# Patient Record
Sex: MALE | Race: BLACK OR AFRICAN AMERICAN | Employment: UNEMPLOYED | URBAN - METROPOLITAN AREA
[De-identification: names, ages, dates, MRNs, and addresses within clinical notes are randomized per-mention and may not be internally consistent; named-entity substitution may affect disease eponyms.]

---

## 2018-08-06 ENCOUNTER — HOSPITAL ENCOUNTER (OUTPATIENT)
Dept: PHYSICAL THERAPY | Age: 58
Discharge: HOME | End: 2018-08-06
Payer: MEDICAID

## 2018-08-06 PROCEDURE — 97165 OT EVAL LOW COMPLEX 30 MIN: CPT
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
## 2018-09-06 ENCOUNTER — HOSPITAL ENCOUNTER (OUTPATIENT)
Dept: PHYSICAL THERAPY | Age: 58
Discharge: HOME | End: 2018-09-06
Payer: MEDICAID

## 2018-09-06 PROCEDURE — 97110 THERAPEUTIC EXERCISES: CPT

## 2018-09-06 PROCEDURE — 97535 SELF CARE MNGMENT TRAINING: CPT

## 2018-09-06 PROCEDURE — 97162 PT EVAL MOD COMPLEX 30 MIN: CPT

## 2018-09-06 PROCEDURE — 97165 OT EVAL LOW COMPLEX 30 MIN: CPT
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
## 2018-09-06 NOTE — PROGRESS NOTES
OT DAILY TREATMENT NOTE  3-16 Patient Name: Odalis Lemos Date:2018 : 1960 [x]  Patient  Verified Payor: [employer]ICA[employer] / Plan: [address] [place] [address] / Product Type: Managed Care Medicaid / In time:315  Out time:350 Total Treatment Time (min): 35 Visit #: 1 of 10 Treatment Area: Lower extremity dysfunction [R29.898] SUBJECTIVE Pain Level (0-10 scale): 0/10 Any medication changes, allergies to medications, adverse drug reactions, diagnosis change, or new procedure performed?: [x] No    [] Yes (see summary sheet for update) Subjective functional status/changes:   [] No changes reported I was doing better at this stuff OBJECTIVE 25 min Self Care/Home Management: LB dressing Rationale: increase ROM and improve balance  to improve the patients ability to dress lower bodyWheelchair to mat transfer, sit to supine to sit, supine ot long ist all with supervision only Able to reach to thighs, move hip to partial flexion but unable to abduct to bring feet closer for dressing With 
 [] TE 
 [] TA 
 [] neuro 
 [] other: Patient Education: [x] Review HEP [] Progressed/Changed HEP based on: OT POC, hip stretching 
[] positioning   [] body mechanics   [] transfers   [] heat/ice application  
[] Splint wear/care   [] Sensory re-education   [] scar management     
[] other:   
 
      
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 Stays in bedroom lower than rest of house. Nurse 7 hours day Medical hx: None Medications: See the written copy of this report in the patient's paper medical record. Objective: 
Noraml UE ROm, 5/5 strength, good  B ADLs Feeding:        []MaxA   []ModA   []Imtiaz   [] CGA   []SBA   []Rosales   [x]Independent UE Dressing:       []MaxA   []ModA   []Imtiaz   [] CGA   [x]SBA   []Rosales   []Independent LE Dressing:       [x]MaxA   []ModA   []Imtiaz   [] CGA   []SBA   []Rosales   []Independent Grooming:       []MaxA   []ModA   []Imtiaz   [] CGA   [x]SBA   []Rosales   []Independent Toileting:       []MaxA   []ModA   []Imtiaz   [] CGA   [x]SBA   []Rosales   []Independent Bathing:       []MaxA   []ModA   [x]Imtiaz   [] CGA   []SBA   []Rosales   []Independent Light Meal Prep:    [x]MaxA   []ModA   []Imtiaz   [] CGA   []SBA   []Rosales   []Independent Household/Other:  [x]MaxA   []ModA   []Imtiaz   [] CGA   []SBA   []Rosales   []Independent Adaptive Equip:     []MaxA   []ModA   []Imtiaz   [] CGA   []SBA   []Rosales   []Independent Driving:       [x]MaxA   []ModA   []Imtiaz   [] CGA   []SBA   []Rosales   []Independent Money Mgmt:        []MaxA   []ModA   []Imtiaz   [] CGA   []SBA   []Rosales   []Independent Pain Level (0-10 scale) post treatment: 0/10 ASSESSMENT/Changes in Function:   
[x]  See Plan of Care 
[]  See progress note/recertification 
[]  See Discharge Summary PLAN 
[]  Upgrade activities as tolerated     []  Continue plan of care 
[]  Update interventions per flow sheet      
[]  Discharge due to:_ 
[]  Other:_ Reilly Marcial OTR/L 9/6/2018  3:18 PM 
 
Future Appointments Date Time Provider Brittany Stephanie 10/25/2018 10:45 AM MD Blair Woo
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
## 2018-09-06 NOTE — PROGRESS NOTES
[facility] [address] [facility] 
[phone] fax Plan of Care/ Statement of Necessity for Physical Therapy Services Patient name: Luz Elena Yu Start of Care: 2018 Referral source: Boston Hutchison : 1960 Medical Diagnosis: Lower extremity dysfunction [R29.898] Onset Date: 2017 Treatment Diagnosis: LBP, paraplegia Prior Hospitalization: see medical history Provider#: [id number] Medications: Verified on Patient summary List  
 Comorbidities: asthma, HTN Prior Level of Function: manual wheelchair, but in process of getting electric wheelchair, occasional assistance with transfers and getting dressed. The Plan of Care and following information is based on the information from the initial evaluation. Assessment/ key information:  Pt. Is a 62year old male c/o difficulty with transfers and back pain. He reports he was able to place legs in wheelchair foot rests and manage legs for sit to supine transfers but is now having difficulty with these tasks. He also reports having pain and tightness in his back. He reports no sensation below navel and uses a catheter secondary to lack of bladder control. MMT hip flexion 1/5 and was 0/5 in knee and ankle. He had decreased knee mobility into flexion. Mod A for sit to supine transfers, min A for bringing feet onto foot rests. Skilled PT is medically necessary in order to improve pt. Transfers and trunk stability for increased ease of ADLs improve independence at home.   
 
Evaluation Complexity History MEDIUM  Complexity : 1-2 comorbidities / personal factors will impact the outcome/ POC ; Examination MEDIUM Complexity : 3 Standardized tests and measures addressing body structure, function, activity limitation and / or participation in recreation  ;Presentation MEDIUM Complexity : Evolving with changing characteristics ;Clinical Decision Making MEDIUM Complexity : FOTO score of 26-74 Overall Complexity Rating: MEDIUM Problem List: pain affecting function, decrease ROM, decrease strength, impaired gait/ balance, decrease ADL/ functional abilitiies, decrease activity tolerance, decrease flexibility/ joint mobility and decrease transfer abilities Treatment Plan may include any combination of the following: Therapeutic exercise, Therapeutic activities, Neuromuscular re-education, Physical agent/modality, Gait/balance training, Manual therapy, Patient education, Self Care training, Functional mobility training and Home safety training Patient / Family readiness to learn indicated by: asking questions and trying to perform skills Persons(s) to be included in education: patient (P) Barriers to Learning/Limitations: None Patient Goal (s): to be more independent Patient Self Reported Health Status: good Rehabilitation Potential: good Short Term Goals: To be accomplished in 1 weeks: 1. Patient will demonstrate compliance with HEP in order to improve knee flexion mobility. Long Term Goals: To be accomplished in 6 weeks: 1. Patient will improve FOTO score by 7 points in order to demonstrate a significant improvement in function. 2. Patient will be Ind with sit to supine transfers in order to improve independence at home. 3. Patient will be Ind with placing feet on wheelchair foot rests in order to improve independence at home. 4. Patient will report pain at 4/10 or less in order to improve quality of life. Frequency / Duration: Patient to be seen 2 times per week for 6 weeks. Patient/ CarPatient/ Caregiver education and instruction: Diagnosis, prognosis, exercises 
 [x]  Plan of care has been reviewed with CINDY Romero, PT 9/6/2018 6:25 PM 
 
________________________________________________________________________ I certify that the above Therapy Services are being furnished while the patient is under my care. I agree with the treatment plan and certify that this therapy is necessary. [de-identified] Signature:____________Date:_________TIME:________ 
 
[employer], Date and Time must be completed for valid certification ** Please sign and return to [facility]  [address] [facility] 
[phone] fax

## 2018-09-10 ENCOUNTER — HOSPITAL ENCOUNTER (OUTPATIENT)
Dept: PHYSICAL THERAPY | Age: 58
Discharge: HOME | End: 2018-09-10
Payer: MEDICAID

## 2018-09-10 PROCEDURE — 97535 SELF CARE MNGMENT TRAINING: CPT

## 2018-09-10 NOTE — PROGRESS NOTES
OT DAILY TREATMENT NOTE  3-16 Patient Name: Feliciano Serrano Date:9/10/2018 : 1960 [x]  Patient  Verified Payor: BLUE [employer]OSS MEDICAID / Plan: [address] [place] [address] / Product Type: Managed Care Medicaid / In time:500  Out time:530 Total Treatment Time (min): 30 Visit #: 2 of  Treatment Area: There are no admission diagnoses documented for this encounter. SUBJECTIVE Pain Level (0-10 scale): 0/10 Any medication changes, allergies to medications, adverse drug reactions, diagnosis change, or new procedure performed?: [x] No    [] Yes (see summary sheet for update) Subjective functional status/changes:   [] No changes reported This is a work out, it feels better after I did it ( stretching OBJECTIVE 10 min Therapeutic Exercise:  [] See flow sheet :  
Rationale: increase ROM to improve the patients ability to reach for fee 
t Stretching lower extremities using sheet pull with good success 20 
 min Self Care/Home Management: Lower body dressing Rationale: increase ROM, increase strength and improve balance  to improve the patients ability to dress self , Wc to mat transfer with supervision Sit to supine to long sit with demonstration Sheet pull used to bring left knee into flex hip into abd to reach foot and remove shoe with max difficulty and effort Required OT assist to replace shoe due to swelling 
 
d With 
 [] TE 
 [] TA 
 [] neuro 
 [] other: Patient Education: [x] Review HEP [] Progressed/Changed HEP based on:  
[] positioning   [] body mechanics   [x] transfers   [] heat/ice application  
[] Splint wear/care   [] Sensory re-education   [] scar management     
[x] other: techniques for ADL Other Objective/Functional Measures:  
Able to remove left shoe today Pain Level (0-10 scale) post treatment: 0/10 ASSESSMENT/Changes in Function: Improving independence Patient will continue to benefit from skilled OT services to modify and progress therapeutic interventions, address ROM deficits, address strength deficits, analyze and address soft tissue restrictions, analyze and cue movement patterns and instruct in home and community integration to attain remaining goals. []  See Plan of Care 
[]  See progress note/recertification 
[]  See Discharge Summary Progress towards goals / Updated goals: *1.  Patient will be able to move from supine to long sitting independently and reach to knee level to assist with self caremet following stretching 9/10/18 2. Patient will be independent in stretching for lower extremities to position for lower extremity dressing. progressing 9/10 /18 3. Patient will be able to reposition lower extremities to enable reaching to feet with knee bent. 
  
Long Term Goals: To be accomplished in 8 weeks: 1.  Patient will be able to dress self independently at bed level. 2.  Patient will  Be able to complete simple meal prep from wheelchair level 3. Patient will be able to bathe self from [facility] level** PLAN 
[]  Upgrade activities as tolerated     []  Continue plan of care 
[]  Update interventions per flow sheet      
[]  Discharge due to:_ 
[]  Other:_ MISTY Pena/L 9/10/2018  4:58 PM 
 
Future Appointments Date Time Provider Brittany Brown 9/10/2018 5:00 PM Dayanna Paulino OTR/L [facility]  
9/12/2018 5:30 PM Zara Leal, PT [facility]  
9/13/2018 3:00 PM Rosangela Pineda, PTA [facility]  
9/17/2018 3:30 PM Liza Hankins [facility]  
9/17/2018 4:00 PM Zara Leal, PT [facility]  
9/20/2018 12:30 PM Rosangela Pineda, PTA [facility]  
9/20/2018 1:00 PM Dayanna Paulino, OTR/L [facility]  
9/24/2018 5:00 PM Dayanna Paulino OTR/L [facility]  
9/24/2018 5:30 PM Warrior Gloss, PT [facility]  
9/26/2018 2:00 PM Warrior Gloss, PT [facility]  
9/26/2018 2:30 PM Dayanna Paulino, OTR/L [facility]  
10/25/2018 10:45 AM Samantha Gallegos MD [id number]

## 2018-09-12 ENCOUNTER — HOSPITAL ENCOUNTER (OUTPATIENT)
Dept: PHYSICAL THERAPY | Age: 58
End: 2018-09-12
Payer: MEDICAID

## 2018-09-12 ENCOUNTER — APPOINTMENT (OUTPATIENT)
Dept: PHYSICAL THERAPY | Age: 58
End: 2018-09-12
Payer: MEDICAID

## 2018-09-13 ENCOUNTER — APPOINTMENT (OUTPATIENT)
Dept: PHYSICAL THERAPY | Age: 58
End: 2018-09-13
Payer: MEDICAID

## 2018-09-13 ENCOUNTER — HOSPITAL ENCOUNTER (OUTPATIENT)
Dept: PHYSICAL THERAPY | Age: 58
Discharge: HOME | End: 2018-09-13
Payer: MEDICAID

## 2018-09-13 PROCEDURE — 97110 THERAPEUTIC EXERCISES: CPT

## 2018-09-13 PROCEDURE — 97530 THERAPEUTIC ACTIVITIES: CPT

## 2018-09-13 NOTE — PROGRESS NOTES
OT DAILY TREATMENT NOTE - non [facility] 3-16 Patient Name: Yasmine Morejon Date:2018 : 1960 [x]  Patient  Verified Payor: BLUE [employer]OSS MEDICAID / Plan: [address] [place] [address] / Product Type: Managed Care Medicaid / In time:931  Out time:1002 Total Treatment Time (min): 31 Visit #: 3 of 10 Treatment Area: Lower extremity dysfunction [R29.898] SUBJECTIVE Pain Level (0-10 scale): 0/10 Any medication changes, allergies to medications, adverse drug reactions, diagnosis change, or new procedure performed?: [x] No    [] Yes (see summary sheet for update) Subjective functional status/changes:   [] No changes reported My feet a swollen today OBJECTIVE 11 min Therapeutic Exercise:  [] See flow sheet :  
Rationale: increase ROM and increase strength to improve the patients ability to reach LB stretching with use of leg , seated at edge of mat 
 
20 min Therapeutic Activity:  [x]  See flow sheet :  
Rationale: increase ROM, increase strength and improve coordination  to improve the patients ability to don/doff pants Simulated LB dressing with use of theraband, reacher, and leg  in long sitting, don/doff 2x With 
 [] TE 
 [] TA 
 [] neuro 
 [] other: Patient Education: [x] Review HEP [] Progressed/Changed HEP based on:  
[] positioning   [] body mechanics   [] transfers   [] heat/ice application  
[] Splint wear/care   [] Sensory re-education   [] scar management     
[] other:   
 
      
Other Objective/Functional Measures:  
 
Edge of Mat to Supine: Min A assistance with moving BLE onto mat Supine to Long Sitting: supervision Long sitting to [place] of Mat: Supervision with some difficulty with B LE 
Edge of Mat to W/c: Supervision, assistance needed for BLE in leg rests Simulated BLE dressing: Mod A/Min A with improvement on second attempt, increased time required Pain Level (0-10 scale) post treatment: 0/10 ASSESSMENT/Changes in Function: Improvement with LB dressing with use of AE Patient will continue to benefit from skilled OT services to modify and progress therapeutic interventions, address ROM deficits, address strength deficits and instruct in home and community integration to attain remaining goals. []  See Plan of Care 
[]  See progress note/recertification 
[]  See Discharge Summary Progress towards goals / Updated goals: 1.  Patient will be able to move from supine to long sitting independently and reach to knee level to assist with self caremet following stretching 9/10/18 2.  Patient will be independent in stretching for lower extremities to position for lower extremity dressing. progressing 9/10 /18 3.   Patient will be able to reposition lower extremities to enable reaching to feet with knee bent. 
   
Long Term Goals: To be accomplished in [address] [place] [address] 
1.  Patient will be able to dress self independently at bed level. 2.  Patient will  Be able to complete simple meal prep from wheelchair level 3.  Patient will be able to bathe self from [facility] level PLAN 
[]  Upgrade activities as tolerated     [x]  Continue plan of care 
[]  Update interventions per flow sheet      
[]  Discharge due to:_ 
[]  Other:_ Jesu Mayen MONTIEL/ROBBIN 9/13/2018  9:29 AM 
 
Future Appointments Date Time Provider Brittany Brown 9/13/2018 9:30 AM Jane Alarcon [facility]  
9/17/2018 3:30 PM Jesu Mayen [id number] [facility]  
9/17/2018 4:00 PM Navin Graham, PT [facility]  
9/20/2018 12:30 PM Eliz Kim PTA [facility]  
9/20/2018 1:00 PM Shruthi Good, OTR/L [facility]  
9/24/2018 5:00 PM Shruthi Good, OTR/L [facility]  
9/24/2018 5:30 PM Navin Graham, PT [facility]  
9/26/2018 2:00 PM Navin Graham PT [facility]  
9/26/2018 2:30 PM Shruthi Good, OTR/L [facility]  
10/25/2018 10:45 AM MD Blair Joyner

## 2018-09-13 NOTE — PROGRESS NOTES
PT DAILY TREATMENT NOTE - [facility]  Patient Name: Guerda Espinal Date:2018 : 1960 [x]  Patient  Verified Payor: BL[employer]OSS [employer]ICAID / Plan: [address] [place] [address] / Product Type: Managed Care Medicaid / In time:9:06  Out time:9:31 Total Treatment Time (min): 25 Total Timed Codes (min): 25 
1:1 Treatment Time ( only): 25 Visit #: 2 of 12 Treatment Area: Lower extremity dysfunction [R29.898] SUBJECTIVE Pain Level (0-10 scale): 0/10 Any medication changes, allergies to medications, adverse drug reactions, diagnosis change, or new procedure performed?: [x] No    [] Yes (see summary sheet for update) Subjective functional status/changes:   [] No changes reported Pt reports that he is doing the exercises at home. He is not sure what he's hoping to achieve with therapy. He feels like he's ok with his transfers at home. OBJECTIVE 25 min Therapeutic Exercise:  [x] See flow sheet :  
Rationale: increase ROM, increase strength, improve coordination, improve balance and increase proprioception to improve the patients ability to improve ease of transfers and ADLs With 
 [] TE 
 [] TA 
 [] neuro 
 [] other: Patient Education: [x] Review HEP [] Progressed/Changed HEP based on:  
[] positioning   [] body mechanics   [] transfers   [] heat/ice application   
[] other:   
 
Other Objective/Functional Measures:  
 
Simon TB for TB rows, required cues for scapular retraction and to avoid should shrug, improved form with cuing Unable to maintain elbow extension with plum TB for extension, improved form with blue TB, required cues to avoid trunk flexion and maintain upright posture Performed wheelchair<>table transfer Refugio, therapist performed set-up with wheelchair Imtiaz with putting feet on leg rests of wheelchair No visible movement with seated tara Pain Level (0-10 scale) post treatment: 0/10 ASSESSMENT/Changes in Function: Initiated treatment per POC. Pt is motivated in therapy and put forth good effort with interventions. Will continue to address strength and functional mobility deficits for improved ease of ADLs and improved QOL. Patient will continue to benefit from skilled PT services to modify and progress therapeutic interventions, address functional mobility deficits, address ROM deficits, address strength deficits, analyze and address soft tissue restrictions, analyze and cue movement patterns, analyze and modify body mechanics/ergonomics, assess and modify postural abnormalities, address imbalance/dizziness and instruct in home and community integration to attain remaining goals. Progress towards goals / Updated goals: 
Short Term Goals: To be accomplished in 1 weeks: 1. Patient will demonstrate compliance with HEP in order to improve knee flexion mobility. Goal me. 9/13/18 
  
Long Term Goals: To be accomplished in 6 weeks: 1. Patient will improve FOTO score by 7 points in order to demonstrate a significant improvement in function. 2. Patient will be Ind with sit to supine transfers in order to improve independence at home. 3. Patient will be Ind with placing feet on wheelchair foot rests in order to improve independence at home. 4. Patient will report pain at 4/10 or less in order to improve quality of life. PLAN [x]  Upgrade activities as tolerated     [x]  Continue plan of care 
[]  Update interventions per flow sheet      
[]  Discharge due to:_ 
[]  Other:_ Daniel Berman PT 9/13/2018  9:08 AM 
 
Future Appointments Date Time Provider Brittany Brown 9/13/2018 9:30 AM Nirav Hands Grogg [facility]  
9/17/2018 3:30 PM Hebert Hannon [id number] [facility]  
9/17/2018 4:00 PM Demetria Dennis, PT [facility]  
9/20/2018 12:30 PM Alejo Hunt, PTA [facility]  
9/20/2018 1:00 PM Sebastián Rodriguez, OTR/L [facility]  
9/24/2018 5:00 PM Sebastián Rodriguez, OTR/L [facility]  
 9/24/2018 5:30 PM Ning Salmon, PT [id number] [facility]  
9/26/2018 2:00 PM Ning Salmon, PT [facility]  
9/26/2018 2:30 PM Elodia Del Castillo, OTR/L [facility]  
10/25/2018 10:45 AM MD Simeon Hamilton [facility]

## 2018-09-17 ENCOUNTER — HOSPITAL ENCOUNTER (OUTPATIENT)
Dept: PHYSICAL THERAPY | Age: 58
Discharge: HOME | End: 2018-09-17
Payer: MEDICAID

## 2018-09-17 PROCEDURE — 97110 THERAPEUTIC EXERCISES: CPT

## 2018-09-17 PROCEDURE — 97535 SELF CARE MNGMENT TRAINING: CPT

## 2018-09-17 NOTE — PROGRESS NOTES
OT DAILY TREATMENT NOTE - non [facility] 3-16 Patient Name: Kenzie Odom Date:2018 : 1960 [x]  Patient  Verified Payor: BL[employer]OSS [employer]ICAID / Plan: [address] [place] [address] / Product Type: Managed Care Medicaid / In time:332  Out time:400 Total Treatment Time (min): 28 Visit #: 4 of 10 Treatment Area: Lower extremity dysfunction [R29.898] SUBJECTIVE Pain Level (0-10 scale): 0/10 Any medication changes, allergies to medications, adverse drug reactions, diagnosis change, or new procedure performed?: [x] No    [] Yes (see summary sheet for update) Subjective functional status/changes:   [] No changes reported I think I would be able to do my shoes if I wasn't so tight OBJECTIVE 18 min Therapeutic Exercise:  [] See flow sheet :  
Rationale: increase ROM and increase strength to improve the patients ability to reach BLE for increased independence with ADL's 
 
LB Stretching with Band 10 min Self Care/Home Management: Shoe Donning Rationale: increase ROM, increase strength and improve coordination  to improve the patients ability to don shoes with increased independence Patty Minion Right Shoe in long sitting With 
 [] TE 
 [] TA 
 [] neuro 
 [] other: Patient Education: [x] Review HEP [] Progressed/Changed HEP based on:  
[] positioning   [] body mechanics   [] transfers   [] heat/ice application  
[] Splint wear/care   [] Sensory re-education   [] scar management     
[] other:   
 
      
Other Objective/Functional Measures: W/C <> Mat: Mod I Edge of Mat <> Supine: Min A for assistance with B LE 
Supine to long sitting: Mod I Dynamic Sitting balance in long sitting: SBA Patty Minion Right Shoe: SBA with increased time required Verbal Cueing needing for neck position/posture during stretching Pain Level (0-10 scale) post treatment: 0/10 ASSESSMENT/Changes in Function: Pt progressing with ability to perform LB dressing tasks Patient will continue to benefit from skilled OT services to modify and progress therapeutic interventions, address ROM deficits, address strength deficits, analyze and cue movement patterns and instruct in home and community integration to attain remaining goals. []  See Plan of Care 
[]  See progress note/recertification 
[]  See Discharge Summary Progress towards goals / Updated goals: 1.  Patient will be able to move from supine to long sitting independently and reach to knee level to assist with self caremet following stretching 9/10/18 2.  Patient will be independent in stretching for lower extremities to position for lower extremity dressing. progressing 9/17 /18 3.   Patient will be able to reposition lower extremities to enable reaching to feet with knee bent. Progressing with in clinic activities 9/17/18 
   
[address] be accomplished in [address] ([facility]) will be able to dress self independently at bed level. 2.  Patient will  Be able to complete simple meal prep from wheelchair level 3.  Patient will be able to bathe self from [facility] level PLAN 
[]  Upgrade activities as tolerated     [x]  Continue plan of care 
[]  Update interventions per flow sheet      
[]  Discharge due to:_ 
[]  Other:_ DINESH Leiva/ROBBIN 9/17/2018  2:54 PM 
 
Future Appointments Date Time Provider Brittany Brown 9/17/2018 3:30 PM Von Toledo [facility]  
9/17/2018 4:00 PM Henri Calderon, DESIRE [facility]  
9/20/2018 12:30 PM Delgado Diallo PTA [facility]  
9/20/2018 1:00 PM Irina Murrell, OTR/L [facility]  
9/24/2018 5:00 PM Irina Murrell, OTR/L [facility]  
9/24/2018 5:30 PM Henri Calderon, DESIRE [facility]  
9/26/2018 2:00 PM Henri Calderon, DESIRE [facility]  
9/26/2018 2:30 PM Irina Murrell, OTR/L [facility]  
10/25/2018 10:45 AM Veronica Vega MD [facility]

## 2018-09-17 NOTE — PROGRESS NOTES
PT DAILY TREATMENT NOTE  Patient Name: Nora Shultz Date:2018 : 1960 [x]  Patient  Verified Payor: BLUE [employer]OSS [employer]ICAID / Plan: [address] [place] [address] / Product Type: Managed Care Medicaid / In time:406 Out time:4:44 Total Treatment Time (min): 38 Visit #: 3 of 12 Treatment Area: Lower extremity dysfunction [R29.898] SUBJECTIVE Pain Level (0-10 scale): 0/10 Any medication changes, allergies to medications, adverse drug reactions, diagnosis change, or new procedure performed?: [x] No    [] Yes (see summary sheet for update) Subjective functional status/changes:   [] No changes reported Pt. States he is doing fine. Received after completing OT session. OBJECTIVE 15 min Therapeutic Exercise:  [x] See flow sheet :  
Rationale: increase ROM and increase strength to improve the patients ability to improve ease with ADL's. 
 
8 min Therapeutic Activity:  [x]  See flow sheet :  
Rationale: increase strength and improve coordination  to improve the patients ability to improve ease with ADL's, including bed mobility and transfers. 10 min Neuromuscular Re-education:  [x]  See flow sheet :  
Rationale: increase strength and improve balance  to improve the patients ability to improve ease with ADL's. With 
 [] TE 
 [] TA 
 [] neuro 
 [] other: Patient Education: [x] Review HEP [] Progressed/Changed HEP based on:  
[] positioning   [] body mechanics   [] transfers   [] heat/ice application   
[] other:   
 
Other Objective/Functional Measures: Pt. Able to complete rolling in supine with minimal assistance to manage LE's, uses momentum of UE's to complete. Pain Level (0-10 scale) post treatment: 0/10 ASSESSMENT/Changes in Function: 
Pt. Is highly motivated to achieve goals. Pt. Worked on improving core activation and strengthening to improve ease of completing bed mobility and transfers. Patient will continue to benefit from skilled PT services to modify and progress therapeutic interventions, address functional mobility deficits, address ROM deficits, address strength deficits, analyze and cue movement patterns, assess and modify postural abnormalities, address imbalance/dizziness and instruct in home and community integration to attain remaining goals. Progress towards goals / Updated goals: 
Short Term Goals: To be accomplished in 1 weeks: 1. Patient will demonstrate compliance with HEP in order to improve knee flexion mobility. Goal me. 9/13/18 
   
Long Term Goals: To be accomplished in 6 weeks: 1. Patient will improve FOTO score by 7 points in order to demonstrate a significant improvement in function. 2. Patient will be Ind with sit to supine transfers in order to improve independence at home. 3. Patient will be Ind with placing feet on wheelchair foot rests in order to improve independence at home. 4. Patient will report pain at 4/10 or less in order to improve quality of life. PLAN 
[]  Upgrade activities as tolerated     [x]  Continue plan of care 
[]  Update interventions per flow sheet      
[]  Discharge due to:_ 
[]  Other:_   
 
Sol Goss, PT 9/17/2018  4:13 PM 
 
Future Appointments Date Time Provider Brittany Brown 9/20/2018 12:30 PM Demetri Gibbons PTA [facility]  
9/20/2018 1:00 PM Colby Fox, OTR/L [facility]  
9/24/2018 5:00 PM Colby Fox, OTR/L [facility]  
9/24/2018 5:30 PM Sol Goss, PT [facility]  
9/26/2018 2:00 PM Sol Goss, PT [facility]  
9/26/2018 2:30 PM Colby Fox, OTR/L [facility]  
10/25/2018 10:45 AM Quincy Menendez MD [address] [facility]

## 2018-09-19 ENCOUNTER — APPOINTMENT (OUTPATIENT)
Dept: PHYSICAL THERAPY | Age: 58
End: 2018-09-19
Payer: MEDICAID

## 2018-09-20 ENCOUNTER — HOSPITAL ENCOUNTER (OUTPATIENT)
Dept: PHYSICAL THERAPY | Age: 58
Discharge: HOME | End: 2018-09-20
Payer: MEDICAID

## 2018-09-20 PROCEDURE — 97110 THERAPEUTIC EXERCISES: CPT

## 2018-09-20 PROCEDURE — 97535 SELF CARE MNGMENT TRAINING: CPT

## 2018-09-20 NOTE — PROGRESS NOTES
PT DAILY TREATMENT NOTE - [facility]  Patient Name: Isaiah Weiss Date:2018 : 1960 [x]  Patient  Verified Payor: BLUE [employer]OSS MEDICAID / Plan: [address] [place] [address] / Product Type: Managed Care Medicaid / In time:12:39  Out time:1:02 Total Treatment Time (min): 23 Total Timed Codes (min): 23 
1:1 Treatment Time ([facility] only): 23 Visit #: 4 of 12 Treatment Area: Lower extremity dysfunction [R29.898] SUBJECTIVE Pain Level (0-10 scale): 0/10 Any medication changes, allergies to medications, adverse drug reactions, diagnosis change, or new procedure performed?: [x] No    [] Yes (see summary sheet for update) Subjective functional status/changes:   [] No changes reported Pt reports being compliant with his HEP. OBJECTIVE 23 min Therapeutic Exercise:  [] See flow sheet :  
Rationale: increase ROM and increase strength to improve the patients ability to perform ADL's. With 
 [x] TE 
 [] TA 
 [] neuro 
 [] other: Patient Education: [x] Review HEP [] Progressed/Changed HEP based on:  
[] positioning   [] body mechanics   [] transfers   [] heat/ice application   
[] other:   
 
Other Objective/Functional Measures: Pt progressed TE with tricep extensions. Pain Level (0-10 scale) post treatment: 0/10 ASSESSMENT/Changes in Function: Pt progressed strengthening TE's evidenced by tricep extensions and [place] rows repetitions. Pt seems very motivated and works hard. Patient will continue to benefit from skilled PT services to modify and progress therapeutic interventions, address functional mobility deficits, address ROM deficits, address strength deficits, analyze and address soft tissue restrictions and analyze and cue movement patterns to attain remaining goals. [x]  See Plan of Care 
[]  See progress note/recertification 
[]  See Discharge Summary Progress towards goals / Updated goals: 
Short Term Goals: To be accomplished in 1 weeks: 1. Patient will demonstrate compliance with HEP in order to improve knee flexion mobility. Goal me. 9/13/18 
   
Long Term Goals: To be accomplished in 6 weeks: 1. Patient will improve FOTO score by 7 points in order to demonstrate a significant improvement in function. 2. Patient will be Ind with sit to supine transfers in order to improve independence at home. 3. Patient will be Ind with placing feet on wheelchair foot rests in order to improve independence at home. 4. Patient will report pain at 4/10 or less in order to improve quality of life. PLAN [x]  Upgrade activities as tolerated     [x]  Continue plan of care [x]  Update interventions per flow sheet      
[]  Discharge due to:_ 
[]  Other:_ Toyin White, CINDY 9/20/2018  6:13 PM 
 
Future Appointments Date Time Provider Brittany Brown 9/24/2018 5:00 PM Sheila Avelar, OTR/L [facility]  
9/24/2018 5:30 PM Bard Brunner, PT [facility]  
9/26/2018 2:00 PM Bard Brunner, PT [facility]  
9/26/2018 2:30 PM Sheila Avelar, OTR/L [facility]  
10/25/2018 10:45 AM Paras Bragg MD [address] [facility]

## 2018-09-20 NOTE — PROGRESS NOTES
OT DAILY TREATMENT NOTE  3-16 Patient Name: Isaiah Weiss Date:2018 : 1960 [x]  Patient  Verified Payor: BLUE CROSS MEDICAID / Plan: [address] [place] [address] / Product Type: Managed Care Medicaid / In time:100  Out time:130 Total Treatment Time (min): 30 Visit #: 5 of 10 Treatment Area: Lower extremity dysfunction [R29.898] SUBJECTIVE Pain Level (0-10 scale): 0/10 Any medication changes, allergies to medications, adverse drug reactions, diagnosis change, or new procedure performed?: [x] No    [] Yes (see summary sheet for update) Subjective functional status/changes:   [] No changes reported I got stretched out good in PT today OBJECTIVE 30 min Self Care/Home Management: Lower body dressing Rationale: increase ROM  to improve the patients ability to dress lower extremity Wc to mat transfer, supine to sit after I, sit to supine with several trials to correct Reached to mid calf, used leg  with cues, tried sock aid hard and thin plastic with fair success Able to remove left shoe With 
 [] TE 
 [] TA 
 [] neuro 
 [] other: Patient Education: [x] Review HEP [] Progressed/Changed HEP based on: stretching 
[] positioning   [] body mechanics   [] transfers   [] heat/ice application  
[] Splint wear/care   [] Sensory re-education   [] scar management     
[] other:   
 
      
Other Objective/Functional Measures:  
Able to remove left shoe independently Pain Level (0-10 scale) post treatment: 0/10 ASSESSMENT/Changes in Function: Improving lower body dressing Patient will continue to benefit from skilled OT services to modify and progress therapeutic interventions, address ROM deficits, address strength deficits, analyze and address soft tissue restrictions and instruct in home and community integration to attain remaining goals. []  See Plan of Care 
[]  See progress note/recertification 
[]  See Discharge Summary Progress towards goals / Updated goals: 1.  Patient will be able to move from supine to long sitting independently and reach to knee level to assist with self caremet following stretching 9/10/18 2.  Patient will be independent in stretching for lower extremities to position for lower extremity dressing. met 9/20/18 3.   Patient will be able to reposition lower extremities to enable reaching to feet with knee bent. Progressing with in clinic activities 9/20/18 
   
[address] be accomplished in [address] ([facility]) will be able to dress self independently at bed level. 2.  Patient will  Be able to complete simple meal prep from wheelchair level 3.  Patient will be able to bathe self from [facility] level PLAN [x]  Upgrade activities as tolerated     [x]  Continue plan of care 
[]  Update interventions per flow sheet      
[]  Discharge due to:_ 
[]  Other:_ Eunice Holder OTR/L 9/20/2018  2:55 PM 
 
Future Appointments Date Time Provider Brittany Brown 9/24/2018 5:00 PM Eunice Holder OTR/L [facility]  
9/24/2018 5:30 PM Elvis Elam, PT [facility]  
9/26/2018 2:00 PM Elvis Elam, PT [facility]  
9/26/2018 2:30 PM Eunice Holder OTR/L [facility]  
10/25/2018 10:45 AM Hayden Yang MD [address] [facility]

## 2018-09-24 ENCOUNTER — HOSPITAL ENCOUNTER (OUTPATIENT)
Dept: PHYSICAL THERAPY | Age: 58
Discharge: HOME | End: 2018-09-24
Payer: MEDICAID

## 2018-09-24 PROCEDURE — 97110 THERAPEUTIC EXERCISES: CPT

## 2018-09-24 PROCEDURE — 97535 SELF CARE MNGMENT TRAINING: CPT

## 2018-09-24 NOTE — PROGRESS NOTES
OT DAILY TREATMENT NOTE  3 Patient Name: Yasmine Morejon Date:2018 : 1960 [x]  Patient  Verified Payor: [employer] / Plan: [address] [place] [address] / Product Type: Managed Care Medicaid / In time:515  Out time:530 Total Treatment Time (min): 15 Visit #: 6 of  Treatment Area: Lower extremity dysfunction [R29.898] SUBJECTIVE Pain Level (0-10 scale): 0/10 Any medication changes, allergies to medications, adverse drug reactions, diagnosis change, or new procedure performed?: [x] No    [] Yes (see summary sheet for update) Subjective functional status/changes:   [] No changes reported I am sorry I am late I am seeing foot doctor tomorrow ( re sore on great toe on left foot) OBJECTIVE 15 min Self Care/Home Management: Lower body dressing Rationale: increase ROM and adaptive techniques  to improve the patients ability to don doff left shoe Removed left sock with dressing stick in wheelchair, Removed left shoe with dressing stick in wheelchair Donned left sock with sock aid in wheelchair, donned left shoe using dressing stick and mod assist of OT With 
 [] TE 
 [] TA 
 [] neuro 
 [] other: Patient Education: [x] Review HEP [] Progressed/Changed HEP based on: use of equipment 
[] positioning   [] body mechanics   [] transfers   [] heat/ice application  
[] Splint wear/care   [] Sensory re-education   [] scar management     
[x] other: Skin care foot care with use of mirror Other Objective/Functional Measures:  
Open area on top of great toe, dark area on side of 5th metatarsal  
 
Pain Level (0-10 scale) post treatment: 0/10 ASSESSMENT/Changes in Function: Able to perform shoe and sock at wheelchair level requiring equipment and rest breaks, and physical assist only for shoe fully on Patient will continue to benefit from skilled OT services to modify and progress therapeutic interventions, address ROM deficits, address strength deficits, analyze and address soft tissue restrictions and instruct in home and community integration to attain remaining goals. []  See Plan of Care 
[]  See progress note/recertification 
[]  See Discharge Summary Progress towards goals / Updated goals: 1.  Patient will be able to move from supine to long sitting independently and reach to knee level to assist with self caremet following stretching 9/10/18 2.  Patient will be independent in stretching for lower extremities to position for lower extremity dressing. met 9/20/18 3.   Patient will be able to reposition lower extremities to enable reaching to feet with knee bent. Progressing with in clinic activities 9/20/18 
   
[address] be accomplished in [address] ([facility]) will be able to dress self independently at bed level. 2.  Patient will  Be able to complete simple meal prep from wheelchair level 3.  Patient will be able to bathe self from [facility] level PLAN 
[]  Upgrade activities as tolerated     [x]  Continue plan of care 
[]  Update interventions per flow sheet      
[]  Discharge due to:_ 
[]  Other:_ Connie Cabrera OTR/L 9/24/2018  5:38 PM 
 
Future Appointments Date Time Provider Brittany Brown 9/26/2018 2:00 PM Genora Lefort, PT [facility]  
9/26/2018 2:30 PM Connie Cabrera OTR/L [facility]  
10/25/2018 10:45 AM MD Blair Morales

## 2018-09-26 ENCOUNTER — HOSPITAL ENCOUNTER (OUTPATIENT)
Dept: PHYSICAL THERAPY | Age: 58
Discharge: HOME | End: 2018-09-26
Payer: MEDICAID

## 2018-09-26 PROCEDURE — 97535 SELF CARE MNGMENT TRAINING: CPT

## 2018-09-26 PROCEDURE — 97110 THERAPEUTIC EXERCISES: CPT

## 2018-09-26 NOTE — PROGRESS NOTES
OT DAILY TREATMENT NOTE  3- Patient Name: Sabi Jacinto Date:2018 : 1960 [x]  Patient  Verified Payor: [employer] / Plan: [address] [place] [address] / Product Type: Managed Care Medicaid / In time:230  Out time:300 Total Treatment Time (min): 30 Visit #: 7 of  Treatment Area: Lower extremity dysfunction [R29.898] SUBJECTIVE Pain Level (0-10 scale): 0/10 Any medication changes, allergies to medications, adverse drug reactions, diagnosis change, or new procedure performed?: [x] No    [] Yes (see summary sheet for update) Subjective functional status/changes:   [] No changes reported This is harder than I thought it would be OBJECTIVE 30 min Self Care/Home Management: lower body dressing Rationale: increase ROM, increase strength and improve balance  to improve the patients ability to dress lower body Sit to supine on first trial today Verbal cues to long sit from supine Able to don pants to hips with min assist and mod verbal cues, Used leg  to reposition leg intially Attempted seated shoe management-unable With 
 [] TE 
 [] TA 
 [] neuro 
 [] other: Patient Education: [x] Review HEP [x] Progressed/Changed HEP based on: adaptive dressing skills [] positioning   [] body mechanics   [] transfers   [] heat/ice application  
[] Splint wear/care   [] Sensory re-education   [] scar management     
[] other:   
 
      
Other Objective/Functional Measures:  
Able to don pants to hips with min assist  
 
Pain Level (0-10 scale) post treatment: 0/10 ASSESSMENT/Changes in Function: improving self care and mobility skills Patient will continue to benefit from skilled OT services to modify and progress therapeutic interventions, address ROM deficits, address strength deficits, analyze and address soft tissue restrictions, analyze and cue movement patterns and instruct in home and community integration to attain remaining goals. []  See Plan of Care 
[]  See progress note/recertification 
[]  See Discharge Summary Progress towards goals / Updated goals: *1.  Patient will be able to move from supine to long sitting independently and reach to knee level to assist with self caremet following stretching 9/10/18, met 9/26/18 2.  Patient will be independent in stretching for lower extremities to position for lower extremity dressing. met 9/20/18 3.   Patient will be able to reposition lower extremities to enable reaching to feet with knee bent. Progressing with in clinic activities 9/26/18 
   
[address] be accomplished in [address] ([facility]) will be able to dress self independently at bed level. progressing 9/26/18 2.  Patient will  Be able to complete simple meal prep from wheelchair level 3.  Patient will be able to bathe self from [facility] level** PLAN [x]  Upgrade activities as tolerated     [x]  Continue plan of care 
[]  Update interventions per flow sheet      
[]  Discharge due to:_ 
[]  Other:_ MISTY Chicas/L 9/26/2018  3:04 PM 
 
Future Appointments Date Time Provider Brittany Brown 10/25/2018 10:45 AM MD Blair Crain Che

## 2018-09-26 NOTE — PROGRESS NOTES
PT DAILY TREATMENT NOTE - [facility]  Patient Name: Guerda Espinal Date:2018 : 1960 [x]  Patient  Verified Payor: [employer] / Plan: [address] [place] [address] / Product Type: Managed Care Medicaid / In time:202 Out time:230 Total Treatment Time (min): 28 Total Timed Codes (min): 28 
1:1 Treatment Time ( only): 28 Visit #: 6 of 12 Treatment Area: Lower extremity dysfunction [R29.898] SUBJECTIVE Pain Level (0-10 scale): 0/10 Any medication changes, allergies to medications, adverse drug reactions, diagnosis change, or new procedure performed?: [x] No    [] Yes (see summary sheet for update) Subjective functional status/changes:   [x] No changes reported Pt reports he is doing alright, no changes since Monday. OBJECTIVE 30 min Therapeutic Exercise:  [x] See flow sheet :  
Rationale: increase strength and improve balance to improve the patients ability to increase ease of transfers and daily activities. With 
 [] TE 
 [] TA 
 [] neuro 
 [] other: Patient Education: [x] Review HEP [] Progressed/Changed HEP based on:  
[] positioning   [] body mechanics   [] transfers   [] heat/ice application   
[] other:   
 
Other Objective/Functional Measures:   
 
Pain Level (0-10 scale) post treatment: 0/10 ASSESSMENT/Changes in Function: Pt reports a 95% improvement in over all function since start of therapy program. He is able to complete bed mobility and transfers ( wheelchair <>mat, supine<>sit) independently. Patient has met 4/4 longer term goals and 100% FOTO goal. Patient reports he is compliant, and independent with HEP.  
 
Patient will continue to benefit from skilled PT services to modify and progress therapeutic interventions, address functional mobility deficits, address ROM deficits, address strength deficits, analyze and cue movement patterns, analyze and modify body mechanics/ergonomics and instruct in home and community integration to attain remaining goals. []  See Plan of Care 
[]  See progress note/recertification 
[]  See Discharge Summary Progress towards goals / Updated goals: 
Short Term Goals: To be accomplished in 1 weeks: 1. Patient will demonstrate compliance with HEP in order to improve knee flexion mobility. Goal met. 9/13/18 
   
Long Term Goals: To be accomplished in 6 weeks: 1. Patient will improve FOTO score by 7 points in order to demonstrate a significant improvement in function. Current: MET, scored 45 pts on 9/24, 13 point improvement 2. Patient will be Ind with sit to supine transfers in order to improve independence at home. Current: MET 3. Patient will be Ind with placing feet on wheelchair foot rests in order to improve independence at home. Current: MET 4. Patient will report pain at 4/10 or less in order to improve quality of life. Current: MET, patient reports no pain, occasional muscular soreness following exercise. PLAN 
[]  Upgrade activities as tolerated     [x]  Continue plan of care 
[]  Update interventions per flow sheet      
[]  Discharge due to:_ 
[]  Other:_   
 
Milan Benitez, PT 9/26/2018  2:07 PM 
 
Future Appointments Date Time Provider Brittany Brown 9/26/2018 2:30 PM Andreas Ramos OTR/L [facility]  
10/25/2018 10:45 AM Claudette Arbour, MD [address] [facility]

## 2018-09-27 ENCOUNTER — APPOINTMENT (OUTPATIENT)
Dept: PHYSICAL THERAPY | Age: 58
End: 2018-09-27
Payer: MEDICAID

## 2018-10-08 ENCOUNTER — HOSPITAL ENCOUNTER (OUTPATIENT)
Dept: PHYSICAL THERAPY | Age: 58
Discharge: HOME | End: 2018-10-08
Payer: MEDICAID

## 2018-10-08 PROCEDURE — 97110 THERAPEUTIC EXERCISES: CPT

## 2018-10-08 PROCEDURE — 97112 NEUROMUSCULAR REEDUCATION: CPT

## 2018-10-08 NOTE — PROGRESS NOTES
PT DAILY TREATMENT NOTE - [facility]  Patient Name: Pascual Garcia Date:10/8/2018 : 1960 [x]  Patient  Verified Payor: [employer] / Plan: [address] [place] [address] / Product Type: Managed Care Medicaid / In time:4:30  Out time:5:03 Total Treatment Time (min): 33 Total Timed Codes (min): 33 
1:1 Treatment Time ( only): 28 Visit #: 7 of 12 Treatment Area: Lower extremity dysfunction [R29.898] SUBJECTIVE Pain Level (0-10 scale): 0/10 Any medication changes, allergies to medications, adverse drug reactions, diagnosis change, or new procedure performed?: [x] No    [] Yes (see summary sheet for update) Subjective functional status/changes:   [] No changes reported Pt reports being compliant with his HEP. OBJECTIVE 23 min Therapeutic Exercise:  [x] See flow sheet :  
Rationale: increase ROM and increase strength to improve the patients ability to perform ADL's independently. 10 min Neuromuscular Re-education:  [x]  See flow sheet :  
Rationale: improve coordination, improve balance and increase proprioception  to improve the patients ability to perform ADL's independently. With 
 [x] TE 
 [] TA [x] neuro 
 [] other: Patient Education: [x] Review HEP [] Progressed/Changed HEP based on:  
[] positioning   [] body mechanics   [] transfers   [] heat/ice application   
[] other:   
 
Other Objective/Functional Measures: Pt completed TE's per flow sheet with good for taking cueing. Pain Level (0-10 scale) post treatment: 0/10 ASSESSMENT/Changes in Function: Pt continued strengthening TE's and neuro re-ed requiring verbal and tactile cues. Pt appears to have the most difficulty with core strengthening, evidenced by seated push outs and SB#1's.   
 
Patient will continue to benefit from skilled PT services to modify and progress therapeutic interventions, address functional mobility deficits, address ROM deficits, address strength deficits, analyze and address soft tissue restrictions and analyze and cue movement patterns to attain remaining goals. [x]  See Plan of Care 
[]  See progress note/recertification 
[]  See Discharge Summary Progress towards goals / Updated goals: 
Short Term Goals: To be accomplished in 1 weeks: 1. Patient will demonstrate compliance with HEP in order to improve knee flexion mobility. Goal met. 9/13/18 
   
Long Term Goals: To be accomplished in 6 weeks: 1. Patient will improve FOTO score by 7 points in order to demonstrate a significant improvement in function. Current: MET, scored 45 pts on 9/24, 13 point improvement 2. Patient will be Ind with sit to supine transfers in order to improve independence at home. Current: MET 3. Patient will be Ind with placing feet on wheelchair foot rests in order to improve independence at home. Current: MET 4. Patient will report pain at 4/10 or less in order to improve quality of life. Current: MET, patient reports no pain, occasional muscular soreness following exercise. PLAN [x]  Upgrade activities as tolerated     [x]  Continue plan of care [x]  Update interventions per flow sheet      
[]  Discharge due to:_ 
[]  Other:_ Harriett Klein PTA 10/8/2018  6:28 PM 
 
Future Appointments Date Time Provider Brittany Brown 10/10/2018 1:30 PM [facility]pers[facility]  
10/25/2018 10:45 AM MD Blair Jain

## 2018-10-09 NOTE — PROGRESS NOTES
[facility] [address] [facility] 
[phone] fax Physical Therapy Progress Note Patient name: Valentina Lundberg Start of Care:  18 Referral source: Epi Barrett : 1960 Medical/Treatment Diagnosis: Lower extremity dysfunction [R29.898] Onset Date: 2017 Prior Hospitalization: see medical history Provider#: [id number] Medications: Verified on Patient Summary List   
Comorbidities: asthma, HTN Prior Level of Function: manual wheelchair, but in process of getting electric wheelchair, occasional assistance with transfers and getting dressed. Visits from Start of Care: 7    Missed Visits: 0 Established Goals:         Excellent           Good         Limited           None 
[x] Increased ROM   []  [x]  []  [] 
[x] Increased Strength  []  []  [x]  [] 
[x] Increased Mobility  []  [x]  []  []  
[x] Decreased Pain   []  [x]  []  [] 
[] Decreased Swelling  []  []  []  [] 
 
Key Functional Changes:  Pt. Is progressing well with physical therapy. FOTO score improved to 45 points indicating a significant improvement in function. He has improved in his wheelchair management and bed mobility. Pt. Is Independent with sit to supine transfers and placing feet onto wheelchair foot rests. He also reports having less overall except for muscle soreness following exercise. Skilled PT is medically necessary in order to improve strength and mobility for improved independence at home. Updated Goals: to be achieved in 4 weeks: 1. Patient will improve B hip flexor strength to 2/5 in order to increase ease of transfers at home. 2. Patient will demonstrate understanding of updated HEP in order to continue to improve following D/C. ASSESSMENT/RECOMMENDATIONS: 
[x]Continue therapy per initial plan/protocol at a frequency of  1-2 x per week for 4 weeks []Continue therapy with the following recommended changes:_____________________      _____________________________________________________________________ []Discontinue therapy progressing towards or have reached established goals []Discontinue therapy due to lack of appreciable progress towards goals []Discontinue therapy due to lack of attendance or compliance []Await Physician's recommendations/decisions regarding therapy []Other:________________________________________________________________ Thank you for this referral.  
Warden Rouse, PT 10/9/2018 9:48 AM 
 
NOTE TO PHYSICIAN:  PLEASE COMPLETE THE ORDERS BELOW AND  
FAX TO [facility]: [phone] If you are unable to process this request in 24 hours please contact our office: [phone] ? I have read the above report and request that my patient continue as recommended. ? I have read the above report and request that my patient continue therapy with the following changes/special instructions:____________________________________ ? I have read the above report and request that my patient be discharged from therapy. Physicians signature: ______________________________Date: ______Time:______

## 2018-10-10 ENCOUNTER — APPOINTMENT (OUTPATIENT)
Dept: PHYSICAL THERAPY | Age: 58
End: 2018-10-10
Payer: MEDICAID

## 2018-10-10 NOTE — PROGRESS NOTES
[facility] [address] [facility] 
[phone] fax Occupational Therapy Progress Note Patient name: Yasmine Morejon Start of Care: 18 Referral source: Ruddy Marion : 1960 Medical/Treatment Diagnosis: Lower extremity dysfunction [R29.898] Onset Date:2017 Prior Hospitalization: see medical history Provider#: [id number] Medications: Verified on Patient Summary List   
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 
[x] Increased ROM   []  [x]  []  [] 
[] Increased Strength  []  []  []  [] 
[x] Increased Mobility  []  [x]  []  []  
[] Decreased Pain   []  []  []  [] 
[] Decreased Swelling  []  []  []  [] 
[] Increased Fine Motor Skills []  []  []  [] 
[x] Increased ADL [place] []  [x]  []  [] 
 
Key Functional Changes:  
Patient is progressing well in occupational therapy with improved reach and independence in lower extremity management for lower body dressing. He is now able to move from sit to supine without assist on first trial and go from supine to long sit well. He is able to don pants to hip level with min assist and moderate verbal cues. He has been trained in use of dressing stick and leg . He continues to struggle with shoes but can remove socks with dressing stick and don with sock aid with min assist.   
 
Prior goals and progress; 1.  Patient will be able to move from supine to long sitting independently and reach to knee level to assist with self care met 18 2.  Patient will be independent in stretching for lower extremities to position for lower extremity dressing. met 18 3.   Patient will be able to reposition lower extremities to enable reaching to feet with knee bent. Progressing with in clinic activities using leg  as needed 9/26/18 
   
Long Term Goals: To be accomplished in [address] [place] [address] 
1.  Patient will be able to dress self independently at bed level. progressing 9/26/18 2.  Patient will  Be able to complete simple meal prep from wheelchair levelnot addressed 3.  Patient will be able to bathe self from [address] addressed Updated Goals: to be achieved in weeks: 3.   Patient will be able to reposition lower extremities to enable reaching to feet with knee bent. Long Term Goals:  
LTG 1.  Patient will be able to dress self independently at bed level. progressing 9/26/18 LTG 2.  Patient will  Be able to complete simple meal prep from wheelchair level LTG 3.  Patient will be able to bathe self from [facility] level ASSESSMENT/RECOMMENDATIONS: 
[x]Continue therapy per initial plan/protocol at a frequency of  2-3 x per week for 8 weeks []Continue therapy with the following recommended changes:_____________________      _____________________________________________________________________ []Discontinue therapy progressing towards or have reached established goals []Discontinue therapy due to lack of appreciable progress towards goals []Discontinue therapy due to lack of attendance or compliance []Await Physician's recommendations/decisions regarding therapy []Other:________________________________________________________________ Thank you for this referral.  
JENN Chaudhry 10/10/2018 1:48 PM 
NOTE TO PHYSICIAN:  PLEASE COMPLETE THE ORDERS BELOW AND  
FAX TO [facility]: [phone] If you are unable to process this request in 24 hours please contact our office: [phone] ? I have read the above report and request that my patient continue as recommended.  
? I have read the above report and request that my patient continue therapy with the following changes/special instructions:___________*_____________________________________________ ? I have read the above report and request that my patient be discharged from therapy.  
 
[de-identified] Signature:____________Date:_________TIME:________ 
 
[employer], Date and Time must be completed for valid certification **

## 2018-10-11 ENCOUNTER — HOSPITAL ENCOUNTER (OUTPATIENT)
Dept: PHYSICAL THERAPY | Age: 58
Discharge: HOME | End: 2018-10-11
Payer: MEDICAID

## 2018-10-11 PROCEDURE — 97535 SELF CARE MNGMENT TRAINING: CPT

## 2018-10-11 NOTE — PROGRESS NOTES
OT DAILY TREATMENT NOTE 10-18 Patient Name: Maicol Chu Date:10/11/2018 : 1960 [x]  Patient  Verified Payor: BLUE [employer]OSS MEDICAID / Plan: [address] [place] [address] / Product Type: Managed Care Medicaid / In time:400  Out time:430 Total Treatment Time (min): 30 Visit #: 1 of  Medicare/BCBS Only Total Timed Codes (min):  30 1:1 Treatment Time:  40 Treatment Area: Lower extremity dysfunction [R29.898] SUBJECTIVE Pain Level (0-10 scale): 0/10 Any medication changes, allergies to medications, adverse drug reactions, diagnosis change, or new procedure performed?: [x] No    [] Yes (see summary sheet for update) Subjective functional status/changes:   [] No changes reported My wrist is sore after this OBJECTIVE Modality rationale: decrease pain to improve the patients ability to use right hand Min Type Additional Details  
 [] Estim:  []Unatt       []IFC  []Premod []Other:  []w/ice   []w/heat Position: Location:  
 [] Estim: []Att    []TENS instruct  []NMES []Other:  []w/US   []w/ice   []w/heat Position: Location:  
 []  Traction: [] Cervical       []Lumbar 
                     [] Prone          []Supine []Intermittent   []Continuous Lbs: 
[] before manual 
[] after manual  
 []  Ultrasound: []Continuous   [] Pulsed []1MHz   []3MHz W/cm2: 
Location:  
 []  Iontophoresis with dexamethasone Location: [] Take home patch  
[] In clinic  
10 [x]  Ice     []  heat 
[]  Ice massage 
[]  Laser  
[]  Anodyne Position: Location:right wrist  
 []  Laser with stim 
[]  Other:  Position: Location:  
 []  Vasopneumatic Device Pressure:       [] lo [] med [] hi  
Temperature: [] lo [] med [] hi  
[x] Skin assessment post-treatment:  [x]intact []redness- no adverse reaction 
  []redness  adverse reaction:  
 
 
 
30 min Self Care/Home Management: *Lower body dressing**  
 Rationale: increase ROM, increase strength and improve balance  to improve the patients ability to dress lower body bed level Able to remove right show and sock without assist, able to replace with verbal cues and demonstration using sock aid. With 
 [] TE 
 [] TA 
 [] neuro 
 [] other: Patient Education: [x] Review HEP [] Progressed/Changed HEP based on: how to position foot for success 
[] positioning   [] body mechanics   [] transfers   [] heat/ice application  
[] Splint wear/care   [] Sensory re-education   [] scar management     
[] other:   
 
     
Other Objective/Functional Measures:  
Supervision level for right shoe and sock Pain Level (0-10 scale) post treatment: 0/10 after ice to wrist 
 
ASSESSMENT/Changes in Function: Improving dressing independence Patient will continue to benefit from skilled OT services to modify and progress therapeutic interventions, address ROM deficits, analyze and cue movement patterns and instruct in home and community integration to attain remaining goals. []  See Plan of Care 
[]  See progress note/recertification 
[]  See Discharge Summary Progress towards goals / Updated goals: *3.   Patient will be able to reposition lower extremities to enable reaching to feet with knee bent. met today 10/11/18 
  
Long Term Goals:  
LTG 1.  Patient will be able to dress self independently at bed level. progressing 10/11/18 donned doffed right shoe and sock with supervision LTG 2.  Patient will  Be able to complete simple meal prep from wheelchair level LTG 3.  Patient will be able to bathe self from [facility] level** PLAN 
[]  Upgrade activities as tolerated     []  Continue plan of care 
[]  Update interventions per flow sheet      
[]  Discharge due to:_ 
[]  Other:_ JENN Arevalo 10/11/2018  5:16 PM 
 
Future Appointments Date Time Provider Brittany Brown 10[facility]16/2018 3:15 [facility]  
 10/18/2018 3:00 PM Jacob Willoughby, PTA [facility]CPTP[facility]  
10/18/2018 3:30 PM Veldon Jeramy, OTR/L [facility]CPTP[facility]  
10/22/2018 2:30 PM Gerhardt Broach GPLNDUE [facility]  
10/22/2018 3:30 PM Shruti Cosme, PT MMCPTPB [facility]  
10/25/2018 10:45 AM MD Heike Chavez [facility]  
10/26/2018 5:30 PM Aashish Block, PT MMCPTPB [facility]  
10/30/2018 3:15 PM Gerhardt Broach SBSWNRC [facility]  
11/2/2018 3:00 PM Veldon Jeramy, OTR/L [facility]CPTP[facility]  
11/2/2018 4:00 PM Aashish Block, PT [facility]CPTP[facility]  
11/5/2018 5:30 PM Aashish Block, PT [facility]CP[facility]  
11/8/2018 5:15 PM Veldon Jeramy, OTR/L [facility]  
11/13/2018 1:00 PM Veldon Jeramy, OTR/L [facility]CPTPB [facility]  
11/13/2018 2:00 PM [facility] PT [address] MMCPTPB [facility]

## 2018-10-16 ENCOUNTER — HOSPITAL ENCOUNTER (OUTPATIENT)
Dept: PHYSICAL THERAPY | Age: 58
Discharge: HOME | End: 2018-10-16
Payer: MEDICAID

## 2018-10-16 PROCEDURE — 97535 SELF CARE MNGMENT TRAINING: CPT

## 2018-10-16 NOTE — PROGRESS NOTES
OT DAILY TREATMENT NOTE 10-18 Patient Name: Valentina Lundberg Date:10/16/2018 : 1960 [x]  Patient  Verified Payor: BL[employer]OSS [employer]ICAID / Plan: [address] [place] [address] / Product Type: Managed Care Medicaid / In time:335  Out time:400 Total Treatment Time (min): 25 Visit #: 2 of  Medicare/BCBS Only Total Timed Codes (min):  25 1:1 Treatment Time:  25 Treatment Area: Lower extremity dysfunction [R29.898] SUBJECTIVE Pain Level (0-10 scale): 0/10 Any medication changes, allergies to medications, adverse drug reactions, diagnosis change, or new procedure performed?: [x] No    [] Yes (see summary sheet for update) Subjective functional status/changes:   [] No changes reported I should be doing my bathing on the [facility], but I've been doing it in the bed OBJECTIVE 25 min Self Care/Home Management: Bathing Rationale: increase ROM, improve coordination and improve balance  to improve the patients ability to bathe self independently Adaptive Equipment education and demonstration to assist with UB/LB bathing. Pt able to use Long handled sponge with legs crossed at ankle to demonstrate LB bathing. With 
 [] TE 
 [] TA 
 [] neuro 
 [] other: Patient Education: [x] Review HEP [] Progressed/Changed HEP based on:  
[] positioning   [] body mechanics   [] transfers   [] heat/ice application  
[] Splint wear/care   [] Sensory re-education   [] scar management     
[] other:   
 
     
Other Objective/Functional Measures:  
 
Pt able to demonstrate use of Long Handled Sponge Pain Level (0-10 scale) post treatment: 0/10 ASSESSMENT/Changes in Function: no pain reported in wrist, ROM improving Patient will continue to benefit from skilled OT services to modify and progress therapeutic interventions, address ROM deficits, address strength deficits, analyze and cue movement patterns and instruct in home and community integration to attain remaining goals. []  See Plan of Care 
[]  See progress note/recertification 
[]  See Discharge Summary Progress towards goals / Updated goals: 3.   Patient will be able to reposition lower extremities to enable reaching to feet with knee bent. met today 10/11/18 
   
Long Term Goals:  
LTG 1.  Patient will be able to dress self independently at bed level. progressing 10/11/18 donned doffed right shoe and sock with supervision LTG 2.  Patient will  Be able to complete simple meal prep from wheelchair level LTG 3.  Patient will be able to bathe self from [facility] level  Education/Demonstration of AE 10/16/18 PLAN 
[]  Upgrade activities as tolerated     [x]  Continue plan of care 
[]  Update interventions per flow sheet      
[]  Discharge due to:_ 
[]  Other:_ Toshia Castellanos, MONTIEL/L 10/16/2018  3:16 PM 
 
Future Appointments Date Time Provider Brittany Brown 10/18/2018 3:00 PM Sravani Christine, PTA [facility]  
10/18/2018 3:30 PM Fior Tamez, OTR/L [facility]TP[facility]  
10/22/2018 2:30 PM Toshia Castellanos VZNEWWH [facility]  
10/22/2018 3:30 PM Pearl Engel, PT [facility]CPTP[facility]  
10/25/2018 10:45 AM Rosalba Goss MD [facility]  
10/26/2018 5:30 PM Shekhar Hendrickson, PT MMCPTPB [facility]  
10/30/2018 3:15 PM Toshia Castellanos FIWRZYD [facility]  
11/2/2018 3:00 PM Fior Tamez, OTR/L [facility]CPTPB [facility]  
11/2/2018 4:00 PM Shekhar Hendrickson, PT [facility]CPTPB [facility]  
11/5/2018 5:30 PM Shekhar Hendrickson, PT [facility]  
11/8/2018 5:15 PM Fior Tamez, OTR/L [facility]CPTPB [facility]  
11/13/2018 1:00 PM Fior Tamez, OTR/L [facility]CPTPB [facility]  
11/13/2018 2:00 PM [facility] PT [address] MMCPTPB [facility]

## 2018-10-18 ENCOUNTER — APPOINTMENT (OUTPATIENT)
Dept: PHYSICAL THERAPY | Age: 58
End: 2018-10-18
Payer: MEDICAID

## 2018-10-22 ENCOUNTER — APPOINTMENT (OUTPATIENT)
Dept: PHYSICAL THERAPY | Age: 58
End: 2018-10-22
Payer: MEDICAID

## 2018-10-22 ENCOUNTER — HOSPITAL ENCOUNTER (OUTPATIENT)
Dept: PHYSICAL THERAPY | Age: 58
Discharge: HOME | End: 2018-10-22
Payer: MEDICAID

## 2018-10-22 PROCEDURE — 97110 THERAPEUTIC EXERCISES: CPT

## 2018-10-22 PROCEDURE — 97112 NEUROMUSCULAR REEDUCATION: CPT

## 2018-10-22 NOTE — PROGRESS NOTES
PT DAILY TREATMENT NOTE 10-18 Patient Name: Craole Maurer Date:10/22/2018 : 1960 [x]  Patient  Verified Payor: [employer]ICA[employer] / Plan: [address] [place] [address] / Product Type: Managed Care Medicaid / In time:335  Out time:405 Total Treatment Time (min): 30 Visit #: 1 of 4-8 Medicare/BCBS Only Total Timed Codes (min):  30 1:1 Treatment Time:  30 Treatment Area: Lower extremity dysfunction [R29.898] SUBJECTIVE Pain Level (0-10 scale): 0 Any medication changes, allergies to medications, adverse drug reactions, diagnosis change, or new procedure performed?: [x] No    [] Yes (see summary sheet for update) Subjective functional status/changes:   [] No changes reported \" I'm alright\" OBJECTIVE 15 min Therapeutic Exercise:  [x] See flow sheet :  
Rationale: increase ROM and increase strength to improve the patients ability to increase ease of ADL's. 15 min Neuromuscular Re-education:  [x]  See flow sheet :  
Rationale: increase strength, improve balance and increase proprioception  to improve the patients ability to improve core stability in order to increase ease of daily tasks. With 
 [] TE 
 [] TA 
 [] neuro 
 [] other: Patient Education: [x] Review HEP [] Progressed/Changed HEP based on:  
[] positioning   [] body mechanics   [] transfers   [] heat/ice application   
[] other:   
 
Other Objective/Functional Measures:  
Pt reports muscular fatigue following Sam exercises. Added [place] anti-rotation & chop Pain Level (0-10 scale) post treatment: 0 
 
ASSESSMENT/Changes in Function: Patient demonstrates good tolerance to progression of core stability program.Will continue to benefit from skilled PT for strengthening and endurance training in order to increase ease of daily tasks.  
 
Patient will continue to benefit from skilled PT services to modify and progress therapeutic interventions, address functional mobility deficits, address ROM deficits, address strength deficits, analyze and cue movement patterns, analyze and modify body mechanics/ergonomics, assess and modify postural abnormalities and instruct in home and community integration to attain remaining goals. []  See Plan of Care 
[]  See progress note/recertification 
[]  See Discharge Summary Progress towards goals / Updated goals: 1. Patient will improve B hip flexor strength to 2/5 in order to increase ease of transfers at home. 2. Patient will demonstrate understanding of updated HEP in order to continue to improve following D/C. PLAN 
[]  Upgrade activities as tolerated     [x]  Continue plan of care 
[]  Update interventions per flow sheet      
[]  Discharge due to:_ 
[]  Other:_   
 
Daril Dates, PT 10/22/2018  3:32 PM 
 
Future Appointments Date Time Provider Brittany Brown 10/25/2018 10:45 AM Gordo Lambert MD [facility]  
10/26/2018  5:30 PM Neftali Mccann, PT [id number] [facility]  
10/30/2018  3:15 PM Stephanie MALONEYFDHUJESSE [facility]  
11/2/2018  3:00 PM Genie Paonia, OTR/L MMCPTPB [facility]  
11/2/2018  4:00 PM Neftali Halo, PT [id number] [facility]  
11/5/2018  5:30 PM Neftali Halo, PT [id number] [facility]  
11/8/2018  5:15 PM Genie Paonia, OTR/L MMCPTPB [facility]  
11/13/2018  1:00 PM Genie Paonia, OTR/L MMCPTPB [facility]  
11/13/2018  2:00 PM [facility] PT [address] MMCPTPB [facility]

## 2018-10-26 ENCOUNTER — APPOINTMENT (OUTPATIENT)
Dept: PHYSICAL THERAPY | Age: 58
End: 2018-10-26
Payer: MEDICAID

## 2018-10-26 ENCOUNTER — HOSPITAL ENCOUNTER (OUTPATIENT)
Dept: PHYSICAL THERAPY | Age: 58
Discharge: HOME | End: 2018-10-26
Payer: MEDICAID

## 2018-10-26 PROCEDURE — 97112 NEUROMUSCULAR REEDUCATION: CPT

## 2018-10-26 PROCEDURE — 97110 THERAPEUTIC EXERCISES: CPT

## 2018-10-26 NOTE — PROGRESS NOTES
PT DAILY TREATMENT NOTE 10-18 Patient Name: Lenora Cogan Date:10/26/2018 : 1960 [x]  Patient  Verified Payor: BL[employer]OSS [employer]ICAID / Plan: [address] [place] [address] / Product Type: Managed Care Medicaid / In time: 10:35  Out time: 11:10 Total Treatment Time (min): 35 Visit #: 2 of 4-8 Medicare/BCBS Only Total Timed Codes (min):  35 1:1 Treatment Time: 35 Treatment Area: Lower extremity dysfunction [R29.898] SUBJECTIVE Pain Level (0-10 scale): 0/10 Any medication changes, allergies to medications, adverse drug reactions, diagnosis change, or new procedure performed?: [x] No    [] Yes (see summary sheet for update) Subjective functional status/changes:   [] No changes reported I had to use my old wheelchair today because the other one is in the other vehicle. OBJECTIVE 18 min Therapeutic Exercise:  [x] See flow sheet :  
Rationale: increase ROM and increase strength to improve the patients ability to increase ease of ADL's. 17 min Neuromuscular Re-education:  [x]  See flow sheet :  
Rationale: increase strength, improve balance and increase proprioception  to improve the patients ability to improve core stability in order to increase ease of daily tasks. With 
 [] TE 
 [] TA 
 [] neuro 
 [] other: Patient Education: [x] Review HEP [] Progressed/Changed HEP based on:  
[] positioning   [] body mechanics   [] transfers   [] heat/ice application   
[] other:   
 
Other Objective/Functional Measures:  
- Progressed reps/sets/resiatcne as noted on flow sheet - Patient requires additional time to complete each exercise - Patient challenged to maintain TA draw and erect posture with all seated TE  
- Patient c/o fatigue after seated D2 flexion with OTB Pain Level (0-10 scale) post treatment: 0/10 ASSESSMENT/Changes in Function:  
Patient requires demo and cuing for all TE for proper form/technique and to prevent compensations. He is progressing slowly with some increase in core strength noted. Good effort by patient. He continues to be unable to lift BLEs off of the floor when marching, trace muscle contraction palpated. Patient will continue to benefit from skilled PT services to modify and progress therapeutic interventions, address functional mobility deficits, address ROM deficits, address strength deficits, analyze and cue movement patterns, analyze and modify body mechanics/ergonomics, assess and modify postural abnormalities and instruct in home and community integration to attain remaining goals. []  See Plan of Care 
[]  See progress note/recertification 
[]  See Discharge Summary Progress towards goals / Updated goals: 1. Patient will improve B hip flexor strength to 2/5 in order to increase ease of transfers at home. 2. Patient will demonstrate understanding of updated HEP in order to continue to improve following D/C. PLAN 
[]  Upgrade activities as tolerated     [x]  Continue plan of care 
[]  Update interventions per flow sheet      
[]  Discharge due to:_ 
[]  Other:_   
 
Kateryna Pérez 10/26/2018  11:10 AM 
 
Future Appointments Date Time Provider Brittany Brown 10/26/2018 10:30 AM [address] [facility] [address]  
10/30/2018  3:15 PM Kina Soares ONPTCTQ [address]  
11/2/2018  3:00 PM Kenneth Snell, OTR/L MMCPTPB [address]  
11/2/2018  4:00 PM Quin Bruno PT [id number] [address]  
11/5/2018  5:30 PM Quin Bruno PT LLEOGZK [address]  
11/8/2018  5:15 PM Kenneth Snell OTR/L MMCPTPB [address]  
11/13/2018  1:00 PM Kennteh Snell OTR/L MMCPTPB [address]  
11/13/2018  2:00 PM [address] [facility]TP[facility] [address]  
12/4/2018  3:45 PM Sj Power MD [place]

## 2018-10-30 ENCOUNTER — HOSPITAL ENCOUNTER (OUTPATIENT)
Dept: PHYSICAL THERAPY | Age: 58
Discharge: HOME | End: 2018-10-30
Payer: MEDICAID

## 2018-10-30 PROCEDURE — 97110 THERAPEUTIC EXERCISES: CPT

## 2018-10-30 PROCEDURE — 97535 SELF CARE MNGMENT TRAINING: CPT

## 2018-10-30 NOTE — PROGRESS NOTES
OT DAILY TREATMENT NOTE 10-18 Patient Name: Danni Briseno Date:10/30/2018 : 1960 [x]  Patient  Verified Payor: BL[employer]OSS [employer]ICAID / Plan: [address] [place] [address] / Product Type: Managed Care Medicaid / In time:318  Out time:405 Total Treatment Time (min): 47 Visit #: 3 of - Medicare/BCBS Only Total Timed Codes (min):  25 1:1 Treatment Time:  47  
 
Treatment Area: Lower extremity dysfunction [R29.898] SUBJECTIVE Pain Level (0-10 scale): 0/10 Any medication changes, allergies to medications, adverse drug reactions, diagnosis change, or new procedure performed?: [x] No    [] Yes (see summary sheet for update) Subjective functional status/changes:   [] No changes reported It scared me. (popping sound) OBJECTIVE 10 min Therapeutic Exercise:  [] See flow sheet :  
Rationale: increase ROM and increase strength to improve the patients ability to reach Seated at [facility]: Pt used B UE to completing shoulder flexion, and horizontal abduction/adduction with swiss ball 10x with cueing for breathing 15 min Self Care/Home Management: Bathing, LB Dressing Rationale: increase ROM, increase strength, improve coordination and improve balance  to improve the patients ability to complete self care tasks with least amount of assistance Recheck for PN:  
 BSC Bathing LB Dressing Meal Prep Pt tasked with removing Right Shoe in long sitting on mat Pt tasked with using theraband to simulate threading Right LE into pant leg in long sitting. Pt able to perform task at independent level. Pt then tasked with removing foot to simulate doffing pant leg. While attempting to remove foot, loud \"thud/pop\" was heard. Physical Therapist was notified and looked at Patients Right knee. Right knee was able to bend with proper knee cap tracking.  Pt advised to keep an eye on Right Knee for any abnormal swelling or redness and if any occurs to reach out to their doctor. Pt unable to report any sensation changes due to Paraplegia. Pt able to complete Edge of Mat to wheelchair transfer at end of session with no reports of perceived changes/difficulties. With 
 [] TE 
 [] TA 
 [] neuro 
 [] other: Patient Education: [x] Review HEP [] Progressed/Changed HEP based on:  
[] positioning   [] body mechanics   [] transfers   [] heat/ice application  
[] Splint wear/care   [] Sensory re-education   [] scar management     
[] other:   
 
     
Other Objective/Functional Measures: 
 
Wheel Chair <> Edge of Mat: Independent Edge of Mat to Supine: Independent Supine to Edge of Mat: SBA for B LE secondary to fear/concern over Right LE Right Shoe [place]: Independent with increased time Pain Level (0-10 scale) post treatment: 0/10 ASSESSMENT/Changes in Function:   
 
Patient will continue to benefit from skilled OT services to modify and progress therapeutic interventions, address ROM deficits, address strength deficits, analyze and cue movement patterns and instruct in home and community integration to attain remaining goals. []  See Plan of Care 
[]  See progress note/recertification 
[]  See Discharge Summary Progress towards goals / Updated goals: 3.   Patient will be able to reposition lower extremities to enable reaching to feet with knee bent. met today 10/11/18 
   
Long Term Goals:  
LTG 1.  Patient will be able to dress self independently at bed levelPer Pt, still requiring assistance with LE tasks 10/30/18 LTG 2.  Patient will  Be able to complete simple meal prep from wheelchair level Met Per Pt report 10/30/18 LTG 3.  Patient will be able to bathe self from [facility] level  Met with use of Long Handled Sponge per Pt report 10/30/18 PLAN 
[]  Upgrade activities as tolerated     [x]  Continue plan of care 
[]  Update interventions per flow sheet      
[]  Discharge due to:_ 
[]  Other:_ VILLA Mathias 10/30/2018  3:26 PM 
 
 Future Appointments Date Time Provider Brittany Brown 11/2/2018  3:00 PM Crys Moscoso, OTR/L MMCPTPB [facility]  
11/2/2018  4:00 PM Jordyn Denny, PT [id number] [facility]  
11/5/2018  5:30 PM Jordyn Denny, PT [id number] [facility]  
11/8/2018  5:15 PM Crys Moscoso, OTR/L MMCPTPB [facility]  
11/13/2018  1:00 PM Crys Moscoso, OTR/L MMCPTPB [facility]  
11/13/2018  2:00 PM [facility] PT [address] MMCPTPB [facility]  
12/4/2018  3:45 PM Cindy Armijo MD [facility]

## 2018-11-02 ENCOUNTER — HOSPITAL ENCOUNTER (OUTPATIENT)
Dept: PHYSICAL THERAPY | Age: 58
Discharge: HOME | End: 2018-11-02
Payer: MEDICAID

## 2018-11-02 PROCEDURE — 97110 THERAPEUTIC EXERCISES: CPT

## 2018-11-02 PROCEDURE — 97535 SELF CARE MNGMENT TRAINING: CPT

## 2018-11-02 NOTE — PROGRESS NOTES
OT DAILY TREATMENT NOTE - [facility]  Patient Name: Deanna Sewell Date:2018 : 1960 [x]  Patient  Verified Payor: BLUE CROSS MEDICAID / Plan: [address] [place] [address] / Product Type: Managed Care Medicaid / In time:315  Out time:330 Total Treatment Time (min):15 Visit #: 4 of  Treatment Area: Lower extremity dysfunction [R29.898] SUBJECTIVE Pain Level (0-10 scale): 0/10 Any medication changes, allergies to medications, adverse drug reactions, diagnosis change, or new procedure performed?: [x] No    [] Yes (see summary sheet for update) Subjective functional status/changes:   [] No changes reported I dn't want to move my legs on the mat OBJECTIVE 15 min Self Care/Home Management: OT POC Rationale: increase ROM, increase strength and improve balance  to improve the patients ability to perform self care Reviewed progress made. With 
 [] TE 
 [] TA 
 [] neuro 
 [] other: Patient Education: [x] Review HEP [] Progressed/Changed HEP based on:  
[] positioning   [] body mechanics   [] transfers   [] heat/ice application  
[] Splint wear/care   [] Sensory re-education   [] scar management     
[x] other: OT goals progress Other Objective/Functional Measures:  
Transfer to mat with supervision only Pain Level (0-10 scale) post treatment: 01/10 ASSESSMENT/Changes in Function: Improved self care skills, still with assist required for lower body dressing, and not interested in working on this at this time due to fear of injury to lower extremities. []  See Plan of Care 
[]  See progress note/recertification 
[x]  See Discharge Summary Progress towards goals / Updated goals: 3.   Patient will be able to reposition lower extremities to enable reaching to feet with knee bent. met today 10/11/18 
   
Long Term Goals:  
LTG 1.  Patient will be able to dress self independently at bed levelPer Pt, still requiring assistance with LE tasks 10/30/18 LTG 2.  Patient will  Be able to complete simple meal prep from wheelchair level Met Per Pt report 10/30/18 LTG 3.  Patient will be able to bathe self from [facility] level  Met with use of Long Handled Sponge per Pt report 10/30/18 PLAN 
[]  Upgrade activities as tolerated     []  Continue plan of care 
[]  Update interventions per flow sheet [x]  Discharge due to:Not comfortable moving legs on mat and does not want to addres lower extremity dressing at this time.  _ 
[]  Other:_ Serafin Paniagua, OTR/L 11/2/2018  4:08 PM 
 
Future Appointments Date Time Provider Brittany Brown 11/5/2018  5:30 PM Tenzin Bermeo, PT [facility]  
11/13/2018  2:00 PM [facility] [address] [facility]  
12/4/2018  3:45 PM [facility] Power [facility]

## 2018-11-05 ENCOUNTER — APPOINTMENT (OUTPATIENT)
Dept: GENERAL RADIOLOGY | Age: 58
End: 2018-11-05
Attending: PHYSICIAN ASSISTANT
Payer: MEDICAID

## 2018-11-05 ENCOUNTER — HOSPITAL ENCOUNTER (EMERGENCY)
Age: 58
Discharge: HOME | End: 2018-11-05
Attending: EMERGENCY MEDICINE
Payer: MEDICAID

## 2018-11-05 ENCOUNTER — APPOINTMENT (OUTPATIENT)
Dept: VASCULAR SURGERY | Age: 58
End: 2018-11-05
Attending: PHYSICIAN ASSISTANT
Payer: MEDICAID

## 2018-11-05 ENCOUNTER — HOSPITAL ENCOUNTER (OUTPATIENT)
Dept: PHYSICAL THERAPY | Age: 58
End: 2018-11-05
Payer: MEDICAID

## 2018-11-05 VITALS
DIASTOLIC BLOOD PRESSURE: 85 MMHG | HEART RATE: 91 BPM | SYSTOLIC BLOOD PRESSURE: 146 MMHG | OXYGEN SATURATION: 98 % | RESPIRATION RATE: 20 BRPM | TEMPERATURE: 99.2 F

## 2018-11-05 DIAGNOSIS — S83.91XA SPRAIN OF RIGHT KNEE, UNSPECIFIED LIGAMENT, INITIAL ENCOUNTER: Primary | ICD-10-CM

## 2018-11-05 LAB — D DIMER PPP FEU-MCNC: 14.19 UG/ML(FEU)

## 2018-11-05 PROCEDURE — 93971 EXTREMITY STUDY: CPT

## 2018-11-05 PROCEDURE — 73560 X-RAY EXAM OF KNEE 1 OR 2: CPT

## 2018-11-05 PROCEDURE — 99282 EMERGENCY DEPT VISIT SF MDM: CPT

## 2018-11-05 PROCEDURE — 85379 FIBRIN DEGRADATION QUANT: CPT | Performed by: PHYSICIAN ASSISTANT

## 2018-11-05 NOTE — ED PROVIDER NOTES
EMERGENCY DEPARTMENT HISTORY AND PHYSICAL EXAM 
 
5:58 PM 
 
 
Date: 11/5/2018 Patient Name: Alesia Avendaño History of Presenting Illness Chief Complaint Patient presents with  Leg Injury History Provided By: Patient Chief Complaint: right knee swelling, possible injury Duration: 6 Days Timing:  Acute Location: RLE Quality: Aching Severity: Mild Modifying Factors: none Associated Symptoms: denies any other associated signs or symptoms Additional History (Context):Syed Medina is a 62 y.o. male with a pertinent history of paraplegia from [facility], HTN who presents to the emergency department for evaluation of right knee swelling after hearing a pop while performing PT 6 days ago. No pain. No history of blood clots. Pt is not on any blood thinners. Pt is wheelchair bound at baseline. Pt denies any fevers or chills, headache, dizziness or light headedness, ENT issues, CP or discomfort, SOB, cough, n/v/d/c, abd pain, back pain, dysuria, hematuria, frequency, focal weakness/numbness/tingling, or rash. Patient has no other complaints at this time. PCP:  Chelly Mccauley MD 
 
 
 
Current Outpatient Medications Medication Sig Dispense Refill  furosemide (LASIX) 20 mg tablet TAKE 1 TABLET BY MOUTH DAILY AS NEEDED FOR SWELLING  3  
 lisinopril-hydroCHLOROthiazide (PRINZIDE, ZESTORETIC) 20-12.5 mg per tablet TAKE 1 TABLET EVERY DAY  3  
 MULTIVIT-MINERALS/FOLIC ACID (SPECTRAVITE ADULT PO) Take  by mouth.  escitalopram oxalate (LEXAPRO) 10 mg tablet Take 10 mg by mouth daily.  acetaminophen (TYLENOL) 325 mg tablet TAKE 2 TABLETS BY MOUTH EVERY 4 HOURS AS NEEDED FOR PAIN  2  
 lisinopril (PRINIVIL, ZESTRIL) 20 mg tablet Take 1 Tab by mouth daily. 30 Tab 0 Past History Past Medical History: 
Past Medical History:  
Diagnosis Date  Asthma  Hypertension  Paralysis ([facility] [address] 2017  
 waist down,  [facility] Past Surgical History: Past Surgical History:  
Procedure Laterality Date  HX OTHER SURGICAL Eye surgery  HX OTHER SURGICAL  2017  
 spinal surgery  HX OTHER SURGICAL  2017 Liver repair from [facility] Family History: 
History reviewed. No pertinent family history. Social History: 
Social History Tobacco Use  Smoking status: Never Smoker  Smokeless tobacco: Never Used Substance Use Topics  Alcohol use: No  
 Drug use: No  
 
 
Allergies: 
No Known Allergies Review of Systems Review of Systems Constitutional: Negative for chills and fever. HENT: Negative for congestion, rhinorrhea and sore throat. Respiratory: Negative for cough and shortness of breath. Cardiovascular: Negative for chest pain. Gastrointestinal: Negative for abdominal pain, blood in stool, constipation, diarrhea, nausea and vomiting. Genitourinary: Negative for dysuria, frequency and hematuria. Musculoskeletal: Positive for gait problem and joint swelling. Negative for back pain and myalgias. Skin: Negative for rash and wound. Neurological: Negative for dizziness and headaches. Physical Exam  
 
Visit Vitals /85 (BP 1 Location: Left arm) Pulse 91 Temp 99.2 °F (37.3 °C) Resp 20 SpO2 98% Physical Exam  
Constitutional: He is oriented to person, place, and time. He appears well-developed and well-nourished. No distress. HENT:  
Head: Normocephalic and atraumatic. Eyes: Conjunctivae are normal.  
Neck: Normal range of motion. Neck supple. Cardiovascular: Normal rate, regular rhythm and normal heart sounds. Pulmonary/Chest: Effort normal and breath sounds normal. No respiratory distress. He exhibits no tenderness. Musculoskeletal: He exhibits edema. He exhibits no tenderness or deformity. Paraplegic. Edema noted to right knee and right lower leg compared to the left. Intact distal PT and DP pulses. No overlying skin disruption. No warmth to RLE. Neurological: He is alert and oriented to person, place, and time. Skin: Skin is warm and dry. He is not diaphoretic. Psychiatric: He has a normal mood and affect. Nursing note and vitals reviewed. Diagnostic Study Results Labs - Recent Results (from the past 12 hour(s)) D DIMER Collection Time: 11/05/18  6:40 PM  
Result Value Ref Range D DIMER 14.19 (H) <0.46 ug/ml(FEU) Radiologic Studies - Xr Knee Rt Max 2 Vws Result Date: 11/5/2018 EXAM: KNEE LIMITED RIGHT CLINICAL HISTORY/INDICATION: Swelling of the right knee and leg status post physical therapy 5 days ago, patient heard a pop from the knee physical therapy, patient is paralyzed from the waist down and cannot feel pain at the knee COMPARISON: None. TECHNIQUE: Two views obtained. FINDINGS: The joint spaces are minimally narrowed with tiny marginal spurs. There is no fracture nor dislocation. There is a small to moderate joint effusion. Mineralization is normal. There is no chondrocalcinosis. IMPRESSION: Mild osteoarthrosis. Joint effusion. Collin Linda Medical Decision Making I am the first provider for this patient. I reviewed the vital signs, available nursing notes, past medical history, past surgical history, family history and social history. Vital Signs-Reviewed the patient's vital signs. Pulse Oximetry Analysis -  98% on room air (Interpretation) Records Reviewed: Nursing Notes and Old Medical Records (Time of Review: 5:58 PM) ED Course: Progress Notes, Reevaluation, and Consults: 
 
Provider Notes (Medical Decision Making):  
differential diagnosis:  Fracture, dislocation, sprain, DVT, bursitis, less likely infection Plan: Pt presents in NAD, vitals wnl. Exam with swelling to right knee and right lower leg compared with the left. Ddimer elevated. XR with effusion, but no fracture. Duplex negative. Pt is not tachycardic, SOB, hypoxic, or experiencing CP. Do not feel that CTA is warranted. Explained to patient that haile was very elevted and he needs to follow-up with PCP for this. At this time, patient is stable and appropriate for discharge home. Patient demonstrates understanding of current diagnoses and is in agreement with the treatment plan. They are advised that while the likelihood of serious underlying condition is low at this point given the evaluation performed today, we cannot fully rule it out. They are advised to immediately return with any new symptoms or worsening of current condition. All questions have been answered. Patient is given educational material regarding their diagnoses, including danger symptoms and when to return to the ED. Diagnosis Clinical Impression: 1. Sprain of right knee, unspecified ligament, initial encounter Disposition: [address] Follow-up Information Follow up With Specialties Details Why Contact Info [address] [facility] and [facility] - Yanira Wilkerson Orthopedic Surgery Call in 2 days For follow-up [address] [facility], [address] [facility] [address] 
[phone] [address] [facility] EMERGENCY DEPT Emergency Medicine Go to As needed, If symptoms worsen Simin Garza [facility] [id number] [phone] Medication List  
  
CONTINUE taking these medications   
acetaminophen 325 mg tablet Commonly known as:  TYLENOL 
  
escitalopram oxalate 10 mg tablet Commonly known as:  LEXAPRO 
  
furosemide 20 mg tablet Commonly known as:  LASIX 
  
lisinopril 20 mg tablet Commonly known as:  Morgan Soto Take 1 Tab by mouth daily. lisinopril-hydroCHLOROthiazide 20-12.5 mg per tablet Commonly known as:  Inderjit Leyva 
  
  
 
_______________________________

## 2018-11-05 NOTE — ED TRIAGE NOTES
Pt was at physical Therapy weds and heard a pop in his right knee. Now leg is swollen. Pt is unable to feel pain due to being paralyzed from the waist down

## 2018-11-06 NOTE — DISCHARGE INSTRUCTIONS
Knee Sprain: Care Instructions  Your Care Instructions    A knee sprain is one or more stretched, partly torn, or completely torn knee ligaments. Ligaments are bands of ropelike tissue that connect bone to bone and make the knee stable. The knee has four main ligaments. Knee sprains often happen because of a twisting or bending injury from sports such as skiing, basketball, soccer, or football. The knee turns one way while the lower or upper leg goes another way. A sprain also can happen when the knee is hit from the side or the front. If a knee ligament is slightly stretched, you will probably need only home treatment. You may need a splint or brace (immobilizer) for a partly torn ligament. A complete tear may need surgery. A minor knee sprain may take up to 6 weeks to heal, while a severe sprain may take months. Follow-up care is a key part of your treatment and safety. Be sure to make and go to all appointments, and call your doctor if you are having problems. It's also a good idea to know your test results and keep a list of the medicines you take. How can you care for yourself at home? · Follow instructions about how much weight you can put on your leg and how to walk with crutches. · Prop up your leg on a pillow when you ice it or anytime you sit or lie down for the next 3 days. Try to keep it above the level of your heart. This will help reduce swelling. · Put ice or a cold pack on your knee for 10 to 20 minutes at a time. Try to do this every 1 to 2 hours for the next 3 days (when you are awake) or until the swelling goes down. Put a thin cloth between the ice and your skin. Do not get the splint wet. · If you have an elastic bandage, make sure it is snug but not so tight that your leg is numb, tingles, or swells below the bandage. You can loosen the bandage if it is too tight. · Your doctor may recommend a brace (immobilizer) to support your knee while it heals. Wear it as directed.   · Ask your doctor if you can take an over-the-counter pain medicine, such as acetaminophen (Tylenol), ibuprofen (Advil, Motrin), or naproxen (Aleve). Be safe with medicines. Read and follow all instructions on the label. When should you call for help? Call 911 anytime you think you may need emergency care. For example, call if:    · You have sudden chest pain and shortness of breath, or you cough up blood.    Call your doctor now or seek immediate medical care if:    · You have increased or severe pain.     · You cannot move your toes or ankle.     · Your foot is cool or pale or changes color.     · You have tingling, weakness, or numbness in your foot or leg.     · Your splint or brace feels too tight.     · You are unable to straighten the knee, or the knee \"locks. \"     · You have signs of a blood clot in your leg, such as:  ? Pain in your calf, back of the knee, thigh, or groin. ? Redness and swelling in your leg.    Watch closely for changes in your health, and be sure to contact your doctor if:    · Your pain is not getting better or is getting worse. Where can you learn more? Go to http://agustin-lawanda.info/. Enter N406 in the search box to learn more about \"Knee Sprain: Care Instructions. \"  Current as of: November 29, 2017  Content Version: 11.8  © 8732-4367 [employer]. Care instructions adapted under license by [employer] (which disclaims liability or warranty for this information). If you have questions about a medical condition or this instruction, always ask your healthcare professional. Lawrance [employer] any warranty or liability for your use of this information. Learning About RICE (Rest, Ice, Compression, and Elevation)  What is RICE? RICE is a way to care for an injury. RICE helps relieve pain and swelling. It may also help with healing and flexibility. RICE stands for:  · Rest and protect the injured or sore area.   · Ice or a cold pack used as soon as possible. · Compression, or wrapping the injured or sore area with an elastic bandage. · Elevation (propping up) the injured or sore area. How do you do RICE? You can use RICE for home treatment when you have general aches and pains or after an injury or surgery. Rest  · Do not put weight on the injury for at least 24 to 48 hours. · Use crutches for a badly sprained knee or ankle. · Support a sprained wrist, elbow, or shoulder with a sling. Ice  · Put ice or a cold pack on the injury right away to reduce pain and swelling. Frozen vegetables will also work as an ice pack. Put a thin cloth between the ice or cold pack and your skin. The cloth protects the injured area from getting too cold. · Use ice for 10 to 15 minutes at a time for the first 48 to 72 hours. Compression  · Use compression for sprains, strains, and surgeries of the arms and legs. · Wrap the injured area with an elastic bandage or compression sleeve to reduce swelling. · Don't wrap it too tightly. If the area below it feels numb, tingles, or feels cool, loosen the wrap. Elevation  · Use elevation for areas of the body that can be propped up, such as arms and legs. · Prop up the injured area on pillows whenever you use ice. Keep it propped up anytime you sit or lie down. · Try to keep the injured area at or above the level of your heart. This will help reduce swelling and bruising. Where can you learn more? Go to http://agustin-lawanda.info/. Enter J275 in the search box to learn more about \"Learning About RICE (Rest, Ice, Compression, and Elevation). \"  Current as of: November 29, 2017  Content Version: 11.8  © 4022-7320 [employer]. Care instructions adapted under license by [employer] (which disclaims liability or warranty for this information).  If you have questions about a medical condition or this instruction, always ask your healthcare professional. [employer] disclaims any warranty or liability for your use of this information.

## 2018-11-06 NOTE — PROGRESS NOTES
[facility] [address] [facility] 
[phone] fax Occupational Therapy Discharge Summary Patient name: Ashley Torres Start of Care: 18 Referral source: Elmer Davison : 1960 Medical/Treatment Diagnosis: Lower extremity dysfunction [R29.898] Payor: BLUE CROSS MEDICAID / Plan: [address] [place] [address] / Product Type: Managed Care Medicaid /  Onset Date:2017 Prior Hospitalization: see medical history Provider#: [id number] Medications: Verified on Patient Summary List   
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 He has strategies for task modifications for dressing and bathing. Progress on remaining golas are listed below: 
 
Goal:3.   Patient will be able to reposition lower extremities to enable reaching to feet with knee bent Status at last note/certification:Unable Status at discharge: met 
 
:LTG 1.  Patient will be able to dress self independently at bed level Status at last note/certification:Max assist 
Status at discharge: not met Progressing Patient able to perform tasks with max effort but is not functional in terms of energy expenditure and time at this time.   
 , 
l:LTG 2.  Patient will  Be able to complete simple meal prep from wheelchair level Status at last note/certification:Unable Status at discharge: met 
 
:LTG 3.  Patient will be able to bathe self from [facility] level   
Status at last note/certification:Unable Status at discharge: met ASSESSMENT/Changes in Function: Patient made excellent progress in occupational therapy. Patient had episode on 10/30/18 when doing leg management for dressing on mat. A pop was heard from knee.   No edema and no pain noted, no change in patella per physical therapist and ATC. Since that time patient has been reluctant to move lower extremities for fear of reoccurnece. He is being discharged at this time as only remaining unmet goal involved lower body dressing which requires leg management. ASSESSMENT/RECOMMENDATIONS: 
[x]Discontinue therapy: [x]Patient has reached or is progressing toward set goals []Patient is non-compliant or has abdicated 
    []Due to lack of appreciable progress towards set goals JENN Krueger 11/6/2018 8:44 AM 
 
NOTE TO PHYSICIAN:  Please complete the following and fax to: [facility] at [facility] at [phone] Jeramn Romo Retain this original for your records. If you are unable to process this request in  
24 hours, please contact our office. [] I have read the above report and request that my patient continue therapy with the following changes/special instructions: 
[] I have read the above report and request that my patient be discharged from therapy [de-identified] Signature:____________Date:_________TIME:________ 
 
[employer], Date and Time must be completed for valid certification **

## 2018-11-06 NOTE — ED NOTES
I have reviewed discharge instructions with the patient. The patient verbalized understanding. Medication teaching given, to include name, dose, action, and side effects. Patient verbalized understanding of medications. Encouraged patient to voice any concerns with reassurance provided. Patient armband removed and given to patient to take home. Patient was informed of the privacy risks if armband lost or stolen Patient Discharged in stable condition.

## 2018-11-08 ENCOUNTER — APPOINTMENT (OUTPATIENT)
Dept: PHYSICAL THERAPY | Age: 58
End: 2018-11-08
Payer: MEDICAID

## 2018-11-13 ENCOUNTER — APPOINTMENT (OUTPATIENT)
Dept: PHYSICAL THERAPY | Age: 58
End: 2018-11-13
Payer: MEDICAID

## 2018-11-16 ENCOUNTER — HOSPITAL ENCOUNTER (OUTPATIENT)
Dept: PHYSICAL THERAPY | Age: 58
End: 2018-11-16
Payer: MEDICAID

## 2018-11-20 ENCOUNTER — APPOINTMENT (OUTPATIENT)
Dept: PHYSICAL THERAPY | Age: 58
End: 2018-11-20
Payer: MEDICAID

## 2018-12-10 NOTE — PROGRESS NOTES
[facility] [address] [facility] 
[phone] fax Physical Therapy Discharge Summary Patient name: Lolly Lucio Start of Care:  18 Referral source: Errol Woods : 1960 Medical/Treatment Diagnosis: Lower extremity dysfunction [R29.898] Payor: [facility] MEDICAID / [facility] / Product Type: Managed Care Medicaid /  Onset Date: 2017 Prior Hospitalization: see medical history Provider#: [id number] Medications: Verified on Patient Summary List   
Comorbidities: asthma, HTN Prior Level of Function: manual wheelchair, but in process of getting electric wheelchair, occasional assistance with transfers and getting dressed.   
 
Visits from Start of Care: 10    Missed Visits: 0 Reporting Period : 10/9/18 to 18 Summary of Care: 
Goal: Patient will improve B hip flexor strength to 2/5 in order to increase ease of transfers at home. Status at last note/certification:  Status at discharge: not met Goal: Patient will demonstrate understanding of updated HEP in order to continue to improve following D/C. Status at last note/certification: n/a Status at discharge: met Pt. Has progressed with physical therapy. He demonstrates improving balance and trunk control but no significant change in LE strength. He is able to transfer from wheelchair to bed without difficulty. He was educated on HEP to continue with following D/C. ASSESSMENT/RECOMMENDATIONS: 
[x]Discontinue therapy: [x]Patient has reached or is progressing toward set goals []Patient is non-compliant or has abdicated 
    []Due to lack of appreciable progress towards set goals Ange Ge, PT 12/10/2018 9:25 AM

## 2019-03-23 ENCOUNTER — APPOINTMENT (OUTPATIENT)
Dept: CT IMAGING | Age: 59
End: 2019-03-23
Attending: EMERGENCY MEDICINE
Payer: MEDICAID

## 2019-03-23 ENCOUNTER — APPOINTMENT (OUTPATIENT)
Dept: GENERAL RADIOLOGY | Age: 59
End: 2019-03-23
Attending: EMERGENCY MEDICINE
Payer: MEDICAID

## 2019-03-23 ENCOUNTER — HOSPITAL ENCOUNTER (EMERGENCY)
Age: 59
Discharge: HOME | End: 2019-03-24
Attending: EMERGENCY MEDICINE
Payer: MEDICAID

## 2019-03-23 DIAGNOSIS — N30.01 ACUTE CYSTITIS WITH HEMATURIA: Primary | ICD-10-CM

## 2019-03-23 DIAGNOSIS — R41.82 ALTERED MENTAL STATUS, UNSPECIFIED ALTERED MENTAL STATUS TYPE: ICD-10-CM

## 2019-03-23 DIAGNOSIS — T40.2X1A OPIOID OVERDOSE, ACCIDENTAL OR UNINTENTIONAL, INITIAL ENCOUNTER (HCC): ICD-10-CM

## 2019-03-23 LAB
AMPHET UR QL SCN: NEGATIVE
ANION GAP SERPL CALC-SCNC: 7 MMOL/L (ref 3–18)
APPEARANCE UR: ABNORMAL
BACTERIA URNS QL MICRO: ABNORMAL /HPF
BARBITURATES UR QL SCN: NEGATIVE
BASOPHILS # BLD: 0 K/UL (ref 0–0.1)
BASOPHILS NFR BLD: 0 % (ref 0–2)
BENZODIAZ UR QL: NEGATIVE
BILIRUB UR QL: NEGATIVE
BUN SERPL-MCNC: 12 MG/DL (ref 7–18)
BUN/CREAT SERPL: 12 (ref 12–20)
CALCIUM SERPL-MCNC: 9 MG/DL (ref 8.5–10.1)
CANNABINOIDS UR QL SCN: NEGATIVE
CHLORIDE SERPL-SCNC: 102 MMOL/L (ref 100–108)
CK MB CFR SERPL CALC: 1.6 % (ref 0–4)
CK MB SERPL-MCNC: 6.1 NG/ML (ref 5–25)
CK SERPL-CCNC: 379 U/L (ref 39–308)
CO2 SERPL-SCNC: 30 MMOL/L (ref 21–32)
COCAINE UR QL SCN: NEGATIVE
COLOR UR: YELLOW
CREAT SERPL-MCNC: 1.03 MG/DL (ref 0.6–1.3)
DIFFERENTIAL METHOD BLD: ABNORMAL
EOSINOPHIL # BLD: 0.2 K/UL (ref 0–0.4)
EOSINOPHIL NFR BLD: 1 % (ref 0–5)
EPITH CASTS URNS QL MICRO: NEGATIVE /LPF (ref 0–5)
ERYTHROCYTE [DISTWIDTH] IN BLOOD BY AUTOMATED COUNT: 15.7 % (ref 11.6–14.5)
FLUAV AG NPH QL IA: NEGATIVE
FLUBV AG NOSE QL IA: NEGATIVE
GLUCOSE BLD STRIP.AUTO-MCNC: 197 MG/DL (ref 70–110)
GLUCOSE SERPL-MCNC: 202 MG/DL (ref 74–99)
GLUCOSE UR STRIP.AUTO-MCNC: NEGATIVE MG/DL
HCT VFR BLD AUTO: 42.7 % (ref 36–48)
HDSCOM,HDSCOM: ABNORMAL
HGB BLD-MCNC: 13.8 G/DL (ref 13–16)
HGB UR QL STRIP: ABNORMAL
KETONES UR QL STRIP.AUTO: NEGATIVE MG/DL
LACTATE BLD-SCNC: 1.65 MMOL/L (ref 0.4–2)
LACTATE BLD-SCNC: 4.13 MMOL/L (ref 0.4–2)
LEUKOCYTE ESTERASE UR QL STRIP.AUTO: ABNORMAL
LYMPHOCYTES # BLD: 4.5 K/UL (ref 0.9–3.6)
LYMPHOCYTES NFR BLD: 34 % (ref 21–52)
MCH RBC QN AUTO: 29.2 PG (ref 24–34)
MCHC RBC AUTO-ENTMCNC: 32.3 G/DL (ref 31–37)
MCV RBC AUTO: 90.5 FL (ref 74–97)
METHADONE UR QL: POSITIVE
MONOCYTES # BLD: 0.7 K/UL (ref 0.05–1.2)
MONOCYTES NFR BLD: 5 % (ref 3–10)
NEUTS SEG # BLD: 7.8 K/UL (ref 1.8–8)
NEUTS SEG NFR BLD: 60 % (ref 40–73)
NITRITE UR QL STRIP.AUTO: POSITIVE
OPIATES UR QL: POSITIVE
PCP UR QL: NEGATIVE
PH UR STRIP: 5.5 [PH] (ref 5–8)
PLATELET # BLD AUTO: 412 K/UL (ref 135–420)
PMV BLD AUTO: 10.1 FL (ref 9.2–11.8)
POTASSIUM SERPL-SCNC: 3.8 MMOL/L (ref 3.5–5.5)
PROT UR STRIP-MCNC: 30 MG/DL
RBC # BLD AUTO: 4.72 M/UL (ref 4.7–5.5)
RBC #/AREA URNS HPF: ABNORMAL /HPF (ref 0–5)
SODIUM SERPL-SCNC: 139 MMOL/L (ref 136–145)
SP GR UR REFRACTOMETRY: 1.02 (ref 1–1.03)
TROPONIN I SERPL-MCNC: <0.02 NG/ML (ref 0–0.04)
UROBILINOGEN UR QL STRIP.AUTO: 1 EU/DL (ref 0.2–1)
WBC # BLD AUTO: 13.2 K/UL (ref 4.6–13.2)
WBC URNS QL MICRO: ABNORMAL /HPF (ref 0–4)

## 2019-03-23 PROCEDURE — 80307 DRUG TEST PRSMV CHEM ANLYZR: CPT

## 2019-03-23 PROCEDURE — 87040 BLOOD CULTURE FOR BACTERIA: CPT

## 2019-03-23 PROCEDURE — 87804 INFLUENZA ASSAY W/OPTIC: CPT

## 2019-03-23 PROCEDURE — 93005 ELECTROCARDIOGRAM TRACING: CPT

## 2019-03-23 PROCEDURE — 81001 URINALYSIS AUTO W/SCOPE: CPT

## 2019-03-23 PROCEDURE — 96368 THER/DIAG CONCURRENT INF: CPT

## 2019-03-23 PROCEDURE — 99285 EMERGENCY DEPT VISIT HI MDM: CPT

## 2019-03-23 PROCEDURE — 82962 GLUCOSE BLOOD TEST: CPT

## 2019-03-23 PROCEDURE — 80048 BASIC METABOLIC PNL TOTAL CA: CPT

## 2019-03-23 PROCEDURE — 70450 CT HEAD/BRAIN W/O DYE: CPT

## 2019-03-23 PROCEDURE — 85025 COMPLETE CBC W/AUTO DIFF WBC: CPT

## 2019-03-23 PROCEDURE — 74011000258 HC RX REV CODE- 258: Performed by: EMERGENCY MEDICINE

## 2019-03-23 PROCEDURE — 74011250636 HC RX REV CODE- 250/636: Performed by: EMERGENCY MEDICINE

## 2019-03-23 PROCEDURE — 71045 X-RAY EXAM CHEST 1 VIEW: CPT

## 2019-03-23 PROCEDURE — 96366 THER/PROPH/DIAG IV INF ADDON: CPT

## 2019-03-23 PROCEDURE — 82550 ASSAY OF CK (CPK): CPT

## 2019-03-23 PROCEDURE — 96365 THER/PROPH/DIAG IV INF INIT: CPT

## 2019-03-23 PROCEDURE — 83605 ASSAY OF LACTIC ACID: CPT

## 2019-03-23 RX ORDER — VANCOMYCIN/0.9 % SOD CHLORIDE 1.5G/250ML
1500 PLASTIC BAG, INJECTION (ML) INTRAVENOUS ONCE
Status: COMPLETED | OUTPATIENT
Start: 2019-03-23 | End: 2019-03-24

## 2019-03-23 RX ORDER — VANCOMYCIN HYDROCHLORIDE
1250 EVERY 12 HOURS
Status: DISCONTINUED | OUTPATIENT
Start: 2019-03-24 | End: 2019-03-24 | Stop reason: HOSPADM

## 2019-03-23 RX ORDER — SULFAMETHOXAZOLE AND TRIMETHOPRIM 800; 160 MG/1; MG/1
1 TABLET ORAL 2 TIMES DAILY
Qty: 20 TAB | Refills: 0 | Status: SHIPPED | OUTPATIENT
Start: 2019-03-23 | End: 2019-04-02

## 2019-03-23 RX ORDER — SODIUM CHLORIDE 0.9 % (FLUSH) 0.9 %
5-10 SYRINGE (ML) INJECTION AS NEEDED
Status: DISCONTINUED | OUTPATIENT
Start: 2019-03-23 | End: 2019-03-24 | Stop reason: HOSPADM

## 2019-03-23 RX ORDER — SULFAMETHOXAZOLE AND TRIMETHOPRIM 800; 160 MG/1; MG/1
1 TABLET ORAL
Status: DISCONTINUED | OUTPATIENT
Start: 2019-03-23 | End: 2019-03-23

## 2019-03-23 RX ORDER — LEVOFLOXACIN 5 MG/ML
750 INJECTION, SOLUTION INTRAVENOUS EVERY 24 HOURS
Status: DISCONTINUED | OUTPATIENT
Start: 2019-03-23 | End: 2019-03-24 | Stop reason: HOSPADM

## 2019-03-23 RX ADMIN — VANCOMYCIN HYDROCHLORIDE 1500 MG: 10 INJECTION, POWDER, LYOPHILIZED, FOR SOLUTION INTRAVENOUS at 21:45

## 2019-03-23 RX ADMIN — SODIUM CHLORIDE 1000 ML: 900 INJECTION, SOLUTION INTRAVENOUS at 20:00

## 2019-03-23 RX ADMIN — SODIUM CHLORIDE 1000 ML: 900 INJECTION, SOLUTION INTRAVENOUS at 19:42

## 2019-03-23 RX ADMIN — LEVOFLOXACIN 750 MG: 5 INJECTION, SOLUTION INTRAVENOUS at 20:10

## 2019-03-23 RX ADMIN — PIPERACILLIN SODIUM,TAZOBACTAM SODIUM 4.5 G: 4; .5 INJECTION, POWDER, FOR SOLUTION INTRAVENOUS at 20:01

## 2019-03-23 RX ADMIN — SODIUM CHLORIDE 844 ML: 900 INJECTION, SOLUTION INTRAVENOUS at 20:00
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
## 2019-03-24 VITALS
HEIGHT: 72 IN | DIASTOLIC BLOOD PRESSURE: 81 MMHG | BODY MASS INDEX: 28.31 KG/M2 | TEMPERATURE: 99.5 F | SYSTOLIC BLOOD PRESSURE: 122 MMHG | HEART RATE: 113 BPM | RESPIRATION RATE: 16 BRPM | WEIGHT: 209 LBS | OXYGEN SATURATION: 100 %

## 2019-03-24 LAB
ATRIAL RATE: 98 BPM
CALCULATED P AXIS, ECG09: 69 DEGREES
CALCULATED R AXIS, ECG10: 27 DEGREES
CALCULATED T AXIS, ECG11: 64 DEGREES
DIAGNOSIS, 93000: NORMAL
P-R INTERVAL, ECG05: 146 MS
Q-T INTERVAL, ECG07: 318 MS
QRS DURATION, ECG06: 80 MS
QTC CALCULATION (BEZET), ECG08: 405 MS
VENTRICULAR RATE, ECG03: 98 BPM

## 2019-03-24 NOTE — DISCHARGE INSTRUCTIONS
Patient Education        Urinary Tract Infections in Men: Care Instructions  Your Care Instructions    A urinary tract infection, or UTI, is a general term for an infection anywhere between the kidneys and the tip of the penis. UTIs can also be a result of a prostate problem. Most cause pain or burning when you urinate. Most UTIs are caused by bacteria and can be cured with antibiotics. It is important to complete your treatment so that the infection does not get worse. Follow-up care is a key part of your treatment and safety. Be sure to make and go to all appointments, and call your doctor if you are having problems. It's also a good idea to know your test results and keep a list of the medicines you take. How can you care for yourself at home? · Take your antibiotics as prescribed. Do not stop taking them just because you feel better. You need to take the full course of antibiotics. · Take your medicines exactly as prescribed. Your doctor may have prescribed a medicine, such as phenazopyridine (Pyridium), to help relieve pain when you urinate. This turns your urine orange. You may stop taking it when your symptoms get better. But be sure to take all of your antibiotics, which treat the infection. · Drink extra water for the next day or two. This will help make the urine less concentrated and help wash out the bacteria causing the infection. (If you have kidney, heart, or liver disease and have to limit your fluids, talk with your doctor before you increase your fluid intake.)  · Avoid drinks that are carbonated or have caffeine. They can irritate the bladder. · Urinate often. Try to empty your bladder each time. · To relieve pain, take a hot bath or lay a heating pad (set on low) over your lower belly or genital area. Never go to sleep with a heating pad in place. To help prevent UTIs  · Drink plenty of fluids, enough so that your urine is light yellow or clear like water.  If you have kidney, heart, or liver disease and have to limit fluids, talk with your doctor before you increase the amount of fluids you drink. · Urinate when you have the urge. Do not hold your urine for a long time. Urinate before you go to sleep. · Keep your penis clean. Catheter care  If you have a drainage tube (catheter) in place, the following steps will help you care for it. · Always wash your hands before and after touching your catheter. · Check the area around the urethra for inflammation or signs of infection. Signs of infection include irritated, swollen, red, or tender skin, or pus around the catheter. · Clean the area around the catheter with soap and water two times a day. Dry with a clean towel afterward. · Do not apply powder or lotion to the skin around the catheter. To empty the urine collection bag  · Wash your hands with soap and water. · Without touching the drain spout, remove the spout from its sleeve at the bottom of the collection bag. Open the valve on the spout. · Let the urine flow out of the bag and into the toilet or a container. Do not let the tubing or drain spout touch anything. · After you empty the bag, clean the end of the drain spout with tissue and water. Close the valve and put the drain spout back into its sleeve at the bottom of the collection bag. · Wash your hands with soap and water. When should you call for help? Call your doctor now or seek immediate medical care if:    · Symptoms such as a fever, chills, nausea, or vomiting get worse or happen for the first time.     · You have new pain in your back just below your rib cage. This is called flank pain.     · There is new blood or pus in your urine.     · You are not able to take or keep down your antibiotics.    Watch closely for changes in your health, and be sure to contact your doctor if:    · You are not getting better after taking an antibiotic for 2 days.     · Your symptoms go away but then come back.    Where can you learn more?  Go to http://agustin-lawanda.info/. Enter N796 in the search box to learn more about \"Urinary Tract Infections in Men: Care Instructions. \"  Current as of: March 20, 2018  Content Version: 11.9  © 2181-8359 [employer]. Care instructions adapted under license by [employer] (which disclaims liability or warranty for this information). If you have questions about a medical condition or this instruction, always ask your healthcare professional. [address] any warranty or liability for your use of this information. Patient Education        Accidental Overdose of Medicine: Care Instructions  Your Care Instructions    Almost any medicine can cause harm if you take too much of it. You have had treatment to help your body get rid of an overdose of a medicine. This may have been an over-the-counter medicine. Or it might be one that a doctor prescribed. It may even have been a vitamin or a supplement. During treatment, the doctor may have given you fluids and medicine. You also may have had lab tests. Then the doctor made sure that you were well enough to go home. The doctor has checked you carefully, but problems can develop later. If you notice any problems or new symptoms, get medical treatment right away. Follow-up care is a key part of your treatment and safety. Be sure to make and go to all appointments, and call your doctor if you are having problems. It's also a good idea to know your test results and keep a list of the medicines you take. How can you care for yourself at home? Home care  · Drink plenty of fluids. If you have kidney, heart, or liver disease and have to limit fluids, talk with your doctor before you increase the amount of fluids you drink. · If you normally take medicines, ask your doctor when you can start taking them again. · Read the information that comes with any medicine.  If you have questions, ask your doctor or pharmacist.  Prevention  · Be safe with medicines. Take your medicines exactly as prescribed or as the label directs. Call your doctor if you think you are having a problem with your medicine. · Keep your medicines in the containers they came in. Keep them with the original labels. · Find out what to do if you miss a dose of your medicine. When should you call for help? Call 911 anytime you think you may need emergency care. For example, call if:    · You passed out (lost consciousness).     · You have trouble breathing.     · You are sleepy or hard to wake up.   [facility] your doctor now or seek immediate medical care if:    · You are vomiting.     · You have a new or worse headache.     · You are dizzy or lightheaded or feel like you may faint.    Watch closely for changes in your health, and be sure to contact your doctor if:    · You do not get better as expected. Where can you learn more? Go to http://agustin-lawanda.info/. Enter C165 in the search box to learn more about \"Accidental Overdose of Medicine: Care Instructions. \"  Current as of: March 28, 2018  Content Version: 11.9  © 8284-8412 [employer]. Care instructions adapted under license by [employer] (which disclaims liability or warranty for this information). If you have questions about a medical condition or this instruction, always ask your healthcare professional. [address] any warranty or liability for your use of this information.

## 2019-03-29 LAB
BACTERIA SPEC CULT: NORMAL
BACTERIA SPEC CULT: NORMAL
SERVICE CMNT-IMP: NORMAL
SERVICE CMNT-IMP: NORMAL

## 2019-05-07 ENCOUNTER — HOSPITAL ENCOUNTER (OUTPATIENT)
Dept: ULTRASOUND IMAGING | Age: 59
Discharge: HOME | End: 2019-05-07
Attending: UROLOGY
Payer: MEDICAID

## 2019-05-07 DIAGNOSIS — R33.9 INCOMPLETE BLADDER EMPTYING: ICD-10-CM

## 2019-05-07 DIAGNOSIS — N39.0 RECURRENT UTI: ICD-10-CM

## 2019-05-07 PROCEDURE — 76770 US EXAM ABDO BACK WALL COMP: CPT

## 2019-08-13 ENCOUNTER — HOSPITAL ENCOUNTER (OUTPATIENT)
Dept: VASCULAR SURGERY | Age: 59
Discharge: HOME | End: 2019-08-13
Attending: PODIATRIST
Payer: MEDICAID

## 2019-08-13 DIAGNOSIS — L97.512 SKIN ULCER OF RIGHT FOOT WITH FAT LAYER EXPOSED (HCC): ICD-10-CM

## 2019-08-13 LAB
LEFT ABI: 1.17
LEFT ANTERIOR TIBIAL: 142 MMHG
LEFT ARM BP: 139 MMHG
LEFT CALF PRESSURE: 144 MMHG
LEFT POSTERIOR TIBIAL: 163 MMHG
LEFT TBI: 0.82
LEFT TOE PRESSURE: 114 MMHG
RIGHT ABI: 1.15
RIGHT ANTERIOR TIBIAL: 160 MMHG
RIGHT ARM BP: 139 MMHG
RIGHT CALF PRESSURE: 168 MMHG
RIGHT POSTERIOR TIBIAL: 132 MMHG
RIGHT TBI: 0.79
RIGHT TOE PRESSURE: 110 MMHG

## 2019-08-13 PROCEDURE — 93923 UPR/LXTR ART STDY 3+ LVLS: CPT

## 2020-03-05 ENCOUNTER — HOSPITAL ENCOUNTER (OUTPATIENT)
Dept: LAB | Age: 60
Discharge: HOME | End: 2020-03-05

## 2020-03-05 LAB — XX-LABCORP SPECIMEN COL,LCBCF: NORMAL

## 2020-03-05 PROCEDURE — 99001 SPECIMEN HANDLING PT-LAB: CPT

## 2021-01-28 ENCOUNTER — HOME CARE VISIT (OUTPATIENT)
Dept: HOME HEALTH SERVICES | Facility: HOME HEALTH | Age: 61
End: 2021-01-28
Payer: MEDICAID

## 2021-03-31 ENCOUNTER — TRANSCRIBE ORDER (OUTPATIENT)
Dept: SCHEDULING | Age: 61
End: 2021-03-31

## 2021-03-31 DIAGNOSIS — N39.0 URINARY TRACT INFECTION, SITE NOT SPECIFIED: ICD-10-CM

## 2021-03-31 DIAGNOSIS — E63.9 NUTRITIONAL DEFICIENCY, UNSPECIFIED: ICD-10-CM

## 2021-03-31 DIAGNOSIS — R73.9 HYPERGLYCEMIA, UNSPECIFIED: ICD-10-CM

## 2021-03-31 DIAGNOSIS — L98.9 DISORDER OF THE SKIN AND SUBCUTANEOUS TISSUE, UNSPECIFIED: ICD-10-CM

## 2021-03-31 DIAGNOSIS — L89.92: ICD-10-CM

## 2021-03-31 DIAGNOSIS — F32.9 MAJOR DEPRESSIVE DISORDER, SINGLE EPISODE, UNSPECIFIED: ICD-10-CM

## 2021-03-31 DIAGNOSIS — G82.20 PARAPLEGIA (HCC): ICD-10-CM

## 2021-03-31 DIAGNOSIS — I10 ESSENTIAL (PRIMARY) HYPERTENSION: ICD-10-CM

## 2021-03-31 DIAGNOSIS — K59.00 CONSTIPATION, UNSPECIFIED: ICD-10-CM

## 2021-03-31 DIAGNOSIS — G89.29 OTHER CHRONIC PAIN: ICD-10-CM

## 2021-03-31 DIAGNOSIS — E55.9 VITAMIN D DEFICIENCY, UNSPECIFIED: ICD-10-CM

## 2021-03-31 DIAGNOSIS — J45.909 UNSPECIFIED ASTHMA, UNCOMPLICATED: Primary | ICD-10-CM

## 2021-04-07 ENCOUNTER — HOSPITAL ENCOUNTER (OUTPATIENT)
Dept: CT IMAGING | Age: 61
Discharge: HOME | End: 2021-04-07
Attending: FAMILY MEDICINE
Payer: MEDICAID

## 2021-04-07 DIAGNOSIS — G82.20 PARAPLEGIA (HCC): ICD-10-CM

## 2021-04-07 DIAGNOSIS — K59.00 CONSTIPATION, UNSPECIFIED: ICD-10-CM

## 2021-04-07 DIAGNOSIS — F32.9 MAJOR DEPRESSIVE DISORDER, SINGLE EPISODE, UNSPECIFIED: ICD-10-CM

## 2021-04-07 DIAGNOSIS — N39.0 URINARY TRACT INFECTION, SITE NOT SPECIFIED: ICD-10-CM

## 2021-04-07 DIAGNOSIS — R73.9 HYPERGLYCEMIA, UNSPECIFIED: ICD-10-CM

## 2021-04-07 DIAGNOSIS — L89.92: ICD-10-CM

## 2021-04-07 DIAGNOSIS — I10 ESSENTIAL (PRIMARY) HYPERTENSION: ICD-10-CM

## 2021-04-07 DIAGNOSIS — E63.9 NUTRITIONAL DEFICIENCY, UNSPECIFIED: ICD-10-CM

## 2021-04-07 DIAGNOSIS — J45.909 UNSPECIFIED ASTHMA, UNCOMPLICATED: ICD-10-CM

## 2021-04-07 DIAGNOSIS — G89.29 OTHER CHRONIC PAIN: ICD-10-CM

## 2021-04-07 DIAGNOSIS — L98.9 DISORDER OF THE SKIN AND SUBCUTANEOUS TISSUE, UNSPECIFIED: ICD-10-CM

## 2021-04-07 DIAGNOSIS — E55.9 VITAMIN D DEFICIENCY, UNSPECIFIED: ICD-10-CM

## 2021-04-07 LAB — CREAT UR-MCNC: 0.9 MG/DL (ref 0.6–1.3)

## 2021-04-07 PROCEDURE — 74178 CT ABD&PLV WO CNTR FLWD CNTR: CPT

## 2021-04-07 PROCEDURE — 74011000636 HC RX REV CODE- 636

## 2021-04-07 PROCEDURE — 82565 ASSAY OF CREATININE: CPT

## 2021-04-07 RX ADMIN — IOPAMIDOL 99 ML: 612 INJECTION, SOLUTION INTRAVENOUS at 17:29

## 2021-04-12 ENCOUNTER — OFFICE VISIT (OUTPATIENT)
Dept: SURGERY | Age: 61
End: 2021-04-12
Payer: MEDICAID

## 2021-04-12 VITALS
BODY MASS INDEX: 32.55 KG/M2 | RESPIRATION RATE: 20 BRPM | DIASTOLIC BLOOD PRESSURE: 80 MMHG | HEART RATE: 78 BPM | SYSTOLIC BLOOD PRESSURE: 136 MMHG | HEIGHT: 72 IN | TEMPERATURE: 98.4 F

## 2021-04-12 DIAGNOSIS — L97.512 RIGHT FOOT ULCER, WITH FAT LAYER EXPOSED (HCC): ICD-10-CM

## 2021-04-12 DIAGNOSIS — G82.20 PARAPLEGIA (HCC): Primary | ICD-10-CM

## 2021-04-12 DIAGNOSIS — L97.512 RIGHT FOOT ULCER, WITH FAT LAYER EXPOSED (HCC): Primary | ICD-10-CM

## 2021-04-12 DIAGNOSIS — L89.154 SACRAL DECUBITUS ULCER, STAGE IV (HCC): ICD-10-CM

## 2021-04-12 PROCEDURE — 99205 OFFICE O/P NEW HI 60 MIN: CPT | Performed by: SURGERY
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
## 2021-04-15 ENCOUNTER — ANESTHESIA EVENT (OUTPATIENT)
Dept: SURGERY | Age: 61
End: 2021-04-15
Payer: MEDICAID

## 2021-04-16 ENCOUNTER — HOSPITAL ENCOUNTER (OUTPATIENT)
Age: 61
Setting detail: OUTPATIENT SURGERY
Discharge: HOME | End: 2021-04-16
Attending: SURGERY | Admitting: SURGERY
Payer: MEDICAID

## 2021-04-16 ENCOUNTER — ANESTHESIA (OUTPATIENT)
Dept: SURGERY | Age: 61
End: 2021-04-16
Payer: MEDICAID

## 2021-04-16 VITALS
HEART RATE: 69 BPM | WEIGHT: 250 LBS | OXYGEN SATURATION: 97 % | DIASTOLIC BLOOD PRESSURE: 77 MMHG | HEIGHT: 74 IN | TEMPERATURE: 98 F | SYSTOLIC BLOOD PRESSURE: 121 MMHG | BODY MASS INDEX: 32.08 KG/M2 | RESPIRATION RATE: 16 BRPM

## 2021-04-16 LAB
COVID-19 RAPID TEST, COVR: NOT DETECTED
SARS-COV-2, COV2: NORMAL
SOURCE, COVRS: NORMAL

## 2021-04-16 PROCEDURE — 00400 ANES INTEGUMENTARY SYS NOS: CPT | Performed by: ANESTHESIOLOGY

## 2021-04-16 PROCEDURE — 74011250637 HC RX REV CODE- 250/637: Performed by: NURSE ANESTHETIST, CERTIFIED REGISTERED

## 2021-04-16 PROCEDURE — 74011000258 HC RX REV CODE- 258: Performed by: NURSE ANESTHETIST, CERTIFIED REGISTERED

## 2021-04-16 PROCEDURE — 2709999900 HC NON-CHARGEABLE SUPPLY: Performed by: SURGERY

## 2021-04-16 PROCEDURE — 76060000032 HC ANESTHESIA 0.5 TO 1 HR: Performed by: SURGERY

## 2021-04-16 PROCEDURE — 87635 SARS-COV-2 COVID-19 AMP PRB: CPT

## 2021-04-16 PROCEDURE — 76010000138 HC OR TIME 0.5 TO 1 HR: Performed by: SURGERY

## 2021-04-16 PROCEDURE — 76210000063 HC OR PH I REC FIRST 0.5 HR: Performed by: SURGERY

## 2021-04-16 PROCEDURE — 74011250636 HC RX REV CODE- 250/636: Performed by: SURGERY

## 2021-04-16 PROCEDURE — 74011250636 HC RX REV CODE- 250/636: Performed by: NURSE ANESTHETIST, CERTIFIED REGISTERED

## 2021-04-16 PROCEDURE — 74011000250 HC RX REV CODE- 250: Performed by: NURSE ANESTHETIST, CERTIFIED REGISTERED

## 2021-04-16 PROCEDURE — 11043 DBRDMT MUSC&/FSCA 1ST 20/<: CPT | Performed by: SURGERY

## 2021-04-16 PROCEDURE — 00400 ANES INTEGUMENTARY SYS NOS: CPT | Performed by: NURSE ANESTHETIST, CERTIFIED REGISTERED

## 2021-04-16 PROCEDURE — 77030040361 HC SLV COMPR DVT MDII -B: Performed by: SURGERY

## 2021-04-16 PROCEDURE — 76210000021 HC REC RM PH II 0.5 TO 1 HR: Performed by: SURGERY

## 2021-04-16 PROCEDURE — 77030040922 HC BLNKT HYPOTHRM STRY -A: Performed by: SURGERY

## 2021-04-16 PROCEDURE — 11046 DBRDMT MUSC&/FSCA EA ADDL: CPT | Performed by: SURGERY

## 2021-04-16 RX ORDER — SODIUM CHLORIDE, SODIUM LACTATE, POTASSIUM CHLORIDE, CALCIUM CHLORIDE 600; 310; 30; 20 MG/100ML; MG/100ML; MG/100ML; MG/100ML
25 INJECTION, SOLUTION INTRAVENOUS CONTINUOUS
Status: DISCONTINUED | OUTPATIENT
Start: 2021-04-16 | End: 2021-04-16 | Stop reason: HOSPADM

## 2021-04-16 RX ORDER — LIDOCAINE HYDROCHLORIDE 20 MG/ML
INJECTION, SOLUTION EPIDURAL; INFILTRATION; INTRACAUDAL; PERINEURAL AS NEEDED
Status: DISCONTINUED | OUTPATIENT
Start: 2021-04-16 | End: 2021-04-16 | Stop reason: HOSPADM

## 2021-04-16 RX ORDER — FENTANYL CITRATE 50 UG/ML
50 INJECTION, SOLUTION INTRAMUSCULAR; INTRAVENOUS AS NEEDED
Status: DISCONTINUED | OUTPATIENT
Start: 2021-04-16 | End: 2021-04-16 | Stop reason: HOSPADM

## 2021-04-16 RX ORDER — PROPOFOL 10 MG/ML
INJECTION, EMULSION INTRAVENOUS AS NEEDED
Status: DISCONTINUED | OUTPATIENT
Start: 2021-04-16 | End: 2021-04-16 | Stop reason: HOSPADM

## 2021-04-16 RX ORDER — SODIUM CHLORIDE 0.9 % (FLUSH) 0.9 %
5-40 SYRINGE (ML) INJECTION AS NEEDED
Status: DISCONTINUED | OUTPATIENT
Start: 2021-04-16 | End: 2021-04-16 | Stop reason: HOSPADM

## 2021-04-16 RX ORDER — ONDANSETRON 2 MG/ML
4 INJECTION INTRAMUSCULAR; INTRAVENOUS ONCE
Status: DISCONTINUED | OUTPATIENT
Start: 2021-04-16 | End: 2021-04-16 | Stop reason: HOSPADM

## 2021-04-16 RX ORDER — FENTANYL CITRATE 50 UG/ML
INJECTION, SOLUTION INTRAMUSCULAR; INTRAVENOUS AS NEEDED
Status: DISCONTINUED | OUTPATIENT
Start: 2021-04-16 | End: 2021-04-16 | Stop reason: HOSPADM

## 2021-04-16 RX ORDER — MIDAZOLAM HYDROCHLORIDE 1 MG/ML
INJECTION, SOLUTION INTRAMUSCULAR; INTRAVENOUS AS NEEDED
Status: DISCONTINUED | OUTPATIENT
Start: 2021-04-16 | End: 2021-04-16 | Stop reason: HOSPADM

## 2021-04-16 RX ORDER — LIDOCAINE HYDROCHLORIDE 10 MG/ML
0.1 INJECTION, SOLUTION EPIDURAL; INFILTRATION; INTRACAUDAL; PERINEURAL AS NEEDED
Status: DISCONTINUED | OUTPATIENT
Start: 2021-04-16 | End: 2021-04-16 | Stop reason: HOSPADM

## 2021-04-16 RX ORDER — ROCURONIUM BROMIDE 10 MG/ML
INJECTION, SOLUTION INTRAVENOUS AS NEEDED
Status: DISCONTINUED | OUTPATIENT
Start: 2021-04-16 | End: 2021-04-16 | Stop reason: HOSPADM

## 2021-04-16 RX ORDER — FAMOTIDINE 20 MG/1
20 TABLET, FILM COATED ORAL ONCE
Status: COMPLETED | OUTPATIENT
Start: 2021-04-16 | End: 2021-04-16

## 2021-04-16 RX ORDER — SODIUM CHLORIDE 0.9 % (FLUSH) 0.9 %
5-40 SYRINGE (ML) INJECTION EVERY 8 HOURS
Status: DISCONTINUED | OUTPATIENT
Start: 2021-04-16 | End: 2021-04-16 | Stop reason: HOSPADM

## 2021-04-16 RX ORDER — ONDANSETRON 2 MG/ML
INJECTION INTRAMUSCULAR; INTRAVENOUS AS NEEDED
Status: DISCONTINUED | OUTPATIENT
Start: 2021-04-16 | End: 2021-04-16 | Stop reason: HOSPADM

## 2021-04-16 RX ADMIN — FENTANYL CITRATE 100 MCG: 50 INJECTION, SOLUTION INTRAMUSCULAR; INTRAVENOUS at 13:36

## 2021-04-16 RX ADMIN — LIDOCAINE HYDROCHLORIDE 50 MG: 20 INJECTION, SOLUTION EPIDURAL; INFILTRATION; INTRACAUDAL; PERINEURAL at 14:02

## 2021-04-16 RX ADMIN — SODIUM CHLORIDE, SODIUM LACTATE, POTASSIUM CHLORIDE, AND CALCIUM CHLORIDE 25 ML/HR: 600; 310; 30; 20 INJECTION, SOLUTION INTRAVENOUS at 12:30

## 2021-04-16 RX ADMIN — MIDAZOLAM HYDROCHLORIDE 2 MG: 2 INJECTION, SOLUTION INTRAMUSCULAR; INTRAVENOUS at 13:26

## 2021-04-16 RX ADMIN — ONDANSETRON 4 MG: 2 INJECTION INTRAMUSCULAR; INTRAVENOUS at 13:42

## 2021-04-16 RX ADMIN — LIDOCAINE HYDROCHLORIDE 50 MG: 20 INJECTION, SOLUTION EPIDURAL; INFILTRATION; INTRACAUDAL; PERINEURAL at 14:07

## 2021-04-16 RX ADMIN — PHENYLEPHRINE HYDROCHLORIDE 250 MCG: 10 INJECTION INTRAVENOUS at 13:50

## 2021-04-16 RX ADMIN — ROCURONIUM BROMIDE 50 MG: 50 INJECTION INTRAVENOUS at 13:37

## 2021-04-16 RX ADMIN — LIDOCAINE HYDROCHLORIDE 50 MG: 20 INJECTION, SOLUTION EPIDURAL; INFILTRATION; INTRACAUDAL; PERINEURAL at 13:36

## 2021-04-16 RX ADMIN — FAMOTIDINE 20 MG: 20 TABLET, FILM COATED ORAL at 12:30

## 2021-04-16 RX ADMIN — SUGAMMADEX 200 MG: 100 INJECTION, SOLUTION INTRAVENOUS at 13:59

## 2021-04-16 RX ADMIN — PROPOFOL 200 MG: 10 INJECTION, EMULSION INTRAVENOUS at 13:37

## 2021-04-16 NOTE — PROGRESS NOTES
Date of Surgery Update:  Audrey Sagastume was seen and examined. History and physical has been reviewed. The patient has been examined.  There have been no significant clinical changes since the completion of the originally dated History and Physical. Will proceed with debridement of a stage IV decubitus ulcer     Signed By: Eliazar Amato MD     April 16, 2021 12:48 PM

## 2021-04-16 NOTE — DISCHARGE INSTRUCTIONS
Discharge Instructions Following Surgery    Patient: Tania Phillip MRN: [id number]  SSN: [id number]    YOB: 1960  Age: 61 y.o. Sex: male      Activity  · As tolerated, walking encourage, stairs are okay. · Avoid strenuous activities - no lifting anything heavier than 15 pounds till seen in the clinic. · You may shower at home after 24 hours. Diet  · Regular diet after nausea from the anesthetic has passed. Pain  · Take pain medication as directed by your doctor. · Call your doctor if pain is NOT relieved by medication. Wound and Dressing Care    · Wet to dry dressings daily. After Anesthesia  · For the first 24 hours: DO NOT Drive, Drink alcoholic beverages, or Make important decisions. · Be aware of dizziness following anesthesia and while taking pain medication. Call your doctor if  · Excessive bleeding that does not stop after holding mild pressure over the area. · Temperature of 101 degrees F or above. · Redness,excessive swelling or bruising, and/or green or yellow, smelly discharge from incision. · If nausea and vomiting continues. Appointment date/time Follow-Up Phone Calls    · Call the office at [phone] to make your follow-up appointment in 2 weeks after the surgery (if not already set up) . Dr. Yue Zaman cell phone number is [phone]. Please call me if you have any concerns or questions.
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
## 2021-04-16 NOTE — ANESTHESIA POSTPROCEDURE EVALUATION
Procedure(s):  DEBRIDEMENT OF SACRAL DECUBITUS ULCER. general    Anesthesia Post Evaluation      Multimodal analgesia: multimodal analgesia used between 6 hours prior to anesthesia start to PACU discharge  Patient location during evaluation: bedside  Patient participation: complete - patient participated  Level of consciousness: awake  Pain management: adequate  Airway patency: patent  Anesthetic complications: no  Cardiovascular status: stable  Respiratory status: acceptable  Hydration status: acceptable  Post anesthesia nausea and vomiting:  controlled      INITIAL Post-op Vital signs:   Vitals Value Taken Time   /72 04/16/21 1435   Temp 37.1 °C (98.8 °F) 04/16/21 1419   Pulse 72 04/16/21 1438   Resp 15 04/16/21 1438   SpO2 100 % 04/16/21 1439   Vitals shown include unvalidated device data.

## 2021-04-16 NOTE — BRIEF OP NOTE
Brief Postoperative Note    Patient: José No  YOB: 1960  MRN: [id number]    Date of Procedure: 4/16/2021     Pre-Op Diagnosis: L89.154 SACRAL ULCER    Post-Op Diagnosis: Same as preoperative diagnosis.       Procedure(s):  DEBRIDEMENT OF SACRAL DECUBITUS ULCER    Surgeon(s):  Pratima Sanchez MD    Surgical Assistant: Surg Asst-1: Mortimer Longest    Anesthesia: General     Estimated Blood Loss (mL): Minimal    Complications: None    Specimens: * No specimens in log *     Implants: * No implants in log *    Drains: * No LDAs found *    Findings: Stage 4     Electronically Signed by Issa Christensen MD on 4/16/2021 at 1:59 PM

## 2021-04-17 NOTE — OP NOTES
[address] [place] [address]   OPERATIVE REPORT    Name:  Patsy Medina  MR#:   [id number]  :  1960  ACCOUNT #:  [de-identified]  DATE OF SERVICE:  2021    PREOPERATIVE DIAGNOSIS:  Stage IV sacral decubitus ulcer. POSTOPERATIVE DIAGNOSIS:  Stage IV sacral decubitus ulcer. PROCEDURE PERFORMED:  Debridement of stage IV sacral decubitus ulcer. SURGEON:  Madelin Smith. Owen De La Vega MD.    Benoit Fitch. ANESTHESIA:  General.    COMPLICATIONS:  None. SPECIMENS REMOVED:  Necrotic tissue from decubitus ulcer. IMPLANTS:  None. ESTIMATED BLOOD LOSS:  Minimal.    DETAILS OF PROCEDURE:  The patient was brought to the operating room. Anesthesia was induced. The patient was placed in prone position. Scrubbing and draping of the buttock area were done in the usual manner. A time-out was performed. The sacral ulcer was identified. It was stage IV with exposed bone. There was necrotic tissue in the skin, subcutaneous tissue, and fascia all the way to the bone. These were all excised and debrided using electrocautery. Hemostasis was secured with electrocautery. The defect itself was about 15 x 5 cm and the depth was all the way to the bone, about 4 cm deep. There was tunneling on the upper side, about 3 cm. At this point, the area was irrigated and packed with Kerlix and sterile dressing.       Sherran Bernheim, MD      [id number]  D:  2021 14:01  T:  2021 0:51  JOB #:  [id number]

## 2021-04-19 ENCOUNTER — TELEPHONE (OUTPATIENT)
Dept: SURGERY | Age: 61
End: 2021-04-19

## 2021-04-19 DIAGNOSIS — L89.154 SACRAL DECUBITUS ULCER, STAGE IV (HCC): Primary | ICD-10-CM

## 2021-04-21 ENCOUNTER — TELEPHONE (OUTPATIENT)
Dept: SURGERY | Age: 61
End: 2021-04-21

## 2021-04-21 NOTE — TELEPHONE ENCOUNTER
Ms. Parikh/Mr. Garrido Notice called stating they received an email stating her son, Lily Lezama, appointment was cancelled. Informed I verified with the nurse post op visit was not cancelled. Post visit is scheduled for Monday, April 26 at 1pm at [facility] office.

## 2021-04-22 ENCOUNTER — TELEPHONE (OUTPATIENT)
Dept: SURGERY | Age: 61
End: 2021-04-22

## 2021-04-22 NOTE — TELEPHONE ENCOUNTER
Jes Osborne with [facility] states she has charan treating patient with wet to dry dressing change daily. Jes Osborne states today wound has necrotic tissue, fowl smelling drainage, and a hard knot above the wound. I advised Douglas that I will send Dr Kasandra Jernigan message. Jes Osborne said that she also has a picture of the wound and would like to email it to Dr Kasandra Jernigan. Jordi Santana mom is concerned and would like to know if he should go to the ER or wait until Monday. I gave Jes Osborne email add to surgery scheduler [place]. Email was received by Lissett Lopez and forwarded it to Dr Kasandra Jernigan and myself. I contacted Dr Kasandra Jernigan and he states he call Nurse Jes Osborne.

## 2021-04-26 ENCOUNTER — OFFICE VISIT (OUTPATIENT)
Dept: SURGERY | Age: 61
End: 2021-04-26
Payer: MEDICAID

## 2021-04-26 ENCOUNTER — TELEPHONE (OUTPATIENT)
Dept: SURGERY | Age: 61
End: 2021-04-26

## 2021-04-26 VITALS — SYSTOLIC BLOOD PRESSURE: 115 MMHG | HEART RATE: 73 BPM | OXYGEN SATURATION: 100 % | DIASTOLIC BLOOD PRESSURE: 57 MMHG

## 2021-04-26 DIAGNOSIS — G82.20 PARAPLEGIA (HCC): ICD-10-CM

## 2021-04-26 DIAGNOSIS — L89.154 SACRAL DECUBITUS ULCER, STAGE IV (HCC): Primary | ICD-10-CM

## 2021-04-26 DIAGNOSIS — R15.9 FULL INCONTINENCE OF FECES: ICD-10-CM

## 2021-04-26 PROCEDURE — 99215 OFFICE O/P EST HI 40 MIN: CPT | Performed by: SURGERY

## 2021-04-26 NOTE — PROGRESS NOTES
Joel Angulo is a 61 y.o. male (: 1960) presenting to address:    Chief Complaint   Patient presents with    Surgical Follow-up     Debridement of stage 4 sacral decubitus ulcer 21       Medication list and allergies have been reviewed with Joel Adele and updated as of today's date. I have gone over all Medical, Surgical and Social History with Joel Angulo and updated/added the information accordingly. 1. Have you been to the ER, Urgent Care or Hospitalized since your last visit? NO      2. Have you followed up with your PCP or any other Physicians since your procedure/ last office visit?    NO

## 2021-04-26 NOTE — TELEPHONE ENCOUNTER
Left voicemail message for Mr. Gretel Fitzgerald to contact our office to schedule surgery (debridement/lap colostomy) with Dr. Ridge Benton.
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
## 2021-04-28 ENCOUNTER — ANESTHESIA EVENT (OUTPATIENT)
Dept: SURGERY | Age: 61
DRG: 364 | End: 2021-04-28
Payer: MEDICAID

## 2021-04-29 ENCOUNTER — HOSPITAL ENCOUNTER (INPATIENT)
Age: 61
LOS: 19 days | Discharge: NURSING, REHAB OR LONG-TERM CARE | DRG: 364 | End: 2021-05-22
Attending: SURGERY | Admitting: SURGERY
Payer: MEDICAID

## 2021-04-29 ENCOUNTER — ANESTHESIA (OUTPATIENT)
Dept: SURGERY | Age: 61
DRG: 364 | End: 2021-04-29
Payer: MEDICAID

## 2021-04-29 DIAGNOSIS — D72.829 LEUKOCYTOSIS, UNSPECIFIED TYPE: ICD-10-CM

## 2021-04-29 DIAGNOSIS — L89.154 SACRAL DECUBITUS ULCER, STAGE IV (HCC): Primary | ICD-10-CM

## 2021-04-29 PROBLEM — Z93.3 S/P COLOSTOMY (HCC): Status: ACTIVE | Noted: 2021-04-29

## 2021-04-29 LAB
ANION GAP SERPL CALC-SCNC: 6 MMOL/L (ref 3–18)
BUN SERPL-MCNC: 35 MG/DL (ref 7–18)
BUN/CREAT SERPL: 19 (ref 12–20)
CALCIUM SERPL-MCNC: 8.4 MG/DL (ref 8.5–10.1)
CHLORIDE SERPL-SCNC: 89 MMOL/L (ref 100–111)
CO2 SERPL-SCNC: 30 MMOL/L (ref 21–32)
CREAT SERPL-MCNC: 1.8 MG/DL (ref 0.6–1.3)
GLUCOSE SERPL-MCNC: 99 MG/DL (ref 74–99)
POTASSIUM SERPL-SCNC: 5.5 MMOL/L (ref 3.5–5.5)
SODIUM SERPL-SCNC: 125 MMOL/L (ref 136–145)

## 2021-04-29 PROCEDURE — 00790 ANES IPER UPR ABD NOS: CPT | Performed by: NURSE ANESTHETIST, CERTIFIED REGISTERED

## 2021-04-29 PROCEDURE — 77030010541: Performed by: SURGERY

## 2021-04-29 PROCEDURE — 77030040259 HC STPLR DEV SUREFORM DVNCI INTU -F: Performed by: SURGERY

## 2021-04-29 PROCEDURE — 76010000875 HC OR TIME 1.5 TO 2HR INTENSV - TIER 2: Performed by: SURGERY

## 2021-04-29 PROCEDURE — 80048 BASIC METABOLIC PNL TOTAL CA: CPT

## 2021-04-29 PROCEDURE — 74011250636 HC RX REV CODE- 250/636: Performed by: NURSE ANESTHETIST, CERTIFIED REGISTERED

## 2021-04-29 PROCEDURE — 77030035489 HC REDUCR CANN ENDOWR INTU -C: Performed by: SURGERY

## 2021-04-29 PROCEDURE — 77030020703 HC SEAL CANN DISP INTU -B: Performed by: SURGERY

## 2021-04-29 PROCEDURE — 77030040260 HC STPLR RELD SUREFORM DVNCI INTU -C: Performed by: SURGERY

## 2021-04-29 PROCEDURE — 74011250636 HC RX REV CODE- 250/636: Performed by: SURGERY

## 2021-04-29 PROCEDURE — 76210000017 HC OR PH I REC 1.5 TO 2 HR: Performed by: SURGERY

## 2021-04-29 PROCEDURE — 77030036731 HC STPLR ENDOSC J&J -F: Performed by: SURGERY

## 2021-04-29 PROCEDURE — 2709999900 HC NON-CHARGEABLE SUPPLY: Performed by: SURGERY

## 2021-04-29 PROCEDURE — 99218 HC RM OBSERVATION: CPT

## 2021-04-29 PROCEDURE — 77030040922 HC BLNKT HYPOTHRM STRY -A: Performed by: SURGERY

## 2021-04-29 PROCEDURE — 74011250637 HC RX REV CODE- 250/637: Performed by: NURSE ANESTHETIST, CERTIFIED REGISTERED

## 2021-04-29 PROCEDURE — 74011000250 HC RX REV CODE- 250: Performed by: NURSE ANESTHETIST, CERTIFIED REGISTERED

## 2021-04-29 PROCEDURE — 77030008683 HC TU ET CUF COVD -A: Performed by: NURSE ANESTHETIST, CERTIFIED REGISTERED

## 2021-04-29 PROCEDURE — 76060000034 HC ANESTHESIA 1.5 TO 2 HR: Performed by: SURGERY

## 2021-04-29 PROCEDURE — S2900 ROBOTIC SURGICAL SYSTEM: HCPCS | Performed by: SURGERY

## 2021-04-29 PROCEDURE — 88304 TISSUE EXAM BY PATHOLOGIST: CPT

## 2021-04-29 PROCEDURE — 77030040361 HC SLV COMPR DVT MDII -B: Performed by: SURGERY

## 2021-04-29 PROCEDURE — 00790 ANES IPER UPR ABD NOS: CPT | Performed by: ANESTHESIOLOGY

## 2021-04-29 PROCEDURE — 77030035279 HC SEAL VSL ENDOWR XI INTU -I2: Performed by: SURGERY

## 2021-04-29 PROCEDURE — 44188 LAP COLOSTOMY: CPT | Performed by: SURGERY

## 2021-04-29 PROCEDURE — 77030035277 HC OBTRTR BLDELSS DISP INTU -B: Performed by: SURGERY

## 2021-04-29 PROCEDURE — 0QB10ZZ EXCISION OF SACRUM, OPEN APPROACH: ICD-10-PCS | Performed by: SURGERY

## 2021-04-29 RX ORDER — HYDROMORPHONE HYDROCHLORIDE 2 MG/ML
0.5 INJECTION, SOLUTION INTRAMUSCULAR; INTRAVENOUS; SUBCUTANEOUS
Status: DISCONTINUED | OUTPATIENT
Start: 2021-04-29 | End: 2021-04-29 | Stop reason: HOSPADM

## 2021-04-29 RX ORDER — MIDAZOLAM HYDROCHLORIDE 1 MG/ML
INJECTION, SOLUTION INTRAMUSCULAR; INTRAVENOUS AS NEEDED
Status: DISCONTINUED | OUTPATIENT
Start: 2021-04-29 | End: 2021-04-29 | Stop reason: HOSPADM

## 2021-04-29 RX ORDER — VECURONIUM BROMIDE FOR INJECTION 1 MG/ML
INJECTION, POWDER, LYOPHILIZED, FOR SOLUTION INTRAVENOUS AS NEEDED
Status: DISCONTINUED | OUTPATIENT
Start: 2021-04-29 | End: 2021-04-29 | Stop reason: HOSPADM

## 2021-04-29 RX ORDER — HYDROMORPHONE HYDROCHLORIDE 1 MG/ML
1 INJECTION, SOLUTION INTRAMUSCULAR; INTRAVENOUS; SUBCUTANEOUS
Status: DISCONTINUED | OUTPATIENT
Start: 2021-04-29 | End: 2021-05-05

## 2021-04-29 RX ORDER — SODIUM CHLORIDE, SODIUM LACTATE, POTASSIUM CHLORIDE, CALCIUM CHLORIDE 600; 310; 30; 20 MG/100ML; MG/100ML; MG/100ML; MG/100ML
50 INJECTION, SOLUTION INTRAVENOUS CONTINUOUS
Status: DISCONTINUED | OUTPATIENT
Start: 2021-04-29 | End: 2021-04-29 | Stop reason: SDUPTHER

## 2021-04-29 RX ORDER — PROPOFOL 10 MG/ML
INJECTION, EMULSION INTRAVENOUS AS NEEDED
Status: DISCONTINUED | OUTPATIENT
Start: 2021-04-29 | End: 2021-04-29 | Stop reason: HOSPADM

## 2021-04-29 RX ORDER — KETOROLAC TROMETHAMINE 15 MG/ML
15 INJECTION, SOLUTION INTRAMUSCULAR; INTRAVENOUS EVERY 6 HOURS
Status: DISPENSED | OUTPATIENT
Start: 2021-04-29 | End: 2021-05-04

## 2021-04-29 RX ORDER — GLYCOPYRROLATE 0.2 MG/ML
INJECTION INTRAMUSCULAR; INTRAVENOUS AS NEEDED
Status: DISCONTINUED | OUTPATIENT
Start: 2021-04-29 | End: 2021-04-29 | Stop reason: HOSPADM

## 2021-04-29 RX ORDER — ONDANSETRON 2 MG/ML
INJECTION INTRAMUSCULAR; INTRAVENOUS AS NEEDED
Status: DISCONTINUED | OUTPATIENT
Start: 2021-04-29 | End: 2021-04-29 | Stop reason: HOSPADM

## 2021-04-29 RX ORDER — PHENYLEPHRINE HCL IN 0.9% NACL 1 MG/10 ML
SYRINGE (ML) INTRAVENOUS AS NEEDED
Status: DISCONTINUED | OUTPATIENT
Start: 2021-04-29 | End: 2021-04-29 | Stop reason: HOSPADM

## 2021-04-29 RX ORDER — INSULIN LISPRO 100 [IU]/ML
INJECTION, SOLUTION INTRAVENOUS; SUBCUTANEOUS ONCE
Status: DISCONTINUED | OUTPATIENT
Start: 2021-04-29 | End: 2021-04-29 | Stop reason: HOSPADM

## 2021-04-29 RX ORDER — SODIUM CHLORIDE, SODIUM LACTATE, POTASSIUM CHLORIDE, CALCIUM CHLORIDE 600; 310; 30; 20 MG/100ML; MG/100ML; MG/100ML; MG/100ML
75 INJECTION, SOLUTION INTRAVENOUS CONTINUOUS
Status: DISCONTINUED | OUTPATIENT
Start: 2021-04-29 | End: 2021-04-29 | Stop reason: HOSPADM

## 2021-04-29 RX ORDER — LIDOCAINE HYDROCHLORIDE 20 MG/ML
INJECTION, SOLUTION EPIDURAL; INFILTRATION; INTRACAUDAL; PERINEURAL AS NEEDED
Status: DISCONTINUED | OUTPATIENT
Start: 2021-04-29 | End: 2021-04-29 | Stop reason: HOSPADM

## 2021-04-29 RX ORDER — FENTANYL CITRATE 50 UG/ML
INJECTION, SOLUTION INTRAMUSCULAR; INTRAVENOUS AS NEEDED
Status: DISCONTINUED | OUTPATIENT
Start: 2021-04-29 | End: 2021-04-29 | Stop reason: HOSPADM

## 2021-04-29 RX ORDER — OXYCODONE AND ACETAMINOPHEN 5; 325 MG/1; MG/1
1 TABLET ORAL
Status: DISCONTINUED | OUTPATIENT
Start: 2021-04-29 | End: 2021-05-22 | Stop reason: HOSPADM

## 2021-04-29 RX ORDER — NEOSTIGMINE METHYLSULFATE 1 MG/ML
INJECTION, SOLUTION INTRAVENOUS AS NEEDED
Status: DISCONTINUED | OUTPATIENT
Start: 2021-04-29 | End: 2021-04-29 | Stop reason: HOSPADM

## 2021-04-29 RX ORDER — ALBUTEROL SULFATE 90 UG/1
AEROSOL, METERED RESPIRATORY (INHALATION) AS NEEDED
Status: DISCONTINUED | OUTPATIENT
Start: 2021-04-29 | End: 2021-04-29 | Stop reason: HOSPADM

## 2021-04-29 RX ORDER — SUCCINYLCHOLINE CHLORIDE 20 MG/ML
INJECTION INTRAMUSCULAR; INTRAVENOUS AS NEEDED
Status: DISCONTINUED | OUTPATIENT
Start: 2021-04-29 | End: 2021-04-29 | Stop reason: HOSPADM

## 2021-04-29 RX ORDER — FAMOTIDINE 20 MG/1
20 TABLET, FILM COATED ORAL ONCE
Status: DISCONTINUED | OUTPATIENT
Start: 2021-04-29 | End: 2021-04-29 | Stop reason: HOSPADM

## 2021-04-29 RX ORDER — CEFAZOLIN SODIUM 2 G/50ML
2 SOLUTION INTRAVENOUS ONCE
Status: COMPLETED | OUTPATIENT
Start: 2021-04-29 | End: 2021-04-29

## 2021-04-29 RX ORDER — SODIUM CHLORIDE, SODIUM LACTATE, POTASSIUM CHLORIDE, CALCIUM CHLORIDE 600; 310; 30; 20 MG/100ML; MG/100ML; MG/100ML; MG/100ML
25 INJECTION, SOLUTION INTRAVENOUS CONTINUOUS
Status: DISCONTINUED | OUTPATIENT
Start: 2021-04-29 | End: 2021-05-01

## 2021-04-29 RX ORDER — ONDANSETRON 2 MG/ML
4 INJECTION INTRAMUSCULAR; INTRAVENOUS
Status: DISCONTINUED | OUTPATIENT
Start: 2021-04-29 | End: 2021-04-29 | Stop reason: HOSPADM

## 2021-04-29 RX ADMIN — Medication 200 MCG: at 12:57

## 2021-04-29 RX ADMIN — SODIUM CHLORIDE, SODIUM LACTATE, POTASSIUM CHLORIDE, AND CALCIUM CHLORIDE 25 ML/HR: 600; 310; 30; 20 INJECTION, SOLUTION INTRAVENOUS at 18:08

## 2021-04-29 RX ADMIN — ALBUTEROL SULFATE 4 PUFF: 108 AEROSOL, METERED RESPIRATORY (INHALATION) at 12:48

## 2021-04-29 RX ADMIN — LIDOCAINE HYDROCHLORIDE 100 MG: 20 INJECTION, SOLUTION EPIDURAL; INFILTRATION; INTRACAUDAL; PERINEURAL at 12:38

## 2021-04-29 RX ADMIN — VECURONIUM BROMIDE 3 MG: 1 INJECTION, POWDER, LYOPHILIZED, FOR SOLUTION INTRAVENOUS at 13:04

## 2021-04-29 RX ADMIN — HYDROMORPHONE HYDROCHLORIDE 1 MG: 1 INJECTION, SOLUTION INTRAMUSCULAR; INTRAVENOUS; SUBCUTANEOUS at 18:07

## 2021-04-29 RX ADMIN — CEFAZOLIN SODIUM 2 G: 2 SOLUTION INTRAVENOUS at 12:54

## 2021-04-29 RX ADMIN — SUCCINYLCHOLINE CHLORIDE 100 MG: 20 INJECTION, SOLUTION INTRAMUSCULAR; INTRAVENOUS at 12:38

## 2021-04-29 RX ADMIN — MIDAZOLAM HYDROCHLORIDE 2 MG: 2 INJECTION, SOLUTION INTRAMUSCULAR; INTRAVENOUS at 12:31

## 2021-04-29 RX ADMIN — Medication 3 MG: at 14:13

## 2021-04-29 RX ADMIN — Medication 200 MCG: at 13:03

## 2021-04-29 RX ADMIN — GLYCOPYRROLATE 0.4 MG: 0.2 INJECTION INTRAMUSCULAR; INTRAVENOUS at 14:13

## 2021-04-29 RX ADMIN — VECURONIUM BROMIDE 4 MG: 1 INJECTION, POWDER, LYOPHILIZED, FOR SOLUTION INTRAVENOUS at 12:45

## 2021-04-29 RX ADMIN — ONDANSETRON 4 MG: 2 INJECTION INTRAMUSCULAR; INTRAVENOUS at 14:07

## 2021-04-29 RX ADMIN — FENTANYL CITRATE 50 MCG: 50 INJECTION, SOLUTION INTRAMUSCULAR; INTRAVENOUS at 12:31

## 2021-04-29 RX ADMIN — SODIUM CHLORIDE, SODIUM LACTATE, POTASSIUM CHLORIDE, AND CALCIUM CHLORIDE: 600; 310; 30; 20 INJECTION, SOLUTION INTRAVENOUS at 12:31

## 2021-04-29 RX ADMIN — PROPOFOL 200 MG: 10 INJECTION, EMULSION INTRAVENOUS at 12:38

## 2021-04-29 RX ADMIN — FENTANYL CITRATE 50 MCG: 50 INJECTION, SOLUTION INTRAMUSCULAR; INTRAVENOUS at 12:59

## 2021-04-29 RX ADMIN — KETOROLAC TROMETHAMINE 15 MG: 15 INJECTION, SOLUTION INTRAMUSCULAR; INTRAVENOUS at 18:08

## 2021-04-29 RX ADMIN — SODIUM CHLORIDE, SODIUM LACTATE, POTASSIUM CHLORIDE, AND CALCIUM CHLORIDE: 600; 310; 30; 20 INJECTION, SOLUTION INTRAVENOUS at 14:02

## 2021-04-29 NOTE — BRIEF OP NOTE
Brief Postoperative Note    Patient: Vanessa Palacios  YOB: 1960  MRN: [id number]    Date of Procedure: 4/29/2021     Pre-Op Diagnosis: L89.154 SACRAL DECUBITUS ULCER    Post-Op Diagnosis: Same as preoperative diagnosis. Procedure(s):  ROBOTIC COLOSTOMY FORMATION  DEBRIDEMENT OF STAGE IV NECROTIC SACRAL DECUBITUS ULCER    Surgeon(s):  Abraham Morales MD    Surgical Assistant: Surg Asst-1: Tc Barros    Anesthesia: General     Estimated Blood Loss (mL): Minimal    Complications: None    Specimens:   ID Type Source Tests Collected by Time Destination   1 : sacral decubitus ulcer Preservative   Abraham Morales MD 4/29/2021 1341 Pathology        Implants: * No implants in log *    Drains: * No LDAs found *    Findings: Intra-abdominal adhesions. Infected stage 4 decubitus ulcer.      Electronically Signed by Latonya Pagan MD on 4/29/2021 at 2:13 PM

## 2021-04-29 NOTE — PERIOP NOTES
Assumed care of pt from [address] with pt lying quietly in bed. No distress noted. Will cont to monitor. 1550   TRANSFER - OUT REPORT:    Verbal report given to Jeovany Rueda RN (name) on Aaron Spears  being transferred to [address] [place] [address] (unit) for routine post - op       Report consisted of patients Situation, Background, Assessment and   Recommendations(SBAR). Information from the following report(s) SBAR, OR Summary, Intake/Output, MAR and Cardiac Rhythm sinus rhythm was reviewed with the receiving nurse. Lines:   Peripheral IV 04/29/21 Left Hand (Active)   Site Assessment Clean, dry, & intact 04/29/21 1522   Phlebitis Assessment 0 04/29/21 1522   Infiltration Assessment 0 04/29/21 1522   Dressing Status Clean, dry, & intact 04/29/21 1522   Dressing Type Transparent;Tape 04/29/21 1522   Hub Color/Line Status Pink; Infusing 04/29/21 1522   Alcohol Cap Used No 04/29/21 1226        Opportunity for questions and clarification was provided. Patient transported with:   Tech  1600  Pts mother called and given pt update and room assignment.

## 2021-04-29 NOTE — PERIOP NOTES
Pre-Op Summary    Pt arrived via medical transport and is oriented to time, place, person and situation. Patient with unsteady gait with wheelchair assistive devices. Visit Vitals  /67   Pulse 83   Temp 97.8 °F (36.6 °C)   Resp 18   Ht 6' 2\" (1.88 m)   Wt 113.4 kg (250 lb)   SpO2 100%   BMI 32.10 kg/m²       Peripheral IV located on Left hand . Patients belongings are located [facility]. Patient's point of contact is Joycelyn Hernandez (angelic) and their contact number is: [phone] or [phone]. They will be leaving and coming back. They are able to receive medication information. They will be their ride home. Pt will need medical transport, mom will accompany pt.
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
## 2021-04-29 NOTE — ANESTHESIA POSTPROCEDURE EVALUATION
Procedure(s):  ROBOTIC COLOSTOMY FORMATION  DEBRIDEMENT OF STAGE IV NECROTIC SACRAL DECUBITUS ULCER. general    Anesthesia Post Evaluation      Multimodal analgesia: multimodal analgesia used between 6 hours prior to anesthesia start to PACU discharge  Patient location during evaluation: PACU  Patient participation: complete - patient participated  Level of consciousness: awake and alert  Pain management: adequate  Airway patency: patent  Anesthetic complications: no  Cardiovascular status: acceptable  Respiratory status: acceptable  Hydration status: acceptable  Post anesthesia nausea and vomiting:  none  Final Post Anesthesia Temperature Assessment:  Normothermia (36.0-37.5 degrees C)      INITIAL Post-op Vital signs:   Vitals Value Taken Time   /96 04/29/21 1452   Temp 36.3 °C (97.4 °F) 04/29/21 1423   Pulse 85 04/29/21 1455   Resp 15 04/29/21 1455   SpO2 100 % 04/29/21 1455   Vitals shown include unvalidated device data.

## 2021-04-29 NOTE — PROGRESS NOTES
Date of Surgery Update:  Audrey Sagastume was seen and examined. History and physical has been reviewed. The patient has been examined. There have been no significant clinical changes since the completion of the originally dated History and Physical. Will proceed with robotic colostomy formation and debridement of stage IV necrotic sacral decubitus ulcer.     Signed By: Eliazar Amato MD     April 29, 2021 11:52 AM

## 2021-04-30 PROBLEM — I10 HTN (HYPERTENSION): Status: ACTIVE | Noted: 2021-04-30

## 2021-04-30 PROBLEM — G82.20 PARAPLEGIA (HCC): Status: ACTIVE | Noted: 2021-04-30

## 2021-04-30 PROBLEM — L89.154 SACRAL DECUBITUS ULCER, STAGE IV (HCC): Status: ACTIVE | Noted: 2021-04-30

## 2021-04-30 PROCEDURE — 74011000250 HC RX REV CODE- 250: Performed by: HOSPITALIST

## 2021-04-30 PROCEDURE — 99218 HC RM OBSERVATION: CPT

## 2021-04-30 PROCEDURE — 77030013076 HC PCH OST BAG COLO -A

## 2021-04-30 PROCEDURE — 2709999900 HC NON-CHARGEABLE SUPPLY

## 2021-04-30 PROCEDURE — 96374 THER/PROPH/DIAG INJ IV PUSH: CPT

## 2021-04-30 PROCEDURE — 74011250636 HC RX REV CODE- 250/636: Performed by: SURGERY

## 2021-04-30 PROCEDURE — 77030040162

## 2021-04-30 RX ORDER — DEXTROSE MONOHYDRATE AND SODIUM CHLORIDE 5; .45 G/100ML; G/100ML
75 INJECTION, SOLUTION INTRAVENOUS CONTINUOUS
Status: DISCONTINUED | OUTPATIENT
Start: 2021-04-30 | End: 2021-05-01

## 2021-04-30 RX ORDER — ESCITALOPRAM OXALATE 10 MG/1
10 TABLET ORAL DAILY
Status: DISCONTINUED | OUTPATIENT
Start: 2021-05-01 | End: 2021-05-22 | Stop reason: HOSPADM

## 2021-04-30 RX ADMIN — DEXTROSE MONOHYDRATE AND SODIUM CHLORIDE 75 ML/HR: 5; .45 INJECTION, SOLUTION INTRAVENOUS at 17:59

## 2021-04-30 RX ADMIN — KETOROLAC TROMETHAMINE 15 MG: 15 INJECTION, SOLUTION INTRAMUSCULAR; INTRAVENOUS at 00:18

## 2021-04-30 RX ADMIN — KETOROLAC TROMETHAMINE 15 MG: 15 INJECTION, SOLUTION INTRAMUSCULAR; INTRAVENOUS at 06:24

## 2021-04-30 RX ADMIN — KETOROLAC TROMETHAMINE 15 MG: 15 INJECTION, SOLUTION INTRAMUSCULAR; INTRAVENOUS at 11:38

## 2021-04-30 RX ADMIN — KETOROLAC TROMETHAMINE 15 MG: 15 INJECTION, SOLUTION INTRAMUSCULAR; INTRAVENOUS at 17:59

## 2021-04-30 NOTE — WOUND CARE
Physical Exam   Focused assessment   Colostomy to left lower quad leaking, thin sanguinous drainage. Old appliance removed and peristomal skin cleaned with water and washcloth. This is allowed to air dry then skin prep applied. Stoma measured and wafer cut to fit. Barrier ring applied to wafer portion then applied to patient. Patient held in place for 2-3 minutes to speed adherence. Patient reports that he has paid caregivers daily from 9a-1p, and has a spouse that assists him at home. He reports she will be in at some point today and plans to discharge from hospital prior to Monday. Will plan a second visit today to review ostomy care with spouse. Patient also reports that he receives skilled nursing care for his wound but unsure of which company. Topical treatment protocol in place. Care discussed with primary nurse, Navneet Parrish. Care turned over to nursing staff at this time. Sebastien Renteria RN, BSN, [facility]

## 2021-04-30 NOTE — PROGRESS NOTES
Spoke with patient's mom and she stated she cannot care for his colostomy and pressure wound at home. Patient and patient's mom were agreeable with CM matching patient in [facility] for LTC placement. Will continue to follow up.      Adilia Saenz RN, BSN   Care Management  [phone]

## 2021-04-30 NOTE — WOUND CARE
Physical Exam   Visit attempted to teach colostomy care to caregiver. No caregiver at bedside. Spoke with TABITHA Sanchez, patient will likely be discharged over the weekend but is planning to have home health care. He is provided with 2 sets of wafer/pouches and supplies and samples for ostomy care. Will also have sample kit sent via [address] [place] [address] discussed with primary nurse, Vicenta Harper. Care turned over to nursing staff at this time. Honor Dimitrios Renteria RN, BSN, [address] [place] [address],[address]

## 2021-04-30 NOTE — PROGRESS NOTES
Received pt alert and oriented and in pleasant spirits. He is paraplegic. Condom catheter in place draining adryan urine. Lap sites x 3 to abdomen dry and well approximated with surgical glue. Colostomy noted to left lower abdomen with scant bloody drainage in bag. Dressing to buttock dry and intact. Pt able to assist with turning. No complaints voiced at this time. 0400 pt has requested no prn pain medications. Scheduled toradol controlling pain . Taking in p.o.fluids well. Comfort measures provided. SCds on, and able to use ICS independently.

## 2021-04-30 NOTE — PROGRESS NOTES
bedsde shift received from [facility] with sbar  Dr. Sydney Russo in to see patient  Taking clears well denies pain  Wound care nurse in room  Bedside shift report given to [facility] with sbar

## 2021-04-30 NOTE — ACP (ADVANCE CARE PLANNING)
Advance Care Planning     General Advance Care Planning (ACP) Conversation      Date of Conversation: 4/30/21  Conducted with: Patient with Decision Making Capacity     Content/Action Overview:   DECLINED ACP conversation - will revisit periodically     Sherman Gresham RN, BSN   Care Management  [phone]

## 2021-04-30 NOTE — PROGRESS NOTES
Problem: Falls - Risk of  Goal: *Absence of Falls  Description: Document Balbir Connors Fall Risk and appropriate interventions in the flowsheet.   Outcome: Progressing Towards Goal  Note: Fall Risk Interventions:  Mobility Interventions: Bed/chair exit alarm    Mentation Interventions: Adequate sleep, hydration, pain control, Bed/chair exit alarm    Medication Interventions: Bed/chair exit alarm    Elimination Interventions: Bed/chair exit alarm, Call light in reach              Problem: Patient Education: Go to Patient Education Activity  Goal: Patient/Family Education  Outcome: Progressing Towards Goal
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
## 2021-04-30 NOTE — OP NOTES
[address] [place] [address]   OPERATIVE REPORT    Name:  Patsy Medina  MR#:   [id number]  :  1960  ACCOUNT #:  [de-identified]  DATE OF SERVICE:  2021    PREOPERATIVE DIAGNOSES:  Stage IV infected decubitus ulcer and stool incontinence. POSTOPERATIVE DIAGNOSES:  Stage IV infected decubitus ulcer and stool incontinence. PROCEDURES PERFORMED:  1.  Robotic colostomy formation. 2.  Debridement of stage IV decubitus ulcer all the way to the bone. SURGEON:  Madelin Smith. Owen De La Vega MD.    ASSISTANTShira Panda. ANESTHESIA:  General.    COMPLICATIONS:  None. SPECIMENS REMOVED:  Infected tissue from decubitus ulcer. IMPLANTS:  None. ESTIMATED BLOOD LOSS:  Minimal.    DETAILS OF PROCEDURE:  The patient was brought to the operating room. Anesthesia was induced. There was slight difficulty in intubation. The patient's saturation went down to the low 70s for less than a few seconds, but we were able to Ambu bag him and then we were able to intubate him. At this point, the patient was placed in prone position, and scrubbing and draping of the buttock and sacral area were done in the usual manner. There was foul-smelling odor coming from the sacral area with significant necrotic tissue. I had to do excision of skin, subcutaneous tissue, and muscles all the way to the bone. They were all necrotic tissue that were excised with electrocautery and sent for permanent pathology. We made the sacral decubitus ulcer area wider than it was before and it was about 15 x 15 cm round area. At the end, there was no evidence of necrotic tissue, but the whole tissue area was dusky and not clearly healthy. So, at this point, we irrigated the area multiple times and we placed Kerlix and then abdominal pad and returned the patient back to supine position. New scrubbing and draping of the abdomen were done in the usual manner and a time-out was performed again.   A skin incision in the left upper quadrant was performed. Veress needle was inserted. Saline drop test was performed. Abdomen was insufflated. An 8 mm port was placed on the right side of the abdominal wall in the right upper quadrant. Exploration revealed adhesion in the upper abdomen, most likely from a previous laparotomy after his trauma. We were able to see the sigmoid colon, so I decided to place two other ports on the right side of the abdominal wall. One was an 8 mm port and one was a 12 mm port for stapler in the right lower quadrant. The patient was placed slightly left side up and the robot was docked. The sigmoid colon was very redundant as expected in this patient, so I was able to divide it with a ETHEL blue load at an area where I felt it was going to be enough length to bring it up. After dividing the colon with a ETHEL blue load, the mesentery of the colon was taken with a vessel sealer just to make sure that there was enough space and length to be able to pull. We identified an area in the left lower quadrant where we attempted to bring the colon up and it was clearly easily pulled up with no evidence of no tension. So, at this point, I scrubbed back in into the abdomen. We desufflated the abdomen, but we grabbed the colon before desufflation with a grasper just to make sure this was the proximal end. At this point, we created a colostomy area in the left lower quadrant by dividing the skin incision in a round fashion and then dividing the fascia in a cruciate fashion. The muscle was split to find the posterior peritoneum, which we divided, and then, we were able to grab the grasper and the colon that it was holding and pull the colon up easily. We formed the colostomy by dividing the colon and maturing it with interrupted 3-0 Vicryl sutures and then placing a colostomy bag. The three port sites were closed with 4-0 Monocryl and glue.       Viviane Luna MD      [id number]  D:  04/29/2021 14:22  T:  04/30/2021 1:19  JOB #:  [id number]

## 2021-04-30 NOTE — PROGRESS NOTES
Reason for Admission:  S/P colostomy ([facility] [Z93.3]                 RUR Score:    n/a            Plan for utilizing home health:    [facility]                      Likelihood of Readmission:   LOW                         Transition of Care Plan:              Initial assessment completed with patient and parent. Cognitive status of patient: oriented to time, place, person and situation. Face sheet information confirmed:  yes. The patient designates mom to participate in his discharge plan and to receive any needed information. This patient lives in a single family home with mother. Patient is not able to navigate steps as needed. Prior to hospitalization, patient was considered to be independent with ADLs/IADLS : no . If not independent,  patient needs assist with : dressing, bathing, food preparation, cooking, toileting and grooming    Patient has a current ACP document on file: no      Healthcare Decision Maker:     Click here to complete [address] including selection of the [address] Relationship (ie \"Primary\")    The patient will need medicaid w/c [facility] [employer] home upon discharge. The patient already has [address], [facility], Shower chair, [facility], hospital bed medical equipment available in the home. Patient is currently active with home health. If active, agency name is [facility], confirmed with sariah and also receives 8hr/day personal care through [facility]. Patient has not stayed in a skilled nursing facility or rehab. This patient is on dialysis :no    List of available Home Health agencies were provided and reviewed with the patient prior to discharge. Freedom of choice signed: yes, for [facility] . Currently, the discharge plan is Home with [address] [place] [address] [facility]. The patient states that he can obtain his medications from the pharmacy, and take his medications as directed.     Patient's current insurance is [employer]       Care My [employer] Interventions  PCP Verified by CM:  Yes  Mode of Transport at Discharge: [facility](medicaid transport)  Transition of Care Consult (CM Consult): Home Health, Discharge Planning  Reason Outside [place]: Patient already serviced by other home care/hospice agency  Discharge Durable Medical Equipment: No  Physical Therapy Consult: No  Occupational Therapy Consult: No  Speech Therapy Consult: No  Current Support Network: Relative's Home(lives with mom)  Confirm Follow Up Transport: Other (see comment)(medicaid transport-w/c [place])  The Patient and/or Patient Representative was Provided with a Choice of Provider and Agrees with the Discharge Plan?: Yes  Name of the Patient Representative Who was Provided with a Choice of Provider and Agrees with the Discharge Plan: ramona edward  Freedom of Choice List was Provided with Basic Dialogue that Supports the Patient's Individualized Plan of Care/Goals, Treatment Preferences and Shares the Quality Data Associated with the Providers?: Yes  Discharge Location  Discharge Placement: Home with home health        Sue Appiah RN, BSN   Care Management  [phone]

## 2021-05-01 LAB
ANION GAP SERPL CALC-SCNC: 5 MMOL/L (ref 3–18)
BASOPHILS # BLD: 0.1 K/UL (ref 0–0.1)
BASOPHILS NFR BLD: 0 % (ref 0–2)
BUN SERPL-MCNC: 17 MG/DL (ref 7–18)
BUN/CREAT SERPL: 19 (ref 12–20)
CALCIUM SERPL-MCNC: 8 MG/DL (ref 8.5–10.1)
CHLORIDE SERPL-SCNC: 92 MMOL/L (ref 100–111)
CO2 SERPL-SCNC: 31 MMOL/L (ref 21–32)
CREAT SERPL-MCNC: 0.9 MG/DL (ref 0.6–1.3)
DIFFERENTIAL METHOD BLD: ABNORMAL
EOSINOPHIL # BLD: 0.1 K/UL (ref 0–0.4)
EOSINOPHIL NFR BLD: 1 % (ref 0–5)
ERYTHROCYTE [DISTWIDTH] IN BLOOD BY AUTOMATED COUNT: 13.6 % (ref 11.6–14.5)
GLUCOSE SERPL-MCNC: 112 MG/DL (ref 74–99)
HCT VFR BLD AUTO: 27.1 % (ref 36–48)
HGB BLD-MCNC: 8.6 G/DL (ref 13–16)
LYMPHOCYTES # BLD: 2.1 K/UL (ref 0.9–3.6)
LYMPHOCYTES NFR BLD: 10 % (ref 21–52)
MCH RBC QN AUTO: 27.8 PG (ref 24–34)
MCHC RBC AUTO-ENTMCNC: 31.7 G/DL (ref 31–37)
MCV RBC AUTO: 87.7 FL (ref 74–97)
MONOCYTES # BLD: 1.7 K/UL (ref 0.05–1.2)
MONOCYTES NFR BLD: 8 % (ref 3–10)
NEUTS SEG # BLD: 16.5 K/UL (ref 1.8–8)
NEUTS SEG NFR BLD: 80 % (ref 40–73)
PLATELET # BLD AUTO: 369 K/UL (ref 135–420)
PMV BLD AUTO: 9.9 FL (ref 9.2–11.8)
POTASSIUM SERPL-SCNC: 3.8 MMOL/L (ref 3.5–5.5)
RBC # BLD AUTO: 3.09 M/UL (ref 4.35–5.65)
SODIUM SERPL-SCNC: 128 MMOL/L (ref 136–145)
WBC # BLD AUTO: 20.7 K/UL (ref 4.6–13.2)

## 2021-05-01 PROCEDURE — 74011250636 HC RX REV CODE- 250/636: Performed by: SURGERY

## 2021-05-01 PROCEDURE — 2709999900 HC NON-CHARGEABLE SUPPLY

## 2021-05-01 PROCEDURE — 74011250637 HC RX REV CODE- 250/637: Performed by: SURGERY

## 2021-05-01 PROCEDURE — 87040 BLOOD CULTURE FOR BACTERIA: CPT

## 2021-05-01 PROCEDURE — 36415 COLL VENOUS BLD VENIPUNCTURE: CPT

## 2021-05-01 PROCEDURE — 99232 SBSQ HOSP IP/OBS MODERATE 35: CPT | Performed by: EMERGENCY MEDICINE

## 2021-05-01 PROCEDURE — 74011250636 HC RX REV CODE- 250/636: Performed by: EMERGENCY MEDICINE

## 2021-05-01 PROCEDURE — 85025 COMPLETE CBC W/AUTO DIFF WBC: CPT

## 2021-05-01 PROCEDURE — 80048 BASIC METABOLIC PNL TOTAL CA: CPT

## 2021-05-01 PROCEDURE — 74011000258 HC RX REV CODE- 258: Performed by: EMERGENCY MEDICINE

## 2021-05-01 PROCEDURE — 96376 TX/PRO/DX INJ SAME DRUG ADON: CPT

## 2021-05-01 PROCEDURE — 96375 TX/PRO/DX INJ NEW DRUG ADDON: CPT

## 2021-05-01 PROCEDURE — 99218 HC RM OBSERVATION: CPT

## 2021-05-01 PROCEDURE — 77030027138 HC INCENT SPIROMETER -A

## 2021-05-01 RX ORDER — BISACODYL 5 MG
5 TABLET, DELAYED RELEASE (ENTERIC COATED) ORAL DAILY PRN
Status: DISCONTINUED | OUTPATIENT
Start: 2021-05-01 | End: 2021-05-22 | Stop reason: HOSPADM

## 2021-05-01 RX ORDER — SODIUM CHLORIDE 9 MG/ML
50 INJECTION, SOLUTION INTRAVENOUS CONTINUOUS
Status: DISCONTINUED | OUTPATIENT
Start: 2021-05-01 | End: 2021-05-20

## 2021-05-01 RX ORDER — VANCOMYCIN 1.75 GRAM/500 ML IN 0.9 % SODIUM CHLORIDE INTRAVENOUS
1750 EVERY 12 HOURS
Status: DISCONTINUED | OUTPATIENT
Start: 2021-05-02 | End: 2021-05-04

## 2021-05-01 RX ORDER — DOCUSATE SODIUM 50 MG/5ML
100 LIQUID ORAL DAILY
Status: DISCONTINUED | OUTPATIENT
Start: 2021-05-02 | End: 2021-05-22 | Stop reason: HOSPADM

## 2021-05-01 RX ORDER — VANCOMYCIN 2 GRAM/500 ML IN 0.9 % SODIUM CHLORIDE INTRAVENOUS
2000 ONCE
Status: COMPLETED | OUTPATIENT
Start: 2021-05-01 | End: 2021-05-01

## 2021-05-01 RX ADMIN — VANCOMYCIN HYDROCHLORIDE 2000 MG: 10 INJECTION, POWDER, LYOPHILIZED, FOR SOLUTION INTRAVENOUS at 20:54

## 2021-05-01 RX ADMIN — KETOROLAC TROMETHAMINE 15 MG: 15 INJECTION, SOLUTION INTRAMUSCULAR; INTRAVENOUS at 00:52

## 2021-05-01 RX ADMIN — KETOROLAC TROMETHAMINE 15 MG: 15 INJECTION, SOLUTION INTRAMUSCULAR; INTRAVENOUS at 16:03

## 2021-05-01 RX ADMIN — OXYCODONE HYDROCHLORIDE AND ACETAMINOPHEN 1 TABLET: 5; 325 TABLET ORAL at 08:49

## 2021-05-01 RX ADMIN — PIPERACILLIN AND TAZOBACTAM 3.38 G: 3; .375 INJECTION, POWDER, LYOPHILIZED, FOR SOLUTION INTRAVENOUS at 18:18

## 2021-05-01 RX ADMIN — KETOROLAC TROMETHAMINE 15 MG: 15 INJECTION, SOLUTION INTRAMUSCULAR; INTRAVENOUS at 05:55

## 2021-05-01 RX ADMIN — SODIUM CHLORIDE 1000 ML: 900 INJECTION, SOLUTION INTRAVENOUS at 16:25

## 2021-05-01 RX ADMIN — SODIUM CHLORIDE 75 ML/HR: 900 INJECTION, SOLUTION INTRAVENOUS at 17:42

## 2021-05-01 NOTE — PROGRESS NOTES
Patient seen and examined. He is doing well. Tolerating clear liquid diet. He denies any major abdominal pain and he denies any nausea. His ostomy is functioning and viable. His abdomen is soft and nontender. Plan:  Regular diet  Discharge home today  Wound care follow-up for his decubitus ulcer in his ostomy  Wound care for home which he already is set up  I spoke with the mother yesterday and she is okay with him going home today.   Follow-up with me in couple weeks

## 2021-05-01 NOTE — PROGRESS NOTES
Received pt alert and oriented resting in bed. Colostomy intact to LLQ with small amount of tan/brown drainage. Pt without any complaints of pain or discomfort at this time. 2200 dressing changed to buttock . Small amount bleed from wound. Pt had small bowel movement of soft tan stool from rectum during dressing change. Hien care given. Wound cleaned and dressed with W-D packing and ABD pads covering. Pt tolerated procedure well. 0330 MD in and assessed pt. States pt is for discharge today.
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
## 2021-05-01 NOTE — PROGRESS NOTES
Problem: Falls - Risk of  Goal: *Absence of Falls  Description: Document Yolanda Caro Fall Risk and appropriate interventions in the flowsheet.   Outcome: Progressing Towards Goal  Note: Fall Risk Interventions:  Mobility Interventions: Patient to call before getting OOB    Mentation Interventions: Adequate sleep, hydration, pain control    Medication Interventions: Patient to call before getting OOB    Elimination Interventions: Call light in reach              Problem: Patient Education: Go to Patient Education Activity  Goal: Patient/Family Education  Outcome: Progressing Towards Goal

## 2021-05-01 NOTE — ROUTINE PROCESS
End of Shift Note     Bedside and verbal shift change report given to Dallas Hicks RN (On coming [address]) by Kan Puri RN (Off going nurse).   Report included the following information:      --Procedure Summary     --MAR,     --Recent Results     --Med Rec Status

## 2021-05-01 NOTE — PROGRESS NOTES
Problem: Falls - Risk of  Goal: *Absence of Falls  Description: Document Booker Bundy Fall Risk and appropriate interventions in the flowsheet. Outcome: Progressing Towards Goal  Note: Fall Risk Interventions:  Mobility Interventions: Bed/chair exit alarm, Patient to call before getting OOB    Mentation Interventions: Adequate sleep, hydration, pain control, Evaluate medications/consider consulting pharmacy    Medication Interventions: Utilize gait belt for transfers/ambulation, Evaluate medications/consider consulting pharmacy    Elimination Interventions: Call light in reach, Patient to call for help with toileting needs, Toileting schedule/hourly rounds              Problem: Patient Education: Go to Patient Education Activity  Goal: Patient/Family Education  Outcome: Progressing Towards Goal     Problem: Pressure Injury - Risk of  Goal: *Prevention of pressure injury  Description: Document Jordin Scale and appropriate interventions in the flowsheet.   Outcome: Progressing Towards Goal  Note: Pressure Injury Interventions:  Sensory Interventions: Assess changes in LOC, Discuss PT/OT consult with provider, Check visual cues for pain, Assess need for specialty bed, Keep linens dry and wrinkle-free, Maintain/enhance activity level, Float heels, Minimize linen layers    Moisture Interventions: Absorbent underpads, Apply protective barrier, creams and emollients, Assess need for specialty bed, Maintain skin hydration (lotion/cream), Minimize layers, Moisture barrier    Activity Interventions: Pressure redistribution bed/mattress(bed type), Increase time out of bed    Mobility Interventions: Pressure redistribution bed/mattress (bed type)    Nutrition Interventions: Document food/fluid/supplement intake    Friction and Shear Interventions: Foam dressings/transparent film/skin sealants, HOB 30 degrees or less, Lift sheet, Lift team/patient mobility team                Problem: Patient Education: Go to Patient Education Activity  Goal: Patient/Family Education  Outcome: Progressing Towards Goal

## 2021-05-01 NOTE — ROUTINE PROCESS
Dr. Modesta Iraheta called about the patient's discharge status, he stated that nobody informed him about the changes. Updates on patient's discharge changes was not clearly communicated. Spoke with the  Pam Majano RN and given her Dr. Lesly Saeed contact number.

## 2021-05-02 LAB
ANION GAP SERPL CALC-SCNC: 8 MMOL/L (ref 3–18)
BASOPHILS # BLD: 0 K/UL (ref 0–0.1)
BASOPHILS NFR BLD: 0 % (ref 0–2)
BUN SERPL-MCNC: 12 MG/DL (ref 7–18)
BUN/CREAT SERPL: 15 (ref 12–20)
CALCIUM SERPL-MCNC: 8.2 MG/DL (ref 8.5–10.1)
CHLORIDE SERPL-SCNC: 98 MMOL/L (ref 100–111)
CO2 SERPL-SCNC: 25 MMOL/L (ref 21–32)
CREAT SERPL-MCNC: 0.82 MG/DL (ref 0.6–1.3)
DIFFERENTIAL METHOD BLD: ABNORMAL
EOSINOPHIL # BLD: 0.2 K/UL (ref 0–0.4)
EOSINOPHIL NFR BLD: 1 % (ref 0–5)
ERYTHROCYTE [DISTWIDTH] IN BLOOD BY AUTOMATED COUNT: 13.5 % (ref 11.6–14.5)
GLUCOSE SERPL-MCNC: 87 MG/DL (ref 74–99)
HCT VFR BLD AUTO: 25.8 % (ref 36–48)
HGB BLD-MCNC: 8.3 G/DL (ref 13–16)
LYMPHOCYTES # BLD: 0.6 K/UL (ref 0.9–3.6)
LYMPHOCYTES NFR BLD: 3 % (ref 21–52)
MAGNESIUM SERPL-MCNC: 1.8 MG/DL (ref 1.6–2.6)
MCH RBC QN AUTO: 28.1 PG (ref 24–34)
MCHC RBC AUTO-ENTMCNC: 32.2 G/DL (ref 31–37)
MCV RBC AUTO: 87.5 FL (ref 74–97)
MONOCYTES # BLD: 1.3 K/UL (ref 0.05–1.2)
MONOCYTES NFR BLD: 6 % (ref 3–10)
NEUTS SEG # BLD: 19.3 K/UL (ref 1.8–8)
NEUTS SEG NFR BLD: 90 % (ref 40–73)
PLATELET # BLD AUTO: 432 K/UL (ref 135–420)
PLATELET COMMENTS,PCOM: ABNORMAL
PMV BLD AUTO: 9.4 FL (ref 9.2–11.8)
POTASSIUM SERPL-SCNC: 3.4 MMOL/L (ref 3.5–5.5)
RBC # BLD AUTO: 2.95 M/UL (ref 4.35–5.65)
RBC MORPH BLD: ABNORMAL
SODIUM SERPL-SCNC: 131 MMOL/L (ref 136–145)
WBC # BLD AUTO: 21.4 K/UL (ref 4.6–13.2)
WBC MORPH BLD: ABNORMAL

## 2021-05-02 PROCEDURE — 99232 SBSQ HOSP IP/OBS MODERATE 35: CPT | Performed by: EMERGENCY MEDICINE

## 2021-05-02 PROCEDURE — 99218 HC RM OBSERVATION: CPT

## 2021-05-02 PROCEDURE — 85025 COMPLETE CBC W/AUTO DIFF WBC: CPT

## 2021-05-02 PROCEDURE — 36415 COLL VENOUS BLD VENIPUNCTURE: CPT

## 2021-05-02 PROCEDURE — 80048 BASIC METABOLIC PNL TOTAL CA: CPT

## 2021-05-02 PROCEDURE — 74011000258 HC RX REV CODE- 258: Performed by: EMERGENCY MEDICINE

## 2021-05-02 PROCEDURE — 74011250637 HC RX REV CODE- 250/637: Performed by: EMERGENCY MEDICINE

## 2021-05-02 PROCEDURE — 74011250636 HC RX REV CODE- 250/636: Performed by: SURGERY

## 2021-05-02 PROCEDURE — 2709999900 HC NON-CHARGEABLE SUPPLY

## 2021-05-02 PROCEDURE — 74011250636 HC RX REV CODE- 250/636: Performed by: EMERGENCY MEDICINE

## 2021-05-02 PROCEDURE — 96376 TX/PRO/DX INJ SAME DRUG ADON: CPT

## 2021-05-02 PROCEDURE — 77030018836 HC SOL IRR NACL ICUM -A

## 2021-05-02 PROCEDURE — 83735 ASSAY OF MAGNESIUM: CPT

## 2021-05-02 RX ORDER — ACETAMINOPHEN 325 MG/1
650 TABLET ORAL
Status: DISCONTINUED | OUTPATIENT
Start: 2021-05-02 | End: 2021-05-22 | Stop reason: HOSPADM

## 2021-05-02 RX ADMIN — KETOROLAC TROMETHAMINE 15 MG: 15 INJECTION, SOLUTION INTRAMUSCULAR; INTRAVENOUS at 12:24

## 2021-05-02 RX ADMIN — VANCOMYCIN HYDROCHLORIDE 1750 MG: 10 INJECTION, POWDER, LYOPHILIZED, FOR SOLUTION INTRAVENOUS at 21:51

## 2021-05-02 RX ADMIN — KETOROLAC TROMETHAMINE 15 MG: 15 INJECTION, SOLUTION INTRAMUSCULAR; INTRAVENOUS at 05:31

## 2021-05-02 RX ADMIN — POTASSIUM BICARBONATE 40 MEQ: 782 TABLET, EFFERVESCENT ORAL at 19:44

## 2021-05-02 RX ADMIN — KETOROLAC TROMETHAMINE 15 MG: 15 INJECTION, SOLUTION INTRAMUSCULAR; INTRAVENOUS at 19:44

## 2021-05-02 RX ADMIN — SODIUM CHLORIDE 75 ML/HR: 900 INJECTION, SOLUTION INTRAVENOUS at 11:08

## 2021-05-02 RX ADMIN — PIPERACILLIN AND TAZOBACTAM 3.38 G: 3; .375 INJECTION, POWDER, LYOPHILIZED, FOR SOLUTION INTRAVENOUS at 00:49

## 2021-05-02 RX ADMIN — PIPERACILLIN AND TAZOBACTAM 3.38 G: 3; .375 INJECTION, POWDER, LYOPHILIZED, FOR SOLUTION INTRAVENOUS at 12:24

## 2021-05-02 RX ADMIN — VANCOMYCIN HYDROCHLORIDE 1750 MG: 10 INJECTION, POWDER, LYOPHILIZED, FOR SOLUTION INTRAVENOUS at 08:42

## 2021-05-02 RX ADMIN — KETOROLAC TROMETHAMINE 15 MG: 15 INJECTION, SOLUTION INTRAMUSCULAR; INTRAVENOUS at 00:49

## 2021-05-02 RX ADMIN — PIPERACILLIN AND TAZOBACTAM 3.38 G: 3; .375 INJECTION, POWDER, LYOPHILIZED, FOR SOLUTION INTRAVENOUS at 19:44

## 2021-05-02 RX ADMIN — PIPERACILLIN AND TAZOBACTAM 3.38 G: 3; .375 INJECTION, POWDER, LYOPHILIZED, FOR SOLUTION INTRAVENOUS at 05:31

## 2021-05-02 NOTE — PROGRESS NOTES
Problem: Falls - Risk of  Goal: *Absence of Falls  Description: Document Faye Chua Fall Risk and appropriate interventions in the flowsheet. Outcome: Progressing Towards Goal  Note: Fall Risk Interventions:  Mobility Interventions: Communicate number of staff needed for ambulation/transfer, OT consult for ADLs, Patient to call before getting OOB, PT Consult for mobility concerns    Mentation Interventions: Adequate sleep, hydration, pain control, Evaluate medications/consider consulting pharmacy    Medication Interventions: Evaluate medications/consider consulting pharmacy    Elimination Interventions: Call light in reach, Toileting schedule/hourly rounds              Problem: Patient Education: Go to Patient Education Activity  Goal: Patient/Family Education  Outcome: Progressing Towards Goal     Problem: Pressure Injury - Risk of  Goal: *Prevention of pressure injury  Description: Document Jordin Scale and appropriate interventions in the flowsheet.   Outcome: Progressing Towards Goal  Note: Pressure Injury Interventions:  Sensory Interventions: Assess changes in LOC, Assess need for specialty bed, Keep linens dry and wrinkle-free, Maintain/enhance activity level, Minimize linen layers    Moisture Interventions: Absorbent underpads, Apply protective barrier, creams and emollients, Internal/External urinary devices, Minimize layers, Moisture barrier, Maintain skin hydration (lotion/cream)    Activity Interventions: PT/OT evaluation, Pressure redistribution bed/mattress(bed type), Increase time out of bed    Mobility Interventions: Pressure redistribution bed/mattress (bed type), PT/OT evaluation    Nutrition Interventions: Document food/fluid/supplement intake    Friction and Shear Interventions: HOB 30 degrees or less, Foam dressings/transparent film/skin sealants, Lift team/patient mobility team, Transferring/repositioning devices                Problem: Patient Education: Go to Patient Education Activity  Goal: Patient/Family Education  Outcome: Progressing Towards Goal     Problem: Pain  Goal: *Control of Pain  Outcome: Progressing Towards Goal     Problem: Patient Education: Go to Patient Education Activity  Goal: Patient/Family Education  Outcome: Progressing Towards Goal

## 2021-05-02 NOTE — PROGRESS NOTES
conducted an initial consultation and Spiritual Assessment for Ganesh Chapa, who is a 61 y.o.,male. Patient's Primary Language is: [place]. According to the patient's EMR Religion Affiliation is: Ilan Leon.     The reason the Patient came to the hospital is:   Patient Active Problem List    Diagnosis Date Noted    Paraplegia ([facility] 04/30/2021    Sacral decubitus ulcer, stage IV ([facility] 04/30/2021    HTN (hypertension) 04/30/2021    S/P colostomy ([facility] 04/29/2021        The  provided the following Interventions:  Initiated a relationship of care and support. Explored for spiritual needs while hospitalized. Provided information about Spiritual Care Services, in case he desired a 's assistance during his stay. Chart reviewed. The following outcomes where achieved:  Patient expressed gratitude for 's visit. Assessment:  Patient does not have any Methodist/cultural needs that will affect patient's preferences in health care. There are no spiritual or Methodist issues which require intervention at this time. Plan:  Chaplains will continue to follow and will provide pastoral care on an as needed/requested basis.  recommends bedside caregivers page  on duty if patient shows signs of acute spiritual or emotional distress.     [address] [place]   [phone]
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
## 2021-05-02 NOTE — PROGRESS NOTES
Progress notes uploaded in [facility] to nursing facilities. From CM's discussion with pt's mom yesterday, she is aware that pt only has Medicaid and does not have long term benefits and he an sharon go to snf long term. Case Management will follow up with nursing facilities tomorrow.         TRINA WhiteN RN  Care Management  Pager: [phone]

## 2021-05-02 NOTE — ROUTINE PROCESS
Received report from HUMAIRA. Pt AAOx3, NAD, breathing non labored, on room air, HOB up. IV site clean, dry and intact. IVF going per order. Dressing sacral area in place. Pt's paraplegic. Bed at the lowest level on lock position, call bell w/i reach. Bed alarm on. Bedside and Verbal shift change report given to Gunjan Mondragon (oncoming nurse) by me (offgoing nurse). Report included the following information SBAR, Kardex, Procedure Summary, Intake/Output, MAR and Recent Results.

## 2021-05-02 NOTE — ROUTINE PROCESS
Bedside and Verbal shift change report given to Noemi Prasad (oncoming nurse) by Nilda Lewis (offgoing nurse). Report included the following information SBAR, Kardex, Intake/Output, MAR and Recent Results.

## 2021-05-02 NOTE — ROUTINE PROCESS
End of Shift Note     Bedside and verbal shift change report given to Nyasia Painting RN (On coming nurse) by Kevin Briceno RN (Off going nurse).   Report included the following information:      --Procedure Summary     --MAR,     --Recent Results     --Med Rec Status
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
## 2021-05-02 NOTE — PROGRESS NOTES
Problem: Falls - Risk of  Goal: *Absence of Falls  Description: Document Shelly Werner Fall Risk and appropriate interventions in the flowsheet.   Outcome: Progressing Towards Goal  Note: Fall Risk Interventions:  Mobility Interventions: Communicate number of staff needed for ambulation/transfer, OT consult for ADLs, PT Consult for mobility concerns    Mentation Interventions: Adequate sleep, hydration, pain control, Door open when patient unattended    Medication Interventions: Evaluate medications/consider consulting pharmacy    Elimination Interventions: Call light in reach, Patient to call for help with toileting needs, Toileting schedule/hourly rounds

## 2021-05-02 NOTE — ROUTINE PROCESS
Bedside and Verbal shift change report given to Raven Paredes (oncoming nurse) by Maria Del Carmen gallego. Report included the following information SBAR, Kardex and MAR.

## 2021-05-02 NOTE — PROGRESS NOTES
Patient seen and examined. He is doing well. He is tolerating regular diet. His abdomen is soft and nontender and his ostomy is functioning well. Patient was supposed to be discharged yesterday for which I placed the order however for some reason there was a discussion between the family and social work and the nursing team and they decided to keep him because they need a nursing place for him. I spoke with the mother and she stated that she is very exhausted and tired and she does not think that she can take care of him with his new ostomy in place. I do understand her as well as the patient and the  helping in getting him a bed in a nursing home. However I explained to them that if by Monday we cannot get a place and not going to keep him in the hospital and he will have to go home with home health care. Which they agreed.

## 2021-05-03 ENCOUNTER — ANESTHESIA EVENT (OUTPATIENT)
Dept: SURGERY | Age: 61
DRG: 364 | End: 2021-05-03
Payer: MEDICAID

## 2021-05-03 ENCOUNTER — ANESTHESIA (OUTPATIENT)
Dept: SURGERY | Age: 61
DRG: 364 | End: 2021-05-03
Payer: MEDICAID

## 2021-05-03 LAB
ANION GAP SERPL CALC-SCNC: 6 MMOL/L (ref 3–18)
BASOPHILS # BLD: 0.2 K/UL (ref 0–0.1)
BASOPHILS NFR BLD: 1 % (ref 0–2)
BUN SERPL-MCNC: 12 MG/DL (ref 7–18)
BUN/CREAT SERPL: 13 (ref 12–20)
CALCIUM SERPL-MCNC: 7.7 MG/DL (ref 8.5–10.1)
CHLORIDE SERPL-SCNC: 100 MMOL/L (ref 100–111)
CO2 SERPL-SCNC: 27 MMOL/L (ref 21–32)
COVID-19 RAPID TEST, COVR: NOT DETECTED
CREAT SERPL-MCNC: 0.96 MG/DL (ref 0.6–1.3)
DIFFERENTIAL METHOD BLD: ABNORMAL
EOSINOPHIL # BLD: 0.4 K/UL (ref 0–0.4)
EOSINOPHIL NFR BLD: 2 % (ref 0–5)
ERYTHROCYTE [DISTWIDTH] IN BLOOD BY AUTOMATED COUNT: 13.5 % (ref 11.6–14.5)
GLUCOSE SERPL-MCNC: 73 MG/DL (ref 74–99)
HCT VFR BLD AUTO: 23.9 % (ref 36–48)
HGB BLD-MCNC: 7.7 G/DL (ref 13–16)
LYMPHOCYTES # BLD: 1.8 K/UL (ref 0.9–3.6)
LYMPHOCYTES NFR BLD: 9 % (ref 21–52)
MCH RBC QN AUTO: 28.6 PG (ref 24–34)
MCHC RBC AUTO-ENTMCNC: 32.2 G/DL (ref 31–37)
MCV RBC AUTO: 88.8 FL (ref 74–97)
MONOCYTES # BLD: 1.6 K/UL (ref 0.05–1.2)
MONOCYTES NFR BLD: 8 % (ref 3–10)
NEUTS SEG # BLD: 15.5 K/UL (ref 1.8–8)
NEUTS SEG NFR BLD: 80 % (ref 40–73)
PLATELET # BLD AUTO: 381 K/UL (ref 135–420)
PLATELET COMMENTS,PCOM: ABNORMAL
PMV BLD AUTO: 10.1 FL (ref 9.2–11.8)
POTASSIUM SERPL-SCNC: 3.8 MMOL/L (ref 3.5–5.5)
RBC # BLD AUTO: 2.69 M/UL (ref 4.35–5.65)
RBC MORPH BLD: ABNORMAL
SODIUM SERPL-SCNC: 133 MMOL/L (ref 136–145)
SOURCE, COVRS: NORMAL
WBC # BLD AUTO: 19.5 K/UL (ref 4.6–13.2)

## 2021-05-03 PROCEDURE — 77030010541: Performed by: SURGERY

## 2021-05-03 PROCEDURE — 99232 SBSQ HOSP IP/OBS MODERATE 35: CPT | Performed by: EMERGENCY MEDICINE

## 2021-05-03 PROCEDURE — 97530 THERAPEUTIC ACTIVITIES: CPT

## 2021-05-03 PROCEDURE — 87205 SMEAR GRAM STAIN: CPT

## 2021-05-03 PROCEDURE — 77030003029 HC SUT VCRL J&J -B: Performed by: SURGERY

## 2021-05-03 PROCEDURE — 76210000006 HC OR PH I REC 0.5 TO 1 HR: Performed by: SURGERY

## 2021-05-03 PROCEDURE — 74011250636 HC RX REV CODE- 250/636: Performed by: SURGERY

## 2021-05-03 PROCEDURE — 77030036731 HC STPLR ENDOSC J&J -F: Performed by: SURGERY

## 2021-05-03 PROCEDURE — 77030011265 HC ELECTRD BLD HEX COVD -A: Performed by: SURGERY

## 2021-05-03 PROCEDURE — 74011250637 HC RX REV CODE- 250/637: Performed by: EMERGENCY MEDICINE

## 2021-05-03 PROCEDURE — 77030019934 HC DRSG VAC ASST KCON -B

## 2021-05-03 PROCEDURE — 85025 COMPLETE CBC W/AUTO DIFF WBC: CPT

## 2021-05-03 PROCEDURE — 74011250636 HC RX REV CODE- 250/636: Performed by: EMERGENCY MEDICINE

## 2021-05-03 PROCEDURE — 00840 ANES IPER PX LOWER ABD NOS: CPT | Performed by: NURSE ANESTHETIST, CERTIFIED REGISTERED

## 2021-05-03 PROCEDURE — 74011250636 HC RX REV CODE- 250/636: Performed by: ANESTHESIOLOGY

## 2021-05-03 PROCEDURE — 77030011264 HC ELECTRD BLD EXT COVD -A: Performed by: SURGERY

## 2021-05-03 PROCEDURE — 80048 BASIC METABOLIC PNL TOTAL CA: CPT

## 2021-05-03 PROCEDURE — 74011000250 HC RX REV CODE- 250: Performed by: ANESTHESIOLOGY

## 2021-05-03 PROCEDURE — 74011000258 HC RX REV CODE- 258: Performed by: EMERGENCY MEDICINE

## 2021-05-03 PROCEDURE — 88307 TISSUE EXAM BY PATHOLOGIST: CPT

## 2021-05-03 PROCEDURE — 76010000153 HC OR TIME 1.5 TO 2 HR: Performed by: SURGERY

## 2021-05-03 PROCEDURE — 74011250636 HC RX REV CODE- 250/636: Performed by: NURSE ANESTHETIST, CERTIFIED REGISTERED

## 2021-05-03 PROCEDURE — 77010033678 HC OXYGEN DAILY

## 2021-05-03 PROCEDURE — 77030002996 HC SUT SLK J&J -A: Performed by: SURGERY

## 2021-05-03 PROCEDURE — 97161 PT EVAL LOW COMPLEX 20 MIN: CPT

## 2021-05-03 PROCEDURE — 77030011278 HC ELECTRD LIG IMPT COVD -F: Performed by: SURGERY

## 2021-05-03 PROCEDURE — 77030009979 HC RELD STPLR TCR J&J -C: Performed by: SURGERY

## 2021-05-03 PROCEDURE — 87077 CULTURE AEROBIC IDENTIFY: CPT

## 2021-05-03 PROCEDURE — 0D1N0Z4 BYPASS SIGMOID COLON TO CUTANEOUS, OPEN APPROACH: ICD-10-PCS | Performed by: SURGERY

## 2021-05-03 PROCEDURE — 8E0W4CZ ROBOTIC ASSISTED PROCEDURE OF TRUNK REGION, PERCUTANEOUS ENDOSCOPIC APPROACH: ICD-10-PCS | Performed by: SURGERY

## 2021-05-03 PROCEDURE — 97166 OT EVAL MOD COMPLEX 45 MIN: CPT

## 2021-05-03 PROCEDURE — 0DT80ZZ RESECTION OF SMALL INTESTINE, OPEN APPROACH: ICD-10-PCS | Performed by: SURGERY

## 2021-05-03 PROCEDURE — 87186 SC STD MICRODIL/AGAR DIL: CPT

## 2021-05-03 PROCEDURE — 99218 HC RM OBSERVATION: CPT

## 2021-05-03 PROCEDURE — 77030008462 HC STPLR SKN PROX J&J -A: Performed by: SURGERY

## 2021-05-03 PROCEDURE — 74011250637 HC RX REV CODE- 250/637: Performed by: HOSPITALIST

## 2021-05-03 PROCEDURE — 88304 TISSUE EXAM BY PATHOLOGIST: CPT

## 2021-05-03 PROCEDURE — 2709999900 HC NON-CHARGEABLE SUPPLY: Performed by: SURGERY

## 2021-05-03 PROCEDURE — 77030008683 HC TU ET CUF COVD -A: Performed by: ANESTHESIOLOGY

## 2021-05-03 PROCEDURE — 96376 TX/PRO/DX INJ SAME DRUG ADON: CPT

## 2021-05-03 PROCEDURE — 2709999900 HC NON-CHARGEABLE SUPPLY

## 2021-05-03 PROCEDURE — 00840 ANES IPER PX LOWER ABD NOS: CPT | Performed by: ANESTHESIOLOGY

## 2021-05-03 PROCEDURE — 77030002966 HC SUT PDS J&J -A: Performed by: SURGERY

## 2021-05-03 PROCEDURE — 87635 SARS-COV-2 COVID-19 AMP PRB: CPT

## 2021-05-03 PROCEDURE — 74011000258 HC RX REV CODE- 258: Performed by: SURGERY

## 2021-05-03 PROCEDURE — 36415 COLL VENOUS BLD VENIPUNCTURE: CPT

## 2021-05-03 PROCEDURE — 76060000034 HC ANESTHESIA 1.5 TO 2 HR: Performed by: SURGERY

## 2021-05-03 PROCEDURE — 65270000029 HC RM PRIVATE

## 2021-05-03 RX ORDER — DIPHENHYDRAMINE HYDROCHLORIDE 50 MG/ML
12.5 INJECTION, SOLUTION INTRAMUSCULAR; INTRAVENOUS
Status: DISCONTINUED | OUTPATIENT
Start: 2021-05-03 | End: 2021-05-03 | Stop reason: HOSPADM

## 2021-05-03 RX ORDER — ONDANSETRON 2 MG/ML
4 INJECTION INTRAMUSCULAR; INTRAVENOUS ONCE
Status: DISCONTINUED | OUTPATIENT
Start: 2021-05-03 | End: 2021-05-03 | Stop reason: HOSPADM

## 2021-05-03 RX ORDER — ALBUTEROL SULFATE 0.83 MG/ML
2.5 SOLUTION RESPIRATORY (INHALATION) AS NEEDED
Status: DISCONTINUED | OUTPATIENT
Start: 2021-05-03 | End: 2021-05-03 | Stop reason: HOSPADM

## 2021-05-03 RX ORDER — HYDROMORPHONE HYDROCHLORIDE 2 MG/ML
0.5 INJECTION, SOLUTION INTRAMUSCULAR; INTRAVENOUS; SUBCUTANEOUS
Status: DISCONTINUED | OUTPATIENT
Start: 2021-05-03 | End: 2021-05-03 | Stop reason: HOSPADM

## 2021-05-03 RX ORDER — FENTANYL CITRATE 50 UG/ML
25 INJECTION, SOLUTION INTRAMUSCULAR; INTRAVENOUS
Status: DISCONTINUED | OUTPATIENT
Start: 2021-05-03 | End: 2021-05-03 | Stop reason: HOSPADM

## 2021-05-03 RX ORDER — ONDANSETRON 2 MG/ML
INJECTION INTRAMUSCULAR; INTRAVENOUS AS NEEDED
Status: DISCONTINUED | OUTPATIENT
Start: 2021-05-03 | End: 2021-05-03 | Stop reason: HOSPADM

## 2021-05-03 RX ORDER — DEXAMETHASONE SODIUM PHOSPHATE 4 MG/ML
INJECTION, SOLUTION INTRA-ARTICULAR; INTRALESIONAL; INTRAMUSCULAR; INTRAVENOUS; SOFT TISSUE AS NEEDED
Status: DISCONTINUED | OUTPATIENT
Start: 2021-05-03 | End: 2021-05-03 | Stop reason: HOSPADM

## 2021-05-03 RX ORDER — ROCURONIUM BROMIDE 10 MG/ML
INJECTION, SOLUTION INTRAVENOUS AS NEEDED
Status: DISCONTINUED | OUTPATIENT
Start: 2021-05-03 | End: 2021-05-03 | Stop reason: HOSPADM

## 2021-05-03 RX ORDER — FENTANYL CITRATE 50 UG/ML
INJECTION, SOLUTION INTRAMUSCULAR; INTRAVENOUS AS NEEDED
Status: DISCONTINUED | OUTPATIENT
Start: 2021-05-03 | End: 2021-05-03 | Stop reason: HOSPADM

## 2021-05-03 RX ORDER — MIDAZOLAM HYDROCHLORIDE 1 MG/ML
INJECTION, SOLUTION INTRAMUSCULAR; INTRAVENOUS AS NEEDED
Status: DISCONTINUED | OUTPATIENT
Start: 2021-05-03 | End: 2021-05-03 | Stop reason: HOSPADM

## 2021-05-03 RX ORDER — OXYCODONE AND ACETAMINOPHEN 5; 325 MG/1; MG/1
1 TABLET ORAL AS NEEDED
Status: DISCONTINUED | OUTPATIENT
Start: 2021-05-03 | End: 2021-05-03 | Stop reason: HOSPADM

## 2021-05-03 RX ORDER — HYDROMORPHONE HYDROCHLORIDE 2 MG/ML
0.5 INJECTION, SOLUTION INTRAMUSCULAR; INTRAVENOUS; SUBCUTANEOUS
Status: DISCONTINUED | OUTPATIENT
Start: 2021-05-03 | End: 2021-05-03

## 2021-05-03 RX ORDER — SODIUM CHLORIDE, SODIUM LACTATE, POTASSIUM CHLORIDE, CALCIUM CHLORIDE 600; 310; 30; 20 MG/100ML; MG/100ML; MG/100ML; MG/100ML
100 INJECTION, SOLUTION INTRAVENOUS CONTINUOUS
Status: DISCONTINUED | OUTPATIENT
Start: 2021-05-03 | End: 2021-05-03 | Stop reason: HOSPADM

## 2021-05-03 RX ORDER — NEOSTIGMINE METHYLSULFATE 1 MG/ML
INJECTION, SOLUTION INTRAVENOUS AS NEEDED
Status: DISCONTINUED | OUTPATIENT
Start: 2021-05-03 | End: 2021-05-03 | Stop reason: HOSPADM

## 2021-05-03 RX ORDER — PROPOFOL 10 MG/ML
INJECTION, EMULSION INTRAVENOUS AS NEEDED
Status: DISCONTINUED | OUTPATIENT
Start: 2021-05-03 | End: 2021-05-03 | Stop reason: HOSPADM

## 2021-05-03 RX ORDER — GLYCOPYRROLATE 0.2 MG/ML
INJECTION INTRAMUSCULAR; INTRAVENOUS AS NEEDED
Status: DISCONTINUED | OUTPATIENT
Start: 2021-05-03 | End: 2021-05-03 | Stop reason: HOSPADM

## 2021-05-03 RX ORDER — SUCCINYLCHOLINE CHLORIDE 20 MG/ML
INJECTION INTRAMUSCULAR; INTRAVENOUS AS NEEDED
Status: DISCONTINUED | OUTPATIENT
Start: 2021-05-03 | End: 2021-05-03 | Stop reason: HOSPADM

## 2021-05-03 RX ORDER — FENTANYL CITRATE 50 UG/ML
25 INJECTION, SOLUTION INTRAMUSCULAR; INTRAVENOUS
Status: DISCONTINUED | OUTPATIENT
Start: 2021-05-03 | End: 2021-05-03

## 2021-05-03 RX ORDER — LIDOCAINE HYDROCHLORIDE 20 MG/ML
INJECTION, SOLUTION EPIDURAL; INFILTRATION; INTRACAUDAL; PERINEURAL AS NEEDED
Status: DISCONTINUED | OUTPATIENT
Start: 2021-05-03 | End: 2021-05-03 | Stop reason: HOSPADM

## 2021-05-03 RX ADMIN — HYDROMORPHONE HYDROCHLORIDE 0.5 MG: 2 INJECTION, SOLUTION INTRAMUSCULAR; INTRAVENOUS; SUBCUTANEOUS at 22:26

## 2021-05-03 RX ADMIN — PIPERACILLIN AND TAZOBACTAM 3.38 G: 3; .375 INJECTION, POWDER, LYOPHILIZED, FOR SOLUTION INTRAVENOUS at 23:14

## 2021-05-03 RX ADMIN — KETOROLAC TROMETHAMINE 15 MG: 15 INJECTION, SOLUTION INTRAMUSCULAR; INTRAVENOUS at 06:30

## 2021-05-03 RX ADMIN — ROCURONIUM BROMIDE 10 MG: 50 INJECTION INTRAVENOUS at 20:30

## 2021-05-03 RX ADMIN — HYDROMORPHONE HYDROCHLORIDE 0.5 MG: 2 INJECTION, SOLUTION INTRAMUSCULAR; INTRAVENOUS; SUBCUTANEOUS at 22:31

## 2021-05-03 RX ADMIN — SUCCINYLCHOLINE CHLORIDE 160 MG: 20 INJECTION, SOLUTION INTRAMUSCULAR; INTRAVENOUS at 20:30

## 2021-05-03 RX ADMIN — Medication 3 MG: at 21:43

## 2021-05-03 RX ADMIN — KETOROLAC TROMETHAMINE 15 MG: 15 INJECTION, SOLUTION INTRAMUSCULAR; INTRAVENOUS at 18:12

## 2021-05-03 RX ADMIN — DOCUSATE SODIUM 100 MG: 50 LIQUID ORAL at 11:14

## 2021-05-03 RX ADMIN — FENTANYL CITRATE 100 MCG: 50 INJECTION, SOLUTION INTRAMUSCULAR; INTRAVENOUS at 20:22

## 2021-05-03 RX ADMIN — PIPERACILLIN AND TAZOBACTAM 3.38 G: 3; .375 INJECTION, POWDER, LYOPHILIZED, FOR SOLUTION INTRAVENOUS at 00:14

## 2021-05-03 RX ADMIN — PIPERACILLIN AND TAZOBACTAM 3.38 G: 3; .375 INJECTION, POWDER, LYOPHILIZED, FOR SOLUTION INTRAVENOUS at 06:30

## 2021-05-03 RX ADMIN — VANCOMYCIN HYDROCHLORIDE 1750 MG: 10 INJECTION, POWDER, LYOPHILIZED, FOR SOLUTION INTRAVENOUS at 18:12

## 2021-05-03 RX ADMIN — GLYCOPYRROLATE 0.2 MG: 0.2 INJECTION INTRAMUSCULAR; INTRAVENOUS at 20:18

## 2021-05-03 RX ADMIN — HYDROMORPHONE HYDROCHLORIDE 1 MG: 1 INJECTION, SOLUTION INTRAMUSCULAR; INTRAVENOUS; SUBCUTANEOUS at 18:11

## 2021-05-03 RX ADMIN — GLYCOPYRROLATE 0.4 MG: 0.2 INJECTION INTRAMUSCULAR; INTRAVENOUS at 21:43

## 2021-05-03 RX ADMIN — ROCURONIUM BROMIDE 30 MG: 50 INJECTION INTRAVENOUS at 20:35

## 2021-05-03 RX ADMIN — MIDAZOLAM HYDROCHLORIDE 2 MG: 2 INJECTION, SOLUTION INTRAMUSCULAR; INTRAVENOUS at 20:18

## 2021-05-03 RX ADMIN — KETOROLAC TROMETHAMINE 15 MG: 15 INJECTION, SOLUTION INTRAMUSCULAR; INTRAVENOUS at 11:14

## 2021-05-03 RX ADMIN — VANCOMYCIN HYDROCHLORIDE 2 G: 10 INJECTION, POWDER, LYOPHILIZED, FOR SOLUTION INTRAVENOUS at 20:22

## 2021-05-03 RX ADMIN — KETOROLAC TROMETHAMINE 15 MG: 15 INJECTION, SOLUTION INTRAMUSCULAR; INTRAVENOUS at 00:14

## 2021-05-03 RX ADMIN — LIDOCAINE HYDROCHLORIDE 80 MG: 20 INJECTION, SOLUTION EPIDURAL; INFILTRATION; INTRACAUDAL; PERINEURAL at 20:30

## 2021-05-03 RX ADMIN — DEXAMETHASONE SODIUM PHOSPHATE 4 MG: 4 INJECTION, SOLUTION INTRAMUSCULAR; INTRAVENOUS at 20:51

## 2021-05-03 RX ADMIN — PROPOFOL 150 MG: 10 INJECTION, EMULSION INTRAVENOUS at 20:30

## 2021-05-03 RX ADMIN — PIPERACILLIN AND TAZOBACTAM 3.38 G: 3; .375 INJECTION, POWDER, LYOPHILIZED, FOR SOLUTION INTRAVENOUS at 18:12

## 2021-05-03 RX ADMIN — PIPERACILLIN AND TAZOBACTAM 3.38 G: 3; .375 INJECTION, POWDER, LYOPHILIZED, FOR SOLUTION INTRAVENOUS at 11:14

## 2021-05-03 RX ADMIN — ESCITALOPRAM OXALATE 10 MG: 10 TABLET ORAL at 11:14

## 2021-05-03 RX ADMIN — ONDANSETRON 4 MG: 2 INJECTION INTRAMUSCULAR; INTRAVENOUS at 20:51

## 2021-05-03 RX ADMIN — KETOROLAC TROMETHAMINE 15 MG: 15 INJECTION, SOLUTION INTRAMUSCULAR; INTRAVENOUS at 23:14
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
## 2021-05-03 NOTE — DISCHARGE INSTRUCTIONS
Discharge Instructions Following Surgery    Patient: Sami Aquino MRN: [id number]  SSN: [id number]    YOB: 1960  Age: 61 y.o. Sex: male      Activity  · As tolerated  · You may shower at home    Diet  · Regular diet    Pain  · Take pain medication as directed by your doctor. · Call your doctor if pain is NOT relieved by medication. Wound and Dressing Care  · Sacral decubitus ulcer dressing as before the surgery with packing and turning as much as possible. · Ostomy care    Call your doctor if  · Excessive bleeding that does not stop after holding mild pressure over the area. · Temperature of 101 degrees F or above. · Redness,excessive swelling or bruising, and/or green or yellow, smelly discharge from incision. · If nausea and vomiting continues. Appointment date/time Follow-Up Phone Calls    · Call the office at [phone] to make your follow-up appointment in 2 weeks after the surgery (if not already set up) . Dr. Lesly Saeed cell phone number is [phone]. Please call me if you have any concerns or questions.
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
## 2021-05-03 NOTE — PROGRESS NOTES
Patient seen and examined. He is doing well. His vital signs are normal with no fever or tachycardia. He is tolerating regular diet and his ostomy is functioning. He still have leukocytosis but it is getting slightly better. The social work team are still working on placement. Plan:  I appreciate the medicine management and help  Appreciate social work team help in finding him in place  Dr. Stone Landeros agreed to transfer the patient under his name. He will decide if the patient will need chronic IV antibiotics or ID consultation for now  Plan for placement either at home or at the nursing home  I will sign off for now and I wrote all my discharge instructions. Please call me for any questions.

## 2021-05-03 NOTE — ROUTINE PROCESS
Patient had two discharge orders, spoke with Surgeon regarding these discharges. He ordered for both of the DC orders to be discontinued. Surgeon is going to call MD regarding patient.

## 2021-05-03 NOTE — WOUND CARE
Physical Exam   Nurse in for ostomy teaching with mother and wound vac placement. Upon inspection of pouch, it is noted to be fully filled with stoma. There remains some liquid, brown output, however, bowel is filling almost all of the pouch. The bowel is noted to be deep pink, moist and soft. Dr. Teixeira Begin notified and he inst to place new pouch at bedside so that he may evaluate in the AM and change pouch. Supplies left on bedside tray. Will defer wound vac placement until tomorrow post MD eval of stoma. He also orders to hold discharge orders until further notice. Topical treatment protocol in place. Care discussed with primary nurse, Prince Borges RN. Care turned over to nursing staff at this time. Yue Renteria RN, BSN, [facility]

## 2021-05-03 NOTE — WOUND CARE
Physical Exam  Musculoskeletal:        Legs:         Focused assessment   POA sacral pressure injury 10cm x 15cm x 4.1cm with 4.2cm undermining from 9 o'clock to 3 o'clock. 25% slough, base pale pink, bone apparent. Edges well defined with some hyperpigmentation michel-wound. Colostomy bar to RLQ intact. Planning to change later with mother in room for education. Topical treatment protocol in place. Skilled nurse to perform [facility] dressing change to sacral stage 4 pressure injury  Monday, Wednesday, and Friday. May soak dressing in saline prior to removal if necessary. Cleanse with wound spray and apply Wound Vac at 150mmhg continuous suction. May apply non-adherent dressing to protect tendons, ligaments, bone, areas not intended for granulation or use white foam. May apply NS wet to dry dressing prn wound vac problems. Care discussed with primary nurse, She Jerry RN. Care turned over to nursing staff at this time. Karen Renteria RN, BSN, [facility]

## 2021-05-03 NOTE — PROGRESS NOTES
Physician Progress Note      Isai Chin  CSN #:                  [id number]  :                       1960  ADMIT DATE:       2021 11:00 AM  [address] [place] DATE:  RESPONDING  PROVIDER #:        Clemente Ness MD          QUERY TEXT:    Pt admitted with stage 4 sacral pressure ulcer. Pt noted to have elevated creatinine upon admission. If possible, please document in the progress notes and discharge summary if you are evaluating and/or treating any of the following: The medical record reflects the following:  Risk Factors: 61 y.o. male with paraplegia, HTN, s/p colostomy formation and decubitus debridement  Clinical Indicators: Creatinine on  - 1.80  Per  medicine progress note - Patient's creatinine is elevated compared with his baseline. Will treat with IVF (no potassium as his potassium is relatively high.) Follow renal function  Treatment: IVF    Thank you,  Camila Shelton RN, [facility]  [phone]  Options provided:  -- Acute kidney failure  -- Acute kidney injury  -- Other - I will add my own diagnosis  -- Disagree - Not applicable / Not valid  -- Disagree - Clinically unable to determine / Unknown  -- Refer to Clinical Documentation Reviewer    PROVIDER RESPONSE TEXT:    This patient has an Acute kidney injury.     Query created by: Branden Pelayo on 5/3/2021 5:20 PM      Electronically signed by:  Clemente Ness MD 5/3/2021 6:01 PM

## 2021-05-03 NOTE — PROGRESS NOTES
Discharge planning    Met with patient and mother at bedside to discuss discharge planning. Per Mrs. Mcgee, Patient is active with [facility] and has personal aides with [facility]. She stated \" I just want more care done to his wounds. The nurse that was coming in was just putting saline on the wound. I believe more need to be done than that. If the doctor can give better wound care orders for the wound, I have no problem with him coming home. \"      This writer spoke with Dr. Niels Tee concerning Mrs. Mcgee concerns about the wound care. Dr. Niels Tee stated \" I put a consult in for wound care nurse to see the patient. Whatever, they recommend will be fine. Please ask the nurse to call and ask the wound care nurse to see him. Also, he need to be turned more at home. \"    Spoke with Gladys Paul RN and she will call wound care nurse. 1150 am : Met with patient and mother. Explained above and mother and patient verbalized understanding. Plan is home with home health with personal care aides and family assistance. 1430 - Wound care recommending wound vac. Order form placed on chart for MD's signature. Will need home health orders for wound vac care.      JEANNA Kelly, RN  Pager # [phone]  Care Manager

## 2021-05-03 NOTE — PROGRESS NOTES
Nutrition Note    RN referral received for pressure injury. Full nutrition assessment completed 5/2, please refer to RD note for further details. Fair intake of recent meals per chart, will add nutritional supplements per previous RD recommendation. Pt progressing toward nutritional goals. Nutrition Recommendations/Plan:   - Continue current diet and add Ensure Enlive, TID.     Electronically signed by Neela Jefferson RD on 5/3/2021 at 12:52 PM    Contact: [phone]

## 2021-05-03 NOTE — PROGRESS NOTES
Problem: Falls - Risk of  Goal: *Absence of Falls  Description: Document Pradolaurel Carrington Fall Risk and appropriate interventions in the flowsheet. Outcome: Progressing Towards Goal  Note: Fall Risk Interventions:  Mobility Interventions: Assess mobility with egress test, Communicate number of staff needed for ambulation/transfer, Bed/chair exit alarm, Strengthening exercises (ROM-active/passive), Utilize walker, cane, or other assistive device    Mentation Interventions: Adequate sleep, hydration, pain control, Bed/chair exit alarm, Door open when patient unattended, Evaluate medications/consider consulting pharmacy, Familiar objects from home, Family/sitter at bedside, More frequent rounding, Toileting rounds, Update white board    Medication Interventions: Assess postural VS orthostatic hypotension, Bed/chair exit alarm, Evaluate medications/consider consulting pharmacy    Elimination Interventions: Bed/chair exit alarm, Call light in reach, Patient to call for help with toileting needs, Toileting schedule/hourly rounds              Problem: Patient Education: Go to Patient Education Activity  Goal: Patient/Family Education  Outcome: Progressing Towards Goal     Problem: Pressure Injury - Risk of  Goal: *Prevention of pressure injury  Description: Document Jordin Scale and appropriate interventions in the flowsheet. Outcome: Progressing Towards Goal  Note: Pressure Injury Interventions:  Sensory Interventions: Assess changes in LOC, Assess need for specialty bed, Avoid rigorous massage over bony prominences, Chair cushion, Check visual cues for pain, Float heels, Keep linens dry and wrinkle-free, Maintain/enhance activity level, Minimize linen layers, Monitor skin under medical devices, Pad between skin to skin, Pressure redistribution bed/mattress (bed type), Turn and reposition approx.  every two hours (pillows and wedges if needed)    Moisture Interventions: Absorbent underpads, Apply protective barrier, creams and emollients, Assess need for specialty bed, Check for incontinence Q2 hours and as needed, Contain wound drainage, Internal/External urinary devices, Limit adult briefs, Maintain skin hydration (lotion/cream), Minimize layers, Moisture barrier, Offer toileting Q_hr    Activity Interventions: Assess need for specialty bed, Pressure redistribution bed/mattress(bed type)    Mobility Interventions: Assess need for specialty bed, Chair cushion, Float heels, HOB 30 degrees or less, Pressure redistribution bed/mattress (bed type), Turn and reposition approx. every two hours(pillow and wedges)    Nutrition Interventions: Document food/fluid/supplement intake    Friction and Shear Interventions: Apply protective barrier, creams and emollients, Feet elevated on foot rest, Foam dressings/transparent film/skin sealants, HOB 30 degrees or less, Lift sheet, Lift team/patient mobility team, Minimize layers, Transferring/repositioning devices                Problem: Patient Education: Go to Patient Education Activity  Goal: Patient/Family Education  Outcome: Progressing Towards Goal     Problem: Pain  Goal: *Control of Pain  Outcome: Progressing Towards Goal     Problem: Patient Education: Go to Patient Education Activity  Goal: Patient/Family Education  Outcome: Progressing Towards Goal     Problem: Nutrition Deficit  Goal: *Optimize nutritional status  Outcome: Progressing Towards Goal     Problem: Impaired Skin Integrity/Pressure Injury Treatment  Goal: *Improvement of Existing Pressure Injury  Outcome: Progressing Towards Goal  Goal: *Prevention of pressure injury  Description: Document Jordin Scale and appropriate interventions in the flowsheet.   Outcome: Progressing Towards Goal  Note: Pressure Injury Interventions:  Sensory Interventions: Assess changes in LOC, Assess need for specialty bed, Avoid rigorous massage over bony prominences, Chair cushion, Check visual cues for pain, Float heels, Keep linens dry and wrinkle-free, Maintain/enhance activity level, Minimize linen layers, Monitor skin under medical devices, Pad between skin to skin, Pressure redistribution bed/mattress (bed type), Turn and reposition approx. every two hours (pillows and wedges if needed)    Moisture Interventions: Absorbent underpads, Apply protective barrier, creams and emollients, Assess need for specialty bed, Check for incontinence Q2 hours and as needed, Contain wound drainage, Internal/External urinary devices, Limit adult briefs, Maintain skin hydration (lotion/cream), Minimize layers, Moisture barrier, Offer toileting Q_hr    Activity Interventions: Assess need for specialty bed, Pressure redistribution bed/mattress(bed type)    Mobility Interventions: Assess need for specialty bed, Chair cushion, Float heels, HOB 30 degrees or less, Pressure redistribution bed/mattress (bed type), Turn and reposition approx.  every two hours(pillow and wedges)    Nutrition Interventions: Document food/fluid/supplement intake    Friction and Shear Interventions: Apply protective barrier, creams and emollients, Feet elevated on foot rest, Foam dressings/transparent film/skin sealants, HOB 30 degrees or less, Lift sheet, Lift team/patient mobility team, Minimize layers, Transferring/repositioning devices                Problem: Patient Education: Go to Patient Education Activity  Goal: Patient/Family Education  Outcome: Progressing Towards Goal     Problem: Hypertension  Goal: *Blood pressure within specified parameters  Outcome: Progressing Towards Goal  Goal: *Fluid volume balance  Outcome: Progressing Towards Goal  Goal: *Labs within defined limits  Outcome: Progressing Towards Goal     Problem: Patient Education: Go to Patient Education Activity  Goal: Patient/Family Education  Outcome: Progressing Towards Goal     Problem: Patient Education: Go to Patient Education Activity  Goal: Patient/Family Education  Outcome: Progressing Towards Goal     Problem: Ostomy Care  Goal: *Patient pouching appliance will fit properly and maintain integrity at least three to five days  Description: Infection control procedures (eg, clean dressings, clean gloves, hand washing, precautions to isolate wound from contamination, sterile instruments used for wound debridement) should be implemented.   Outcome: Progressing Towards Goal  Goal: *Acceptance of change in body image  Outcome: Progressing Towards Goal     Problem: Patient Education: Go to Patient Education Activity  Goal: Patient/Family Education  Outcome: Progressing Towards Goal

## 2021-05-03 NOTE — ROUTINE PROCESS
Patient was complaining of pain, I walked into patient's room, asked patient's pain rating; documented assessment and then looked at ostomy bag; bowel has completely filled ostomy bag and is also surrounding the outside of the ostomy. I notified Dr. Heriberto Mobley. Surgeon ordered to place wet dressing on bowels. I with the assistance of another RN placed lilia pads around the area to contain the bowels and placed wet 4x4s all around exposed bowel. Patient has now been placed on the surgery roster, rapid covid test has been sent down, consent forms for blood, anesthesia, and surgery were placed in patient's folder for MD to sign.
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
## 2021-05-04 LAB
ANION GAP SERPL CALC-SCNC: 7 MMOL/L (ref 3–18)
APPEARANCE UR: ABNORMAL
BACTERIA URNS QL MICRO: ABNORMAL /HPF
BASOPHILS # BLD: 0 K/UL (ref 0–0.1)
BASOPHILS NFR BLD: 0 % (ref 0–2)
BILIRUB UR QL: NEGATIVE
BUN SERPL-MCNC: 13 MG/DL (ref 7–18)
BUN/CREAT SERPL: 13 (ref 12–20)
CALCIUM SERPL-MCNC: 7.9 MG/DL (ref 8.5–10.1)
CHLORIDE SERPL-SCNC: 104 MMOL/L (ref 100–111)
CO2 SERPL-SCNC: 24 MMOL/L (ref 21–32)
COLOR UR: ABNORMAL
CREAT SERPL-MCNC: 1.04 MG/DL (ref 0.6–1.3)
DIFFERENTIAL METHOD BLD: ABNORMAL
EOSINOPHIL # BLD: 0 K/UL (ref 0–0.4)
EOSINOPHIL NFR BLD: 0 % (ref 0–5)
EPITH CASTS URNS QL MICRO: ABNORMAL /LPF (ref 0–5)
ERYTHROCYTE [DISTWIDTH] IN BLOOD BY AUTOMATED COUNT: 13.6 % (ref 11.6–14.5)
GLUCOSE BLD STRIP.AUTO-MCNC: 189 MG/DL (ref 70–110)
GLUCOSE SERPL-MCNC: 130 MG/DL (ref 74–99)
GLUCOSE UR STRIP.AUTO-MCNC: NEGATIVE MG/DL
HCT VFR BLD AUTO: 29.3 % (ref 36–48)
HGB BLD-MCNC: 9.2 G/DL (ref 13–16)
HGB UR QL STRIP: NEGATIVE
KETONES UR QL STRIP.AUTO: NEGATIVE MG/DL
LEUKOCYTE ESTERASE UR QL STRIP.AUTO: ABNORMAL
LYMPHOCYTES # BLD: 1.7 K/UL (ref 0.9–3.6)
LYMPHOCYTES NFR BLD: 9 % (ref 21–52)
MAGNESIUM SERPL-MCNC: 1.6 MG/DL (ref 1.6–2.6)
MCH RBC QN AUTO: 28 PG (ref 24–34)
MCHC RBC AUTO-ENTMCNC: 31.4 G/DL (ref 31–37)
MCV RBC AUTO: 89.3 FL (ref 74–97)
MONOCYTES # BLD: 0.7 K/UL (ref 0.05–1.2)
MONOCYTES NFR BLD: 4 % (ref 3–10)
NEUTS SEG # BLD: 16.3 K/UL (ref 1.8–8)
NEUTS SEG NFR BLD: 87 % (ref 40–73)
NITRITE UR QL STRIP.AUTO: NEGATIVE
PH UR STRIP: 5.5 [PH] (ref 5–8)
PLATELET # BLD AUTO: 459 K/UL (ref 135–420)
PLATELET COMMENTS,PCOM: ABNORMAL
PMV BLD AUTO: 9.6 FL (ref 9.2–11.8)
POTASSIUM SERPL-SCNC: 4.8 MMOL/L (ref 3.5–5.5)
PROT UR STRIP-MCNC: ABNORMAL MG/DL
RBC # BLD AUTO: 3.28 M/UL (ref 4.35–5.65)
RBC #/AREA URNS HPF: ABNORMAL /HPF (ref 0–5)
RBC MORPH BLD: ABNORMAL
SODIUM SERPL-SCNC: 135 MMOL/L (ref 136–145)
SP GR UR REFRACTOMETRY: 1.01 (ref 1–1.03)
UROBILINOGEN UR QL STRIP.AUTO: 1 EU/DL (ref 0.2–1)
WBC # BLD AUTO: 18.7 K/UL (ref 4.6–13.2)
WBC URNS QL MICRO: ABNORMAL /HPF (ref 0–4)

## 2021-05-04 PROCEDURE — 44120 REMOVAL OF SMALL INTESTINE: CPT | Performed by: SURGERY

## 2021-05-04 PROCEDURE — 74011250637 HC RX REV CODE- 250/637: Performed by: HOSPITALIST

## 2021-05-04 PROCEDURE — 77010033678 HC OXYGEN DAILY

## 2021-05-04 PROCEDURE — 36415 COLL VENOUS BLD VENIPUNCTURE: CPT

## 2021-05-04 PROCEDURE — 82962 GLUCOSE BLOOD TEST: CPT

## 2021-05-04 PROCEDURE — 80048 BASIC METABOLIC PNL TOTAL CA: CPT

## 2021-05-04 PROCEDURE — 85025 COMPLETE CBC W/AUTO DIFF WBC: CPT

## 2021-05-04 PROCEDURE — 74011250637 HC RX REV CODE- 250/637: Performed by: SURGERY

## 2021-05-04 PROCEDURE — 97606 NEG PRS WND THER DME>50 SQCM: CPT

## 2021-05-04 PROCEDURE — 44320 COLOSTOMY: CPT | Performed by: SURGERY

## 2021-05-04 PROCEDURE — 80202 ASSAY OF VANCOMYCIN: CPT

## 2021-05-04 PROCEDURE — 2709999900 HC NON-CHARGEABLE SUPPLY

## 2021-05-04 PROCEDURE — 65270000029 HC RM PRIVATE

## 2021-05-04 PROCEDURE — 83735 ASSAY OF MAGNESIUM: CPT

## 2021-05-04 PROCEDURE — 74011250636 HC RX REV CODE- 250/636: Performed by: SURGERY

## 2021-05-04 PROCEDURE — 81001 URINALYSIS AUTO W/SCOPE: CPT

## 2021-05-04 PROCEDURE — 99232 SBSQ HOSP IP/OBS MODERATE 35: CPT | Performed by: EMERGENCY MEDICINE

## 2021-05-04 PROCEDURE — 51798 US URINE CAPACITY MEASURE: CPT

## 2021-05-04 PROCEDURE — 74011000258 HC RX REV CODE- 258: Performed by: SURGERY

## 2021-05-04 RX ADMIN — PIPERACILLIN AND TAZOBACTAM 3.38 G: 3; .375 INJECTION, POWDER, LYOPHILIZED, FOR SOLUTION INTRAVENOUS at 05:44

## 2021-05-04 RX ADMIN — PIPERACILLIN AND TAZOBACTAM 3.38 G: 3; .375 INJECTION, POWDER, LYOPHILIZED, FOR SOLUTION INTRAVENOUS at 18:13

## 2021-05-04 RX ADMIN — PIPERACILLIN AND TAZOBACTAM 3.38 G: 3; .375 INJECTION, POWDER, LYOPHILIZED, FOR SOLUTION INTRAVENOUS at 23:41

## 2021-05-04 RX ADMIN — ESCITALOPRAM OXALATE 10 MG: 10 TABLET ORAL at 10:14

## 2021-05-04 RX ADMIN — DOCUSATE SODIUM 100 MG: 50 LIQUID ORAL at 10:14

## 2021-05-04 RX ADMIN — OXYCODONE HYDROCHLORIDE AND ACETAMINOPHEN 1 TABLET: 5; 325 TABLET ORAL at 03:31

## 2021-05-04 RX ADMIN — KETOROLAC TROMETHAMINE 15 MG: 15 INJECTION, SOLUTION INTRAMUSCULAR; INTRAVENOUS at 12:51

## 2021-05-04 RX ADMIN — VANCOMYCIN HYDROCHLORIDE 1750 MG: 10 INJECTION, POWDER, LYOPHILIZED, FOR SOLUTION INTRAVENOUS at 05:06

## 2021-05-04 RX ADMIN — PIPERACILLIN AND TAZOBACTAM 3.38 G: 3; .375 INJECTION, POWDER, LYOPHILIZED, FOR SOLUTION INTRAVENOUS at 12:50

## 2021-05-04 RX ADMIN — SODIUM CHLORIDE 75 ML/HR: 900 INJECTION, SOLUTION INTRAVENOUS at 12:52

## 2021-05-04 RX ADMIN — KETOROLAC TROMETHAMINE 15 MG: 15 INJECTION, SOLUTION INTRAMUSCULAR; INTRAVENOUS at 05:44

## 2021-05-04 NOTE — PROGRESS NOTES
Infectious Disease progress Note        Reason:     Current abx Prior abx   Piperacillin/tazobactam, vancomycin since 5/1      Lines:       Assessment :    61year old man with h/o HTN, paraplegia admitted to [facility] on 4/29/21 for evaluation of infected sacral decubiti. Clinical presentation c/w acute on chronic sacral osteomyelitis, infected sacral decubiti    S/p surgical debridement,  robotic colostomy on 4/29/2021    Persistent leukocytosis could be leukemoid reaction to post op sacral inflammation versus partially treated infection due to resistant pathogens. Await wound cultures  Intraoperative findings discussed with dr. Niels Tee    Protrusion of the small bowel around the colostomy causing bowel ischemia s/p expl. laparotomy with small bowel resection, partial colectomy/revision of colostomy on 5/4/21    Recommendations:    1. Continue pip/tazo, d/c vancomycin  2. F/u intra op wound cultures  3. picc line for outpt iv abx  4. Patient will benefit from outpatient iv abx and good wound care to aid healing and prevent future infectious complications  5. Colostomy care per surgery team    Above plan was discussed in details with patient, family and dr Delphine Lopez. Please call me if any further questions or concerns. Will continue to participate in the care of this patient. HPI:      At the time of my evaluation, patient denied any chest pain, shortness of breath, abdominal pain. Current Discharge Medication List      CONTINUE these medications which have NOT CHANGED    Details   lisinopril-hydroCHLOROthiazide (PRINZIDE, ZESTORETIC) 20-12.5 mg per tablet TAKE 1 TABLET EVERY DAY  Refills: 3      ergocalciferol (ERGOCALCIFEROL) 1,250 mcg (50,000 unit) capsule       ciprofloxacin HCl (CIPRO) 250 mg tablet Take 1 Tab by mouth two (2) times a day.   Qty: 30 Tab, Refills: 2      tamsulosin (FLOMAX) 0.4 mg capsule TAKE 1 CAPSULE BY MOUTH EVERY DAY  Refills: 1      naloxone (NARCAN) 2 mg/actuation spry Use 1 spray intranasally into 1 nostril. Use a new Narcan nasal spray for subsequent doses and administer into alternating nostrils. May repeat every 2 to 3 minutes as needed. Qty: 2 Actuation(s), Refills: 0      furosemide (LASIX) 20 mg tablet TAKE 1 TABLET BY MOUTH DAILY AS NEEDED FOR SWELLING  Refills: 3      MULTIVIT-MINERALS/FOLIC ACID (SPECTRAVITE ADULT PO) Take  by mouth.      escitalopram oxalate (LEXAPRO) 10 mg tablet Take 10 mg by mouth daily. acetaminophen (TYLENOL) 325 mg tablet TAKE 2 TABLETS BY MOUTH EVERY 4 HOURS AS NEEDED FOR PAIN  Refills: 2             Current Facility-Administered Medications   Medication Dose Route Frequency    Vancomycin trough  @0830  1 Each Other ONCE    acetaminophen (TYLENOL) tablet 650 mg  650 mg Oral Q6H PRN    0.9% sodium chloride infusion  75 mL/hr IntraVENous CONTINUOUS    docusate (COLACE) 50 mg/5 mL oral liquid 100 mg  100 mg Oral DAILY    bisacodyL (DULCOLAX) tablet 5 mg  5 mg Oral DAILY PRN    piperacillin-tazobactam (ZOSYN) 3.375 g in 0.9% sodium chloride (MBP/ADV) 100 mL MBP  3.375 g IntraVENous Q6H    vancomycin (VANCOCIN) 1750 mg in  ml infusion  1,750 mg IntraVENous Q12H    escitalopram oxalate (LEXAPRO) tablet 10 mg  10 mg Oral DAILY    HYDROmorphone (DILAUDID) injection 1 mg  1 mg IntraVENous Q2H PRN    ketorolac (TORADOL) injection 15 mg  15 mg IntraVENous Q6H    oxyCODONE-acetaminophen (PERCOCET) 5-325 mg per tablet 1 Tab  1 Tab Oral Q4H PRN       Allergies: Patient has no known allergies. Temp (24hrs), Av.6 °F (36.4 °C), Min:96.9 °F (36.1 °C), Max:98.8 °F (37.1 °C)    Visit Vitals  /61   Pulse 89   Temp 97 °F (36.1 °C)   Resp 16   Ht 6' 2\" (1.88 m)   Wt 113.4 kg (250 lb)   SpO2 100%   BMI 32.10 kg/m²       ROS: 12 point ROS obtained in details. Pertinent positives as mentioned in HPI,   otherwise negative    Physical Exam:    General: Well developed, well nourished male laying on the bed AAOx3 in no acute distress.     General: awake alert and oriented   HEENT:  Normocephalic, atraumatic, EOMI, no scleral icterus or pallor; no conjunctival hemmohage;  nasal and oral mucous are moist and without evidence of lesions. No thrush. Dentition good. Neck supple, no bruits. Lymph Nodes:   no cervical, axillary or inguinal adenopathy   Lungs:   non-labored, bilaterally clear to auscultation- no crackles wheezes rales or rhonchi   Heart:  RRR, s1 and s2; no rubs or gallops, no edema, + pedal pulses   Abdomen:  soft, non-distended, active bowel sounds, no hepatomegaly, no splenomegaly. Colostomy in place. Non-tender   Genitourinary:  deferred   Extremities:   no clubbing, cyanosis; no joint effusions or swelling; Full ROM of all large joints to the upper and lower extremities; muscle mass appropriate for age   Neurologic:  Paraplegia. Speech appropriate.  Cranial nerves intact                        Skin:  Surgical changes back   Back:  wound vac recent surgical wound     Psychiatric:  No suicidal or homicidal ideations, appropriate mood and affect         Labs: Results:   Chemistry Recent Labs     05/04/21 0220 05/03/21  0210 05/02/21  0407   * 73* 87   * 133* 131*   K 4.8 3.8 3.4*    100 98*   CO2 24 27 25   BUN 13 12 12   CREA 1.04 0.96 0.82   CA 7.9* 7.7* 8.2*   AGAP 7 6 8   BUCR 13 13 15      CBC w/Diff Recent Labs     05/04/21 0220 05/03/21 0210 05/02/21  0407   WBC 18.7* 19.5* 21.4*   RBC 3.28* 2.69* 2.95*   HGB 9.2* 7.7* 8.3*   HCT 29.3* 23.9* 25.8*   * 381 432*   GRANS 87* 80* 90*   LYMPH 9* 9* 3*   EOS 0 2 1      Microbiology Recent Labs     05/03/21 0236 05/01/21  1918   CULT LIGHT GRAM NEGATIVE RODS*  CHECKING FOR POSSIBLE LIGHT 2ND GRAM NEGATIVE MICHEL*  MODERATE MIXED SKIN JORDAN ISOLATED NO GROWTH 3 DAYS  NO GROWTH 3 DAYS          RADIOLOGY:    All available imaging studies/reports in [facility] care for this admission were reviewed      Disclaimer: Sections of this note are dictated utilizing voice recognition software, which may have resulted in some phonetic based errors in grammar and contents. Even though attempts were made to correct all the mistakes, some may have been missed, and remained in the body of the document. If questions arise, please contact our department.     Dr. Georgie Salmon, Infectious Disease Specialist  [phone]  May 4, 2021  8:39 PM

## 2021-05-04 NOTE — ROUTINE PROCESS
6105 Patient has low urine output =50ml of cloudy urine ,bladder aclitpp=632 ml.  0630 Dr Franklin Tobar notified about low urine output and bladder scanned =797 ml ,new order received to insert lee cath.

## 2021-05-04 NOTE — WOUND CARE
Physical Exam   Focused assessment Rm 513  Colostomy pouch in place, with budded, pink stoma. No output noted in bag, but patient reports improvement in abdominal pain. He is turned onto his right side for wound vac placement. Old dressing removed from sacral wound and site cleaned with wound spray. Skin prep applied followed by drapes. Hole cut over wound then, adaptic applied over bone, then packed with one piece black foam. This is then bridged to left hip area and drapes applied followed by diskus. Vac intiated and positive seal obtained. Settings 125mmhg continuous. Topical treatment protocol in place. Care discussed with primary nurse, [facility] RN. Care turned over to nursing staff at this time. Stephany Renteria RN, BSN, [address] [place] [address],[address]

## 2021-05-04 NOTE — ROUTINE PROCESS
0400 Stage 4 to Sacrum dressing changed, small amount serosanguinous drainage noted wound bed pink with yellow slough noted. 4x4 wet to dry saline ,abdominal pad and tape applied. Patient tolerated procedure.

## 2021-05-04 NOTE — ROUTINE PROCESS
Bedside shift change report given to Micaela Mancilla RN (oncoming nurse) by Charleen Lau RN (offgoing nurse). Report included the following information SBAR, Kardex, Procedure Summary, Intake/Output, MAR and Recent Results. Opportunity for questions and clarification was provided.
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
## 2021-05-04 NOTE — PROGRESS NOTES
Patient seen and examined. He is doing much better compared to yesterday before the surgery. He stated that he does not have significant abdominal pain. His vitals are normal with no fever or tachycardia and his blood pressure is normal as well. His WBC is 18,000. Yesterday it was 19,000. His creatinine is normal.  His abdomen is soft with some incisional tenderness. I removed the dressing and the midline wound is clean. His colostomy is viable but not functioning yet. Plan:  I really do appreciate the nursing team calling me yesterday as well as the wound care team to inform me about the paracolostomy herniation. The patient is doing much better. I expect the patient to have an ileus because of both small bowel resection and revision of his colostomy. So we will keep him n.p.o. for now but he can have ice chips and sips of water.   Follow-up ID recommendation  I do appreciate the medicine taking over and helping me with the medical issues  Hold on discharge for now till the patient has return of his bowel function  Follow-up the wound care team recommendation  Ostomy care  Dressing care for his sacral decubitus ulcer  I will follow

## 2021-05-04 NOTE — BRIEF OP NOTE
Brief Postoperative Note    Patient: Vanessa Palacios  YOB: 1960  MRN: [id number]    Date of Procedure: 5/3/2021     Pre-Op Diagnosis: Protruding of colostomy with small bowel evisceration. Post-Op Diagnosis: Small bowel ischemia secondary to para colostomy evisceration. Procedure(s):  Exploratory laparotomy, small bowel resection, partial colectomy, and colostomy. Surgeon(s):  Abraham Morales MD    Surgical Assistant: Surg Asst-1: Yajaira Duncan    Anesthesia: General     Estimated Blood Loss (mL): Minimal    Complications: None    Specimens:   ID Type Source Tests Collected by Time Destination   1 : SIGMOID Preservative Sigmoid  Abraham Morales MD 5/3/2021 2141 Pathology        Implants: * No implants in log *    Drains:   Condom Catheter (Active)   Indications for Use Prolonged immobilization (e.g. unstable thoracic or lumbar spine, multiple traumatic injuries such as pelvic fractures) 05/03/21 0809   Status Draining;Patent 05/03/21 0809   Site Condition No abnormalities 05/03/21 0809   Drainage Tube Clipped to Bed Yes 05/03/21 0809   Catheter Secured to Thigh No 05/03/21 0809   Tamper Seal Intact No 05/03/21 0809   Bag Below Bladder/Not on Floor Yes 05/03/21 0809   Lack of Dependent Loop in Tubing Yes 05/03/21 0809   Drainage Bag Less Than Half Full Yes 05/03/21 0809   Sterile Solution Used for  Irrigation N/A 05/03/21 0809   Urine Output (mL) 700 ml 05/03/21 0552       Findings: As above.      Electronically Signed by Latonya Pagan MD on 5/3/2021 at 9:56 PM

## 2021-05-04 NOTE — PROGRESS NOTES
Patient seen and examined. I got the call from the floor that he is ostomy is protruding and causing severe pain. The patient is complaining of severe abdominal pain and there is evisceration of the bowel from the ostomy site. I explained to him and his mother that I have to take him for reexploration and revision of his ostomy and possible colectomy. A consent was taken from the patient and will take him for surgery now.

## 2021-05-04 NOTE — PROGRESS NOTES
Problem: Falls - Risk of  Goal: *Absence of Falls  Description: Document Faye Carranzavera Fall Risk and appropriate interventions in the flowsheet. Outcome: Progressing Towards Goal  Note: Fall Risk Interventions:  Mobility Interventions: PT Consult for mobility concerns    Mentation Interventions: Adequate sleep, hydration, pain control    Medication Interventions: Patient to call before getting OOB    Elimination Interventions: Bed/chair exit alarm              Problem: Patient Education: Go to Patient Education Activity  Goal: Patient/Family Education  Outcome: Progressing Towards Goal     Problem: Pressure Injury - Risk of  Goal: *Prevention of pressure injury  Description: Document Jordin Scale and appropriate interventions in the flowsheet. Outcome: Progressing Towards Goal  Note: Pressure Injury Interventions:  Sensory Interventions: Assess changes in LOC    Moisture Interventions: Absorbent underpads    Activity Interventions: Increase time out of bed    Mobility Interventions: Assess need for specialty bed    Nutrition Interventions: Document food/fluid/supplement intake    Friction and Shear Interventions: Apply protective barrier, creams and emollients                Problem: Patient Education: Go to Patient Education Activity  Goal: Patient/Family Education  Outcome: Progressing Towards Goal     Problem: Pain  Goal: *Control of Pain  Outcome: Progressing Towards Goal     Problem: Patient Education: Go to Patient Education Activity  Goal: Patient/Family Education  Outcome: Progressing Towards Goal     Problem: Nutrition Deficit  Goal: *Optimize nutritional status  Outcome: Progressing Towards Goal     Problem: Impaired Skin Integrity/Pressure Injury Treatment  Goal: *Improvement of Existing Pressure Injury  Outcome: Progressing Towards Goal  Goal: *Prevention of pressure injury  Description: Document Jordin Scale and appropriate interventions in the flowsheet.   Outcome: Progressing Towards Goal  Note: Pressure Injury Interventions:  Sensory Interventions: Assess changes in LOC    Moisture Interventions: Absorbent underpads    Activity Interventions: Increase time out of bed    Mobility Interventions: Assess need for specialty bed    Nutrition Interventions: Document food/fluid/supplement intake    Friction and Shear Interventions: Apply protective barrier, creams and emollients                Problem: Patient Education: Go to Patient Education Activity  Goal: Patient/Family Education  Outcome: Progressing Towards Goal     Problem: Hypertension  Goal: *Blood pressure within specified parameters  Outcome: Progressing Towards Goal  Goal: *Fluid volume balance  Outcome: Progressing Towards Goal  Goal: *Labs within defined limits  Outcome: Progressing Towards Goal     Problem: Patient Education: Go to Patient Education Activity  Goal: Patient/Family Education  Outcome: Progressing Towards Goal     Problem: Ostomy Care  Goal: *Patient pouching appliance will fit properly and maintain integrity at least three to five days  Description: Infection control procedures (eg, clean dressings, clean gloves, hand washing, precautions to isolate wound from contamination, sterile instruments used for wound debridement) should be implemented.   Outcome: Progressing Towards Goal  Goal: *Acceptance of change in body image  Outcome: Progressing Towards Goal
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
## 2021-05-04 NOTE — PERIOP NOTES
TRANSFER - OUT REPORT:    Verbal report given to Sarah(name) on Cindy Peraza  being transferred to room 513(unit) for routine progression of care       Report consisted of patients Situation, Background, Assessment and   Recommendations(SBAR). Information from the following report(s) SBAR, OR Summary, Intake/Output and MAR was reviewed with the receiving nurse. Lines:   Peripheral IV 05/02/21 Right Antecubital (Active)   Site Assessment Clean, dry, & intact 05/03/21 2202   Phlebitis Assessment 0 05/03/21 2202   Infiltration Assessment 0 05/03/21 2202   Dressing Status Clean, dry, & intact 05/03/21 2202   Dressing Type Tape;Transparent 05/03/21 2202   Hub Color/Line Status Pink; Infusing 05/02/21 2000   Action Taken Open ports on tubing capped 05/02/21 2000   Alcohol Cap Used Yes 05/02/21 2000        Opportunity for questions and clarification was provided.       Patient transported with:   O2 @ 3 liters  Registered Nurse

## 2021-05-04 NOTE — ROUTINE PROCESS
Bedside and Verbal shift change report given to Radha Rodriguez (oncoming nurse) by Bryan Maldonado RN (offgoing nurse). Report included the following information SBAR, Kardex, Intake/Output, MAR and Recent Results.

## 2021-05-04 NOTE — PROGRESS NOTES
Problem: Falls - Risk of  Goal: *Absence of Falls  Description: Document Linden Whitaker Fall Risk and appropriate interventions in the flowsheet. Outcome: Progressing Towards Goal  Note: Fall Risk Interventions:  Mobility Interventions: Bed/chair exit alarm, Patient to call before getting OOB, Utilize walker, cane, or other assistive device    Mentation Interventions: Bed/chair exit alarm    Medication Interventions: Bed/chair exit alarm    Elimination Interventions: Call light in reach, Bed/chair exit alarm              Problem: Patient Education: Go to Patient Education Activity  Goal: Patient/Family Education  Outcome: Progressing Towards Goal     Problem: Pressure Injury - Risk of  Goal: *Prevention of pressure injury  Description: Document Jordin Scale and appropriate interventions in the flowsheet.   Outcome: Progressing Towards Goal  Note: Pressure Injury Interventions:  Sensory Interventions: Assess changes in LOC, Float heels, Keep linens dry and wrinkle-free, Minimize linen layers    Moisture Interventions: Absorbent underpads, Minimize layers, Moisture barrier    Activity Interventions: Pressure redistribution bed/mattress(bed type)    Mobility Interventions: Pressure redistribution bed/mattress (bed type)    Nutrition Interventions: Document food/fluid/supplement intake    Friction and Shear Interventions: Minimize layers, Apply protective barrier, creams and emollients                Problem: Patient Education: Go to Patient Education Activity  Goal: Patient/Family Education  Outcome: Progressing Towards Goal     Problem: Pain  Goal: *Control of Pain  Outcome: Progressing Towards Goal     Problem: Patient Education: Go to Patient Education Activity  Goal: Patient/Family Education  Outcome: Progressing Towards Goal     Problem: Nutrition Deficit  Goal: *Optimize nutritional status  Outcome: Progressing Towards Goal     Problem: Impaired Skin Integrity/Pressure Injury Treatment  Goal: *Improvement of Existing Pressure Injury  Outcome: Progressing Towards Goal  Goal: *Prevention of pressure injury  Description: Document Jordin Scale and appropriate interventions in the flowsheet. Outcome: Progressing Towards Goal  Note: Pressure Injury Interventions:  Sensory Interventions: Assess changes in LOC, Float heels, Keep linens dry and wrinkle-free, Minimize linen layers    Moisture Interventions: Absorbent underpads, Minimize layers, Moisture barrier    Activity Interventions: Pressure redistribution bed/mattress(bed type)    Mobility Interventions: Pressure redistribution bed/mattress (bed type)    Nutrition Interventions: Document food/fluid/supplement intake    Friction and Shear Interventions: Minimize layers, Apply protective barrier, creams and emollients                Problem: Patient Education: Go to Patient Education Activity  Goal: Patient/Family Education  Outcome: Progressing Towards Goal     Problem: Hypertension  Goal: *Blood pressure within specified parameters  Outcome: Progressing Towards Goal  Goal: *Fluid volume balance  Outcome: Progressing Towards Goal  Goal: *Labs within defined limits  Outcome: Progressing Towards Goal     Problem: Patient Education: Go to Patient Education Activity  Goal: Patient/Family Education  Outcome: Progressing Towards Goal     Problem: Patient Education: Go to Patient Education Activity  Goal: Patient/Family Education  Outcome: Progressing Towards Goal     Problem: Patient Education: Go to Patient Education Activity  Goal: Patient/Family Education  Outcome: Progressing Towards Goal     Problem: Ostomy Care  Goal: *Patient pouching appliance will fit properly and maintain integrity at least three to five days  Description: Infection control procedures (eg, clean dressings, clean gloves, hand washing, precautions to isolate wound from contamination, sterile instruments used for wound debridement) should be implemented.   Outcome: Progressing Towards Goal  Goal: *Acceptance of change in body image  Outcome: Progressing Towards Goal     Problem: Patient Education: Go to Patient Education Activity  Goal: Patient/Family Education  Outcome: Progressing Towards Goal     Problem: Patient Education: Go to Patient Education Activity  Goal: Patient/Family Education  Outcome: Progressing Towards Goal

## 2021-05-04 NOTE — ROUTINE PROCESS
Bedside shift change report given to Arie Renee RN (oncoming nurse) by Prince Borges RN (offgoing nurse). Report included the following information SBAR, Kardex, Procedure Summary, Intake/Output, MAR and Recent Results. Opportunity for questions and clarification was provided.
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
## 2021-05-05 LAB
AMORPH CRY URNS QL MICRO: ABNORMAL
ANION GAP SERPL CALC-SCNC: 6 MMOL/L (ref 3–18)
ANION GAP SERPL CALC-SCNC: 8 MMOL/L (ref 3–18)
APPEARANCE UR: ABNORMAL
BACTERIA URNS QL MICRO: ABNORMAL /HPF
BASOPHILS # BLD: 0 K/UL (ref 0–0.1)
BASOPHILS NFR BLD: 0 % (ref 0–2)
BILIRUB UR QL: NEGATIVE
BUN SERPL-MCNC: 20 MG/DL (ref 7–18)
BUN SERPL-MCNC: 25 MG/DL (ref 7–18)
BUN/CREAT SERPL: 8 (ref 12–20)
BUN/CREAT SERPL: 8 (ref 12–20)
CALCIUM SERPL-MCNC: 7.9 MG/DL (ref 8.5–10.1)
CALCIUM SERPL-MCNC: 8 MG/DL (ref 8.5–10.1)
CHLORIDE SERPL-SCNC: 108 MMOL/L (ref 100–111)
CHLORIDE SERPL-SCNC: 91 MMOL/L (ref 100–111)
CO2 SERPL-SCNC: 22 MMOL/L (ref 21–32)
CO2 SERPL-SCNC: 25 MMOL/L (ref 21–32)
COLOR UR: YELLOW
CREAT SERPL-MCNC: 2.45 MG/DL (ref 0.6–1.3)
CREAT SERPL-MCNC: 3.21 MG/DL (ref 0.6–1.3)
DATE LAST DOSE: NORMAL
DIFFERENTIAL METHOD BLD: ABNORMAL
EOSINOPHIL # BLD: 0.2 K/UL (ref 0–0.4)
EOSINOPHIL #/AREA URNS HPF: NORMAL /[HPF]
EOSINOPHIL NFR BLD: 1 % (ref 0–5)
EPITH CASTS URNS QL MICRO: ABNORMAL /LPF (ref 0–5)
ERYTHROCYTE [DISTWIDTH] IN BLOOD BY AUTOMATED COUNT: 13.8 % (ref 11.6–14.5)
GLUCOSE SERPL-MCNC: 75 MG/DL (ref 74–99)
GLUCOSE SERPL-MCNC: 77 MG/DL (ref 74–99)
GLUCOSE UR STRIP.AUTO-MCNC: NEGATIVE MG/DL
HCT VFR BLD AUTO: 25.3 % (ref 36–48)
HGB BLD-MCNC: 7.8 G/DL (ref 13–16)
HGB UR QL STRIP: ABNORMAL
KETONES UR QL STRIP.AUTO: NEGATIVE MG/DL
LEUKOCYTE ESTERASE UR QL STRIP.AUTO: ABNORMAL
LYMPHOCYTES # BLD: 1.3 K/UL (ref 0.9–3.6)
LYMPHOCYTES NFR BLD: 7 % (ref 21–52)
MAGNESIUM SERPL-MCNC: 1.5 MG/DL (ref 1.6–2.6)
MCH RBC QN AUTO: 28.2 PG (ref 24–34)
MCHC RBC AUTO-ENTMCNC: 30.8 G/DL (ref 31–37)
MCV RBC AUTO: 91.3 FL (ref 74–97)
MONOCYTES # BLD: 0.6 K/UL (ref 0.05–1.2)
MONOCYTES NFR BLD: 3 % (ref 3–10)
NEUTS SEG # BLD: 16.8 K/UL (ref 1.8–8)
NEUTS SEG NFR BLD: 89 % (ref 40–73)
NITRITE UR QL STRIP.AUTO: NEGATIVE
PH UR STRIP: 5 [PH] (ref 5–8)
PLATELET # BLD AUTO: 475 K/UL (ref 135–420)
PLATELET COMMENTS,PCOM: ABNORMAL
PMV BLD AUTO: 9.9 FL (ref 9.2–11.8)
POTASSIUM SERPL-SCNC: 4.1 MMOL/L (ref 3.5–5.5)
POTASSIUM SERPL-SCNC: 5.1 MMOL/L (ref 3.5–5.5)
PROT UR STRIP-MCNC: 100 MG/DL
RBC # BLD AUTO: 2.77 M/UL (ref 4.35–5.65)
RBC #/AREA URNS HPF: ABNORMAL /HPF (ref 0–5)
RBC MORPH BLD: ABNORMAL
RBC MORPH BLD: ABNORMAL
REPORTED DOSE,DOSE: NORMAL UNITS
REPORTED DOSE/TIME,TMG: 2100
SODIUM SERPL-SCNC: 121 MMOL/L (ref 136–145)
SODIUM SERPL-SCNC: 139 MMOL/L (ref 136–145)
SODIUM UR-SCNC: 42 MMOL/L (ref 20–110)
SP GR UR REFRACTOMETRY: 1.01 (ref 1–1.03)
UROBILINOGEN UR QL STRIP.AUTO: 1 EU/DL (ref 0.2–1)
VANCOMYCIN TROUGH SERPL-MCNC: NORMAL UG/ML (ref 10–20)
WBC # BLD AUTO: 18.9 K/UL (ref 4.6–13.2)
WBC URNS QL MICRO: ABNORMAL /HPF (ref 0–4)

## 2021-05-05 PROCEDURE — 87205 SMEAR GRAM STAIN: CPT

## 2021-05-05 PROCEDURE — 77030012890

## 2021-05-05 PROCEDURE — 74011250636 HC RX REV CODE- 250/636: Performed by: SURGERY

## 2021-05-05 PROCEDURE — 84300 ASSAY OF URINE SODIUM: CPT

## 2021-05-05 PROCEDURE — 65270000029 HC RM PRIVATE

## 2021-05-05 PROCEDURE — 87086 URINE CULTURE/COLONY COUNT: CPT

## 2021-05-05 PROCEDURE — 80202 ASSAY OF VANCOMYCIN: CPT

## 2021-05-05 PROCEDURE — 74011000258 HC RX REV CODE- 258: Performed by: SURGERY

## 2021-05-05 PROCEDURE — 80048 BASIC METABOLIC PNL TOTAL CA: CPT

## 2021-05-05 PROCEDURE — 2709999900 HC NON-CHARGEABLE SUPPLY

## 2021-05-05 PROCEDURE — 81001 URINALYSIS AUTO W/SCOPE: CPT

## 2021-05-05 PROCEDURE — 77030025414 HC DRSG VAC ASST SMPLC KCON -B

## 2021-05-05 PROCEDURE — 83935 ASSAY OF URINE OSMOLALITY: CPT

## 2021-05-05 PROCEDURE — 83735 ASSAY OF MAGNESIUM: CPT

## 2021-05-05 PROCEDURE — 99232 SBSQ HOSP IP/OBS MODERATE 35: CPT | Performed by: EMERGENCY MEDICINE

## 2021-05-05 PROCEDURE — 36415 COLL VENOUS BLD VENIPUNCTURE: CPT

## 2021-05-05 PROCEDURE — 74011250636 HC RX REV CODE- 250/636: Performed by: EMERGENCY MEDICINE

## 2021-05-05 PROCEDURE — 85025 COMPLETE CBC W/AUTO DIFF WBC: CPT

## 2021-05-05 RX ORDER — HYDROMORPHONE HYDROCHLORIDE 1 MG/ML
0.5 INJECTION, SOLUTION INTRAMUSCULAR; INTRAVENOUS; SUBCUTANEOUS
Status: DISCONTINUED | OUTPATIENT
Start: 2021-05-05 | End: 2021-05-22 | Stop reason: HOSPADM

## 2021-05-05 RX ORDER — NALOXONE HYDROCHLORIDE 0.4 MG/ML
0.4 INJECTION, SOLUTION INTRAMUSCULAR; INTRAVENOUS; SUBCUTANEOUS AS NEEDED
Status: DISCONTINUED | OUTPATIENT
Start: 2021-05-05 | End: 2021-05-22 | Stop reason: HOSPADM

## 2021-05-05 RX ORDER — MAGNESIUM SULFATE 1 G/100ML
1 INJECTION INTRAVENOUS ONCE
Status: COMPLETED | OUTPATIENT
Start: 2021-05-05 | End: 2021-05-05

## 2021-05-05 RX ORDER — DEXTROSE, SODIUM CHLORIDE, AND POTASSIUM CHLORIDE 5; .45; .15 G/100ML; G/100ML; G/100ML
150 INJECTION INTRAVENOUS CONTINUOUS
Status: DISCONTINUED | OUTPATIENT
Start: 2021-05-05 | End: 2021-05-05

## 2021-05-05 RX ADMIN — PIPERACILLIN AND TAZOBACTAM 3.38 G: 3; .375 INJECTION, POWDER, LYOPHILIZED, FOR SOLUTION INTRAVENOUS at 05:01

## 2021-05-05 RX ADMIN — SODIUM CHLORIDE 75 ML/HR: 900 INJECTION, SOLUTION INTRAVENOUS at 08:25

## 2021-05-05 RX ADMIN — MAGNESIUM SULFATE HEPTAHYDRATE 1 G: 1 INJECTION, SOLUTION INTRAVENOUS at 22:03

## 2021-05-05 RX ADMIN — PIPERACILLIN AND TAZOBACTAM 3.38 G: 3; .375 INJECTION, POWDER, LYOPHILIZED, FOR SOLUTION INTRAVENOUS at 11:11

## 2021-05-05 RX ADMIN — PIPERACILLIN AND TAZOBACTAM 3.38 G: 3; .375 INJECTION, POWDER, LYOPHILIZED, FOR SOLUTION INTRAVENOUS at 17:55

## 2021-05-05 RX ADMIN — PIPERACILLIN AND TAZOBACTAM 3.38 G: 3; .375 INJECTION, POWDER, LYOPHILIZED, FOR SOLUTION INTRAVENOUS at 23:32

## 2021-05-05 RX ADMIN — SODIUM CHLORIDE 100 ML/HR: 900 INJECTION, SOLUTION INTRAVENOUS at 22:02

## 2021-05-05 RX ADMIN — SODIUM CHLORIDE, SODIUM LACTATE, POTASSIUM CHLORIDE, AND CALCIUM CHLORIDE 1000 ML: 600; 310; 30; 20 INJECTION, SOLUTION INTRAVENOUS at 11:11

## 2021-05-05 NOTE — PROGRESS NOTES
Patient seen and examined. He is doing relatively well. He is having some abdominal pain but it is better than yesterday. His vital signs are normal with no fever or tachycardia and his blood pressure is normal and he is satting 100% on room air. He still n.p.o.. His abdomen is soft with some incisional tenderness. His colostomy is viable but not functioning yet. His labs from today showed that he still have leukocytosis. It is 18.9. Similar to yesterday. His creatinine showed significant worsening. It is 2.4 today up from 1. Plan:  Appreciate the medicine accepting the patient and the management  Follow-up the ID recommendation because of his leukocytosis and adjusting his medication for his renal failure  I increase his IV fluids to 100 cc/h and I gave him 1 L bolus of LR  Repeat labs tomorrow  Consider renal consultation  I placed him on clear liquid diet for now but I will not advance until he has return of bowel function. The patient has paraplegia and I expect him to have an ileus, so we will take it slow with the diet. I will follow closely.

## 2021-05-05 NOTE — ANESTHESIA POSTPROCEDURE EVALUATION
Procedure(s):  EXPLORATORY LAPAROTOMY, SMALL BOWEL RESECTION , PARTIAL COLECTOMY ,COLOSTOMY, REVISION. general    Anesthesia Post Evaluation      Multimodal analgesia: multimodal analgesia used between 6 hours prior to anesthesia start to PACU discharge  Patient location during evaluation: bedside  Patient participation: complete - patient participated  Level of consciousness: awake  Pain management: adequate  Airway patency: patent  Anesthetic complications: no  Cardiovascular status: stable  Respiratory status: acceptable  Hydration status: acceptable  Post anesthesia nausea and vomiting:  controlled  Final Post Anesthesia Temperature Assessment:  Normothermia (36.0-37.5 degrees C)      INITIAL Post-op Vital signs:   Vitals Value Taken Time   /67 05/03/21 2241   Temp 36.2 °C (97.2 °F) 05/03/21 2243   Pulse 79 05/03/21 2243   Resp 16 05/03/21 2243   SpO2 100 % 05/03/21 2243   Vitals shown include unvalidated device data.

## 2021-05-05 NOTE — ROUTINE PROCESS
End of Shift Note     Bedside and verbal shift change report given to Bernice Nolasco RN (On coming nurse) by Yrn Nix RN (Off going nurse).   Report included the following information:      --Procedure Summary     --MAR,     --Recent Results     --Med Rec Status

## 2021-05-05 NOTE — PROGRESS NOTES
Problem: Falls - Risk of  Goal: *Absence of Falls  Description: Document Shauna Maza Fall Risk and appropriate interventions in the flowsheet. Outcome: Progressing Towards Goal  Note: Fall Risk Interventions:  Mobility Interventions: Bed/chair exit alarm, Patient to call before getting OOB, Utilize walker, cane, or other assistive device    Mentation Interventions: Bed/chair exit alarm    Medication Interventions: Bed/chair exit alarm    Elimination Interventions: Call light in reach, Bed/chair exit alarm              Problem: Patient Education: Go to Patient Education Activity  Goal: Patient/Family Education  Outcome: Progressing Towards Goal     Problem: Pressure Injury - Risk of  Goal: *Prevention of pressure injury  Description: Document Jordin Scale and appropriate interventions in the flowsheet.   Outcome: Progressing Towards Goal  Note: Pressure Injury Interventions:  Sensory Interventions: Assess changes in LOC, Float heels, Keep linens dry and wrinkle-free, Minimize linen layers    Moisture Interventions: Absorbent underpads, Minimize layers, Moisture barrier    Activity Interventions: Pressure redistribution bed/mattress(bed type)    Mobility Interventions: Pressure redistribution bed/mattress (bed type)    Nutrition Interventions: Document food/fluid/supplement intake    Friction and Shear Interventions: Minimize layers, Apply protective barrier, creams and emollients                Problem: Patient Education: Go to Patient Education Activity  Goal: Patient/Family Education  Outcome: Progressing Towards Goal     Problem: Pain  Goal: *Control of Pain  Outcome: Progressing Towards Goal     Problem: Patient Education: Go to Patient Education Activity  Goal: Patient/Family Education  Outcome: Progressing Towards Goal     Problem: Nutrition Deficit  Goal: *Optimize nutritional status  Outcome: Progressing Towards Goal     Problem: Impaired Skin Integrity/Pressure Injury Treatment  Goal: *Improvement of Existing Pressure Injury  Outcome: Progressing Towards Goal  Goal: *Prevention of pressure injury  Description: Document Jordin Scale and appropriate interventions in the flowsheet. Outcome: Progressing Towards Goal  Note: Pressure Injury Interventions:  Sensory Interventions: Assess changes in LOC, Float heels, Keep linens dry and wrinkle-free, Minimize linen layers    Moisture Interventions: Absorbent underpads, Minimize layers, Moisture barrier    Activity Interventions: Pressure redistribution bed/mattress(bed type)    Mobility Interventions: Pressure redistribution bed/mattress (bed type)    Nutrition Interventions: Document food/fluid/supplement intake    Friction and Shear Interventions: Minimize layers, Apply protective barrier, creams and emollients                Problem: Patient Education: Go to Patient Education Activity  Goal: Patient/Family Education  Outcome: Progressing Towards Goal     Problem: Hypertension  Goal: *Blood pressure within specified parameters  Outcome: Progressing Towards Goal  Goal: *Fluid volume balance  Outcome: Progressing Towards Goal  Goal: *Labs within defined limits  Outcome: Progressing Towards Goal     Problem: Patient Education: Go to Patient Education Activity  Goal: Patient/Family Education  Outcome: Progressing Towards Goal     Problem: Patient Education: Go to Patient Education Activity  Goal: Patient/Family Education  Outcome: Progressing Towards Goal     Problem: Ostomy Care  Goal: *Patient pouching appliance will fit properly and maintain integrity at least three to five days  Description: Infection control procedures (eg, clean dressings, clean gloves, hand washing, precautions to isolate wound from contamination, sterile instruments used for wound debridement) should be implemented.   Outcome: Progressing Towards Goal  Goal: *Acceptance of change in body image  Outcome: Progressing Towards Goal     Problem: Patient Education: Go to Patient Education Activity  Goal: Patient/Family Education  Outcome: Progressing Towards Goal     Problem: Patient Education: Go to Patient Education Activity  Goal: Patient/Family Education  Outcome: Progressing Towards Goal     Problem: Patient Education: Go to Patient Education Activity  Goal: Patient/Family Education  Outcome: Progressing Towards Goal

## 2021-05-05 NOTE — PROGRESS NOTES
Infectious Disease progress Note        Reason:     Current abx Prior abx   Piperacillin/tazobactam since 5/1 vancomycin 5/1-5/4     Lines:       Assessment :    61year old man with h/o HTN, paraplegia admitted to [facility] on 4/29/21 for evaluation of infected sacral decubiti. Clinical presentation c/w acute on chronic sacral osteomyelitis, infected sacral decubiti    S/p surgical debridement,  robotic colostomy on 4/29/2021    Persistent leukocytosis could be leukemoid reaction to post op sacral inflammation versus partially treated infection due to resistant pathogens. Await wound cultures  Intraoperative findings discussed with dr. Mark Villaseñor    Protrusion of the small bowel around the colostomy causing bowel ischemia s/p expl. laparotomy with small bowel resection, partial colectomy/revision of colostomy on 5/4/21    Recommendations:    1. Continue pip/tazo  2. F/u intra op wound cultures  3. picc line for outpt iv abx once improved leukocytosis, and patient on appropriate abx  4. Patient will benefit from outpatient iv abx and good wound care to aid healing and prevent future infectious complications  5. Colostomy care per surgery team    Above plan was discussed in details with patient, dr. Mark Villaseñor, primary team. Please call me if any further questions or concerns. Will continue to participate in the care of this patient. HPI:      Feels better. patient denied any chest pain, shortness of breath, abdominal pain. Current Discharge Medication List      CONTINUE these medications which have NOT CHANGED    Details   lisinopril-hydroCHLOROthiazide (PRINZIDE, ZESTORETIC) 20-12.5 mg per tablet TAKE 1 TABLET EVERY DAY  Refills: 3      ergocalciferol (ERGOCALCIFEROL) 1,250 mcg (50,000 unit) capsule       ciprofloxacin HCl (CIPRO) 250 mg tablet Take 1 Tab by mouth two (2) times a day.   Qty: 30 Tab, Refills: 2      tamsulosin (FLOMAX) 0.4 mg capsule TAKE 1 CAPSULE BY MOUTH EVERY DAY  Refills: 1      naloxone [facility]) 2 mg/actuation spry Use 1 spray intranasally into 1 nostril. Use a new Narcan nasal spray for subsequent doses and administer into alternating nostrils. May repeat every 2 to 3 minutes as needed. Qty: 2 Actuation(s), Refills: 0      furosemide (LASIX) 20 mg tablet TAKE 1 TABLET BY MOUTH DAILY AS NEEDED FOR SWELLING  Refills: 3      MULTIVIT-MINERALS/FOLIC ACID (SPECTRAVITE ADULT PO) Take  by mouth.      escitalopram oxalate (LEXAPRO) 10 mg tablet Take 10 mg by mouth daily. acetaminophen (TYLENOL) 325 mg tablet TAKE 2 TABLETS BY MOUTH EVERY 4 HOURS AS NEEDED FOR PAIN  Refills: 2             Current Facility-Administered Medications   Medication Dose Route Frequency    acetaminophen (TYLENOL) tablet 650 mg  650 mg Oral Q6H PRN    0.9% sodium chloride infusion  75 mL/hr IntraVENous CONTINUOUS    docusate (COLACE) 50 mg/5 mL oral liquid 100 mg  100 mg Oral DAILY    bisacodyL (DULCOLAX) tablet 5 mg  5 mg Oral DAILY PRN    piperacillin-tazobactam (ZOSYN) 3.375 g in 0.9% sodium chloride (MBP/ADV) 100 mL MBP  3.375 g IntraVENous Q6H    escitalopram oxalate (LEXAPRO) tablet 10 mg  10 mg Oral DAILY    HYDROmorphone (DILAUDID) injection 1 mg  1 mg IntraVENous Q2H PRN    oxyCODONE-acetaminophen (PERCOCET) 5-325 mg per tablet 1 Tab  1 Tab Oral Q4H PRN       Allergies: Patient has no known allergies. Temp (24hrs), Av °F (36.7 °C), Min:96.8 °F (36 °C), Max:99.1 °F (37.3 °C)    Visit Vitals  /73 (BP 1 Location: Left upper arm, BP Patient Position: At rest)   Pulse 78   Temp 99.1 °F (37.3 °C)   Resp 18   Ht 6' 2\" (1.88 m)   Wt 113.4 kg (250 lb)   SpO2 100%   BMI 32.10 kg/m²       ROS: 12 point ROS obtained in details. Pertinent positives as mentioned in HPI,   otherwise negative    Physical Exam:    General: Well developed, well nourished male laying on the bed AAOx3 in no acute distress.     General:   awake alert and oriented   HEENT:  Normocephalic, atraumatic, EOMI, no scleral icterus or pallor; no conjunctival hemmohage;  nasal and oral mucous are moist and without evidence of lesions. No thrush. Dentition good. Neck supple, no bruits. Lymph Nodes:   no cervical, axillary or inguinal adenopathy   Lungs:   non-labored, bilaterally clear to auscultation- no crackles wheezes rales or rhonchi   Heart:  RRR, s1 and s2; no rubs or gallops, no edema   Abdomen:  soft, non-distended, active bowel sounds, no hepatomegaly, no splenomegaly. Colostomy in place. Non-tender   Genitourinary:  deferred   Extremities:   no clubbing, cyanosis; no joint effusions or swelling; Full ROM of all large joints to the upper and lower extremities; muscle mass appropriate for age   Neurologic:  Paraplegia. Speech appropriate. Cranial nerves intact                        Skin:  Surgical changes back   Back:  wound vac recent surgical wound     Psychiatric:  No suicidal or homicidal ideations, appropriate mood and affect         Labs: Results:   Chemistry Recent Labs     05/05/21 0250 05/04/21 0220 05/03/21  0210   GLU 77 130* 73*   * 135* 133*   K 4.1 4.8 3.8   CL 91* 104 100   CO2 22 24 27   BUN 20* 13 12   CREA 2.45* 1.04 0.96   CA 8.0* 7.9* 7.7*   AGAP 8 7 6   BUCR 8* 13 13      CBC w/Diff Recent Labs     05/05/21 0250 05/04/21 0220 05/03/21  0210   WBC 18.9* 18.7* 19.5*   RBC 2.77* 3.28* 2.69*   HGB 7.8* 9.2* 7.7*   HCT 25.3* 29.3* 23.9*   * 459* 381   GRANS 89* 87* 80*   LYMPH 7* 9* 9*   EOS 1 0 2      Microbiology Recent Labs     05/03/21  0236   CULT LIGHT GRAM NEGATIVE RODS*  CHECKING FOR POSSIBLE LIGHT 2ND GRAM NEGATIVE MICHEL*  MODERATE MIXED SKIN JORDAN ISOLATED          RADIOLOGY:    All available imaging studies/reports in [facility] care for this admission were reviewed      Disclaimer: Sections of this note are dictated utilizing voice recognition software, which may have resulted in some phonetic based errors in grammar and contents.  Even though attempts were made to correct all the mistakes, some may have been missed, and remained in the body of the document. If questions arise, please contact our department.     Dr. Dylan Nolasco, Infectious Disease Specialist  [phone]  May 5, 2021  8:39 PM
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
## 2021-05-05 NOTE — ROUTINE PROCESS
Bedside and Verbal shift change report given to [employer] (oncoming nurse) by Lydia Arana RN (offgoing nurse). Report included the following information SBAR, Kardex, Intake/Output, MAR and Recent Results.

## 2021-05-06 ENCOUNTER — HOSPITAL ENCOUNTER (INPATIENT)
Dept: ULTRASOUND IMAGING | Age: 61
Discharge: HOME | DRG: 364 | End: 2021-05-06
Attending: INTERNAL MEDICINE | Admitting: SURGERY
Payer: MEDICAID

## 2021-05-06 LAB
ANION GAP SERPL CALC-SCNC: 7 MMOL/L (ref 3–18)
BACTERIA SPEC CULT: ABNORMAL
BACTERIA SPEC CULT: NORMAL
BASOPHILS # BLD: 0 K/UL (ref 0–0.1)
BASOPHILS NFR BLD: 0 % (ref 0–2)
BUN SERPL-MCNC: 26 MG/DL (ref 7–18)
BUN/CREAT SERPL: 8 (ref 12–20)
CALCIUM SERPL-MCNC: 7.8 MG/DL (ref 8.5–10.1)
CHLORIDE SERPL-SCNC: 107 MMOL/L (ref 100–111)
CO2 SERPL-SCNC: 23 MMOL/L (ref 21–32)
CREAT SERPL-MCNC: 3.38 MG/DL (ref 0.6–1.3)
DIFFERENTIAL METHOD BLD: ABNORMAL
EOSINOPHIL # BLD: 1.3 K/UL (ref 0–0.4)
EOSINOPHIL NFR BLD: 6 % (ref 0–5)
ERYTHROCYTE [DISTWIDTH] IN BLOOD BY AUTOMATED COUNT: 14.1 % (ref 11.6–14.5)
GLUCOSE SERPL-MCNC: 81 MG/DL (ref 74–99)
GRAM STN SPEC: ABNORMAL
GRAM STN SPEC: ABNORMAL
HCT VFR BLD AUTO: 24.8 % (ref 36–48)
HGB BLD-MCNC: 7.7 G/DL (ref 13–16)
LYMPHOCYTES # BLD: 3 K/UL (ref 0.9–3.6)
LYMPHOCYTES NFR BLD: 14 % (ref 21–52)
MAGNESIUM SERPL-MCNC: 2.1 MG/DL (ref 1.6–2.6)
MCH RBC QN AUTO: 28.3 PG (ref 24–34)
MCHC RBC AUTO-ENTMCNC: 31 G/DL (ref 31–37)
MCV RBC AUTO: 91.2 FL (ref 74–97)
MONOCYTES # BLD: 1.3 K/UL (ref 0.05–1.2)
MONOCYTES NFR BLD: 6 % (ref 3–10)
NEUTS SEG # BLD: 15.5 K/UL (ref 1.8–8)
NEUTS SEG NFR BLD: 74 % (ref 40–73)
PLATELET # BLD AUTO: 468 K/UL (ref 135–420)
PLATELET COMMENTS,PCOM: ABNORMAL
PMV BLD AUTO: 8.6 FL (ref 9.2–11.8)
POTASSIUM SERPL-SCNC: 4.4 MMOL/L (ref 3.5–5.5)
RBC # BLD AUTO: 2.72 M/UL (ref 4.35–5.65)
RBC MORPH BLD: ABNORMAL
SERVICE CMNT-IMP: ABNORMAL
SERVICE CMNT-IMP: NORMAL
SODIUM SERPL-SCNC: 137 MMOL/L (ref 136–145)
VANCOMYCIN SERPL-MCNC: 31.8 UG/ML (ref 5–40)
WBC # BLD AUTO: 21.1 K/UL (ref 4.6–13.2)

## 2021-05-06 PROCEDURE — 74011000258 HC RX REV CODE- 258: Performed by: SURGERY

## 2021-05-06 PROCEDURE — 74011250636 HC RX REV CODE- 250/636: Performed by: SURGERY

## 2021-05-06 PROCEDURE — 74011000250 HC RX REV CODE- 250: Performed by: INTERNAL MEDICINE

## 2021-05-06 PROCEDURE — 74011250636 HC RX REV CODE- 250/636: Performed by: INTERNAL MEDICINE

## 2021-05-06 PROCEDURE — 65270000029 HC RM PRIVATE

## 2021-05-06 PROCEDURE — 36415 COLL VENOUS BLD VENIPUNCTURE: CPT

## 2021-05-06 PROCEDURE — 85025 COMPLETE CBC W/AUTO DIFF WBC: CPT

## 2021-05-06 PROCEDURE — 2709999900 HC NON-CHARGEABLE SUPPLY

## 2021-05-06 PROCEDURE — 80048 BASIC METABOLIC PNL TOTAL CA: CPT

## 2021-05-06 PROCEDURE — 83735 ASSAY OF MAGNESIUM: CPT

## 2021-05-06 PROCEDURE — 74011250637 HC RX REV CODE- 250/637: Performed by: SURGERY

## 2021-05-06 PROCEDURE — 76770 US EXAM ABDO BACK WALL COMP: CPT

## 2021-05-06 PROCEDURE — 99232 SBSQ HOSP IP/OBS MODERATE 35: CPT | Performed by: EMERGENCY MEDICINE

## 2021-05-06 RX ADMIN — PIPERACILLIN AND TAZOBACTAM 3.38 G: 3; .375 INJECTION, POWDER, LYOPHILIZED, FOR SOLUTION INTRAVENOUS at 05:06

## 2021-05-06 RX ADMIN — SODIUM CHLORIDE 100 ML/HR: 900 INJECTION, SOLUTION INTRAVENOUS at 12:34

## 2021-05-06 RX ADMIN — ESCITALOPRAM OXALATE 10 MG: 10 TABLET ORAL at 09:08

## 2021-05-06 RX ADMIN — MEROPENEM 500 MG: 500 INJECTION INTRAVENOUS at 12:34

## 2021-05-06 NOTE — ROUTINE PROCESS
[address] results no new order. Urine specimen collected and  sent to lab. Patient resting quietly no distress noted.

## 2021-05-06 NOTE — PROGRESS NOTES
Chart reviewed. Pt had colostomy place on 4/29 with a revision on 5/4. Has a PICC for anticipated course of long term IV abx. Wound vac to debrided sacral decubitus. Pt is bedbound at baseline. Plan is for SNF vs home with home health. Pt has [facility] care through [facility]. Will continue to monitor for discharge needs. Recent clinicals uploaded to [facility]. Numerous SNF acceptances.   Sona Mann RN - Outcomes Manager  [phone]

## 2021-05-06 NOTE — PROGRESS NOTES
Problem: Falls - Risk of  Goal: *Absence of Falls  Description: Document Chari Hamlin Fall Risk and appropriate interventions in the flowsheet. Outcome: Progressing Towards Goal  Note: Fall Risk Interventions:  Mobility Interventions: Bed/chair exit alarm, Patient to call before getting OOB, Strengthening exercises (ROM-active/passive), Utilize walker, cane, or other assistive device    Mentation Interventions: Bed/chair exit alarm, Door open when patient unattended    Medication Interventions: Patient to call before getting OOB, Teach patient to arise slowly    Elimination Interventions: Bed/chair exit alarm, Call light in reach, Patient to call for help with toileting needs, Toileting schedule/hourly rounds              Problem: Patient Education: Go to Patient Education Activity  Goal: Patient/Family Education  Outcome: Progressing Towards Goal     Problem: Pressure Injury - Risk of  Goal: *Prevention of pressure injury  Description: Document Jordin Scale and appropriate interventions in the flowsheet.   Outcome: Progressing Towards Goal  Note: Pressure Injury Interventions:  Sensory Interventions: Assess changes in LOC, Keep linens dry and wrinkle-free, Minimize linen layers    Moisture Interventions: Minimize layers, Absorbent underpads, Apply protective barrier, creams and emollients    Activity Interventions: Increase time out of bed, Pressure redistribution bed/mattress(bed type)    Mobility Interventions: Pressure redistribution bed/mattress (bed type)    Nutrition Interventions: Document food/fluid/supplement intake    Friction and Shear Interventions: Minimize layers, Apply protective barrier, creams and emollients                Problem: Patient Education: Go to Patient Education Activity  Goal: Patient/Family Education  Outcome: Progressing Towards Goal     Problem: Pain  Goal: *Control of Pain  Outcome: Progressing Towards Goal     Problem: Patient Education: Go to Patient Education Activity  Goal: Patient/Family Education  Outcome: Progressing Towards Goal     Problem: Nutrition Deficit  Goal: *Optimize nutritional status  Outcome: Progressing Towards Goal     Problem: Impaired Skin Integrity/Pressure Injury Treatment  Goal: *Improvement of Existing Pressure Injury  Outcome: Progressing Towards Goal  Goal: *Prevention of pressure injury  Description: Document Jordin Scale and appropriate interventions in the flowsheet.   Outcome: Progressing Towards Goal  Note: Pressure Injury Interventions:  Sensory Interventions: Assess changes in LOC, Keep linens dry and wrinkle-free, Minimize linen layers    Moisture Interventions: Minimize layers, Absorbent underpads, Apply protective barrier, creams and emollients    Activity Interventions: Increase time out of bed, Pressure redistribution bed/mattress(bed type)    Mobility Interventions: Pressure redistribution bed/mattress (bed type)    Nutrition Interventions: Document food/fluid/supplement intake    Friction and Shear Interventions: Minimize layers, Apply protective barrier, creams and emollients                Problem: Patient Education: Go to Patient Education Activity  Goal: Patient/Family Education  Outcome: Progressing Towards Goal     Problem: Hypertension  Goal: *Blood pressure within specified parameters  Outcome: Progressing Towards Goal  Goal: *Fluid volume balance  Outcome: Progressing Towards Goal  Goal: *Labs within defined limits  Outcome: Progressing Towards Goal     Problem: Patient Education: Go to Patient Education Activity  Goal: Patient/Family Education  Outcome: Progressing Towards Goal     Problem: Patient Education: Go to Patient Education Activity  Goal: Patient/Family Education  Outcome: Not Progressing Towards Goal     Problem: Patient Education: Go to Patient Education Activity  Goal: Patient/Family Education  Outcome: Not Progressing Towards Goal     Problem: Ostomy Care  Goal: *Patient pouching appliance will fit properly and maintain integrity at least three to five days  Description: Infection control procedures (eg, clean dressings, clean gloves, hand washing, precautions to isolate wound from contamination, sterile instruments used for wound debridement) should be implemented.   Outcome: Progressing Towards Goal  Goal: *Acceptance of change in body image  Outcome: Progressing Towards Goal     Problem: Patient Education: Go to Patient Education Activity  Goal: Patient/Family Education  Outcome: Progressing Towards Goal     Problem: Patient Education: Go to Patient Education Activity  Goal: Patient/Family Education  Outcome: Progressing Towards Goal

## 2021-05-06 NOTE — PROGRESS NOTES
Problem: Falls - Risk of  Goal: *Absence of Falls  Description: Document Michael Bourgeois Fall Risk and appropriate interventions in the flowsheet. Outcome: Progressing Towards Goal  Note: Fall Risk Interventions:  Mobility Interventions: Bed/chair exit alarm    Mentation Interventions: Door open when patient unattended    Medication Interventions: Bed/chair exit alarm    Elimination Interventions: Call light in reach              Problem: Patient Education: Go to Patient Education Activity  Goal: Patient/Family Education  Outcome: Progressing Towards Goal     Problem: Pressure Injury - Risk of  Goal: *Prevention of pressure injury  Description: Document Jordin Scale and appropriate interventions in the flowsheet.   Outcome: Progressing Towards Goal  Note: Pressure Injury Interventions:  Sensory Interventions: Assess changes in LOC    Moisture Interventions: Absorbent underpads    Activity Interventions: PT/OT evaluation    Mobility Interventions: Pressure redistribution bed/mattress (bed type), PT/OT evaluation    Nutrition Interventions: Document food/fluid/supplement intake    Friction and Shear Interventions: HOB 30 degrees or less                Problem: Patient Education: Go to Patient Education Activity  Goal: Patient/Family Education  Outcome: Progressing Towards Goal     Problem: Pain  Goal: *Control of Pain  Outcome: Progressing Towards Goal     Problem: Pain  Goal: *Control of Pain  Outcome: Progressing Towards Goal     Problem: Patient Education: Go to Patient Education Activity  Goal: Patient/Family Education  Outcome: Progressing Towards Goal     Problem: Nutrition Deficit  Goal: *Optimize nutritional status  Outcome: Progressing Towards Goal     Problem: Impaired Skin Integrity/Pressure Injury Treatment  Goal: *Improvement of Existing Pressure Injury  Outcome: Progressing Towards Goal  Goal: *Prevention of pressure injury  Description: Document Jordin Scale and appropriate interventions in the flowsheet. Outcome: Progressing Towards Goal  Note: Pressure Injury Interventions:  Sensory Interventions: Assess changes in LOC    Moisture Interventions: Absorbent underpads    Activity Interventions: PT/OT evaluation    Mobility Interventions: Pressure redistribution bed/mattress (bed type), PT/OT evaluation    Nutrition Interventions: Document food/fluid/supplement intake    Friction and Shear Interventions: HOB 30 degrees or less                Problem: Impaired Skin Integrity/Pressure Injury Treatment  Goal: *Improvement of Existing Pressure Injury  Outcome: Progressing Towards Goal  Goal: *Prevention of pressure injury  Description: Document Jordin Scale and appropriate interventions in the flowsheet.   Outcome: Progressing Towards Goal  Note: Pressure Injury Interventions:  Sensory Interventions: Assess changes in LOC    Moisture Interventions: Absorbent underpads    Activity Interventions: PT/OT evaluation    Mobility Interventions: Pressure redistribution bed/mattress (bed type), PT/OT evaluation    Nutrition Interventions: Document food/fluid/supplement intake    Friction and Shear Interventions: HOB 30 degrees or less                Problem: Patient Education: Go to Patient Education Activity  Goal: Patient/Family Education  Outcome: Progressing Towards Goal

## 2021-05-06 NOTE — ROUTINE PROCESS
Bedside shift change report given to SAFIA Quezada (oncoming nurse) by Prince Borges RN (offgoing nurse). Report included the following information SBAR, Kardex, Procedure Summary, Intake/Output, MAR and Recent Results. Opportunity for questions and clarification was provided.

## 2021-05-06 NOTE — PROGRESS NOTES
Infectious Disease progress Note        Reason:     Current abx Prior abx   Piperacillin/tazobactam since 5/1 vancomycin 5/1-5/4     Lines:       Assessment :    61year old man with h/o HTN, paraplegia admitted to [facility] on 4/29/21 for evaluation of infected sacral decubiti. Clinical presentation c/w acute on chronic sacral osteomyelitis, infected sacral decubiti    S/p surgical debridement,  robotic colostomy on 4/29/2021    Persistent leukocytosis -likely due to partially treated infection due to resistant pathogens. wound cultures 5/3-E. coli, providencia (resistant to piperacillin/tazobactam)  Intraoperative findings discussed with dr. Zeyad Barnes    Protrusion of the small bowel around the colostomy causing bowel ischemia s/p expl. laparotomy with small bowel resection, partial colectomy/revision of colostomy on 5/4/21    Now with worsening leukocytosis likely secondary to acute kidney injury    Acute kidney injury-likely multifactorial.  Discussed with nephrologist.  Concerns for antibiotic associated interstitial nephritis    Recommendations:    1. Discontinue pip/tazo. Start meropenem-adjust dose per changing renal function  2. Follow-up nephrology recommendations regarding ЕКАТЕРИНА   3. picc line for outpt iv abx once improved leukocytosis, sacral infection controlled  4. Patient will benefit from outpatient iv abx and good wound care to aid healing and prevent future infectious complications  5. Colostomy care per surgery team    Above plan was discussed in details with patient, dr. Carmen Barrientos, primary team. Please call me if any further questions or concerns. Will continue to participate in the care of this patient. HPI:      Feels better. patient denied any chest pain, shortness of breath, abdominal pain.       Current Discharge Medication List      CONTINUE these medications which have NOT CHANGED    Details   lisinopril-hydroCHLOROthiazide (PRINZIDE, ZESTORETIC) 20-12.5 mg per tablet TAKE 1 TABLET EVERY DAY  Refills: 3      ergocalciferol (ERGOCALCIFEROL) 1,250 mcg (50,000 unit) capsule       ciprofloxacin HCl (CIPRO) 250 mg tablet Take 1 Tab by mouth two (2) times a day. Qty: 30 Tab, Refills: 2      tamsulosin (FLOMAX) 0.4 mg capsule TAKE 1 CAPSULE BY MOUTH EVERY DAY  Refills: 1      naloxone (NARCAN) 2 mg/actuation spry Use 1 spray intranasally into 1 nostril. Use a new Narcan nasal spray for subsequent doses and administer into alternating nostrils. May repeat every 2 to 3 minutes as needed. Qty: 2 Actuation(s), Refills: 0      furosemide (LASIX) 20 mg tablet TAKE 1 TABLET BY MOUTH DAILY AS NEEDED FOR SWELLING  Refills: 3      MULTIVIT-MINERALS/FOLIC ACID (SPECTRAVITE ADULT PO) Take  by mouth.      escitalopram oxalate (LEXAPRO) 10 mg tablet Take 10 mg by mouth daily. acetaminophen (TYLENOL) 325 mg tablet TAKE 2 TABLETS BY MOUTH EVERY 4 HOURS AS NEEDED FOR PAIN  Refills: 2             Current Facility-Administered Medications   Medication Dose Route Frequency    meropenem (MERREM) 500 mg in sterile water (preservative free) 10 mL IV syringe  0.5 g IntraVENous Q12H    HYDROmorphone (DILAUDID) syringe 0.5 mg  0.5 mg IntraVENous Q4H PRN    naloxone (NARCAN) injection 0.4 mg  0.4 mg IntraVENous PRN    acetaminophen (TYLENOL) tablet 650 mg  650 mg Oral Q6H PRN    0.9% sodium chloride infusion  100 mL/hr IntraVENous CONTINUOUS    docusate (COLACE) 50 mg/5 mL oral liquid 100 mg  100 mg Oral DAILY    bisacodyL (DULCOLAX) tablet 5 mg  5 mg Oral DAILY PRN    escitalopram oxalate (LEXAPRO) tablet 10 mg  10 mg Oral DAILY    oxyCODONE-acetaminophen (PERCOCET) 5-325 mg per tablet 1 Tab  1 Tab Oral Q4H PRN       Allergies: Patient has no known allergies.     Temp (24hrs), Av °F (36.7 °C), Min:97.5 °F (36.4 °C), Max:98.6 °F (37 °C)    Visit Vitals  /71   Pulse 77   Temp 97.5 °F (36.4 °C)   Resp 18   Ht 6' 2\" (1.88 m)   Wt 113.4 kg (250 lb)   SpO2 100%   BMI 32.10 kg/m²       ROS: 12 point ROS obtained in details. Pertinent positives as mentioned in HPI,   otherwise negative    Physical Exam:    General: Well developed, well nourished male laying on the bed AAOx3 in no acute distress. General:   awake alert and oriented   HEENT:  Normocephalic, atraumatic, EOMI, no scleral icterus or pallor; no conjunctival hemmohage;  nasal and oral mucous are moist and without evidence of lesions. No thrush. Dentition good. Neck supple, no bruits. Lymph Nodes:   no cervical, axillary or inguinal adenopathy   Lungs:   non-labored, bilaterally clear to auscultation- no crackles wheezes rales or rhonchi   Heart:  RRR, s1 and s2; no rubs or gallops, no edema   Abdomen:  soft, non-distended, active bowel sounds, no hepatomegaly, no splenomegaly. Colostomy in place. Non-tender   Genitourinary:  deferred   Extremities:   no clubbing, cyanosis; no joint effusions or swelling; Full ROM of all large joints to the upper and lower extremities; muscle mass appropriate for age   Neurologic:  Paraplegia. Speech appropriate. Cranial nerves intact                        Skin:  Surgical changes back   Back:  wound vac recent surgical wound     Psychiatric:  No suicidal or homicidal ideations, appropriate mood and affect         Labs: Results:   Chemistry Recent Labs     05/06/21 0310 05/05/21  2143 05/05/21  0250   GLU 81 75 77    139 121*   K 4.4 5.1 4.1    108 91*   CO2 23 25 22   BUN 26* 25* 20*   CREA 3.38* 3.21* 2.45*   CA 7.8* 7.9* 8.0*   AGAP 7 6 8   BUCR 8* 8* 8*      CBC w/Diff Recent Labs     05/06/21  0310 05/05/21  0250 05/04/21  0220   WBC 21.1* 18.9* 18.7*   RBC 2.72* 2.77* 3.28*   HGB 7.7* 7.8* 9.2*   HCT 24.8* 25.3* 29.3*   * 475* 459*   GRANS 74* 89* 87*   LYMPH 14* 7* 9*   EOS 6* 1 0      Microbiology No results for input(s): CULT in the last 72 hours.        RADIOLOGY:    All available imaging studies/reports in connect care for this admission were reviewed      Disclaimer: Sections of this note are dictated utilizing voice recognition software, which may have resulted in some phonetic based errors in grammar and contents. Even though attempts were made to correct all the mistakes, some may have been missed, and remained in the body of the document. If questions arise, please contact our department.     Dr. Kathi Tan, Infectious Disease Specialist  [phone]  May 6, 2021  8:39 PM

## 2021-05-06 NOTE — CONSULTS
Consult Note    Consult requested by: Leonardo Tran MD    ADMIT DATE: 4/29/2021    CONSULT DATE: May 6, 2021           Admission diagnosis: Sacral decubitus ulcer, stage IV ([address]   Reason for Nephrology Consultation: ЕКАТЕРИНА    Assessment and plan:     #1 acute kidney injury, nonoliguric, etiology AIN secondary to Vanc/Zosyn toxicity v/s  ischemic ATN   Renal ultrasound pending  #2 hypotonic hyponatremia, likely associated with volume depletion, presently on saline  #3 stage IV sacral decubitus ulcer s/p debridement  #4 parastomal herniation, now s/p revision of colostomy  #5 hypertension but presently soft blood pressures  #6 leukocytosis and possible sepsis  #7 anemia of chronic disease  #8 hypomag replace      Plan:     #1 continue IV fluids with normal saline at 75 cc an hour  #2 discussed with ID regarding possible antibiotic change   #3 renal ultrasound to be obtained, f/u  #4 avoid tight blood pressure control, blood pressures of 804B 501M systolic acceptable  #5 avoid IV contrast, nephrotoxins, NSAIDs  #6 check renal panel this evening and in the morning   #8 dose AB for egfr < 30  #9 strict I/o , lee in place         Please call with questions     Josefa Alvarez MD FASN  Cell [phone]  Pager: [phone]    HPI:   Patient is a 70-year-old male with history of hypertension, paraplegia who was initially admitted for evaluation of infected sacral decubitus ulcer. Patient was diagnosed with acute on chronic sacral osteomyelitis, was treated with antibiotics vancomycin and Zosyn. Patient initially underwent debridement of sacral stage IV decubitus ulcer on 16 April. Subsequently was noted to have worsening decubitus ulcer with necrosis. He was admitted on 4/29 for surgical debridement. He underwent surgical debridement and then robotic colostomy on 4/29. He had worsening leukocytosis, started on bowel broad-spectrum antibiotics subsequently.   Subsequently postop patient's creatinine was 0.96 on 5/2 which has worsened to 2.45 this morning. Also patient sodium which was in the high 120s had improved to 135 on 5/4 is also down to 121 this morning.   Nephrology has been consulted for both ЕКАТЕРИНА and hyponatremia          Past Medical History:   Diagnosis Date    Asthma     Hypertension     Ill-defined condition     Neurogenic Bladder, s/p T11 injury    Paralysis ([facility] [address] 2017    waist down,  [facility]      Past Surgical History:   Procedure Laterality Date    HX OTHER SURGICAL      Eye surgery    HX OTHER SURGICAL  2017    spinal surgery    HX OTHER SURGICAL  2017    Liver repair from [facility]    HX OTHER SURGICAL  04/2021    Decubitus Debridement       Social History     Socioeconomic History    Marital status:      Spouse name: Not on file    Number of children: Not on file    Years of education: Not on file    Highest education level: Not on file   Occupational History    Not on file   Social Needs    Financial resource strain: Not on file    Food insecurity     Worry: Not on file     Inability: Not on file    Transportation needs     Medical: Not on file     Non-medical: Not on file   Tobacco Use    Smoking status: Never Smoker    Smokeless tobacco: Never Used   Substance and Sexual Activity    Alcohol use: No    Drug use: Never    Sexual activity: Not Currently     Partners: Female   Lifestyle    Physical activity     Days per week: Not on file     Minutes per session: Not on file    Stress: Not on file   Relationships    Social connections     Talks on phone: Not on file     Gets together: Not on file     Attends Alevism service: Not on file     Active member of club or organization: Not on file     Attends meetings of clubs or organizations: Not on file     Relationship status: Not on file    Intimate partner violence     Fear of current or ex partner: Not on file     Emotionally abused: Not on file     Physically abused: Not on file     Forced sexual activity: Not on file   Other Topics Concern    Not on file   Social History Narrative    Not on file       History reviewed. No pertinent family history. No Known Allergies     Home Medications:     Medications Prior to Admission   Medication Sig    lisinopril-hydroCHLOROthiazide (PRINZIDE, ZESTORETIC) 20-12.5 mg per tablet TAKE 1 TABLET EVERY DAY    ergocalciferol (ERGOCALCIFEROL) 1,250 mcg (50,000 unit) capsule     ciprofloxacin HCl (CIPRO) 250 mg tablet Take 1 Tab by mouth two (2) times a day.  tamsulosin (FLOMAX) 0.4 mg capsule TAKE 1 CAPSULE BY MOUTH EVERY DAY    naloxone (NARCAN) 2 mg/actuation spry Use 1 spray intranasally into 1 nostril. Use a new Narcan nasal spray for subsequent doses and administer into alternating nostrils. May repeat every 2 to 3 minutes as needed.  furosemide (LASIX) 20 mg tablet TAKE 1 TABLET BY MOUTH DAILY AS NEEDED FOR SWELLING    MULTIVIT-MINERALS/FOLIC ACID (SPECTRAVITE ADULT PO) Take  by mouth.  escitalopram oxalate (LEXAPRO) 10 mg tablet Take 10 mg by mouth daily.  acetaminophen (TYLENOL) 325 mg tablet TAKE 2 TABLETS BY MOUTH EVERY 4 HOURS AS NEEDED FOR PAIN       Current Inpatient Medications:     Current Facility-Administered Medications   Medication Dose Route Frequency    meropenem (MERREM) 500 mg in sterile water (preservative free) 10 mL IV syringe  0.5 g IntraVENous Q12H    HYDROmorphone (DILAUDID) syringe 0.5 mg  0.5 mg IntraVENous Q4H PRN    naloxone (NARCAN) injection 0.4 mg  0.4 mg IntraVENous PRN    acetaminophen (TYLENOL) tablet 650 mg  650 mg Oral Q6H PRN    0.9% sodium chloride infusion  100 mL/hr IntraVENous CONTINUOUS    docusate (COLACE) 50 mg/5 mL oral liquid 100 mg  100 mg Oral DAILY    bisacodyL (DULCOLAX) tablet 5 mg  5 mg Oral DAILY PRN    escitalopram oxalate (LEXAPRO) tablet 10 mg  10 mg Oral DAILY    oxyCODONE-acetaminophen (PERCOCET) 5-325 mg per tablet 1 Tab  1 Tab Oral Q4H PRN       Review of Systems:   No fever or chills. No sore throat.  No cough or hemoptysis. No shortness of breath or chest pain. No orthopnea or paroxysmal nocturnal dyspnea. Good appetite. No dysuria, no gross hematuria of voiding difficulties. No ankle swelling, no joint paints. No muscle aches. No skin changes. No dizziness or lightheadedness. No headaches. Physical Assessment:     Vitals:    05/05/21 2318 05/06/21 0316 05/06/21 0851 05/06/21 1156   BP: 117/73 123/77 117/71 133/79   Pulse: 77 73 77 73   Resp: 18 18 18 18   Temp: 98.1 °F (36.7 °C) 98.3 °F (36.8 °C) 97.5 °F (36.4 °C) 98.5 °F (36.9 °C)   SpO2: 99% 98% 100% 100%   Weight:       Height:         Last 3 Recorded Weights in this Encounter    04/27/21 1058   Weight: 113.4 kg (250 lb)     Admission weight: Weight: 113.4 kg (250 lb) (04/27/21 1058)      Intake/Output Summary (Last 24 hours) at 5/6/2021 1626  Last data filed at 5/6/2021 1546  Gross per 24 hour   Intake 850 ml   Output 1160 ml   Net -310 ml     Patient is in no apparent distress. HEENT: mmm  Neck: no cervical lymphadenopathy or thyromegaly. Lungs: good air entry, clear to auscultation bilaterally. Cardiovascular system: S1, S2, regular rate and rhythm. Abdomen: soft, colostomy + , staples midline   Extremities: no clubbing, cyanosis or edema. Neurologic: Alert, oriented time three. Data Review:    Labs: Results:       Chemistry Recent Labs     05/06/21 0310 05/05/21  2143 05/05/21  0250   GLU 81 75 77    139 121*   K 4.4 5.1 4.1    108 91*   CO2 23 25 22   BUN 26* 25* 20*   CREA 3.38* 3.21* 2.45*   CA 7.8* 7.9* 8.0*   AGAP 7 6 8   BUCR 8* 8* 8*         CBC w/Diff Recent Labs     05/06/21  0310 05/05/21  0250 05/04/21  0220   WBC 21.1* 18.9* 18.7*   RBC 2.72* 2.77* 3.28*   HGB 7.7* 7.8* 9.2*   HCT 24.8* 25.3* 29.3*   * 475* 459*   GRANS 74* 89* 87*   LYMPH 14* 7* 9*   EOS 6* 1 0         Iron/Ferritin No results for input(s): IRON in the last 72 hours.     No lab exists for component: TIBCCALC   PTH/VIT D No results for input(s): PTH in the last 72 hours.     No lab exists for component: KAMRAN Baxter MD  5/6/2021  4:26 PM      May 6, 2021

## 2021-05-06 NOTE — PROGRESS NOTES
Problem: Falls - Risk of  Goal: *Absence of Falls  Description: Document Kathy Echeverria Fall Risk and appropriate interventions in the flowsheet. 5/6/2021 1558 by Aide Siddiqui  Outcome: Progressing Towards Goal  Note: Fall Risk Interventions:  Mobility Interventions: Bed/chair exit alarm, Patient to call before getting OOB, Strengthening exercises (ROM-active/passive), Utilize walker, cane, or other assistive device    Mentation Interventions: Bed/chair exit alarm, Door open when patient unattended    Medication Interventions: Patient to call before getting OOB, Teach patient to arise slowly    Elimination Interventions: Bed/chair exit alarm, Call light in reach, Patient to call for help with toileting needs, Toileting schedule/hourly rounds           5/6/2021 1143 by Aide Siddiqui  Outcome: Progressing Towards Goal  Note: Fall Risk Interventions:  Mobility Interventions: Bed/chair exit alarm, Patient to call before getting OOB, Strengthening exercises (ROM-active/passive), Utilize walker, cane, or other assistive device    Mentation Interventions: Bed/chair exit alarm, Door open when patient unattended    Medication Interventions: Patient to call before getting OOB, Teach patient to arise slowly    Elimination Interventions: Bed/chair exit alarm, Call light in reach, Patient to call for help with toileting needs, Toileting schedule/hourly rounds              Problem: Patient Education: Go to Patient Education Activity  Goal: Patient/Family Education  5/6/2021 1558 by Aide Siddiqui  Outcome: Progressing Towards Goal  5/6/2021 1143 by Aide Siddiqui  Outcome: Progressing Towards Goal     Problem: Pressure Injury - Risk of  Goal: *Prevention of pressure injury  Description: Document Jordin Scale and appropriate interventions in the flowsheet.   5/6/2021 1558 by Aide Siddiqui  Outcome: Progressing Towards Goal  Note: Pressure Injury Interventions:  Sensory Interventions: Assess changes in LOC, Keep linens dry and wrinkle-free, Minimize linen layers    Moisture Interventions: Minimize layers, Absorbent underpads, Apply protective barrier, creams and emollients    Activity Interventions: Increase time out of bed, Pressure redistribution bed/mattress(bed type)    Mobility Interventions: Pressure redistribution bed/mattress (bed type)    Nutrition Interventions: Document food/fluid/supplement intake    Friction and Shear Interventions: Minimize layers, Apply protective barrier, creams and emollients             5/6/2021 1143 by Babita Benavidez  Outcome: Progressing Towards Goal  Note: Pressure Injury Interventions:  Sensory Interventions: Assess changes in LOC, Keep linens dry and wrinkle-free, Minimize linen layers    Moisture Interventions: Minimize layers, Absorbent underpads, Apply protective barrier, creams and emollients    Activity Interventions: Increase time out of bed, Pressure redistribution bed/mattress(bed type)    Mobility Interventions: Pressure redistribution bed/mattress (bed type)    Nutrition Interventions: Document food/fluid/supplement intake    Friction and Shear Interventions: Minimize layers, Apply protective barrier, creams and emollients                Problem: Patient Education: Go to Patient Education Activity  Goal: Patient/Family Education  5/6/2021 1558 by Babita Benavidez  Outcome: Progressing Towards Goal  5/6/2021 1143 by Babita Benavidez  Outcome: Progressing Towards Goal     Problem: Pain  Goal: *Control of Pain  5/6/2021 1558 by Babita Benavidez  Outcome: Progressing Towards Goal  5/6/2021 1143 by Babita Benavidez  Outcome: Progressing Towards Goal     Problem: Patient Education: Go to Patient Education Activity  Goal: Patient/Family Education  5/6/2021 1558 by Babita Benavidez  Outcome: Progressing Towards Goal  5/6/2021 1143 by Babita Benavidez  Outcome: Progressing Towards Goal     Problem: Nutrition Deficit  Goal: *Optimize nutritional status  5/6/2021 1558 by Shalom Ward Jennifer  Outcome: Progressing Towards Goal  5/6/2021 1143 by Sabine Kan  Outcome: Progressing Towards Goal     Problem: Impaired Skin Integrity/Pressure Injury Treatment  Goal: *Improvement of Existing Pressure Injury  5/6/2021 1558 by Sabine Kan  Outcome: Progressing Towards Goal  5/6/2021 1143 by Sabine Kan  Outcome: Progressing Towards Goal  Goal: *Prevention of pressure injury  Description: Document Jordin Scale and appropriate interventions in the flowsheet.   5/6/2021 1558 by Sabine Kan  Outcome: Progressing Towards Goal  Note: Pressure Injury Interventions:  Sensory Interventions: Assess changes in LOC, Keep linens dry and wrinkle-free, Minimize linen layers    Moisture Interventions: Minimize layers, Absorbent underpads, Apply protective barrier, creams and emollients    Activity Interventions: Increase time out of bed, Pressure redistribution bed/mattress(bed type)    Mobility Interventions: Pressure redistribution bed/mattress (bed type)    Nutrition Interventions: Document food/fluid/supplement intake    Friction and Shear Interventions: Minimize layers, Apply protective barrier, creams and emollients             5/6/2021 1143 by Sabine Kan  Outcome: Progressing Towards Goal  Note: Pressure Injury Interventions:  Sensory Interventions: Assess changes in LOC, Keep linens dry and wrinkle-free, Minimize linen layers    Moisture Interventions: Minimize layers, Absorbent underpads, Apply protective barrier, creams and emollients    Activity Interventions: Increase time out of bed, Pressure redistribution bed/mattress(bed type)    Mobility Interventions: Pressure redistribution bed/mattress (bed type)    Nutrition Interventions: Document food/fluid/supplement intake    Friction and Shear Interventions: Minimize layers, Apply protective barrier, creams and emollients                Problem: Patient Education: Go to Patient Education Activity  Goal: Patient/Family Education  5/6/2021 1558 by Duarte Rout  Outcome: Progressing Towards Goal  5/6/2021 1143 by Duarte Rout  Outcome: Progressing Towards Goal     Problem: Hypertension  Goal: *Blood pressure within specified parameters  5/6/2021 1558 by Duarte Rout  Outcome: Progressing Towards Goal  5/6/2021 1143 by Duarte Rout  Outcome: Progressing Towards Goal  Goal: *Fluid volume balance  5/6/2021 1558 by Duarte Rout  Outcome: Progressing Towards Goal  5/6/2021 1143 by Duarte Rout  Outcome: Progressing Towards Goal  Goal: *Labs within defined limits  5/6/2021 1558 by Duarte Rout  Outcome: Progressing Towards Goal  5/6/2021 1143 by Duarte Rout  Outcome: Progressing Towards Goal     Problem: Patient Education: Go to Patient Education Activity  Goal: Patient/Family Education  5/6/2021 1558 by Duarte Rout  Outcome: Progressing Towards Goal  5/6/2021 1143 by Duarte Rout  Outcome: Progressing Towards Goal     Problem: Patient Education: Go to Patient Education Activity  Goal: Patient/Family Education  5/6/2021 1558 by Duarte Rout  Outcome: Progressing Towards Goal  5/6/2021 1143 by Duarte Rout  Outcome: Not Progressing Towards Goal     Problem: Patient Education: Go to Patient Education Activity  Goal: Patient/Family Education  5/6/2021 1558 by Duarte Rout  Outcome: Progressing Towards Goal  5/6/2021 1143 by Duarte Rout  Outcome: Not Progressing Towards Goal     Problem: Ostomy Care  Goal: *Patient pouching appliance will fit properly and maintain integrity at least three to five days  Description: Infection control procedures (eg, clean dressings, clean gloves, hand washing, precautions to isolate wound from contamination, sterile instruments used for wound debridement) should be implemented.   5/6/2021 1558 by Duarte Rout  Outcome: Progressing Towards Goal  5/6/2021 1143 by Duarte Rout  Outcome: Progressing Towards Goal  Goal: *Acceptance of change in body image  5/6/2021 1558 by Marah Estrada  Outcome: Progressing Towards Goal  5/6/2021 1143 by Marah Estrada  Outcome: Progressing Towards Goal     Problem: Patient Education: Go to Patient Education Activity  Goal: Patient/Family Education  5/6/2021 1558 by Marah Estrada  Outcome: Progressing Towards Goal  5/6/2021 1143 by Marah Estrada  Outcome: Progressing Towards Goal     Problem: Patient Education: Go to Patient Education Activity  Goal: Patient/Family Education  5/6/2021 1558 by Marah Estrada  Outcome: Progressing Towards Goal  5/6/2021 1143 by Marah Estrada  Outcome: Progressing Towards Goal

## 2021-05-06 NOTE — PROGRESS NOTES
Patient seen and examined. He stated that he is doing relatively well. He looks depressed. He is tolerating clear liquid diet and he stated he has no nausea. He said that his abdominal pain is minimal.  His abdomen is soft and nontender and his midline wound is healing well. His ostomy is not functioning yet but viable. His WBC has increased and his creatinine is still high but seems to be stabilized. Plan:  Appreciate medicine management  Follow-up ID and nephrology recommendation  Consider placing a Chavez catheter instead of a condom cath for accurate intake output  Continue only on the clear liquid diet for now till return of bowel function and colostomy is functioning  Wound care for his sacral ulcer.   Follow-up the wound care team recommendation  Ostomy care  We will follow
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
## 2021-05-06 NOTE — PROGRESS NOTES
Consult noted for ЕКАТЕРИНА , hyponatremia , chart reviewed,  Orders placed, discussed with Dr Silvia Diaz  A/P as below:    Patient is a 70-year-old male with history of hypertension, paraplegia who was initially admitted for evaluation of infected sacral decubitus ulcer. Patient was diagnosed with acute on chronic sacral osteomyelitis, was treated with antibiotics vancomycin and Zosyn. Patient initially underwent debridement of sacral stage IV decubitus ulcer on 16 April. Subsequently was noted to have worsening decubitus ulcer with necrosis. He was admitted on 4/29 for surgical debridement. He underwent surgical debridement and then robotic colostomy on 4/29. He had worsening leukocytosis, started on bowel broad-spectrum antibiotics subsequently. Subsequently postop patient's creatinine was 0.96 on 5/2 which has worsened to 2.45 this morning. Also patient sodium which was in the high 120s had improved to 135 on 5/4 is also down to 121 this morning. Nephrology has been consulted for both ЕКАТЕРИНА and hyponatremia    Assessment and plan:    #1 acute kidney injury, nonoliguric, etiology prerenal azotemia versus ischemic ATN in the setting of sepsis/postop ATN. Patient has been on antibiotics so acute interstitial nephritis also in the differential.  Postinfectious GN also a possibility but less likely. Urine analysis with no hematuria, some proteinuria.   Renal ultrasound pending  #2 hypotonic hyponatremia, likely associated with volume depletion, presently on saline  #3 stage IV sacral decubitus ulcer s/p debridement  #4 parastomal herniation, now s/p revision of colostomy  #5 hypertension but presently soft blood pressures  #6 leukocytosis and possible sepsis  #7 anemia of chronic disease  #8 hypomag     Plan:    #1 continue IV fluids with normal saline at 100 cc an hour  #2 urine studies including urine analysis has been obtained, urine sodium and urine eosinophils ordered, urine osm as well  #3 renal ultrasound to be obtained  #4 avoid tight blood pressure control, blood pressures of 439R 118R systolic acceptable  #5 check Vanco levels  #6 avoid IV contrast, nephrotoxins, NSAIDs  #7 check renal panel stat this evening and in the morning  #8 dose AB for egfr < 30  #9 replace mag       Please call with questions    Soni Fletcher MD FASN  Cell [phone]  Pager: [phone]

## 2021-05-06 NOTE — ROUTINE PROCESS
Bedside and Verbal shift change report given to Radha Rodriguez (oncoming nurse) by Daniel Murry RN (offgoing nurse). Report included the following information SBAR, Kardex, Intake/Output, MAR and Recent Results.

## 2021-05-07 LAB
ALBUMIN SERPL-MCNC: 1.7 G/DL (ref 3.4–5)
ANION GAP SERPL CALC-SCNC: 6 MMOL/L (ref 3–18)
BACTERIA SPEC CULT: NORMAL
BACTERIA SPEC CULT: NORMAL
BASOPHILS # BLD: 0.1 K/UL (ref 0–0.1)
BASOPHILS NFR BLD: 0 % (ref 0–2)
BUN SERPL-MCNC: 28 MG/DL (ref 7–18)
BUN/CREAT SERPL: 7 (ref 12–20)
CALCIUM SERPL-MCNC: 7.5 MG/DL (ref 8.5–10.1)
CHLORIDE SERPL-SCNC: 109 MMOL/L (ref 100–111)
CO2 SERPL-SCNC: 23 MMOL/L (ref 21–32)
CREAT SERPL-MCNC: 3.97 MG/DL (ref 0.6–1.3)
DIFFERENTIAL METHOD BLD: ABNORMAL
EOSINOPHIL # BLD: 0.4 K/UL (ref 0–0.4)
EOSINOPHIL NFR BLD: 3 % (ref 0–5)
ERYTHROCYTE [DISTWIDTH] IN BLOOD BY AUTOMATED COUNT: 14.2 % (ref 11.6–14.5)
GLUCOSE SERPL-MCNC: 88 MG/DL (ref 74–99)
HCT VFR BLD AUTO: 25.4 % (ref 36–48)
HGB BLD-MCNC: 7.7 G/DL (ref 13–16)
LYMPHOCYTES # BLD: 1.1 K/UL (ref 0.9–3.6)
LYMPHOCYTES NFR BLD: 7 % (ref 21–52)
MCH RBC QN AUTO: 28.2 PG (ref 24–34)
MCHC RBC AUTO-ENTMCNC: 30.3 G/DL (ref 31–37)
MCV RBC AUTO: 93 FL (ref 74–97)
MONOCYTES # BLD: 0.8 K/UL (ref 0.05–1.2)
MONOCYTES NFR BLD: 5 % (ref 3–10)
NEUTS SEG # BLD: 14.2 K/UL (ref 1.8–8)
NEUTS SEG NFR BLD: 85 % (ref 40–73)
OSMOLALITY UR: 240 MOSMOL/KG
PHOSPHATE SERPL-MCNC: 4.6 MG/DL (ref 2.5–4.9)
PLATELET # BLD AUTO: 511 K/UL (ref 135–420)
PMV BLD AUTO: 8.8 FL (ref 9.2–11.8)
POTASSIUM SERPL-SCNC: 4.8 MMOL/L (ref 3.5–5.5)
RBC # BLD AUTO: 2.73 M/UL (ref 4.35–5.65)
SERVICE CMNT-IMP: NORMAL
SERVICE CMNT-IMP: NORMAL
SODIUM SERPL-SCNC: 138 MMOL/L (ref 136–145)
WBC # BLD AUTO: 16.7 K/UL (ref 4.6–13.2)

## 2021-05-07 PROCEDURE — 99232 SBSQ HOSP IP/OBS MODERATE 35: CPT | Performed by: EMERGENCY MEDICINE

## 2021-05-07 PROCEDURE — 74011250636 HC RX REV CODE- 250/636: Performed by: INTERNAL MEDICINE

## 2021-05-07 PROCEDURE — 74011000250 HC RX REV CODE- 250: Performed by: INTERNAL MEDICINE

## 2021-05-07 PROCEDURE — 2709999900 HC NON-CHARGEABLE SUPPLY

## 2021-05-07 PROCEDURE — 65270000029 HC RM PRIVATE

## 2021-05-07 PROCEDURE — 85025 COMPLETE CBC W/AUTO DIFF WBC: CPT

## 2021-05-07 PROCEDURE — 36415 COLL VENOUS BLD VENIPUNCTURE: CPT

## 2021-05-07 PROCEDURE — 77030019934 HC DRSG VAC ASST KCON -B

## 2021-05-07 PROCEDURE — 80069 RENAL FUNCTION PANEL: CPT

## 2021-05-07 RX ADMIN — SODIUM CHLORIDE 75 ML/HR: 900 INJECTION, SOLUTION INTRAVENOUS at 00:05

## 2021-05-07 RX ADMIN — MEROPENEM 500 MG: 500 INJECTION INTRAVENOUS at 12:12

## 2021-05-07 RX ADMIN — MEROPENEM 500 MG: 500 INJECTION INTRAVENOUS at 00:03

## 2021-05-07 RX ADMIN — SODIUM CHLORIDE 75 ML/HR: 900 INJECTION, SOLUTION INTRAVENOUS at 18:00

## 2021-05-07 NOTE — PROGRESS NOTES
Infectious Disease progress Note        Reason: infected decubiti ulcer    Current abx Prior abx   Piperacillin/tazobactam since 5/1 vancomycin 5/1-5/4     Lines:       Assessment :    61year old man with h/o HTN, paraplegia admitted to [facility] on 4/29/21 for evaluation of infected sacral decubiti. Clinical presentation c/w acute on chronic sacral osteomyelitis, infected sacral decubiti    S/p surgical debridement,  robotic colostomy on 4/29/2021    Persistent leukocytosis -likely due to partially treated infection due to resistant pathogens. wound cultures 5/3-E. coli, providencia (resistant to piperacillin/tazobactam)  Antibiotics switched to meropenem on 5/6/2021. Improved leukocytosis    Protrusion of the small bowel around the colostomy causing bowel ischemia s/p expl. laparotomy with small bowel resection, partial colectomy/revision of colostomy on 5/4/21    Now with worsening leukocytosis likely secondary to acute kidney injury    Acute kidney injury-likely multifactorial.  Discussed with nephrologist.  Concerns for antibiotic associated interstitial nephritis    Recommendations:    1. Continue meropenem-adjust dose per changing renal function  2. Follow-up nephrology recommendations regarding ЕКАТЕРИНА   3. picc line for outpt iv abx once improved leukocytosis, sacral infection controlled  4. Patient will benefit from outpatient iv abx and good wound care to aid healing and prevent future infectious complications  5. Colostomy care per surgery team    Above plan was discussed in details with patient, dr. Jens Corrales, primary team. Please call me if any further questions or concerns. Will continue to participate in the care of this patient. HPI:      Feels better. patient denied any chest pain, shortness of breath, abdominal pain.       Current Discharge Medication List      CONTINUE these medications which have NOT CHANGED    Details   lisinopril-hydroCHLOROthiazide (PRINZIDE, ZESTORETIC) 20-12.5 mg per tablet TAKE 1 TABLET EVERY DAY  Refills: 3      ergocalciferol (ERGOCALCIFEROL) 1,250 mcg (50,000 unit) capsule       ciprofloxacin HCl (CIPRO) 250 mg tablet Take 1 Tab by mouth two (2) times a day. Qty: 30 Tab, Refills: 2      tamsulosin (FLOMAX) 0.4 mg capsule TAKE 1 CAPSULE BY MOUTH EVERY DAY  Refills: 1      naloxone (NARCAN) 2 mg/actuation spry Use 1 spray intranasally into 1 nostril. Use a new Narcan nasal spray for subsequent doses and administer into alternating nostrils. May repeat every 2 to 3 minutes as needed. Qty: 2 Actuation(s), Refills: 0      furosemide (LASIX) 20 mg tablet TAKE 1 TABLET BY MOUTH DAILY AS NEEDED FOR SWELLING  Refills: 3      MULTIVIT-MINERALS/FOLIC ACID (SPECTRAVITE ADULT PO) Take  by mouth.      escitalopram oxalate (LEXAPRO) 10 mg tablet Take 10 mg by mouth daily. acetaminophen (TYLENOL) 325 mg tablet TAKE 2 TABLETS BY MOUTH EVERY 4 HOURS AS NEEDED FOR PAIN  Refills: 2             Current Facility-Administered Medications   Medication Dose Route Frequency    meropenem (MERREM) 500 mg in sterile water (preservative free) 10 mL IV syringe  0.5 g IntraVENous Q12H    HYDROmorphone (DILAUDID) syringe 0.5 mg  0.5 mg IntraVENous Q4H PRN    naloxone (NARCAN) injection 0.4 mg  0.4 mg IntraVENous PRN    acetaminophen (TYLENOL) tablet 650 mg  650 mg Oral Q6H PRN    0.9% sodium chloride infusion  75 mL/hr IntraVENous CONTINUOUS    docusate (COLACE) 50 mg/5 mL oral liquid 100 mg  100 mg Oral DAILY    bisacodyL (DULCOLAX) tablet 5 mg  5 mg Oral DAILY PRN    escitalopram oxalate (LEXAPRO) tablet 10 mg  10 mg Oral DAILY    oxyCODONE-acetaminophen (PERCOCET) 5-325 mg per tablet 1 Tab  1 Tab Oral Q4H PRN       Allergies: Patient has no known allergies.     Temp (24hrs), Av.9 °F (36.6 °C), Min:96.7 °F (35.9 °C), Max:98.5 °F (36.9 °C)    Visit Vitals  BP (!) 151/83   Pulse 76   Temp 98.3 °F (36.8 °C)   Resp 16   Ht 6' 2\" (1.88 m)   Wt 113.4 kg (250 lb)   SpO2 100%   BMI 32.10 kg/m² ROS: 12 point ROS obtained in details. Pertinent positives as mentioned in HPI,   otherwise negative    Physical Exam:    General: Well developed, well nourished male laying on the bed AAOx3 in no acute distress. General:   awake alert and oriented   HEENT:  Normocephalic, atraumatic, EOMI, no scleral icterus or pallor; no conjunctival hemmohage;  nasal and oral mucous are moist and without evidence of lesions. No thrush. Dentition good. Neck supple, no bruits. Lymph Nodes:   no cervical, axillary or inguinal adenopathy   Lungs:   non-labored, bilaterally clear to auscultation- no crackles wheezes rales or rhonchi   Heart:  RRR, s1 and s2; no rubs or gallops, no edema   Abdomen:  soft, non-distended, active bowel sounds, no hepatomegaly, no splenomegaly. Colostomy in place. Non-tender   Genitourinary:  deferred   Extremities:   no clubbing, cyanosis; no joint effusions or swelling; Full ROM of all large joints to the upper and lower extremities; muscle mass appropriate for age   Neurologic:  Paraplegia. Speech appropriate.  Cranial nerves intact                        Skin:  Surgical changes back   Back:  wound vac recent surgical wound     Psychiatric:  No suicidal or homicidal ideations, appropriate mood and affect         Labs: Results:   Chemistry Recent Labs     05/06/21  0310 05/05/21  2143 05/05/21  0250   GLU 81 75 77    139 121*   K 4.4 5.1 4.1    108 91*   CO2 23 25 22   BUN 26* 25* 20*   CREA 3.38* 3.21* 2.45*   CA 7.8* 7.9* 8.0*   AGAP 7 6 8   BUCR 8* 8* 8*      CBC w/Diff Recent Labs     05/06/21  0310 05/05/21  0250   WBC 21.1* 18.9*   RBC 2.72* 2.77*   HGB 7.7* 7.8*   HCT 24.8* 25.3*   * 475*   GRANS 74* 89*   LYMPH 14* 7*   EOS 6* 1      Microbiology Recent Labs     05/05/21  1814   CULT No growth (<1,000 CFU/ML)          RADIOLOGY:    All available imaging studies/reports in [facility] care for this admission were reviewed      Disclaimer: Sections of this note are dictated utilizing voice recognition software, which may have resulted in some phonetic based errors in grammar and contents. Even though attempts were made to correct all the mistakes, some may have been missed, and remained in the body of the document. If questions arise, please contact our department.     Dr. Jerman Allen, Infectious Disease Specialist  [phone]  May 7, 2021  8:39 PM
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
## 2021-05-07 NOTE — PROGRESS NOTES
Per Con Gonzalez with [facility], pt can go to [address] [place] [address] on Monday. Pt needs LTC as he normally lives with his mother and has personal care 7hrs a day but she is not able to care for him. Pt has wound vac, PICC and needs course of IV abx. Pt told today that he will need to relinquish his disability check to go to the facility; not happy about that but acknowledges that his mother is not able to properly care for him at home.   Bright Bernal RN - Outcomes Manager  [phone]

## 2021-05-07 NOTE — PROGRESS NOTES
Problem: Falls - Risk of  Goal: *Absence of Falls  Description: Document Gordo Major Fall Risk and appropriate interventions in the flowsheet. Outcome: Progressing Towards Goal  Note: Fall Risk Interventions:  Mobility Interventions: Bed/chair exit alarm    Mentation Interventions: Bed/chair exit alarm    Medication Interventions: Bed/chair exit alarm    Elimination Interventions: Bed/chair exit alarm, Call light in reach              Problem: Patient Education: Go to Patient Education Activity  Goal: Patient/Family Education  Outcome: Progressing Towards Goal     Problem: Pressure Injury - Risk of  Goal: *Prevention of pressure injury  Description: Document Jordin Scale and appropriate interventions in the flowsheet.   Outcome: Progressing Towards Goal  Note: Pressure Injury Interventions:  Sensory Interventions: Float heels, Keep linens dry and wrinkle-free    Moisture Interventions: Check for incontinence Q2 hours and as needed, Internal/External fecal devices, Internal/External urinary devices    Activity Interventions: Increase time out of bed, Pressure redistribution bed/mattress(bed type)    Mobility Interventions: HOB 30 degrees or less, Pressure redistribution bed/mattress (bed type)    Nutrition Interventions: Document food/fluid/supplement intake, Offer support with meals,snacks and hydration    Friction and Shear Interventions: Foam dressings/transparent film/skin sealants, HOB 30 degrees or less                Problem: Patient Education: Go to Patient Education Activity  Goal: Patient/Family Education  Outcome: Progressing Towards Goal     Problem: Pain  Goal: *Control of Pain  Outcome: Progressing Towards Goal     Problem: Patient Education: Go to Patient Education Activity  Goal: Patient/Family Education  Outcome: Progressing Towards Goal     Problem: Nutrition Deficit  Goal: *Optimize nutritional status  Outcome: Progressing Towards Goal     Problem: Impaired Skin Integrity/Pressure Injury Treatment  Goal: *Improvement of Existing Pressure Injury  Outcome: Progressing Towards Goal  Goal: *Prevention of pressure injury  Description: Document Jordin Scale and appropriate interventions in the flowsheet. Outcome: Progressing Towards Goal  Note: Pressure Injury Interventions:  Sensory Interventions: Float heels, Keep linens dry and wrinkle-free    Moisture Interventions: Check for incontinence Q2 hours and as needed, Internal/External fecal devices, Internal/External urinary devices    Activity Interventions: Increase time out of bed, Pressure redistribution bed/mattress(bed type)    Mobility Interventions: HOB 30 degrees or less, Pressure redistribution bed/mattress (bed type)    Nutrition Interventions: Document food/fluid/supplement intake, Offer support with meals,snacks and hydration    Friction and Shear Interventions: Foam dressings/transparent film/skin sealants, HOB 30 degrees or less                Problem: Patient Education: Go to Patient Education Activity  Goal: Patient/Family Education  Outcome: Progressing Towards Goal     Problem: Hypertension  Goal: *Blood pressure within specified parameters  Outcome: Progressing Towards Goal  Goal: *Fluid volume balance  Outcome: Progressing Towards Goal  Goal: *Labs within defined limits  Outcome: Progressing Towards Goal     Problem: Patient Education: Go to Patient Education Activity  Goal: Patient/Family Education  Outcome: Progressing Towards Goal     Problem: Patient Education: Go to Patient Education Activity  Goal: Patient/Family Education  Outcome: Progressing Towards Goal     Problem: Ostomy Care  Goal: *Patient pouching appliance will fit properly and maintain integrity at least three to five days  Description: Infection control procedures (eg, clean dressings, clean gloves, hand washing, precautions to isolate wound from contamination, sterile instruments used for wound debridement) should be implemented.   Outcome: Progressing Towards Goal  Goal: *Acceptance of change in body image  Outcome: Progressing Towards Goal     Problem: Patient Education: Go to Patient Education Activity  Goal: Patient/Family Education  Outcome: Progressing Towards Goal     Problem: Patient Education: Go to Patient Education Activity  Goal: Patient/Family Education  Outcome: Progressing Towards Goal     Problem: Patient Education: Go to Patient Education Activity  Goal: Patient/Family Education  Outcome: Progressing Towards Goal

## 2021-05-07 NOTE — PROGRESS NOTES
Patient seen and examined. He is doing relatively well. He stated that he is tolerating clear liquid diet but he is not sure if his ostomy has been functioning. He denies any significant abdominal pain. His vitals shows no fever or tachycardia. He does not have labs back from today. His abdomen is soft and nontender and non distended. His colostomy is viable but no gas or stool yet. Plan:  I appreciate medicine taking over and helping with the management  Follow-up ID recommendation because his leukocytosis is still worsening but there is no labs from today back yet so hopefully it is trending down  Keep on clear liquid diet for now because he still have a picture of ileus especially given his surgery and infection. However it safe to continue on the clear liquid diet because he is tolerating it well and his abdomen is soft and non distended  Social work help for placement  Wound care for his sacral decubitus ulcer  Ostomy care.

## 2021-05-07 NOTE — PROGRESS NOTES
Nutrition Assessment     Type and Reason for Visit: Reassess, Consult, Wound    Nutrition Recommendations/Plan:   - Monitor GI symptoms and readiness for diet advancement  - Change nutritional supplements to Ensure Clear, BID & Gelatein once daily   - IVF per MD    Nutrition Assessment:  Fair intake until NPO 5/3 now s/p ex lap, small bowel resection partial colectomy and colostomy 5/3 for small bowel ischemia due to para colostomy evisceration. Clear liquid diet started 5/5 tolerating well with fair intake but MD with concern for ileus, no gas or stool from ostomy yet. Malnutrition Assessment:  Malnutrition Status: At risk for malnutrition (specify)(pressure injury)     Estimated Daily Nutrient Needs:  Energy (kcal):  2010-2412  Protein (g):  136-170       Fluid (ml/day):  2010-2412    Nutrition Related Findings:  Colostomy. NS at 75 mL/hr. Denies nausea or emesis      Current Nutrition Therapies:  DIET NUTRITIONAL SUPPLEMENTS Breakfast, Lunch, Dinner, All Meals;  Ensure Enlive  DIET CLEAR LIQUID    Anthropometric Measures:  · Height:  6' 2\" (188 cm)  · Current Body Wt:  113.4 kg (250 lb)  · BMI: 32.1    Nutrition Diagnosis:   · Inadequate energy intake related to altered GI function, increased demand for energy/nutrients(wounds) as evidenced by NPO or clear liquid status due to medical condition, intake 26-50%    Nutrition Intervention:  Food and/or Nutrient Delivery: Continue current diet, Modify oral nutrition supplement, IV fluid delivery  Nutrition Education and Counseling: Education not indicated  Coordination of Nutrition Care: Continue to monitor while inpatient    Goals:  PO nutrition intake will meet >75% of patient estimated nutritional needs within the next 7 days       Nutrition Monitoring and Evaluation:   Behavioral-Environmental Outcomes: None identified  Food/Nutrient Intake Outcomes: Diet advancement/tolerance, Food and nutrient intake, Supplement intake, IVF intake  Physical Signs/Symptoms Outcomes: Biochemical data, GI status, Meal time behavior, Nutrition focused physical findings    Discharge Planning:     Too soon to determine     Electronically signed by Ayaan Paredes RD on 5/7/2021 at 3:40 PM    Contact Number: [phone]

## 2021-05-07 NOTE — PROGRESS NOTES
In Patient Progress note      Admit Date: 4/29/2021    Impression:     #1 acute kidney injury, nonoliguric, etiology AIN secondary to Vanc/Zosyn toxicity v/s  ischemic ATN   Renal ultrasound with no renal US   #2 hypotonic hyponatremia, likely associated with volume depletion, presently on saline  #3 stage IV sacral decubitus ulcer s/p debridement  #4 parastomal herniation, now s/p revision of colostomy  #5 hypertension but presently soft blood pressures  #6 leukocytosis and possible sepsis  #7 anemia of chronic disease  #8 hypomag replace   #9 Complex fluid collection above the bladder on renal US , consider urology consult      Plan:  #1 continue IV fluids with normal saline at 75 cc an hour  #2 appreciate ID recs   #3 renal ultrasound to be obtained, f/u  #4 avoid tight blood pressure control, blood pressures of 909P 223L systolic acceptable  #5 avoid IV contrast, nephrotoxins, NSAIDs  #6 check renal panel this evening and in the morning   #8 dose AB for egfr < 30  #9 strict I/o , lee in place      Please call with questions,     Vincent Gregory MD FASN  Cell [phone]  Pager: [phone]     Subjective:     - No acute over night events. - respiratory - stable  - hemodynamics - stable, no pressrs  - UOP-ok  - Nutrition -poor    Objective:     Visit Vitals  /86 (BP 1 Location: Left upper arm, BP Patient Position: At rest)   Pulse 80   Temp 98.7 °F (37.1 °C)   Resp 16   Ht 6' 2\" (1.88 m)   Wt 113.4 kg (250 lb)   SpO2 100%   BMI 32.10 kg/m²         Intake/Output Summary (Last 24 hours) at 5/7/2021 1550  Last data filed at 5/7/2021 1207  Gross per 24 hour   Intake 390 ml   Output 1075 ml   Net -685 ml       Physical Exam:           Patient is in no apparent distress. HEENT: mmm  Neck: no cervical lymphadenopathy or thyromegaly. Lungs: good air entry, clear to auscultation bilaterally. Cardiovascular system: S1, S2, regular rate and rhythm.    Abdomen: soft, colostomy + , staples midline Extremities: no clubbing, cyanosis or edema. Neurologic: Alert, oriented time three.         Data Review:    Recent Labs     05/07/21  1045   WBC 16.7*   RBC 2.73*   HCT 25.4*   MCV 93.0   MCH 28.2   MCHC 30.3*   RDW 14.2     Recent Labs     05/07/21  1045 05/06/21  0310 05/05/21  2143 05/05/21  0250   BUN 28* 26* 25* 20*   CREA 3.97* 3.38* 3.21* 2.45*   CA 7.5* 7.8* 7.9* 8.0*   ALB 1.7*  --   --   --    K 4.8 4.4 5.1 4.1    137 139 121*    107 108 91*   CO2 23 23 25 22   PHOS 4.6  --   --   --    GLU 88 81 75 77       Rylan Pope MD

## 2021-05-07 NOTE — PROGRESS NOTES
Problem: Pain  Goal: *Control of Pain  Outcome: Progressing Towards Goal     Problem: Patient Education: Go to Patient Education Activity  Goal: Patient/Family Education  Outcome: Progressing Towards Goal     Problem: Impaired Skin Integrity/Pressure Injury Treatment  Goal: *Improvement of Existing Pressure Injury  Outcome: Progressing Towards Goal  Goal: *Prevention of pressure injury  Description: Document Jordin Scale and appropriate interventions in the flowsheet.   Outcome: Progressing Towards Goal  Note: Pressure Injury Interventions:  Sensory Interventions: Float heels, Keep linens dry and wrinkle-free    Moisture Interventions: Check for incontinence Q2 hours and as needed, Internal/External fecal devices, Internal/External urinary devices    Activity Interventions: Increase time out of bed, Pressure redistribution bed/mattress(bed type)    Mobility Interventions: HOB 30 degrees or less, Pressure redistribution bed/mattress (bed type)    Nutrition Interventions: Document food/fluid/supplement intake, Offer support with meals,snacks and hydration    Friction and Shear Interventions: Foam dressings/transparent film/skin sealants, HOB 30 degrees or less

## 2021-05-07 NOTE — PROGRESS NOTES
Shift Summary Note:  Assumed care of patient in bed asleep but arouseable. No complaints offered. VSS, Uneventful night Wound vac intact. Uneventful night, call bell within reach.   Patient Vitals for the past 12 hrs:   Temp Pulse Resp BP SpO2   05/07/21 0402 (!) 96.7 °F (35.9 °C) 86 16 138/79 100 %   05/06/21 1948 97.5 °F (36.4 °C) 78 16 (!) 142/83 100 %

## 2021-05-08 LAB
ANION GAP SERPL CALC-SCNC: 6 MMOL/L (ref 3–18)
BASOPHILS # BLD: 0.1 K/UL (ref 0–0.1)
BASOPHILS NFR BLD: 0 % (ref 0–2)
BUN SERPL-MCNC: 30 MG/DL (ref 7–18)
BUN/CREAT SERPL: 7 (ref 12–20)
CALCIUM SERPL-MCNC: 7.7 MG/DL (ref 8.5–10.1)
CHLORIDE SERPL-SCNC: 110 MMOL/L (ref 100–111)
CO2 SERPL-SCNC: 22 MMOL/L (ref 21–32)
CREAT SERPL-MCNC: 4.08 MG/DL (ref 0.6–1.3)
DIFFERENTIAL METHOD BLD: ABNORMAL
EOSINOPHIL # BLD: 0.4 K/UL (ref 0–0.4)
EOSINOPHIL NFR BLD: 3 % (ref 0–5)
ERYTHROCYTE [DISTWIDTH] IN BLOOD BY AUTOMATED COUNT: 14.4 % (ref 11.6–14.5)
GLUCOSE SERPL-MCNC: 94 MG/DL (ref 74–99)
HCT VFR BLD AUTO: 28.3 % (ref 36–48)
HGB BLD-MCNC: 8.5 G/DL (ref 13–16)
LYMPHOCYTES # BLD: 1.4 K/UL (ref 0.9–3.6)
LYMPHOCYTES NFR BLD: 10 % (ref 21–52)
MAGNESIUM SERPL-MCNC: 2 MG/DL (ref 1.6–2.6)
MCH RBC QN AUTO: 28.2 PG (ref 24–34)
MCHC RBC AUTO-ENTMCNC: 30 G/DL (ref 31–37)
MCV RBC AUTO: 94 FL (ref 74–97)
MONOCYTES # BLD: 0.8 K/UL (ref 0.05–1.2)
MONOCYTES NFR BLD: 6 % (ref 3–10)
NEUTS SEG # BLD: 11 K/UL (ref 1.8–8)
NEUTS SEG NFR BLD: 79 % (ref 40–73)
PLATELET # BLD AUTO: 430 K/UL (ref 135–420)
PMV BLD AUTO: 9.1 FL (ref 9.2–11.8)
POTASSIUM SERPL-SCNC: 4.9 MMOL/L (ref 3.5–5.5)
RBC # BLD AUTO: 3.01 M/UL (ref 4.35–5.65)
SODIUM SERPL-SCNC: 138 MMOL/L (ref 136–145)
WBC # BLD AUTO: 13.9 K/UL (ref 4.6–13.2)

## 2021-05-08 PROCEDURE — 80048 BASIC METABOLIC PNL TOTAL CA: CPT

## 2021-05-08 PROCEDURE — 65270000029 HC RM PRIVATE

## 2021-05-08 PROCEDURE — 74011250637 HC RX REV CODE- 250/637: Performed by: SURGERY

## 2021-05-08 PROCEDURE — 74011250636 HC RX REV CODE- 250/636: Performed by: INTERNAL MEDICINE

## 2021-05-08 PROCEDURE — 99232 SBSQ HOSP IP/OBS MODERATE 35: CPT | Performed by: HOSPITALIST

## 2021-05-08 PROCEDURE — 74011000250 HC RX REV CODE- 250: Performed by: INTERNAL MEDICINE

## 2021-05-08 PROCEDURE — 36415 COLL VENOUS BLD VENIPUNCTURE: CPT

## 2021-05-08 PROCEDURE — 85025 COMPLETE CBC W/AUTO DIFF WBC: CPT

## 2021-05-08 PROCEDURE — 74011250636 HC RX REV CODE- 250/636: Performed by: HOSPITALIST

## 2021-05-08 PROCEDURE — 2709999900 HC NON-CHARGEABLE SUPPLY

## 2021-05-08 PROCEDURE — 83735 ASSAY OF MAGNESIUM: CPT

## 2021-05-08 PROCEDURE — 74011250636 HC RX REV CODE- 250/636: Performed by: SPECIALIST

## 2021-05-08 RX ORDER — HEPARIN SODIUM 5000 [USP'U]/ML
5000 INJECTION, SOLUTION INTRAVENOUS; SUBCUTANEOUS EVERY 8 HOURS
Status: DISCONTINUED | OUTPATIENT
Start: 2021-05-08 | End: 2021-05-22 | Stop reason: HOSPADM

## 2021-05-08 RX ORDER — ONDANSETRON 2 MG/ML
4 INJECTION INTRAMUSCULAR; INTRAVENOUS
Status: DISCONTINUED | OUTPATIENT
Start: 2021-05-08 | End: 2021-05-22 | Stop reason: HOSPADM

## 2021-05-08 RX ADMIN — SODIUM CHLORIDE 50 ML/HR: 900 INJECTION, SOLUTION INTRAVENOUS at 08:46

## 2021-05-08 RX ADMIN — ESCITALOPRAM OXALATE 10 MG: 10 TABLET ORAL at 08:47

## 2021-05-08 RX ADMIN — MEROPENEM 500 MG: 500 INJECTION INTRAVENOUS at 12:23

## 2021-05-08 RX ADMIN — HEPARIN SODIUM 5000 UNITS: 5000 INJECTION INTRAVENOUS; SUBCUTANEOUS at 23:08

## 2021-05-08 RX ADMIN — ONDANSETRON 4 MG: 2 INJECTION INTRAMUSCULAR; INTRAVENOUS at 18:56

## 2021-05-08 RX ADMIN — HEPARIN SODIUM 5000 UNITS: 5000 INJECTION INTRAVENOUS; SUBCUTANEOUS at 13:09

## 2021-05-08 RX ADMIN — MEROPENEM 500 MG: 500 INJECTION INTRAVENOUS at 00:26

## 2021-05-08 NOTE — ROUTINE PROCESS
End of Shift Note     Bedside and verbal shift change report given to Negrito Mcgee (On coming nurse) by Ivett Borges RN (Off going nurse).   Report included the following information:      --Procedure Summary     --MAR,     --Recent Results     --Med Rec Status
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
## 2021-05-08 NOTE — PROGRESS NOTES
Shift Summary Note:  Assumed care of patient in bed asleep but arouseable. VSS, no complaints. Drank some fluids with encouragement. Uneventful night, call bell within reach.   Patient Vitals for the past 12 hrs:   Temp Pulse Resp BP SpO2   05/08/21 0346 97.7 °F (36.5 °C) 81 16 (!) 149/87 100 %   05/07/21 2009 97.3 °F (36.3 °C) 74 16 120/73 100 %

## 2021-05-08 NOTE — PROGRESS NOTES
Patient seen and examined. He is doing relatively well. He is tolerating the clear liquid diet but his ostomy is still not functioning except for some minimal fluids in the bag. Though he denies any nausea or vomiting. He denies any significant abdominal pain. His vitals are normal with no fever or tachycardia. He is hypertensive. His WBC is improving significantly and it is currently at 14,000. However his creatinine is still getting worse and it is currently 4. His abdomen is soft and nontender and nondistended and his midline wound is healing well and his ostomy is viable but not functioning yet    Plan:  I appreciate the medicine taking over and managing the patient  Follow-up nephrology recommendation because his kidney function is worsening  Follow-up ID recommendations.   It seems his the WBC is finally improving  Keep on clear liquid diet for now till there is a return of bowel function  We will follow

## 2021-05-08 NOTE — PROGRESS NOTES
Acknowledged duplicate PT orders. Patient previously evaluated and discharged 5/3/2021 d/t patient at baseline mobility. Received assist for mobility from caregivers at baseline. Please refer to PT evaluation for discharge recommendations and plan of care.

## 2021-05-08 NOTE — PROGRESS NOTES
RENAL PROGRESS NOTE        Noel Yang         Assessment  - ЕКАТЕРИНА , ATN / AIN 2/2 to Abx Nephrotoxicity   - Resolved Hyponatremia   - Infected Sacral dec ulcer   - Chronic anemia     Plan   - Kidney function is stabilizing with improved UO , will continue to monitor for recovery , off vancomycin / Zosyn on meropenem and hemodynamically stable   - Decrease IVF to 50 ml / h  - Follow labs     ·                                                                                                                                Subjective:  Patient doing ok , he denies any CP / SOB   UO 1.1 L       Patient Active Problem List   Diagnosis Code    S/P colostomy ([facility] [address] Z93.3    Paraplegia ([facility] [address] G82.20    Sacral decubitus ulcer, stage IV ([facility] [address] L89.154    HTN (hypertension) I10       Current Facility-Administered Medications   Medication Dose Route Frequency Provider Last Rate Last Admin    meropenem (MERREM) 500 mg in sterile water (preservative free) 10 mL IV syringe  0.5 g IntraVENous Q12H Anmol TROY MD   500 mg at 05/08/21 0026    HYDROmorphone (DILAUDID) syringe 0.5 mg  0.5 mg IntraVENous Q4H PRN Jessi Chavez MD        naloxone (NARCAN) injection 0.4 mg  0.4 mg IntraVENous PRN Jessi Chavez MD        acetaminophen (TYLENOL) tablet 650 mg  650 mg Oral Q6H PRN Adia Rodriguez MD        0.9% sodium chloride infusion  75 mL/hr IntraVENous CONTINUOUS Bichu, Refugio TALBOT MD 75 mL/hr at 05/07/21 1800 75 mL/hr at 05/07/21 1800    docusate (COLACE) 50 mg/5 mL oral liquid 100 mg  100 mg Oral DAILY Adia Rodriguez MD   100 mg at 05/04/21 1014    bisacodyL (DULCOLAX) tablet 5 mg  5 mg Oral DAILY PRN Adia Rodriguez MD        escitalopram oxalate (LEXAPRO) tablet 10 mg  10 mg Oral DAILY Adia Rodriguez MD   10 mg at 05/06/21 0908    oxyCODONE-acetaminophen (PERCOCET) 5-325 mg per tablet 1 Tab  1 Tab Oral Q4H PRN Adia Rodriguez MD   1 Tab at 05/04/21 0331       Objective  Vitals:    05/07/21 1207 05/07/21 1654 05/07/21 2009 05/08/21 0346   BP: 132/86 (!) 150/85 120/73 (!) 149/87   Pulse: 80 75 74 81   Resp: 16 16 16 16   Temp: 98.7 °F (37.1 °C) 97.9 °F (36.6 °C) 97.3 °F (36.3 °C) 97.7 °F (36.5 °C)   SpO2: 100% 100% 100% 100%   Weight:       Height:             Intake/Output Summary (Last 24 hours) at 5/8/2021 0757  Last data filed at 5/8/2021 0617  Gross per 24 hour   Intake 2860 ml   Output 1425 ml   Net 1435 ml           Admission weight: Weight: 113.4 kg (250 lb) (04/27/21 1058)  Last Weight Metrics:  Weight Loss Metrics 4/29/2021 4/27/2021 4/16/2021 4/12/2021 1/7/2021 6/17/2019 3/23/2019   Today's Wt - 250 lb 250 lb - 240 lb 240 lb 209 lb   BMI 32.1 kg/m2 - 32.1 kg/m2 32.55 kg/m2 32.55 kg/m2 32.55 kg/m2 28.35 kg/m2             Physical Assessment:     General: NAD, alert and oriented. Neck: No jvd. LUNGS: Clear to Auscultation, No rales, rhonchi or wheezes. CVS EXM: S1, S2  RRR, no murmurs/gallops/rubs. Abdomen: soft, non tender. Lower Extremities:  no edema.        Lab    CBC w/Diff Recent Labs     05/08/21 0441 05/07/21  1045 05/06/21  0310   WBC 13.9* 16.7* 21.1*   RBC 3.01* 2.73* 2.72*   HGB 8.5* 7.7* 7.7*   HCT 28.3* 25.4* 24.8*   * 511* 468*   GRANS 79* 85* 74*   LYMPH 10* 7* 14*   EOS 3 3 6*        Chemistry Recent Labs     05/08/21 0441 05/07/21  1045 05/06/21  0310   GLU 94 88 81    138 137   K 4.9 4.8 4.4    109 107   CO2 22 23 23   BUN 30* 28* 26*   CREA 4.08* 3.97* 3.38*   CA 7.7* 7.5* 7.8*   AGAP 6 6 7   BUCR 7* 7* 8*   ALB  --  1.7*  --    PHOS  --  4.6  --          No results found for: IRON, FE, TIBC, IBCT, PSAT, FERR   Lab Results   Component Value Date/Time    Calcium 7.7 (L) 05/08/2021 04:41 AM    Phosphorus 4.6 05/07/2021 10:45 AM          Maame Loredo MD  5/8/2021  7:57 AM
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
## 2021-05-09 LAB
ANION GAP SERPL CALC-SCNC: 6 MMOL/L (ref 3–18)
BASOPHILS # BLD: 0.1 K/UL (ref 0–0.1)
BASOPHILS NFR BLD: 0 % (ref 0–2)
BUN SERPL-MCNC: 29 MG/DL (ref 7–18)
BUN/CREAT SERPL: 8 (ref 12–20)
CALCIUM SERPL-MCNC: 7.2 MG/DL (ref 8.5–10.1)
CHLORIDE SERPL-SCNC: 113 MMOL/L (ref 100–111)
CO2 SERPL-SCNC: 22 MMOL/L (ref 21–32)
CREAT SERPL-MCNC: 3.82 MG/DL (ref 0.6–1.3)
DIFFERENTIAL METHOD BLD: ABNORMAL
EOSINOPHIL # BLD: 0.3 K/UL (ref 0–0.4)
EOSINOPHIL NFR BLD: 2 % (ref 0–5)
ERYTHROCYTE [DISTWIDTH] IN BLOOD BY AUTOMATED COUNT: 14.2 % (ref 11.6–14.5)
GLUCOSE SERPL-MCNC: 110 MG/DL (ref 74–99)
HCT VFR BLD AUTO: 24.1 % (ref 36–48)
HGB BLD-MCNC: 7.5 G/DL (ref 13–16)
LYMPHOCYTES # BLD: 1.4 K/UL (ref 0.9–3.6)
LYMPHOCYTES NFR BLD: 11 % (ref 21–52)
MAGNESIUM SERPL-MCNC: 1.5 MG/DL (ref 1.6–2.6)
MCH RBC QN AUTO: 28.1 PG (ref 24–34)
MCHC RBC AUTO-ENTMCNC: 31.1 G/DL (ref 31–37)
MCV RBC AUTO: 90.3 FL (ref 74–97)
MONOCYTES # BLD: 0.8 K/UL (ref 0.05–1.2)
MONOCYTES NFR BLD: 6 % (ref 3–10)
NEUTS SEG # BLD: 10.4 K/UL (ref 1.8–8)
NEUTS SEG NFR BLD: 80 % (ref 40–73)
PHOSPHATE SERPL-MCNC: 4 MG/DL (ref 2.5–4.9)
PLATELET # BLD AUTO: 472 K/UL (ref 135–420)
PMV BLD AUTO: 8.8 FL (ref 9.2–11.8)
POTASSIUM SERPL-SCNC: 4.4 MMOL/L (ref 3.5–5.5)
RBC # BLD AUTO: 2.67 M/UL (ref 4.35–5.65)
SODIUM SERPL-SCNC: 141 MMOL/L (ref 136–145)
WBC # BLD AUTO: 13.1 K/UL (ref 4.6–13.2)

## 2021-05-09 PROCEDURE — 85025 COMPLETE CBC W/AUTO DIFF WBC: CPT

## 2021-05-09 PROCEDURE — 80048 BASIC METABOLIC PNL TOTAL CA: CPT

## 2021-05-09 PROCEDURE — 74011250636 HC RX REV CODE- 250/636: Performed by: SPECIALIST

## 2021-05-09 PROCEDURE — 83735 ASSAY OF MAGNESIUM: CPT

## 2021-05-09 PROCEDURE — 99232 SBSQ HOSP IP/OBS MODERATE 35: CPT | Performed by: HOSPITALIST

## 2021-05-09 PROCEDURE — 84100 ASSAY OF PHOSPHORUS: CPT

## 2021-05-09 PROCEDURE — 36415 COLL VENOUS BLD VENIPUNCTURE: CPT

## 2021-05-09 PROCEDURE — 74011250636 HC RX REV CODE- 250/636: Performed by: HOSPITALIST

## 2021-05-09 PROCEDURE — 74011250637 HC RX REV CODE- 250/637: Performed by: SURGERY

## 2021-05-09 PROCEDURE — 65270000029 HC RM PRIVATE

## 2021-05-09 PROCEDURE — 74011000250 HC RX REV CODE- 250: Performed by: INTERNAL MEDICINE

## 2021-05-09 PROCEDURE — 74011250637 HC RX REV CODE- 250/637: Performed by: SPECIALIST

## 2021-05-09 PROCEDURE — 2709999900 HC NON-CHARGEABLE SUPPLY

## 2021-05-09 PROCEDURE — 74011250636 HC RX REV CODE- 250/636: Performed by: INTERNAL MEDICINE

## 2021-05-09 RX ORDER — AMLODIPINE BESYLATE 5 MG/1
5 TABLET ORAL DAILY
Status: DISCONTINUED | OUTPATIENT
Start: 2021-05-09 | End: 2021-05-11

## 2021-05-09 RX ORDER — MAGNESIUM SULFATE 1 G/100ML
1 INJECTION INTRAVENOUS ONCE
Status: COMPLETED | OUTPATIENT
Start: 2021-05-09 | End: 2021-05-09

## 2021-05-09 RX ORDER — HYDRALAZINE HYDROCHLORIDE 20 MG/ML
10 INJECTION INTRAMUSCULAR; INTRAVENOUS
Status: DISCONTINUED | OUTPATIENT
Start: 2021-05-09 | End: 2021-05-22 | Stop reason: HOSPADM

## 2021-05-09 RX ADMIN — MEROPENEM 500 MG: 500 INJECTION INTRAVENOUS at 12:08

## 2021-05-09 RX ADMIN — HEPARIN SODIUM 5000 UNITS: 5000 INJECTION INTRAVENOUS; SUBCUTANEOUS at 21:24

## 2021-05-09 RX ADMIN — ESCITALOPRAM OXALATE 10 MG: 10 TABLET ORAL at 08:44

## 2021-05-09 RX ADMIN — AMLODIPINE BESYLATE 5 MG: 5 TABLET ORAL at 11:03

## 2021-05-09 RX ADMIN — MEROPENEM 500 MG: 500 INJECTION INTRAVENOUS at 23:41

## 2021-05-09 RX ADMIN — MAGNESIUM SULFATE HEPTAHYDRATE 1 G: 1 INJECTION, SOLUTION INTRAVENOUS at 11:04

## 2021-05-09 RX ADMIN — HEPARIN SODIUM 5000 UNITS: 5000 INJECTION INTRAVENOUS; SUBCUTANEOUS at 05:36

## 2021-05-09 RX ADMIN — MEROPENEM 500 MG: 500 INJECTION INTRAVENOUS at 00:59

## 2021-05-09 RX ADMIN — HEPARIN SODIUM 5000 UNITS: 5000 INJECTION INTRAVENOUS; SUBCUTANEOUS at 13:10

## 2021-05-09 RX ADMIN — SODIUM CHLORIDE 50 ML/HR: 900 INJECTION, SOLUTION INTRAVENOUS at 06:14

## 2021-05-09 RX ADMIN — HYDRALAZINE HYDROCHLORIDE 10 MG: 20 INJECTION, SOLUTION INTRAMUSCULAR; INTRAVENOUS at 12:08

## 2021-05-09 NOTE — PROGRESS NOTES
Shift Progress Note:  Assumed care of patient in bed asleep but arouseable. Encouraged to take fluids, IV fluids continue. No complaints offered. VSS, call bell within reach.   Patient Vitals for the past 8 hrs:   Temp Pulse Resp BP SpO2   05/09/21 0533 98.2 °F (36.8 °C) 84 16 (!) 158/87 98 %   05/08/21 2357 97.3 °F (36.3 °C) 84 16 (!) 157/88 99 %

## 2021-05-09 NOTE — ROUTINE PROCESS
Bedside and Verbal shift change report given to SAFIA Trujillo (oncoming nurse) by Harinder Medina (offgoing nurse). Report included the following information SBAR, Kardex, Procedure Summary, Intake/Output, MAR and Recent Results.

## 2021-05-09 NOTE — ROUTINE PROCESS
End of Shift Note     Bedside and verbal shift change report given to Akila Mcqueen RN (On coming nurse) by Yasmin Gamino RN (Off going nurse).   Report included the following information:      --Procedure Summary     --MAR,     --Recent Results     --Med Rec Status    SBAR Recommendations: Turn     Issues for Provider to address placement          Activity This Shift     [x] Bed Rest Order   [] Refused   [] Dangled    [] TDWB         Ambulating:     [] Bathroom     [] BSC     [] Room/Hallway      Up in Chair for meals    []Yes [] No   Voiding       [] Yes  [] No  Chavez          [x] Yes  [] No  Incontinent [] Yes  [] No    DUE TO VOID POUR        [] Yes [] No  Purewick    [] Yes [] No  New Onset [] Yes [] No Straight Cath   []Yes  [] No  Condom Cath  [] Yes [] No  MD Called      [] Yes  [] No   Blood Sugars Managed []Yes [x] No    Bowels Moved [x] Yes [] No    Incontinent     [] Yes [] No Passed Gas [x]Yes [] No    New Onset  []Yes [] No        MD Called []Yes  [] No     CHG Bath Done     Before Surgery     After Surgery      [] Yes  [x] No  [] Yes  [x] No       Drain Removed [] Yes  [] No [x] N/A    Dressing Changed [] Yes   [x] No [] N/A      Nausea/Vomiting [] Yes   [x] No     Ice Packs Changed [] Yes   [x] No  [] N/A    Incentive Spirometer  [x] Yes  [] No      SCD Pumps On     Ankle Pumping  [x] Yes   [] No      [] Yes   [x] No        Telemetry Monitoring [] Yes   [x] No   Rhythm
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
## 2021-05-09 NOTE — PROGRESS NOTES
Patient seen and examined. He is actually feeling much better today. I was called last night stating that the patient had vomited and I advised if he has nausea or vomiting again to place an NG tube. However the patient told me that he did not vomit a significant amount and it was like phlegm and only happened 1 time. He stated that he is tolerating his clear liquid diet and actually his ostomy is functioning now. The nurse did confirm that he had passed about 200 cc of stool in his ostomy bag. On exam his vitals are normal with no fever or tachycardia and his abdomen is soft and nondistended and nontender. His ostomy is functioning now and it has stool in the bag. Plan:  Appreciate medicine management  Follow-up ID and nephrology recommendation  I was going to advance his diet to full liquid diet however because of his one episode of vomiting I will keep him on the clear liquid diet.   If he does well during the day I believe his diet can be advanced  Ostomy care  Wound care for his sacral decubitus ulcer

## 2021-05-09 NOTE — ROUTINE PROCESS
Bedside and Verbal shift change report given to Ashly Newby (oncoming nurse) by Marlene House nurse). Report included the following information SBAR, Kardex and MAR.

## 2021-05-09 NOTE — PROGRESS NOTES
RENAL PROGRESS NOTE        [facility]         Assessment  - ЕКАТЕРИНА , ATN / AIN 2/2 to Abx Nephrotoxicity   - Resolved Hyponatremia   - Infected Sacral dec ulcer   - Chronic anemia   - HTN     Plan   - Kidney function is little better today  with improved UO , will continue to monitor for recovery , off vancomycin / Zosyn on meropenem and hemodynamically stable   - IVF  50 ml / h  - Start Norvasc for BP control   - Replace Lytes as needed   - Surgery following , no surgical intervention for fluid collection above the bladder   - Follow labs     ·                                                                                                                                Subjective:  Patient doing ok , he denies any CP / SOB   UO 1.3 L       Patient Active Problem List   Diagnosis Code    S/P colostomy ([facility] [address] Z93.3    Paraplegia ([facility] [address] G82.20    Sacral decubitus ulcer, stage IV ([facility] [address] L89.154    HTN (hypertension) I10       Current Facility-Administered Medications   Medication Dose Route Frequency Provider Last Rate Last Admin    amLODIPine (NORVASC) tablet 5 mg  5 mg Oral DAILY Melodie Russell MD        heparin (porcine) injection 5,000 Units  5,000 Units SubCUTAneous Q8H Blanca Vargas MD   5,000 Units at 05/09/21 0536    ondansetron (ZOFRAN) injection 4 mg  4 mg IntraVENous Q6H PRN Blanca Vargas MD   4 mg at 05/08/21 1856    meropenem (MERREM) 500 mg in sterile water (preservative free) 10 mL IV syringe  0.5 g IntraVENous Q12H Alberto TROY MD   500 mg at 05/09/21 0059    HYDROmorphone (DILAUDID) syringe 0.5 mg  0.5 mg IntraVENous Q4H PRN Monique Jimenes MD        naloxone (NARCAN) injection 0.4 mg  0.4 mg IntraVENous PRN Monique Jimenes MD        acetaminophen (TYLENOL) tablet 650 mg  650 mg Oral Q6H PRN Bennett Silvestre MD        0.9% sodium chloride infusion  50 mL/hr IntraVENous CONTINUOUS Melodie Russell MD 50 mL/hr at 05/09/21 0614 50 mL/hr at 05/09/21 0323    docusate (COLACE) 50 mg/5 mL oral liquid 100 mg  100 mg Oral DAILY Hanh Santos MD   100 mg at 05/04/21 1014    bisacodyL (DULCOLAX) tablet 5 mg  5 mg Oral DAILY PRN Hanh Santos MD        escitalopram oxalate (LEXAPRO) tablet 10 mg  10 mg Oral DAILY Hanh Santos MD   10 mg at 05/08/21 0847    oxyCODONE-acetaminophen (PERCOCET) 5-325 mg per tablet 1 Tab  1 Tab Oral Q4H PRN Hanh Santos MD   1 Tab at 05/04/21 0331       Objective  Vitals:    05/08/21 2039 05/08/21 2357 05/09/21 0533 05/09/21 0800   BP: (!) 169/91 (!) 157/88 (!) 158/87 (!) 181/86   Pulse: 84 84 84 86   Resp: 16 16 16 16   Temp: 98.7 °F (37.1 °C) 97.3 °F (36.3 °C) 98.2 °F (36.8 °C) 98.4 °F (36.9 °C)   SpO2:  99% 98% 98%   Weight:       Height:             Intake/Output Summary (Last 24 hours) at 5/9/2021 0823  Last data filed at 5/9/2021 0539  Gross per 24 hour   Intake 1306.17 ml   Output 1455 ml   Net -148.83 ml           Admission weight: Weight: 113.4 kg (250 lb) (04/27/21 1058)  Last Weight Metrics:  Weight Loss Metrics 4/29/2021 4/27/2021 4/16/2021 4/12/2021 1/7/2021 6/17/2019 3/23/2019   Today's Wt - 250 lb 250 lb - 240 lb 240 lb 209 lb   BMI 32.1 kg/m2 - 32.1 kg/m2 32.55 kg/m2 32.55 kg/m2 32.55 kg/m2 28.35 kg/m2             Physical Assessment:     General: NAD, alert and oriented. Neck: No jvd. LUNGS: Clear to Auscultation, No rales, rhonchi or wheezes. CVS EXM: S1, S2  RRR, no murmurs/gallops/rubs. Abdomen: soft, non tender. Lower Extremities:  no edema.        Lab    CBC w/Diff Recent Labs     05/09/21  0506 05/08/21  0441 05/07/21  1045   WBC 13.1 13.9* 16.7*   RBC 2.67* 3.01* 2.73*   HGB 7.5* 8.5* 7.7*   HCT 24.1* 28.3* 25.4*   * 430* 511*   GRANS 80* 79* 85*   LYMPH 11* 10* 7*   EOS 2 3 3        Chemistry Recent Labs     05/09/21  0506 05/08/21  0441 05/07/21  1045   * 94 88    138 138   K 4.4 4.9 4.8   * 110 109   CO2 22 22 23   BUN 29* 30* 28*   CREA 3.82* 4.08* 3.97* CA 7.2* 7.7* 7.5*   AGAP 6 6 6   BUCR 8* 7* 7*   ALB  --   --  1.7*   PHOS 4.0  --  4.6         No results found for: IRON, FE, TIBC, IBCT, PSAT, FERR   Lab Results   Component Value Date/Time    Calcium 7.2 (L) 05/09/2021 05:06 AM    Phosphorus 4.0 05/09/2021 05:06 AM          Winter Villarreal MD  5/9/2021  7:57 AM

## 2021-05-09 NOTE — PROGRESS NOTES
Spoke with  via phone and made him aware (as instructed by Faustino Cao) pt experiencing n/v. Received order to place NGT and keep pt NPO if continues to have n/v.

## 2021-05-10 LAB
ANION GAP SERPL CALC-SCNC: 6 MMOL/L (ref 3–18)
BASOPHILS # BLD: 0 K/UL (ref 0–0.1)
BASOPHILS NFR BLD: 0 % (ref 0–2)
BUN SERPL-MCNC: 28 MG/DL (ref 7–18)
BUN/CREAT SERPL: 8 (ref 12–20)
CALCIUM SERPL-MCNC: 7.5 MG/DL (ref 8.5–10.1)
CHLORIDE SERPL-SCNC: 114 MMOL/L (ref 100–111)
CO2 SERPL-SCNC: 21 MMOL/L (ref 21–32)
CREAT SERPL-MCNC: 3.55 MG/DL (ref 0.6–1.3)
DIFFERENTIAL METHOD BLD: ABNORMAL
EOSINOPHIL # BLD: 0.3 K/UL (ref 0–0.4)
EOSINOPHIL NFR BLD: 2 % (ref 0–5)
ERYTHROCYTE [DISTWIDTH] IN BLOOD BY AUTOMATED COUNT: 14 % (ref 11.6–14.5)
GLUCOSE SERPL-MCNC: 90 MG/DL (ref 74–99)
HCT VFR BLD AUTO: 24.9 % (ref 36–48)
HCT VFR BLD AUTO: 28.8 % (ref 36–48)
HGB BLD-MCNC: 7.8 G/DL (ref 13–16)
HGB BLD-MCNC: 8.7 G/DL (ref 13–16)
LYMPHOCYTES # BLD: 1.6 K/UL (ref 0.9–3.6)
LYMPHOCYTES NFR BLD: 11 % (ref 21–52)
MAGNESIUM SERPL-MCNC: 1.7 MG/DL (ref 1.6–2.6)
MCH RBC QN AUTO: 27.9 PG (ref 24–34)
MCHC RBC AUTO-ENTMCNC: 31.3 G/DL (ref 31–37)
MCV RBC AUTO: 88.9 FL (ref 74–97)
MONOCYTES # BLD: 0.7 K/UL (ref 0.05–1.2)
MONOCYTES NFR BLD: 5 % (ref 3–10)
NEUTS SEG # BLD: 10.9 K/UL (ref 1.8–8)
NEUTS SEG NFR BLD: 80 % (ref 40–73)
PHOSPHATE SERPL-MCNC: 3.8 MG/DL (ref 2.5–4.9)
PLATELET # BLD AUTO: 491 K/UL (ref 135–420)
PMV BLD AUTO: 8.8 FL (ref 9.2–11.8)
POTASSIUM SERPL-SCNC: 4.1 MMOL/L (ref 3.5–5.5)
RBC # BLD AUTO: 2.8 M/UL (ref 4.35–5.65)
SODIUM SERPL-SCNC: 141 MMOL/L (ref 136–145)
WBC # BLD AUTO: 13.6 K/UL (ref 4.6–13.2)

## 2021-05-10 PROCEDURE — 99232 SBSQ HOSP IP/OBS MODERATE 35: CPT | Performed by: HOSPITALIST

## 2021-05-10 PROCEDURE — 80048 BASIC METABOLIC PNL TOTAL CA: CPT

## 2021-05-10 PROCEDURE — 36415 COLL VENOUS BLD VENIPUNCTURE: CPT

## 2021-05-10 PROCEDURE — 74011000250 HC RX REV CODE- 250: Performed by: INTERNAL MEDICINE

## 2021-05-10 PROCEDURE — 74011250636 HC RX REV CODE- 250/636: Performed by: HOSPITALIST

## 2021-05-10 PROCEDURE — 83735 ASSAY OF MAGNESIUM: CPT

## 2021-05-10 PROCEDURE — 77030025414 HC DRSG VAC ASST SMPLC KCON -B

## 2021-05-10 PROCEDURE — 74011250637 HC RX REV CODE- 250/637: Performed by: SPECIALIST

## 2021-05-10 PROCEDURE — 85025 COMPLETE CBC W/AUTO DIFF WBC: CPT

## 2021-05-10 PROCEDURE — 74011250637 HC RX REV CODE- 250/637: Performed by: SURGERY

## 2021-05-10 PROCEDURE — 65270000029 HC RM PRIVATE

## 2021-05-10 PROCEDURE — 85018 HEMOGLOBIN: CPT

## 2021-05-10 PROCEDURE — 2709999900 HC NON-CHARGEABLE SUPPLY

## 2021-05-10 PROCEDURE — 74011250636 HC RX REV CODE- 250/636: Performed by: INTERNAL MEDICINE

## 2021-05-10 PROCEDURE — 84100 ASSAY OF PHOSPHORUS: CPT

## 2021-05-10 RX ADMIN — ESCITALOPRAM OXALATE 10 MG: 10 TABLET ORAL at 08:54

## 2021-05-10 RX ADMIN — HEPARIN SODIUM 5000 UNITS: 5000 INJECTION INTRAVENOUS; SUBCUTANEOUS at 23:58

## 2021-05-10 RX ADMIN — MEROPENEM 500 MG: 500 INJECTION INTRAVENOUS at 23:56

## 2021-05-10 RX ADMIN — AMLODIPINE BESYLATE 5 MG: 5 TABLET ORAL at 08:54

## 2021-05-10 RX ADMIN — MEROPENEM 500 MG: 500 INJECTION INTRAVENOUS at 13:03

## 2021-05-10 RX ADMIN — HEPARIN SODIUM 5000 UNITS: 5000 INJECTION INTRAVENOUS; SUBCUTANEOUS at 05:35

## 2021-05-10 NOTE — PROGRESS NOTES
Discharge planning    Met with patient and  Karlos Saleh, mother, concerning discharge plan for LTC. Mrs. Vandana Brito stated \" No one talked to me about him going to a facility as long term care. \"  This writer explained that the patient spoke with Alicia Meyers [address] [place] [address] [place]. Patient stated ' I don't remember talking to anybody. \"  Writer explained to both of them that patient will now need IV ABX, along with wound vac change and picc line care. Mrs. Vandana Brito stated \" I will let you know tomorrow after I speak with his daughter whether or not want him to come home with home health or long term care. \"     TRINA YapN, RN  Pager # [phone]  Care Manager

## 2021-05-10 NOTE — PROGRESS NOTES
Problem: Falls - Risk of  Goal: *Absence of Falls  Description: Document Gloria Zimmerman Fall Risk and appropriate interventions in the flowsheet. 5/10/2021 1947 by Aureliano Palacio  Outcome: Progressing Towards Goal  Note: Fall Risk Interventions:  Mobility Interventions: Communicate number of staff needed for ambulation/transfer, OT consult for ADLs, PT Consult for mobility concerns, PT Consult for assist device competence, Strengthening exercises (ROM-active/passive), Utilize walker, cane, or other assistive device    Mentation Interventions: Adequate sleep, hydration, pain control, More frequent rounding, Reorient patient, Update white board    Medication Interventions: Teach patient to arise slowly    Elimination Interventions: Call light in reach, Patient to call for help with toileting needs           5/10/2021 1842 by Aureliano Palacio  Outcome: Progressing Towards Goal  Note: Fall Risk Interventions:  Mobility Interventions: Communicate number of staff needed for ambulation/transfer, OT consult for ADLs, PT Consult for mobility concerns, PT Consult for assist device competence, Strengthening exercises (ROM-active/passive), Utilize walker, cane, or other assistive device    Mentation Interventions: Adequate sleep, hydration, pain control, More frequent rounding, Reorient patient, Update white board    Medication Interventions: Teach patient to arise slowly    Elimination Interventions: Call light in reach, Patient to call for help with toileting needs              Problem: Patient Education: Go to Patient Education Activity  Goal: Patient/Family Education  5/10/2021 1947 by Aureliano Palacio  Outcome: Progressing Towards Goal  5/10/2021 1842 by Aureliano Palacio  Outcome: Progressing Towards Goal     Problem: Pressure Injury - Risk of  Goal: *Prevention of pressure injury  Description: Document Jordin Scale and appropriate interventions in the flowsheet.   5/10/2021 1947 by Aureliano Palacio  Outcome: Progressing Towards Goal  Note: Pressure Injury Interventions:  Sensory Interventions: Assess changes in LOC, Discuss PT/OT consult with provider, Keep linens dry and wrinkle-free, Minimize linen layers, Maintain/enhance activity level, Monitor skin under medical devices, Pressure redistribution bed/mattress (bed type), Turn and reposition approx. every two hours (pillows and wedges if needed)    Moisture Interventions: Absorbent underpads, Apply protective barrier, creams and emollients, Contain wound drainage, Internal/External urinary devices, Internal/External fecal devices, Minimize layers    Activity Interventions: Increase time out of bed, Pressure redistribution bed/mattress(bed type), PT/OT evaluation    Mobility Interventions: HOB 30 degrees or less, Pressure redistribution bed/mattress (bed type), PT/OT evaluation, Turn and reposition approx. every two hours(pillow and wedges), Float heels    Nutrition Interventions: Document food/fluid/supplement intake, Discuss nutritional consult with provider, Offer support with meals,snacks and hydration    Friction and Shear Interventions: HOB 30 degrees or less, Feet elevated on foot rest, Lift team/patient mobility team, Minimize layers             5/10/2021 1842 by Amber Braun  Outcome: Progressing Towards Goal  Note: Pressure Injury Interventions:  Sensory Interventions: Assess changes in LOC, Discuss PT/OT consult with provider, Keep linens dry and wrinkle-free, Minimize linen layers, Maintain/enhance activity level, Monitor skin under medical devices, Pressure redistribution bed/mattress (bed type), Turn and reposition approx.  every two hours (pillows and wedges if needed)    Moisture Interventions: Absorbent underpads, Apply protective barrier, creams and emollients, Contain wound drainage, Internal/External urinary devices, Internal/External fecal devices, Minimize layers    Activity Interventions: Increase time out of bed, Pressure redistribution bed/mattress(bed type), PT/OT evaluation    Mobility Interventions: HOB 30 degrees or less, Pressure redistribution bed/mattress (bed type), PT/OT evaluation, Turn and reposition approx. every two hours(pillow and wedges), Float heels    Nutrition Interventions: Document food/fluid/supplement intake, Discuss nutritional consult with provider, Offer support with meals,snacks and hydration    Friction and Shear Interventions: HOB 30 degrees or less, Feet elevated on foot rest, Lift team/patient mobility team, Minimize layers                Problem: Patient Education: Go to Patient Education Activity  Goal: Patient/Family Education  5/10/2021 1947 by Jesusita Longoria  Outcome: Progressing Towards Goal  5/10/2021 1842 by Jesusita Longoria  Outcome: Progressing Towards Goal     Problem: Nutrition Deficit  Goal: *Optimize nutritional status  Outcome: Progressing Towards Goal     Problem: Impaired Skin Integrity/Pressure Injury Treatment  Goal: *Improvement of Existing Pressure Injury  Outcome: Progressing Towards Goal     Problem: Patient Education: Go to Patient Education Activity  Goal: Patient/Family Education  Outcome: Progressing Towards Goal     Problem: Hypertension  Goal: *Blood pressure within specified parameters  Outcome: Progressing Towards Goal  Goal: *Fluid volume balance  Outcome: Progressing Towards Goal  Goal: *Labs within defined limits  Outcome: Progressing Towards Goal     Problem: Patient Education: Go to Patient Education Activity  Goal: Patient/Family Education  Outcome: Progressing Towards Goal     Problem: Ostomy Care  Goal: *Patient pouching appliance will fit properly and maintain integrity at least three to five days  Description: Infection control procedures (eg, clean dressings, clean gloves, hand washing, precautions to isolate wound from contamination, sterile instruments used for wound debridement) should be implemented.   Outcome: Progressing Towards Goal  Goal: *Acceptance of change in body image  Outcome: Progressing Towards Goal     Problem: Patient Education: Go to Patient Education Activity  Goal: Patient/Family Education  Outcome: Progressing Towards Goal     Problem: Impaired Skin Integrity/Pressure Injury Treatment  Goal: *Prevention of pressure injury  Description: Document Jordin Scale and appropriate interventions in the flowsheet. Outcome: Not Progressing Towards Goal  Note: Pressure Injury Interventions:  Sensory Interventions: Assess changes in LOC, Discuss PT/OT consult with provider, Keep linens dry and wrinkle-free, Minimize linen layers, Maintain/enhance activity level, Monitor skin under medical devices, Pressure redistribution bed/mattress (bed type), Turn and reposition approx. every two hours (pillows and wedges if needed)    Moisture Interventions: Absorbent underpads, Apply protective barrier, creams and emollients, Contain wound drainage, Internal/External urinary devices, Internal/External fecal devices, Minimize layers    Activity Interventions: Increase time out of bed, Pressure redistribution bed/mattress(bed type), PT/OT evaluation    Mobility Interventions: HOB 30 degrees or less, Pressure redistribution bed/mattress (bed type), PT/OT evaluation, Turn and reposition approx.  every two hours(pillow and wedges), Float heels    Nutrition Interventions: Document food/fluid/supplement intake, Discuss nutritional consult with provider, Offer support with meals,snacks and hydration    Friction and Shear Interventions: HOB 30 degrees or less, Feet elevated on foot rest, Lift team/patient mobility team, Minimize layers                Problem: Pain  Goal: *Control of Pain  Outcome: Resolved/Met     Problem: Patient Education: Go to Patient Education Activity  Goal: Patient/Family Education  Outcome: Resolved/Met

## 2021-05-10 NOTE — PROGRESS NOTES
RENAL PROGRESS NOTE        [facility]         Assessment  1. ЕКАТЕРИНА , ATN / AIN 2/2 to Abx Nephrotoxicity   2. Resolved Hyponatremia   3. Infected Sacral dec ulcer   4. Chronic anemia   5.  HTN     Patient's renal functions appear to be improving gradually  Good urine output and volume status appears to be okay  Blood pressure is high and Norvasc was started    Plan   - Kidney function is little better today  with improved UO , will continue to monitor for recovery ,   off vancomycin / Zosyn on meropenem and hemodynamically stable   - continue IVF  50 ml / h  - hold parameters placed for norvasc  - Replace Lytes as needed   - Surgery following , no surgical intervention for fluid collection above the bladder     Please call with questions,    Rere Lopez MD FASN  Cell [phone]  Pager: [phone]                                                                                                                            Subjective:  Patient doing ok , he denies any CP / SOB   Good output      Patient Active Problem List   Diagnosis Code    S/P colostomy ([facility] [address] Z93.3    Paraplegia ([address] G82.20    Sacral decubitus ulcer, stage IV ([address] L89.154    HTN (hypertension) I10       Current Facility-Administered Medications   Medication Dose Route Frequency Provider Last Rate Last Admin    lidocaine-EPINEPHrine (PF) (XYLOCAINE) 1 %-1:200,000 injection 15 mg  1.5 mL SubCUTAneous ONCE Lia Salazar MD   Stopped at 05/10/21 1300    amLODIPine (NORVASC) tablet 5 mg  5 mg Oral DAILY Thad Marroquin MD   5 mg at 05/10/21 0854    hydrALAZINE (APRESOLINE) 20 mg/mL injection 10 mg  10 mg IntraVENous Q6H PRN Claduia Hernandez MD   10 mg at 05/09/21 1208    heparin (porcine) injection 5,000 Units  5,000 Units SubCUTAneous Q8H Jabier Vargas MD   Stopped at 05/10/21 1400    ondansetron (ZOFRAN) injection 4 mg  4 mg IntraVENous Q6H PRN Claudia Hernandez MD   4 mg at 05/08/21 1856  meropenem (MERREM) 500 mg in sterile water (preservative free) 10 mL IV syringe  0.5 g IntraVENous Q12H Dionte TROY MD   500 mg at 05/10/21 1303    HYDROmorphone (DILAUDID) syringe 0.5 mg  0.5 mg IntraVENous Q4H PRN Carla Mckeon MD        naloxone (NARCAN) injection 0.4 mg  0.4 mg IntraVENous PRN Carla Mckeon MD        acetaminophen (TYLENOL) tablet 650 mg  650 mg Oral Q6H PRN Francesca Sanderson MD        0.9% sodium chloride infusion  50 mL/hr IntraVENous CONTINUOUS Sade Flaherty MD 50 mL/hr at 05/09/21 0614 50 mL/hr at 05/09/21 5873    docusate (COLACE) 50 mg/5 mL oral liquid 100 mg  100 mg Oral DAILY Francesca Sanderson MD   100 mg at 05/04/21 1014    bisacodyL (DULCOLAX) tablet 5 mg  5 mg Oral DAILY PRN Francesca Sanderson MD        escitalopram oxalate (LEXAPRO) tablet 10 mg  10 mg Oral DAILY Francesca Sanderson MD   10 mg at 05/10/21 0854    oxyCODONE-acetaminophen (PERCOCET) 5-325 mg per tablet 1 Tab  1 Tab Oral Q4H PRN Francesca Sanderson MD   1 Tab at 05/04/21 0331       Objective  Vitals:    05/09/21 2001 05/10/21 0324 05/10/21 0728 05/10/21 1209   BP: (!) 171/91 (!) 162/82 (!) 185/98 103/65   Pulse: 84 82 84 88   Resp: 16 16 16 16   Temp: 97.9 °F (36.6 °C) 97.8 °F (36.6 °C) 98.1 °F (36.7 °C) 97.1 °F (36.2 °C)   SpO2: 98% 99% 99% 98%   Weight:       Height:             Intake/Output Summary (Last 24 hours) at 5/10/2021 1532  Last data filed at 5/10/2021 1209  Gross per 24 hour   Intake 2280 ml   Output 1275 ml   Net 1005 ml           Admission weight: Weight: 113.4 kg (250 lb) (04/27/21 1058)  Last Weight Metrics:  Weight Loss Metrics 4/29/2021 4/27/2021 4/16/2021 4/12/2021 1/7/2021 6/17/2019 3/23/2019   Today's Wt - 250 lb 250 lb - 240 lb 240 lb 209 lb   BMI 32.1 kg/m2 - 32.1 kg/m2 32.55 kg/m2 32.55 kg/m2 32.55 kg/m2 28.35 kg/m2             Physical Assessment:     General: NAD, alert and oriented. Neck: No jvd. LUNGS: Clear to Auscultation, No rales, rhonchi or wheezes.   CVS EXM: S1, S2  RRR, no murmurs/gallops/rubs. Abdomen: soft, non tender. Lower Extremities:  no edema. Lab    CBC w/Diff Recent Labs     05/10/21  1240 05/10/21  0308 05/09/21  0506 05/08/21 0441   WBC  --  13.6* 13.1 13.9*   RBC  --  2.80* 2.67* 3.01*   HGB 8.7* 7.8* 7.5* 8.5*   HCT 28.8* 24.9* 24.1* 28.3*   PLT  --  491* 472* 430*   GRANS  --  80* 80* 79*   LYMPH  --  11* 11* 10*   EOS  --  2 2 3        Chemistry Recent Labs     05/10/21  0308 05/09/21  0506 05/08/21 0441 05/08/21 0441   GLU 90 110*  --  94    141  --  138   K 4.1 4.4  --  4.9   * 113*  --  110   CO2 21 22  --  22   BUN 28* 29*  --  30*   CREA 3.55* 3.82*  --  4.08*   CA 7.5* 7.2*  --  7.7*   AGAP 6 6  --  6   BUCR 8* 8*  --  7*   PHOS 3.8 4.0   < >  --     < > = values in this interval not displayed.          No results found for: IRON, FE, TIBC, IBCT, PSAT, FERR   Lab Results   Component Value Date/Time    Calcium 7.5 (L) 05/10/2021 03:08 AM    Phosphorus 3.8 05/10/2021 03:08 AM          Lazarus Fried, MD  5/10/2021  7:57 AM

## 2021-05-10 NOTE — PROGRESS NOTES
Problem: Falls - Risk of  Goal: *Absence of Falls  Description: Document Johan Carrington Fall Risk and appropriate interventions in the flowsheet. Outcome: Progressing Towards Goal  Note: Fall Risk Interventions:  Mobility Interventions: Communicate number of staff needed for ambulation/transfer, OT consult for ADLs, PT Consult for mobility concerns, PT Consult for assist device competence, Strengthening exercises (ROM-active/passive), Utilize walker, cane, or other assistive device    Mentation Interventions: Adequate sleep, hydration, pain control, More frequent rounding, Reorient patient, Update white board    Medication Interventions: Teach patient to arise slowly    Elimination Interventions: Call light in reach, Patient to call for help with toileting needs              Problem: Patient Education: Go to Patient Education Activity  Goal: Patient/Family Education  Outcome: Progressing Towards Goal     Problem: Pressure Injury - Risk of  Goal: *Prevention of pressure injury  Description: Document Jordin Scale and appropriate interventions in the flowsheet. Outcome: Progressing Towards Goal  Note: Pressure Injury Interventions:  Sensory Interventions: Assess changes in LOC, Discuss PT/OT consult with provider, Keep linens dry and wrinkle-free, Minimize linen layers, Maintain/enhance activity level, Monitor skin under medical devices, Pressure redistribution bed/mattress (bed type), Turn and reposition approx. every two hours (pillows and wedges if needed)    Moisture Interventions: Absorbent underpads, Apply protective barrier, creams and emollients, Contain wound drainage, Internal/External urinary devices, Internal/External fecal devices, Minimize layers    Activity Interventions: Increase time out of bed, Pressure redistribution bed/mattress(bed type), PT/OT evaluation    Mobility Interventions: HOB 30 degrees or less, Pressure redistribution bed/mattress (bed type), PT/OT evaluation, Turn and reposition approx. every two hours(pillow and wedges), Float heels    Nutrition Interventions: Document food/fluid/supplement intake, Discuss nutritional consult with provider, Offer support with meals,snacks and hydration    Friction and Shear Interventions: HOB 30 degrees or less, Feet elevated on foot rest, Lift team/patient mobility team, Minimize layers                Problem: Patient Education: Go to Patient Education Activity  Goal: Patient/Family Education  Outcome: Progressing Towards Goal     Problem: Nutrition Deficit  Goal: *Optimize nutritional status  Outcome: Progressing Towards Goal     Problem: Impaired Skin Integrity/Pressure Injury Treatment  Goal: *Improvement of Existing Pressure Injury  Outcome: Progressing Towards Goal     Problem: Patient Education: Go to Patient Education Activity  Goal: Patient/Family Education  Outcome: Progressing Towards Goal     Problem: Hypertension  Goal: *Blood pressure within specified parameters  Outcome: Progressing Towards Goal  Goal: *Fluid volume balance  Outcome: Progressing Towards Goal  Goal: *Labs within defined limits  Outcome: Progressing Towards Goal     Problem: Patient Education: Go to Patient Education Activity  Goal: Patient/Family Education  Outcome: Progressing Towards Goal     Problem: Ostomy Care  Goal: *Patient pouching appliance will fit properly and maintain integrity at least three to five days  Description: Infection control procedures (eg, clean dressings, clean gloves, hand washing, precautions to isolate wound from contamination, sterile instruments used for wound debridement) should be implemented.   Outcome: Progressing Towards Goal  Goal: *Acceptance of change in body image  Outcome: Progressing Towards Goal     Problem: Patient Education: Go to Patient Education Activity  Goal: Patient/Family Education  Outcome: Progressing Towards Goal     Problem: Impaired Skin Integrity/Pressure Injury Treatment  Goal: *Prevention of pressure injury  Description: Document Jordin Scale and appropriate interventions in the flowsheet. Outcome: Not Progressing Towards Goal  Note: Pressure Injury Interventions:  Sensory Interventions: Assess changes in LOC, Discuss PT/OT consult with provider, Keep linens dry and wrinkle-free, Minimize linen layers, Maintain/enhance activity level, Monitor skin under medical devices, Pressure redistribution bed/mattress (bed type), Turn and reposition approx. every two hours (pillows and wedges if needed)    Moisture Interventions: Absorbent underpads, Apply protective barrier, creams and emollients, Contain wound drainage, Internal/External urinary devices, Internal/External fecal devices, Minimize layers    Activity Interventions: Increase time out of bed, Pressure redistribution bed/mattress(bed type), PT/OT evaluation    Mobility Interventions: HOB 30 degrees or less, Pressure redistribution bed/mattress (bed type), PT/OT evaluation, Turn and reposition approx.  every two hours(pillow and wedges), Float heels    Nutrition Interventions: Document food/fluid/supplement intake, Discuss nutritional consult with provider, Offer support with meals,snacks and hydration    Friction and Shear Interventions: HOB 30 degrees or less, Feet elevated on foot rest, Lift team/patient mobility team, Minimize layers                Problem: Pain  Goal: *Control of Pain  Outcome: Resolved/Met     Problem: Patient Education: Go to Patient Education Activity  Goal: Patient/Family Education  Outcome: Resolved/Met

## 2021-05-10 NOTE — ROUTINE PROCESS
End of Shift Note     Bedside and verbal shift change report given to Conchita Mancilla RN (On coming nurse) by Bob Vázquez RN (Off going nurse).   Report included the following information:      --Procedure Summary     --MAR,     --Recent Results     --Med Rec Status    SBAR Recommendations: Turn     Issues for Provider to address placement          Activity This Shift     [x] Bed Rest Order   [] Refused   [] Dangled    [] TDWB         Ambulating:     [] Bathroom     [] BSC     [] Room/Hallway      Up in Chair for meals    []Yes [] No   Voiding       [] Yes  [] No  Chavez          [x] Yes  [] No  Incontinent [] Yes  [] No    DUE TO VOID POUR        [] Yes [] No  Purewick    [] Yes [] No  New Onset [] Yes [] No Straight Cath   []Yes  [] No  Condom Cath  [] Yes [] No  MD Called      [] Yes  [] No   Blood Sugars Managed []Yes [x] No    Bowels Moved [x] Yes [] No    Incontinent     [] Yes [] No Passed Gas [x]Yes [] No    New Onset  []Yes [] No        MD Called []Yes  [] No     CHG Bath Done     Before Surgery     After Surgery      [] Yes  [x] No  [] Yes  [x] No       Drain Removed [] Yes  [] No [x] N/A    Dressing Changed [] Yes   [x] No [] N/A      Nausea/Vomiting [] Yes   [x] No     Ice Packs Changed [] Yes   [x] No  [] N/A    Incentive Spirometer  [x] Yes  [] No      SCD Pumps On     Ankle Pumping  [x] Yes   [] No      [] Yes   [x] No        Telemetry Monitoring [] Yes   [x] No   Rhythm
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
## 2021-05-10 NOTE — PROGRESS NOTES
Nutrition Assessment     Type and Reason for Visit: Reassess, Consult, Wound    Nutrition Recommendations/Plan:   - Monitor GI symptoms and readiness for continued diet advancement. - Change nutritional supplements to Ensure Enlive, BID & Magic Cup once daily. - Continue IV fluids until oral intake adequate. Nutrition Assessment:  Episode of emesis noted yesterday with clear liquid diet, tolerating well today with fair intake, denies nausea or vomiting. Now with +output from ostomy and diet advanced to full liquids with plan for continued advancement to regular diet if pt continues to tolerate. Previously consuming supplements but states yesterday and today he did not consume. Malnutrition Assessment:  Malnutrition Status: At risk for malnutrition (specify)(pressure injury)     Estimated Daily Nutrient Needs:  Energy (kcal):  2010-2412  Protein (g):  136-170       Fluid (ml/day):  2010-2412    Nutrition Related Findings:  Colostomy with 325 mL output x  24 hours.  NS at 50 mL/hr      Current Nutrition Therapies:  DIET NUTRITIONAL SUPPLEMENTS Breakfast, Dinner; Ensure Clear  DIET NUTRITIONAL SUPPLEMENTS Lunch; Gelatein 20  DIET FULL LIQUID    Anthropometric Measures:  · Height:  6' 2\" (188 cm)  · Current Body Wt:  113.4 kg (250 lb)  · BMI: 32.1    Nutrition Diagnosis:   · Inadequate energy intake related to altered GI function, increased demand for energy/nutrients(wounds) as evidenced by intake 26-50%(liquid diet)    Nutrition Intervention:  Food and/or Nutrient Delivery: Continue current diet, Modify oral nutrition supplement, IV fluid delivery  Nutrition Education and Counseling: Education not indicated  Coordination of Nutrition Care: Continue to monitor while inpatient(pt discussed with RN)    Goals:  PO nutrition intake will meet >75% of patient estimated nutritional needs within the next 7 days       Nutrition Monitoring and Evaluation:   Behavioral-Environmental Outcomes: None identified  Food/Nutrient Intake Outcomes: Diet advancement/tolerance, Food and nutrient intake, Supplement intake, Vitamin/mineral intake  Physical Signs/Symptoms Outcomes: Biochemical data, GI status, Meal time behavior, Nutrition focused physical findings, Skin    Discharge Planning:    Continue current diet, Continue oral nutrition supplement     Electronically signed by Aly Garcia RD on 5/10/2021 at 1:39 PM    Contact Number: [phone]

## 2021-05-10 NOTE — PROGRESS NOTES
New OT order received and chart reviewed. Patient evaluated and discharged on 5/3/2021 secondary to being at his functional baseline with ADL tasks. See full note for details. Recommendation is for return home with caregivers vs LTC. Will acknowledge/complete the order.   Thank you for the referral.  Aide Teixeira MS OTR/L

## 2021-05-10 NOTE — PROGRESS NOTES
Patient seen and examined. I saw him earlier in the morning and he was doing relatively well. He has been tolerating clear liquid diet and having multiple bowel movement through his colostomy. His vitals are normal and finally his creatinine is getting better. His leukocytosis has been stable at 13,000. However I just got called back the patient was bleeding from his sacral decubitus ulcers. It seems that he had the wound VAC yesterday and it was filled with the blood and so it was placed back again but because it did continue to bleed they took it out and realized that there is a bleeder. The nursing team did put a pressure on it for about 30 minutes according to them but then it continued to bleed so I was called in. I came back, but the nursing team is already took care of the bleeder by putting pressure and I took all the dressing down and there was no evidence of any active bleeding. So we put 4 x 4 gauze as a compressive dressing and we placed the patient back on supine position. Plan:  Appreciate medicine management  Follow-up nephrology recommendation. Patient's creatinine seems to be getting better  Follow-up ID recommendation  Do not place the wound VAC back for now because of the bleeding. Keep the compressive dressing for at least 24 hours. This can be changed with wet-to-dry dressing. I advance his diet to full liquid diet. It can be advanced to regular if tolerated. Check labs including CBC for tomorrow to check on his hemoglobin and hematocrit because of the bleeding. Unlikely he will need transfusion.   We will follow

## 2021-05-10 NOTE — PROGRESS NOTES
Bedside shift change report given to Delmer Donovan (oncoming nurse) by Tiffany Hernandez RN (offgoing nurse). Report included the following information SBAR, Kardex, Procedure Summary, Intake/Output, MAR, Recent Results and Med Rec Status.

## 2021-05-10 NOTE — PROGRESS NOTES
Infectious Disease progress Note        Reason: infected decubiti ulcer    Current abx Prior abx     Meropenem since 5/6 vancomycin 5/1-5/4  Piperacillin/tazobactam since 5/1-5/6     Lines:       Assessment :    61year old man with h/o HTN, paraplegia admitted to [facility] on 4/29/21 for evaluation of infected sacral decubiti. Clinical presentation c/w acute on chronic sacral osteomyelitis, infected sacral decubiti    S/p surgical debridement,  robotic colostomy on 4/29/2021    Persistent leukocytosis -likely due to partially treated infection due to resistant pathogens. wound cultures 5/3-E. coli, providencia (resistant to piperacillin/tazobactam)  Antibiotics switched to meropenem on 5/6/2021. Improved leukocytosis    Protrusion of the small bowel around the colostomy causing bowel ischemia s/p expl. laparotomy with small bowel resection, partial colectomy/revision of colostomy on 5/4/21    Recent worsening leukocytosis likely secondary to acute kidney injury-improving    Acute kidney injury-likely multifactorial.  Discussed with nephrologist.  Concerns for antibiotic associated interstitial nephritis-gradually improving creatinine    Tolerating liquid diet    Recommendations:    1. Continue meropenem-adjust dose per changing renal function-continue meropenem till 6/18/21  2. Follow-up nephrology recommendations regarding ЕКАТЕРИНА   3. We will start making arrangements for outpatient IV antibiotics. 4.  Will need good wound care to aid healing and prevent future infectious complications  5. Colostomy care per surgery team    Home health orders on chart (click on \"chart review, other orders, IP home health)    Above plan was discussed in details with patient, dr. Kailey Garcia, . Please call me if any further questions or concerns. Will continue to participate in the care of this patient. HPI:      Feels better. patient denied any chest pain, shortness of breath, abdominal pain.       Current Discharge Medication List      CONTINUE these medications which have NOT CHANGED    Details   lisinopril-hydroCHLOROthiazide (PRINZIDE, ZESTORETIC) 20-12.5 mg per tablet TAKE 1 TABLET EVERY DAY  Refills: 3      ergocalciferol (ERGOCALCIFEROL) 1,250 mcg (50,000 unit) capsule       ciprofloxacin HCl (CIPRO) 250 mg tablet Take 1 Tab by mouth two (2) times a day. Qty: 30 Tab, Refills: 2      tamsulosin (FLOMAX) 0.4 mg capsule TAKE 1 CAPSULE BY MOUTH EVERY DAY  Refills: 1      naloxone (NARCAN) 2 mg/actuation spry Use 1 spray intranasally into 1 nostril. Use a new Narcan nasal spray for subsequent doses and administer into alternating nostrils. May repeat every 2 to 3 minutes as needed. Qty: 2 Actuation(s), Refills: 0      furosemide (LASIX) 20 mg tablet TAKE 1 TABLET BY MOUTH DAILY AS NEEDED FOR SWELLING  Refills: 3      MULTIVIT-MINERALS/FOLIC ACID (SPECTRAVITE ADULT PO) Take  by mouth.      escitalopram oxalate (LEXAPRO) 10 mg tablet Take 10 mg by mouth daily.       acetaminophen (TYLENOL) 325 mg tablet TAKE 2 TABLETS BY MOUTH EVERY 4 HOURS AS NEEDED FOR PAIN  Refills: 2             Current Facility-Administered Medications   Medication Dose Route Frequency    amLODIPine (NORVASC) tablet 5 mg  5 mg Oral DAILY    hydrALAZINE (APRESOLINE) 20 mg/mL injection 10 mg  10 mg IntraVENous Q6H PRN    heparin (porcine) injection 5,000 Units  5,000 Units SubCUTAneous Q8H    ondansetron (ZOFRAN) injection 4 mg  4 mg IntraVENous Q6H PRN    meropenem (MERREM) 500 mg in sterile water (preservative free) 10 mL IV syringe  0.5 g IntraVENous Q12H    HYDROmorphone (DILAUDID) syringe 0.5 mg  0.5 mg IntraVENous Q4H PRN    naloxone (NARCAN) injection 0.4 mg  0.4 mg IntraVENous PRN    acetaminophen (TYLENOL) tablet 650 mg  650 mg Oral Q6H PRN    0.9% sodium chloride infusion  50 mL/hr IntraVENous CONTINUOUS    docusate (COLACE) 50 mg/5 mL oral liquid 100 mg  100 mg Oral DAILY    bisacodyL (DULCOLAX) tablet 5 mg  5 mg Oral DAILY PRN    escitalopram oxalate (LEXAPRO) tablet 10 mg  10 mg Oral DAILY    oxyCODONE-acetaminophen (PERCOCET) 5-325 mg per tablet 1 Tab  1 Tab Oral Q4H PRN       Allergies: Patient has no known allergies. Temp (24hrs), Av.4 °F (36.9 °C), Min:97.8 °F (36.6 °C), Max:99.8 °F (37.7 °C)    Visit Vitals  BP (!) 185/98 (BP 1 Location: Left upper arm, BP Patient Position: At rest)   Pulse 84   Temp 98.1 °F (36.7 °C)   Resp 16   Ht 6' 2\" (1.88 m)   Wt 113.4 kg (250 lb)   SpO2 99%   BMI 32.10 kg/m²       ROS: 12 point ROS obtained in details. Pertinent positives as mentioned in HPI,   otherwise negative    Physical Exam:    General: Well developed, well nourished male laying on the bed AAOx3 in no acute distress. General:   awake alert and oriented   HEENT:  Normocephalic, atraumatic, EOMI, no scleral icterus or pallor; no conjunctival hemmohage;  nasal and oral mucous are moist and without evidence of lesions. No thrush. Dentition good. Neck supple, no bruits. Lymph Nodes:   no cervical, axillary or inguinal adenopathy   Lungs:   non-labored, bilaterally clear to auscultation- no crackles wheezes rales or rhonchi   Heart:  RRR, s1 and s2; no rubs or gallops, no edema   Abdomen:  soft, non-distended, active bowel sounds, no hepatomegaly, no splenomegaly. Colostomy in place. Non-tender   Genitourinary:  deferred   Extremities:   no clubbing, cyanosis; no joint effusions or swelling; Full ROM of all large joints to the upper and lower extremities; muscle mass appropriate for age   Neurologic:  Paraplegia. Speech appropriate.  Cranial nerves intact                        Skin:  Surgical changes back   Back:  wound vac recent surgical wound     Psychiatric:  No suicidal or homicidal ideations, appropriate mood and affect         Labs: Results:   Chemistry Recent Labs     05/10/21  0308 21  0506 21  0441 21  1045   GLU 90 110* 94 88    141 138 138   K 4.1 4.4 4.9 4.8   * 113* 110 109   CO2 21 22 22 23   BUN 28* 29* 30* 28*   CREA 3.55* 3.82* 4.08* 3.97*   CA 7.5* 7.2* 7.7* 7.5*   AGAP 6 6 6 6   BUCR 8* 8* 7* 7*   ALB  --   --   --  1.7*      CBC w/Diff Recent Labs     05/10/21  0308 05/09/21  0506 05/08/21  0441   WBC 13.6* 13.1 13.9*   RBC 2.80* 2.67* 3.01*   HGB 7.8* 7.5* 8.5*   HCT 24.9* 24.1* 28.3*   * 472* 430*   GRANS 80* 80* 79*   LYMPH 11* 11* 10*   EOS 2 2 3      Microbiology No results for input(s): CULT in the last 72 hours. RADIOLOGY:    All available imaging studies/reports in [facility] care for this admission were reviewed      Disclaimer: Sections of this note are dictated utilizing voice recognition software, which may have resulted in some phonetic based errors in grammar and contents. Even though attempts were made to correct all the mistakes, some may have been missed, and remained in the body of the document. If questions arise, please contact our department.     Dr. Claudy King, Infectious Disease Specialist  [phone]  May 10, 2021  8:39 PM

## 2021-05-10 NOTE — PROGRESS NOTES
Called pharmacy to request refill of Hydralazine as we are out in the Pyxis.  Waiting for refill to give pt medication for high BP.

## 2021-05-11 LAB
ANION GAP SERPL CALC-SCNC: 4 MMOL/L (ref 3–18)
BASOPHILS # BLD: 0.2 K/UL (ref 0–0.1)
BASOPHILS NFR BLD: 1 % (ref 0–2)
BUN SERPL-MCNC: 28 MG/DL (ref 7–18)
BUN/CREAT SERPL: 9 (ref 12–20)
CALCIUM SERPL-MCNC: 7.9 MG/DL (ref 8.5–10.1)
CHLORIDE SERPL-SCNC: 115 MMOL/L (ref 100–111)
CO2 SERPL-SCNC: 23 MMOL/L (ref 21–32)
CREAT SERPL-MCNC: 3.27 MG/DL (ref 0.6–1.3)
DIFFERENTIAL METHOD BLD: ABNORMAL
EOSINOPHIL # BLD: 0.2 K/UL (ref 0–0.4)
EOSINOPHIL NFR BLD: 1 % (ref 0–5)
ERYTHROCYTE [DISTWIDTH] IN BLOOD BY AUTOMATED COUNT: 14.4 % (ref 11.6–14.5)
GLUCOSE SERPL-MCNC: 105 MG/DL (ref 74–99)
HCT VFR BLD AUTO: 26.4 % (ref 36–48)
HGB BLD-MCNC: 8.1 G/DL (ref 13–16)
LYMPHOCYTES # BLD: 1.9 K/UL (ref 0.9–3.6)
LYMPHOCYTES NFR BLD: 10 % (ref 21–52)
MCH RBC QN AUTO: 28 PG (ref 24–34)
MCHC RBC AUTO-ENTMCNC: 30.7 G/DL (ref 31–37)
MCV RBC AUTO: 91.3 FL (ref 74–97)
MONOCYTES # BLD: 0.2 K/UL (ref 0.05–1.2)
MONOCYTES NFR BLD: 1 % (ref 3–10)
NEUTS BAND NFR BLD MANUAL: 1 % (ref 0–5)
NEUTS SEG # BLD: 16.7 K/UL (ref 1.8–8)
NEUTS SEG NFR BLD: 86 % (ref 40–73)
PLATELET # BLD AUTO: 467 K/UL (ref 135–420)
PLATELET COMMENTS,PCOM: ABNORMAL
PMV BLD AUTO: 9.2 FL (ref 9.2–11.8)
POTASSIUM SERPL-SCNC: 4.4 MMOL/L (ref 3.5–5.5)
RBC # BLD AUTO: 2.89 M/UL (ref 4.35–5.65)
RBC MORPH BLD: ABNORMAL
RBC MORPH BLD: ABNORMAL
SODIUM SERPL-SCNC: 142 MMOL/L (ref 136–145)
WBC # BLD AUTO: 19.2 K/UL (ref 4.6–13.2)

## 2021-05-11 PROCEDURE — 74011250637 HC RX REV CODE- 250/637: Performed by: SURGERY

## 2021-05-11 PROCEDURE — 36415 COLL VENOUS BLD VENIPUNCTURE: CPT

## 2021-05-11 PROCEDURE — 80048 BASIC METABOLIC PNL TOTAL CA: CPT

## 2021-05-11 PROCEDURE — 74011250636 HC RX REV CODE- 250/636: Performed by: HOSPITALIST

## 2021-05-11 PROCEDURE — 74011250636 HC RX REV CODE- 250/636: Performed by: SPECIALIST

## 2021-05-11 PROCEDURE — 74011250637 HC RX REV CODE- 250/637: Performed by: INTERNAL MEDICINE

## 2021-05-11 PROCEDURE — 2709999900 HC NON-CHARGEABLE SUPPLY

## 2021-05-11 PROCEDURE — 99232 SBSQ HOSP IP/OBS MODERATE 35: CPT | Performed by: HOSPITALIST

## 2021-05-11 PROCEDURE — 74011250636 HC RX REV CODE- 250/636: Performed by: INTERNAL MEDICINE

## 2021-05-11 PROCEDURE — 85025 COMPLETE CBC W/AUTO DIFF WBC: CPT

## 2021-05-11 PROCEDURE — 65270000029 HC RM PRIVATE

## 2021-05-11 PROCEDURE — 74011000250 HC RX REV CODE- 250: Performed by: INTERNAL MEDICINE

## 2021-05-11 RX ORDER — AMLODIPINE BESYLATE 5 MG/1
5 TABLET ORAL ONCE
Status: COMPLETED | OUTPATIENT
Start: 2021-05-11 | End: 2021-05-11

## 2021-05-11 RX ORDER — AMLODIPINE BESYLATE 10 MG/1
10 TABLET ORAL DAILY
Status: DISCONTINUED | OUTPATIENT
Start: 2021-05-12 | End: 2021-05-14

## 2021-05-11 RX ADMIN — SODIUM CHLORIDE 50 ML/HR: 900 INJECTION, SOLUTION INTRAVENOUS at 02:01

## 2021-05-11 RX ADMIN — AMLODIPINE BESYLATE 5 MG: 5 TABLET ORAL at 13:00

## 2021-05-11 RX ADMIN — AMLODIPINE BESYLATE 5 MG: 5 TABLET ORAL at 08:51

## 2021-05-11 RX ADMIN — ESCITALOPRAM OXALATE 10 MG: 10 TABLET ORAL at 08:51

## 2021-05-11 RX ADMIN — MEROPENEM 500 MG: 500 INJECTION INTRAVENOUS at 13:00

## 2021-05-11 RX ADMIN — ONDANSETRON 4 MG: 2 INJECTION INTRAMUSCULAR; INTRAVENOUS at 18:45

## 2021-05-11 RX ADMIN — HEPARIN SODIUM 5000 UNITS: 5000 INJECTION INTRAVENOUS; SUBCUTANEOUS at 06:23

## 2021-05-11 RX ADMIN — ONDANSETRON 4 MG: 2 INJECTION INTRAMUSCULAR; INTRAVENOUS at 01:49

## 2021-05-11 RX ADMIN — SODIUM CHLORIDE 50 ML/HR: 900 INJECTION, SOLUTION INTRAVENOUS at 21:42

## 2021-05-11 RX ADMIN — HEPARIN SODIUM 5000 UNITS: 5000 INJECTION INTRAVENOUS; SUBCUTANEOUS at 13:00

## 2021-05-11 RX ADMIN — MEROPENEM 500 MG: 500 INJECTION INTRAVENOUS at 23:27

## 2021-05-11 RX ADMIN — ONDANSETRON 4 MG: 2 INJECTION INTRAMUSCULAR; INTRAVENOUS at 22:35

## 2021-05-11 RX ADMIN — HEPARIN SODIUM 5000 UNITS: 5000 INJECTION INTRAVENOUS; SUBCUTANEOUS at 22:35

## 2021-05-11 NOTE — PROGRESS NOTES
Problem: Falls - Risk of  Goal: *Absence of Falls  Description: Document Rio Freeman Fall Risk and appropriate interventions in the flowsheet. Outcome: Progressing Towards Goal  Note: Fall Risk Interventions:  Mobility Interventions: Bed/chair exit alarm, Patient to call before getting OOB, Strengthening exercises (ROM-active/passive), Utilize walker, cane, or other assistive device    Mentation Interventions: Bed/chair exit alarm, Reorient patient, Room close to nurse's station, Toileting rounds    Medication Interventions: Patient to call before getting OOB, Teach patient to arise slowly    Elimination Interventions: Bed/chair exit alarm, Call light in reach, Patient to call for help with toileting needs, Toileting schedule/hourly rounds              Problem: Patient Education: Go to Patient Education Activity  Goal: Patient/Family Education  Outcome: Progressing Towards Goal     Problem: Pressure Injury - Risk of  Goal: *Prevention of pressure injury  Description: Document Jordin Scale and appropriate interventions in the flowsheet. Outcome: Progressing Towards Goal  Note: Pressure Injury Interventions:  Sensory Interventions: Check visual cues for pain, Float heels, Keep linens dry and wrinkle-free, Minimize linen layers    Moisture Interventions: Absorbent underpads, Apply protective barrier, creams and emollients, Minimize layers, Moisture barrier    Activity Interventions: Pressure redistribution bed/mattress(bed type)    Mobility Interventions: Pressure redistribution bed/mattress (bed type), Trapeze to reposition, Turn and reposition approx.  every two hours(pillow and wedges)    Nutrition Interventions: Document food/fluid/supplement intake, Discuss nutritional consult with provider, Offer support with meals,snacks and hydration    Friction and Shear Interventions: HOB 30 degrees or less, Apply protective barrier, creams and emollients                Problem: Patient Education: Go to Patient Education Activity  Goal: Patient/Family Education  Outcome: Progressing Towards Goal     Problem: Nutrition Deficit  Goal: *Optimize nutritional status  Outcome: Progressing Towards Goal     Problem: Impaired Skin Integrity/Pressure Injury Treatment  Goal: *Improvement of Existing Pressure Injury  Outcome: Progressing Towards Goal  Goal: *Prevention of pressure injury  Description: Document Jordin Scale and appropriate interventions in the flowsheet. Outcome: Progressing Towards Goal  Note: Pressure Injury Interventions:  Sensory Interventions: Check visual cues for pain, Float heels, Keep linens dry and wrinkle-free, Minimize linen layers    Moisture Interventions: Absorbent underpads, Apply protective barrier, creams and emollients, Minimize layers, Moisture barrier    Activity Interventions: Pressure redistribution bed/mattress(bed type)    Mobility Interventions: Pressure redistribution bed/mattress (bed type), Trapeze to reposition, Turn and reposition approx.  every two hours(pillow and wedges)    Nutrition Interventions: Document food/fluid/supplement intake, Discuss nutritional consult with provider, Offer support with meals,snacks and hydration    Friction and Shear Interventions: HOB 30 degrees or less, Apply protective barrier, creams and emollients                Problem: Patient Education: Go to Patient Education Activity  Goal: Patient/Family Education  Outcome: Progressing Towards Goal     Problem: Hypertension  Goal: *Blood pressure within specified parameters  Outcome: Progressing Towards Goal  Goal: *Fluid volume balance  Outcome: Progressing Towards Goal  Goal: *Labs within defined limits  Outcome: Progressing Towards Goal     Problem: Patient Education: Go to Patient Education Activity  Goal: Patient/Family Education  Outcome: Progressing Towards Goal     Problem: Patient Education: Go to Patient Education Activity  Goal: Patient/Family Education  Outcome: Progressing Towards Goal     Problem: Patient Education: Go to Patient Education Activity  Goal: Patient/Family Education  Outcome: Progressing Towards Goal     Problem: Ostomy Care  Goal: *Patient pouching appliance will fit properly and maintain integrity at least three to five days  Description: Infection control procedures (eg, clean dressings, clean gloves, hand washing, precautions to isolate wound from contamination, sterile instruments used for wound debridement) should be implemented.   Outcome: Progressing Towards Goal  Goal: *Acceptance of change in body image  Outcome: Progressing Towards Goal     Problem: Patient Education: Go to Patient Education Activity  Goal: Patient/Family Education  Outcome: Progressing Towards Goal     Problem: Patient Education: Go to Patient Education Activity  Goal: Patient/Family Education  Outcome: Progressing Towards Goal
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
## 2021-05-11 NOTE — PROGRESS NOTES
Patient seen and examined. There are no issues overnight. There is no any more evidence of bleeding from the sacral decubitus ulcer. He is tolerating full liquid diet and his ostomy is functioning. He currently denies any nausea but he stated that last night he had 2 episode of small amount of vomiting that he described as spitting. His vitals shows no fever or tachycardia. He is hypertensive  His WBC has increased to 19,000 up from 13,000. His hemoglobin and hematocrit are stable. His creatinine keeps trending down but still high. His abdomen is soft and nontender and nondistended and his midline wound is healing well. His ostomy is functioning. Plan:  Appreciate medicine management  Follow on ID recommendation for his worsening leukocytosis but most likely just observation  Follow-up nephrology recommendation. Wound care for sacral decubitus ulcer by doing only wet-to-dry dressing and no wound VAC for now because of the bleeding that developed after placing the wound VAC. I suggest to wait on the wound VAC until the patient kidney function has improved.   Ostomy care  Discharge planning

## 2021-05-11 NOTE — PROGRESS NOTES
RENAL PROGRESS NOTE        Noel Yang         Assessment  1. ЕКАТЕРИНА , ATN / AIN 2/2 to Abx Nephrotoxicity   2. Resolved Hyponatremia   3. Infected Sacral dec ulcer   4. Chronic anemia   5. HTN , uncontrolled  6.  fluid collection above the bladder, incidentally seen above bladder    Patient's renal functions appear to be improving gradually  Blood pressure is high and Norvasc uptitrated    Plan   - renal parameters improving slowly  - continue IVF 50 ml / hr d/t poor intake , discussed with nursing to encourage     fluid and protein intake   - increase norvasc dose  - Replace Lytes as needed     Please call with questions,    Duran Owens MD FASN  Cell [phone]  Pager: [phone]                                                                                                                            Subjective:  Patient doing ok ,  he denies any CP / SOB   Sleepy today       Patient Active Problem List   Diagnosis Code    S/P colostomy ([facility] Z93.3    Paraplegia ([facility] G82.20    Sacral decubitus ulcer, stage IV ([address] L89.154    HTN (hypertension) I10       Current Facility-Administered Medications   Medication Dose Route Frequency Provider Last Rate Last Admin    [START ON 5/12/2021] amLODIPine (NORVASC) tablet 10 mg  10 mg Oral DAILY Kranthi, Hu Mendoza MD        hydrALAZINE (APRESOLINE) 20 mg/mL injection 10 mg  10 mg IntraVENous Q6H PRN Curt Smith MD   10 mg at 05/09/21 1208    heparin (porcine) injection 5,000 Units  5,000 Units SubCUTAneous Q8H Angela Vargas MD   5,000 Units at 05/11/21 1300    ondansetron (ZOFRAN) injection 4 mg  4 mg IntraVENous Q6H PRN Angela Vargas MD   4 mg at 05/11/21 0149    meropenem (MERREM) 500 mg in sterile water (preservative free) 10 mL IV syringe  0.5 g IntraVENous Q12H Anmol TROY MD   500 mg at 05/11/21 1300    HYDROmorphone (DILAUDID) syringe 0.5 mg  0.5 mg IntraVENous Q4H PRN MD Bernice Solano Officer naloxone (NARCAN) injection 0.4 mg  0.4 mg IntraVENous PRN Heide Vail MD        acetaminophen (TYLENOL) tablet 650 mg  650 mg Oral Q6H PRN Mervin Sousa MD        0.9% sodium chloride infusion  50 mL/hr IntraVENous CONTINUOUS Yvonne Caraballo MD 50 mL/hr at 05/11/21 0201 50 mL/hr at 05/11/21 0201    docusate (COLACE) 50 mg/5 mL oral liquid 100 mg  100 mg Oral DAILY Mervin Sousa MD   100 mg at 05/04/21 1014    bisacodyL (DULCOLAX) tablet 5 mg  5 mg Oral DAILY PRN Mervin Sousa MD        escitalopram oxalate (LEXAPRO) tablet 10 mg  10 mg Oral DAILY Mervin Sousa MD   10 mg at 05/11/21 0851    oxyCODONE-acetaminophen (PERCOCET) 5-325 mg per tablet 1 Tab  1 Tab Oral Q4H PRN Mervin Sousa MD   1 Tab at 05/04/21 0331       Objective  Vitals:    05/10/21 2325 05/11/21 0233 05/11/21 0808 05/11/21 1132   BP: (!) 178/89 (!) 165/85 (!) 167/89 (!) 165/84   Pulse: 78 82 78 74   Resp: 16 18 16 16   Temp: 97 °F (36.1 °C) 98.8 °F (37.1 °C) 98.6 °F (37 °C) 98.5 °F (36.9 °C)   SpO2: 100% 100% 100% 100%   Weight:       Height:             Intake/Output Summary (Last 24 hours) at 5/11/2021 1637  Last data filed at 5/11/2021 0214  Gross per 24 hour   Intake 1680 ml   Output 590 ml   Net 1090 ml           Admission weight: Weight: 113.4 kg (250 lb) (04/27/21 1058)  Last Weight Metrics:  Weight Loss Metrics 4/29/2021 4/27/2021 4/16/2021 4/12/2021 1/7/2021 6/17/2019 3/23/2019   Today's Wt - 250 lb 250 lb - 240 lb 240 lb 209 lb   BMI 32.1 kg/m2 - 32.1 kg/m2 32.55 kg/m2 32.55 kg/m2 32.55 kg/m2 28.35 kg/m2             Physical Assessment:     General: sleepy   Neck: No jvd. LUNGS: Clear to Auscultation, No rales, rhonchi or wheezes. CVS EXM: S1, S2  RRR, no murmurs/gallops/rubs. Abdomen: soft, non tender. Lower Extremities:  no edema.      Lab    CBC w/Diff Recent Labs     05/11/21  0241 05/10/21  1240 05/10/21  0308 05/09/21  0506   WBC 19.2*  --  13.6* 13.1   RBC 2.89*  --  2.80* 2.67*   HGB 8.1* 8. 7* 7.8* 7.5*   HCT 26.4* 28.8* 24.9* 24.1*   *  --  491* 472*   GRANS 86*  --  80* 80*   LYMPH 10*  --  11* 11*   EOS 1  --  2 2        Chemistry Recent Labs     05/11/21  0241 05/10/21  0308 05/09/21  0506   * 90 110*    141 141   K 4.4 4.1 4.4   * 114* 113*   CO2 23 21 22   BUN 28* 28* 29*   CREA 3.27* 3.55* 3.82*   CA 7.9* 7.5* 7.2*   AGAP 4 6 6   BUCR 9* 8* 8*   PHOS  --  3.8 4.0         No results found for: IRON, FE, TIBC, IBCT, PSAT, FERR   Lab Results   Component Value Date/Time    Calcium 7.9 (L) 05/11/2021 02:41 AM    Phosphorus 3.8 05/10/2021 03:08 AM          Soledad Barron MD  5/11/2021  7:57 AM

## 2021-05-11 NOTE — ROUTINE PROCESS
Bedside and Verbal shift change report given to 1812 Joe Peoples (oncoming nurse) by Otilia Jamil RN (offgoing nurse). Report included the following information SBAR, Kardex, Intake/Output, MAR and Recent Results.

## 2021-05-11 NOTE — PROGRESS NOTES
Progress Note:  Patient c/o nausea, zofran given. Patient vomited small amount of bile colored contents. 6:31 AM  Patient asleep but arouseable, no further c/o nausea, no further emesis. IV fluids continue, call bell within reach.   Patient Vitals for the past 12 hrs:   Temp Pulse Resp BP SpO2   05/11/21 0233 98.8 °F (37.1 °C) 82 18 (!) 165/85 100 %   05/10/21 2325 97 °F (36.1 °C) 78 16 (!) 178/89 100 %   05/10/21 1958 98.7 °F (37.1 °C) 84 16 (!) 171/93 99 %

## 2021-05-11 NOTE — PROGRESS NOTES
Physician Progress Note      Maggie Vail  CSN #:                  [id number]  :                       1960  ADMIT DATE:       2021 11:00 AM  [address] [place] DATE:  RESPONDING  PROVIDER #:        Aruna Lainez MD          QUERY TEXT:    Pt admitted with sacral ulcer. Noted documentation of ATN on  by ordered Nephrology consultant. If possible, please document in progress notes and discharge summary:    The medical record reflects the following:  Risk Factors: 61 y.o. male with ЕКАТЕРИНА  Clinical Indicators: Per  Nephrology progress note - ЕКАТЕРИНА , ATN / AIN 2/2 to Abx Nephrotoxicity  Treatment: Renal U/S; IV fluids, avoid IV contrast, nephrotoxins, NSAIDs, strict I/o , lee in place    Thank you,  Elaine Alvares RN, [facility]  [phone]  Options provided:  -- Acute kidney failure with acute tubular necrosis  -- Acute kidney failure  -- Defer to nephrology consultant documentation regarding ATN  -- Other - I will add my own diagnosis  -- Disagree - Not applicable / Not valid  -- Disagree - Clinically unable to determine / Unknown  -- Refer to Clinical Documentation Reviewer    PROVIDER RESPONSE TEXT:    I defer to nephrology consultant regarding documentation of ATN.     Query created by: Ray Thorne on 2021 3:08 PM      Electronically signed by:  Aruna Lainez MD 2021 3:16 PM

## 2021-05-11 NOTE — ROUTINE PROCESS
Bedside shift change report given to SAFIA Quezada (oncoming nurse) by Severiano Jewett, RN (offgoing nurse). Report included the following information SBAR, Kardex, Procedure Summary, Intake/Output, MAR and Recent Results. Opportunity for questions and clarification was provided.
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
## 2021-05-11 NOTE — PROGRESS NOTES
Infectious Disease progress Note        Reason: infected decubiti ulcer    Current abx Prior abx     Meropenem since 5/6 vancomycin 5/1-5/4  Piperacillin/tazobactam since 5/1-5/6     Lines:       Assessment :    61year old man with h/o HTN, paraplegia admitted to [facility] on 4/29/21 for evaluation of infected sacral decubiti. Clinical presentation c/w acute on chronic sacral osteomyelitis, infected sacral decubiti    S/p surgical debridement,  robotic colostomy on 4/29/2021    Persistent leukocytosis -likely due to partially treated infection due to resistant pathogens. wound cultures 5/3-E. coli, providencia (resistant to piperacillin/tazobactam)  Antibiotics switched to meropenem on 5/6/2021. Improved leukocytosis    Protrusion of the small bowel around the colostomy causing bowel ischemia s/p expl. laparotomy with small bowel resection, partial colectomy/revision of colostomy on 5/4/21    Recent worsening leukocytosis likely secondary to acute kidney injury-improving    Acute kidney injury-likely multifactorial.  Discussed with nephrologist.  Concerns for antibiotic associated interstitial nephritis-gradually improving creatinine    Tolerating liquid diet- poor po intake. Increased sleepiness. Recommendations:    1. Continue meropenem-adjust dose per changing renal function-continue meropenem till 6/18/21  2. Follow-up nephrology recommendations regarding ЕКАТЕРИНА   3.  start making arrangements for outpatient IV antibiotics. 4.  Will need good wound care to aid healing and prevent future infectious complications  5. Colostomy care per surgery team    Home health orders on chart (click on \"chart review, other orders, IP home health)    Above plan was discussed in details with patient, dr. Sree Oliveros, . Please call me if any further questions or concerns. Will continue to participate in the care of this patient.     HPI:       patient denied any chest pain, shortness of breath, abdominal pain.      Current Discharge Medication List      CONTINUE these medications which have NOT CHANGED    Details   lisinopril-hydroCHLOROthiazide (PRINZIDE, ZESTORETIC) 20-12.5 mg per tablet TAKE 1 TABLET EVERY DAY  Refills: 3      ergocalciferol (ERGOCALCIFEROL) 1,250 mcg (50,000 unit) capsule       ciprofloxacin HCl (CIPRO) 250 mg tablet Take 1 Tab by mouth two (2) times a day. Qty: 30 Tab, Refills: 2      tamsulosin (FLOMAX) 0.4 mg capsule TAKE 1 CAPSULE BY MOUTH EVERY DAY  Refills: 1      naloxone (NARCAN) 2 mg/actuation spry Use 1 spray intranasally into 1 nostril. Use a new Narcan nasal spray for subsequent doses and administer into alternating nostrils. May repeat every 2 to 3 minutes as needed. Qty: 2 Actuation(s), Refills: 0      furosemide (LASIX) 20 mg tablet TAKE 1 TABLET BY MOUTH DAILY AS NEEDED FOR SWELLING  Refills: 3      MULTIVIT-MINERALS/FOLIC ACID (SPECTRAVITE ADULT PO) Take  by mouth.      escitalopram oxalate (LEXAPRO) 10 mg tablet Take 10 mg by mouth daily.       acetaminophen (TYLENOL) 325 mg tablet TAKE 2 TABLETS BY MOUTH EVERY 4 HOURS AS NEEDED FOR PAIN  Refills: 2             Current Facility-Administered Medications   Medication Dose Route Frequency    [START ON 5/12/2021] amLODIPine (NORVASC) tablet 10 mg  10 mg Oral DAILY    amLODIPine (NORVASC) tablet 5 mg  5 mg Oral ONCE    hydrALAZINE (APRESOLINE) 20 mg/mL injection 10 mg  10 mg IntraVENous Q6H PRN    heparin (porcine) injection 5,000 Units  5,000 Units SubCUTAneous Q8H    ondansetron (ZOFRAN) injection 4 mg  4 mg IntraVENous Q6H PRN    meropenem (MERREM) 500 mg in sterile water (preservative free) 10 mL IV syringe  0.5 g IntraVENous Q12H    HYDROmorphone (DILAUDID) syringe 0.5 mg  0.5 mg IntraVENous Q4H PRN    naloxone (NARCAN) injection 0.4 mg  0.4 mg IntraVENous PRN    acetaminophen (TYLENOL) tablet 650 mg  650 mg Oral Q6H PRN    0.9% sodium chloride infusion  50 mL/hr IntraVENous CONTINUOUS    docusate (COLACE) 50 mg/5 mL oral liquid 100 mg  100 mg Oral DAILY    bisacodyL (DULCOLAX) tablet 5 mg  5 mg Oral DAILY PRN    escitalopram oxalate (LEXAPRO) tablet 10 mg  10 mg Oral DAILY    oxyCODONE-acetaminophen (PERCOCET) 5-325 mg per tablet 1 Tab  1 Tab Oral Q4H PRN       Allergies: Patient has no known allergies. Temp (24hrs), Av.9 °F (36.6 °C), Min:97 °F (36.1 °C), Max:98.8 °F (37.1 °C)    Visit Vitals  BP (!) 167/89   Pulse 78   Temp 98.6 °F (37 °C)   Resp 16   Ht 6' 2\" (1.88 m)   Wt 113.4 kg (250 lb)   SpO2 100%   BMI 32.10 kg/m²       ROS: 12 point ROS obtained in details. Pertinent positives as mentioned in HPI,   otherwise negative    Physical Exam:    General: Well developed, well nourished male laying on the bed, sleepy, in no acute distress. General:   awake alert and oriented   HEENT:  Normocephalic, atraumatic, EOMI, no scleral icterus or pallor; no conjunctival hemmohage;  nasal and oral mucous are moist and without evidence of lesions. No thrush. Dentition good. Neck supple, no bruits. Lymph Nodes:   no cervical, axillary or inguinal adenopathy   Lungs:   non-labored, bilaterally clear to auscultation- no crackles wheezes rales or rhonchi   Heart:  RRR, s1 and s2; no rubs or gallops, no edema   Abdomen:  soft, non-distended, active bowel sounds, no hepatomegaly, no splenomegaly. Colostomy in place. Non-tender   Genitourinary:  deferred   Extremities:   no clubbing, cyanosis; no joint effusions or swelling; Full ROM of all large joints to the upper and lower extremities; muscle mass appropriate for age   Neurologic:  Paraplegia. Speech appropriate.  Cranial nerves intact                        Skin:  Surgical changes back   Back:  wound vac recent surgical wound     Psychiatric:  No suicidal or homicidal ideations, appropriate mood and affect         Labs: Results:   Chemistry Recent Labs     21  0241 05/10/21  0308 21  0506   * 90 110*    141 141   K 4.4 4.1 4.4   * 114* 113* CO2 23 21 22   BUN 28* 28* 29*   CREA 3.27* 3.55* 3.82*   CA 7.9* 7.5* 7.2*   AGAP 4 6 6   BUCR 9* 8* 8*      CBC w/Diff Recent Labs     05/11/21  0241 05/10/21  1240 05/10/21  0308 05/09/21  0506   WBC 19.2*  --  13.6* 13.1   RBC 2.89*  --  2.80* 2.67*   HGB 8.1* 8.7* 7.8* 7.5*   HCT 26.4* 28.8* 24.9* 24.1*   *  --  491* 472*   GRANS 86*  --  80* 80*   LYMPH 10*  --  11* 11*   EOS 1  --  2 2      Microbiology No results for input(s): CULT in the last 72 hours. RADIOLOGY:    All available imaging studies/reports in [facility] care for this admission were reviewed      Disclaimer: Sections of this note are dictated utilizing voice recognition software, which may have resulted in some phonetic based errors in grammar and contents. Even though attempts were made to correct all the mistakes, some may have been missed, and remained in the body of the document. If questions arise, please contact our department.     Dr. Twyla Malone, Infectious Disease Specialist  [phone]  May 11, 2021  8:39 PM

## 2021-05-12 LAB
ANION GAP SERPL CALC-SCNC: 7 MMOL/L (ref 3–18)
BASOPHILS # BLD: 0.1 K/UL (ref 0–0.1)
BASOPHILS NFR BLD: 0 % (ref 0–2)
BUN SERPL-MCNC: 28 MG/DL (ref 7–18)
BUN/CREAT SERPL: 10 (ref 12–20)
CALCIUM SERPL-MCNC: 7.7 MG/DL (ref 8.5–10.1)
CHLORIDE SERPL-SCNC: 115 MMOL/L (ref 100–111)
CO2 SERPL-SCNC: 22 MMOL/L (ref 21–32)
CREAT SERPL-MCNC: 2.93 MG/DL (ref 0.6–1.3)
DIFFERENTIAL METHOD BLD: ABNORMAL
EOSINOPHIL # BLD: 0.2 K/UL (ref 0–0.4)
EOSINOPHIL NFR BLD: 1 % (ref 0–5)
ERYTHROCYTE [DISTWIDTH] IN BLOOD BY AUTOMATED COUNT: 14.3 % (ref 11.6–14.5)
GLUCOSE SERPL-MCNC: 120 MG/DL (ref 74–99)
HCT VFR BLD AUTO: 19.9 % (ref 36–48)
HCT VFR BLD AUTO: 21 % (ref 36–48)
HGB BLD-MCNC: 6.2 G/DL (ref 13–16)
HGB BLD-MCNC: 6.7 G/DL (ref 13–16)
HISTORY CHECKED?,CKHIST: NORMAL
LYMPHOCYTES # BLD: 1.8 K/UL (ref 0.9–3.6)
LYMPHOCYTES NFR BLD: 11 % (ref 21–52)
MCH RBC QN AUTO: 28.1 PG (ref 24–34)
MCHC RBC AUTO-ENTMCNC: 31.2 G/DL (ref 31–37)
MCV RBC AUTO: 90 FL (ref 74–97)
MONOCYTES # BLD: 0.7 K/UL (ref 0.05–1.2)
MONOCYTES NFR BLD: 4 % (ref 3–10)
NEUTS SEG # BLD: 14.1 K/UL (ref 1.8–8)
NEUTS SEG NFR BLD: 83 % (ref 40–73)
PLATELET # BLD AUTO: 429 K/UL (ref 135–420)
PMV BLD AUTO: 9.3 FL (ref 9.2–11.8)
POTASSIUM SERPL-SCNC: 3.9 MMOL/L (ref 3.5–5.5)
RBC # BLD AUTO: 2.21 M/UL (ref 4.35–5.65)
SODIUM SERPL-SCNC: 144 MMOL/L (ref 136–145)
WBC # BLD AUTO: 17 K/UL (ref 4.6–13.2)

## 2021-05-12 PROCEDURE — 85018 HEMOGLOBIN: CPT

## 2021-05-12 PROCEDURE — 99232 SBSQ HOSP IP/OBS MODERATE 35: CPT | Performed by: HOSPITALIST

## 2021-05-12 PROCEDURE — 86901 BLOOD TYPING SEROLOGIC RH(D): CPT

## 2021-05-12 PROCEDURE — 2709999900 HC NON-CHARGEABLE SUPPLY

## 2021-05-12 PROCEDURE — P9016 RBC LEUKOCYTES REDUCED: HCPCS

## 2021-05-12 PROCEDURE — 30230N1 TRANSFUSION OF NONAUTOLOGOUS RED BLOOD CELLS INTO PERIPHERAL VEIN, OPEN APPROACH: ICD-10-PCS | Performed by: EMERGENCY MEDICINE

## 2021-05-12 PROCEDURE — 74011000250 HC RX REV CODE- 250: Performed by: INTERNAL MEDICINE

## 2021-05-12 PROCEDURE — 92526 ORAL FUNCTION THERAPY: CPT

## 2021-05-12 PROCEDURE — 65270000029 HC RM PRIVATE

## 2021-05-12 PROCEDURE — 74011250636 HC RX REV CODE- 250/636: Performed by: SPECIALIST

## 2021-05-12 PROCEDURE — 92610 EVALUATE SWALLOWING FUNCTION: CPT

## 2021-05-12 PROCEDURE — 77030040393 HC DRSG OPTIFOAM GENT MDII -B

## 2021-05-12 PROCEDURE — 74011250636 HC RX REV CODE- 250/636: Performed by: HOSPITALIST

## 2021-05-12 PROCEDURE — 85025 COMPLETE CBC W/AUTO DIFF WBC: CPT

## 2021-05-12 PROCEDURE — 80048 BASIC METABOLIC PNL TOTAL CA: CPT

## 2021-05-12 PROCEDURE — 86923 COMPATIBILITY TEST ELECTRIC: CPT

## 2021-05-12 PROCEDURE — 77030040831 HC BAG URINE DRNG MDII -A

## 2021-05-12 PROCEDURE — 74011250637 HC RX REV CODE- 250/637: Performed by: INTERNAL MEDICINE

## 2021-05-12 PROCEDURE — 74011250637 HC RX REV CODE- 250/637: Performed by: SURGERY

## 2021-05-12 PROCEDURE — 74011250636 HC RX REV CODE- 250/636: Performed by: INTERNAL MEDICINE

## 2021-05-12 PROCEDURE — 36430 TRANSFUSION BLD/BLD COMPNT: CPT

## 2021-05-12 PROCEDURE — 36415 COLL VENOUS BLD VENIPUNCTURE: CPT

## 2021-05-12 RX ORDER — SODIUM CHLORIDE 9 MG/ML
250 INJECTION, SOLUTION INTRAVENOUS AS NEEDED
Status: DISCONTINUED | OUTPATIENT
Start: 2021-05-12 | End: 2021-05-22 | Stop reason: HOSPADM

## 2021-05-12 RX ADMIN — HEPARIN SODIUM 5000 UNITS: 5000 INJECTION INTRAVENOUS; SUBCUTANEOUS at 05:33

## 2021-05-12 RX ADMIN — AMLODIPINE BESYLATE 10 MG: 10 TABLET ORAL at 09:44

## 2021-05-12 RX ADMIN — ESCITALOPRAM OXALATE 10 MG: 10 TABLET ORAL at 09:44

## 2021-05-12 RX ADMIN — SODIUM CHLORIDE 50 ML/HR: 900 INJECTION, SOLUTION INTRAVENOUS at 20:15

## 2021-05-12 RX ADMIN — MEROPENEM 500 MG: 500 INJECTION INTRAVENOUS at 20:15
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
## 2021-05-12 NOTE — PROGRESS NOTES
Problem: Dysphagia (Adult)  Goal: *Acute Goals and Plan of Care (Insert Text)  Description:     Patient will:  1. Tolerate PO trials with 0 s/s overt distress in 4/5 trials  2. Utilize compensatory swallow strategies/maneuvers (decrease bite/sip, size/rate, alt. liq/sol) with min cues in 4/5 trials    Rec:     Reg solid with thin liquids  Aspiration precautions  HOB >45 during po intake, remain >30 for 30-45 minutes after po   Small bites/sips; alternate liquid/solid with slow feeding rate   Oral care TID  Meds per pt preference  Assistance with PO as needed      Outcome: Progressing Towards Goal    SPEECH LANGUAGE PATHOLOGY BEDSIDE SWALLOW EVALUATION/TREATMENT    Patient: Saman Abbott (61 y.o. male)  Date: 5/12/2021  Primary Diagnosis: [facility] [Z93.3]  Procedure(s) (LRB):  EXPLORATORY LAPAROTOMY, SMALL BOWEL RESECTION , PARTIAL COLECTOMY ,COLOSTOMY, REVISION (N/A) 9 Days Post-Op   Precautions:  Fall, Aspiration, Skin  PLOF: As per H&P    ASSESSMENT :  Based on the objective data described below, the patient presents with oropharyngeal swallow fxn essentially WFL. Strength, ROM, and coordination of the orofacial musculature were all found to be [facility]. Pt tolerated reg solid, puree, and thin liquids +/- straw consecutive swallows without any overt s/sx of aspiration. Mastication was appropriate with timely a-p transit. Positive oral clearance observed across all trials. Pt safe for initiation of reg solid diet with thin liquids, aspiration precautions, oral care TID, and meds as tolerated. Pt may require assistance with PO as needed. ST will continue to follow x 1-2 visits to ensure safety of diet tolerance. TREATMENT :  Skilled therapy initiated; Educated pt on aspiration precautions and importance of compensatory swallow techniques to decrease aspiration risk (decrease rate of intake & sip/bite size, upright @HOB for all po intake and ~30 minutes after po); verbalized comprehension.     Patient will benefit from skilled intervention to address the above impairments. Patient's rehabilitation potential is considered to be Good  Factors which may influence rehabilitation potential include:   []            None noted  []            Mental ability/status  [x]            Medical condition  []            Home/family situation and support systems  []            Safety awareness  []            Pain tolerance/management  []            Other:      PLAN :  Recommendations and Planned Interventions: See above  Frequency/Duration: Patient will be followed by speech-language pathology x 1-2 more visits to address goals. Discharge Recommendations: To Be Determined     SUBJECTIVE:   Patient stated I don't like magdalena. OBJECTIVE:     Past Medical History:   Diagnosis Date    Asthma     Hypertension     Ill-defined condition     Neurogenic Bladder, s/p T11 injury    Paralysis ([address] 2017    waist down,  W     Past Surgical History:   Procedure Laterality Date    HX OTHER SURGICAL      Eye surgery    HX OTHER SURGICAL  2017    spinal surgery    HX OTHER SURGICAL  2017    Liver repair from W    HX OTHER SURGICAL  04/2021    Decubitus Debridement     Prior Level of Function/Home Situation: see below  Home Situation  Home Environment: Private residence  # Steps to Enter:  3(reports he goes through back of home where room is, 1 level)  Rails to Iagnosis: Yes  One/Two Story Residence: One story  Living Alone: No  Support Systems: Family member(s), Friends \ neighbors  Patient Expects to be Discharged to[de-identified] Private residence  Current DME Used/Available at [facility]: Shower chair, Commode, bedside, Walker, rollator, Wheelchair, power(hospital bed)  Tub or Shower Type: (basin bath)    Diet prior to admission: reg solid with thin, assistance with feeds  Current Diet:  reg solid with thin, assistance with feeds     Cognitive and Communication Status:  Neurologic State: Alert  Orientation Level: Oriented X4  Cognition: Follows commands  Perception: Appears intact  Perseveration: No perseveration noted  Safety/Judgement: Fall prevention  Oral Assessment:  Oral Assessment  Labial: No impairment  Dentition: Natural;Intact  Oral Hygiene: adequate  Lingual: No impairment  Velum: No impairment  Mandible: No impairment  P.O. Trials:  Patient Position: [facility] at [facility]  Vocal quality prior to P.O.: No impairment  Consistency Presented: Thin liquid; Solid;Puree  How Presented: SLP-fed/presented;Cup/sip;Spoon;Straw;Successive swallows  Bolus Acceptance: No impairment  Bolus Formation/Control: No impairment  Propulsion: No impairment  Oral Residue: None  Initiation of Swallow: No impairment  Laryngeal Elevation: Functional  Aspiration Signs/Symptoms: None  Pharyngeal Phase Characteristics: No impairment, issues, or problems   Effective Modifications: None  Cues for Modifications: None     Oral Phase Severity: No impairment  Pharyngeal Phase Severity : No impairment    PAIN:  Start of Eval: 0  End of Eval: 0     After treatment:   []            Patient left in no apparent distress sitting up in chair  [x]            Patient left in no apparent distress in bed  [x]            Call bell left within reach  [x]            Nursing notified  []            Family present  []            Caregiver present  []            Bed alarm activated    COMMUNICATION/EDUCATION:   [x]            Aspiration precautions; swallow safety; compensatory techniques. [x]            Patient/family have participated as able in goal setting and plan of care. []            Patient/family agree to work toward stated goals and plan of care. []            Patient understands intent and goals of therapy; neutral about participation. []            Patient unable to participate in goal setting/plan of care; educ ongoing with interdisciplinary staff  [x]         Posted safety precautions in patient's room.     Thank you for this referral.    Ike Enrique M.S. CCC-SLP/L  Speech-Language Pathologist

## 2021-05-12 NOTE — ROUTINE PROCESS
Patient has watery blood coming from rectum during bed change. Called Dr. Blanca Kilpatrick and he advised to call Dr. Robel Garcia. Spoke with Dr. Robel Garcia, he stated to completely stop anticoagulation therapy, continue infusing blood, and monitor patient for the moment. Will recheck blood level in the morning. He stated that this is a combination from the surgery, coloscopy, and anticoagulation therapy.

## 2021-05-12 NOTE — ROUTINE PROCESS
Patient's H&H is below 7, called MD. MD ordered STAT H&H and type and screen. I called phlebotomy to get an ETA, they will be up on the floor in a few minutes.

## 2021-05-12 NOTE — PROGRESS NOTES
Shift Summary Note:  Assumed care of patient awake, c/o of nausea x1 with zofran given  Relief noted and patient drinking small amount of fluids Patient eliminated large stool from rectum and had a lot of air in ostomy bag. Afterwards patient stated \" I feel a lot better'\" Dressing change to sacral decub. Lee draining small amounts of urine and urine noted coming out penis x1 around lee catheter. No complaints of pain, call bell within reach.   Patient Vitals for the past 12 hrs:   Temp Pulse Resp BP SpO2   05/12/21 0501 97.2 °F (36.2 °C) 80 16 (!) 145/83 100 %   05/11/21 2337 99.4 °F (37.4 °C) 77 16 (!) 155/73 100 %   05/11/21 2019 98.8 °F (37.1 °C) 76 16 (!) 154/84 99 %

## 2021-05-12 NOTE — ROUTINE PROCESS
Bedside and Verbal shift change report given to 1812 Joe Peoples (oncoming nurse) by Andrey Lugo RN (offgoing nurse). Report included the following information SBAR, Kardex, Intake/Output, MAR and Recent Results.

## 2021-05-12 NOTE — PROGRESS NOTES
Patient seen and examined. He is doing well. He had no issues overnight. He had a large multiple bowel movements through his colostomy as well as through his rectum. He is tolerating diet. His vitals are normal with no fever or tachycardia. His WBC is decreasing slightly. His creatinine is improving. The nurse told me that she has changed the dressing on his sacral decubitus ulcer and there is no evidence of any bleeding. His abdomen is soft and nontender and nondistended and his ostomy is functioning. Plan:  Appreciate medicine management  Follow-up nephrology recommendations. Patient is doing well and his creatinine is improving. Follow-up ID recommendations. He has no fever or tachycardia and his WBC is kind of trending down. Follow-up wound care recommendation for sacral decubitus ulcer with wound care changes. Ostomy care. Placement. I will sign off for now. Please call me for any questions.

## 2021-05-12 NOTE — PROGRESS NOTES
Met with patient and mother at bedside. Explained that pt needs iv abx until 6/18. Mother states she does not feel comfortable managing that and they dont have anyone else available to do it. Pt agrees that he needs to go to a facility. Also verbalizes understanding that they will require him to turn over his monthly check to the facility. Gave them a list of accepting facilities and they will make a decision by this afternoon.   Vitaly Patel RN - Outcomes Manager  [phone]

## 2021-05-12 NOTE — PROGRESS NOTES
conducted a Follow up consultation and Spiritual Assessment for Joel Angulo, who is a 61 y.o.,male. The  provided the following Interventions:  Continued the relationship of care and support. Patient is covered with blanket w/ eyes closed but easily aroused upon calling his name. Listened empathically. Patient denies any spiritual or emotional concerns at the moment. Patient confirmed that he is [employer] but [employer] of continued prayer on patient's behalf. Chart reviewed. The following outcomes were achieved:  Patient expressed gratitude for 's visit. Assessment:  There are no further spiritual or Yazidi issues which require Spiritual Care Services interventions at this time. Plan:  Chaplains will continue to follow and will provide pastoral care on an as needed/requested basis.  recommends bedside caregivers page  on duty if patient shows signs of acute spiritual or emotional distress.      [address], [address]   [phone]

## 2021-05-12 NOTE — PROGRESS NOTES
RENAL PROGRESS NOTE        [facility], [place]         Assessment  1. ЕКАТЕРИНА , ATN / AIN 2/2 to Abx Nephrotoxicity   2. Resolved Hyponatremia   3. Infected Sacral dec ulcer   4. Chronic anemia   5. HTN , uncontrolled  6.  fluid collection above the bladder, incidentally seen above bladder    Patient's renal functions appear to be improving gradually  Blood pressure better , at short term goal    Plan   - renal parameters improving slowly  - continue IVF 50 ml / hr d/t poor intake , discussed with nursing to encourage     fluid and protein intake   - Replace Lytes as needed     Discussed with Dr Chad Mora     Please call with questions,    Yeny Garcia MD FASSVETLANA  Cell [phone]  Pager: [phone]                                                                                                                            Subjective:  Patient doing ok ,  he denies any CP / SOB   Sleepy today       Patient Active Problem List   Diagnosis Code    S/P colostomy ([facility] Z93.3    Paraplegia ([facility] G82.20    Sacral decubitus ulcer, stage IV ([facility] L89.154    HTN (hypertension) I10       Current Facility-Administered Medications   Medication Dose Route Frequency Provider Last Rate Last Admin    0.9% sodium chloride infusion 250 mL  250 mL IntraVENous PRN Chun Valles MD        meropenem (MERREM) 500 mg in sterile water (preservative free) 10 mL IV syringe  0.5 g IntraVENous Q8H Melody Alvares MD        amLODIPine (NORVASC) tablet 10 mg  10 mg Oral DAILY Ana Crisostomo MD   10 mg at 05/12/21 0944    hydrALAZINE (APRESOLINE) 20 mg/mL injection 10 mg  10 mg IntraVENous Q6H PRN Chun Valles MD   10 mg at 05/09/21 1208    [Held by provider] heparin (porcine) injection 5,000 Units  5,000 Units SubCUTAneous Q8H Candace Vargas MD   5,000 Units at 05/12/21 1535    ondansetron (ZOFRAN) injection 4 mg  4 mg IntraVENous Q6H PRN Chun Valles MD   4 mg at 05/11/21 2094    HYDROmorphone (DILAUDID) syringe 0.5 mg  0.5 mg IntraVENous Q4H PRN Yoselin Thompson MD        naloxone (NARCAN) injection 0.4 mg  0.4 mg IntraVENous PRN Yoselin Thompson MD        acetaminophen (TYLENOL) tablet 650 mg  650 mg Oral Q6H PRN Hilda Kearney MD        0.9% sodium chloride infusion  50 mL/hr IntraVENous CONTINUOUS Pippa Babcock MD 50 mL/hr at 05/11/21 2142 50 mL/hr at 05/11/21 2142    docusate (COLACE) 50 mg/5 mL oral liquid 100 mg  100 mg Oral DAILY Hilda Kearney MD   100 mg at 05/04/21 1014    bisacodyL (DULCOLAX) tablet 5 mg  5 mg Oral DAILY PRN Hilda Kearney MD        escitalopram oxalate (LEXAPRO) tablet 10 mg  10 mg Oral DAILY Hilda Kearney MD   10 mg at 05/12/21 0944    oxyCODONE-acetaminophen (PERCOCET) 5-325 mg per tablet 1 Tab  1 Tab Oral Q4H PRN Hilda Kearney MD   1 Tab at 05/04/21 0331       Objective  Vitals:    05/12/21 1352 05/12/21 1449 05/12/21 1543 05/12/21 1556   BP: (!) 148/79 (!) 143/80 (!) 151/80 (!) 143/80   Pulse: 86 84 86 86   Resp: 16 16 16 18   Temp: 97.6 °F (36.4 °C) 97.3 °F (36.3 °C) 97.7 °F (36.5 °C) 97.2 °F (36.2 °C)   SpO2: 100% 100% 100% 100%   Weight:       Height:             Intake/Output Summary (Last 24 hours) at 5/12/2021 1656  Last data filed at 5/12/2021 1506  Gross per 24 hour   Intake 1284.5 ml   Output 775 ml   Net 509.5 ml           Admission weight: Weight: 113.4 kg (250 lb) (04/27/21 1058)  Last Weight Metrics:  Weight Loss Metrics 5/12/2021 4/29/2021 4/16/2021 4/12/2021 1/7/2021 6/17/2019 3/23/2019   Today's Wt 240 lb - 250 lb - 240 lb 240 lb 209 lb   BMI - 30.81 kg/m2 32.1 kg/m2 32.55 kg/m2 32.55 kg/m2 32.55 kg/m2 28.35 kg/m2             Physical Assessment:     General: sleepy   Neck: No jvd. LUNGS: Clear to Auscultation, No rales, rhonchi or wheezes. CVS EXM: S1, S2  RRR, no murmurs/gallops/rubs. Abdomen: soft, non tender. Lower Extremities:  no edema.      Lab    CBC w/Diff Recent Labs     05/12/21  0900 05/12/21  0240 05/11/21  0241 05/10/21  0308 05/10/21  0308   WBC  --  17.0* 19.2*  --  13.6*   RBC  --  2.21* 2.89*  --  2.80*   HGB 6.7* 6.2* 8.1*   < > 7.8*   HCT 21.0* 19.9* 26.4*   < > 24.9*   PLT  --  429* 467*  --  491*   GRANS  --  83* 86*  --  80*   LYMPH  --  11* 10*  --  11*   EOS  --  1 1  --  2    < > = values in this interval not displayed.         Chemistry Recent Labs     05/12/21  0240 05/11/21  0241 05/10/21  0308   * 105* 90    142 141   K 3.9 4.4 4.1   * 115* 114*   CO2 22 23 21   BUN 28* 28* 28*   CREA 2.93* 3.27* 3.55*   CA 7.7* 7.9* 7.5*   AGAP 7 4 6   BUCR 10* 9* 8*   PHOS  --   --  3.8         No results found for: IRON, FE, TIBC, IBCT, PSAT, FERR   Lab Results   Component Value Date/Time    Calcium 7.7 (L) 05/12/2021 02:40 AM    Phosphorus 3.8 05/10/2021 03:08 AM          Dionte Verduzco MD  5/12/2021  7:57 AM

## 2021-05-12 NOTE — PROGRESS NOTES
Infectious Disease progress Note        Reason: infected decubiti ulcer    Current abx Prior abx     Meropenem since 5/6 vancomycin 5/1-5/4  Piperacillin/tazobactam since 5/1-5/6     Lines:       Assessment :    61year old man with h/o HTN, paraplegia admitted to [facility] on 4/29/21 for evaluation of infected sacral decubiti. Clinical presentation c/w acute on chronic sacral osteomyelitis, infected sacral decubiti    S/p surgical debridement,  robotic colostomy on 4/29/2021    Persistent leukocytosis -likely due to partially treated infection due to resistant pathogens. wound cultures 5/3-E. coli, providencia (resistant to piperacillin/tazobactam)  Antibiotics switched to meropenem on 5/6/2021. Improved leukocytosis    Protrusion of the small bowel around the colostomy causing bowel ischemia s/p expl. laparotomy with small bowel resection, partial colectomy/revision of colostomy on 5/4/21    Acute kidney injury-likely multifactorial.  Discussed with nephrologist.  Concerns for antibiotic associated interstitial nephritis-gradually improving creatinine    Tolerating liquid diet- poor po intake. Worsening leukocytosis 19 K on 5/11- ? Due to undiagnosed GI bleed-drop in hemoglobin to 6.2 noted on today's labs. Improved leukocytosis. No clinical evidence of new infection    Recommendations:    1. Continue meropenem-adjust dose per changing renal function-continue meropenem till 6/18/21  2. Follow-up nephrology recommendations regarding ЕКАТЕРИНА   3.  start making arrangements for outpatient IV antibiotics. 4.  Will need good wound care to aid healing and prevent future infectious complications  5. Colostomy care per surgery team  6. Encourage p.o. diet    Home health orders on chart (click on \"chart review, other orders, IP home health)    Above plan was discussed in details with patient, dr. Lolis Jansen. Please call me if any further questions or concerns.  Will continue to participate in the care of this patient. HPI:       patient denied any chest pain, shortness of breath, abdominal pain. Current Discharge Medication List      CONTINUE these medications which have NOT CHANGED    Details   lisinopril-hydroCHLOROthiazide (PRINZIDE, ZESTORETIC) 20-12.5 mg per tablet TAKE 1 TABLET EVERY DAY  Refills: 3      ergocalciferol (ERGOCALCIFEROL) 1,250 mcg (50,000 unit) capsule       ciprofloxacin HCl (CIPRO) 250 mg tablet Take 1 Tab by mouth two (2) times a day. Qty: 30 Tab, Refills: 2      tamsulosin (FLOMAX) 0.4 mg capsule TAKE 1 CAPSULE BY MOUTH EVERY DAY  Refills: 1      naloxone (NARCAN) 2 mg/actuation spry Use 1 spray intranasally into 1 nostril. Use a new Narcan nasal spray for subsequent doses and administer into alternating nostrils. May repeat every 2 to 3 minutes as needed. Qty: 2 Actuation(s), Refills: 0      furosemide (LASIX) 20 mg tablet TAKE 1 TABLET BY MOUTH DAILY AS NEEDED FOR SWELLING  Refills: 3      MULTIVIT-MINERALS/FOLIC ACID (SPECTRAVITE ADULT PO) Take  by mouth.      escitalopram oxalate (LEXAPRO) 10 mg tablet Take 10 mg by mouth daily.       acetaminophen (TYLENOL) 325 mg tablet TAKE 2 TABLETS BY MOUTH EVERY 4 HOURS AS NEEDED FOR PAIN  Refills: 2             Current Facility-Administered Medications   Medication Dose Route Frequency    amLODIPine (NORVASC) tablet 10 mg  10 mg Oral DAILY    hydrALAZINE (APRESOLINE) 20 mg/mL injection 10 mg  10 mg IntraVENous Q6H PRN    [Held by provider] heparin (porcine) injection 5,000 Units  5,000 Units SubCUTAneous Q8H    ondansetron (ZOFRAN) injection 4 mg  4 mg IntraVENous Q6H PRN    meropenem (MERREM) 500 mg in sterile water (preservative free) 10 mL IV syringe  0.5 g IntraVENous Q12H    HYDROmorphone (DILAUDID) syringe 0.5 mg  0.5 mg IntraVENous Q4H PRN    naloxone (NARCAN) injection 0.4 mg  0.4 mg IntraVENous PRN    acetaminophen (TYLENOL) tablet 650 mg  650 mg Oral Q6H PRN    0.9% sodium chloride infusion  50 mL/hr IntraVENous CONTINUOUS  docusate (COLACE) 50 mg/5 mL oral liquid 100 mg  100 mg Oral DAILY    bisacodyL (DULCOLAX) tablet 5 mg  5 mg Oral DAILY PRN    escitalopram oxalate (LEXAPRO) tablet 10 mg  10 mg Oral DAILY    oxyCODONE-acetaminophen (PERCOCET) 5-325 mg per tablet 1 Tab  1 Tab Oral Q4H PRN       Allergies: Patient has no known allergies. Temp (24hrs), Av.4 °F (36.9 °C), Min:97.2 °F (36.2 °C), Max:99.4 °F (37.4 °C)    Visit Vitals  BP (!) 157/93   Pulse 81   Temp 98.2 °F (36.8 °C)   Resp 16   Ht 6' 2\" (1.88 m)   Wt 113.4 kg (250 lb)   SpO2 100%   BMI 32.10 kg/m²       ROS: 12 point ROS obtained in details. Pertinent positives as mentioned in HPI,   otherwise negative    Physical Exam:    General: Well developed, well nourished male laying on the bed, sleepy, in no acute distress. General:   awake alert and oriented   HEENT:  Normocephalic, atraumatic, EOMI, no scleral icterus or pallor; no conjunctival hemmohage;  nasal and oral mucous are moist and without evidence of lesions. No thrush. Dentition good. Neck supple, no bruits. Lymph Nodes:   no cervical, axillary or inguinal adenopathy   Lungs:   non-labored, bilaterally clear to auscultation- no crackles wheezes rales or rhonchi   Heart:  RRR, s1 and s2; no rubs or gallops, no edema   Abdomen:  soft, non-distended, active bowel sounds, no hepatomegaly, no splenomegaly. Colostomy in place. Non-tender   Genitourinary:  deferred   Extremities:   no clubbing, cyanosis; no joint effusions or swelling; Full ROM of all large joints to the upper and lower extremities; muscle mass appropriate for age   Neurologic:  Paraplegia. Speech appropriate.  Cranial nerves intact                        Skin:  Surgical changes back   Back:  wound vac recent surgical wound     Psychiatric:  No suicidal or homicidal ideations, appropriate mood and affect         Labs: Results:   Chemistry Recent Labs     21  0240 21  0241 05/10/21  0308   * 105* 90    142 141 K 3.9 4.4 4.1   * 115* 114*   CO2 22 23 21   BUN 28* 28* 28*   CREA 2.93* 3.27* 3.55*   CA 7.7* 7.9* 7.5*   AGAP 7 4 6   BUCR 10* 9* 8*      CBC w/Diff Recent Labs     05/12/21  0900 05/12/21  0240 05/11/21  0241 05/10/21  0308 05/10/21  0308   WBC  --  17.0* 19.2*  --  13.6*   RBC  --  2.21* 2.89*  --  2.80*   HGB 6.7* 6.2* 8.1*   < > 7.8*   HCT 21.0* 19.9* 26.4*   < > 24.9*   PLT  --  429* 467*  --  491*   GRANS  --  83* 86*  --  80*   LYMPH  --  11* 10*  --  11*   EOS  --  1 1  --  2    < > = values in this interval not displayed. Microbiology No results for input(s): CULT in the last 72 hours. RADIOLOGY:    All available imaging studies/reports in [facility] care for this admission were reviewed      Disclaimer: Sections of this note are dictated utilizing voice recognition software, which may have resulted in some phonetic based errors in grammar and contents. Even though attempts were made to correct all the mistakes, some may have been missed, and remained in the body of the document. If questions arise, please contact our department.     Dr. Rj Perdomo, Infectious Disease Specialist  [phone]  May 12, 2021  8:39 PM

## 2021-05-13 LAB
AMPHET UR QL SCN: NEGATIVE
ANION GAP SERPL CALC-SCNC: 6 MMOL/L (ref 3–18)
BARBITURATES UR QL SCN: NEGATIVE
BASOPHILS # BLD: 0.1 K/UL (ref 0–0.1)
BASOPHILS NFR BLD: 0 % (ref 0–2)
BENZODIAZ UR QL: NEGATIVE
BUN SERPL-MCNC: 23 MG/DL (ref 7–18)
BUN/CREAT SERPL: 8 (ref 12–20)
CALCIUM SERPL-MCNC: 7.3 MG/DL (ref 8.5–10.1)
CANNABINOIDS UR QL SCN: NEGATIVE
CHLORIDE SERPL-SCNC: 114 MMOL/L (ref 100–111)
CO2 SERPL-SCNC: 23 MMOL/L (ref 21–32)
COCAINE UR QL SCN: NEGATIVE
CREAT SERPL-MCNC: 2.78 MG/DL (ref 0.6–1.3)
DIFFERENTIAL METHOD BLD: ABNORMAL
EOSINOPHIL # BLD: 0.3 K/UL (ref 0–0.4)
EOSINOPHIL NFR BLD: 2 % (ref 0–5)
ERYTHROCYTE [DISTWIDTH] IN BLOOD BY AUTOMATED COUNT: 15.9 % (ref 11.6–14.5)
GLUCOSE SERPL-MCNC: 94 MG/DL (ref 74–99)
HCT VFR BLD AUTO: 20.9 % (ref 36–48)
HCT VFR BLD AUTO: 20.9 % (ref 36–48)
HCT VFR BLD AUTO: 21.9 % (ref 36–48)
HCT VFR BLD AUTO: 23.9 % (ref 36–48)
HDSCOM,HDSCOM: ABNORMAL
HGB BLD-MCNC: 6.5 G/DL (ref 13–16)
HGB BLD-MCNC: 6.8 G/DL (ref 13–16)
HGB BLD-MCNC: 7.1 G/DL (ref 13–16)
HGB BLD-MCNC: 7.7 G/DL (ref 13–16)
HISTORY CHECKED?,CKHIST: NORMAL
LYMPHOCYTES # BLD: 1.9 K/UL (ref 0.9–3.6)
LYMPHOCYTES NFR BLD: 11 % (ref 21–52)
MAGNESIUM SERPL-MCNC: 1.7 MG/DL (ref 1.6–2.6)
MCH RBC QN AUTO: 28.3 PG (ref 24–34)
MCHC RBC AUTO-ENTMCNC: 32.5 G/DL (ref 31–37)
MCV RBC AUTO: 87.1 FL (ref 74–97)
METHADONE UR QL: NEGATIVE
MONOCYTES # BLD: 0.8 K/UL (ref 0.05–1.2)
MONOCYTES NFR BLD: 5 % (ref 3–10)
NEUTS SEG # BLD: 14.1 K/UL (ref 1.8–8)
NEUTS SEG NFR BLD: 81 % (ref 40–73)
OPIATES UR QL: POSITIVE
PCP UR QL: NEGATIVE
PHOSPHATE SERPL-MCNC: 3.5 MG/DL (ref 2.5–4.9)
PLATELET # BLD AUTO: 389 K/UL (ref 135–420)
PMV BLD AUTO: 9.4 FL (ref 9.2–11.8)
POTASSIUM SERPL-SCNC: 3.7 MMOL/L (ref 3.5–5.5)
RBC # BLD AUTO: 2.4 M/UL (ref 4.35–5.65)
SODIUM SERPL-SCNC: 143 MMOL/L (ref 136–145)
WBC # BLD AUTO: 17.4 K/UL (ref 4.6–13.2)

## 2021-05-13 PROCEDURE — P9016 RBC LEUKOCYTES REDUCED: HCPCS

## 2021-05-13 PROCEDURE — 36430 TRANSFUSION BLD/BLD COMPNT: CPT

## 2021-05-13 PROCEDURE — 80307 DRUG TEST PRSMV CHEM ANLYZR: CPT

## 2021-05-13 PROCEDURE — 74011250636 HC RX REV CODE- 250/636: Performed by: HOSPITALIST

## 2021-05-13 PROCEDURE — 87205 SMEAR GRAM STAIN: CPT

## 2021-05-13 PROCEDURE — 74011000250 HC RX REV CODE- 250: Performed by: INTERNAL MEDICINE

## 2021-05-13 PROCEDURE — 74011250637 HC RX REV CODE- 250/637: Performed by: SURGERY

## 2021-05-13 PROCEDURE — 77030013076 HC PCH OST BAG COLO -A

## 2021-05-13 PROCEDURE — 65270000029 HC RM PRIVATE

## 2021-05-13 PROCEDURE — 84100 ASSAY OF PHOSPHORUS: CPT

## 2021-05-13 PROCEDURE — 83735 ASSAY OF MAGNESIUM: CPT

## 2021-05-13 PROCEDURE — 36415 COLL VENOUS BLD VENIPUNCTURE: CPT

## 2021-05-13 PROCEDURE — 99233 SBSQ HOSP IP/OBS HIGH 50: CPT | Performed by: HOSPITALIST

## 2021-05-13 PROCEDURE — 74011250637 HC RX REV CODE- 250/637: Performed by: INTERNAL MEDICINE

## 2021-05-13 PROCEDURE — 80048 BASIC METABOLIC PNL TOTAL CA: CPT

## 2021-05-13 PROCEDURE — 2709999900 HC NON-CHARGEABLE SUPPLY

## 2021-05-13 PROCEDURE — 85018 HEMOGLOBIN: CPT

## 2021-05-13 PROCEDURE — 85025 COMPLETE CBC W/AUTO DIFF WBC: CPT

## 2021-05-13 PROCEDURE — 74011250636 HC RX REV CODE- 250/636: Performed by: INTERNAL MEDICINE

## 2021-05-13 RX ORDER — SODIUM CHLORIDE 9 MG/ML
500 INJECTION, SOLUTION INTRAVENOUS ONCE
Status: COMPLETED | OUTPATIENT
Start: 2021-05-13 | End: 2021-05-13

## 2021-05-13 RX ORDER — SODIUM CHLORIDE 9 MG/ML
250 INJECTION, SOLUTION INTRAVENOUS AS NEEDED
Status: DISCONTINUED | OUTPATIENT
Start: 2021-05-13 | End: 2021-05-22 | Stop reason: HOSPADM

## 2021-05-13 RX ORDER — SILVER SULFADIAZINE 10 G/1000G
CREAM TOPICAL DAILY
Status: DISCONTINUED | OUTPATIENT
Start: 2021-05-14 | End: 2021-05-22 | Stop reason: HOSPADM

## 2021-05-13 RX ADMIN — AMLODIPINE BESYLATE 10 MG: 10 TABLET ORAL at 10:39

## 2021-05-13 RX ADMIN — SODIUM CHLORIDE 500 ML: 900 INJECTION, SOLUTION INTRAVENOUS at 11:57

## 2021-05-13 RX ADMIN — MEROPENEM 500 MG: 500 INJECTION INTRAVENOUS at 04:05

## 2021-05-13 RX ADMIN — MEROPENEM 500 MG: 500 INJECTION INTRAVENOUS at 20:46

## 2021-05-13 RX ADMIN — ESCITALOPRAM OXALATE 10 MG: 10 TABLET ORAL at 10:39

## 2021-05-13 NOTE — PROGRESS NOTES
Nutrition Note    Diet advanced to solids yesterday however pt continues with poor po intake on IVF, consuming mainly juices. Pt unable to provide reasoning as to why intake has been poor, denies diet intolerance- no nausea or emesis. Pt in low spirits and noted bleeding from sacral decubital ulcer. Large BM via colostomy and rectum yesterday. NS at 50 mL/hr. Discussed poor po intake with MD, plan for calorie count. Discussed with nursing and form hanging in pt's room on white board. Pt not progressing towards nutritional goals. Nutrition Recommendations/Plan:   - Start 3 day calorie count 5/14-5/16.  - Liberalize diet, change to regular to encourage po intake. - Monitor and encourage po intake as tolerated.   - Continue Ensure Willis Bowels, BID & Magic Cup once daily  - IVF per MD.    Electronically signed by Ella Warren RD on 5/13/2021 at 3:42 PM    Contact: [phone]
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
## 2021-05-13 NOTE — PROGRESS NOTES
Problem: Falls - Risk of  Goal: *Absence of Falls  Description: Document Burrell Canavan Fall Risk and appropriate interventions in the flowsheet. Outcome: Progressing Towards Goal  Note: Fall Risk Interventions:  Mobility Interventions: Bed/chair exit alarm, Patient to call before getting OOB, Strengthening exercises (ROM-active/passive), Utilize walker, cane, or other assistive device    Mentation Interventions: Bed/chair exit alarm    Medication Interventions: Bed/chair exit alarm    Elimination Interventions: Bed/chair exit alarm, Call light in reach              Problem: Patient Education: Go to Patient Education Activity  Goal: Patient/Family Education  Outcome: Progressing Towards Goal     Problem: Pressure Injury - Risk of  Goal: *Prevention of pressure injury  Description: Document Jordin Scale and appropriate interventions in the flowsheet.   Outcome: Progressing Towards Goal  Note: Pressure Injury Interventions:  Sensory Interventions: Assess changes in LOC, Keep linens dry and wrinkle-free, Minimize linen layers    Moisture Interventions: Absorbent underpads, Apply protective barrier, creams and emollients, Check for incontinence Q2 hours and as needed, Internal/External urinary devices, Maintain skin hydration (lotion/cream), Minimize layers, Moisture barrier    Activity Interventions: Increase time out of bed, Pressure redistribution bed/mattress(bed type), PT/OT evaluation    Mobility Interventions: Pressure redistribution bed/mattress (bed type), PT/OT evaluation, Float heels    Nutrition Interventions: Document food/fluid/supplement intake    Friction and Shear Interventions: Feet elevated on foot rest, Apply protective barrier, creams and emollients, Foam dressings/transparent film/skin sealants, Minimize layers                Problem: Patient Education: Go to Patient Education Activity  Goal: Patient/Family Education  Outcome: Progressing Towards Goal     Problem: Nutrition Deficit  Goal: *Optimize nutritional status  Outcome: Not Progressing Towards Goal     Problem: Impaired Skin Integrity/Pressure Injury Treatment  Goal: *Improvement of Existing Pressure Injury  Outcome: Progressing Towards Goal  Goal: *Prevention of pressure injury  Description: Document Jordin Scale and appropriate interventions in the flowsheet.   Outcome: Progressing Towards Goal  Note: Pressure Injury Interventions:  Sensory Interventions: Assess changes in LOC, Keep linens dry and wrinkle-free, Minimize linen layers    Moisture Interventions: Absorbent underpads, Apply protective barrier, creams and emollients, Check for incontinence Q2 hours and as needed, Internal/External urinary devices, Maintain skin hydration (lotion/cream), Minimize layers, Moisture barrier    Activity Interventions: Increase time out of bed, Pressure redistribution bed/mattress(bed type), PT/OT evaluation    Mobility Interventions: Pressure redistribution bed/mattress (bed type), PT/OT evaluation, Float heels    Nutrition Interventions: Document food/fluid/supplement intake    Friction and Shear Interventions: Feet elevated on foot rest, Apply protective barrier, creams and emollients, Foam dressings/transparent film/skin sealants, Minimize layers                Problem: Patient Education: Go to Patient Education Activity  Goal: Patient/Family Education  Outcome: Progressing Towards Goal     Problem: Hypertension  Goal: *Blood pressure within specified parameters  Outcome: Progressing Towards Goal  Goal: *Fluid volume balance  Outcome: Progressing Towards Goal  Goal: *Labs within defined limits  Outcome: Progressing Towards Goal     Problem: Patient Education: Go to Patient Education Activity  Goal: Patient/Family Education  Outcome: Progressing Towards Goal     Problem: Patient Education: Go to Patient Education Activity  Goal: Patient/Family Education  Outcome: Progressing Towards Goal     Problem: Patient Education: Go to Patient Education Activity  Goal: Patient/Family Education  Outcome: Progressing Towards Goal     Problem: Ostomy Care  Goal: *Patient pouching appliance will fit properly and maintain integrity at least three to five days  Description: Infection control procedures (eg, clean dressings, clean gloves, hand washing, precautions to isolate wound from contamination, sterile instruments used for wound debridement) should be implemented.   Outcome: Progressing Towards Goal  Goal: *Acceptance of change in body image  Outcome: Progressing Towards Goal     Problem: Patient Education: Go to Patient Education Activity  Goal: Patient/Family Education  Outcome: Progressing Towards Goal     Problem: Patient Education: Go to Patient Education Activity  Goal: Patient/Family Education  Outcome: Progressing Towards Goal     Problem: Patient Education: Go to Patient Education Activity  Goal: Patient/Family Education  Outcome: Progressing Towards Goal

## 2021-05-13 NOTE — PROGRESS NOTES
Infectious Disease progress Note        Reason: infected decubiti ulcer    Current abx Prior abx     Meropenem since 5/6 vancomycin 5/1-5/4  Piperacillin/tazobactam since 5/1-5/6     Lines:       Assessment :    61year old man with h/o HTN, paraplegia admitted to [facility] on 4/29/21 for evaluation of infected sacral decubiti. Clinical presentation c/w acute on chronic sacral osteomyelitis, infected sacral decubiti    S/p surgical debridement,  robotic colostomy on 4/29/2021    Persistent leukocytosis -likely due to partially treated infection due to resistant pathogens. wound cultures 5/3-E. coli, providencia (resistant to piperacillin/tazobactam)  Antibiotics switched to meropenem on 5/6/2021. Improved leukocytosis    Protrusion of the small bowel around the colostomy causing bowel ischemia s/p expl. laparotomy with small bowel resection, partial colectomy/revision of colostomy on 5/4/21    Acute kidney injury-likely multifactorial.  Discussed with nephrologist.  Concerns for antibiotic associated interstitial nephritis-gradually improving creatinine    Tolerating liquid diet- poor po intake. Worsening leukocytosis 19 K on 5/11- likely leukemoid reaction to bleed from sacral wound-drop in hemoglobin to 6.2 noted on 5/12, 5/13 labs. Improved leukocytosis. No clinical evidence of new infection    Recommendations:    1. Continue meropenem-adjust dose per changing renal function-continue meropenem till 6/18/21  2. Follow-up nephrology recommendations regarding ЕКАТЕРИНА   3.  start making arrangements for outpatient IV antibiotics. 4.  Will need good wound care to aid healing and prevent future infectious complications  5. F/u surgery recommendations about bleeding from sacral wound  6. F/u cbc, clinically    Home health orders on chart (click on \"chart review, other orders, IP home health)    Above plan was discussed in details with patient, dr. Michelle Dixon. Please call me if any further questions or concerns.  Will continue to participate in the care of this patient. HPI:       patient denied any chest pain, shortness of breath, abdominal pain. Current Discharge Medication List      CONTINUE these medications which have NOT CHANGED    Details   lisinopril-hydroCHLOROthiazide (PRINZIDE, ZESTORETIC) 20-12.5 mg per tablet TAKE 1 TABLET EVERY DAY  Refills: 3      ergocalciferol (ERGOCALCIFEROL) 1,250 mcg (50,000 unit) capsule       ciprofloxacin HCl (CIPRO) 250 mg tablet Take 1 Tab by mouth two (2) times a day. Qty: 30 Tab, Refills: 2      tamsulosin (FLOMAX) 0.4 mg capsule TAKE 1 CAPSULE BY MOUTH EVERY DAY  Refills: 1      naloxone (NARCAN) 2 mg/actuation spry Use 1 spray intranasally into 1 nostril. Use a new Narcan nasal spray for subsequent doses and administer into alternating nostrils. May repeat every 2 to 3 minutes as needed. Qty: 2 Actuation(s), Refills: 0      furosemide (LASIX) 20 mg tablet TAKE 1 TABLET BY MOUTH DAILY AS NEEDED FOR SWELLING  Refills: 3      MULTIVIT-MINERALS/FOLIC ACID (SPECTRAVITE ADULT PO) Take  by mouth.      escitalopram oxalate (LEXAPRO) 10 mg tablet Take 10 mg by mouth daily.       acetaminophen (TYLENOL) 325 mg tablet TAKE 2 TABLETS BY MOUTH EVERY 4 HOURS AS NEEDED FOR PAIN  Refills: 2             Current Facility-Administered Medications   Medication Dose Route Frequency    0.9% sodium chloride infusion 250 mL  250 mL IntraVENous PRN    meropenem (MERREM) 500 mg in sterile water (preservative free) 10 mL IV syringe  0.5 g IntraVENous Q8H    amLODIPine (NORVASC) tablet 10 mg  10 mg Oral DAILY    hydrALAZINE (APRESOLINE) 20 mg/mL injection 10 mg  10 mg IntraVENous Q6H PRN    [Held by provider] heparin (porcine) injection 5,000 Units  5,000 Units SubCUTAneous Q8H    ondansetron (ZOFRAN) injection 4 mg  4 mg IntraVENous Q6H PRN    HYDROmorphone (DILAUDID) syringe 0.5 mg  0.5 mg IntraVENous Q4H PRN    naloxone (NARCAN) injection 0.4 mg  0.4 mg IntraVENous PRN    acetaminophen (TYLENOL) tablet 650 mg  650 mg Oral Q6H PRN    0.9% sodium chloride infusion  50 mL/hr IntraVENous CONTINUOUS    docusate (COLACE) 50 mg/5 mL oral liquid 100 mg  100 mg Oral DAILY    bisacodyL (DULCOLAX) tablet 5 mg  5 mg Oral DAILY PRN    escitalopram oxalate (LEXAPRO) tablet 10 mg  10 mg Oral DAILY    oxyCODONE-acetaminophen (PERCOCET) 5-325 mg per tablet 1 Tab  1 Tab Oral Q4H PRN       Allergies: Patient has no known allergies. Temp (24hrs), Av.9 °F (36.6 °C), Min:97.2 °F (36.2 °C), Max:99.3 °F (37.4 °C)    Visit Vitals  BP (!) 156/81 (BP 1 Location: Left upper arm, BP Patient Position: At rest)   Pulse 89   Temp 99.3 °F (37.4 °C)   Resp 16   Ht 6' 2\" (1.88 m)   Wt 108.9 kg (240 lb)   SpO2 100%   BMI 30.81 kg/m²       ROS: 12 point ROS obtained in details. Pertinent positives as mentioned in HPI,   otherwise negative    Physical Exam:    General: Well developed, well nourished male laying on the bed, sleepy, in no acute distress. General:   awake alert and oriented   HEENT:  Normocephalic, atraumatic, EOMI, no scleral icterus or pallor; no conjunctival hemmohage;  nasal and oral mucous are moist and without evidence of lesions. No thrush. Dentition good. Neck supple, no bruits. Lymph Nodes:   no cervical, axillary or inguinal adenopathy   Lungs:   non-labored, bilaterally clear to auscultation- no crackles wheezes rales or rhonchi   Heart:  RRR, s1 and s2; no rubs or gallops, no edema   Abdomen:  soft, non-distended, active bowel sounds, no hepatomegaly, no splenomegaly. Colostomy in place. Non-tender   Genitourinary:  deferred   Extremities:   no clubbing, cyanosis; no joint effusions or swelling; Full ROM of all large joints to the upper and lower extremities; muscle mass appropriate for age   Neurologic:  Paraplegia. Speech appropriate.  Cranial nerves intact                        Skin:  Surgical changes back   Back:  wound vac recent surgical wound     Psychiatric:  No suicidal or homicidal ideations, appropriate mood and affect         Labs: Results:   Chemistry Recent Labs     05/13/21  0235 05/12/21  0240 05/11/21  0241   GLU 94 120* 105*    144 142   K 3.7 3.9 4.4   * 115* 115*   CO2 23 22 23   BUN 23* 28* 28*   CREA 2.78* 2.93* 3.27*   CA 7.3* 7.7* 7.9*   AGAP 6 7 4   BUCR 8* 10* 9*      CBC w/Diff Recent Labs     05/13/21  0855 05/13/21  0235 05/12/21  0900 05/12/21  0240 05/11/21  0241   WBC  --  17.4*  --  17.0* 19.2*   RBC  --  2.40*  --  2.21* 2.89*   HGB 7.1* 6.8* 6.7* 6.2* 8.1*   HCT 21.9* 20.9* 21.0* 19.9* 26.4*   PLT  --  389  --  429* 467*   GRANS  --  81*  --  83* 86*   LYMPH  --  11*  --  11* 10*   EOS  --  2  --  1 1      Microbiology No results for input(s): CULT in the last 72 hours. RADIOLOGY:    All available imaging studies/reports in [facility] care for this admission were reviewed      Disclaimer: Sections of this note are dictated utilizing voice recognition software, which may have resulted in some phonetic based errors in grammar and contents. Even though attempts were made to correct all the mistakes, some may have been missed, and remained in the body of the document. If questions arise, please contact our department.     Dr. Stephen Gilmore, Infectious Disease Specialist  [phone]  May 13, 2021  8:39 PM

## 2021-05-13 NOTE — ROUTINE PROCESS
Bedside shift change report given to Heather Paredes RN (oncoming nurse) by Rm Mariee RN (offgoing nurse). Report included the following information SBAR, Kardex, Procedure Summary, Intake/Output, MAR and Recent Results. Opportunity for questions and clarification was provided.

## 2021-05-13 NOTE — ROUTINE PROCESS
End of Shift Note     Bedside and verbal shift change report given to Awilda Arias RN (On coming nurse) by Js Edmonds RN (Off going nurse).   Report included the following information:      --Procedure Summary     --MAR,     --Recent Results     --Med Rec Status    SBAR Recommendations: Dressing    Issues for Provider to address placement          Activity This Shift     [x] Bed Rest Order   [] Refused   [] Dangled    [] TDWB         Ambulating:     [] Bathroom     [] BSC     [] Room/Hallway      Up in Chair for meals    []Yes [] No   Voiding       [x] Yes  [] No  Chavez          [] Yes  [] No  Incontinent [] Yes  [] No    DUE TO VOID POUR        [] Yes [] No  Purewick    [] Yes [] No  New Onset [] Yes [] No Straight Cath   []Yes  [] No  Condom Cath  [x] Yes [] No  MD Called      [] Yes  [] No   Blood Sugars Managed []Yes [x] No    Bowels Moved [x] Yes [] No    Incontinent     [] Yes [] No Passed Gas [x]Yes [] No    New Onset  []Yes [] No        MD Called []Yes  [] No     CHG Bath Done     Before Surgery     After Surgery      [] Yes  [x] No  [] Yes  [x] No       Drain Removed [] Yes  [] No [x] N/A    Dressing Changed [] Yes   [x] No [] N/A      Nausea/Vomiting [] Yes   [x] No     Ice Packs Changed [] Yes   [x] No  [] N/A    Incentive Spirometer  [x] Yes  [] No      SCD Pumps On     Ankle Pumping  [x] Yes   [] No      [] Yes   [x] No        Telemetry Monitoring [] Yes   [x] No   Rhythm
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
## 2021-05-13 NOTE — PROGRESS NOTES
RENAL PROGRESS NOTE        [facility]         Assessment  1. ЕКАТЕРИНА , ATN / AIN 2/2 to Abx Nephrotoxicity   2. Infected Sacral dec ulcer   3. Chronic anemia   4.  HTN , uncontrolled  5. fluid collection above the bladder, incidentally seen above bladder      Plan   - renal parameters improving slowly  - agree with holding amlodipine , allow BP ~ 130-150 SBP in acute setting  - continue IVF 50 ml / hr d/t poor intake  - agree with PRBC   - will Replace Lytes as needed     Discussed with Dr Jabier Benites and nursing    Please call with questions,    Dolly Tinoco MD FASN  Cell [phone]  Pager: [phone]                                                                                                                            Subjective:  he denies any CP / SOB   Not very interactive       Patient Active Problem List   Diagnosis Code    S/P colostomy ([facility] [address] Z93.3    Paraplegia ([facility] G82.20    Sacral decubitus ulcer, stage IV ([facility] L89.154    HTN (hypertension) I10       Current Facility-Administered Medications   Medication Dose Route Frequency Provider Last Rate Last Admin    0.9% sodium chloride infusion 250 mL  250 mL IntraVENous PRN Christine Art MD        [START ON 5/14/2021] silver sulfADIAZINE (SILVADENE) 1 % topical cream   Topical DAILY Everardo Lab, DPM        0.9% sodium chloride infusion 250 mL  250 mL IntraVENous PRN Ester Vargas MD        meropenem (MERREM) 500 mg in sterile water (preservative free) 10 mL IV syringe  0.5 g IntraVENous Q8H Emilee TROY MD   500 mg at 05/13/21 0405    [Held by provider] amLODIPine (NORVASC) tablet 10 mg  10 mg Oral DAILY Refugio Crisostomo MD   10 mg at 05/13/21 1039    hydrALAZINE (APRESOLINE) 20 mg/mL injection 10 mg  10 mg IntraVENous Q6H PRN Christine Art MD   10 mg at 05/09/21 1208    [Held by provider] heparin (porcine) injection 5,000 Units  5,000 Units SubCUTAneous Q8H Maylin Vargas MD   5,000 Units at 05/12/21 0533    ondansetron (ZOFRAN) injection 4 mg  4 mg IntraVENous Q6H PRN Luis Yadav MD   4 mg at 05/11/21 2235    HYDROmorphone (DILAUDID) syringe 0.5 mg  0.5 mg IntraVENous Q4H PRN Carla Mckeon MD        naloxone [facility]) injection 0.4 mg  0.4 mg IntraVENous PRN Carla Mckeon MD        acetaminophen (TYLENOL) tablet 650 mg  650 mg Oral Q6H PRN Francesca Sanderson MD        0.9% sodium chloride infusion  50 mL/hr IntraVENous CONTINUOUS Sade Flaherty MD 50 mL/hr at 05/12/21 2015 50 mL/hr at 05/12/21 2015    docusate (COLACE) 50 mg/5 mL oral liquid 100 mg  100 mg Oral DAILY Francesca Sanderson MD   100 mg at 05/04/21 1014    bisacodyL (DULCOLAX) tablet 5 mg  5 mg Oral DAILY PRN Francesca Sanderson MD        escitalopram oxalate (LEXAPRO) tablet 10 mg  10 mg Oral DAILY Francesca Sanderson MD   10 mg at 05/13/21 1039    oxyCODONE-acetaminophen (PERCOCET) 5-325 mg per tablet 1 Tab  1 Tab Oral Q4H PRN Francesca Sanderson MD   1 Tab at 05/04/21 0331       Objective  Vitals:    05/13/21 1247 05/13/21 1321 05/13/21 1409 05/13/21 1506   BP: 124/74 127/71 (!) 147/90 139/83   Pulse: 83 81 81 83   Resp: 16 16 16 16   Temp: 98.9 °F (37.2 °C) 97.9 °F (36.6 °C) 98.2 °F (36.8 °C) 97 °F (36.1 °C)   SpO2: 99% 100% 100% 100%   Weight:       Height:             Intake/Output Summary (Last 24 hours) at 5/13/2021 1639  Last data filed at 5/13/2021 1344  Gross per 24 hour   Intake 3064.17 ml   Output 1250 ml   Net 1814.17 ml           Admission weight: Weight: 113.4 kg (250 lb) (04/27/21 1058)  Last Weight Metrics:  Weight Loss Metrics 5/12/2021 4/29/2021 4/16/2021 4/12/2021 1/7/2021 6/17/2019 3/23/2019   Today's Wt 240 lb - 250 lb - 240 lb 240 lb 209 lb   BMI - 30.81 kg/m2 32.1 kg/m2 32.55 kg/m2 32.55 kg/m2 32.55 kg/m2 28.35 kg/m2             Physical Assessment:     General: sleepy   Neck: No jvd. LUNGS: Clear to Auscultation, No rales, rhonchi or wheezes.   CVS EXM: S1, S2  RRR, no murmurs/gallops/rubs. Abdomen: soft, non tender. Lower Extremities:  no edema. Lab    CBC w/Diff Recent Labs     05/13/21  1203 05/13/21  0855 05/13/21 0235 05/12/21 0240 05/12/21 0240 05/11/21 0241   WBC  --   --  17.4*  --  17.0* 19.2*   RBC  --   --  2.40*  --  2.21* 2.89*   HGB 6.5* 7.1* 6.8*   < > 6.2* 8.1*   HCT 20.9* 21.9* 20.9*   < > 19.9* 26.4*   PLT  --   --  389  --  429* 467*   GRANS  --   --  81*  --  83* 86*   LYMPH  --   --  11*  --  11* 10*   EOS  --   --  2  --  1 1    < > = values in this interval not displayed.         Chemistry Recent Labs     05/13/21 0235 05/12/21 0240 05/11/21 0241   GLU 94 120* 105*    144 142   K 3.7 3.9 4.4   * 115* 115*   CO2 23 22 23   BUN 23* 28* 28*   CREA 2.78* 2.93* 3.27*   CA 7.3* 7.7* 7.9*   AGAP 6 7 4   BUCR 8* 10* 9*   PHOS 3.5  --   --          No results found for: IRON, FE, TIBC, IBCT, PSAT, FERR   Lab Results   Component Value Date/Time    Calcium 7.3 (L) 05/13/2021 02:35 AM    Phosphorus 3.5 05/13/2021 02:35 AM          Camelia Graham MD  5/13/2021  7:57 AM

## 2021-05-13 NOTE — PROGRESS NOTES
Problem: Falls - Risk of  Goal: *Absence of Falls  Description: Document Everette Castorland Fall Risk and appropriate interventions in the flowsheet. Outcome: Progressing Towards Goal  Note: Fall Risk Interventions:  Mobility Interventions: Bed/chair exit alarm    Mentation Interventions: Bed/chair exit alarm    Medication Interventions: Bed/chair exit alarm    Elimination Interventions: Bed/chair exit alarm, Call light in reach              Problem: Patient Education: Go to Patient Education Activity  Goal: Patient/Family Education  Outcome: Progressing Towards Goal     Problem: Pressure Injury - Risk of  Goal: *Prevention of pressure injury  Description: Document Jordin Scale and appropriate interventions in the flowsheet.   Outcome: Not Progressing Towards Goal  Note: Pressure Injury Interventions:  Sensory Interventions: Assess changes in LOC, Assess need for specialty bed, Float heels, Keep linens dry and wrinkle-free, Minimize linen layers    Moisture Interventions: Absorbent underpads, Internal/External urinary devices, Minimize layers, Moisture barrier    Activity Interventions: Increase time out of bed, Pressure redistribution bed/mattress(bed type)    Mobility Interventions: Pressure redistribution bed/mattress (bed type), PT/OT evaluation    Nutrition Interventions: Document food/fluid/supplement intake    Friction and Shear Interventions: Apply protective barrier, creams and emollients, Feet elevated on foot rest, Minimize layers                Problem: Patient Education: Go to Patient Education Activity  Goal: Patient/Family Education  Outcome: Progressing Towards Goal     Problem: Nutrition Deficit  Goal: *Optimize nutritional status  Outcome: Not Progressing Towards Goal     Problem: Impaired Skin Integrity/Pressure Injury Treatment  Goal: *Improvement of Existing Pressure Injury  Outcome: Not Progressing Towards Goal  Goal: *Prevention of pressure injury  Description: Document Jordin Scale and appropriate interventions in the flowsheet. Outcome: Not Progressing Towards Goal  Note: Pressure Injury Interventions:  Sensory Interventions: Assess changes in LOC, Assess need for specialty bed, Float heels, Keep linens dry and wrinkle-free, Minimize linen layers    Moisture Interventions: Absorbent underpads, Internal/External urinary devices, Minimize layers, Moisture barrier    Activity Interventions: Increase time out of bed, Pressure redistribution bed/mattress(bed type)    Mobility Interventions: Pressure redistribution bed/mattress (bed type), PT/OT evaluation    Nutrition Interventions: Document food/fluid/supplement intake    Friction and Shear Interventions: Apply protective barrier, creams and emollients, Feet elevated on foot rest, Minimize layers                Problem: Patient Education: Go to Patient Education Activity  Goal: Patient/Family Education  Outcome: Progressing Towards Goal     Problem: Hypertension  Goal: *Blood pressure within specified parameters  Outcome: Progressing Towards Goal  Goal: *Fluid volume balance  Outcome: Progressing Towards Goal  Goal: *Labs within defined limits  Outcome: Progressing Towards Goal     Problem: Patient Education: Go to Patient Education Activity  Goal: Patient/Family Education  Outcome: Progressing Towards Goal     Problem: Patient Education: Go to Patient Education Activity  Goal: Patient/Family Education  Outcome: Progressing Towards Goal     Problem: Patient Education: Go to Patient Education Activity  Goal: Patient/Family Education  Outcome: Progressing Towards Goal     Problem: Ostomy Care  Goal: *Patient pouching appliance will fit properly and maintain integrity at least three to five days  Description: Infection control procedures (eg, clean dressings, clean gloves, hand washing, precautions to isolate wound from contamination, sterile instruments used for wound debridement) should be implemented.   Outcome: Progressing Towards Goal  Goal: *Acceptance of change in body image  Outcome: Progressing Towards Goal     Problem: Patient Education: Go to Patient Education Activity  Goal: Patient/Family Education  Outcome: Progressing Towards Goal     Problem: Patient Education: Go to Patient Education Activity  Goal: Patient/Family Education  Outcome: Progressing Towards Goal     Problem: Patient Education: Go to Patient Education Activity  Goal: Patient/Family Education  Outcome: Progressing Towards Goal

## 2021-05-14 LAB
ALBUMIN SERPL-MCNC: 1.9 G/DL (ref 3.4–5)
ANION GAP SERPL CALC-SCNC: 7 MMOL/L (ref 3–18)
BASOPHILS # BLD: 0 K/UL (ref 0–0.1)
BASOPHILS NFR BLD: 0 % (ref 0–2)
BUN SERPL-MCNC: 23 MG/DL (ref 7–18)
BUN/CREAT SERPL: 9 (ref 12–20)
CALCIUM SERPL-MCNC: 7.4 MG/DL (ref 8.5–10.1)
CHLORIDE SERPL-SCNC: 112 MMOL/L (ref 100–111)
CO2 SERPL-SCNC: 24 MMOL/L (ref 21–32)
CREAT SERPL-MCNC: 2.47 MG/DL (ref 0.6–1.3)
DIFFERENTIAL METHOD BLD: ABNORMAL
EOSINOPHIL # BLD: 0.3 K/UL (ref 0–0.4)
EOSINOPHIL NFR BLD: 2 % (ref 0–5)
ERYTHROCYTE [DISTWIDTH] IN BLOOD BY AUTOMATED COUNT: 15.8 % (ref 11.6–14.5)
GLUCOSE SERPL-MCNC: 123 MG/DL (ref 74–99)
HCT VFR BLD AUTO: 21.6 % (ref 36–48)
HCT VFR BLD AUTO: 23.2 % (ref 36–48)
HCT VFR BLD AUTO: 24.4 % (ref 36–48)
HGB BLD-MCNC: 6.8 G/DL (ref 13–16)
HGB BLD-MCNC: 7.5 G/DL (ref 13–16)
HGB BLD-MCNC: 7.8 G/DL (ref 13–16)
HISTORY CHECKED?,CKHIST: NORMAL
IRON SATN MFR SERPL: 21 % (ref 20–50)
IRON SERPL-MCNC: 29 UG/DL (ref 50–175)
LYMPHOCYTES # BLD: 1.8 K/UL (ref 0.9–3.6)
LYMPHOCYTES NFR BLD: 10 % (ref 21–52)
MAGNESIUM SERPL-MCNC: 1.8 MG/DL (ref 1.6–2.6)
MCH RBC QN AUTO: 28.3 PG (ref 24–34)
MCHC RBC AUTO-ENTMCNC: 32 G/DL (ref 31–37)
MCV RBC AUTO: 88.4 FL (ref 74–97)
MONOCYTES # BLD: 0.8 K/UL (ref 0.05–1.2)
MONOCYTES NFR BLD: 5 % (ref 3–10)
NEUTS SEG # BLD: 14.4 K/UL (ref 1.8–8)
NEUTS SEG NFR BLD: 82 % (ref 40–73)
PHOSPHATE SERPL-MCNC: 4.1 MG/DL (ref 2.5–4.9)
PLATELET # BLD AUTO: 241 K/UL (ref 135–420)
PMV BLD AUTO: 9.3 FL (ref 9.2–11.8)
POTASSIUM SERPL-SCNC: 4 MMOL/L (ref 3.5–5.5)
RBC # BLD AUTO: 2.76 M/UL (ref 4.35–5.65)
SODIUM SERPL-SCNC: 143 MMOL/L (ref 136–145)
TIBC SERPL-MCNC: 136 UG/DL (ref 250–450)
WBC # BLD AUTO: 17.6 K/UL (ref 4.6–13.2)

## 2021-05-14 PROCEDURE — 2709999900 HC NON-CHARGEABLE SUPPLY

## 2021-05-14 PROCEDURE — 65270000029 HC RM PRIVATE

## 2021-05-14 PROCEDURE — 82040 ASSAY OF SERUM ALBUMIN: CPT

## 2021-05-14 PROCEDURE — 83540 ASSAY OF IRON: CPT

## 2021-05-14 PROCEDURE — 74011000250 HC RX REV CODE- 250: Performed by: INTERNAL MEDICINE

## 2021-05-14 PROCEDURE — 85025 COMPLETE CBC W/AUTO DIFF WBC: CPT

## 2021-05-14 PROCEDURE — 36415 COLL VENOUS BLD VENIPUNCTURE: CPT

## 2021-05-14 PROCEDURE — 83735 ASSAY OF MAGNESIUM: CPT

## 2021-05-14 PROCEDURE — 74011250636 HC RX REV CODE- 250/636: Performed by: INTERNAL MEDICINE

## 2021-05-14 PROCEDURE — 36430 TRANSFUSION BLD/BLD COMPNT: CPT

## 2021-05-14 PROCEDURE — 84100 ASSAY OF PHOSPHORUS: CPT

## 2021-05-14 PROCEDURE — 80048 BASIC METABOLIC PNL TOTAL CA: CPT

## 2021-05-14 PROCEDURE — 85018 HEMOGLOBIN: CPT

## 2021-05-14 PROCEDURE — 74011250637 HC RX REV CODE- 250/637: Performed by: SURGERY

## 2021-05-14 PROCEDURE — P9016 RBC LEUKOCYTES REDUCED: HCPCS

## 2021-05-14 PROCEDURE — 77030018390 HC SPNG HEMSTAT2 J&J -B

## 2021-05-14 PROCEDURE — 74011000250 HC RX REV CODE- 250: Performed by: PODIATRIST

## 2021-05-14 PROCEDURE — 76450000000

## 2021-05-14 PROCEDURE — 92526 ORAL FUNCTION THERAPY: CPT

## 2021-05-14 PROCEDURE — 77030040393 HC DRSG OPTIFOAM GENT MDII -B

## 2021-05-14 PROCEDURE — 99232 SBSQ HOSP IP/OBS MODERATE 35: CPT | Performed by: HOSPITALIST

## 2021-05-14 RX ORDER — SODIUM CHLORIDE 9 MG/ML
250 INJECTION, SOLUTION INTRAVENOUS AS NEEDED
Status: DISCONTINUED | OUTPATIENT
Start: 2021-05-14 | End: 2021-05-22 | Stop reason: HOSPADM

## 2021-05-14 RX ORDER — AMLODIPINE BESYLATE 5 MG/1
5 TABLET ORAL DAILY
Status: DISCONTINUED | OUTPATIENT
Start: 2021-05-15 | End: 2021-05-19

## 2021-05-14 RX ADMIN — MEROPENEM 500 MG: 500 INJECTION INTRAVENOUS at 11:11

## 2021-05-14 RX ADMIN — ESCITALOPRAM OXALATE 10 MG: 10 TABLET ORAL at 08:50

## 2021-05-14 RX ADMIN — MEROPENEM 500 MG: 500 INJECTION INTRAVENOUS at 03:43

## 2021-05-14 RX ADMIN — SILVER SULFADIAZINE: 10 CREAM TOPICAL at 11:10

## 2021-05-14 RX ADMIN — MEROPENEM 1 G: 1 INJECTION INTRAVENOUS at 23:49

## 2021-05-14 NOTE — ROUTINE PROCESS
Bedside shift change report given to Ale (oncoming nurse) by Arlette Cleveland (offgoing nurse). Report included the following information SBAR, Kardex, Intake/Output, MAR and Recent Results.

## 2021-05-14 NOTE — ROUTINE PROCESS
End of Shift Note     Bedside and verbal shift change report given to SAFIA Haley (On coming nurse) by Bob Vázquez RN (Off going nurse).   Report included the following information:      --Procedure Summary     --MAR,     --Recent Results     --Med Rec Status    SBAR Recommendations: Dressing Bleeding    Issues for Provider to address Bleeding          Activity This Shift     [x] Bed Rest Order   [] Refused   [] Dangled    [] TDWB         Ambulating:     [] Bathroom     [] BSC     [] Room/Hallway      Up in Chair for meals    []Yes [] No   Voiding       [x] Yes  [] No  Chavez          [] Yes  [] No  Incontinent [] Yes  [] No    DUE TO VOID POUR        [] Yes [] No  Purewick    [] Yes [] No  New Onset [] Yes [] No Straight Cath   []Yes  [] No  Condom Cath  [x] Yes [] No  MD Called      [] Yes  [] No   Blood Sugars Managed []Yes [x] No    Bowels Moved [x] Yes [] No    Incontinent     [] Yes [] No Passed Gas [x]Yes [] No    New Onset  []Yes [] No        MD Called []Yes  [] No     CHG Bath Done     Before Surgery     After Surgery      [] Yes  [x] No  [] Yes  [x] No       Drain Removed [] Yes  [] No [x] N/A    Dressing Changed [] Yes   [x] No [] N/A      Nausea/Vomiting [] Yes   [x] No     Ice Packs Changed [] Yes   [x] No  [] N/A    Incentive Spirometer  [x] Yes  [] No      SCD Pumps On     Ankle Pumping  [x] Yes   [] No      [] Yes   [x] No        Telemetry Monitoring [] Yes   [x] No   Rhythm

## 2021-05-14 NOTE — PROGRESS NOTES
Problem: Dysphagia (Adult)  Goal: *Acute Goals and Plan of Care (Insert Text)  Description: Patient will:  1. Tolerate PO trials with 0 s/s overt distress in 4/5 trials  2. Utilize compensatory swallow strategies/maneuvers (decrease bite/sip, size/rate, alt. liq/sol) with min cues in 4/5 trials    Rec:     Reg solid with thin liquids  Aspiration precautions  HOB >45 during po intake, remain >30 for 30-45 minutes after po   Small bites/sips; alternate liquid/solid with slow feeding rate   Oral care TID  Meds per pt preference        Outcome: Resolved/Met     SPEECH LANGUAGE PATHOLOGY DYSPHAGIA TREATMENT & DISCHARGE    Patient: Vanessa Palacios (61 y.o. male)  Date: 5/14/2021  Diagnosis: [facility] [Z93.3] Sacral decubitus ulcer, stage IV (HCC)  Procedure(s) (LRB):  EXPLORATORY LAPAROTOMY, SMALL BOWEL RESECTION , PARTIAL COLECTOMY ,COLOSTOMY, REVISION (N/A) 11 Days Post-Op  Precautions:  Fall, Aspiration, Skin  PLOF: Per H&P    ASSESSMENT:  Pt seen for FU dysphagia management. Pt AOx4, accepting of tx. Reporting decreased appetite lately and \"just wanting to sleep\". Agreeable to   therapeutic PO trials of thin liquid via straw and tandem drinking and regular textures. No s/sx of aspiration appreciated across consistencies. Oropharyngeal swallow appears WFL. Swallow appears timely, adequate laryngeal elevation noted via palpation, no change in vocal/resp quality appreciated. Recommend regular texture diet with thin liquids, meds per pt preference. Pt educated on and verbalized understanding of findings, recs, and POC; d/w RN. 0 formal ST needs for dysphagia indicated at this time. SLP will sign off at this time. Please re-consult should further concerns arise.        Progression toward goals:  [x]         Improving appropriately - goals met/approximated  []         Not making progress/Not appropriate - will d/c POC     PLAN:  Recommendations and Planned Interventions:  Maximum therapeutic gains met; safest, least restrictive diet achieved. D/C ST intervention at this time. Discharge Recommendations: To Be Determined     SUBJECTIVE:   Patient stated I'm not really Catherne Box. OBJECTIVE:   Cognitive and Communication Status:  Neurologic State: Alert  Orientation Level: Oriented X4  Cognition: Follows commands  Perception: Appears intact  Perseveration: No perseveration noted  Safety/Judgement: Fall prevention  Dysphagia Treatment:  Oral Assessment:  Oral Assessment  Labial: No impairment  Dentition: Natural, Intact  Oral Hygiene: WFL  Lingual: No impairment  Velum: No impairment  Mandible: No impairment  P.O. Trials:   Patient Position: [facility] 55*   Vocal quality prior to P.O.: No impairment   Consistency Presented: Thin liquid, Solid   How Presented: Self-fed/presented, Straw, Spoon       Bolus Acceptance: No impairment   Bolus Formation/Control: No impairment       Propulsion: No impairment   Oral Residue: None   Initiation of Swallow: No impairment   Laryngeal Elevation: Functional   Aspiration Signs/Symptoms: None   Pharyngeal Phase Characteristics: No impairment, issues, or problems    Effective Modifications: None   Cues for Modifications: None         Oral Phase Severity: No impairment   Pharyngeal Phase Severity : No impairment       PAIN:  Pain level pre-treatment: 0/10   Pain level post-treatment: 0/10     After treatment:   []              Patient left in no apparent distress sitting up in chair  [x]              Patient left in no apparent distress in bed  [x]              Call bell left within reach  [x]              Nursing notified  []              Caregiver present  []              Bed alarm activated      COMMUNICATION/EDUCATION:   [x] Aspiration precautions; swallow safety; compensatory techniques  []        Patient unable to participate in education; education ongoing with staff  []  Posted safety precautions in patient's room.   [] Oral-motor/laryngeal strengthening exercises    Thank you for this referral.    Giselle Flowers M.S., CCC-SLP  Speech-Language Pathologist    Time Calculation: 10 mins

## 2021-05-14 NOTE — PROGRESS NOTES
Patient seen and evaluated again. Patient feels fine, no dizziness, no lightheadedness. After multiple Surgicel and pressure for 30 minutes by RN, bleeding stopped. Patient has been put pressure dressing with multiple 4 x 4's and ABD. Currently no signs of bleeding. Patient received blood transfusion and also 0.5 liter fluid bolus. Blood pressure stabilized. Awaiting surgical input.
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
## 2021-05-14 NOTE — PROGRESS NOTES
Infectious Disease progress Note        Reason: infected decubiti ulcer    Current abx Prior abx     Meropenem since 5/6 vancomycin 5/1-5/4  Piperacillin/tazobactam since 5/1-5/6     Lines:       Assessment :    61year old man with h/o HTN, paraplegia admitted to [facility] on 4/29/21 for evaluation of infected sacral decubiti. Clinical presentation c/w acute on chronic sacral osteomyelitis, infected sacral decubiti    S/p surgical debridement,  robotic colostomy on 4/29/2021    Persistent leukocytosis -likely due to partially treated infection due to resistant pathogens. wound cultures 5/3-E. coli, providencia (resistant to piperacillin/tazobactam)  Antibiotics switched to meropenem on 5/6/2021. Improved leukocytosis    Protrusion of the small bowel around the colostomy causing bowel ischemia s/p expl. laparotomy with small bowel resection, partial colectomy/revision of colostomy on 5/4/21    Acute kidney injury-likely multifactorial.  Discussed with nephrologist.  Concerns for antibiotic associated interstitial nephritis-gradually improving creatinine    Tolerating liquid diet- poor po intake. Worsening leukocytosis 19 K on 5/11- likely leukemoid reaction to bleed from sacral wound-drop in hemoglobin to 6.2 noted on 5/12, 5/13 labs. Improved leukocytosis. No clinical evidence of new infection    Recommendations:    1. Continue meropenem-adjust dose per changing renal function-continue meropenem till 6/18/21  2. Follow-up nephrology recommendations regarding ЕКАТЕРИНА   3.  start making arrangements for outpatient IV antibiotics. 4.  Will need good wound care to aid healing and prevent future infectious complications  5. F/u surgery recommendations about bleeding from sacral wound  6. F/u cbc, clinically    Home health orders on chart (click on \"chart review, other orders, IP home health)    Above plan was discussed in details with patient, dr. Tyra Sands. Please call me if any further questions or concerns.  Will continue to participate in the care of this patient. HPI:       patient denied any chest pain, shortness of breath, abdominal pain. Current Discharge Medication List      CONTINUE these medications which have NOT CHANGED    Details   lisinopril-hydroCHLOROthiazide (PRINZIDE, ZESTORETIC) 20-12.5 mg per tablet TAKE 1 TABLET EVERY DAY  Refills: 3      ergocalciferol (ERGOCALCIFEROL) 1,250 mcg (50,000 unit) capsule       ciprofloxacin HCl (CIPRO) 250 mg tablet Take 1 Tab by mouth two (2) times a day. Qty: 30 Tab, Refills: 2      tamsulosin (FLOMAX) 0.4 mg capsule TAKE 1 CAPSULE BY MOUTH EVERY DAY  Refills: 1      naloxone (NARCAN) 2 mg/actuation spry Use 1 spray intranasally into 1 nostril. Use a new Narcan nasal spray for subsequent doses and administer into alternating nostrils. May repeat every 2 to 3 minutes as needed. Qty: 2 Actuation(s), Refills: 0      furosemide (LASIX) 20 mg tablet TAKE 1 TABLET BY MOUTH DAILY AS NEEDED FOR SWELLING  Refills: 3      MULTIVIT-MINERALS/FOLIC ACID (SPECTRAVITE ADULT PO) Take  by mouth.      escitalopram oxalate (LEXAPRO) 10 mg tablet Take 10 mg by mouth daily.       acetaminophen (TYLENOL) 325 mg tablet TAKE 2 TABLETS BY MOUTH EVERY 4 HOURS AS NEEDED FOR PAIN  Refills: 2             Current Facility-Administered Medications   Medication Dose Route Frequency    0.9% sodium chloride infusion 250 mL  250 mL IntraVENous PRN    0.9% sodium chloride infusion 250 mL  250 mL IntraVENous PRN    silver sulfADIAZINE (SILVADENE) 1 % topical cream   Topical DAILY    0.9% sodium chloride infusion 250 mL  250 mL IntraVENous PRN    meropenem (MERREM) 500 mg in sterile water (preservative free) 10 mL IV syringe  0.5 g IntraVENous Q8H    [Held by provider] amLODIPine (NORVASC) tablet 10 mg  10 mg Oral DAILY    hydrALAZINE (APRESOLINE) 20 mg/mL injection 10 mg  10 mg IntraVENous Q6H PRN    [Held by provider] heparin (porcine) injection 5,000 Units  5,000 Units SubCUTAneous Q8H    ondansetron (ZOFRAN) injection 4 mg  4 mg IntraVENous Q6H PRN    HYDROmorphone (DILAUDID) syringe 0.5 mg  0.5 mg IntraVENous Q4H PRN    naloxone (NARCAN) injection 0.4 mg  0.4 mg IntraVENous PRN    acetaminophen (TYLENOL) tablet 650 mg  650 mg Oral Q6H PRN    0.9% sodium chloride infusion  50 mL/hr IntraVENous CONTINUOUS    docusate (COLACE) 50 mg/5 mL oral liquid 100 mg  100 mg Oral DAILY    bisacodyL (DULCOLAX) tablet 5 mg  5 mg Oral DAILY PRN    escitalopram oxalate (LEXAPRO) tablet 10 mg  10 mg Oral DAILY    oxyCODONE-acetaminophen (PERCOCET) 5-325 mg per tablet 1 Tab  1 Tab Oral Q4H PRN       Allergies: Patient has no known allergies. Temp (24hrs), Av.8 °F (36.6 °C), Min:97 °F (36.1 °C), Max:99.3 °F (37.4 °C)    Visit Vitals  /75   Pulse 83   Temp 99.3 °F (37.4 °C)   Resp 15   Ht 6' 2\" (1.88 m)   Wt 109.8 kg (242 lb)   SpO2 99%   BMI 31.07 kg/m²       ROS: 12 point ROS obtained in details. Pertinent positives as mentioned in HPI,   otherwise negative    Physical Exam:    General: Well developed, well nourished male laying on the bed, sleepy, in no acute distress. General:   awake alert and oriented   HEENT:  Normocephalic, atraumatic, EOMI, no scleral icterus or pallor; no conjunctival hemmohage;  nasal and oral mucous are moist and without evidence of lesions. No thrush. Dentition good. Neck supple, no bruits. Lymph Nodes:   no cervical, axillary or inguinal adenopathy   Lungs:   non-labored, bilaterally clear to auscultation- no crackles wheezes rales or rhonchi   Heart:  RRR, s1 and s2; no rubs or gallops, no edema   Abdomen:  soft, non-distended, active bowel sounds, no hepatomegaly, no splenomegaly. Colostomy in place. Non-tender   Genitourinary:  deferred   Extremities:   no clubbing, cyanosis; no joint effusions or swelling; Full ROM of all large joints to the upper and lower extremities; muscle mass appropriate for age   Neurologic:  Paraplegia. Speech appropriate.  Cranial nerves intact                        Skin:  Surgical changes back   Back:  wound vac recent surgical wound     Psychiatric:  No suicidal or homicidal ideations, appropriate mood and affect         Labs: Results:   Chemistry Recent Labs     05/14/21 0924 05/13/21 0235 05/12/21  0240   * 94 120*    143 144   K 4.0 3.7 3.9   * 114* 115*   CO2 24 23 22   BUN 23* 23* 28*   CREA 2.47* 2.78* 2.93*   CA 7.4* 7.3* 7.7*   AGAP 7 6 7   BUCR 9* 8* 10*      CBC w/Diff Recent Labs     05/14/21 0924 05/14/21  0040 05/13/21 2019 05/13/21 0235 05/13/21 0235 05/12/21  0240 05/12/21  0240   WBC 17.6*  --   --   --  17.4*  --  17.0*   RBC 2.76*  --   --   --  2.40*  --  2.21*   HGB 7.8* 6.8* 7.7*   < > 6.8*   < > 6.2*   HCT 24.4* 21.6* 23.9*   < > 20.9*   < > 19.9*     --   --   --  389  --  429*   GRANS 82*  --   --   --  81*  --  83*   LYMPH 10*  --   --   --  11*  --  11*   EOS 2  --   --   --  2  --  1    < > = values in this interval not displayed. Microbiology Recent Labs     05/13/21  1236   CULT PENDING          RADIOLOGY:    All available imaging studies/reports in [facility] care for this admission were reviewed      Disclaimer: Sections of this note are dictated utilizing voice recognition software, which may have resulted in some phonetic based errors in grammar and contents. Even though attempts were made to correct all the mistakes, some may have been missed, and remained in the body of the document. If questions arise, please contact our department.     Dr. Abdirahman Lawler, Infectious Disease Specialist  [phone]  May 14, 2021  8:39 PM

## 2021-05-14 NOTE — PROGRESS NOTES
RENAL PROGRESS NOTE        [facility]         Assessment  1. ЕКАТЕРИНА , ATN / AIN 2/2 to Abx Nephrotoxicity   2. Infected Sacral dec ulcer   3. Chronic anemia   4.  HTN , uncontrolled  5. fluid collection above the bladder, incidentally seen above bladder      Plan   - renal parameters improving slowly  - goal  BP ~ 130-150 SBP in acute setting  - continue IVF 50 ml / hr d/t poor intake  - will Replace Lytes as needed   - follow H&H , check iron levels     Discussed with Dr Skelton Due and nursing    Please call with questions,    Becky Paige MD FASN  Cell [phone]  Pager: [phone]                                                                                                                            Subjective:  he denies any CP / SOB   Not very interactive       Patient Active Problem List   Diagnosis Code    S/P colostomy ([facility] Z93.3    Paraplegia ([facility] G82.20    Sacral decubitus ulcer, stage IV ([facility] L89.154    HTN (hypertension) I10       Current Facility-Administered Medications   Medication Dose Route Frequency Provider Last Rate Last Admin    0.9% sodium chloride infusion 250 mL  250 mL IntraVENous PRN Radha York MD        0.9% sodium chloride infusion 250 mL  250 mL IntraVENous PRN Romelia Donovan MD        silver sulfADIAZINE (SILVADENE) 1 % topical cream   Topical DAILY Sarah Oropeza DPM   Given at 05/14/21 1110    0.9% sodium chloride infusion 250 mL  250 mL IntraVENous PRN Briana Vargas MD        meropenem (MERREM) 500 mg in sterile water (preservative free) 10 mL IV syringe  0.5 g IntraVENous Q8H Erika TROY MD   500 mg at 05/14/21 1111    [Held by provider] amLODIPine (NORVASC) tablet 10 mg  10 mg Oral DAILY Refugio Crisostomo MD   10 mg at 05/13/21 1039    hydrALAZINE (APRESOLINE) 20 mg/mL injection 10 mg  10 mg IntraVENous Q6H PRN Romelia Donovan MD   10 mg at 05/09/21 1208    [Held by provider] heparin (porcine) injection 5,000 Units  5,000 Units SubCUTAneous Q8H Chris Sol MD   5,000 Units at 05/12/21 0533    ondansetron (ZOFRAN) injection 4 mg  4 mg IntraVENous Q6H PRN Chris Sol MD   4 mg at 05/11/21 2235    HYDROmorphone (DILAUDID) syringe 0.5 mg  0.5 mg IntraVENous Q4H PRN Heike Almaraz MD        naloxone [facility]) injection 0.4 mg  0.4 mg IntraVENous PRN Heike Almaraz MD        acetaminophen (TYLENOL) tablet 650 mg  650 mg Oral Q6H PRN Jamie Serrano MD        0.9% sodium chloride infusion  50 mL/hr IntraVENous CONTINUOUS Brittanie Donovan MD 50 mL/hr at 05/12/21 2015 50 mL/hr at 05/12/21 2015    docusate (COLACE) 50 mg/5 mL oral liquid 100 mg  100 mg Oral DAILY Jamie Serrano MD   100 mg at 05/04/21 1014    bisacodyL (DULCOLAX) tablet 5 mg  5 mg Oral DAILY PRN Jamie Serrano MD        escitalopram oxalate (LEXAPRO) tablet 10 mg  10 mg Oral DAILY Jamie Serrano MD   10 mg at 05/14/21 0850    oxyCODONE-acetaminophen (PERCOCET) 5-325 mg per tablet 1 Tab  1 Tab Oral Q4H PRN Jamie Serrano MD   1 Tab at 05/04/21 0331       Objective  Vitals:    05/14/21 0552 05/14/21 0610 05/14/21 0853 05/14/21 1112   BP: (!) 151/84 134/77 132/75 (!) 144/78   Pulse: 80 82 83 76   Resp: 16 16 15 14   Temp: 97.9 °F (36.6 °C) 97.1 °F (36.2 °C) 99.3 °F (37.4 °C) 98 °F (36.7 °C)   SpO2: 100% 100% 99% 100%   Weight:       Height:             Intake/Output Summary (Last 24 hours) at 5/14/2021 1258  Last data filed at 5/14/2021 0853  Gross per 24 hour   Intake 1410.83 ml   Output 1050 ml   Net 360.83 ml           Admission weight: Weight: 113.4 kg (250 lb) (04/27/21 1058)  Last Weight Metrics:  Weight Loss Metrics 5/13/2021 4/29/2021 4/16/2021 4/12/2021 1/7/2021 6/17/2019 3/23/2019   Today's Wt 242 lb - 250 lb - 240 lb 240 lb 209 lb   BMI - 31.07 kg/m2 32.1 kg/m2 32.55 kg/m2 32.55 kg/m2 32.55 kg/m2 28.35 kg/m2             Physical Assessment:     General: sleepy   Neck: No jvd.  LUNGS: Clear to Auscultation, No rales, rhonchi or wheezes. CVS EXM: S1, S2  RRR, no murmurs/gallops/rubs. Abdomen: soft, non tender. Lower Extremities:  no edema. Lab    CBC w/Diff Recent Labs     05/14/21 0924 05/14/21  0040 05/13/21 2019 05/13/21 0235 05/13/21 0235 05/12/21  0240 05/12/21  0240   WBC 17.6*  --   --   --  17.4*  --  17.0*   RBC 2.76*  --   --   --  2.40*  --  2.21*   HGB 7.8* 6.8* 7.7*   < > 6.8*   < > 6.2*   HCT 24.4* 21.6* 23.9*   < > 20.9*   < > 19.9*     --   --   --  389  --  429*   GRANS 82*  --   --   --  81*  --  83*   LYMPH 10*  --   --   --  11*  --  11*   EOS 2  --   --   --  2  --  1    < > = values in this interval not displayed. Chemistry Recent Labs     05/14/21 0924 05/13/21 0235 05/12/21 0240 05/12/21 0240   * 94  --  120*    143  --  144   K 4.0 3.7  --  3.9   * 114*  --  115*   CO2 24 23  --  22   BUN 23* 23*  --  28*   CREA 2.47* 2.78*  --  2.93*   CA 7.4* 7.3*  --  7.7*   AGAP 7 6  --  7   BUCR 9* 8*  --  10*   ALB 1.9*  --   --   --    PHOS 4.1 3.5   < >  --     < > = values in this interval not displayed.          No results found for: IRON, FE, TIBC, IBCT, PSAT, FERR   Lab Results   Component Value Date/Time    Calcium 7.4 (L) 05/14/2021 09:24 AM    Phosphorus 4.1 05/14/2021 09:24 AM          Tai Patel MD  5/14/2021  7:57 AM

## 2021-05-14 NOTE — PROGRESS NOTES
Problem: Falls - Risk of  Goal: *Absence of Falls  Description: Document Rio Freeman Fall Risk and appropriate interventions in the flowsheet. Outcome: Progressing Towards Goal  Note: Fall Risk Interventions:  Mobility Interventions: Bed/chair exit alarm, Patient to call before getting OOB    Mentation Interventions: Door open when patient unattended, Bed/chair exit alarm    Medication Interventions: Patient to call before getting OOB    Elimination Interventions: Bed/chair exit alarm, Call light in reach, Patient to call for help with toileting needs              Problem: Pressure Injury - Risk of  Goal: *Prevention of pressure injury  Description: Document Jordin Scale and appropriate interventions in the flowsheet.   Outcome: Progressing Towards Goal  Note: Pressure Injury Interventions:  Sensory Interventions: Assess changes in LOC, Maintain/enhance activity level, Minimize linen layers    Moisture Interventions: Absorbent underpads    Activity Interventions: Increase time out of bed, Pressure redistribution bed/mattress(bed type)    Mobility Interventions: Pressure redistribution bed/mattress (bed type)    Nutrition Interventions: Document food/fluid/supplement intake    Friction and Shear Interventions: Minimize layers, Lift sheet

## 2021-05-14 NOTE — PROGRESS NOTES
Chart reviewed. Pt having difficult hospital course r/t new colostomy and sacral decubitus. Has received 3U PRBC over last few days. Not eating well; may need NG or PEG for feedings. Plan is for d/c to LTC while on IV abx and wound vac as pt is a paraplegic and lives with mother who is not able to manage his care and PICC. Palliative consulted today.   Naveen Yadav RN - Outcomes Manager  [phone]

## 2021-05-14 NOTE — PROGRESS NOTES
Palliative Medicine     Consult received. Chart review in progress. Thank you for the Palliative Medicine consult and allowing us to participate in the care of Mr. Debbie Moise. Will continue to monitor and provide support.     Apoorva MEEKS, RN, [address] [place] [address] Line: [phone]

## 2021-05-15 LAB
ABO + RH BLD: NORMAL
AMPHET UR QL SCN: NEGATIVE
ANION GAP SERPL CALC-SCNC: 5 MMOL/L (ref 3–18)
BARBITURATES UR QL SCN: NEGATIVE
BASOPHILS # BLD: 0.1 K/UL (ref 0–0.1)
BASOPHILS NFR BLD: 0 % (ref 0–2)
BENZODIAZ UR QL: NEGATIVE
BLD PROD TYP BPU: NORMAL
BLOOD GROUP ANTIBODIES SERPL: NORMAL
BPU ID: NORMAL
BUN SERPL-MCNC: 22 MG/DL (ref 7–18)
BUN/CREAT SERPL: 9 (ref 12–20)
CALCIUM SERPL-MCNC: 7.2 MG/DL (ref 8.5–10.1)
CALLED TO:,BCALL1: NORMAL
CALLED TO:,BCALL2: NORMAL
CANNABINOIDS UR QL SCN: NEGATIVE
CHLORIDE SERPL-SCNC: 115 MMOL/L (ref 100–111)
CO2 SERPL-SCNC: 23 MMOL/L (ref 21–32)
COCAINE UR QL SCN: NEGATIVE
CREAT SERPL-MCNC: 2.34 MG/DL (ref 0.6–1.3)
CROSSMATCH RESULT,%XM: NORMAL
DIFFERENTIAL METHOD BLD: ABNORMAL
EOSINOPHIL # BLD: 0.3 K/UL (ref 0–0.4)
EOSINOPHIL NFR BLD: 2 % (ref 0–5)
ERYTHROCYTE [DISTWIDTH] IN BLOOD BY AUTOMATED COUNT: 16.4 % (ref 11.6–14.5)
FERRITIN SERPL-MCNC: 445 NG/ML (ref 8–388)
GLUCOSE BLD STRIP.AUTO-MCNC: 114 MG/DL (ref 70–110)
GLUCOSE SERPL-MCNC: 101 MG/DL (ref 74–99)
HCT VFR BLD AUTO: 21.3 % (ref 36–48)
HCT VFR BLD AUTO: 22.4 % (ref 36–48)
HCT VFR BLD AUTO: 24.7 % (ref 36–48)
HDSCOM,HDSCOM: ABNORMAL
HGB BLD-MCNC: 6.7 G/DL (ref 13–16)
HGB BLD-MCNC: 7 G/DL (ref 13–16)
HGB BLD-MCNC: 7.5 G/DL (ref 13–16)
LYMPHOCYTES # BLD: 1.7 K/UL (ref 0.9–3.6)
LYMPHOCYTES NFR BLD: 10 % (ref 21–52)
MAGNESIUM SERPL-MCNC: 1.8 MG/DL (ref 1.6–2.6)
MCH RBC QN AUTO: 28.6 PG (ref 24–34)
MCHC RBC AUTO-ENTMCNC: 31.5 G/DL (ref 31–37)
MCV RBC AUTO: 91 FL (ref 74–97)
METHADONE UR QL: NEGATIVE
MONOCYTES # BLD: 1 K/UL (ref 0.05–1.2)
MONOCYTES NFR BLD: 6 % (ref 3–10)
NEUTS SEG # BLD: 14.1 K/UL (ref 1.8–8)
NEUTS SEG NFR BLD: 81 % (ref 40–73)
OPIATES UR QL: POSITIVE
PCP UR QL: NEGATIVE
PHOSPHATE SERPL-MCNC: 4.5 MG/DL (ref 2.5–4.9)
PLATELET # BLD AUTO: 220 K/UL (ref 135–420)
PMV BLD AUTO: 9.3 FL (ref 9.2–11.8)
POTASSIUM SERPL-SCNC: 4 MMOL/L (ref 3.5–5.5)
RBC # BLD AUTO: 2.34 M/UL (ref 4.35–5.65)
SODIUM SERPL-SCNC: 143 MMOL/L (ref 136–145)
SPECIMEN EXP DATE BLD: NORMAL
STATUS OF UNIT,%ST: NORMAL
UNIT DIVISION, %UDIV: 0
WBC # BLD AUTO: 17.4 K/UL (ref 4.6–13.2)

## 2021-05-15 PROCEDURE — 85025 COMPLETE CBC W/AUTO DIFF WBC: CPT

## 2021-05-15 PROCEDURE — 65270000029 HC RM PRIVATE

## 2021-05-15 PROCEDURE — 74011250637 HC RX REV CODE- 250/637: Performed by: SURGERY

## 2021-05-15 PROCEDURE — 77030040393 HC DRSG OPTIFOAM GENT MDII -B

## 2021-05-15 PROCEDURE — 80307 DRUG TEST PRSMV CHEM ANLYZR: CPT

## 2021-05-15 PROCEDURE — 82728 ASSAY OF FERRITIN: CPT

## 2021-05-15 PROCEDURE — 74011250636 HC RX REV CODE- 250/636: Performed by: INTERNAL MEDICINE

## 2021-05-15 PROCEDURE — 2709999900 HC NON-CHARGEABLE SUPPLY

## 2021-05-15 PROCEDURE — 74011250636 HC RX REV CODE- 250/636: Performed by: SPECIALIST

## 2021-05-15 PROCEDURE — 84100 ASSAY OF PHOSPHORUS: CPT

## 2021-05-15 PROCEDURE — 36415 COLL VENOUS BLD VENIPUNCTURE: CPT

## 2021-05-15 PROCEDURE — 82962 GLUCOSE BLOOD TEST: CPT

## 2021-05-15 PROCEDURE — 80048 BASIC METABOLIC PNL TOTAL CA: CPT

## 2021-05-15 PROCEDURE — 85018 HEMOGLOBIN: CPT

## 2021-05-15 PROCEDURE — 74011250637 HC RX REV CODE- 250/637: Performed by: HOSPITALIST

## 2021-05-15 PROCEDURE — 83735 ASSAY OF MAGNESIUM: CPT

## 2021-05-15 PROCEDURE — 99232 SBSQ HOSP IP/OBS MODERATE 35: CPT | Performed by: HOSPITALIST

## 2021-05-15 PROCEDURE — 74011000250 HC RX REV CODE- 250: Performed by: INTERNAL MEDICINE

## 2021-05-15 RX ADMIN — ESCITALOPRAM OXALATE 10 MG: 10 TABLET ORAL at 10:11

## 2021-05-15 RX ADMIN — IRON SUCROSE 300 MG: 20 INJECTION, SOLUTION INTRAVENOUS at 11:26

## 2021-05-15 RX ADMIN — SODIUM CHLORIDE 50 ML/HR: 900 INJECTION, SOLUTION INTRAVENOUS at 03:14

## 2021-05-15 RX ADMIN — SILVER SULFADIAZINE: 10 CREAM TOPICAL at 11:25

## 2021-05-15 RX ADMIN — AMLODIPINE BESYLATE 5 MG: 5 TABLET ORAL at 10:11

## 2021-05-15 RX ADMIN — MEROPENEM 1 G: 1 INJECTION INTRAVENOUS at 13:25

## 2021-05-15 RX ADMIN — EPOETIN ALFA-EPBX 10000 UNITS: 10000 INJECTION, SOLUTION INTRAVENOUS; SUBCUTANEOUS at 10:11

## 2021-05-15 NOTE — CONSULTS
Nutrition Note      Consult received for calorie count; noted form posted in patient's room, but not filled out. Discussed with nursing about filling out calorie count form (was started on 5/14, to finish on 5/16). Per RN, not filled out due to pt having not eaten anything from his meals yesterday or today so far. Explained to RN about placing 0% if pt does not consuming anything from his meals and placing appropriate % amount for what he does consume and listing what he is consuming (eating/drinking); RN agreed with plan. Pt reported drinking the Ensure yesterday (2 supplements) and per RN, did drink his Ensure this morning. Had food dropped off by family, but has not eaten or tried it yet. Pt keeps saying, \"Just getting this today\" or \"I just started\". Encouraged pt to eat his meals and consume his supplements. RN reported having explained to pt possible need for NGT placement and supplemental EN support if po intake remains poor. Discussed report with Dr Citlalli Villegas; per MD, pt does not want an NGT at this time; will continue with po diet and supplements, encouraging po intake, and continue calorie count. BM 5/14. Poor progression towards nutrition goals      Nutrition Recommendations/Plan:   - Continue calorie count:  5/14-5/16.   - Monitor and encourage po intake as tolerated. - Continue Magic Cup once daily.   Modify Ensure Enlive, increase to TID  - Okay for family to provide food if pt desires, staying consistent with diet order and hospital policy  - IVF per MD  (NS at 50 mL/hr)      Electronically signed by Radha Naidu, [address] on 5/15/2021 at 4:08 PM    Contact: [phone]

## 2021-05-15 NOTE — ROUTINE PROCESS
Patient was given an urine drug screen and the results came back positive for opiates. I spoke with MD; MD and I went into the patient's room to discuss the side effects of taking other drugs/medications while taking the medications prescribed here at the hospital. The patient stated that he understood and that he was not taking anything else. I then called nursing supervisor to make them aware and ask for security to come up. Patient consented to security searching his belongings and nothing was found at this time.

## 2021-05-15 NOTE — PROGRESS NOTES
Problem: Falls - Risk of  Goal: *Absence of Falls  Description: Document Lia Abreu Fall Risk and appropriate interventions in the flowsheet. Outcome: Progressing Towards Goal  Note: Fall Risk Interventions:  Mobility Interventions: Communicate number of staff needed for ambulation/transfer    Mentation Interventions: Adequate sleep, hydration, pain control    Medication Interventions: Bed/chair exit alarm    Elimination Interventions: Bed/chair exit alarm, Call light in reach              Problem: Patient Education: Go to Patient Education Activity  Goal: Patient/Family Education  Outcome: Progressing Towards Goal     Problem: Pressure Injury - Risk of  Goal: *Prevention of pressure injury  Description: Document Jordin Scale and appropriate interventions in the flowsheet.   Outcome: Progressing Towards Goal  Note: Pressure Injury Interventions:  Sensory Interventions: Assess changes in LOC, Keep linens dry and wrinkle-free, Minimize linen layers    Moisture Interventions: Absorbent underpads, Internal/External urinary devices, Minimize layers, Moisture barrier    Activity Interventions: Increase time out of bed, Pressure redistribution bed/mattress(bed type)    Mobility Interventions: Pressure redistribution bed/mattress (bed type), PT/OT evaluation    Nutrition Interventions: Document food/fluid/supplement intake    Friction and Shear Interventions: Apply protective barrier, creams and emollients, Minimize layers                Problem: Patient Education: Go to Patient Education Activity  Goal: Patient/Family Education  Outcome: Progressing Towards Goal     Problem: Nutrition Deficit  Goal: *Optimize nutritional status  Outcome: Progressing Towards Goal     Problem: Impaired Skin Integrity/Pressure Injury Treatment  Goal: *Improvement of Existing Pressure Injury  Outcome: Progressing Towards Goal  Goal: *Prevention of pressure injury  Description: Document Jordin Scale and appropriate interventions in the flowsheet. Outcome: Progressing Towards Goal  Note: Pressure Injury Interventions:  Sensory Interventions: Assess changes in LOC, Keep linens dry and wrinkle-free, Minimize linen layers    Moisture Interventions: Absorbent underpads, Internal/External urinary devices, Minimize layers, Moisture barrier    Activity Interventions: Increase time out of bed, Pressure redistribution bed/mattress(bed type)    Mobility Interventions: Pressure redistribution bed/mattress (bed type), PT/OT evaluation    Nutrition Interventions: Document food/fluid/supplement intake    Friction and Shear Interventions: Apply protective barrier, creams and emollients, Minimize layers                Problem: Patient Education: Go to Patient Education Activity  Goal: Patient/Family Education  Outcome: Progressing Towards Goal     Problem: Hypertension  Goal: *Blood pressure within specified parameters  Outcome: Progressing Towards Goal  Goal: *Fluid volume balance  Outcome: Progressing Towards Goal  Goal: *Labs within defined limits  Outcome: Progressing Towards Goal     Problem: Patient Education: Go to Patient Education Activity  Goal: Patient/Family Education  Outcome: Progressing Towards Goal     Problem: Patient Education: Go to Patient Education Activity  Goal: Patient/Family Education  Outcome: Progressing Towards Goal     Problem: Patient Education: Go to Patient Education Activity  Goal: Patient/Family Education  Outcome: Progressing Towards Goal     Problem: Ostomy Care  Goal: *Patient pouching appliance will fit properly and maintain integrity at least three to five days  Description: Infection control procedures (eg, clean dressings, clean gloves, hand washing, precautions to isolate wound from contamination, sterile instruments used for wound debridement) should be implemented.   Outcome: Progressing Towards Goal  Goal: *Acceptance of change in body image  Outcome: Progressing Towards Goal     Problem: Patient Education: Go to Patient Education Activity  Goal: Patient/Family Education  Outcome: Progressing Towards Goal     Problem: Patient Education: Go to Patient Education Activity  Goal: Patient/Family Education  Outcome: Progressing Towards Goal     Problem: Patient Education: Go to Patient Education Activity  Goal: Patient/Family Education  Outcome: Progressing Towards Goal
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
## 2021-05-15 NOTE — PROGRESS NOTES
Hardeep Fernandez M.D. FACS  PROGRESS NOTE    Name: Saman Abbott MRN: [id number]   : 1960 [facility]   Date: 5/15/2021 Admission Date: 2021 11:00 AM     Hospital Day: 17  12 Days Post-Op  Subjective:  Patient without complaints. He is annoyed at yet another provider wanting to look at his sacral pressure ulcer. Objective:  Vitals:    05/15/21 0002 05/15/21 0145 05/15/21 0434 05/15/21 0741   BP: (!) 149/80 127/77 (!) 147/83 (!) 145/85   Pulse: 81 (!) 104 93 83   Resp: 15 10 14 15   Temp: 98.1 °F (36.7 °C) 98.1 °F (36.7 °C) 99.3 °F (37.4 °C) 98.6 °F (37 °C)   SpO2: 100% 97% 96% 99%   Weight:       Height:         Date 21 0700 - 05/15/21 0659 05/15/21 0700 - 21 0659   Shift 8309-7080 1906-8104 24 Hour Total 3239-4533 4502-1483 24 Hour Total   INTAKE   P.O. 360  360        P. O. 360  360      I. V.(mL/kg/hr)  1803.3(1.4) 1803.3(0.7)        Volume (0.9% sodium chloride infusion)  1803.3 1803.3      Shift Total(mL/kg) 360(3.3) 1803. 3(16.4) 2163.3(19.7)      OUTPUT   Urine(mL/kg/hr) 700(0.5) 600(0.5) 1300(0.5)        Urine Output (mL) (Condom Catheter 21)       Stool  150 150        Output (ml) (Colostomy 21 Left; Lower  Abdomen)  150 150      Shift Total(mL/kg) 700(6.4) 750(6.8) 1450(13.2)      NET -340 1053.3 713.3      Weight (kg) 109.8 109.8 109.8 109.8 109.8 109.8         Physical Exam:    General: Awake and alert, oriented x4, no apparent distress   Sacral pressure ulcer: 95% of the wound bed is healthy granulation tissue. There is some eschar present from recent bedside cautery. The left edge of the wound contains some brown ischemic tissue but no surrounding cellulitis.     Labs:  Recent Results (from the past 24 hour(s))   HGB & HCT    Collection Time: 21  7:01 PM   Result Value Ref Range    HGB 7.5 (L) 13.0 - 16.0 g/dL    HCT 23.2 (L) 36.0 - 48.0 %   IRON PROFILE    Collection Time: 21  7:01 PM   Result Value Ref Range    Iron 29 (L) 50 - 175 ug/dL    TIBC 136 (L) 250 - 450 ug/dL    Iron % saturation 21 20 - 50 %   GLUCOSE, POC    Collection Time: 05/15/21  1:32 AM   Result Value Ref Range    Glucose (POC) 114 (H) 70 - 799 mg/dL   METABOLIC PANEL, BASIC    Collection Time: 05/15/21  3:37 AM   Result Value Ref Range    Sodium 143 136 - 145 mmol/L    Potassium 4.0 3.5 - 5.5 mmol/L    Chloride 115 (H) 100 - 111 mmol/L    CO2 23 21 - 32 mmol/L    Anion gap 5 3.0 - 18 mmol/L    Glucose 101 (H) 74 - 99 mg/dL    BUN 22 (H) 7.0 - 18 MG/DL    Creatinine 2.34 (H) 0.6 - 1.3 MG/DL    BUN/Creatinine ratio 9 (L) 12 - 20      GFR est AA 35 (L) >60 ml/min/1.73m2    GFR est non-AA 29 (L) >60 ml/min/1.73m2    Calcium 7.2 (L) 8.5 - 10.1 MG/DL   CBC WITH AUTOMATED DIFF    Collection Time: 05/15/21  3:37 AM   Result Value Ref Range    WBC 17.4 (H) 4.6 - 13.2 K/uL    RBC 2.34 (L) 4.35 - 5.65 M/uL    HGB 6.7 (L) 13.0 - 16.0 g/dL    HCT 21.3 (L) 36.0 - 48.0 %    MCV 91.0 74.0 - 97.0 FL    MCH 28.6 24.0 - 34.0 PG    MCHC 31.5 31.0 - 37.0 g/dL    RDW 16.4 (H) 11.6 - 14.5 %    PLATELET 176 805 - 081 K/uL    MPV 9.3 9.2 - 11.8 FL    NEUTROPHILS 81 (H) 40 - 73 %    LYMPHOCYTES 10 (L) 21 - 52 %    MONOCYTES 6 3 - 10 %    EOSINOPHILS 2 0 - 5 %    BASOPHILS 0 0 - 2 %    ABS. NEUTROPHILS 14.1 (H) 1.8 - 8.0 K/UL    ABS. LYMPHOCYTES 1.7 0.9 - 3.6 K/UL    ABS. MONOCYTES 1.0 0.05 - 1.2 K/UL    ABS. EOSINOPHILS 0.3 0.0 - 0.4 K/UL    ABS.  BASOPHILS 0.1 0.0 - 0.1 K/UL    DF AUTOMATED     MAGNESIUM    Collection Time: 05/15/21  3:37 AM   Result Value Ref Range    Magnesium 1.8 1.6 - 2.6 mg/dL   PHOSPHORUS    Collection Time: 05/15/21  3:37 AM   Result Value Ref Range    Phosphorus 4.5 2.5 - 4.9 MG/DL   FERRITIN    Collection Time: 05/15/21  3:37 AM   Result Value Ref Range    Ferritin 445 (H) 8 - 388 NG/ML   HGB & HCT    Collection Time: 05/15/21  8:43 AM   Result Value Ref Range    HGB 7.0 (L) 13.0 - 16.0 g/dL    HCT 22.4 (L) 36.0 - 48.0 %     All Micro Results     Procedure Component Value Units Date/Time    CULTURE, Luisana Mckay STAIN [[id number]] Collected: 05/13/21 1236    Order Status: Completed Specimen: Wound from Foot Updated: 05/14/21 1205     Special Requests: NO SPECIAL REQUESTS        GRAM STAIN RARE WBCS SEEN         NO ORGANISMS SEEN        Culture result: NO GROWTH AFTER 12 HOURS       CULTURE, BLOOD [[id number]] Collected: 05/01/21 1918    Order Status: Completed Specimen: Whole Blood Updated: 05/07/21 0716     Special Requests: NO SPECIAL REQUESTS        Culture result: NO GROWTH 6 DAYS       CULTURE, BLOOD [[id number]] Collected: 05/01/21 1918    Order Status: Completed Specimen: Whole Blood Updated: 05/07/21 0716     Special Requests: NO SPECIAL REQUESTS        Culture result: NO GROWTH 6 DAYS       CULTURE, URINE [[id number]] Collected: 05/05/21 1814    Order Status: Completed Specimen: Clean catch Updated: 05/06/21 1824     Special Requests: NO SPECIAL REQUESTS        Culture result: No growth (<1,000 CFU/ML)       CULTURE, WOUND Agustina Quant STAIN [[id number]]  (Abnormal)  (Susceptibility) Collected: 05/03/21 0236    Order Status: Completed Specimen: Sacral Wound Updated: 05/06/21 0913     Special Requests: NO SPECIAL REQUESTS        GRAM STAIN 1+ WBCS SEEN         2+ GRAM NEGATIVE RODS        Culture result: LIGHT ESCHERICHIA COLI               LIGHT PROVIDENCIA STUARTII                  MODERATE MIXED SKIN JORDAN ISOLATED          COVID-19 RAPID TEST [[id number]] Collected: 05/03/21 1858    Order Status: Completed Specimen: Nasopharyngeal Updated: 05/03/21 1946     Specimen source Nasopharyngeal        COVID-19 rapid test Not detected        Comment: Rapid Abbott ID Now       Rapid NAAT:  The specimen is NEGATIVE for SARS-CoV-2, the novel coronavirus associated with COVID-19.        Negative results should be treated as presumptive and, if inconsistent with clinical signs and symptoms or necessary for patient management, should be tested with an alternative molecular assay.  Negative results do not preclude SARS-CoV-2 infection and should not be used as the sole basis for patient management decisions. This test has been authorized by the FDA under an Emergency Use Authorization (EUA) for use by authorized laboratories.    Fact sheet for Healthcare Providers: ConventionUpdate.co.nz  Fact sheet for Patients: ConventionUpdate.co.nz       Methodology: Isothermal Nucleic Acid Amplification               Current Medications:  Current Facility-Administered Medications   Medication Dose Route Frequency Provider Last Rate Last Admin    iron sucrose (VENOFER) 300 mg in 0.9% sodium chloride 250 mL IVPB  300 mg IntraVENous Q24H Refugio Crisostomo .7 mL/hr at 05/15/21 1126 300 mg at 05/15/21 1126    0.9% sodium chloride infusion 250 mL  250 mL IntraVENous PRN Michelle Fournier MD        amLODIPine (NORVASC) tablet 5 mg  5 mg Oral DAILY Ester Vargas MD   5 mg at 05/15/21 1011    meropenem (MERREM) 1 g in sterile water (preservative free) 20 mL IV syringe  1 g IntraVENous Q12H Emilee TROY MD   1 g at 05/14/21 2349    0.9% sodium chloride infusion 250 mL  250 mL IntraVENous PRN Ester Vargas MD        silver sulfADIAZINE (SILVADENE) 1 % topical cream   Topical DAILY Everardo Stanford DPM   Given at 05/15/21 1125    0.9% sodium chloride infusion 250 mL  250 mL IntraVENous PRN Ester Vargas MD        hydrALAZINE (APRESOLINE) 20 mg/mL injection 10 mg  10 mg IntraVENous Q6H PRN Ester Vargas MD   10 mg at 05/09/21 1208    [Held by provider] heparin (porcine) injection 5,000 Units  5,000 Units SubCUTAneous Q8H Ester Vargas MD   5,000 Units at 05/12/21 0533    ondansetron (ZOFRAN) injection 4 mg  4 mg IntraVENous Q6H PRN Christine Art MD   4 mg at 05/11/21 2235    HYDROmorphone (DILAUDID) syringe 0.5 mg  0.5 mg IntraVENous Q4H PRN Gurdeep MD Ranjan        naloxone [facility]) injection 0.4 mg  0.4 mg IntraVENous PRN Jessi Chavez MD        acetaminophen (TYLENOL) tablet 650 mg  650 mg Oral Q6H PRN Adia Rodriguez MD        0.9% sodium chloride infusion  50 mL/hr IntraVENous CONTINUOUS Jose Bhandari MD 50 mL/hr at 05/15/21 0314 50 mL/hr at 05/15/21 0314    docusate (COLACE) 50 mg/5 mL oral liquid 100 mg  100 mg Oral DAILY Adia Rodriguez MD   100 mg at 05/04/21 1014    bisacodyL (DULCOLAX) tablet 5 mg  5 mg Oral DAILY PRN Adia Rodriguez MD        escitalopram oxalate (LEXAPRO) tablet 10 mg  10 mg Oral DAILY Adia Rodriguez MD   10 mg at 05/15/21 1011    oxyCODONE-acetaminophen (PERCOCET) 5-325 mg per tablet 1 Tab  1 Tab Oral Q4H PRN Adia Rodriguez MD   1 Tab at 05/04/21 0331       Chart and notes reviewed. Data reviewed. I have evaluated and examined the patient. IMPRESSION:   · Patient with recent generalized bleeding from the wound bed following debridement of sacral pressure ulcer.       PLAN:/DISCUSION:   · Continue wound care as written  · We will follow        Mayelin Farr MD

## 2021-05-15 NOTE — ROUTINE PROCESS
Spoke with patient giving information to daughter, Ekta Bonilla, regarding patient's healthcare. Patient stated we are able to speak with daughter regarding patient's healthcare.

## 2021-05-15 NOTE — PROGRESS NOTES
Renal parameters improving slowly  On IVF as poor intake  Anemia , AC blood loss?   Iron deficiency noted , add iron  Add a dose of epogen     Following peripherally over weekend    Please call with questions    Vianca Hanna MD FASN  Cell [phone]  Pager: [phone]

## 2021-05-15 NOTE — ROUTINE PROCESS
Bedside shift change report given to Dewey Celaya RN (oncoming nurse) by Dandre Lloyd RN (offgoing nurse). Report included the following information SBAR, Kardex, Procedure Summary, Intake/Output, MAR and Recent Results. Opportunity for questions and clarification was provided.

## 2021-05-15 NOTE — PROGRESS NOTES
Problem: Falls - Risk of  Goal: *Absence of Falls  Description: Document Wilian Perez Fall Risk and appropriate interventions in the flowsheet. Outcome: Progressing Towards Goal  Note: Fall Risk Interventions:  Mobility Interventions: Communicate number of staff needed for ambulation/transfer, OT consult for ADLs, Patient to call before getting OOB, PT Consult for mobility concerns, PT Consult for assist device competence    Mentation Interventions: Door open when patient unattended, Evaluate medications/consider consulting pharmacy    Medication Interventions: Evaluate medications/consider consulting pharmacy    Elimination Interventions: Call light in reach              Problem: Patient Education: Go to Patient Education Activity  Goal: Patient/Family Education  Outcome: Progressing Towards Goal     Problem: Pressure Injury - Risk of  Goal: *Prevention of pressure injury  Description: Document Jordin Scale and appropriate interventions in the flowsheet. Outcome: Progressing Towards Goal  Note: Pressure Injury Interventions:  Sensory Interventions: Turn and reposition approx.  every two hours (pillows and wedges if needed)    Moisture Interventions: Assess need for specialty bed    Activity Interventions: Pressure redistribution bed/mattress(bed type)    Mobility Interventions: Assess need for specialty bed, HOB 30 degrees or less    Nutrition Interventions: Document food/fluid/supplement intake    Friction and Shear Interventions: Apply protective barrier, creams and emollients                Problem: Patient Education: Go to Patient Education Activity  Goal: Patient/Family Education  Outcome: Progressing Towards Goal     Problem: Nutrition Deficit  Goal: *Optimize nutritional status  Outcome: Progressing Towards Goal     Problem: Impaired Skin Integrity/Pressure Injury Treatment  Goal: *Improvement of Existing Pressure Injury  Outcome: Progressing Towards Goal  Goal: *Prevention of pressure injury  Description: Document Jordin Scale and appropriate interventions in the flowsheet. Outcome: Progressing Towards Goal  Note: Pressure Injury Interventions:  Sensory Interventions: Turn and reposition approx. every two hours (pillows and wedges if needed)    Moisture Interventions: Assess need for specialty bed    Activity Interventions: Pressure redistribution bed/mattress(bed type)    Mobility Interventions: Assess need for specialty bed, HOB 30 degrees or less    Nutrition Interventions: Document food/fluid/supplement intake    Friction and Shear Interventions: Apply protective barrier, creams and emollients                Problem: Patient Education: Go to Patient Education Activity  Goal: Patient/Family Education  Outcome: Progressing Towards Goal     Problem: Hypertension  Goal: *Blood pressure within specified parameters  Outcome: Progressing Towards Goal  Goal: *Fluid volume balance  Outcome: Progressing Towards Goal  Goal: *Labs within defined limits  Outcome: Progressing Towards Goal     Problem: Patient Education: Go to Patient Education Activity  Goal: Patient/Family Education  Outcome: Progressing Towards Goal     Problem: Patient Education: Go to Patient Education Activity  Goal: Patient/Family Education  Outcome: Progressing Towards Goal     Problem: Ostomy Care  Goal: *Patient pouching appliance will fit properly and maintain integrity at least three to five days  Description: Infection control procedures (eg, clean dressings, clean gloves, hand washing, precautions to isolate wound from contamination, sterile instruments used for wound debridement) should be implemented.   Outcome: Progressing Towards Goal  Goal: *Acceptance of change in body image  Outcome: Progressing Towards Goal     Problem: Patient Education: Go to Patient Education Activity  Goal: Patient/Family Education  Outcome: Progressing Towards Goal     Problem: Patient Education: Go to Patient Education Activity  Goal: Patient/Family Education  Outcome: Progressing Towards Goal     Problem: Patient Education: Go to Patient Education Activity  Goal: Patient/Family Education  Outcome: Progressing Towards Goal     Problem: Patient Education: Go to Patient Education Activity  Goal: Patient/Family Education  Outcome: Progressing Towards Goal

## 2021-05-15 NOTE — PROGRESS NOTES
In pt's chart to assist primary RN, Paul Perez. Vitals taken and info conveyed to primary RN. Pt awake, alert, calm, cooperative.

## 2021-05-15 NOTE — PROGRESS NOTES
1900: Assumed care of patient. Bedside verbal report received from [address], RN including SBAR, MAR, and Kardex. Vitals within normal limits. Assessment completed, patient in no apparent distress. 2020: Colostomy and catheter bags emptied, repositioned patient in bed.     0400: Dressing changed due to large amount of drainage on dressing and bed pad. Patient tolerated procedure well. 0830: Report given to SAFIA Haley, including SBAR, MAR, and Kardex. Patient alert and oriented, vitals within normal limits, no apparent distress.

## 2021-05-15 NOTE — PROGRESS NOTES
1930: Assumed care of patient. Bedside verbal report received from [address], RN including CHA, MAR, and Kardex. Vitals within normal limits. Assessment completed, patient in no apparent distress. Patient turned on his left side, wound/dressing assessment completed at the same time. Dressing is currently showing a small amount of serous drainage, but not enough to warrant a dressing change and no bleeding. Will monitor. 2100: Colostomy bag still empty after being emptied by prior shift, stoma appropriately pink and moist.      0140: Patient began hollering loudly in his sleep, alternating snoring and yelling, thrashing in bed, and appears uncomfortable. He was difficult to arouse and slow to follow commands. However after stimulation he would answer questions appropriately. Blood sugar 114. Heart rate elevated and respirations decreased, but otherwise vitals stable. Denies pain. Hospitalist paged, awaiting return call. 5782: Hospitalist paged again. 0201: Spoke with Dr. Maren Law, no new orders at this time. Will monitor patient. 0315: Patient is more alert and is more conversational. States he does not remember having several nurses in his room waking him up to assess him, saying \"I sleep heavily. \" He appears more comfortable, and continues to deny having any pain. 0515: Scant amount of stool in colostomy bag. Stoma appropriately pink and moist.     0620: Condom catheter bag emptied. Catheter intact and in place, no leakage noted. Dressing assessed, moderate amount of serous drainage on dressing and bed pad, no bleeding noted. 0700: Report given to [place], RN, including CHA, MAR, and Kardex. Patient alert and oriented, vitals within normal limits, no apparent distress.

## 2021-05-16 LAB
BACTERIA SPEC CULT: NORMAL
GRAM STN SPEC: NORMAL
GRAM STN SPEC: NORMAL
HCT VFR BLD AUTO: 22.3 % (ref 36–48)
HCT VFR BLD AUTO: 22.8 % (ref 36–48)
HGB BLD-MCNC: 7.1 G/DL (ref 13–16)
HGB BLD-MCNC: 7.4 G/DL (ref 13–16)
SERVICE CMNT-IMP: NORMAL

## 2021-05-16 PROCEDURE — 74011250637 HC RX REV CODE- 250/637: Performed by: HOSPITALIST

## 2021-05-16 PROCEDURE — 85014 HEMATOCRIT: CPT

## 2021-05-16 PROCEDURE — 99233 SBSQ HOSP IP/OBS HIGH 50: CPT | Performed by: FAMILY MEDICINE

## 2021-05-16 PROCEDURE — 74011250637 HC RX REV CODE- 250/637: Performed by: SURGERY

## 2021-05-16 PROCEDURE — 65270000029 HC RM PRIVATE

## 2021-05-16 PROCEDURE — 74011250636 HC RX REV CODE- 250/636: Performed by: INTERNAL MEDICINE

## 2021-05-16 PROCEDURE — 2709999900 HC NON-CHARGEABLE SUPPLY

## 2021-05-16 PROCEDURE — 36415 COLL VENOUS BLD VENIPUNCTURE: CPT

## 2021-05-16 PROCEDURE — 74011000250 HC RX REV CODE- 250: Performed by: INTERNAL MEDICINE

## 2021-05-16 RX ADMIN — IRON SUCROSE 300 MG: 20 INJECTION, SOLUTION INTRAVENOUS at 09:44

## 2021-05-16 RX ADMIN — SILVER SULFADIAZINE: 10 CREAM TOPICAL at 09:46

## 2021-05-16 RX ADMIN — MEROPENEM 1 G: 1 INJECTION INTRAVENOUS at 11:52

## 2021-05-16 RX ADMIN — MEROPENEM 1 G: 1 INJECTION INTRAVENOUS at 00:37

## 2021-05-16 RX ADMIN — ESCITALOPRAM OXALATE 10 MG: 10 TABLET ORAL at 09:44

## 2021-05-16 RX ADMIN — AMLODIPINE BESYLATE 5 MG: 5 TABLET ORAL at 09:44
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
## 2021-05-16 NOTE — PROGRESS NOTES
Problem: Falls - Risk of  Goal: *Absence of Falls  Description: Document [place] Fall Risk and appropriate interventions in the flowsheet. Outcome: Progressing Towards Goal  Note: Fall Risk Interventions:  Mobility Interventions: Communicate number of staff needed for ambulation/transfer    Mentation Interventions: More frequent rounding    Medication Interventions: Evaluate medications/consider consulting pharmacy    Elimination Interventions: Toileting schedule/hourly rounds              Problem: Patient Education: Go to Patient Education Activity  Goal: Patient/Family Education  Outcome: Progressing Towards Goal     Problem: Pressure Injury - Risk of  Goal: *Prevention of pressure injury  Description: Document Jordin Scale and appropriate interventions in the flowsheet.   Outcome: Progressing Towards Goal  Note: Pressure Injury Interventions:  Sensory Interventions: Assess changes in LOC    Moisture Interventions: Absorbent underpads    Activity Interventions: Assess need for specialty bed    Mobility Interventions: Pressure redistribution bed/mattress (bed type)    Nutrition Interventions: Discuss nutritional consult with provider    Friction and Shear Interventions: Lift team/patient mobility team

## 2021-05-16 NOTE — ROUTINE PROCESS
Bedside shift change report given to Corrie Martin (oncoming nurse) by Mya Pope (offgoing nurse). Report included the following information SBAR, Kardex, Intake/Output, MAR and Recent Results.

## 2021-05-17 LAB
ANION GAP SERPL CALC-SCNC: 6 MMOL/L (ref 3–18)
BASOPHILS # BLD: 0.1 K/UL (ref 0–0.1)
BASOPHILS NFR BLD: 1 % (ref 0–2)
BUN SERPL-MCNC: 18 MG/DL (ref 7–18)
BUN/CREAT SERPL: 9 (ref 12–20)
CALCIUM SERPL-MCNC: 7.4 MG/DL (ref 8.5–10.1)
CHLORIDE SERPL-SCNC: 114 MMOL/L (ref 100–111)
CO2 SERPL-SCNC: 24 MMOL/L (ref 21–32)
CREAT SERPL-MCNC: 2.03 MG/DL (ref 0.6–1.3)
DIFFERENTIAL METHOD BLD: ABNORMAL
EOSINOPHIL # BLD: 0.4 K/UL (ref 0–0.4)
EOSINOPHIL NFR BLD: 2 % (ref 0–5)
ERYTHROCYTE [DISTWIDTH] IN BLOOD BY AUTOMATED COUNT: 16.5 % (ref 11.6–14.5)
GLUCOSE SERPL-MCNC: 98 MG/DL (ref 74–99)
HCT VFR BLD AUTO: 22.7 % (ref 36–48)
HCT VFR BLD AUTO: 23.8 % (ref 36–48)
HCT VFR BLD AUTO: 26.1 % (ref 36–48)
HGB BLD-MCNC: 7.2 G/DL (ref 13–16)
HGB BLD-MCNC: 7.6 G/DL (ref 13–16)
HGB BLD-MCNC: 8.4 G/DL (ref 13–16)
LYMPHOCYTES # BLD: 2 K/UL (ref 0.9–3.6)
LYMPHOCYTES NFR BLD: 11 % (ref 21–52)
MCH RBC QN AUTO: 28.7 PG (ref 24–34)
MCHC RBC AUTO-ENTMCNC: 31.7 G/DL (ref 31–37)
MCV RBC AUTO: 90.4 FL (ref 74–97)
MONOCYTES # BLD: 1.3 K/UL (ref 0.05–1.2)
MONOCYTES NFR BLD: 7 % (ref 3–10)
NEUTS SEG # BLD: 13.6 K/UL (ref 1.8–8)
NEUTS SEG NFR BLD: 78 % (ref 40–73)
PLATELET # BLD AUTO: 217 K/UL (ref 135–420)
PMV BLD AUTO: 9.6 FL (ref 9.2–11.8)
POTASSIUM SERPL-SCNC: 4 MMOL/L (ref 3.5–5.5)
RBC # BLD AUTO: 2.51 M/UL (ref 4.35–5.65)
SODIUM SERPL-SCNC: 144 MMOL/L (ref 136–145)
WBC # BLD AUTO: 17.5 K/UL (ref 4.6–13.2)

## 2021-05-17 PROCEDURE — 65270000029 HC RM PRIVATE

## 2021-05-17 PROCEDURE — 74011250636 HC RX REV CODE- 250/636: Performed by: INTERNAL MEDICINE

## 2021-05-17 PROCEDURE — 77030041075 HC DRSG AG OPTIFRM MDII -B

## 2021-05-17 PROCEDURE — 85025 COMPLETE CBC W/AUTO DIFF WBC: CPT

## 2021-05-17 PROCEDURE — 76450000000

## 2021-05-17 PROCEDURE — 74011250636 HC RX REV CODE- 250/636: Performed by: FAMILY MEDICINE

## 2021-05-17 PROCEDURE — 77030040393 HC DRSG OPTIFOAM GENT MDII -B

## 2021-05-17 PROCEDURE — 85014 HEMATOCRIT: CPT

## 2021-05-17 PROCEDURE — 74011250637 HC RX REV CODE- 250/637: Performed by: HOSPITALIST

## 2021-05-17 PROCEDURE — 36415 COLL VENOUS BLD VENIPUNCTURE: CPT

## 2021-05-17 PROCEDURE — 74011000250 HC RX REV CODE- 250: Performed by: INTERNAL MEDICINE

## 2021-05-17 PROCEDURE — 80048 BASIC METABOLIC PNL TOTAL CA: CPT

## 2021-05-17 PROCEDURE — 99233 SBSQ HOSP IP/OBS HIGH 50: CPT | Performed by: FAMILY MEDICINE

## 2021-05-17 PROCEDURE — 74011250637 HC RX REV CODE- 250/637: Performed by: SURGERY

## 2021-05-17 PROCEDURE — 2709999900 HC NON-CHARGEABLE SUPPLY

## 2021-05-17 PROCEDURE — 99222 1ST HOSP IP/OBS MODERATE 55: CPT | Performed by: NURSE PRACTITIONER

## 2021-05-17 RX ORDER — MIRTAZAPINE 15 MG/1
7.5 TABLET, FILM COATED ORAL
Status: DISCONTINUED | OUTPATIENT
Start: 2021-05-17 | End: 2021-05-22 | Stop reason: HOSPADM

## 2021-05-17 RX ADMIN — HYDRALAZINE HYDROCHLORIDE 10 MG: 20 INJECTION, SOLUTION INTRAMUSCULAR; INTRAVENOUS at 17:31

## 2021-05-17 RX ADMIN — MEROPENEM 1 G: 1 INJECTION INTRAVENOUS at 11:01

## 2021-05-17 RX ADMIN — IRON SUCROSE 300 MG: 20 INJECTION, SOLUTION INTRAVENOUS at 10:57

## 2021-05-17 RX ADMIN — ESCITALOPRAM OXALATE 10 MG: 10 TABLET ORAL at 08:36

## 2021-05-17 RX ADMIN — MEROPENEM 1 G: 1 INJECTION INTRAVENOUS at 01:05

## 2021-05-17 RX ADMIN — AMLODIPINE BESYLATE 5 MG: 5 TABLET ORAL at 08:35

## 2021-05-17 RX ADMIN — SILVER SULFADIAZINE: 10 CREAM TOPICAL at 10:57

## 2021-05-17 NOTE — PALLIATIVE CARE
This MSW and Subhash Mckeon NP met with pt and his roommate Cristian Olivares at bedside to discuss goals of care. Pt was sitting up on side of bed, eating lunch. MSW initiated conversation regarding goals of care. Pt reports he wants everything done. Pt's roommate, Cristian Olivares, expressed frustration at being asked about this repeatedly. NP addressed pending Hospice Referral. Cristian Olivares states pt is not interested in hospice at this time, and had only agreed to referral, because had been told could continue treatment while on hospice. Pt indicated agreement with this. Pt remains FULL CODE at this time. Not interested in further conversation regarding hospice or changing goals of care.

## 2021-05-17 NOTE — PROGRESS NOTES
Problem: Falls - Risk of  Goal: *Absence of Falls  Description: Document Monique Perez Fall Risk and appropriate interventions in the flowsheet. Outcome: Progressing Towards Goal  Note: Fall Risk Interventions:  Mobility Interventions: Communicate number of staff needed for ambulation/transfer, OT consult for ADLs, Patient to call before getting OOB, Bed/chair exit alarm    Mentation Interventions: Bed/chair exit alarm, Door open when patient unattended    Medication Interventions: Patient to call before getting OOB, Teach patient to arise slowly, Bed/chair exit alarm    Elimination Interventions: Call light in reach, Stay With Me (per policy)    History of Falls Interventions: Bed/chair exit alarm, Door open when patient unattended         Problem: Pressure Injury - Risk of  Goal: *Prevention of pressure injury  Description: Document Jordin Scale and appropriate interventions in the flowsheet.   Outcome: Progressing Towards Goal  Note: Pressure Injury Interventions:  Sensory Interventions: Discuss PT/OT consult with provider, Check visual cues for pain, Float heels, Keep linens dry and wrinkle-free    Moisture Interventions: Absorbent underpads    Activity Interventions: Pressure redistribution bed/mattress(bed type)    Mobility Interventions: HOB 30 degrees or less, Pressure redistribution bed/mattress (bed type), Assess need for specialty bed    Nutrition Interventions: Document food/fluid/supplement intake    Friction and Shear Interventions: HOB 30 degrees or less, Lift sheet, Minimize layers, Apply protective barrier, creams and emollients

## 2021-05-17 NOTE — PALLIATIVE CARE
This MSW and Jamari Dahl NP met with pt at bedside to address goals of care. Pt laying in bed comfortably at time of visit. Nutritionist consulted with MSW and NP, prior to entering room, because pt is not consuming enough caloric intake to sustain life. MSW and NP addressed pt regarding this issue. Pt indicates he does not want a feeding tube, even temporarily. Pt indicates he is not interested in CPR, but is not ready to complete official documentation to this effect at this time. Pt states he wants to try to eat today. NP and MSW will follow up tomorrow 05/18/2021 for further discussion regarding goals of care and possible feeding tube.

## 2021-05-17 NOTE — PROGRESS NOTES
Bedside and Verbal shift change report given to SAFIA Haley (oncoming nurse) by Jose Armando Pierre RN (offgoing nurse). Report included the following information SBAR, Kardex, Procedure Summary, Intake/Output and MAR.
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
## 2021-05-17 NOTE — PROGRESS NOTES
Infectious Disease progress Note        Reason: infected decubiti ulcer    Current abx Prior abx     Meropenem since 5/6 vancomycin 5/1-5/4  Piperacillin/tazobactam since 5/1-5/6     Lines:       Assessment :    61year old man with h/o HTN, paraplegia admitted to [facility] on 4/29/21 for evaluation of infected sacral decubiti. Clinical presentation c/w acute on chronic sacral osteomyelitis, infected sacral decubiti    S/p surgical debridement,  robotic colostomy on 4/29/2021    Persistent leukocytosis -likely due to partially treated infection due to resistant pathogens. wound cultures 5/3-E. coli, providencia (resistant to piperacillin/tazobactam)  Antibiotics switched to meropenem on 5/6/2021. Improved leukocytosis    Protrusion of the small bowel around the colostomy causing bowel ischemia s/p expl. laparotomy with small bowel resection, partial colectomy/revision of colostomy on 5/4/21    Acute kidney injury-likely multifactorial.  Discussed with nephrologist.  Concerns for antibiotic associated interstitial nephritis-gradually improving creatinine    Tolerating liquid diet- poor po intake. Worsening leukocytosis 19 K on 5/11- likely leukemoid reaction to bleed from sacral wound-drop in hemoglobin to 6.2 noted on 5/12, 5/13 labs. Improved leukocytosis. No clinical evidence of new infection    Recommendations:    1. Continue meropenem-adjust dose per changing renal function-continue meropenem till 6/18/21  2. Follow-up nephrology recommendations regarding ЕКАТЕРИНА   3. Encourage po diet  4. Will need good wound care to aid healing and prevent future infectious complications  5. F/u surgery recommendations about bleeding from sacral wound  6. F/u cbc, clinically    Home health orders on chart (click on \"chart review, other orders, IP home health)    Above plan was discussed in details with patient. Please call me if any further questions or concerns.  Will continue to participate in the care of this patient. HPI:       patient denied any chest pain, shortness of breath, abdominal pain. states that he will try to eat. Doesn't want a feeding tube      Current Discharge Medication List      CONTINUE these medications which have NOT CHANGED    Details   lisinopril-hydroCHLOROthiazide (PRINZIDE, ZESTORETIC) 20-12.5 mg per tablet TAKE 1 TABLET EVERY DAY  Refills: 3      ergocalciferol (ERGOCALCIFEROL) 1,250 mcg (50,000 unit) capsule       ciprofloxacin HCl (CIPRO) 250 mg tablet Take 1 Tab by mouth two (2) times a day. Qty: 30 Tab, Refills: 2      tamsulosin (FLOMAX) 0.4 mg capsule TAKE 1 CAPSULE BY MOUTH EVERY DAY  Refills: 1      naloxone (NARCAN) 2 mg/actuation spry Use 1 spray intranasally into 1 nostril. Use a new Narcan nasal spray for subsequent doses and administer into alternating nostrils. May repeat every 2 to 3 minutes as needed. Qty: 2 Actuation(s), Refills: 0      furosemide (LASIX) 20 mg tablet TAKE 1 TABLET BY MOUTH DAILY AS NEEDED FOR SWELLING  Refills: 3      MULTIVIT-MINERALS/FOLIC ACID (SPECTRAVITE ADULT PO) Take  by mouth.      escitalopram oxalate (LEXAPRO) 10 mg tablet Take 10 mg by mouth daily.       acetaminophen (TYLENOL) 325 mg tablet TAKE 2 TABLETS BY MOUTH EVERY 4 HOURS AS NEEDED FOR PAIN  Refills: 2             Current Facility-Administered Medications   Medication Dose Route Frequency    iron sucrose (VENOFER) 300 mg in 0.9% sodium chloride 250 mL IVPB  300 mg IntraVENous Q24H    0.9% sodium chloride infusion 250 mL  250 mL IntraVENous PRN    amLODIPine (NORVASC) tablet 5 mg  5 mg Oral DAILY    meropenem (MERREM) 1 g in sterile water (preservative free) 20 mL IV syringe  1 g IntraVENous Q12H    0.9% sodium chloride infusion 250 mL  250 mL IntraVENous PRN    silver sulfADIAZINE (SILVADENE) 1 % topical cream   Topical DAILY    0.9% sodium chloride infusion 250 mL  250 mL IntraVENous PRN    hydrALAZINE (APRESOLINE) 20 mg/mL injection 10 mg  10 mg IntraVENous Q6H PRN    [Held by provider] heparin (porcine) injection 5,000 Units  5,000 Units SubCUTAneous Q8H    ondansetron (ZOFRAN) injection 4 mg  4 mg IntraVENous Q6H PRN    HYDROmorphone (DILAUDID) syringe 0.5 mg  0.5 mg IntraVENous Q4H PRN    naloxone (NARCAN) injection 0.4 mg  0.4 mg IntraVENous PRN    acetaminophen (TYLENOL) tablet 650 mg  650 mg Oral Q6H PRN    0.9% sodium chloride infusion  50 mL/hr IntraVENous CONTINUOUS    docusate (COLACE) 50 mg/5 mL oral liquid 100 mg  100 mg Oral DAILY    bisacodyL (DULCOLAX) tablet 5 mg  5 mg Oral DAILY PRN    escitalopram oxalate (LEXAPRO) tablet 10 mg  10 mg Oral DAILY    oxyCODONE-acetaminophen (PERCOCET) 5-325 mg per tablet 1 Tab  1 Tab Oral Q4H PRN       Allergies: Patient has no known allergies. Temp (24hrs), Av.3 °F (36.8 °C), Min:98 °F (36.7 °C), Max:98.7 °F (37.1 °C)    Visit Vitals  BP (!) 175/91 (BP 1 Location: Right upper arm, BP Patient Position: At rest)   Pulse 85   Temp 98.1 °F (36.7 °C)   Resp 20   Ht 6' 2\" (1.88 m)   Wt 109.8 kg (242 lb)   SpO2 100%   BMI 31.07 kg/m²       ROS: 12 point ROS obtained in details. Pertinent positives as mentioned in HPI,   otherwise negative    Physical Exam:    General: Well developed, well nourished male laying on the bed, sleepy, in no acute distress. General:   awake alert and oriented   HEENT:  Normocephalic, atraumatic, EOMI, no scleral icterus or pallor; no conjunctival hemmohage;  nasal and oral mucous are moist and without evidence of lesions. No thrush. Dentition good. Neck supple, no bruits. Lymph Nodes:   no cervical, axillary or inguinal adenopathy   Lungs:   non-labored, bilaterally clear to auscultation- no crackles wheezes rales or rhonchi   Heart:  RRR, s1 and s2; no rubs or gallops, no edema   Abdomen:  soft, non-distended, active bowel sounds, no hepatomegaly, no splenomegaly. Colostomy in place.   Non-tender   Genitourinary:  deferred   Extremities:   no clubbing, cyanosis; no joint effusions or swelling; Full ROM of all large joints to the upper and lower extremities; muscle mass appropriate for age   Neurologic:  Paraplegia. Speech appropriate. Cranial nerves intact                        Skin:  Surgical changes back   Back:  wound vac recent surgical wound     Psychiatric:  No suicidal or homicidal ideations, appropriate mood and affect         Labs: Results:   Chemistry Recent Labs     05/17/21  0131 05/15/21  0337 05/14/21  0924   GLU 98 101* 123*    143 143   K 4.0 4.0 4.0   * 115* 112*   CO2 24 23 24   BUN 18 22* 23*   CREA 2.03* 2.34* 2.47*   CA 7.4* 7.2* 7.4*   AGAP 6 5 7   BUCR 9* 9* 9*   ALB  --   --  1.9*      CBC w/Diff Recent Labs     05/17/21 0131 05/16/21  1620 05/16/21  0525 05/15/21  0337 05/15/21  0337 05/14/21  0924 05/14/21  0924   WBC 17.5*  --   --   --  17.4*  --  17.6*   RBC 2.51*  --   --   --  2.34*  --  2.76*   HGB 7.2* 7.4* 7.1*   < > 6.7*   < > 7.8*   HCT 22.7* 22.8* 22.3*   < > 21.3*   < > 24.4*     --   --   --  220  --  241   GRANS 78*  --   --   --  81*  --  82*   LYMPH 11*  --   --   --  10*  --  10*   EOS 2  --   --   --  2  --  2    < > = values in this interval not displayed. Microbiology No results for input(s): CULT in the last 72 hours. RADIOLOGY:    All available imaging studies/reports in connect care for this admission were reviewed      Disclaimer: Sections of this note are dictated utilizing voice recognition software, which may have resulted in some phonetic based errors in grammar and contents. Even though attempts were made to correct all the mistakes, some may have been missed, and remained in the body of the document. If questions arise, please contact our department.     Dr. Evelin Skinner, Infectious Disease Specialist  [phone]  May 17, 2021  8:39 PM

## 2021-05-17 NOTE — PROGRESS NOTES
Calorie Count Note    Type and Reason for Visit:  Reassess, Consult, Calorie Count, Wound    Nutrition Assessment: Continues with poor po intake. Calorie count completed with some meals of missing documentation- results listed below. Mainly consuming juices with occasional sips of supplement. Palliative care following and discussed nutritional plan with Dr. Destinee López MD to consult nutrition for management of TF if decision for NGT placement made. Current Diet and Supplement Order:    DIET NUTRITIONAL SUPPLEMENTS Lunch; Magic Cups  DIET REGULAR  DIET NUTRITIONAL SUPPLEMENTS Breakfast, Lunch, Dinner; Ens[employer] (Estimated Nutrition Needs):   · Estimated Daily Total Kcal: 2010-2412  · Estimated Daily Protein (g): 136-170    Day 1   Meal Calories Protein Comments   Breakfast - - Mainly consuming juices. No documentation of breakfast or dinner meal, anticipate due to no po intake   Lunch 0 kcal 0 gm     Dinner - -    Snacks/Supplements 582 kcal 20 gm    Total 582 kcal 20 gm     29% Kcal needs met 15% Protein needs met      Day 2   Meal Calories Protein Comments   Breakfast 261 kcal 0 gm 1 supplement, juices, 2 chicken wings   Lunch 87 kcal 0 gm    Dinner 86 kcal 12 gm protein    Snacks/Supplements 350 kcal 20 gm    Total 784 kcal 32 gm     39% Kcal needs met 23% Protein needs met      Day 3   Meal Calories Protein Comments   Breakfast 117 kcal 1 gm    Lunch 58 kcal 0 gm    Dinner 70 kcal 1 gm    Snacks/Supplements 0 kcal 0 gm    Total 245 kcal 2 gm     12% Kcal needs met 0% Protein needs met      Intervention & Recommendations:   1. Discontinue Calorie Count  2. Recommend NGT placement to provide supplemental enteral nutrition support-discussed with MD.  3. Continue nutritional supplements and encourage po intake as tolerated.     Electronically signed by Cody Millan RD on 5/17/2021 at 11:32 AM    Contact Number: [phone]

## 2021-05-17 NOTE — PROGRESS NOTES
RENAL PROGRESS NOTE        Orvel Baston         Assessment  1. ЕКАТЕРИНА , ATN / AIN 2/2 to Abx Nephrotoxicity   2. Infected Sacral dec ulcer   3. Chronic anemia   4.  HTN , uncontrolled  5. fluid collection above the bladder, incidentally seen above bladder      Plan   - renal parameters improving slowly  - goal  BP ~ 130-150 SBP in acute setting  - continue IVF 50 ml / hr d/t poor intake  - will Replace Lytes as needed   - follow H&H , check iron levels   - consider appetite stimulant       Please call with questions,    Consuelo Villalta MD FASN  Cell [phone]  Pager: [phone]                                                                                                                            Subjective:  he denies any CP / SOB   Not very interactive       Patient Active Problem List   Diagnosis Code    S/P colostomy ([facility] [address] Z93.3    Paraplegia ([facility] [address] G82.20    Sacral decubitus ulcer, stage IV ([facility] [address] L89.154    HTN (hypertension) I10       Current Facility-Administered Medications   Medication Dose Route Frequency Provider Last Rate Last Admin    0.9% sodium chloride infusion 250 mL  250 mL IntraVENous PRN Hilda Gonzales MD        amLODIPine (NORVASC) tablet 5 mg  5 mg Oral DAILY Luis Vargas MD   5 mg at 05/17/21 0835    meropenem (MERREM) 1 g in sterile water (preservative free) 20 mL IV syringe  1 g IntraVENous Q12H Portillo TROY MD   1 g at 05/17/21 1101    0.9% sodium chloride infusion 250 mL  250 mL IntraVENous PRN Luis Vargas MD        silver sulfADIAZINE (SILVADENE) 1 % topical cream   Topical DAILY Santa Navarrete DPM   Given at 05/17/21 1057    0.9% sodium chloride infusion 250 mL  250 mL IntraVENous PRN Luis Vargas MD        hydrALAZINE (APRESOLINE) 20 mg/mL injection 10 mg  10 mg IntraVENous Q6H PRN Liang Bari, DO   10 mg at 05/17/21 1731    [Held by provider] heparin (porcine) injection 5,000 Units  5,000 Units SubCUTAneous Q8H Wilmar Madera MD   5,000 Units at 05/12/21 0533    ondansetron [facility]) injection 4 mg  4 mg IntraVENous Q6H PRN Wilmar Madera MD   4 mg at 05/11/21 2235    HYDROmorphone (DILAUDID) syringe 0.5 mg  0.5 mg IntraVENous Q4H PRN Fadi Farr MD        naloxone [facility]) injection 0.4 mg  0.4 mg IntraVENous PRN Fadi Farr MD        acetaminophen (TYLENOL) tablet 650 mg  650 mg Oral Q6H PRN Pratima Sanchez MD        0.9% sodium chloride infusion  50 mL/hr IntraVENous CONTINUOUS Colleen Licona MD 50 mL/hr at 05/15/21 0314 50 mL/hr at 05/15/21 0314    docusate (COLACE) 50 mg/5 mL oral liquid 100 mg  100 mg Oral DAILY Pratima Sanchez MD   100 mg at 05/04/21 1014    bisacodyL (DULCOLAX) tablet 5 mg  5 mg Oral DAILY PRN Pratima Sanchez MD        escitalopram oxalate (LEXAPRO) tablet 10 mg  10 mg Oral DAILY Pratima Sanchez MD   10 mg at 05/17/21 0836    oxyCODONE-acetaminophen (PERCOCET) 5-325 mg per tablet 1 Tab  1 Tab Oral Q4H PRN Pratima Sanchez MD   1 Tab at 05/04/21 0331       Objective  Vitals:    05/17/21 0731 05/17/21 0852 05/17/21 1151 05/17/21 1705   BP: (!) 175/91  (!) 167/88 (!) 168/89   Pulse: 85  88 89   Resp: 20 18 18   Temp: 98.1 °F (36.7 °C)  98 °F (36.7 °C) 97.7 °F (36.5 °C)   SpO2: 100%  99% 100%   Weight:  108.9 kg (240 lb)     Height:             Intake/Output Summary (Last 24 hours) at 5/17/2021 1807  Last data filed at 5/17/2021 1446  Gross per 24 hour   Intake 2525 ml   Output 2600 ml   Net -75 ml           Admission weight: Weight: 113.4 kg (250 lb) (04/27/21 1058)  Last Weight Metrics:  Weight Loss Metrics 5/17/2021 4/29/2021 4/16/2021 4/12/2021 1/7/2021 6/17/2019 3/23/2019   Today's Wt 240 lb - 250 lb - 240 lb 240 lb 209 lb   BMI - 30.81 kg/m2 32.1 kg/m2 32.55 kg/m2 32.55 kg/m2 32.55 kg/m2 28.35 kg/m2             Physical Assessment:     General: sleepy   Neck: No jvd.   LUNGS: Clear to Auscultation, No rales, rhonchi or wheezes. CVS EXM: S1, S2  RRR, no murmurs/gallops/rubs. Abdomen: soft, non tender. Lower Extremities:  no edema. Lab    CBC w/Diff Recent Labs     05/17/21  1045 05/17/21  0131 05/16/21  1620 05/15/21  0337 05/15/21  0337   WBC  --  17.5*  --   --  17.4*   RBC  --  2.51*  --   --  2.34*   HGB 7.6* 7.2* 7.4*   < > 6.7*   HCT 23.8* 22.7* 22.8*   < > 21.3*   PLT  --  217  --   --  220   GRANS  --  78*  --   --  81*   LYMPH  --  11*  --   --  10*   EOS  --  2  --   --  2    < > = values in this interval not displayed.         Chemistry Recent Labs     05/17/21  0131 05/15/21  0337   GLU 98 101*    143   K 4.0 4.0   * 115*   CO2 24 23   BUN 18 22*   CREA 2.03* 2.34*   CA 7.4* 7.2*   AGAP 6 5   BUCR 9* 9*   PHOS  --  4.5         Lab Results   Component Value Date/Time    Iron 29 (L) 05/14/2021 07:01 PM    TIBC 136 (L) 05/14/2021 07:01 PM    Iron % saturation 21 05/14/2021 07:01 PM    Ferritin 445 (H) 05/15/2021 03:37 AM      Lab Results   Component Value Date/Time    Calcium 7.4 (L) 05/17/2021 01:31 AM    Phosphorus 4.5 05/15/2021 03:37 AM          Peggy Reza MD  5/17/2021  7:57 AM

## 2021-05-17 NOTE — PROGRESS NOTES
Problem: Falls - Risk of  Goal: *Absence of Falls  Description: Document [place] Fall Risk and appropriate interventions in the flowsheet. Outcome: Progressing Towards Goal  Note: Fall Risk Interventions:  Mobility Interventions: Communicate number of staff needed for ambulation/transfer, OT consult for ADLs, Patient to call before getting OOB, Bed/chair exit alarm    Mentation Interventions: Bed/chair exit alarm, Door open when patient unattended    Medication Interventions: Patient to call before getting OOB, Teach patient to arise slowly, Bed/chair exit alarm    Elimination Interventions: Call light in reach, Stay With Me (per policy)    History of Falls Interventions: Bed/chair exit alarm, Door open when patient unattended         Problem: Patient Education: Go to Patient Education Activity  Goal: Patient/Family Education  Outcome: Progressing Towards Goal     Problem: Pressure Injury - Risk of  Goal: *Prevention of pressure injury  Description: Document Jordin Scale and appropriate interventions in the flowsheet.   Outcome: Progressing Towards Goal  Note: Pressure Injury Interventions:  Sensory Interventions: Discuss PT/OT consult with provider, Check visual cues for pain, Float heels, Keep linens dry and wrinkle-free    Moisture Interventions: Absorbent underpads    Activity Interventions: Pressure redistribution bed/mattress(bed type)    Mobility Interventions: HOB 30 degrees or less, Pressure redistribution bed/mattress (bed type), Assess need for specialty bed    Nutrition Interventions: Document food/fluid/supplement intake    Friction and Shear Interventions: HOB 30 degrees or less, Lift sheet, Minimize layers, Apply protective barrier, creams and emollients                Problem: Patient Education: Go to Patient Education Activity  Goal: Patient/Family Education  Outcome: Progressing Towards Goal     Problem: Nutrition Deficit  Goal: *Optimize nutritional status  Outcome: Progressing Towards Goal Problem: Impaired Skin Integrity/Pressure Injury Treatment  Goal: *Improvement of Existing Pressure Injury  Outcome: Progressing Towards Goal  Goal: *Prevention of pressure injury  Description: Document Jordin Scale and appropriate interventions in the flowsheet.   Outcome: Progressing Towards Goal  Note: Pressure Injury Interventions:  Sensory Interventions: Discuss PT/OT consult with provider, Check visual cues for pain, Float heels, Keep linens dry and wrinkle-free    Moisture Interventions: Absorbent underpads    Activity Interventions: Pressure redistribution bed/mattress(bed type)    Mobility Interventions: HOB 30 degrees or less, Pressure redistribution bed/mattress (bed type), Assess need for specialty bed    Nutrition Interventions: Document food/fluid/supplement intake    Friction and Shear Interventions: HOB 30 degrees or less, Lift sheet, Minimize layers, Apply protective barrier, creams and emollients                Problem: Patient Education: Go to Patient Education Activity  Goal: Patient/Family Education  Outcome: Progressing Towards Goal     Problem: Hypertension  Goal: *Blood pressure within specified parameters  Outcome: Progressing Towards Goal  Goal: *Fluid volume balance  Outcome: Progressing Towards Goal  Goal: *Labs within defined limits  Outcome: Progressing Towards Goal     Problem: Patient Education: Go to Patient Education Activity  Goal: Patient/Family Education  Outcome: Progressing Towards Goal
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
## 2021-05-18 LAB
ANION GAP SERPL CALC-SCNC: 7 MMOL/L (ref 3–18)
BASOPHILS # BLD: 0.1 K/UL (ref 0–0.1)
BASOPHILS NFR BLD: 0 % (ref 0–2)
BUN SERPL-MCNC: 17 MG/DL (ref 7–18)
BUN/CREAT SERPL: 9 (ref 12–20)
CALCIUM SERPL-MCNC: 7.1 MG/DL (ref 8.5–10.1)
CHLORIDE SERPL-SCNC: 113 MMOL/L (ref 100–111)
CO2 SERPL-SCNC: 23 MMOL/L (ref 21–32)
CREAT SERPL-MCNC: 1.95 MG/DL (ref 0.6–1.3)
DIFFERENTIAL METHOD BLD: ABNORMAL
EOSINOPHIL # BLD: 0.3 K/UL (ref 0–0.4)
EOSINOPHIL NFR BLD: 2 % (ref 0–5)
ERYTHROCYTE [DISTWIDTH] IN BLOOD BY AUTOMATED COUNT: 17.2 % (ref 11.6–14.5)
GLUCOSE SERPL-MCNC: 108 MG/DL (ref 74–99)
HCT VFR BLD AUTO: 23.7 % (ref 36–48)
HCT VFR BLD AUTO: 26.1 % (ref 36–48)
HGB BLD-MCNC: 7.7 G/DL (ref 13–16)
HGB BLD-MCNC: 8.2 G/DL (ref 13–16)
LYMPHOCYTES # BLD: 1.5 K/UL (ref 0.9–3.6)
LYMPHOCYTES NFR BLD: 8 % (ref 21–52)
MCH RBC QN AUTO: 29.7 PG (ref 24–34)
MCHC RBC AUTO-ENTMCNC: 32.5 G/DL (ref 31–37)
MCV RBC AUTO: 91.5 FL (ref 74–97)
MONOCYTES # BLD: 1.2 K/UL (ref 0.05–1.2)
MONOCYTES NFR BLD: 7 % (ref 3–10)
NEUTS SEG # BLD: 14.5 K/UL (ref 1.8–8)
NEUTS SEG NFR BLD: 82 % (ref 40–73)
PLATELET # BLD AUTO: 238 K/UL (ref 135–420)
PMV BLD AUTO: 9.7 FL (ref 9.2–11.8)
POTASSIUM SERPL-SCNC: 3.9 MMOL/L (ref 3.5–5.5)
RBC # BLD AUTO: 2.59 M/UL (ref 4.35–5.65)
SODIUM SERPL-SCNC: 143 MMOL/L (ref 136–145)
WBC # BLD AUTO: 17.8 K/UL (ref 4.6–13.2)

## 2021-05-18 PROCEDURE — 99233 SBSQ HOSP IP/OBS HIGH 50: CPT | Performed by: INTERNAL MEDICINE

## 2021-05-18 PROCEDURE — 74011250637 HC RX REV CODE- 250/637: Performed by: FAMILY MEDICINE

## 2021-05-18 PROCEDURE — 77030040162

## 2021-05-18 PROCEDURE — 74011250636 HC RX REV CODE- 250/636: Performed by: INTERNAL MEDICINE

## 2021-05-18 PROCEDURE — 65270000029 HC RM PRIVATE

## 2021-05-18 PROCEDURE — 74011250637 HC RX REV CODE- 250/637: Performed by: HOSPITALIST

## 2021-05-18 PROCEDURE — 74011250636 HC RX REV CODE- 250/636: Performed by: FAMILY MEDICINE

## 2021-05-18 PROCEDURE — 99356 PR PROLONGED SVC I/P OR OBS SETTING 1ST HOUR: CPT | Performed by: NURSE PRACTITIONER

## 2021-05-18 PROCEDURE — 99233 SBSQ HOSP IP/OBS HIGH 50: CPT | Performed by: NURSE PRACTITIONER

## 2021-05-18 PROCEDURE — 74011000250 HC RX REV CODE- 250: Performed by: INTERNAL MEDICINE

## 2021-05-18 PROCEDURE — 80048 BASIC METABOLIC PNL TOTAL CA: CPT

## 2021-05-18 PROCEDURE — 74011250637 HC RX REV CODE- 250/637: Performed by: SURGERY

## 2021-05-18 PROCEDURE — 36415 COLL VENOUS BLD VENIPUNCTURE: CPT

## 2021-05-18 PROCEDURE — 85018 HEMOGLOBIN: CPT

## 2021-05-18 PROCEDURE — 77030008771 HC TU NG SALEM SUMP -A

## 2021-05-18 PROCEDURE — 77030013076 HC PCH OST BAG COLO -A

## 2021-05-18 PROCEDURE — 85025 COMPLETE CBC W/AUTO DIFF WBC: CPT

## 2021-05-18 PROCEDURE — 2709999900 HC NON-CHARGEABLE SUPPLY

## 2021-05-18 PROCEDURE — 74011250636 HC RX REV CODE- 250/636: Performed by: SPECIALIST

## 2021-05-18 RX ADMIN — HYDRALAZINE HYDROCHLORIDE 10 MG: 20 INJECTION, SOLUTION INTRAMUSCULAR; INTRAVENOUS at 18:20

## 2021-05-18 RX ADMIN — MIRTAZAPINE 7.5 MG: 15 TABLET, FILM COATED ORAL at 00:24

## 2021-05-18 RX ADMIN — ESCITALOPRAM OXALATE 10 MG: 10 TABLET ORAL at 11:02

## 2021-05-18 RX ADMIN — AMLODIPINE BESYLATE 5 MG: 5 TABLET ORAL at 11:02

## 2021-05-18 RX ADMIN — SODIUM CHLORIDE 50 ML/HR: 900 INJECTION, SOLUTION INTRAVENOUS at 20:16

## 2021-05-18 RX ADMIN — MEROPENEM 1 G: 1 INJECTION INTRAVENOUS at 00:25

## 2021-05-18 RX ADMIN — MEROPENEM 1 G: 1 INJECTION INTRAVENOUS at 13:28

## 2021-05-18 RX ADMIN — SILVER SULFADIAZINE: 10 CREAM TOPICAL at 11:10

## 2021-05-18 RX ADMIN — ACETAMINOPHEN 650 MG: 325 TABLET ORAL at 18:21

## 2021-05-18 RX ADMIN — MIRTAZAPINE 7.5 MG: 15 TABLET, FILM COATED ORAL at 21:35

## 2021-05-18 RX ADMIN — HYDRALAZINE HYDROCHLORIDE 10 MG: 20 INJECTION, SOLUTION INTRAMUSCULAR; INTRAVENOUS at 11:04

## 2021-05-18 RX ADMIN — HYDRALAZINE HYDROCHLORIDE 10 MG: 20 INJECTION, SOLUTION INTRAMUSCULAR; INTRAVENOUS at 03:23

## 2021-05-18 NOTE — PROGRESS NOTES
Problem: Falls - Risk of  Goal: *Absence of Falls  Description: Document [place] Fall Risk and appropriate interventions in the flowsheet. Outcome: Progressing Towards Goal  Note: Fall Risk Interventions:  Mobility Interventions: Bed/chair exit alarm, Patient to call before getting OOB    Mentation Interventions: Bed/chair exit alarm, Toileting rounds, Update white board    Medication Interventions: Bed/chair exit alarm, Patient to call before getting OOB, Teach patient to arise slowly    Elimination Interventions: Call light in reach, Urinal in reach    History of Falls Interventions: Door open when patient unattended, Bed/chair exit alarm         Problem: Patient Education: Go to Patient Education Activity  Goal: Patient/Family Education  Outcome: Progressing Towards Goal     Problem: Pressure Injury - Risk of  Goal: *Prevention of pressure injury  Description: Document Jordin Scale and appropriate interventions in the flowsheet.   Outcome: Progressing Towards Goal  Note: Pressure Injury Interventions:  Sensory Interventions: Monitor skin under medical devices, Keep linens dry and wrinkle-free, Pressure redistribution bed/mattress (bed type)    Moisture Interventions: Absorbent underpads, Internal/External urinary devices, Internal/External fecal devices    Activity Interventions: Pressure redistribution bed/mattress(bed type)    Mobility Interventions: Pressure redistribution bed/mattress (bed type), HOB 30 degrees or less    Nutrition Interventions: Document food/fluid/supplement intake    Friction and Shear Interventions: Apply protective barrier, creams and emollients

## 2021-05-18 NOTE — PROGRESS NOTES
Infectious Disease progress Note        Reason: infected decubiti ulcer    Current abx Prior abx     Meropenem since 5/6 vancomycin 5/1-5/4  Piperacillin/tazobactam since 5/1-5/6     Lines:       Assessment :    61year old man with h/o HTN, paraplegia admitted to [facility] on 4/29/21 for evaluation of infected sacral decubiti. Clinical presentation c/w acute on chronic sacral osteomyelitis, infected sacral decubiti    S/p surgical debridement,  robotic colostomy on 4/29/2021    Persistent leukocytosis -likely due to partially treated infection due to resistant pathogens. wound cultures 5/3-E. coli, providencia (resistant to piperacillin/tazobactam)  Antibiotics switched to meropenem on 5/6/2021. Improved leukocytosis    Protrusion of the small bowel around the colostomy causing bowel ischemia s/p expl. laparotomy with small bowel resection, partial colectomy/revision of colostomy on 5/4/21    Acute kidney injury-likely multifactorial.  Discussed with nephrologist.  Concerns for antibiotic associated interstitial nephritis-gradually improving creatinine    Tolerating liquid diet- poor po intake. Worsening leukocytosis 19 K on 5/11- likely leukemoid reaction to bleed from sacral wound-drop in hemoglobin to 6.2 noted on 5/12, 5/13 labs. Improved leukocytosis. No clinical evidence of new infection    Recommendations:    1. Continue meropenem-adjust dose per changing renal function-continue meropenem till 6/18/21  2. Follow-up nephrology recommendations regarding ЕКАТЕРИНА   3. Encourage po diet  4. Will need good wound care to aid healing and prevent future infectious complications  5. F/u surgery recommendations about bleeding from sacral wound  6. F/u cbc, clinically    Home health orders on chart (click on \"chart review, other orders, IP home health)    Above plan was discussed in details with patient. Please call me if any further questions or concerns.  Will continue to participate in the care of this patient. HPI:       patient denied any chest pain, shortness of breath, abdominal pain. states that he will try to eat. Doesn't want a feeding tube      Current Discharge Medication List      CONTINUE these medications which have NOT CHANGED    Details   lisinopril-hydroCHLOROthiazide (PRINZIDE, ZESTORETIC) 20-12.5 mg per tablet TAKE 1 TABLET EVERY DAY  Refills: 3      ergocalciferol (ERGOCALCIFEROL) 1,250 mcg (50,000 unit) capsule       ciprofloxacin HCl (CIPRO) 250 mg tablet Take 1 Tab by mouth two (2) times a day. Qty: 30 Tab, Refills: 2      tamsulosin (FLOMAX) 0.4 mg capsule TAKE 1 CAPSULE BY MOUTH EVERY DAY  Refills: 1      naloxone (NARCAN) 2 mg/actuation spry Use 1 spray intranasally into 1 nostril. Use a new Narcan nasal spray for subsequent doses and administer into alternating nostrils. May repeat every 2 to 3 minutes as needed. Qty: 2 Actuation(s), Refills: 0      furosemide (LASIX) 20 mg tablet TAKE 1 TABLET BY MOUTH DAILY AS NEEDED FOR SWELLING  Refills: 3      MULTIVIT-MINERALS/FOLIC ACID (SPECTRAVITE ADULT PO) Take  by mouth.      escitalopram oxalate (LEXAPRO) 10 mg tablet Take 10 mg by mouth daily.       acetaminophen (TYLENOL) 325 mg tablet TAKE 2 TABLETS BY MOUTH EVERY 4 HOURS AS NEEDED FOR PAIN  Refills: 2             Current Facility-Administered Medications   Medication Dose Route Frequency    mirtazapine (REMERON) tablet 7.5 mg  7.5 mg Oral QHS    0.9% sodium chloride infusion 250 mL  250 mL IntraVENous PRN    amLODIPine (NORVASC) tablet 5 mg  5 mg Oral DAILY    meropenem (MERREM) 1 g in sterile water (preservative free) 20 mL IV syringe  1 g IntraVENous Q12H    0.9% sodium chloride infusion 250 mL  250 mL IntraVENous PRN    silver sulfADIAZINE (SILVADENE) 1 % topical cream   Topical DAILY    0.9% sodium chloride infusion 250 mL  250 mL IntraVENous PRN    hydrALAZINE (APRESOLINE) 20 mg/mL injection 10 mg  10 mg IntraVENous Q6H PRN    [Held by provider] heparin (porcine) injection 5,000 Units  5,000 Units SubCUTAneous Q8H    ondansetron (ZOFRAN) injection 4 mg  4 mg IntraVENous Q6H PRN    HYDROmorphone (DILAUDID) syringe 0.5 mg  0.5 mg IntraVENous Q4H PRN    naloxone (NARCAN) injection 0.4 mg  0.4 mg IntraVENous PRN    acetaminophen (TYLENOL) tablet 650 mg  650 mg Oral Q6H PRN    0.9% sodium chloride infusion  50 mL/hr IntraVENous CONTINUOUS    docusate (COLACE) 50 mg/5 mL oral liquid 100 mg  100 mg Oral DAILY    bisacodyL (DULCOLAX) tablet 5 mg  5 mg Oral DAILY PRN    escitalopram oxalate (LEXAPRO) tablet 10 mg  10 mg Oral DAILY    oxyCODONE-acetaminophen (PERCOCET) 5-325 mg per tablet 1 Tab  1 Tab Oral Q4H PRN       Allergies: Patient has no known allergies. Temp (24hrs), Av.2 °F (36.8 °C), Min:97.4 °F (36.3 °C), Max:99.5 °F (37.5 °C)    Visit Vitals  BP (!) 167/91 (BP 1 Location: Right upper arm, BP Patient Position: At rest)   Pulse (!) 103   Temp 99.5 °F (37.5 °C)   Resp 18   Ht 6' 2\" (1.88 m)   Wt 108.9 kg (240 lb)   SpO2 99%   BMI 30.81 kg/m²       ROS: 12 point ROS obtained in details. Pertinent positives as mentioned in HPI,   otherwise negative    Physical Exam:    General: Well developed, well nourished male laying on the bed, sleepy, in no acute distress. General:   awake alert and oriented   HEENT:  Normocephalic, atraumatic, EOMI, no scleral icterus or pallor; no conjunctival hemmohage;  nasal and oral mucous are moist and without evidence of lesions. No thrush. Dentition good. Neck supple, no bruits. Lymph Nodes:   no cervical, axillary or inguinal adenopathy   Lungs:   non-labored, bilaterally clear to auscultation- no crackles wheezes rales or rhonchi   Heart:  RRR, s1 and s2; no rubs or gallops, no edema   Abdomen:  soft, non-distended, active bowel sounds, no hepatomegaly, no splenomegaly. Colostomy in place. Non-tender   Genitourinary:  deferred   Extremities:   no clubbing, cyanosis; no joint effusions or swelling;  Full ROM of all large joints to the upper and lower extremities; muscle mass appropriate for age   Neurologic:  Paraplegia. Speech appropriate. Cranial nerves intact                        Skin:  Surgical changes back   Back:  wound vac recent surgical wound     Psychiatric:  No suicidal or homicidal ideations, appropriate mood and affect         Labs: Results:   Chemistry Recent Labs     05/18/21 0255 05/17/21  0131   * 98    144   K 3.9 4.0   * 114*   CO2 23 24   BUN 17 18   CREA 1.95* 2.03*   CA 7.1* 7.4*   AGAP 7 6   BUCR 9* 9*      CBC w/Diff Recent Labs     05/18/21 0255 05/17/21 1955 05/17/21  1045 05/17/21  0131   WBC 17.8*  --   --  17.5*   RBC 2.59*  --   --  2.51*   HGB 7.7* 8.4* 7.6* 7.2*   HCT 23.7* 26.1* 23.8* 22.7*     --   --  217   GRANS 82*  --   --  78*   LYMPH 8*  --   --  11*   EOS 2  --   --  2      Microbiology No results for input(s): CULT in the last 72 hours. RADIOLOGY:    All available imaging studies/reports in [facility] care for this admission were reviewed      Disclaimer: Sections of this note are dictated utilizing voice recognition software, which may have resulted in some phonetic based errors in grammar and contents. Even though attempts were made to correct all the mistakes, some may have been missed, and remained in the body of the document. If questions arise, please contact our department.     Dr. Abdirahman Lawler, Infectious Disease Specialist  [phone]  May 18, 2021  8:39 PM

## 2021-05-18 NOTE — PALLIATIVE CARE
MSW attempted to meet with pt to discuss his inability to take in calories orally. Pt was very sleepy and unable to participate in conversation. After discussion, it was determined it is appropriate time to have a family meeting to discuss pt's condition and issues related to inability to eat. This MSW made telephone contact with pt's mother and arranged for family meeting at 3:00p.m. Apryl Weston and MSW met with pt again, pt was more awake and able to actively participate in discussion. Pt acknowledges he has not been able to eat solid food, but is able to take in liquids. At this time pt stated he is in agreement with trying tube feeds via NG tube because \"I need to get something in me. I don't want to just waste away. \"  MSW informed pt of planned family meeting at 3:00p.m. MD has been notified of pt's agreement with NG tube placement for tube feeding trial.   At 3:10p.m. Apryl Weston, NP and this MSW met with pt, his mother and his two daughters at bedside to facilitate family meeting. NP informed family of reason for meeting. Explained that due to pt's inability to take in adequate calories orally, it's necessary to do a NG tube for feeding and that pt has agreed to this. NP explained it's hopeful that this is a temporary thing, to help pt's digestive system become more active. NP explained to pt that the process of putting in the NG tube is uncomfortable, but that once it's in, it won't be too bad. Pt verbalized agreement to what NP had informed family members of. All members of family are agreeable to idea of temporary feeding tube. NP addressed medical decision making if case of pt inability to do so for self. She  encouraged  family to discuss what pt would want regarding life support measures in situation where he experienced Cardiac Arrest or other condition requiring life support such as intubation or long-term feeding tube.  Pt indicated he wishes his daughters and mother to work collaboratively regarding decision making for him, in situation where he is unable to make his own decisions. Pt and family informed palliative care remains available for support and assistance as required. MSW will follow up with pt tomorrow.

## 2021-05-18 NOTE — PROGRESS NOTES
RENAL PROGRESS NOTE        Birgit Toney         Assessment  1. ЕКАТЕРИНА , ATN / AIN 2/2 to Abx Nephrotoxicity   2. Infected Sacral dec ulcer   3. Chronic anemia   4.  HTN , uncontrolled  5. fluid collection above the bladder, incidentally seen above bladder      Plan   - renal parameters improving slowly  - encourage po intake , consider appetite stimulant   - will Replace Lytes as needed   - follow H&H , check iron levels   - consider appetite stimulant       Please call with questions,    Naomi Babcock MD FASN  Cell [phone]  Pager: [phone]                                                                                                                            Subjective:  he denies any CP / SOB   Not very interactive       Patient Active Problem List   Diagnosis Code    S/P colostomy ([facility] Z93.3    Paraplegia ([facility] G82.20    Sacral decubitus ulcer, stage IV ([facility] L89.154    HTN (hypertension) I10       Current Facility-Administered Medications   Medication Dose Route Frequency Provider Last Rate Last Admin    mirtazapine (REMERON) tablet 7.5 mg  7.5 mg Oral QHS Maple Breeding, DO   7.5 mg at 05/18/21 0024    0.9% sodium chloride infusion 250 mL  250 mL IntraVENous PRN Flor Malin MD        amLODIPine (NORVASC) tablet 5 mg  5 mg Oral DAILY Clint Garcia MD   5 mg at 05/18/21 1102    meropenem (MERREM) 1 g in sterile water (preservative free) 20 mL IV syringe  1 g IntraVENous Q12H Madai Parsons MD   1 g at 05/18/21 1328    0.9% sodium chloride infusion 250 mL  250 mL IntraVENous PRN Tiffani Vargas MD        silver sulfADIAZINE (SILVADENE) 1 % topical cream   Topical DAILY Lavonne Flurry, DPM   Given at 05/18/21 1110    0.9% sodium chloride infusion 250 mL  250 mL IntraVENous PRN Tiffani Vargas MD        hydrALAZINE (APRESOLINE) 20 mg/mL injection 10 mg  10 mg IntraVENous Q6H PRN Maple Breeding, DO   10 mg at 05/18/21 1104    [Held by provider] heparin (porcine) injection 5,000 Units  5,000 Units SubCUTAneous Q8H Mike Carmichael MD   5,000 Units at 05/12/21 0533    ondansetron (ZOFRAN) injection 4 mg  4 mg IntraVENous Q6H PRN Mike Carmichael MD   4 mg at 05/11/21 2235    HYDROmorphone (DILAUDID) syringe 0.5 mg  0.5 mg IntraVENous Q4H PRN Sherman Matta MD        naloxone [facility]) injection 0.4 mg  0.4 mg IntraVENous PRN Sherman Matta MD        acetaminophen (TYLENOL) tablet 650 mg  650 mg Oral Q6H PRN Felix Doherty MD        0.9% sodium chloride infusion  50 mL/hr IntraVENous CONTINUOUS Deepika Patel MD 50 mL/hr at 05/15/21 0314 50 mL/hr at 05/15/21 0314    docusate (COLACE) 50 mg/5 mL oral liquid 100 mg  100 mg Oral DAILY Felix Doherty MD   Stopped at 05/18/21 1102    bisacodyL (DULCOLAX) tablet 5 mg  5 mg Oral DAILY PRN Felix Doherty MD        escitalopram oxalate (LEXAPRO) tablet 10 mg  10 mg Oral DAILY Felix Doherty MD   10 mg at 05/18/21 1102    oxyCODONE-acetaminophen (PERCOCET) 5-325 mg per tablet 1 Tab  1 Tab Oral Q4H PRN Felix Doherty MD   1 Tab at 05/04/21 0331       Objective  Vitals:    05/18/21 0313 05/18/21 0821 05/18/21 1117 05/18/21 1556   BP: (!) 175/93 (!) 167/91 (!) 158/81 (!) 175/99   Pulse: 90 (!) 103 (!) 105 99   Resp: 16 18 20 20   Temp: 98.2 °F (36.8 °C) 99.5 °F (37.5 °C) 99 °F (37.2 °C) 100 °F (37.8 °C)   SpO2: 100% 99% 100% 100%   Weight:       Height:             Intake/Output Summary (Last 24 hours) at 5/18/2021 1650  Last data filed at 5/18/2021 1320  Gross per 24 hour   Intake 1050 ml   Output 2700 ml   Net -1650 ml           Admission weight: Weight: 113.4 kg (250 lb) (04/27/21 1058)  Last Weight Metrics:  Weight Loss Metrics 5/17/2021 4/29/2021 4/16/2021 4/12/2021 1/7/2021 6/17/2019 3/23/2019   Today's Wt 240 lb - 250 lb - 240 lb 240 lb 209 lb   BMI - 30.81 kg/m2 32.1 kg/m2 32.55 kg/m2 32.55 kg/m2 32.55 kg/m2 28.35 kg/m2             Physical Assessment:     General: sleepy   Neck: No jvd. LUNGS: Clear to Auscultation, No rales, rhonchi or wheezes. CVS EXM: S1, S2  RRR, no murmurs/gallops/rubs. Abdomen: soft, non tender. Lower Extremities:  no edema.      Lab    CBC w/Diff Recent Labs     05/18/21  0255 05/17/21  1955 05/17/21  1045 05/17/21  0131   WBC 17.8*  --   --  17.5*   RBC 2.59*  --   --  2.51*   HGB 7.7* 8.4* 7.6* 7.2*   HCT 23.7* 26.1* 23.8* 22.7*     --   --  217   GRANS 82*  --   --  78*   LYMPH 8*  --   --  11*   EOS 2  --   --  2        Chemistry Recent Labs     05/18/21  0255 05/17/21  0131   * 98    144   K 3.9 4.0   * 114*   CO2 23 24   BUN 17 18   CREA 1.95* 2.03*   CA 7.1* 7.4*   AGAP 7 6   BUCR 9* 9*         Lab Results   Component Value Date/Time    Iron 29 (L) 05/14/2021 07:01 PM    TIBC 136 (L) 05/14/2021 07:01 PM    Iron % saturation 21 05/14/2021 07:01 PM    Ferritin 445 (H) 05/15/2021 03:37 AM      Lab Results   Component Value Date/Time    Calcium 7.1 (L) 05/18/2021 02:55 AM    Phosphorus 4.5 05/15/2021 03:37 AM          Kaila Claudio MD  5/18/2021  7:57 AM

## 2021-05-18 NOTE — PROGRESS NOTES
Shift Progress Note:  Assumed care of patient in bed awake and alert, No s/s of acute distress call bell within reach. Sleeping unless assess, took 8 ounces of juice without emesis, ostomy draining liquid brown contents. Call bell within reach. No c/opain.   ,  Patient Vitals for the past 12 hrs:   Temp Pulse Resp BP SpO2   05/18/21 0313 98.2 °F (36.8 °C) 90 16 (!) 175/93 100 %   05/17/21 1954 97.4 °F (36.3 °C) 88 18 (!) 178/83 99 %
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
## 2021-05-18 NOTE — PROGRESS NOTES
Tried to place NG tube, patient and family asked for some time to allow patient to eat the soup that his mother prepared. Patient is not agreeable to NG tube placement at this time. Patient wants to finish the soup then he will make a decision regarding NG tube placement. 36- Dr. Rachel Evans notified of patient's  NG tube refusal at this time and Patient's last elevated BP (175/99. No orders given at this time. Attending Attestation (For Attendings USE Only)...

## 2021-05-18 NOTE — PROGRESS NOTES
Met with patient at bedside. He is still willing to go to LTC for his IV abx therapy; states he has no preference for facilities. Spoke with [place] from [facility]. Pt has been accepted at [address] [place] [address]. They are ready to take him when he is medically cleared. No Vaibhav Acosta is necessary.   Janes Lam RN - Outcomes Manager  [phone]

## 2021-05-18 NOTE — ROUTINE PROCESS
Bedside shift change report given to Rekha Kaplan (oncoming nurse) by Kourtney Lomeli (offgoing nurse). Report included the following information SBAR, Kardex, Intake/Output, MAR and Recent Results.

## 2021-05-18 NOTE — PROGRESS NOTES
Problem: Falls - Risk of  Goal: *Absence of Falls  Description: Document Kathy Echeverria Fall Risk and appropriate interventions in the flowsheet. Outcome: Progressing Towards Goal  Note: Fall Risk Interventions:  Mobility Interventions: Bed/chair exit alarm, Patient to call before getting OOB    Mentation Interventions: Bed/chair exit alarm, Toileting rounds, Update white board    Medication Interventions: Bed/chair exit alarm, Patient to call before getting OOB, Teach patient to arise slowly    Elimination Interventions: Call light in reach, Urinal in reach    History of Falls Interventions: Door open when patient unattended, Bed/chair exit alarm         Problem: Pressure Injury - Risk of  Goal: *Prevention of pressure injury  Description: Document Jordin Scale and appropriate interventions in the flowsheet. Outcome: Progressing Towards Goal  Note: Pressure Injury Interventions:  Sensory Interventions: Monitor skin under medical devices, Keep linens dry and wrinkle-free, Pressure redistribution bed/mattress (bed type)    Moisture Interventions: Absorbent underpads, Internal/External urinary devices, Internal/External fecal devices    Activity Interventions: Pressure redistribution bed/mattress(bed type)    Mobility Interventions: Pressure redistribution bed/mattress (bed type), HOB 30 degrees or less    Nutrition Interventions: Document food/fluid/supplement intake    Friction and Shear Interventions: Apply protective barrier, creams and emollients                Problem: Nutrition Deficit  Goal: *Optimize nutritional status  Outcome: Progressing Towards Goal     Problem: Impaired Skin Integrity/Pressure Injury Treatment  Goal: *Improvement of Existing Pressure Injury  Outcome: Progressing Towards Goal  Goal: *Prevention of pressure injury  Description: Document Jordin Scale and appropriate interventions in the flowsheet.   Outcome: Progressing Towards Goal  Note: Pressure Injury Interventions:  Sensory Interventions: Monitor skin under medical devices, Keep linens dry and wrinkle-free, Pressure redistribution bed/mattress (bed type)    Moisture Interventions: Absorbent underpads, Internal/External urinary devices, Internal/External fecal devices    Activity Interventions: Pressure redistribution bed/mattress(bed type)    Mobility Interventions: Pressure redistribution bed/mattress (bed type), HOB 30 degrees or less    Nutrition Interventions: Document food/fluid/supplement intake    Friction and Shear Interventions: Apply protective barrier, creams and emollients
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
## 2021-05-18 NOTE — CONSULTS
Nutrition Note    Family meeting today with palliative care. Pt now agreeable to NGT placement for temporary nutrition. Per discussion with Dr. Tamica Velasquez MD to order for NGT placement with nutrition to manage tube feeding. Po intake remains inadequate. Loose output from ostomy & remeron started yesterday. Slow progressing towards nutritional.    Nutrition Recommendations/Plan:   - Continue regular diet & decrease Ensure Enlive to once daily. Monitor and encourage po intake. - Once NGT placement confirmed start tube feeding of Jevity 1.5 at 20 mL/hr advancing as tolerated by 10 mL q 8 hours to rate of 50 mL/hr with ProSource BID and 150 mL q 4 hour water flushes. (Regimen to provide about 80% of estimated nutritional needs: 1920 kcal, 107 gm protein, 912 mL free water, 100% RDIs). - Monitor po intake and adjust EN regimen as needed.     Electronically signed by Thalia Saucedo RD on 5/18/2021 at 4:03 PM    Contact: [phone]

## 2021-05-19 PROBLEM — E44.1 MILD PROTEIN-CALORIE MALNUTRITION (HCC): Status: ACTIVE | Noted: 2021-05-19

## 2021-05-19 LAB
ANION GAP SERPL CALC-SCNC: 4 MMOL/L (ref 3–18)
BASOPHILS # BLD: 0.1 K/UL (ref 0–0.1)
BASOPHILS NFR BLD: 0 % (ref 0–2)
BUN SERPL-MCNC: 16 MG/DL (ref 7–18)
BUN/CREAT SERPL: 9 (ref 12–20)
CALCIUM SERPL-MCNC: 7.4 MG/DL (ref 8.5–10.1)
CHLORIDE SERPL-SCNC: 114 MMOL/L (ref 100–111)
CO2 SERPL-SCNC: 25 MMOL/L (ref 21–32)
CREAT SERPL-MCNC: 1.86 MG/DL (ref 0.6–1.3)
DIFFERENTIAL METHOD BLD: ABNORMAL
EOSINOPHIL # BLD: 0.4 K/UL (ref 0–0.4)
EOSINOPHIL NFR BLD: 3 % (ref 0–5)
ERYTHROCYTE [DISTWIDTH] IN BLOOD BY AUTOMATED COUNT: 17.5 % (ref 11.6–14.5)
GLUCOSE SERPL-MCNC: 90 MG/DL (ref 74–99)
HCT VFR BLD AUTO: 23.7 % (ref 36–48)
HCT VFR BLD AUTO: 25.2 % (ref 36–48)
HGB BLD-MCNC: 7.5 G/DL (ref 13–16)
HGB BLD-MCNC: 8.1 G/DL (ref 13–16)
LYMPHOCYTES # BLD: 2 K/UL (ref 0.9–3.6)
LYMPHOCYTES NFR BLD: 13 % (ref 21–52)
MCH RBC QN AUTO: 29.3 PG (ref 24–34)
MCHC RBC AUTO-ENTMCNC: 31.6 G/DL (ref 31–37)
MCV RBC AUTO: 92.6 FL (ref 74–97)
MONOCYTES # BLD: 1.2 K/UL (ref 0.05–1.2)
MONOCYTES NFR BLD: 8 % (ref 3–10)
NEUTS SEG # BLD: 11.8 K/UL (ref 1.8–8)
NEUTS SEG NFR BLD: 75 % (ref 40–73)
PLATELET # BLD AUTO: 227 K/UL (ref 135–420)
PMV BLD AUTO: 9.5 FL (ref 9.2–11.8)
POTASSIUM SERPL-SCNC: 3.6 MMOL/L (ref 3.5–5.5)
RBC # BLD AUTO: 2.56 M/UL (ref 4.35–5.65)
SODIUM SERPL-SCNC: 143 MMOL/L (ref 136–145)
WBC # BLD AUTO: 15.6 K/UL (ref 4.6–13.2)

## 2021-05-19 PROCEDURE — 36415 COLL VENOUS BLD VENIPUNCTURE: CPT

## 2021-05-19 PROCEDURE — 74011250636 HC RX REV CODE- 250/636: Performed by: INTERNAL MEDICINE

## 2021-05-19 PROCEDURE — 74011250637 HC RX REV CODE- 250/637: Performed by: SURGERY

## 2021-05-19 PROCEDURE — 2709999900 HC NON-CHARGEABLE SUPPLY

## 2021-05-19 PROCEDURE — 74011250636 HC RX REV CODE- 250/636: Performed by: SPECIALIST

## 2021-05-19 PROCEDURE — 74011250636 HC RX REV CODE- 250/636: Performed by: FAMILY MEDICINE

## 2021-05-19 PROCEDURE — 74011250637 HC RX REV CODE- 250/637: Performed by: FAMILY MEDICINE

## 2021-05-19 PROCEDURE — 99233 SBSQ HOSP IP/OBS HIGH 50: CPT | Performed by: INTERNAL MEDICINE

## 2021-05-19 PROCEDURE — 85025 COMPLETE CBC W/AUTO DIFF WBC: CPT

## 2021-05-19 PROCEDURE — 80048 BASIC METABOLIC PNL TOTAL CA: CPT

## 2021-05-19 PROCEDURE — 65270000029 HC RM PRIVATE

## 2021-05-19 PROCEDURE — 74011250637 HC RX REV CODE- 250/637: Performed by: HOSPITALIST

## 2021-05-19 PROCEDURE — 74011000250 HC RX REV CODE- 250: Performed by: INTERNAL MEDICINE

## 2021-05-19 PROCEDURE — 85018 HEMOGLOBIN: CPT

## 2021-05-19 RX ORDER — THERA TABS 400 MCG
1 TAB ORAL DAILY
Status: DISCONTINUED | OUTPATIENT
Start: 2021-05-20 | End: 2021-05-22 | Stop reason: HOSPADM

## 2021-05-19 RX ORDER — AMLODIPINE BESYLATE 10 MG/1
10 TABLET ORAL DAILY
Status: DISCONTINUED | OUTPATIENT
Start: 2021-05-20 | End: 2021-05-22 | Stop reason: HOSPADM

## 2021-05-19 RX ORDER — LANOLIN ALCOHOL/MO/W.PET/CERES
200 CREAM (GRAM) TOPICAL DAILY
Status: DISCONTINUED | OUTPATIENT
Start: 2021-05-20 | End: 2021-05-22 | Stop reason: HOSPADM

## 2021-05-19 RX ADMIN — MEROPENEM 1 G: 1 INJECTION INTRAVENOUS at 00:02

## 2021-05-19 RX ADMIN — MIRTAZAPINE 7.5 MG: 15 TABLET, FILM COATED ORAL at 22:14

## 2021-05-19 RX ADMIN — SILVER SULFADIAZINE: 10 CREAM TOPICAL at 08:46

## 2021-05-19 RX ADMIN — MEROPENEM 1 G: 1 INJECTION INTRAVENOUS at 15:23

## 2021-05-19 RX ADMIN — MEROPENEM 1 G: 1 INJECTION INTRAVENOUS at 23:53

## 2021-05-19 RX ADMIN — HYDRALAZINE HYDROCHLORIDE 10 MG: 20 INJECTION, SOLUTION INTRAMUSCULAR; INTRAVENOUS at 15:23

## 2021-05-19 RX ADMIN — IRON SUCROSE 300 MG: 20 INJECTION, SOLUTION INTRAVENOUS at 18:46

## 2021-05-19 RX ADMIN — SODIUM CHLORIDE 50 ML/HR: 900 INJECTION, SOLUTION INTRAVENOUS at 15:22

## 2021-05-19 RX ADMIN — AMLODIPINE BESYLATE 5 MG: 5 TABLET ORAL at 08:37

## 2021-05-19 RX ADMIN — ESCITALOPRAM OXALATE 10 MG: 10 TABLET ORAL at 08:37

## 2021-05-19 NOTE — PROGRESS NOTES
RENAL PROGRESS NOTE        Audrey Sagastume         Assessment  1. ЕКАТЕРИНА , ATN / AIN 2/2 to Abx Nephrotoxicity   2. Infected Sacral dec ulcer   3. Chronic anemia   4.  HTN , better controlled      Plan   - renal parameters improving slowly  - noted plans for NG tube and feeds , d/c IVF when full feeds   - will Replace Lytes as needed   - start venofer , dose of DIMITRI   - follow electrolytes and replace      Please call with questions,    Thong Bragg MD FASN  Cell [phone]  Pager: [phone]                                                                                                                            Subjective:  he denies any CP / SOB   Not very interactive       Patient Active Problem List   Diagnosis Code    S/P colostomy ([facility] [address] Z93.3    Paraplegia ([address] G82.20    Sacral decubitus ulcer, stage IV ([address] L89.154    HTN (hypertension) I10    Mild protein-calorie malnutrition ([facility] [address] E44.1       Current Facility-Administered Medications   Medication Dose Route Frequency Provider Last Rate Last Admin    [START ON 5/20/2021] amLODIPine (NORVASC) tablet 10 mg  10 mg Oral DAILY MD Serg Fagan ON 5/20/2021] therapeutic multivitamin [facility]) tablet 1 Tablet  1 Tablet Oral DAILY MD Serg Fagan ON 5/20/2021] thiamine HCL (B-1) tablet 200 mg  200 mg Oral DAILY Mayra Chvais MD        mirtazapine (REMERON) tablet 7.5 mg  7.5 mg Oral QHS Caren Gimenez DO   7.5 mg at 05/18/21 2135    0.9% sodium chloride infusion 250 mL  250 mL IntraVENous PRN Karla Spear MD        meropenem (MERREM) 1 g in sterile water (preservative free) 20 mL IV syringe  1 g IntraVENous Q12H Chris Guerra MD   1 g at 05/19/21 1523    0.9% sodium chloride infusion 250 mL  250 mL IntraVENous PRN Johny Rose MD        silver sulfADIAZINE (SILVADENE) 1 % topical cream   Topical DAILY Eluterio Later, DPM   Given at 05/19/21 0846    0.9% sodium chloride infusion 250 mL  250 mL IntraVENous PRN Nic Vargas MD        hydrALAZINE (APRESOLINE) 20 mg/mL injection 10 mg  10 mg IntraVENous Q6H PRN Elma Current, DO   10 mg at 05/19/21 1523    [Held by provider] heparin (porcine) injection 5,000 Units  5,000 Units SubCUTAneous Q8H Nic Vargas MD   5,000 Units at 05/12/21 0533    ondansetron (ZOFRAN) injection 4 mg  4 mg IntraVENous Q6H PRN Halie Anderson MD   4 mg at 05/11/21 2235    HYDROmorphone (DILAUDID) syringe 0.5 mg  0.5 mg IntraVENous Q4H PRN Fredick Goodpasture, MD        naloxone [facility]) injection 0.4 mg  0.4 mg IntraVENous PRN Fredick Goodpasture, MD        acetaminophen (TYLENOL) tablet 650 mg  650 mg Oral Q6H PRN Kenny Yoo MD   650 mg at 05/18/21 1821    0.9% sodium chloride infusion  50 mL/hr IntraVENous CONTINUOUS Omar Ferrer MD 50 mL/hr at 05/19/21 1522 50 mL/hr at 05/19/21 1522    docusate (COLACE) 50 mg/5 mL oral liquid 100 mg  100 mg Oral DAILY Kenyn Yoo MD   100 mg at 05/04/21 1014    bisacodyL (DULCOLAX) tablet 5 mg  5 mg Oral DAILY PRN Kenny Yoo MD        escitalopram oxalate (LEXAPRO) tablet 10 mg  10 mg Oral DAILY Kenny Yoo MD   10 mg at 05/19/21 0837    oxyCODONE-acetaminophen (PERCOCET) 5-325 mg per tablet 1 Tab  1 Tablet Oral Q4H PRN Kenny oYo MD   1 Tablet at 05/04/21 0331       Objective  Vitals:    05/18/21 1944 05/19/21 0400 05/19/21 0757 05/19/21 1130   BP: (!) 143/78 (!) 163/87 (!) 146/88 (!) 158/91   Pulse: 98 90 99 98   Resp: 20 16 16 16   Temp: 97.1 °F (36.2 °C) 97.3 °F (36.3 °C) 97.4 °F (36.3 °C) 98.5 °F (36.9 °C)   SpO2: 100% 100% 100% 100%   Weight:       Height:             Intake/Output Summary (Last 24 hours) at 5/19/2021 1529  Last data filed at 5/19/2021 1305  Gross per 24 hour   Intake 2218 ml   Output 800 ml   Net 1418 ml           Admission weight: Weight: 113.4 kg (250 lb) (04/27/21 1058)  Last Weight Metrics:  Weight Loss Metrics 5/17/2021 4/29/2021 4/16/2021 4/12/2021 1/7/2021 6/17/2019 3/23/2019   Today's Wt 240 lb - 250 lb - 240 lb 240 lb 209 lb   BMI - 30.81 kg/m2 32.1 kg/m2 32.55 kg/m2 32.55 kg/m2 32.55 kg/m2 28.35 kg/m2             Physical Assessment:     General: sleepy   Neck: No jvd. LUNGS: Clear to Auscultation, No rales, rhonchi or wheezes. CVS EXM: S1, S2  RRR, no murmurs/gallops/rubs. Abdomen: soft, non tender. Lower Extremities:  no edema.      Lab    CBC w/Diff Recent Labs     05/19/21  0235 05/18/21  1816 05/18/21  0255 05/17/21  0131   WBC 15.6*  --  17.8* 17.5*   RBC 2.56*  --  2.59* 2.51*   HGB 7.5* 8.2* 7.7* 7.2*   HCT 23.7* 26.1* 23.7* 22.7*     --  238 217   GRANS 75*  --  82* 78*   LYMPH 13*  --  8* 11*   EOS 3  --  2 2        Chemistry Recent Labs     05/19/21  0235 05/18/21  0255 05/17/21  0131   GLU 90 108* 98    143 144   K 3.6 3.9 4.0   * 113* 114*   CO2 25 23 24   BUN 16 17 18   CREA 1.86* 1.95* 2.03*   CA 7.4* 7.1* 7.4*   AGAP 4 7 6   BUCR 9* 9* 9*         Lab Results   Component Value Date/Time    Iron 29 (L) 05/14/2021 07:01 PM    TIBC 136 (L) 05/14/2021 07:01 PM    Iron % saturation 21 05/14/2021 07:01 PM    Ferritin 445 (H) 05/15/2021 03:37 AM      Lab Results   Component Value Date/Time    Calcium 7.4 (L) 05/19/2021 02:35 AM    Phosphorus 4.5 05/15/2021 03:37 AM          Sandra Clemens MD  5/19/2021  7:57 AM

## 2021-05-19 NOTE — PALLIATIVE CARE
This MSW received message that pt's mother had called requesting return call, asking if pt had NG tube has been placed yet. MSW returned call and encouraged pt's mother to call unit and speak with pt's nurse, as they have the most up to date information on pt's condition. No additional needs or concerns communicated at this time.

## 2021-05-19 NOTE — PROGRESS NOTES
Problem: Pressure Injury - Risk of  Goal: *Prevention of pressure injury  Description: Document Jordin Scale and appropriate interventions in the flowsheet. Outcome: Progressing Towards Goal  Note: Pressure Injury Interventions:  Sensory Interventions: Keep linens dry and wrinkle-free, Minimize linen layers, Pressure redistribution bed/mattress (bed type)    Moisture Interventions: Absorbent underpads, Check for incontinence Q2 hours and as needed, Internal/External urinary devices, Internal/External fecal devices    Activity Interventions: Pressure redistribution bed/mattress(bed type)    Mobility Interventions: Pressure redistribution bed/mattress (bed type)    Nutrition Interventions: Document food/fluid/supplement intake    Friction and Shear Interventions: Apply protective barrier, creams and emollients, HOB 30 degrees or less                Problem: Impaired Skin Integrity/Pressure Injury Treatment  Goal: *Improvement of Existing Pressure Injury  Outcome: Progressing Towards Goal  Goal: *Prevention of pressure injury  Description: Document Jordin Scale and appropriate interventions in the flowsheet.   Outcome: Progressing Towards Goal  Note: Pressure Injury Interventions:  Sensory Interventions: Keep linens dry and wrinkle-free, Minimize linen layers, Pressure redistribution bed/mattress (bed type)    Moisture Interventions: Absorbent underpads, Check for incontinence Q2 hours and as needed, Internal/External urinary devices, Internal/External fecal devices    Activity Interventions: Pressure redistribution bed/mattress(bed type)    Mobility Interventions: Pressure redistribution bed/mattress (bed type)    Nutrition Interventions: Document food/fluid/supplement intake    Friction and Shear Interventions: Apply protective barrier, creams and emollients, HOB 30 degrees or less                Problem: Hypertension  Goal: *Blood pressure within specified parameters  Outcome: Progressing Towards Goal  Goal: *Fluid volume balance  Outcome: Progressing Towards Goal  Goal: *Labs within defined limits  Outcome: Progressing Towards Goal     Problem: Ostomy Care  Goal: *Patient pouching appliance will fit properly and maintain integrity at least three to five days  Description: Infection control procedures (eg, clean dressings, clean gloves, hand washing, precautions to isolate wound from contamination, sterile instruments used for wound debridement) should be implemented.   Outcome: Progressing Towards Goal  Goal: *Acceptance of change in body image  Outcome: Progressing Towards Goal

## 2021-05-19 NOTE — PROGRESS NOTES
Nutrition Assessment     Type and Reason for Visit: Reassess, Wound, Consult, Calorie count    Nutrition Recommendations/Plan:   - Continue regular diet with Ensure Enlive to once daily. Monitor and encourage po intake. - Once NGT placement confirmed start tube feeding of Jevity 1.5 at 20 mL/hr advancing as tolerated by 10 mL q 8 hours to rate of 50 mL/hr with ProSource BID and 150 mL q 4 hour water flushes. (Regimen to provide about 80% of estimated nutritional needs: 1920 kcal, 107 gm protein, 912 mL free water, 100% RDIs). - Monitor po intake and adjust EN regimen as needed. - Add daily multivitamin and thiamine supplementation. Nutrition Assessment:  RN attempted to place NGT yesterday after pt agreeable during palliative care meeting yesterday. Family and pt then requesting more time for pt to eat and ultimately pt then refused NGT placement. Continues to consume only juice and refused supplement last night. +ostomy output. Upon visit with pt this afternoon reassured pt he is still able to eat/drink by mouth with NGT, pt receptive and after further discussion stated [place] lets do it (referring to NGT placement). Discussed with RN. Malnutrition Assessment:  Malnutrition Status: Mild malnutrition  Context: Acute illness  Findings of clinical characteristics of malnutrition:   Energy Intake:  7 - 50% or less of est energy requirements for 5 or more days  Weight Loss:   (-10#, 4% x month)     Body Fat Loss:  No significant body fat loss,     Muscle Mass Loss:  No significant muscle mass loss,    Fluid Accumulation:  Unable to assess,     Strength:  Not performed     Estimated Daily Nutrient Needs:  Energy (kcal):  2010-2412  Protein (g):  136-170       Fluid (ml/day):  6231-1793    Nutrition Related Findings:  Colostomy with 850 mL output x 24 hours.  NS at 50 mL/hr      Current Nutrition Therapies:  DIET REGULAR  DIET NUTRITIONAL SUPPLEMENTS Breakfast; Ensure Enlive  DIET TUBE FEEDING Start once NGT placement confirmed  DIET NUTRITIONAL SUPPLEMENTS Breakfast, Lunch; Prosource    Anthropometric Measures:  · Height:  6' 2\" (188 cm)  · Current Body Wt:  108.9 kg (240 lb 1.3 oz)  · BMI: 30.8    Nutrition Diagnosis:   · Inadequate energy intake related to altered GI function, increased demand for energy/nutrients (wounds) as evidenced by intake 0-25%    Nutrition Intervention:  Food and/or Nutrient Delivery: Continue current diet, Continue oral nutrition supplement, Continue tube feeding, Vitamin supplement, Mineral supplement, IV fluid delivery  Nutrition Education and Counseling: Survival skills/brief education completed (discussed importance of protein and adequate nutrition for wound healing)  Coordination of Nutrition Care: Continue to monitor while inpatient (Pt discussed with Dr. Nabila Grande & RN)    Goals:  Nutritional needs will be met through adequate oral intake or nutrition support within the next 7 days. Nutrition Monitoring and Evaluation:   Behavioral-Environmental Outcomes: Beliefs and attitudes  Food/Nutrient Intake Outcomes: Diet advancement/tolerance, Food and nutrient intake, Supplement intake, Enteral nutrition intake/tolerance, Vitamin/mineral intake, IVF intake  Physical Signs/Symptoms Outcomes: Biochemical data, GI status, Meal time behavior, Nutrition focused physical findings, Skin    Discharge Planning:     Too soon to determine     Electronically signed by Rah Vásquez RD on 5/19/2021 at 2:55 PM    Contact Number: [phone]

## 2021-05-19 NOTE — PROGRESS NOTES
Shift progress Summary:  Assumed care of patient in bed awake and alert. Drank three cups of juice but refused ensure during the shift Ostomy still with liquid greenish brown contents. No c/o pain, no s/s of distress, call bell within reach.   Patient Vitals for the past 12 hrs:   Temp Pulse Resp BP SpO2   05/19/21 0400 97.3 °F (36.3 °C) 90 16 (!) 163/87 100 %   05/18/21 1944 97.1 °F (36.2 °C) 98 20 (!) 143/78 100 %

## 2021-05-19 NOTE — PROGRESS NOTES
End of Shift Note     Bedside and verbal shift change report given to Pilar (On coming nurse) by Ruth Lyon (Off going nurse).   Report included the following information:      --Procedure Summary     --MAR,     --Recent Results     --Med Rec Status

## 2021-05-19 NOTE — PALLIATIVE CARE
MSW reviewed pt's chart this a.m. Pt did not consent to NG tube placement, and does not have one at this time. MSW attempted to meet with pt to provide supportive counseling. Pt appeared to be sleeping at time of arrival. Pt was arousable, but not able to maintain awake to engage in conversation. MSW left prayer blanket for pt and will attempt to see again later in day. Remains available for supportive counseling.
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
## 2021-05-19 NOTE — PROGRESS NOTES
Problem: Falls - Risk of  Goal: *Absence of Falls  Description: Document Katey Lopez Fall Risk and appropriate interventions in the flowsheet. Outcome: Progressing Towards Goal  Note: Fall Risk Interventions:  Mobility Interventions: Patient to call before getting OOB    Mentation Interventions: Door open when patient unattended, Toileting rounds, Update white board    Medication Interventions: Teach patient to arise slowly    Elimination Interventions: Call light in reach    History of Falls Interventions: Door open when patient unattended         Problem: Patient Education: Go to Patient Education Activity  Goal: Patient/Family Education  Outcome: Progressing Towards Goal     Problem: Pressure Injury - Risk of  Goal: *Prevention of pressure injury  Description: Document Jordin Scale and appropriate interventions in the flowsheet.   Outcome: Progressing Towards Goal  Note: Pressure Injury Interventions:  Sensory Interventions: Keep linens dry and wrinkle-free, Minimize linen layers, Pressure redistribution bed/mattress (bed type)    Moisture Interventions: Absorbent underpads, Check for incontinence Q2 hours and as needed, Internal/External urinary devices, Internal/External fecal devices    Activity Interventions: Pressure redistribution bed/mattress(bed type)    Mobility Interventions: Pressure redistribution bed/mattress (bed type)    Nutrition Interventions: Document food/fluid/supplement intake    Friction and Shear Interventions: Apply protective barrier, creams and emollients, HOB 30 degrees or less

## 2021-05-20 LAB
ANION GAP SERPL CALC-SCNC: 5 MMOL/L (ref 3–18)
BUN SERPL-MCNC: 13 MG/DL (ref 7–18)
BUN/CREAT SERPL: 8 (ref 12–20)
CALCIUM SERPL-MCNC: 7.3 MG/DL (ref 8.5–10.1)
CHLORIDE SERPL-SCNC: 115 MMOL/L (ref 100–111)
CO2 SERPL-SCNC: 24 MMOL/L (ref 21–32)
CREAT SERPL-MCNC: 1.73 MG/DL (ref 0.6–1.3)
GLUCOSE SERPL-MCNC: 103 MG/DL (ref 74–99)
HCT VFR BLD AUTO: 22.4 % (ref 36–48)
HCT VFR BLD AUTO: 24.7 % (ref 36–48)
HGB BLD-MCNC: 7.1 G/DL (ref 13–16)
HGB BLD-MCNC: 7.5 G/DL (ref 13–16)
POTASSIUM SERPL-SCNC: 3.4 MMOL/L (ref 3.5–5.5)
SODIUM SERPL-SCNC: 144 MMOL/L (ref 136–145)

## 2021-05-20 PROCEDURE — 99233 SBSQ HOSP IP/OBS HIGH 50: CPT | Performed by: INTERNAL MEDICINE

## 2021-05-20 PROCEDURE — 85014 HEMATOCRIT: CPT

## 2021-05-20 PROCEDURE — 74011250637 HC RX REV CODE- 250/637: Performed by: SURGERY

## 2021-05-20 PROCEDURE — 74011250637 HC RX REV CODE- 250/637: Performed by: INTERNAL MEDICINE

## 2021-05-20 PROCEDURE — 74011250636 HC RX REV CODE- 250/636: Performed by: INTERNAL MEDICINE

## 2021-05-20 PROCEDURE — 80048 BASIC METABOLIC PNL TOTAL CA: CPT

## 2021-05-20 PROCEDURE — 74011250637 HC RX REV CODE- 250/637: Performed by: FAMILY MEDICINE

## 2021-05-20 PROCEDURE — 99232 SBSQ HOSP IP/OBS MODERATE 35: CPT | Performed by: NURSE PRACTITIONER

## 2021-05-20 PROCEDURE — 74011250636 HC RX REV CODE- 250/636: Performed by: SPECIALIST

## 2021-05-20 PROCEDURE — 65270000029 HC RM PRIVATE

## 2021-05-20 PROCEDURE — 36415 COLL VENOUS BLD VENIPUNCTURE: CPT

## 2021-05-20 PROCEDURE — 74011000250 HC RX REV CODE- 250: Performed by: INTERNAL MEDICINE

## 2021-05-20 RX ORDER — PANTOPRAZOLE SODIUM 40 MG/1
40 TABLET, DELAYED RELEASE ORAL
Status: DISCONTINUED | OUTPATIENT
Start: 2021-05-21 | End: 2021-05-22 | Stop reason: HOSPADM

## 2021-05-20 RX ADMIN — AMLODIPINE BESYLATE 10 MG: 10 TABLET ORAL at 08:59

## 2021-05-20 RX ADMIN — Medication 200 MG: at 08:59

## 2021-05-20 RX ADMIN — POTASSIUM BICARBONATE 40 MEQ: 782 TABLET, EFFERVESCENT ORAL at 18:08

## 2021-05-20 RX ADMIN — THERA TABS 1 TABLET: TAB at 08:59

## 2021-05-20 RX ADMIN — SODIUM CHLORIDE 50 ML/HR: 900 INJECTION, SOLUTION INTRAVENOUS at 12:24

## 2021-05-20 RX ADMIN — IRON SUCROSE 300 MG: 20 INJECTION, SOLUTION INTRAVENOUS at 16:11

## 2021-05-20 RX ADMIN — ESCITALOPRAM OXALATE 10 MG: 10 TABLET ORAL at 08:59

## 2021-05-20 RX ADMIN — SILVER SULFADIAZINE: 10 CREAM TOPICAL at 09:00

## 2021-05-20 RX ADMIN — MEROPENEM 1 G: 1 INJECTION INTRAVENOUS at 12:22

## 2021-05-20 RX ADMIN — MIRTAZAPINE 7.5 MG: 15 TABLET, FILM COATED ORAL at 23:14

## 2021-05-20 NOTE — PROGRESS NOTES
Infectious Disease progress Note        Reason: infected decubiti ulcer    Current abx Prior abx     Meropenem since 5/6 vancomycin 5/1-5/4  Piperacillin/tazobactam since 5/1-5/6     Lines:       Assessment :    61year old man with h/o HTN, paraplegia admitted to [facility] on 4/29/21 for evaluation of infected sacral decubiti. Clinical presentation c/w acute on chronic sacral osteomyelitis, infected sacral decubiti    S/p surgical debridement,  robotic colostomy on 4/29/2021    Persistent leukocytosis -likely due to partially treated infection due to resistant pathogens. wound cultures 5/3-E. coli, providencia (resistant to piperacillin/tazobactam)  Antibiotics switched to meropenem on 5/6/2021. Improved leukocytosis    Protrusion of the small bowel around the colostomy causing bowel ischemia s/p expl. laparotomy with small bowel resection, partial colectomy/revision of colostomy on 5/4/21    Acute kidney injury-likely multifactorial.  Discussed with nephrologist.  Concerns for antibiotic associated interstitial nephritis-gradually improving creatinine    Tolerating liquid diet- poor po intake. Worsening leukocytosis 19 K on 5/11- likely leukemoid reaction to bleed from sacral wound-drop in hemoglobin to 6.2 noted on 5/12, 5/13 labs. Improved leukocytosis. No clinical evidence of new infection    Recommendations:    1. Continue meropenem-adjust dose per changing renal function-continue meropenem till 6/18/21  2. Follow-up nephrology recommendations regarding ЕКАТЕРИНА   3. Encourage po diet  4. Will need good wound care to aid healing and prevent future infectious complications  5. F/u surgery recommendations about bleeding from sacral wound  6. F/u cbc, clinically    Home health orders on chart (click on \"chart review, other orders, IP home health)    Above plan was discussed in details with patient. Please call me if any further questions or concerns.  Will continue to participate in the care of this patient. HPI:       patient denied any chest pain, shortness of breath, abdominal pain. states that he will try to eat. Doesn't want a feeding tube      Current Discharge Medication List      CONTINUE these medications which have NOT CHANGED    Details   lisinopril-hydroCHLOROthiazide (PRINZIDE, ZESTORETIC) 20-12.5 mg per tablet TAKE 1 TABLET EVERY DAY  Refills: 3      ergocalciferol (ERGOCALCIFEROL) 1,250 mcg (50,000 unit) capsule       ciprofloxacin HCl (CIPRO) 250 mg tablet Take 1 Tab by mouth two (2) times a day. Qty: 30 Tab, Refills: 2      tamsulosin (FLOMAX) 0.4 mg capsule TAKE 1 CAPSULE BY MOUTH EVERY DAY  Refills: 1      naloxone (NARCAN) 2 mg/actuation spry Use 1 spray intranasally into 1 nostril. Use a new Narcan nasal spray for subsequent doses and administer into alternating nostrils. May repeat every 2 to 3 minutes as needed. Qty: 2 Actuation(s), Refills: 0      furosemide (LASIX) 20 mg tablet TAKE 1 TABLET BY MOUTH DAILY AS NEEDED FOR SWELLING  Refills: 3      MULTIVIT-MINERALS/FOLIC ACID (SPECTRAVITE ADULT PO) Take  by mouth.      escitalopram oxalate (LEXAPRO) 10 mg tablet Take 10 mg by mouth daily.       acetaminophen (TYLENOL) 325 mg tablet TAKE 2 TABLETS BY MOUTH EVERY 4 HOURS AS NEEDED FOR PAIN  Refills: 2             Current Facility-Administered Medications   Medication Dose Route Frequency    amLODIPine (NORVASC) tablet 10 mg  10 mg Oral DAILY    therapeutic multivitamin (THERAGRAN) tablet 1 Tablet  1 Tablet Oral DAILY    thiamine HCL (B-1) tablet 200 mg  200 mg Oral DAILY    iron sucrose (VENOFER) 300 mg in 0.9% sodium chloride 250 mL IVPB  300 mg IntraVENous DAILY    mirtazapine (REMERON) tablet 7.5 mg  7.5 mg Oral QHS    0.9% sodium chloride infusion 250 mL  250 mL IntraVENous PRN    meropenem (MERREM) 1 g in sterile water (preservative free) 20 mL IV syringe  1 g IntraVENous Q12H    0.9% sodium chloride infusion 250 mL  250 mL IntraVENous PRN    silver sulfADIAZINE (SILVADENE) 1 % topical cream   Topical DAILY    0.9% sodium chloride infusion 250 mL  250 mL IntraVENous PRN    hydrALAZINE (APRESOLINE) 20 mg/mL injection 10 mg  10 mg IntraVENous Q6H PRN    [Held by provider] heparin (porcine) injection 5,000 Units  5,000 Units SubCUTAneous Q8H    ondansetron (ZOFRAN) injection 4 mg  4 mg IntraVENous Q6H PRN    HYDROmorphone (DILAUDID) syringe 0.5 mg  0.5 mg IntraVENous Q4H PRN    naloxone (NARCAN) injection 0.4 mg  0.4 mg IntraVENous PRN    acetaminophen (TYLENOL) tablet 650 mg  650 mg Oral Q6H PRN    0.9% sodium chloride infusion  50 mL/hr IntraVENous CONTINUOUS    docusate (COLACE) 50 mg/5 mL oral liquid 100 mg  100 mg Oral DAILY    bisacodyL (DULCOLAX) tablet 5 mg  5 mg Oral DAILY PRN    escitalopram oxalate (LEXAPRO) tablet 10 mg  10 mg Oral DAILY    oxyCODONE-acetaminophen (PERCOCET) 5-325 mg per tablet 1 Tab  1 Tablet Oral Q4H PRN       Allergies: Patient has no known allergies. Temp (24hrs), Av °F (36.7 °C), Min:97.4 °F (36.3 °C), Max:98.5 °F (36.9 °C)    Visit Vitals  BP (!) 153/85 (BP 1 Location: Left upper arm, BP Patient Position: At rest)   Pulse 97   Temp 97.5 °F (36.4 °C)   Resp 16   Ht 6' 2\" (1.88 m)   Wt 108.9 kg (240 lb)   SpO2 98%   BMI 30.81 kg/m²       ROS: 12 point ROS obtained in details. Pertinent positives as mentioned in HPI,   otherwise negative    Physical Exam:    General: Well developed, well nourished male laying on the bed, sleepy, in no acute distress. General:   awake alert and oriented   HEENT:  Normocephalic, atraumatic, EOMI, no scleral icterus or pallor; no conjunctival hemmohage;  nasal and oral mucous are moist and without evidence of lesions. No thrush. Dentition good. Neck supple, no bruits.    Lymph Nodes:   no cervical, axillary or inguinal adenopathy   Lungs:   non-labored, bilaterally clear to auscultation- no crackles wheezes rales or rhonchi   Heart:  RRR, s1 and s2; no rubs or gallops, no edema   Abdomen:  soft, non-distended, active bowel sounds, no hepatomegaly, no splenomegaly. Colostomy in place. Non-tender   Genitourinary:  deferred   Extremities:   no clubbing, cyanosis; no joint effusions or swelling; Full ROM of all large joints to the upper and lower extremities; muscle mass appropriate for age   Neurologic:  Paraplegia. Speech appropriate. Cranial nerves intact                        Skin:  Surgical changes back   Back:  wound vac recent surgical wound     Psychiatric:  No suicidal or homicidal ideations, appropriate mood and affect         Labs: Results:   Chemistry Recent Labs     05/20/21  0230 05/19/21  0235 05/18/21  0255   * 90 108*    143 143   K 3.4* 3.6 3.9   * 114* 113*   CO2 24 25 23   BUN 13 16 17   CREA 1.73* 1.86* 1.95*   CA 7.3* 7.4* 7.1*   AGAP 5 4 7   BUCR 8* 9* 9*      CBC w/Diff Recent Labs     05/20/21  0230 05/19/21  1636 05/19/21  0235 05/18/21  0255   WBC  --   --  15.6* 17.8*   RBC  --   --  2.56* 2.59*   HGB 7.1* 8.1* 7.5* 7.7*   HCT 22.4* 25.2* 23.7* 23.7*   PLT  --   --  227 238   GRANS  --   --  75* 82*   LYMPH  --   --  13* 8*   EOS  --   --  3 2      Microbiology No results for input(s): CULT in the last 72 hours. RADIOLOGY:    All available imaging studies/reports in [facility] for this admission were reviewed      Disclaimer: Sections of this note are dictated utilizing voice recognition software, which may have resulted in some phonetic based errors in grammar and contents. Even though attempts were made to correct all the mistakes, some may have been missed, and remained in the body of the document. If questions arise, please contact our department.     Dr. Kaushal Stein, Infectious Disease Specialist  [phone]  May 20, 2021  8:39 PM

## 2021-05-20 NOTE — PROGRESS NOTES
Informed by Dr. Juan Diallo and Mary Alicia, Dietitian of patient's agreement to NG tube placement. Attempted to place NG tube  With the assistance of Sudha, charge nurse. As I began to advance the tube, the patient yelled, \"Stop, take it out! I don't want this! Patient was offered medication to help with anxiety as well as an opportunity to try again later. Patient still refused placement. It was explained to the patient that the tube will allow him to have supplemental nutrition which will promote healing. Patient verbalized understanding, but still did not want to proceed. Patient stated, \" I will try to eat. The other day I had Ensure, and I threw it up. I have been afraid to eat since then because I thought I would throw that up too. I want to try to eat, I have a little bit of an appetite now, I did not have one before\" I heated the patient's soup from his mom and gave 8 oz of cranberry juice. Notified Dr. Juan Diallo, no orders given at this time. 1551- Patient is tolerating food and drink, denies nausea.

## 2021-05-20 NOTE — PROGRESS NOTES
Received pt resting quietly in bed. Condom cath in place draining clear yellow urine. Taking in fluids well at this time. colostomy intact. No complaints at this time. 0530 dressing change done to coccyx and left foot. Pt tolerated procedure well.       5315 pt has been taking in po fluids well this shift    0715 bedside report given to Sheng santiago RN

## 2021-05-20 NOTE — PROGRESS NOTES
Problem: Falls - Risk of  Goal: *Absence of Falls  Description: Document Nirmal Pandya Fall Risk and appropriate interventions in the flowsheet. Outcome: Progressing Towards Goal  Note: Fall Risk Interventions:  Mobility Interventions: Bed/chair exit alarm, Patient to call before getting OOB    Mentation Interventions: Adequate sleep, hydration, pain control, Bed/chair exit alarm, Door open when patient unattended    Medication Interventions: Bed/chair exit alarm, Patient to call before getting OOB    Elimination Interventions: Call light in reach    History of Falls Interventions: Bed/chair exit alarm, Door open when patient unattended         Problem: Patient Education: Go to Patient Education Activity  Goal: Patient/Family Education  Outcome: Progressing Towards Goal     Problem: Pressure Injury - Risk of  Goal: *Prevention of pressure injury  Description: Document Jordin Scale and appropriate interventions in the flowsheet.   Outcome: Progressing Towards Goal  Note: Pressure Injury Interventions:  Sensory Interventions: Assess changes in LOC, Keep linens dry and wrinkle-free    Moisture Interventions: Absorbent underpads, Apply protective barrier, creams and emollients, Assess need for specialty bed, Internal/External urinary devices    Activity Interventions: Assess need for specialty bed, Pressure redistribution bed/mattress(bed type)    Mobility Interventions: Assess need for specialty bed, Pressure redistribution bed/mattress (bed type)    Nutrition Interventions: Document food/fluid/supplement intake    Friction and Shear Interventions: Apply protective barrier, creams and emollients, Minimize layers                Problem: Patient Education: Go to Patient Education Activity  Goal: Patient/Family Education  Outcome: Progressing Towards Goal     Problem: Nutrition Deficit  Goal: *Optimize nutritional status  Outcome: Progressing Towards Goal
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
## 2021-05-20 NOTE — PROGRESS NOTES
Physician Progress Note      PATIENT:               Jessica Dumont  CSN #:                  [id number]  :                       1960  ADMIT DATE:       2021 11:00 AM  [address] [place] DATE:  RESPONDING  PROVIDER #:        KEV GONZALEZ DO          QUERY TEXT:    Pt admitted with pressure ulcer. Noted documentation of mild malnutrition on  by ordered RD consultant. If possible, please document in progress notes and discharge summary:      The medical record reflects the following:  Risk Factors: 60y. o. male with paraplegia, sacral decub  Clinical Indicators: Malnutrition Status: Mild malnutrition  Context: Acute illness  Findings of clinical characteristics of malnutrition:  Energy Intake:  7 - 50% or less of est energy requirements for 5 or more days  Weight Loss:   (-10#, 4% x month)  Treatment: RD following    Thank you,  Griselda Rondon RN, [facility]  [phone]  Options provided:  -- Mild malnutrition confirmed present on admission  -- Other - I will add my own diagnosis  -- Disagree - Not applicable / Not valid  -- Disagree - Clinically unable to determine / Unknown  -- Refer to Clinical Documentation Reviewer    PROVIDER RESPONSE TEXT:    The diagnosis of mild malnutrition was confirmed as present on admission.     Query created by: Facundo Chiang on 2021 1:01 PM      Electronically signed by:  Betty Hancock DO 2021 3:46 PM
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
## 2021-05-20 NOTE — PROGRESS NOTES
Bedside and Verbal shift change report given to SAFIA Quezada (oncoming nurse) by Sridhar Womack RN (offgoing nurse). Report included the following information SBAR and Kardex.

## 2021-05-20 NOTE — PALLIATIVE CARE
Lisle Kocher, NP and this MSW met with pt at bedside. Pt was awake, alert and oriented. Pt reports he's doing okay this morning. Informed MSW has not been able to consume solid food this a.m. Has been drinking juice. Pt requested more juice, which NP, departed room to obtain. MSW offered supportive counseling, which pt was minimally receptive to. Pt answered questions in short cut off answers. MSW encouraged pt to reach out if needs assistance. Pt verbalized understanding. NP met with pt who informed her he did successfully ate a fish sandwich yesterday, which was provided by his family. He reports he is unsure if family will be bringing food today. NP encouraged pt to eat whatever he can. No additional needs or concerns communicated at this time.

## 2021-05-20 NOTE — PROGRESS NOTES
RENAL PROGRESS NOTE        Cindy Peraza         Assessment  1. ЕКАТЕРИНА , ATN / AIN 2/2 to Abx Nephrotoxicity   2. Infected Sacral dec ulcer   3. Chronic anemia   4.  HTN , better controlled  5. hypokalemia      Plan   - replace K   - renal parameters improving   - noted plans for NG tube and feeds , d/c IVF   - will Replace Lytes as needed   - start venofer , dose of DIMITRI   - follow electrolytes and replace      Please call with questions,    Arvind Martinez MD FASN  Cell [phone]  Pager: [phone]                                                                                                                            Subjective:  he denies any CP / SOB   Not very interactive       Patient Active Problem List   Diagnosis Code    S/P colostomy ([facility] [address] Z93.3    Paraplegia ([address] G82.20    Sacral decubitus ulcer, stage IV ([address] L89.154    HTN (hypertension) I10    Mild protein-calorie malnutrition ([address] E44.1       Current Facility-Administered Medications   Medication Dose Route Frequency Provider Last Rate Last Admin    [START ON 5/21/2021] pantoprazole (PROTONIX) tablet 40 mg  40 mg Oral ACB Sylwia Francis MD        amLODIPine (NORVASC) tablet 10 mg  10 mg Oral DAILY Sylwia Francis MD   10 mg at 05/20/21 1230    therapeutic multivitamin [facility]) tablet 1 Tablet  1 Tablet Oral DAILY Sylwia Francis MD   1 Tablet at 05/20/21 0859    thiamine HCL (B-1) tablet 200 mg  200 mg Oral DAILY Sylwia Francis MD   200 mg at 05/20/21 0859    iron sucrose (VENOFER) 300 mg in 0.9% sodium chloride 250 mL IVPB  300 mg IntraVENous DAILY Refugio Crisostomo .7 mL/hr at 05/20/21 1611 300 mg at 05/20/21 1611    mirtazapine (REMERON) tablet 7.5 mg  7.5 mg Oral QHS Francis Hammonds DO   7.5 mg at 05/19/21 2214    0.9% sodium chloride infusion 250 mL  250 mL IntraVENous PRN Darcie Busby MD        meropenem (MERREM) 1 g in sterile water (preservative free) 20 mL IV syringe  1 g IntraVENous Q12H Gale Voss MD 1 g at 05/20/21 1222    0.9% sodium chloride infusion 250 mL  250 mL IntraVENous PRN Erum Vargas MD        silver sulfADIAZINE (SILVADENE) 1 % topical cream   Topical DAILY Curtis Santacruz DPM   Given at 05/20/21 0900    0.9% sodium chloride infusion 250 mL  250 mL IntraVENous PRN Erum Vargas MD        hydrALAZINE (APRESOLINE) 20 mg/mL injection 10 mg  10 mg IntraVENous Q6H PRN Felecia Lopez DO   10 mg at 05/19/21 1523    [Held by provider] heparin (porcine) injection 5,000 Units  5,000 Units SubCUTAneous Q8H Erum Vargas MD   5,000 Units at 05/12/21 0533    ondansetron (ZOFRAN) injection 4 mg  4 mg IntraVENous Q6H PRN Kory Rosas MD   4 mg at 05/11/21 2235    HYDROmorphone (DILAUDID) syringe 0.5 mg  0.5 mg IntraVENous Q4H PRN Conrad Singh MD        naloxone [facility]) injection 0.4 mg  0.4 mg IntraVENous PRN Conrad Singh MD        acetaminophen (TYLENOL) tablet 650 mg  650 mg Oral Q6H PRN Monica Constantino MD   650 mg at 05/18/21 1821    0.9% sodium chloride infusion  50 mL/hr IntraVENous CONTINUOUS Dalton Silva MD 50 mL/hr at 05/20/21 1224 50 mL/hr at 05/20/21 1224    docusate (COLACE) 50 mg/5 mL oral liquid 100 mg  100 mg Oral DAILY Monica Constantino MD   100 mg at 05/04/21 1014    bisacodyL (DULCOLAX) tablet 5 mg  5 mg Oral DAILY PRN Monica Constantino MD        escitalopram oxalate (LEXAPRO) tablet 10 mg  10 mg Oral DAILY Monica Constantino MD   10 mg at 05/20/21 0859    oxyCODONE-acetaminophen (PERCOCET) 5-325 mg per tablet 1 Tab  1 Tablet Oral Q4H PRN Monica Constantino MD   1 Tablet at 05/04/21 0331       Objective  Vitals:    05/20/21 0417 05/20/21 0800 05/20/21 1159 05/20/21 1602   BP: (!) 154/64 (!) 153/85 (!) 147/84 (!) 159/84   Pulse: 98 97 (!) 101 91   Resp: 16 16 18 16   Temp: 98.4 °F (36.9 °C) 97.5 °F (36.4 °C) 97.3 °F (36.3 °C) 99.4 °F (37.4 °C)   SpO2: 99% 98% 100% 100%   Weight:       Height: Intake/Output Summary (Last 24 hours) at 5/20/2021 1624  Last data filed at 5/20/2021 1616  Gross per 24 hour   Intake 1784 ml   Output 1600 ml   Net 184 ml           Admission weight: Weight: 113.4 kg (250 lb) (04/27/21 1058)  Last Weight Metrics:  Weight Loss Metrics 5/17/2021 4/29/2021 4/16/2021 4/12/2021 1/7/2021 6/17/2019 3/23/2019   Today's Wt 240 lb - 250 lb - 240 lb 240 lb 209 lb   BMI - 30.81 kg/m2 32.1 kg/m2 32.55 kg/m2 32.55 kg/m2 32.55 kg/m2 28.35 kg/m2             Physical Assessment:     General: sleepy   Neck: No jvd. LUNGS: Clear to Auscultation, No rales, rhonchi or wheezes. CVS EXM: S1, S2  RRR, no murmurs/gallops/rubs. Abdomen: soft, non tender. Lower Extremities:  no edema.      Lab    CBC w/Diff Recent Labs     05/20/21  1506 05/20/21  0230 05/19/21  1636 05/19/21  0235 05/18/21  0255   WBC  --   --   --  15.6* 17.8*   RBC  --   --   --  2.56* 2.59*   HGB 7.5* 7.1* 8.1* 7.5* 7.7*   HCT 24.7* 22.4* 25.2* 23.7* 23.7*   PLT  --   --   --  227 238   GRANS  --   --   --  75* 82*   LYMPH  --   --   --  13* 8*   EOS  --   --   --  3 2        Chemistry Recent Labs     05/20/21  0230 05/19/21  0235 05/18/21  0255   * 90 108*    143 143   K 3.4* 3.6 3.9   * 114* 113*   CO2 24 25 23   BUN 13 16 17   CREA 1.73* 1.86* 1.95*   CA 7.3* 7.4* 7.1*   AGAP 5 4 7   BUCR 8* 9* 9*         Lab Results   Component Value Date/Time    Iron 29 (L) 05/14/2021 07:01 PM    TIBC 136 (L) 05/14/2021 07:01 PM    Iron % saturation 21 05/14/2021 07:01 PM    Ferritin 445 (H) 05/15/2021 03:37 AM      Lab Results   Component Value Date/Time    Calcium 7.3 (L) 05/20/2021 02:30 AM    Phosphorus 4.5 05/15/2021 03:37 AM          Denzel Simms MD  5/20/2021  7:57 AM

## 2021-05-20 NOTE — PROGRESS NOTES
End of Shift Note     Bedside and verbal shift change report given to Frank Mensah (On coming nurse) by Subha Shirley (Off going nurse).   Report included the following information:      --Procedure Summary     --MAR,     --Recent Results     --Med Rec Status

## 2021-05-21 ENCOUNTER — APPOINTMENT (OUTPATIENT)
Dept: INTERVENTIONAL RADIOLOGY/VASCULAR | Age: 61
DRG: 364 | End: 2021-05-21
Attending: INTERNAL MEDICINE
Payer: MEDICAID

## 2021-05-21 LAB
ALBUMIN SERPL-MCNC: 1.8 G/DL (ref 3.4–5)
ANION GAP SERPL CALC-SCNC: 7 MMOL/L (ref 3–18)
BUN SERPL-MCNC: 10 MG/DL (ref 7–18)
BUN/CREAT SERPL: 6 (ref 12–20)
CALCIUM SERPL-MCNC: 7.3 MG/DL (ref 8.5–10.1)
CHLORIDE SERPL-SCNC: 112 MMOL/L (ref 100–111)
CO2 SERPL-SCNC: 25 MMOL/L (ref 21–32)
CREAT SERPL-MCNC: 1.63 MG/DL (ref 0.6–1.3)
GLUCOSE SERPL-MCNC: 99 MG/DL (ref 74–99)
HCT VFR BLD AUTO: 21.4 % (ref 36–48)
HCT VFR BLD AUTO: 23.7 % (ref 36–48)
HGB BLD-MCNC: 6.8 G/DL (ref 13–16)
HGB BLD-MCNC: 7.5 G/DL (ref 13–16)
HISTORY CHECKED?,CKHIST: NORMAL
PHOSPHATE SERPL-MCNC: 3.6 MG/DL (ref 2.5–4.9)
POTASSIUM SERPL-SCNC: 3.5 MMOL/L (ref 3.5–5.5)
SODIUM SERPL-SCNC: 144 MMOL/L (ref 136–145)

## 2021-05-21 PROCEDURE — 85014 HEMATOCRIT: CPT

## 2021-05-21 PROCEDURE — 74011250637 HC RX REV CODE- 250/637: Performed by: INTERNAL MEDICINE

## 2021-05-21 PROCEDURE — 74011250636 HC RX REV CODE- 250/636: Performed by: INTERNAL MEDICINE

## 2021-05-21 PROCEDURE — 86923 COMPATIBILITY TEST ELECTRIC: CPT

## 2021-05-21 PROCEDURE — 99239 HOSP IP/OBS DSCHRG MGMT >30: CPT | Performed by: INTERNAL MEDICINE

## 2021-05-21 PROCEDURE — 74011250637 HC RX REV CODE- 250/637: Performed by: SURGERY

## 2021-05-21 PROCEDURE — 36415 COLL VENOUS BLD VENIPUNCTURE: CPT

## 2021-05-21 PROCEDURE — 74011000250 HC RX REV CODE- 250: Performed by: INTERNAL MEDICINE

## 2021-05-21 PROCEDURE — 77030013076 HC PCH OST BAG COLO -A

## 2021-05-21 PROCEDURE — 65270000029 HC RM PRIVATE

## 2021-05-21 PROCEDURE — 86901 BLOOD TYPING SEROLOGIC RH(D): CPT

## 2021-05-21 PROCEDURE — 2709999900 HC NON-CHARGEABLE SUPPLY

## 2021-05-21 PROCEDURE — 74011250637 HC RX REV CODE- 250/637: Performed by: FAMILY MEDICINE

## 2021-05-21 PROCEDURE — 80069 RENAL FUNCTION PANEL: CPT

## 2021-05-21 PROCEDURE — 76937 US GUIDE VASCULAR ACCESS: CPT

## 2021-05-21 RX ORDER — OXYCODONE AND ACETAMINOPHEN 5; 325 MG/1; MG/1
1 TABLET ORAL
Qty: 12 TABLET | Refills: 0 | Status: SHIPPED | OUTPATIENT
Start: 2021-05-21 | End: 2021-05-24

## 2021-05-21 RX ORDER — PANTOPRAZOLE SODIUM 40 MG/1
40 TABLET, DELAYED RELEASE ORAL
Qty: 30 TABLET | Refills: 0 | Status: ON HOLD
Start: 2021-05-22 | End: 2021-08-18 | Stop reason: SDUPTHER

## 2021-05-21 RX ORDER — THERA TABS 400 MCG
1 TAB ORAL DAILY
Qty: 30 TABLET | Refills: 0 | Status: SHIPPED
Start: 2021-05-22

## 2021-05-21 RX ORDER — SODIUM CHLORIDE 9 MG/ML
250 INJECTION, SOLUTION INTRAVENOUS AS NEEDED
Status: DISCONTINUED | OUTPATIENT
Start: 2021-05-21 | End: 2021-05-22 | Stop reason: HOSPADM

## 2021-05-21 RX ORDER — MIRTAZAPINE 7.5 MG/1
7.5 TABLET, FILM COATED ORAL
Qty: 30 TABLET | Refills: 0 | Status: SHIPPED
Start: 2021-05-21 | End: 2021-09-09

## 2021-05-21 RX ORDER — NALOXONE HYDROCHLORIDE 0.4 MG/ML
0.4 INJECTION, SOLUTION INTRAMUSCULAR; INTRAVENOUS; SUBCUTANEOUS AS NEEDED
Qty: 1 ML | Refills: 2 | Status: SHIPPED
Start: 2021-05-21 | End: 2021-08-18

## 2021-05-21 RX ORDER — AMLODIPINE BESYLATE 10 MG/1
10 TABLET ORAL DAILY
Qty: 30 TABLET | Refills: 0 | Status: SHIPPED
Start: 2021-05-22 | End: 2021-08-18

## 2021-05-21 RX ORDER — POTASSIUM CHLORIDE 20 MEQ/1
40 TABLET, EXTENDED RELEASE ORAL
Status: COMPLETED | OUTPATIENT
Start: 2021-05-21 | End: 2021-05-21

## 2021-05-21 RX ORDER — CARVEDILOL 6.25 MG/1
6.25 TABLET ORAL 2 TIMES DAILY WITH MEALS
Status: DISCONTINUED | OUTPATIENT
Start: 2021-05-21 | End: 2021-05-22 | Stop reason: HOSPADM

## 2021-05-21 RX ORDER — LANOLIN ALCOHOL/MO/W.PET/CERES
200 CREAM (GRAM) TOPICAL DAILY
Qty: 30 TABLET | Refills: 0 | Status: SHIPPED
Start: 2021-05-22 | End: 2021-08-18

## 2021-05-21 RX ADMIN — MEROPENEM 1 G: 1 INJECTION INTRAVENOUS at 01:46

## 2021-05-21 RX ADMIN — MEROPENEM 1 G: 1 INJECTION INTRAVENOUS at 13:18

## 2021-05-21 RX ADMIN — MIRTAZAPINE 7.5 MG: 15 TABLET, FILM COATED ORAL at 21:50

## 2021-05-21 RX ADMIN — MEROPENEM 1 G: 1 INJECTION INTRAVENOUS at 23:45

## 2021-05-21 RX ADMIN — CARVEDILOL 6.25 MG: 6.25 TABLET, FILM COATED ORAL at 13:21

## 2021-05-21 RX ADMIN — Medication 200 MG: at 09:20

## 2021-05-21 RX ADMIN — ESCITALOPRAM OXALATE 10 MG: 10 TABLET ORAL at 09:20

## 2021-05-21 RX ADMIN — POTASSIUM CHLORIDE 40 MEQ: 1500 TABLET, EXTENDED RELEASE ORAL at 11:06

## 2021-05-21 RX ADMIN — PANTOPRAZOLE SODIUM 40 MG: 40 TABLET, DELAYED RELEASE ORAL at 06:51

## 2021-05-21 RX ADMIN — AMLODIPINE BESYLATE 10 MG: 10 TABLET ORAL at 09:20

## 2021-05-21 RX ADMIN — THERA TABS 1 TABLET: TAB at 09:20

## 2021-05-21 RX ADMIN — SILVER SULFADIAZINE: 10 CREAM TOPICAL at 11:08

## 2021-05-21 RX ADMIN — IRON SUCROSE 300 MG: 20 INJECTION, SOLUTION INTRAVENOUS at 18:07

## 2021-05-21 NOTE — PROGRESS NOTES
Shift Summary Note:  Assumed care of patient in bed asleep but arouseable. Mom @ bedside,. Drinking fluids only last night. No s/s of distress or discomfort,m No complaints offered. Uneventful night, call bell within reach.   Patient Vitals for the past 12 hrs:   Temp Pulse Resp BP SpO2   05/21/21 0329 98.1 °F (36.7 °C) 93 16 (!) 164/94 100 %   05/20/21 2005 98.1 °F (36.7 °C) 99 16 (!) 156/84 100 %
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
## 2021-05-21 NOTE — PROGRESS NOTES
Spoke with Dr Nelson Venegas. Pt still is not eating much but his H/H has stablilized and other labs look good. She feels he can transition to LTC today. Spoke with Gladys Denis at [facility]; [facility] can take him today. Will set up transport. Informed Dr Nelson Venegas that pt does not have a PICC line yet. She will place a stat order.   Kenyatta Suarez RN - Outcomes Manager  [phone]

## 2021-05-21 NOTE — PROGRESS NOTES
Infectious Disease progress Note        Reason: infected decubiti ulcer    Current abx Prior abx     Meropenem since 5/6 vancomycin 5/1-5/4  Piperacillin/tazobactam since 5/1-5/6     Lines:       Assessment :    61year old man with h/o HTN, paraplegia admitted to  [facility] on 4/29/21 for evaluation of infected sacral decubiti. Clinical presentation c/w acute on chronic sacral osteomyelitis, infected sacral decubiti    S/p surgical debridement,  robotic colostomy on 4/29/2021    Persistent leukocytosis -likely due to partially treated infection due to resistant pathogens. wound cultures 5/3-E. coli, providencia (resistant to piperacillin/tazobactam)  Antibiotics switched to meropenem on 5/6/2021. Improved leukocytosis    Protrusion of the small bowel around the colostomy causing bowel ischemia s/p expl. laparotomy with small bowel resection, partial colectomy/revision of colostomy on 5/4/21    Acute kidney injury-likely multifactorial.  Discussed with nephrologist.  Concerns for antibiotic associated interstitial nephritis-gradually improving creatinine    Tolerating liquid diet- poor po intake. Worsening leukocytosis 19 K on 5/11- likely leukemoid reaction to bleed from sacral wound-drop in hemoglobin to 6.2 noted on 5/12, 5/13 labs. No clinical evidence of new infection  Patient refuses to eat since he does not like the food. Does not want NG tube or PEG tube. Denies nausea, vomiting  Recommendations:    1. Continue meropenem-adjust dose per changing renal function-continue meropenem till 6/18/21  2. Follow-up nephrology recommendations regarding ЕКАТЕРИНА   3. Encourage po diet  4. Will need good wound care to aid healing and prevent future infectious complications  5. F/u surgery recommendations about bleeding from sacral wound  6. F/u cbc, clinically    Home health orders on chart (click on \"chart review, other orders, IP home health)    Above plan was discussed in details with patient.    Please call me if any further questions or concerns. Will continue to participate in the care of this patient. HPI:       patient denied any chest pain, shortness of breath, abdominal pain. states that he will try to eat. Doesn't want a feeding tube      Current Discharge Medication List      CONTINUE these medications which have NOT CHANGED    Details   lisinopril-hydroCHLOROthiazide (PRINZIDE, ZESTORETIC) 20-12.5 mg per tablet TAKE 1 TABLET EVERY DAY  Refills: 3      ergocalciferol (ERGOCALCIFEROL) 1,250 mcg (50,000 unit) capsule       ciprofloxacin HCl (CIPRO) 250 mg tablet Take 1 Tab by mouth two (2) times a day. Qty: 30 Tab, Refills: 2      tamsulosin (FLOMAX) 0.4 mg capsule TAKE 1 CAPSULE BY MOUTH EVERY DAY  Refills: 1      naloxone (NARCAN) 2 mg/actuation spry Use 1 spray intranasally into 1 nostril. Use a new Narcan nasal spray for subsequent doses and administer into alternating nostrils. May repeat every 2 to 3 minutes as needed. Qty: 2 Actuation(s), Refills: 0      furosemide (LASIX) 20 mg tablet TAKE 1 TABLET BY MOUTH DAILY AS NEEDED FOR SWELLING  Refills: 3      MULTIVIT-MINERALS/FOLIC ACID (SPECTRAVITE ADULT PO) Take  by mouth.      escitalopram oxalate (LEXAPRO) 10 mg tablet Take 10 mg by mouth daily.       acetaminophen (TYLENOL) 325 mg tablet TAKE 2 TABLETS BY MOUTH EVERY 4 HOURS AS NEEDED FOR PAIN  Refills: 2             Current Facility-Administered Medications   Medication Dose Route Frequency    pantoprazole (PROTONIX) tablet 40 mg  40 mg Oral ACB    amLODIPine (NORVASC) tablet 10 mg  10 mg Oral DAILY    therapeutic multivitamin (THERAGRAN) tablet 1 Tablet  1 Tablet Oral DAILY    thiamine HCL (B-1) tablet 200 mg  200 mg Oral DAILY    iron sucrose (VENOFER) 300 mg in 0.9% sodium chloride 250 mL IVPB  300 mg IntraVENous DAILY    mirtazapine (REMERON) tablet 7.5 mg  7.5 mg Oral QHS    0.9% sodium chloride infusion 250 mL  250 mL IntraVENous PRN    meropenem (MERREM) 1 g in sterile water (preservative free) 20 mL IV syringe  1 g IntraVENous Q12H    0.9% sodium chloride infusion 250 mL  250 mL IntraVENous PRN    silver sulfADIAZINE (SILVADENE) 1 % topical cream   Topical DAILY    0.9% sodium chloride infusion 250 mL  250 mL IntraVENous PRN    hydrALAZINE (APRESOLINE) 20 mg/mL injection 10 mg  10 mg IntraVENous Q6H PRN    [Held by provider] heparin (porcine) injection 5,000 Units  5,000 Units SubCUTAneous Q8H    ondansetron (ZOFRAN) injection 4 mg  4 mg IntraVENous Q6H PRN    HYDROmorphone (DILAUDID) syringe 0.5 mg  0.5 mg IntraVENous Q4H PRN    naloxone (NARCAN) injection 0.4 mg  0.4 mg IntraVENous PRN    acetaminophen (TYLENOL) tablet 650 mg  650 mg Oral Q6H PRN    docusate (COLACE) 50 mg/5 mL oral liquid 100 mg  100 mg Oral DAILY    bisacodyL (DULCOLAX) tablet 5 mg  5 mg Oral DAILY PRN    escitalopram oxalate (LEXAPRO) tablet 10 mg  10 mg Oral DAILY    oxyCODONE-acetaminophen (PERCOCET) 5-325 mg per tablet 1 Tab  1 Tablet Oral Q4H PRN       Allergies: Patient has no known allergies. Temp (24hrs), Av.1 °F (36.7 °C), Min:97.3 °F (36.3 °C), Max:99.4 °F (37.4 °C)    Visit Vitals  BP (!) 155/88 (BP 1 Location: Left upper arm, BP Patient Position: At rest)   Pulse 91   Temp 97.6 °F (36.4 °C)   Resp 18   Ht 6' 2\" (1.88 m)   Wt 108.9 kg (240 lb)   SpO2 100%   BMI 30.81 kg/m²       ROS: 12 point ROS obtained in details. Pertinent positives as mentioned in HPI,   otherwise negative    Physical Exam:    General: Well developed, well nourished male laying on the bed, sleepy, in no acute distress. General:   awake alert and oriented   HEENT:  Normocephalic, atraumatic, EOMI, no scleral icterus or pallor; no conjunctival hemmohage;  nasal and oral mucous are moist and without evidence of lesions. No thrush. Dentition good. Neck supple, no bruits.    Lymph Nodes:   no cervical, axillary or inguinal adenopathy   Lungs:   non-labored, bilaterally clear to auscultation- no crackles wheezes rales or rhonchi Heart:  RRR, s1 and s2; no rubs or gallops, no edema   Abdomen:  soft, non-distended, active bowel sounds, no hepatomegaly, no splenomegaly. Colostomy in place. Non-tender   Genitourinary:  deferred   Extremities:   no clubbing, cyanosis; no joint effusions or swelling; Full ROM of all large joints to the upper and lower extremities; muscle mass appropriate for age   Neurologic:  Paraplegia. Speech appropriate. Cranial nerves intact                        Skin:  Surgical changes back   Back:  wound vac recent surgical wound     Psychiatric:  No suicidal or homicidal ideations, appropriate mood and affect         Labs: Results:   Chemistry Recent Labs     05/20/21  0230 05/19/21  0235   * 90    143   K 3.4* 3.6   * 114*   CO2 24 25   BUN 13 16   CREA 1.73* 1.86*   CA 7.3* 7.4*   AGAP 5 4   BUCR 8* 9*      CBC w/Diff Recent Labs     05/21/21  0314 05/20/21  1506 05/20/21  0230 05/19/21  0235   WBC  --   --   --  15.6*   RBC  --   --   --  2.56*   HGB 7.5* 7.5* 7.1* 7.5*   HCT 23.7* 24.7* 22.4* 23.7*   PLT  --   --   --  227   GRANS  --   --   --  75*   LYMPH  --   --   --  13*   EOS  --   --   --  3      Microbiology No results for input(s): CULT in the last 72 hours. RADIOLOGY:    All available imaging studies/reports in [facility] for this admission were reviewed      Disclaimer: Sections of this note are dictated utilizing voice recognition software, which may have resulted in some phonetic based errors in grammar and contents. Even though attempts were made to correct all the mistakes, some may have been missed, and remained in the body of the document. If questions arise, please contact our department.     Dr. Floridalma Kelly, Infectious Disease Specialist  [phone]  May 21, 2021  8:39 PM

## 2021-05-21 NOTE — PROGRESS NOTES
Problem: Falls - Risk of  Goal: *Absence of Falls  Description: Document Burrell Canavan Fall Risk and appropriate interventions in the flowsheet. Outcome: Progressing Towards Goal  Note: Fall Risk Interventions:  Mobility Interventions: Mechanical lift    Mentation Interventions: Bed/chair exit alarm    Medication Interventions: Patient to call before getting OOB, Bed/chair exit alarm    Elimination Interventions: Call light in reach    History of Falls Interventions: Door open when patient unattended         Problem: Patient Education: Go to Patient Education Activity  Goal: Patient/Family Education  Outcome: Progressing Towards Goal     Problem: Pressure Injury - Risk of  Goal: *Prevention of pressure injury  Description: Document Jordin Scale and appropriate interventions in the flowsheet. Outcome: Progressing Towards Goal  Note: Pressure Injury Interventions:  Sensory Interventions: Minimize linen layers    Moisture Interventions: Minimize layers, Moisture barrier    Activity Interventions: Pressure redistribution bed/mattress(bed type)    Mobility Interventions: HOB 30 degrees or less, Pressure redistribution bed/mattress (bed type)    Nutrition Interventions: Document food/fluid/supplement intake    Friction and Shear Interventions: Lift team/patient mobility team, Lift sheet                Problem: Patient Education: Go to Patient Education Activity  Goal: Patient/Family Education  Outcome: Progressing Towards Goal     Problem: Nutrition Deficit  Goal: *Optimize nutritional status  Outcome: Progressing Towards Goal     Problem: Impaired Skin Integrity/Pressure Injury Treatment  Goal: *Improvement of Existing Pressure Injury  Outcome: Progressing Towards Goal  Goal: *Prevention of pressure injury  Description: Document Jordin Scale and appropriate interventions in the flowsheet.   Outcome: Progressing Towards Goal  Note: Pressure Injury Interventions:  Sensory Interventions: Minimize linen layers    Moisture Interventions: Minimize layers, Moisture barrier    Activity Interventions: Pressure redistribution bed/mattress(bed type)    Mobility Interventions: HOB 30 degrees or less, Pressure redistribution bed/mattress (bed type)    Nutrition Interventions: Document food/fluid/supplement intake    Friction and Shear Interventions: Lift team/patient mobility team, Lift sheet                Problem: Patient Education: Go to Patient Education Activity  Goal: Patient/Family Education  Outcome: Progressing Towards Goal     Problem: Hypertension  Goal: *Blood pressure within specified parameters  Outcome: Progressing Towards Goal  Goal: *Fluid volume balance  Outcome: Progressing Towards Goal  Goal: *Labs within defined limits  Outcome: Progressing Towards Goal     Problem: Patient Education: Go to Patient Education Activity  Goal: Patient/Family Education  Outcome: Progressing Towards Goal     Problem: Patient Education: Go to Patient Education Activity  Goal: Patient/Family Education  Outcome: Progressing Towards Goal     Problem: Ostomy Care  Goal: *Patient pouching appliance will fit properly and maintain integrity at least three to five days  Description: Infection control procedures (eg, clean dressings, clean gloves, hand washing, precautions to isolate wound from contamination, sterile instruments used for wound debridement) should be implemented.   Outcome: Progressing Towards Goal  Goal: *Acceptance of change in body image  Outcome: Progressing Towards Goal     Problem: Patient Education: Go to Patient Education Activity  Goal: Patient/Family Education  Outcome: Progressing Towards Goal     Problem: Patient Education: Go to Patient Education Activity  Goal: Patient/Family Education  Outcome: Progressing Towards Goal     Problem: Patient Education: Go to Patient Education Activity  Goal: Patient/Family Education  Outcome: Progressing Towards Goal     Problem: Patient Education: Go to Patient Education Activity  Goal: Patient/Family Education  Outcome: Progressing Towards Goal

## 2021-05-21 NOTE — PROCEDURES
INTERVENTIONAL RADIOLOGY POST PICC LINE NOTE     May 21, 2021       3:02 PM     Preoperative Diagnosis:   IV Access    Postoperative Diagnosis:  Same. :  Nyasia Taylor PA-C    Assistant:  None. Attending Physician: Dr. Jakob Chavez    Type of Anesthesia:  1% Lidocaine local    Procedure/Description: right brachial  upper extremity PICC Line. Findings:  right brachial 5 Setswana catheter placed. Tip at SVC/RA junction. OK to use. Length 40 cm. Estimated blood Loss: Minimal    Specimen Removed: None    Drains: None     Complications:  None. Condition:  Stable.     Discharge Plan:  continue present therapy

## 2021-05-21 NOTE — PROGRESS NOTES
End of Shift Note     Bedside and verbal shift change report given to Sheila Barahona (On coming nurse) by Kanika Cee RN (Off going nurse).   Report included the following information:      --Procedure Summary     --MAR,     --Recent Results     --Med Rec Status

## 2021-05-21 NOTE — PROGRESS NOTES
Shift Summary Note:  Assumed care of patient alert and oriented, sleeping most of the night unless awakened and encouraged to drink. Patient did drink but refused and solid food offers, Remains on room air, VSS, ostomy draining liquid contents with sediment. Call bell within reach.   Patient Vitals for the past 12 hrs:   Temp Pulse Resp BP SpO2   05/21/21 0329 98.1 °F (36.7 °C) 93 16 (!) 164/94 100 %

## 2021-05-21 NOTE — PROGRESS NOTES
RENAL PROGRESS NOTE        [facility]         Assessment  1. ЕКАТЕРИНА , ATN / AIN 2/2 to Abx Nephrotoxicity   2. Infected Sacral dec ulcer   3. Chronic anemia   4. HTN , better controlled  5. Hypokalemia  6.  Anemia       Plan   - renal panel this AM  - add coreg   - venofer   - follow electrolytes and replace  - defer PRBC to primary team       Please call with questions,    Vianca Hanna MD FASN  Cell [phone]  Pager: [phone]                                                                                                                            Subjective:  he denies any CP / SOB   Not very interactive       Patient Active Problem List   Diagnosis Code    S/P colostomy ([facility] [address] Z93.3    Paraplegia ([address] G82.20    Sacral decubitus ulcer, stage IV ([address] L89.154    HTN (hypertension) I10    Mild protein-calorie malnutrition ([facility] [address] E44.1       Current Facility-Administered Medications   Medication Dose Route Frequency Provider Last Rate Last Admin    carvediloL (COREG) tablet 6.25 mg  6.25 mg Oral BID WITH MEALS Arben Crisostomo MD        pantoprazole (PROTONIX) tablet 40 mg  40 mg Oral ACB Neyda Walters MD   40 mg at 05/21/21 0651    amLODIPine (NORVASC) tablet 10 mg  10 mg Oral DAILY Neyda Walters MD   10 mg at 05/21/21 0920    therapeutic multivitamin [facility]) tablet 1 Tablet  1 Tablet Oral DAILY Neyda Walters MD   1 Tablet at 05/21/21 0920    thiamine HCL (B-1) tablet 200 mg  200 mg Oral DAILY Neyda Walters MD   200 mg at 05/21/21 0920    iron sucrose (VENOFER) 300 mg in 0.9% sodium chloride 250 mL IVPB  300 mg IntraVENous DAILY Thong Crisostomo .7 mL/hr at 05/20/21 1611 300 mg at 05/20/21 1611    mirtazapine (REMERON) tablet 7.5 mg  7.5 mg Oral QHS Michaela Giraldo DO   7.5 mg at 05/20/21 2314    0.9% sodium chloride infusion 250 mL  250 mL IntraVENous PRN Kaia Romero MD        meropenem [facility]) 1 g in sterile water (preservative free) 20 mL IV syringe  1 g IntraVENous Q12H Jose Carlos Urban MD   1 g at 05/21/21 0146    0.9% sodium chloride infusion 250 mL  250 mL IntraVENous PRN Lizett Vargas MD        silver sulfADIAZINE (SILVADENE) 1 % topical cream   Topical DAILY Jennifer Sleight, DPM   Given at 05/21/21 1108    0.9% sodium chloride infusion 250 mL  250 mL IntraVENous PRN Lizett Vargas MD        hydrALAZINE (APRESOLINE) 20 mg/mL injection 10 mg  10 mg IntraVENous Q6H PRN Yareli Cristino, DO   10 mg at 05/19/21 1523    [Held by provider] heparin (porcine) injection 5,000 Units  5,000 Units SubCUTAneous Q8H Lizett Vargas MD   5,000 Units at 05/12/21 0533    ondansetron (ZOFRAN) injection 4 mg  4 mg IntraVENous Q6H PRN Ivy Diaz MD   4 mg at 05/11/21 2235    HYDROmorphone (DILAUDID) syringe 0.5 mg  0.5 mg IntraVENous Q4H PRN Kelsi Pacheco MD        naloxone [facility]) injection 0.4 mg  0.4 mg IntraVENous PRN Kelsi Pacheco MD        acetaminophen (TYLENOL) tablet 650 mg  650 mg Oral Q6H PRN Richard Villanueva MD   650 mg at 05/18/21 1821    docusate (COLACE) 50 mg/5 mL oral liquid 100 mg  100 mg Oral DAILY Richard Villanueva MD   100 mg at 05/04/21 1014    bisacodyL (DULCOLAX) tablet 5 mg  5 mg Oral DAILY PRN Richard Villanueva MD        escitalopram oxalate (LEXAPRO) tablet 10 mg  10 mg Oral DAILY Richard Villanueva MD   10 mg at 05/21/21 0920    oxyCODONE-acetaminophen (PERCOCET) 5-325 mg per tablet 1 Tab  1 Tablet Oral Q4H PRN Richard Villanueva MD   1 Tablet at 05/04/21 0331       Objective  Vitals:    05/20/21 1602 05/20/21 2005 05/21/21 0329 05/21/21 0820   BP: (!) 159/84 (!) 156/84 (!) 164/94 (!) 155/88   Pulse: 91 99 93 91   Resp: 16 16 16 18   Temp: 99.4 °F (37.4 °C) 98.1 °F (36.7 °C) 98.1 °F (36.7 °C) 97.6 °F (36.4 °C)   SpO2: 100% 100% 100% 100%   Weight:       Height:             Intake/Output Summary (Last 24 hours) at 5/21/2021 1215  Last data filed at 5/21/2021 0930  Gross per 24 hour   Intake 1560 ml   Output 2100 ml   Net -540 ml           Admission weight: Weight: 113.4 kg (250 lb) (04/27/21 1058)  Last Weight Metrics:  Weight Loss Metrics 5/17/2021 4/29/2021 4/16/2021 4/12/2021 1/7/2021 6/17/2019 3/23/2019   Today's Wt 240 lb - 250 lb - 240 lb 240 lb 209 lb   BMI - 30.81 kg/m2 32.1 kg/m2 32.55 kg/m2 32.55 kg/m2 32.55 kg/m2 28.35 kg/m2             Physical Assessment:     General: sleepy   Neck: No jvd. LUNGS: Clear to Auscultation, No rales, rhonchi or wheezes. CVS EXM: S1, S2  RRR, no murmurs/gallops/rubs. Abdomen: soft, non tender. Lower Extremities:  no edema.      Lab    CBC w/Diff Recent Labs     05/21/21  0314 05/20/21  1506 05/20/21  0230 05/19/21  0235   WBC  --   --   --  15.6*   RBC  --   --   --  2.56*   HGB 7.5* 7.5* 7.1* 7.5*   HCT 23.7* 24.7* 22.4* 23.7*   PLT  --   --   --  227   GRANS  --   --   --  75*   LYMPH  --   --   --  13*   EOS  --   --   --  3        Chemistry Recent Labs     05/20/21  0230 05/19/21  0235   * 90    143   K 3.4* 3.6   * 114*   CO2 24 25   BUN 13 16   CREA 1.73* 1.86*   CA 7.3* 7.4*   AGAP 5 4   BUCR 8* 9*         Lab Results   Component Value Date/Time    Iron 29 (L) 05/14/2021 07:01 PM    TIBC 136 (L) 05/14/2021 07:01 PM    Iron % saturation 21 05/14/2021 07:01 PM    Ferritin 445 (H) 05/15/2021 03:37 AM      Lab Results   Component Value Date/Time    Calcium 7.3 (L) 05/20/2021 02:30 AM    Phosphorus 4.5 05/15/2021 03:37 AM          Flo Betito, MD  5/21/2021  7:57 AM

## 2021-05-21 NOTE — PROGRESS NOTES
Met with patient and his mother at bedside. Pt is still agreeing to go to LTC but is not happy about it. Asking if there was some place closer. Told him I would check to see if there was any beds available in [place] but no guarantees. Spoke with Flaco Solomon with [facility] to see if [facility] or [facility] have any openings but have not heard back. Wheelchair transportation set up with Medicaid for tomorrow morning around 10am. Pt has his own electric wheelchair here and will transport to facility in it. MD aware of this plan.   Naveen Yadav RN - Outcomes Manager  [phone]

## 2021-05-21 NOTE — PROGRESS NOTES
conducted a Follow up consultation and Spiritual Assessment for Aleja Gipson, who is a 61 y.o.,male. The  provided the following Interventions:  Continued the relationship of care and support. Offered prayer and assurance of continued prayer on patients behalf. Chart reviewed. The following outcomes were achieved:  Patient expressed gratitude for 's visit. Assessment:  There are no further spiritual or Bahai issues which require Spiritual Care Services interventions at this time. Plan:  Chaplains will continue to follow and will provide pastoral care on an as needed/requested basis.  recommends bedside caregivers page  on duty if patient shows signs of acute spiritual or emotional distress.        [facility] [address]   [phone]

## 2021-05-22 VITALS
WEIGHT: 240 LBS | BODY MASS INDEX: 30.8 KG/M2 | DIASTOLIC BLOOD PRESSURE: 83 MMHG | OXYGEN SATURATION: 100 % | SYSTOLIC BLOOD PRESSURE: 153 MMHG | HEIGHT: 74 IN | HEART RATE: 90 BPM | RESPIRATION RATE: 16 BRPM | TEMPERATURE: 97.7 F

## 2021-05-22 PROCEDURE — 2709999900 HC NON-CHARGEABLE SUPPLY

## 2021-05-22 PROCEDURE — 74011250637 HC RX REV CODE- 250/637: Performed by: SURGERY

## 2021-05-22 PROCEDURE — 74011000250 HC RX REV CODE- 250: Performed by: INTERNAL MEDICINE

## 2021-05-22 PROCEDURE — 36430 TRANSFUSION BLD/BLD COMPNT: CPT

## 2021-05-22 PROCEDURE — 74011250637 HC RX REV CODE- 250/637: Performed by: INTERNAL MEDICINE

## 2021-05-22 PROCEDURE — 74011250636 HC RX REV CODE- 250/636: Performed by: FAMILY MEDICINE

## 2021-05-22 PROCEDURE — P9016 RBC LEUKOCYTES REDUCED: HCPCS

## 2021-05-22 PROCEDURE — 99239 HOSP IP/OBS DSCHRG MGMT >30: CPT | Performed by: EMERGENCY MEDICINE

## 2021-05-22 PROCEDURE — 74011250636 HC RX REV CODE- 250/636: Performed by: INTERNAL MEDICINE

## 2021-05-22 RX ADMIN — THERA TABS 1 TABLET: TAB at 10:00

## 2021-05-22 RX ADMIN — CARVEDILOL 6.25 MG: 6.25 TABLET, FILM COATED ORAL at 10:00

## 2021-05-22 RX ADMIN — Medication 200 MG: at 10:00

## 2021-05-22 RX ADMIN — HYDRALAZINE HYDROCHLORIDE 10 MG: 20 INJECTION, SOLUTION INTRAMUSCULAR; INTRAVENOUS at 02:01

## 2021-05-22 RX ADMIN — ESCITALOPRAM OXALATE 10 MG: 10 TABLET ORAL at 10:00

## 2021-05-22 RX ADMIN — PANTOPRAZOLE SODIUM 40 MG: 40 TABLET, DELAYED RELEASE ORAL at 06:11

## 2021-05-22 RX ADMIN — AMLODIPINE BESYLATE 10 MG: 10 TABLET ORAL at 10:00

## 2021-05-22 RX ADMIN — MEROPENEM 1 G: 1 INJECTION INTRAVENOUS at 12:32

## 2021-05-22 NOTE — PROGRESS NOTES
Following peripherally over weekend  Renal functions improved but still not at baseline   BP better , coreg added yesterday  Still poor intake    Please call with questions    Naomi Babcock MD FASN  Cell [phone]  Pager: [phone]

## 2021-05-22 NOTE — ROUTINE PROCESS
W/D NS dressing change to sacral decubitus ulcer performed. Sillvadene  Cream applied to wounds on Rt. And Lt. Foot' covered with 4x4 dressing.

## 2021-05-22 NOTE — DISCHARGE SUMMARY
Addendum to original discharge summary by Dr. Rosaura Chiang on May 21, 2021. Patient has remained hemodynamically stable overnight. Patient is laying in bed in no apparent distress, awake, follows simple commands        Visit Vitals  BP (!) 161/89 (BP 1 Location: Left arm, BP Patient Position: At rest)   Pulse 93   Temp 97.7 °F (36.5 °C)   Resp 16   Ht 6' 2\" (1.88 m)   Wt 108.9 kg (240 lb)   SpO2 99%   BMI 30.81 kg/m²         General:  Awake, follows commands  Cardiovascular:  S1S2+, RRR  Pulmonary:  CTA b/l  GI:  Soft, BS+, NT, ND has colostomy  Extremities:  + edema      Continue IV meropenem as per infectious disease Dr. Toribio. Melissa Smith through 6/18/2021    Patient is a full code      Diet-regular  Ensure Enlive once daily with breakfast  Prosource twice daily with breakfast and lunch  Keep head of bed greater than 30 degrees at all times  Activity - as tolerated  FALL PRECAUTIONS  ASPIRATION PRECAUTIONS  DECUBITUS PRECAUTIONS  Incentive Spirometry Q30 minutes when awake  SCDs bilateral lower extremities while in bed-use as directed  PT, OT Evaluate and Treat  Check CBC, CMP, Mg in 2 days-results to supervising physician at facility immediately  Routine colostomy care  Routine Chavez catheter care  Routine PICC line care. Please have trained medical staff discontinue PICC line once course of antibiotics is completed. Wound care and wound VAC as directed  F/u with PCP in 1 week  F/u with general surgery Dr. Libia Hahn in 10 days  Follow-up with nephrology in 10 days    For full details regarding discharge diagnosis, hospital course, procedures, consultations, discharge antibiotics and other instructions please refer to original discharge summary by Dr. Rosaura Chiang from May 21, 2021    I discussed with the patient. I have discussed with the . Dragon medical dictation software was used for portions of this report. Unintended errors may occur.     Total time for discharge was more than 35 minutes    [facility] MD

## 2021-05-22 NOTE — PROGRESS NOTES
Transition of Care Plan to SNF/Rehab    SNF/Rehab Transition:  Patient has been accepted to [address] [place] [address] and meets criteria for admission. Patient will  be transported via wheelchair by Medicaid transport and expected to leave at 1pm.    Communication to Patient/Family:  Met with patient and he is agreeable to the transition plan. Communication to SNF/Rehab:  Bedside RN, Haylee Doss, has been notified of the transition plan to the facility and to call report (phone number [phone]). Discharge information has been updated on the AVS. And communicated to facility via [facility], or CC link. SNF/Rehab Transition:    PCP/Specialist:     Nursing Please include all hard scripts for controlled substances, med rec and dc summary, and AVS in packet. Reviewed and confirmed with facility representative, Gareth Dotson  at [address] [place] [address] they can manage the patient care needs for the following:     Ana Penn with (X) only those applicable:    COVID Status  Patient has had the Covid vaccine No . If yes, dates:   and .   Patient is Covid Negative      Medication:  []  Medications will be available at the facility  [x]  IV Antibiotics   [x]  Controlled Substance - hard copy to be sent with patient   []  Weekly Labs    Documents:  [x] Hard RX  Number sent - one for oxycodone  [] MAR  [] Kardex  [] AVS  [x] Wound care note  [x]Transfer Summary/Discharge Summary    Equipment:  []  CPAP/BiPAP  [x]  Wound Vacuum  []  Chavez or Urinary Device  [x]  PICC/Central Line  []  Nebulizer  []  Ventilator    Treatment:  []Isolation (for MRSA, VRE, etc.)  []Surgical Drain Management  []Tracheostomy Care  []Dressing Changes  []Dialysis with transportation and chair time  Center Mode of tranpsort   []PEG Care  []Oxygen  []Daily Weights for Heart Failure    Dietary:  []Any diet limitations  []Tube Feedings   []Total Parenteral Management (TPN)    Eligible for Medicaid Long Term Services and Supports  Yes:  [] Eligible for medical assistance or will become eligible within 180 days and LTSS completed. [] Provider/Patient and/or support system has requested screening. [x] LTSS copy provided to [address] [place] [address].  [] LTSS unavailable at discharge will send once processed to SNF provider.  [] LTSS  unavailable at discharged mailed to patient  [] LTSS performed by outside agency  on  with tracking number   No:   [] Private pay and is not financially eligible for Medicaid within the next 180 days. [] Reside out-of-state.   [] A residents of a state owned/operated facility that is licensed  by [address] [facility] or [facility]  [] Enrollment in [facility] hospice services  [] [facility]  [] Patient /Family declines to have screening completed or provide financial information for screening          Financial Resources:  Medicaid    [] Initiated and application pending   [x] Full coverage      Advanced Care Plan:  []Surrogate Decision Maker of Care  []POA  [x]Communicated Code Status- FULL    Other  Inés Snyder RN - Outcomes Manager  [phone]

## 2021-05-23 LAB
ABO + RH BLD: NORMAL
BLD PROD TYP BPU: NORMAL
BLOOD GROUP ANTIBODIES SERPL: NORMAL
BPU ID: NORMAL
CALLED TO:,BCALL1: NORMAL
CROSSMATCH RESULT,%XM: NORMAL
SPECIMEN EXP DATE BLD: NORMAL
STATUS OF UNIT,%ST: NORMAL
UNIT DIVISION, %UDIV: 0
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
## 2021-07-07 ENCOUNTER — TELEPHONE (OUTPATIENT)
Dept: SURGERY | Age: 61
End: 2021-07-07

## 2021-07-07 NOTE — TELEPHONE ENCOUNTER
Left message for  with  who state Maumelle Flight is not in the office today and states they require 5 day notice to arrange transportation. I explained I called in request yesterday after I received call from Mr. Raúl Humphreys mother, Mrs. Esteban Humphreys was in their facility and would need transportation arrangements however I was told Maumelleadri Marie was gone for the day and your facility was not able to give our her mobile phone number.  states she'll give message to Maumelleadri Marie tomorrow morning and will cross her fingers in hope transportation could be arranged for Mr. Raúl Humphreys Monday, July 12, 2021 appointment with Dr. Libertad Witt and if not their facility will call our office to reschedule appointment.

## 2021-07-07 NOTE — TELEPHONE ENCOUNTER
Spoke to Mrs. Mcgee to inform I called [facility] and was informed their , Hoda Ott, is not in today and the  states she'll give Hoda Ott the message tomorrow morning regarding Mr. Eunice Garcia appointment with Dr. Madison Senra on Monday, July 12, 2021. Mrs. Josph Burkitt states she'll contact the health insurance also to see if she's able to arrange the transportation for the appointment because she really don't want her son to miss his appointment with Dr. Madison Serna because she's requested a medical report from [facility] for three weeks and have not been able to get any information from the facility or the . complains of pain/discomfort

## 2021-07-16 ENCOUNTER — OFFICE VISIT (OUTPATIENT)
Dept: SURGERY | Age: 61
End: 2021-07-16
Payer: MEDICAID

## 2021-07-16 VITALS
BODY MASS INDEX: 30.8 KG/M2 | TEMPERATURE: 97 F | SYSTOLIC BLOOD PRESSURE: 97 MMHG | DIASTOLIC BLOOD PRESSURE: 61 MMHG | RESPIRATION RATE: 16 BRPM | WEIGHT: 240 LBS | HEART RATE: 74 BPM | HEIGHT: 74 IN

## 2021-07-16 DIAGNOSIS — Z09 POSTOPERATIVE EXAMINATION: Primary | ICD-10-CM

## 2021-07-16 PROCEDURE — 99024 POSTOP FOLLOW-UP VISIT: CPT | Performed by: SURGERY

## 2021-07-16 NOTE — PROGRESS NOTES
Patient seen and examined. He is doing ok. He has no complaints. His abdomen is soft and non-tender. His midline wound is healing well. His ostomy is functioning. Follow up as needed.

## 2021-07-29 ENCOUNTER — APPOINTMENT (OUTPATIENT)
Dept: CT IMAGING | Age: 61
DRG: 466 | End: 2021-07-29
Attending: PHYSICIAN ASSISTANT
Payer: MEDICAID

## 2021-07-29 ENCOUNTER — APPOINTMENT (OUTPATIENT)
Dept: GENERAL RADIOLOGY | Age: 61
DRG: 466 | End: 2021-07-29
Attending: PHYSICIAN ASSISTANT
Payer: MEDICAID

## 2021-07-29 ENCOUNTER — HOSPITAL ENCOUNTER (INPATIENT)
Age: 61
LOS: 19 days | Discharge: HOME HEALTH | DRG: 466 | End: 2021-08-18
Attending: EMERGENCY MEDICINE | Admitting: INTERNAL MEDICINE
Payer: MEDICAID

## 2021-07-29 DIAGNOSIS — N10 PYELONEPHRITIS, ACUTE: ICD-10-CM

## 2021-07-29 DIAGNOSIS — R41.82 ALTERED MENTAL STATUS, UNSPECIFIED ALTERED MENTAL STATUS TYPE: ICD-10-CM

## 2021-07-29 DIAGNOSIS — N30.01 ACUTE CYSTITIS WITH HEMATURIA: ICD-10-CM

## 2021-07-29 DIAGNOSIS — Z93.3 S/P COLOSTOMY (HCC): ICD-10-CM

## 2021-07-29 DIAGNOSIS — G82.20 PARAPLEGIA (HCC): ICD-10-CM

## 2021-07-29 DIAGNOSIS — A41.9 SEPTIC SHOCK (HCC): ICD-10-CM

## 2021-07-29 DIAGNOSIS — D64.9 ACUTE ON CHRONIC ANEMIA: ICD-10-CM

## 2021-07-29 DIAGNOSIS — N17.9 AKI (ACUTE KIDNEY INJURY) (HCC): ICD-10-CM

## 2021-07-29 DIAGNOSIS — I82.409 DEEP VEIN THROMBOSIS (DVT) (HCC): ICD-10-CM

## 2021-07-29 DIAGNOSIS — Z87.828 HISTORY OF GUNSHOT WOUND: ICD-10-CM

## 2021-07-29 DIAGNOSIS — D64.9 CHRONIC ANEMIA: ICD-10-CM

## 2021-07-29 DIAGNOSIS — R65.21 SEPTIC SHOCK (HCC): ICD-10-CM

## 2021-07-29 DIAGNOSIS — E44.1 MILD PROTEIN-CALORIE MALNUTRITION (HCC): ICD-10-CM

## 2021-07-29 DIAGNOSIS — N39.0 URINARY TRACT INFECTION WITHOUT HEMATURIA, SITE UNSPECIFIED: ICD-10-CM

## 2021-07-29 DIAGNOSIS — A41.9 SEVERE SEPSIS WITH SEPTIC SHOCK (HCC): ICD-10-CM

## 2021-07-29 DIAGNOSIS — R65.21 SEVERE SEPSIS WITH SEPTIC SHOCK (HCC): ICD-10-CM

## 2021-07-29 DIAGNOSIS — Z97.8 CHRONIC INDWELLING FOLEY CATHETER: ICD-10-CM

## 2021-07-29 DIAGNOSIS — Z86.711 HISTORY OF PULMONARY EMBOLUS (PE): ICD-10-CM

## 2021-07-29 DIAGNOSIS — I73.9 PVD (PERIPHERAL VASCULAR DISEASE) (HCC): ICD-10-CM

## 2021-07-29 DIAGNOSIS — G93.40 ACUTE ENCEPHALOPATHY: ICD-10-CM

## 2021-07-29 DIAGNOSIS — A41.9 SEPSIS, DUE TO UNSPECIFIED ORGANISM, UNSPECIFIED WHETHER ACUTE ORGAN DYSFUNCTION PRESENT (HCC): Primary | ICD-10-CM

## 2021-07-29 DIAGNOSIS — E43 SEVERE PROTEIN-CALORIE MALNUTRITION (HCC): ICD-10-CM

## 2021-07-29 DIAGNOSIS — I95.9 HYPOTENSION, UNSPECIFIED HYPOTENSION TYPE: ICD-10-CM

## 2021-07-29 DIAGNOSIS — L89.154 SACRAL DECUBITUS ULCER, STAGE IV (HCC): ICD-10-CM

## 2021-07-29 DIAGNOSIS — N31.9 NEUROGENIC BLADDER: ICD-10-CM

## 2021-07-29 LAB
ALBUMIN SERPL-MCNC: 2 G/DL (ref 3.4–5)
ALBUMIN/GLOB SERPL: 0.3 {RATIO} (ref 0.8–1.7)
ALP SERPL-CCNC: 91 U/L (ref 45–117)
ALT SERPL-CCNC: 16 U/L (ref 16–61)
ANION GAP SERPL CALC-SCNC: 9 MMOL/L (ref 3–18)
APPEARANCE UR: ABNORMAL
AST SERPL-CCNC: 17 U/L (ref 10–38)
BACTERIA URNS QL MICRO: ABNORMAL /HPF
BASOPHILS # BLD: 0 K/UL (ref 0–0.1)
BASOPHILS NFR BLD: 0 % (ref 0–2)
BILIRUB SERPL-MCNC: 0.3 MG/DL (ref 0.2–1)
BILIRUB UR QL: NEGATIVE
BUN SERPL-MCNC: 71 MG/DL (ref 7–18)
BUN/CREAT SERPL: 38 (ref 12–20)
CALCIUM SERPL-MCNC: 8.1 MG/DL (ref 8.5–10.1)
CHLORIDE SERPL-SCNC: 103 MMOL/L (ref 100–111)
CK MB CFR SERPL CALC: 2.8 % (ref 0–4)
CK MB SERPL-MCNC: 2.1 NG/ML (ref 5–25)
CK SERPL-CCNC: 74 U/L (ref 39–308)
CO2 SERPL-SCNC: 24 MMOL/L (ref 21–32)
COLOR UR: YELLOW
CREAT SERPL-MCNC: 1.89 MG/DL (ref 0.6–1.3)
DIFFERENTIAL METHOD BLD: ABNORMAL
EOSINOPHIL # BLD: 0 K/UL (ref 0–0.4)
EOSINOPHIL NFR BLD: 0 % (ref 0–5)
EPITH CASTS URNS QL MICRO: ABNORMAL /LPF (ref 0–5)
ERYTHROCYTE [DISTWIDTH] IN BLOOD BY AUTOMATED COUNT: 13.8 % (ref 11.6–14.5)
GLOBULIN SER CALC-MCNC: 7.1 G/DL (ref 2–4)
GLUCOSE SERPL-MCNC: 145 MG/DL (ref 74–99)
GLUCOSE UR STRIP.AUTO-MCNC: NEGATIVE MG/DL
HCT VFR BLD AUTO: 26.1 % (ref 36–48)
HGB BLD-MCNC: 8.2 G/DL (ref 13–16)
HGB UR QL STRIP: ABNORMAL
KETONES UR QL STRIP.AUTO: NEGATIVE MG/DL
LACTATE SERPL-SCNC: 1.6 MMOL/L (ref 0.4–2)
LEUKOCYTE ESTERASE UR QL STRIP.AUTO: ABNORMAL
LYMPHOCYTES # BLD: 0.6 K/UL (ref 0.9–3.6)
LYMPHOCYTES NFR BLD: 3 % (ref 21–52)
MAGNESIUM SERPL-MCNC: 1.6 MG/DL (ref 1.6–2.6)
MCH RBC QN AUTO: 28.1 PG (ref 24–34)
MCHC RBC AUTO-ENTMCNC: 31.4 G/DL (ref 31–37)
MCV RBC AUTO: 89.4 FL (ref 74–97)
MONOCYTES # BLD: 0.4 K/UL (ref 0.05–1.2)
MONOCYTES NFR BLD: 2 % (ref 3–10)
NEUTS SEG # BLD: 18.7 K/UL (ref 1.8–8)
NEUTS SEG NFR BLD: 95 % (ref 40–73)
NITRITE UR QL STRIP.AUTO: NEGATIVE
PH UR STRIP: 5.5 [PH] (ref 5–8)
PLATELET # BLD AUTO: 456 K/UL (ref 135–420)
PLATELET COMMENTS,PCOM: ABNORMAL
PMV BLD AUTO: 8.9 FL (ref 9.2–11.8)
POTASSIUM SERPL-SCNC: 3.4 MMOL/L (ref 3.5–5.5)
PROT SERPL-MCNC: 9.1 G/DL (ref 6.4–8.2)
PROT UR STRIP-MCNC: 100 MG/DL
RBC # BLD AUTO: 2.92 M/UL (ref 4.35–5.65)
RBC #/AREA URNS HPF: ABNORMAL /HPF (ref 0–5)
RBC MORPH BLD: ABNORMAL
SODIUM SERPL-SCNC: 136 MMOL/L (ref 136–145)
SP GR UR REFRACTOMETRY: 1.01 (ref 1–1.03)
TROPONIN I SERPL-MCNC: <0.02 NG/ML (ref 0–0.04)
UROBILINOGEN UR QL STRIP.AUTO: 0.2 EU/DL (ref 0.2–1)
WBC # BLD AUTO: 19.7 K/UL (ref 4.6–13.2)
WBC URNS QL MICRO: ABNORMAL /HPF (ref 0–5)
YEAST URNS QL MICRO: ABNORMAL

## 2021-07-29 PROCEDURE — 80053 COMPREHEN METABOLIC PANEL: CPT

## 2021-07-29 PROCEDURE — 96361 HYDRATE IV INFUSION ADD-ON: CPT

## 2021-07-29 PROCEDURE — 74011250636 HC RX REV CODE- 250/636: Performed by: PHYSICIAN ASSISTANT

## 2021-07-29 PROCEDURE — 87449 NOS EACH ORGANISM AG IA: CPT

## 2021-07-29 PROCEDURE — 81001 URINALYSIS AUTO W/SCOPE: CPT

## 2021-07-29 PROCEDURE — 87086 URINE CULTURE/COLONY COUNT: CPT

## 2021-07-29 PROCEDURE — 87040 BLOOD CULTURE FOR BACTERIA: CPT

## 2021-07-29 PROCEDURE — 96365 THER/PROPH/DIAG IV INF INIT: CPT

## 2021-07-29 PROCEDURE — 83735 ASSAY OF MAGNESIUM: CPT

## 2021-07-29 PROCEDURE — 71045 X-RAY EXAM CHEST 1 VIEW: CPT

## 2021-07-29 PROCEDURE — 96367 TX/PROPH/DG ADDL SEQ IV INF: CPT

## 2021-07-29 PROCEDURE — 87186 SC STD MICRODIL/AGAR DIL: CPT

## 2021-07-29 PROCEDURE — 82553 CREATINE MB FRACTION: CPT

## 2021-07-29 PROCEDURE — 93005 ELECTROCARDIOGRAM TRACING: CPT

## 2021-07-29 PROCEDURE — 70450 CT HEAD/BRAIN W/O DYE: CPT

## 2021-07-29 PROCEDURE — 74011000250 HC RX REV CODE- 250: Performed by: PHYSICIAN ASSISTANT

## 2021-07-29 PROCEDURE — 99285 EMERGENCY DEPT VISIT HI MDM: CPT

## 2021-07-29 PROCEDURE — 74011000258 HC RX REV CODE- 258: Performed by: PHYSICIAN ASSISTANT

## 2021-07-29 PROCEDURE — 80307 DRUG TEST PRSMV CHEM ANLYZR: CPT

## 2021-07-29 PROCEDURE — 83605 ASSAY OF LACTIC ACID: CPT

## 2021-07-29 PROCEDURE — 87077 CULTURE AEROBIC IDENTIFY: CPT

## 2021-07-29 PROCEDURE — 85025 COMPLETE CBC W/AUTO DIFF WBC: CPT

## 2021-07-29 PROCEDURE — 99284 EMERGENCY DEPT VISIT MOD MDM: CPT

## 2021-07-29 RX ORDER — NOREPINEPHRINE BITARTRATE/D5W 8 MG/250ML
.5-16 PLASTIC BAG, INJECTION (ML) INTRAVENOUS
Status: DISCONTINUED | OUTPATIENT
Start: 2021-07-29 | End: 2021-07-30

## 2021-07-29 RX ORDER — SODIUM CHLORIDE 0.9 % (FLUSH) 0.9 %
5-10 SYRINGE (ML) INJECTION AS NEEDED
Status: DISCONTINUED | OUTPATIENT
Start: 2021-07-29 | End: 2021-08-18 | Stop reason: HOSPADM

## 2021-07-29 RX ORDER — VANCOMYCIN 1.75 GRAM/500 ML IN 0.9 % SODIUM CHLORIDE INTRAVENOUS
1750 ONCE
Status: DISCONTINUED | OUTPATIENT
Start: 2021-07-29 | End: 2021-07-30

## 2021-07-29 RX ORDER — LEVOFLOXACIN 5 MG/ML
750 INJECTION, SOLUTION INTRAVENOUS
Status: DISCONTINUED | OUTPATIENT
Start: 2021-07-29 | End: 2021-07-30

## 2021-07-29 RX ORDER — LEVOFLOXACIN 5 MG/ML
750 INJECTION, SOLUTION INTRAVENOUS EVERY 24 HOURS
Status: DISCONTINUED | OUTPATIENT
Start: 2021-07-29 | End: 2021-07-29

## 2021-07-29 RX ADMIN — DEXTROSE MONOHYDRATE 4 MCG/MIN: 50 INJECTION, SOLUTION INTRAVENOUS at 23:09

## 2021-07-29 RX ADMIN — SODIUM CHLORIDE 1000 ML: 900 INJECTION, SOLUTION INTRAVENOUS at 20:18

## 2021-07-29 RX ADMIN — SODIUM CHLORIDE 1000 ML: 900 INJECTION, SOLUTION INTRAVENOUS at 19:06

## 2021-07-29 RX ADMIN — SODIUM CHLORIDE 178 ML: 9 INJECTION, SOLUTION INTRAVENOUS at 19:47

## 2021-07-29 RX ADMIN — LEVOFLOXACIN 750 MG: 5 INJECTION, SOLUTION INTRAVENOUS at 21:41

## 2021-07-29 RX ADMIN — PIPERACILLIN AND TAZOBACTAM 3.38 G: 3; .375 INJECTION, POWDER, LYOPHILIZED, FOR SOLUTION INTRAVENOUS at 20:15

## 2021-07-29 NOTE — Clinical Note
TRANSFER - OUT REPORT:     Verbal report given to: Nehemias Burton RN. Report consisted of patient's Situation, Background, Assessment and   Recommendations(SBAR). Opportunity for questions and clarification was provided. Patient transported with a Registered Nurse. Patient transported to: holding area.

## 2021-07-29 NOTE — Clinical Note
Power injection to the IVC, Vein. Single view taken. PSI = 600. Rate of rise = 0.5 sec. Injection rate = 5 mL/sec. Total injected volume = 10 mL.

## 2021-07-29 NOTE — Clinical Note
IVC consent obtained. Plan for Filter insertion. IVC wire inserted. Pre-deployment venacavogram done. Renal veins visualized. IVC filter inserted. IVC placement verified. A Celect filter placed in Inferior Vena Cava.

## 2021-07-29 NOTE — Clinical Note
Vessel: bilateral . Power injection to the IVC, Vein. Single view taken. PSI = 600. Rate of rise = 0.5 sec. Injection rate = 10 mL/sec. Total injected volume = 20 mL.

## 2021-07-29 NOTE — Clinical Note
Power injection to the IVC, Vein. Single view taken. PSI = 600. Rate of rise = 0.5 sec. Injection rate = 10 mL/sec. Total injected volume = 20 mL.

## 2021-07-29 NOTE — ED NOTES
Report received from [place], [facility]. Pt awaiting US IV. Provider aware.

## 2021-07-29 NOTE — Clinical Note
Status[de-identified] INPATIENT [101]   Type of Bed: Intensive Care [6]   Cardiac Monitoring Required?: Yes   Inpatient Hospitalization Certified Necessary for the Following Reasons: 4.  Patient requires ICU level of care interventions (further clarification in H&P documentation)   Admitting Diagnosis: Sepsis [facility]) [8862912]   Admitting Diagnosis: UTI (urinary tract infection) [081907]   Admitting Diagnosis: Hypotension [804099]   Admitting Physician: Bertah Martins [[id number]]   Attending Physician: Bertha Martins [[id number]]   Estimated Length of Stay: 3-4 Midnights   Discharge Plan[de-identified] Extended Care Facility (e.g. Adult Home, Nursing Home, etc.)

## 2021-07-29 NOTE — ED TRIAGE NOTES
Pt BIB EMS w/ c/o bilateral upper extremity weakness that has been resolved. Pt paralyzed from the waist down; sx resolved now.

## 2021-07-29 NOTE — Clinical Note
TRANSFER - IN REPORT:     Verbal report received from: Ghislaine Wilkerson RN. Report consisted of patient's Situation, Background, Assessment and   Recommendations(SBAR). Opportunity for questions and clarification was provided. Assessment completed upon patient's arrival to unit and care assumed. Patient transported with a Registered Nurse.

## 2021-07-29 NOTE — Clinical Note
Sheath #1: Sheath: inserted. Sheath inserted/placed in the right internal jugular  vein. Upon evaluation of the common femoral artery stick using fluoroscopy, the access site puncture was within the safe zone.  4F Micropuncture sheath inserted
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
## 2021-07-30 ENCOUNTER — APPOINTMENT (OUTPATIENT)
Dept: CT IMAGING | Age: 61
DRG: 466 | End: 2021-07-30
Attending: PHYSICIAN ASSISTANT
Payer: MEDICAID

## 2021-07-30 PROBLEM — Z97.8 CHRONIC INDWELLING FOLEY CATHETER: Status: ACTIVE | Noted: 2021-07-30

## 2021-07-30 PROBLEM — D64.9 ACUTE ON CHRONIC ANEMIA: Status: ACTIVE | Noted: 2021-07-30

## 2021-07-30 PROBLEM — N31.9 NEUROGENIC BLADDER: Status: ACTIVE | Noted: 2021-07-30

## 2021-07-30 PROBLEM — A41.9 SEPSIS (HCC): Status: ACTIVE | Noted: 2021-07-30

## 2021-07-30 PROBLEM — N17.9 AKI (ACUTE KIDNEY INJURY) (HCC): Status: ACTIVE | Noted: 2021-07-30

## 2021-07-30 PROBLEM — R65.21 SEPTIC SHOCK (HCC): Status: ACTIVE | Noted: 2021-07-30

## 2021-07-30 PROBLEM — I95.9 HYPOTENSION: Status: ACTIVE | Noted: 2021-07-30

## 2021-07-30 PROBLEM — N39.0 UTI (URINARY TRACT INFECTION): Status: ACTIVE | Noted: 2021-07-30

## 2021-07-30 PROBLEM — A41.9 SEPTIC SHOCK (HCC): Status: ACTIVE | Noted: 2021-07-30

## 2021-07-30 PROBLEM — Z87.828 HISTORY OF GUNSHOT WOUND: Status: ACTIVE | Noted: 2021-07-30

## 2021-07-30 LAB
AMPHET UR QL SCN: NEGATIVE
ANION GAP SERPL CALC-SCNC: 7 MMOL/L (ref 3–18)
ATRIAL RATE: 86 BPM
B PERT DNA SPEC QL NAA+PROBE: NOT DETECTED
BARBITURATES UR QL SCN: NEGATIVE
BASOPHILS # BLD: 0 K/UL (ref 0–0.1)
BASOPHILS # BLD: 0 K/UL (ref 0–0.1)
BASOPHILS NFR BLD: 0 % (ref 0–2)
BASOPHILS NFR BLD: 0 % (ref 0–2)
BENZODIAZ UR QL: NEGATIVE
BORDETELLA PARAPERTUSSIS PCR, BORPAR: NOT DETECTED
BUN SERPL-MCNC: 59 MG/DL (ref 7–18)
BUN/CREAT SERPL: 43 (ref 12–20)
C PNEUM DNA SPEC QL NAA+PROBE: NOT DETECTED
CA-I SERPL-SCNC: 1.16 MMOL/L (ref 1.12–1.32)
CALCIUM SERPL-MCNC: 7.7 MG/DL (ref 8.5–10.1)
CALCULATED P AXIS, ECG09: 69 DEGREES
CALCULATED R AXIS, ECG10: 44 DEGREES
CALCULATED T AXIS, ECG11: 37 DEGREES
CANNABINOIDS UR QL SCN: NEGATIVE
CHLORIDE SERPL-SCNC: 107 MMOL/L (ref 100–111)
CO2 SERPL-SCNC: 23 MMOL/L (ref 21–32)
COCAINE UR QL SCN: NEGATIVE
CREAT SERPL-MCNC: 1.36 MG/DL (ref 0.6–1.3)
CRP SERPL-MCNC: 6.5 MG/DL (ref 0–0.3)
DIAGNOSIS, 93000: NORMAL
DIFFERENTIAL METHOD BLD: ABNORMAL
DIFFERENTIAL METHOD BLD: ABNORMAL
EOSINOPHIL # BLD: 0.2 K/UL (ref 0–0.4)
EOSINOPHIL # BLD: 0.2 K/UL (ref 0–0.4)
EOSINOPHIL NFR BLD: 1 % (ref 0–5)
EOSINOPHIL NFR BLD: 1 % (ref 0–5)
ERYTHROCYTE [DISTWIDTH] IN BLOOD BY AUTOMATED COUNT: 13.7 % (ref 11.6–14.5)
ERYTHROCYTE [DISTWIDTH] IN BLOOD BY AUTOMATED COUNT: 13.7 % (ref 11.6–14.5)
EST. AVERAGE GLUCOSE BLD GHB EST-MCNC: 111 MG/DL
FLUAV SUBTYP SPEC NAA+PROBE: NOT DETECTED
FLUBV RNA SPEC QL NAA+PROBE: NOT DETECTED
GLUCOSE BLD STRIP.AUTO-MCNC: 137 MG/DL (ref 70–110)
GLUCOSE BLD STRIP.AUTO-MCNC: 181 MG/DL (ref 70–110)
GLUCOSE BLD STRIP.AUTO-MCNC: 187 MG/DL (ref 70–110)
GLUCOSE SERPL-MCNC: 131 MG/DL (ref 74–99)
HADV DNA SPEC QL NAA+PROBE: NOT DETECTED
HBA1C MFR BLD: 5.5 % (ref 4.2–5.6)
HCOV 229E RNA SPEC QL NAA+PROBE: NOT DETECTED
HCOV HKU1 RNA SPEC QL NAA+PROBE: NOT DETECTED
HCOV NL63 RNA SPEC QL NAA+PROBE: NOT DETECTED
HCOV OC43 RNA SPEC QL NAA+PROBE: NOT DETECTED
HCT VFR BLD AUTO: 21.6 % (ref 36–48)
HCT VFR BLD AUTO: 22.2 % (ref 36–48)
HDSCOM,HDSCOM: NORMAL
HEMOCCULT STL QL: POSITIVE
HGB BLD-MCNC: 6.7 G/DL (ref 13–16)
HGB BLD-MCNC: 6.9 G/DL (ref 13–16)
HISTORY CHECKED?,CKHIST: NORMAL
HMPV RNA SPEC QL NAA+PROBE: NOT DETECTED
HPIV1 RNA SPEC QL NAA+PROBE: NOT DETECTED
HPIV2 RNA SPEC QL NAA+PROBE: NOT DETECTED
HPIV3 RNA SPEC QL NAA+PROBE: NOT DETECTED
HPIV4 RNA SPEC QL NAA+PROBE: NOT DETECTED
INR PPP: 1.3 (ref 0.8–1.2)
L PNEUMO AG UR QL IA: NEGATIVE
LYMPHOCYTES # BLD: 2.4 K/UL (ref 0.9–3.6)
LYMPHOCYTES # BLD: 2.5 K/UL (ref 0.9–3.6)
LYMPHOCYTES NFR BLD: 13 % (ref 21–52)
LYMPHOCYTES NFR BLD: 14 % (ref 21–52)
M PNEUMO DNA SPEC QL NAA+PROBE: NOT DETECTED
MAGNESIUM SERPL-MCNC: 1.3 MG/DL (ref 1.6–2.6)
MCH RBC QN AUTO: 27.7 PG (ref 24–34)
MCH RBC QN AUTO: 27.7 PG (ref 24–34)
MCHC RBC AUTO-ENTMCNC: 31 G/DL (ref 31–37)
MCHC RBC AUTO-ENTMCNC: 31.1 G/DL (ref 31–37)
MCV RBC AUTO: 89.2 FL (ref 74–97)
MCV RBC AUTO: 89.3 FL (ref 74–97)
METHADONE UR QL: NEGATIVE
MONOCYTES # BLD: 1 K/UL (ref 0.05–1.2)
MONOCYTES # BLD: 1.2 K/UL (ref 0.05–1.2)
MONOCYTES NFR BLD: 6 % (ref 3–10)
MONOCYTES NFR BLD: 6 % (ref 3–10)
NEUTS SEG # BLD: 13.2 K/UL (ref 1.8–8)
NEUTS SEG # BLD: 15.7 K/UL (ref 1.8–8)
NEUTS SEG NFR BLD: 78 % (ref 40–73)
NEUTS SEG NFR BLD: 80 % (ref 40–73)
OPIATES UR QL: NEGATIVE
P-R INTERVAL, ECG05: 122 MS
PCP UR QL: NEGATIVE
PHOSPHATE SERPL-MCNC: 3.7 MG/DL (ref 2.5–4.9)
PLATELET # BLD AUTO: 414 K/UL (ref 135–420)
PLATELET # BLD AUTO: 498 K/UL (ref 135–420)
PLATELET COMMENTS,PCOM: ABNORMAL
PMV BLD AUTO: 8.7 FL (ref 9.2–11.8)
PMV BLD AUTO: 8.8 FL (ref 9.2–11.8)
POTASSIUM SERPL-SCNC: 3 MMOL/L (ref 3.5–5.5)
PROCALCITONIN SERPL-MCNC: 0.49 NG/ML
PROTHROMBIN TIME: 15.7 SEC (ref 11.5–15.2)
Q-T INTERVAL, ECG07: 386 MS
QRS DURATION, ECG06: 82 MS
QTC CALCULATION (BEZET), ECG08: 461 MS
RBC # BLD AUTO: 2.42 M/UL (ref 4.35–5.65)
RBC # BLD AUTO: 2.49 M/UL (ref 4.35–5.65)
RBC MORPH BLD: ABNORMAL
RSV RNA SPEC QL NAA+PROBE: NOT DETECTED
RV+EV RNA SPEC QL NAA+PROBE: NOT DETECTED
S PNEUM AG UR QL: NEGATIVE
SARS-COV-2 PCR, COVPCR: NOT DETECTED
SODIUM SERPL-SCNC: 137 MMOL/L (ref 136–145)
TSH SERPL DL<=0.05 MIU/L-ACNC: 1.72 UIU/ML (ref 0.36–3.74)
VENTRICULAR RATE, ECG03: 86 BPM
WBC # BLD AUTO: 16.9 K/UL (ref 4.6–13.2)
WBC # BLD AUTO: 19.6 K/UL (ref 4.6–13.2)

## 2021-07-30 PROCEDURE — 74011000250 HC RX REV CODE- 250: Performed by: PHYSICIAN ASSISTANT

## 2021-07-30 PROCEDURE — 86140 C-REACTIVE PROTEIN: CPT

## 2021-07-30 PROCEDURE — APPNB30 APP NON BILLABLE TIME 0-30 MINS: Performed by: PHYSICIAN ASSISTANT

## 2021-07-30 PROCEDURE — C9113 INJ PANTOPRAZOLE SODIUM, VIA: HCPCS | Performed by: PHYSICIAN ASSISTANT

## 2021-07-30 PROCEDURE — 74011636637 HC RX REV CODE- 636/637: Performed by: PHYSICIAN ASSISTANT

## 2021-07-30 PROCEDURE — 74011000258 HC RX REV CODE- 258: Performed by: INTERNAL MEDICINE

## 2021-07-30 PROCEDURE — 84100 ASSAY OF PHOSPHORUS: CPT

## 2021-07-30 PROCEDURE — 82272 OCCULT BLD FECES 1-3 TESTS: CPT

## 2021-07-30 PROCEDURE — 99221 1ST HOSP IP/OBS SF/LOW 40: CPT | Performed by: SURGERY

## 2021-07-30 PROCEDURE — 77030040392 HC DRSG OPTIFOAM MDII -A

## 2021-07-30 PROCEDURE — 74011000250 HC RX REV CODE- 250: Performed by: INTERNAL MEDICINE

## 2021-07-30 PROCEDURE — 82330 ASSAY OF CALCIUM: CPT

## 2021-07-30 PROCEDURE — 82962 GLUCOSE BLOOD TEST: CPT

## 2021-07-30 PROCEDURE — 99292 CRITICAL CARE ADDL 30 MIN: CPT | Performed by: INTERNAL MEDICINE

## 2021-07-30 PROCEDURE — 80048 BASIC METABOLIC PNL TOTAL CA: CPT

## 2021-07-30 PROCEDURE — 94762 N-INVAS EAR/PLS OXIMTRY CONT: CPT

## 2021-07-30 PROCEDURE — P9016 RBC LEUKOCYTES REDUCED: HCPCS

## 2021-07-30 PROCEDURE — 2709999900 HC NON-CHARGEABLE SUPPLY

## 2021-07-30 PROCEDURE — 65610000006 HC RM INTENSIVE CARE

## 2021-07-30 PROCEDURE — 84145 PROCALCITONIN (PCT): CPT

## 2021-07-30 PROCEDURE — 51702 INSERT TEMP BLADDER CATH: CPT

## 2021-07-30 PROCEDURE — 30233N1 TRANSFUSION OF NONAUTOLOGOUS RED BLOOD CELLS INTO PERIPHERAL VEIN, PERCUTANEOUS APPROACH: ICD-10-PCS | Performed by: INTERNAL MEDICINE

## 2021-07-30 PROCEDURE — 86923 COMPATIBILITY TEST ELECTRIC: CPT

## 2021-07-30 PROCEDURE — 71260 CT THORAX DX C+: CPT

## 2021-07-30 PROCEDURE — 86901 BLOOD TYPING SEROLOGIC RH(D): CPT

## 2021-07-30 PROCEDURE — 99291 CRITICAL CARE FIRST HOUR: CPT | Performed by: INTERNAL MEDICINE

## 2021-07-30 PROCEDURE — 77030041076 HC DRSG AG OPTICELL MDII -A

## 2021-07-30 PROCEDURE — APPNB60 APP NON BILLABLE TIME 46-60 MINS: Performed by: PHYSICIAN ASSISTANT

## 2021-07-30 PROCEDURE — 36430 TRANSFUSION BLD/BLD COMPNT: CPT

## 2021-07-30 PROCEDURE — 83036 HEMOGLOBIN GLYCOSYLATED A1C: CPT

## 2021-07-30 PROCEDURE — 96366 THER/PROPH/DIAG IV INF ADDON: CPT

## 2021-07-30 PROCEDURE — 74011250636 HC RX REV CODE- 250/636: Performed by: INTERNAL MEDICINE

## 2021-07-30 PROCEDURE — 74011000636 HC RX REV CODE- 636: Performed by: INTERNAL MEDICINE

## 2021-07-30 PROCEDURE — 85610 PROTHROMBIN TIME: CPT

## 2021-07-30 PROCEDURE — 85025 COMPLETE CBC W/AUTO DIFF WBC: CPT

## 2021-07-30 PROCEDURE — 83735 ASSAY OF MAGNESIUM: CPT

## 2021-07-30 PROCEDURE — 0202U NFCT DS 22 TRGT SARS-COV-2: CPT

## 2021-07-30 PROCEDURE — 74011250636 HC RX REV CODE- 250/636: Performed by: PHYSICIAN ASSISTANT

## 2021-07-30 PROCEDURE — 84443 ASSAY THYROID STIM HORMONE: CPT

## 2021-07-30 PROCEDURE — 87205 SMEAR GRAM STAIN: CPT

## 2021-07-30 RX ORDER — SODIUM CHLORIDE 0.9 % (FLUSH) 0.9 %
5-40 SYRINGE (ML) INJECTION EVERY 8 HOURS
Status: DISCONTINUED | OUTPATIENT
Start: 2021-07-30 | End: 2021-07-30

## 2021-07-30 RX ORDER — POTASSIUM CHLORIDE 7.45 MG/ML
10 INJECTION INTRAVENOUS
Status: COMPLETED | OUTPATIENT
Start: 2021-07-30 | End: 2021-07-30

## 2021-07-30 RX ORDER — MAGNESIUM SULFATE HEPTAHYDRATE 40 MG/ML
2 INJECTION, SOLUTION INTRAVENOUS ONCE
Status: COMPLETED | OUTPATIENT
Start: 2021-07-30 | End: 2021-07-30

## 2021-07-30 RX ORDER — IPRATROPIUM BROMIDE AND ALBUTEROL SULFATE 2.5; .5 MG/3ML; MG/3ML
3 SOLUTION RESPIRATORY (INHALATION)
Status: DISCONTINUED | OUTPATIENT
Start: 2021-07-30 | End: 2021-08-18 | Stop reason: HOSPADM

## 2021-07-30 RX ORDER — MAGNESIUM SULFATE 100 %
4 CRYSTALS MISCELLANEOUS AS NEEDED
Status: DISCONTINUED | OUTPATIENT
Start: 2021-07-30 | End: 2021-08-18 | Stop reason: HOSPADM

## 2021-07-30 RX ORDER — HYDROCORTISONE SODIUM SUCCINATE 100 MG/2ML
50 INJECTION, POWDER, FOR SOLUTION INTRAMUSCULAR; INTRAVENOUS EVERY 6 HOURS
Status: DISCONTINUED | OUTPATIENT
Start: 2021-07-30 | End: 2021-07-30

## 2021-07-30 RX ORDER — SODIUM CHLORIDE 9 MG/ML
250 INJECTION, SOLUTION INTRAVENOUS AS NEEDED
Status: DISCONTINUED | OUTPATIENT
Start: 2021-07-30 | End: 2021-08-02 | Stop reason: ALTCHOICE

## 2021-07-30 RX ORDER — INSULIN LISPRO 100 [IU]/ML
INJECTION, SOLUTION INTRAVENOUS; SUBCUTANEOUS EVERY 6 HOURS
Status: DISCONTINUED | OUTPATIENT
Start: 2021-07-30 | End: 2021-07-30

## 2021-07-30 RX ORDER — VANCOMYCIN 1.75 GRAM/500 ML IN 0.9 % SODIUM CHLORIDE INTRAVENOUS
1750 ONCE
Status: COMPLETED | OUTPATIENT
Start: 2021-07-30 | End: 2021-07-30

## 2021-07-30 RX ORDER — ONDANSETRON 4 MG/1
4 TABLET, ORALLY DISINTEGRATING ORAL
Status: DISCONTINUED | OUTPATIENT
Start: 2021-07-30 | End: 2021-08-18 | Stop reason: HOSPADM

## 2021-07-30 RX ORDER — ONDANSETRON 2 MG/ML
4 INJECTION INTRAMUSCULAR; INTRAVENOUS
Status: DISCONTINUED | OUTPATIENT
Start: 2021-07-30 | End: 2021-08-18 | Stop reason: HOSPADM

## 2021-07-30 RX ORDER — DEXTROSE 50 % IN WATER (D50W) INTRAVENOUS SYRINGE
25-50 AS NEEDED
Status: DISCONTINUED | OUTPATIENT
Start: 2021-07-30 | End: 2021-08-18 | Stop reason: HOSPADM

## 2021-07-30 RX ORDER — ACETAMINOPHEN 325 MG/1
650 TABLET ORAL
Status: DISCONTINUED | OUTPATIENT
Start: 2021-07-30 | End: 2021-08-18 | Stop reason: HOSPADM

## 2021-07-30 RX ORDER — INSULIN LISPRO 100 [IU]/ML
INJECTION, SOLUTION INTRAVENOUS; SUBCUTANEOUS
Status: DISCONTINUED | OUTPATIENT
Start: 2021-07-30 | End: 2021-08-18 | Stop reason: HOSPADM

## 2021-07-30 RX ORDER — ACETAMINOPHEN 650 MG/1
650 SUPPOSITORY RECTAL
Status: DISCONTINUED | OUTPATIENT
Start: 2021-07-30 | End: 2021-08-18 | Stop reason: HOSPADM

## 2021-07-30 RX ORDER — SODIUM CHLORIDE 0.9 % (FLUSH) 0.9 %
5-40 SYRINGE (ML) INJECTION AS NEEDED
Status: DISCONTINUED | OUTPATIENT
Start: 2021-07-30 | End: 2021-08-18 | Stop reason: HOSPADM

## 2021-07-30 RX ORDER — POTASSIUM CHLORIDE 14.9 MG/ML
20 INJECTION INTRAVENOUS
Status: ACTIVE | OUTPATIENT
Start: 2021-07-30 | End: 2021-07-30

## 2021-07-30 RX ORDER — HYDROCORTISONE SODIUM SUCCINATE 100 MG/2ML
100 INJECTION, POWDER, FOR SOLUTION INTRAMUSCULAR; INTRAVENOUS ONCE
Status: COMPLETED | OUTPATIENT
Start: 2021-07-30 | End: 2021-07-30

## 2021-07-30 RX ADMIN — DEXTROSE MONOHYDRATE 8 MCG/MIN: 50 INJECTION, SOLUTION INTRAVENOUS at 12:28

## 2021-07-30 RX ADMIN — INSULIN LISPRO 2 UNITS: 100 INJECTION, SOLUTION INTRAVENOUS; SUBCUTANEOUS at 23:43

## 2021-07-30 RX ADMIN — SODIUM CHLORIDE 40 MG: 9 INJECTION, SOLUTION INTRAMUSCULAR; INTRAVENOUS; SUBCUTANEOUS at 23:43

## 2021-07-30 RX ADMIN — IOPAMIDOL 70 ML: 612 INJECTION, SOLUTION INTRAVENOUS at 02:36

## 2021-07-30 RX ADMIN — MEROPENEM 500 MG: 500 INJECTION INTRAVENOUS at 05:17

## 2021-07-30 RX ADMIN — POTASSIUM CHLORIDE 10 MEQ: 7.46 INJECTION, SOLUTION INTRAVENOUS at 07:31

## 2021-07-30 RX ADMIN — POTASSIUM CHLORIDE 10 MEQ: 7.46 INJECTION, SOLUTION INTRAVENOUS at 10:08

## 2021-07-30 RX ADMIN — MEROPENEM 500 MG: 500 INJECTION INTRAVENOUS at 17:40

## 2021-07-30 RX ADMIN — POTASSIUM CHLORIDE 10 MEQ: 7.46 INJECTION, SOLUTION INTRAVENOUS at 06:30

## 2021-07-30 RX ADMIN — MAGNESIUM SULFATE HEPTAHYDRATE 6 G: 500 INJECTION, SOLUTION INTRAMUSCULAR; INTRAVENOUS at 12:34

## 2021-07-30 RX ADMIN — MEROPENEM 500 MG: 500 INJECTION INTRAVENOUS at 12:42

## 2021-07-30 RX ADMIN — INSULIN LISPRO 2 UNITS: 100 INJECTION, SOLUTION INTRAVENOUS; SUBCUTANEOUS at 17:45

## 2021-07-30 RX ADMIN — SODIUM CHLORIDE, SODIUM ACETATE ANHYDROUS, SODIUM GLUCONATE, POTASSIUM CHLORIDE, AND MAGNESIUM CHLORIDE: 526; 222; 502; 37; 30 INJECTION, SOLUTION INTRAVENOUS at 17:38

## 2021-07-30 RX ADMIN — Medication 10 ML: at 17:29

## 2021-07-30 RX ADMIN — HYDROCORTISONE SODIUM SUCCINATE 100 MG: 100 INJECTION, POWDER, FOR SOLUTION INTRAMUSCULAR; INTRAVENOUS at 05:17

## 2021-07-30 RX ADMIN — SODIUM CHLORIDE, SODIUM ACETATE ANHYDROUS, SODIUM GLUCONATE, POTASSIUM CHLORIDE, AND MAGNESIUM CHLORIDE: 526; 222; 502; 37; 30 INJECTION, SOLUTION INTRAVENOUS at 06:30

## 2021-07-30 RX ADMIN — MAGNESIUM SULFATE HEPTAHYDRATE 2 G: 2 INJECTION, SOLUTION INTRAVENOUS at 09:00

## 2021-07-30 RX ADMIN — SODIUM CHLORIDE 40 MG: 9 INJECTION, SOLUTION INTRAMUSCULAR; INTRAVENOUS; SUBCUTANEOUS at 05:17

## 2021-07-30 RX ADMIN — VANCOMYCIN HYDROCHLORIDE 1750 MG: 10 INJECTION, POWDER, LYOPHILIZED, FOR SOLUTION INTRAVENOUS at 12:37

## 2021-07-30 RX ADMIN — POTASSIUM CHLORIDE 10 MEQ: 7.46 INJECTION, SOLUTION INTRAVENOUS at 05:18

## 2021-07-30 RX ADMIN — Medication 10 ML: at 09:01

## 2021-07-30 NOTE — ED NOTES
Pt refused central line. ICU PA made aware. ED Provider attempting to gain another peripheral access.

## 2021-07-30 NOTE — ROUTINE PROCESS
TRANSFER - IN REPORT:    Verbal report received from [place], [employer] (name) on Christie Dorsey  being received from [facility] ED (unit) for routine progression of care      Report consisted of patients Situation, Background, Assessment and   Recommendations(SBAR). Information from the following report(s) SBAR, ED Summary, Intake/Output, Recent Results and Med Rec Status was reviewed with the receiving nurse. Opportunity for questions and clarification was provided. Assessment completed upon patients arrival to unit and care assumed.
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
## 2021-07-30 NOTE — INTERDISCIPLINARY ROUNDS
[employer] [facility]  Pulmonary, Critical Care, and Sleep Medicine  Interdisciplinary and Ventilator Weaning Rounds    Patient discussed in morning walking rounds and interdisciplinary rounds. ICU day: 1    Overnight events:   Admitted to ICU for septic shock requiring pressors, likely sources are UTI vs sacral decub    Assessments and best practice:   Ventilator  o N/A   Sedation  o N/A   Other pertinent drips  o Normosol, Levophed   Lines noted  o PIVs   Critical labs assessed  o Yes   Antibiotics  o Vancomycin and Merrem   Medications reviewed  o Yes   Pending imaging  o BLE PVLs   Pending send out labs  o No   Pending Procedures  o None currently   Glycemic control  o SSI   Stress ulcer prophylaxis  o Protonix   DVT prophylaxis  o Contraindicated due to acute anemia   Need for Lines, lee assessed.  o Yes   Restraint Reevaluation   o No restraints required at this time. Family contact/MPOA: Adrien Jacob (child)  Family will be updated by ICU staff.       Daily Plans:   Will run low-dose, low-concentration Levophed peripherally for now and will place CVL if needed   CT showed possible filling defect in left common fem, PVLs ordered   D/c solu-cortef, pt only requiring low-dose pressors at this time  LILO Williamson as tolerated   Nephro, Gen Surg, ID, and Wound Care consults have been placed     MAGALI time 27 minutes      Carolina Barrientos PA-C  07/30/21  Pulmonary, [address] [place] [address],[address] [facility]

## 2021-07-30 NOTE — ED NOTES
TRANSFER - OUT REPORT:    Verbal report given to Yissel Dicksno RN (name) on Josy Jeong  being transferred to ICU(unit) for routine progression of care       Report consisted of patients Situation, Background, Assessment and   Recommendations(SBAR). Information from the following report(s) SBAR, ED Summary, Intake/Output and MAR was reviewed with the receiving nurse. Lines:   PICC Double Lumen 05/21/21 Right;Brachial (Active)       Peripheral IV 07/29/21 Right Arm (Active)   Site Assessment Clean, dry, & intact 07/29/21 2003   Phlebitis Assessment 0 07/29/21 2003   Infiltration Assessment 0 07/29/21 2003   Dressing Status Clean, dry, & intact 07/29/21 2003   Dressing Type 4 X 4;Tape;Transparent 07/29/21 2003   Hub Color/Line Status Pink;Flushed;Patent 07/29/21 2003   Action Taken Blood drawn 07/29/21 2003       Peripheral IV 07/30/21 Left Antecubital (Active)   Site Assessment Clean, dry, & intact 07/30/21 0557   Phlebitis Assessment 0 07/30/21 0557   Infiltration Assessment 0 07/30/21 0557   Dressing Status Clean, dry, & intact 07/30/21 0557        Opportunity for questions and clarification was provided.       Patient transported with:   Registered Nurse
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
## 2021-07-30 NOTE — PROGRESS NOTES
Patient seen and examined, orders placed  Consult note to follow  ЕКАТЕРИНА likely prerenal as showing brisk improvement with IVF   Continue IVF , keep MAP > 65   Follow renal parameters q12hrs     Please call with questions    Shoshana Zavaleta MD FASN  Cell [phone]  Pager: [phone]

## 2021-07-30 NOTE — ED NOTES
ICU-PA and nurse visualized sacral wound. Wound has foul smelling odor, upper region is black. Overall wound is red and yellow. Not healing well. Pt states he isn't sure when it was changed last. Cultures sent.

## 2021-07-30 NOTE — PROGRESS NOTES
attended the interdisciplinary rounds for Karyn Garcia, who is a 64 y.o.,male. Patients Primary Language is: [place]. According to the patients EMR Zoroastrian Affiliation is: [facility].     The reason the Patient came to the hospital is:   Patient Active Problem List    Diagnosis Date Noted    UTI (urinary tract infection) 07/30/2021    Hypotension 07/30/2021    Sepsis ([address] 07/30/2021    Mild protein-calorie malnutrition ([address] 05/19/2021    Paraplegia ([address] 04/30/2021    Sacral decubitus ulcer, stage IV ([address] 04/30/2021    HTN (hypertension) 04/30/2021    S/P colostomy ([facility] [address] [facility] 04/29/2021          Plan:  Chaplains will continue to follow and will provide pastoral care on an as needed/requested basis.  recommends bedside caregivers page  on duty if patient shows signs of acute spiritual or emotional distress.     [address] [facility]  Board Certified [address] [facility]   [phone]

## 2021-07-30 NOTE — CONSULTS
General Surgery Consult    Rasta Sevilla  Admit date: 2021    MRN: [id number]     : 1960     Age: 64 y.o. Attending Physician: Latina Severin, MD, [facility]      History of Present Illness:      Rasta Sevilla is a 64 y.o. male who we were consulted for evaluation of a sacral decubitus ulcer in the presence of possible sepsis. The patient is very well-known to me he is a paraplegic patient who has a long history of decubitus ulcer for which I did debridement as well as a diverting colostomy. It seems that the patient presented with leukocytosis and hypotension and there was a concern that his decubitus ulcer could be the source. I just saw the patient in the ICU and he is doing relatively well and he stated that he has no major complaints. He has no hypotension currently and not tachycardic and he has no fever and his WBC has been trending down.      Patient Active Problem List    Diagnosis Date Noted    UTI (urinary tract infection) 2021    Hypotension 2021    Sepsis ([address] 2021    Mild protein-calorie malnutrition ([address] 2021    Paraplegia ([address] 2021    Sacral decubitus ulcer, stage IV ([address] 2021    HTN (hypertension) 2021    S/P colostomy ([address] 2021     Past Medical History:   Diagnosis Date    Asthma     Hypertension     Ill-defined condition     Neurogenic Bladder, s/p T11 injury    Paralysis ([address] 2017    waist down,  [facility]      Past Surgical History:   Procedure Laterality Date    HX OTHER SURGICAL      Eye surgery    HX OTHER SURGICAL  2017    spinal surgery    HX OTHER SURGICAL  2017    Liver repair from [facility]    HX OTHER SURGICAL  2021    Decubitus Debridement    HX OTHER SURGICAL  2021    Robotic colostomy formation and Debridement of stage IV decubitus ulcer all the way to the bone    HX OTHER SURGICAL  2021    Exploratory laparotomy with small-bowel resection with primary anastomosis and Partial colectomy with revision of the colostomy      Social History     Tobacco Use    Smoking status: Never Smoker    Smokeless tobacco: Never Used   Substance Use Topics    Alcohol use: No      Social History     Tobacco Use   Smoking Status Never Smoker   Smokeless Tobacco Never Used     History reviewed. No pertinent family history.    Current Facility-Administered Medications   Medication Dose Route Frequency    0.9% sodium chloride infusion 250 mL  250 mL IntraVENous PRN    electrolyte-r (NORMOSOL R) infusion   IntraVENous CONTINUOUS    sodium chloride (NS) flush 5-40 mL  5-40 mL IntraVENous Q8H    sodium chloride (NS) flush 5-40 mL  5-40 mL IntraVENous PRN    acetaminophen (TYLENOL) tablet 650 mg  650 mg Oral Q6H PRN    Or    acetaminophen (TYLENOL) suppository 650 mg  650 mg Rectal Q6H PRN    ondansetron (ZOFRAN ODT) tablet 4 mg  4 mg Oral Q8H PRN    Or    ondansetron (ZOFRAN) injection 4 mg  4 mg IntraVENous Q6H PRN    magnesium sulfate 2 g/50 ml IVPB (premix or compounded)  2 g IntraVENous ONCE    insulin lispro (HUMALOG) injection   SubCUTAneous Q6H    glucose chewable tablet 16 g  4 Tablet Oral PRN    glucagon (GLUCAGEN) injection 1 mg  1 mg IntraMUSCular PRN    dextrose (D50W) injection syrg 12.5-25 g  25-50 mL IntraVENous PRN    pantoprazole (PROTONIX) 40 mg in 0.9% sodium chloride 10 mL injection  40 mg IntraVENous Q12H    hydrocortisone Sod Succ (PF) (SOLU-CORTEF) injection 50 mg  50 mg IntraVENous Q6H    albuterol-ipratropium (DUO-NEB) 2.5 MG-0.5 MG/3 ML  3 mL Nebulization Q6H PRN    meropenem (MERREM) 500 mg in sterile water (preservative free) 10 mL IV syringe  500 mg IntraVENous Q6H    vancomycin (VANCOCIN) 1750 mg in  ml infusion  1,750 mg IntraVENous ONCE    sodium chloride (NS) flush 5-10 mL  5-10 mL IntraVENous PRN    VANCOMYCIN INFORMATION NOTE   Other Rx Dosing/Monitoring    NOREPINephrine (LEVOPHED) 8 mg in 5% dextrose 250mL (32 mcg/mL) infusion  0.5-16 mcg/min IntraVENous TITRATE No Known Allergies       Review of Systems:  Pertinent items are noted in the History of Present Illness. Objective:     Visit Vitals  /61   Pulse 68   Temp 98.1 °F (36.7 °C)   Resp 15   Ht 6' 2.02\" (1.88 m)   Wt 72.6 kg (160 lb)   SpO2 100%   BMI 20.53 kg/m²       Physical Exam:      General:  in no apparent distress, alert and oriented times 3                   Abdomen:   rounded, soft, nontender, nondistended, no masses or organomegaly. Colostomy bag in place. Sacral decubitus ulacer: There is a stage IV decubitus ulcer that is open with no translation with some necrotic soft tissue but in general there is very good granulation. Imaging and Lab Review:     CBC:   Lab Results   Component Value Date/Time    WBC 16.9 (H) 07/30/2021 03:42 AM    RBC 2.49 (L) 07/30/2021 03:42 AM    HGB 6.9 (L) 07/30/2021 03:42 AM    HCT 22.2 (L) 07/30/2021 03:42 AM    PLATELET 860 28/09/6405 03:42 AM     BMP:   Lab Results   Component Value Date/Time    Glucose 131 (H) 07/30/2021 03:42 AM    Sodium 137 07/30/2021 03:42 AM    Potassium 3.0 (L) 07/30/2021 03:42 AM    Chloride 107 07/30/2021 03:42 AM    CO2 23 07/30/2021 03:42 AM    BUN 59 (H) 07/30/2021 03:42 AM    Creatinine 1.36 (H) 07/30/2021 03:42 AM    Calcium 7.7 (L) 07/30/2021 03:42 AM     CMP:  Lab Results   Component Value Date/Time    Glucose 131 (H) 07/30/2021 03:42 AM    Sodium 137 07/30/2021 03:42 AM    Potassium 3.0 (L) 07/30/2021 03:42 AM    Chloride 107 07/30/2021 03:42 AM    CO2 23 07/30/2021 03:42 AM    BUN 59 (H) 07/30/2021 03:42 AM    Creatinine 1.36 (H) 07/30/2021 03:42 AM    Calcium 7.7 (L) 07/30/2021 03:42 AM    Anion gap 7 07/30/2021 03:42 AM    BUN/Creatinine ratio 43 (H) 07/30/2021 03:42 AM    Alk.  phosphatase 91 07/29/2021 06:20 PM    Protein, total 9.1 (H) 07/29/2021 06:20 PM    Albumin 2.0 (L) 07/29/2021 06:20 PM    Globulin 7.1 (H) 07/29/2021 06:20 PM    A-G Ratio 0.3 (L) 07/29/2021 06:20 PM       Recent Results (from the past 24 hour(s))   EKG, 12 LEAD, INITIAL    Collection Time: 07/29/21  5:00 PM   Result Value Ref Range    Ventricular Rate 86 BPM    Atrial Rate 86 BPM    P-R Interval 122 ms    QRS Duration 82 ms    Q-T Interval 386 ms    QTC Calculation (Bezet) 461 ms    Calculated P Axis 69 degrees    Calculated R Axis 44 degrees    Calculated T Axis 37 degrees    Diagnosis       Normal sinus rhythm  Normal ECG  When compared with ECG of 23-MAR-2019 19:16,  ST more depressed in Anterior leads  Nonspecific T wave abnormality now evident in Inferior leads  T wave inversion now evident in Anterior leads  QT has lengthened     CBC WITH AUTOMATED DIFF    Collection Time: 07/29/21  6:20 PM   Result Value Ref Range    WBC 19.7 (H) 4.6 - 13.2 K/uL    RBC 2.92 (L) 4.35 - 5.65 M/uL    HGB 8.2 (L) 13.0 - 16.0 g/dL    HCT 26.1 (L) 36.0 - 48.0 %    MCV 89.4 74.0 - 97.0 FL    MCH 28.1 24.0 - 34.0 PG    MCHC 31.4 31.0 - 37.0 g/dL    RDW 13.8 11.6 - 14.5 %    PLATELET 497 (H) 248 - 420 K/uL    MPV 8.9 (L) 9.2 - 11.8 FL    NEUTROPHILS 95 (H) 40 - 73 %    LYMPHOCYTES 3 (L) 21 - 52 %    MONOCYTES 2 (L) 3 - 10 %    EOSINOPHILS 0 0 - 5 %    BASOPHILS 0 0 - 2 %    ABS. NEUTROPHILS 18.7 (H) 1.8 - 8.0 K/UL    ABS. LYMPHOCYTES 0.6 (L) 0.9 - 3.6 K/UL    ABS. MONOCYTES 0.4 0.05 - 1.2 K/UL    ABS. EOSINOPHILS 0.0 0.0 - 0.4 K/UL    ABS.  BASOPHILS 0.0 0.0 - 0.1 K/UL    DF MANUAL      PLATELET COMMENTS ADEQUATE PLATELETS      RBC COMMENTS NORMOCYTIC, NORMOCHROMIC     METABOLIC PANEL, COMPREHENSIVE    Collection Time: 07/29/21  6:20 PM   Result Value Ref Range    Sodium 136 136 - 145 mmol/L    Potassium 3.4 (L) 3.5 - 5.5 mmol/L    Chloride 103 100 - 111 mmol/L    CO2 24 21 - 32 mmol/L    Anion gap 9 3.0 - 18 mmol/L    Glucose 145 (H) 74 - 99 mg/dL    BUN 71 (H) 7.0 - 18 MG/DL    Creatinine 1.89 (H) 0.6 - 1.3 MG/DL    BUN/Creatinine ratio 38 (H) 12 - 20      GFR est AA 44 (L) >60 ml/min/1.73m2    GFR est non-AA 36 (L) >60 ml/min/1.73m2    Calcium 8.1 (L) 8.5 - 10.1 MG/DL    Bilirubin, total 0.3 0.2 - 1.0 MG/DL    ALT (SGPT) 16 16 - 61 U/L    AST (SGOT) 17 10 - 38 U/L    Alk.  phosphatase 91 45 - 117 U/L    Protein, total 9.1 (H) 6.4 - 8.2 g/dL    Albumin 2.0 (L) 3.4 - 5.0 g/dL    Globulin 7.1 (H) 2.0 - 4.0 g/dL    A-G Ratio 0.3 (L) 0.8 - 1.7     CARDIAC PANEL,(CK, CKMB & TROPONIN)    Collection Time: 07/29/21  6:20 PM   Result Value Ref Range    CK - MB 2.1 <3.6 ng/ml    CK-MB Index 2.8 0.0 - 4.0 %    CK 74 39 - 308 U/L    Troponin-I, QT <0.02 0.0 - 0.045 NG/ML   MAGNESIUM    Collection Time: 07/29/21  6:20 PM   Result Value Ref Range    Magnesium 1.6 1.6 - 2.6 mg/dL   URINALYSIS W/ RFLX MICROSCOPIC    Collection Time: 07/29/21  7:12 PM   Result Value Ref Range    Color YELLOW      Appearance TURBID      Specific gravity 1.013 1.005 - 1.030      pH (UA) 5.5 5.0 - 8.0      Protein 100 (A) NEG mg/dL    Glucose Negative NEG mg/dL    Ketone Negative NEG mg/dL    Bilirubin Negative NEG      Blood LARGE (A) NEG      Urobilinogen 0.2 0.2 - 1.0 EU/dL    Nitrites Negative NEG      Leukocyte Esterase LARGE (A) NEG     URINE MICROSCOPIC ONLY    Collection Time: 07/29/21  7:12 PM   Result Value Ref Range    WBC TOO NUMEROUS TO COUNT 0 - 5 /hpf    RBC 4 to 10 0 - 5 /hpf    Epithelial cells FEW 0 - 5 /lpf    Bacteria 4+ (A) NEG /hpf    Yeast FEW (A) NEG     LEGIONELLA PNEUMOPHILA AG, URINE    Collection Time: 07/29/21  7:12 PM    Specimen: Urine, random   Result Value Ref Range    Legionella Ag, urine Negative NEG     STREP PNEUMO AG, URINE    Collection Time: 07/29/21  7:12 PM    Specimen: Urine, random   Result Value Ref Range    Strep pneumo Ag, urine Negative NEG     DRUG SCREEN UR - W/ CONFIRM    Collection Time: 07/29/21  7:12 PM   Result Value Ref Range    BENZODIAZEPINES Negative NEG      BARBITURATES Negative NEG      THC (TH-CANNABINOL) Negative NEG      OPIATES Negative NEG      PCP(PHENCYCLIDINE) Negative NEG      COCAINE Negative NEG      AMPHETAMINES Negative NEG      METHADONE Negative NEG      HDSCOM (NOTE)    CULTURE, BLOOD    Collection Time: 07/29/21  7:50 PM    Specimen: Blood   Result Value Ref Range    Special Requests: NO SPECIAL REQUESTS      Culture result: NO GROWTH AFTER 8 HOURS     LACTIC ACID    Collection Time: 07/29/21  7:53 PM   Result Value Ref Range    Lactic acid 1.6 0.4 - 2.0 MMOL/L   CULTURE, BLOOD    Collection Time: 07/29/21  8:05 PM    Specimen: Blood   Result Value Ref Range    Special Requests: NO SPECIAL REQUESTS      Culture result: NO GROWTH AFTER 8 HOURS     CBC WITH AUTOMATED DIFF    Collection Time: 07/30/21 12:36 AM   Result Value Ref Range    WBC 19.6 (H) 4.6 - 13.2 K/uL    RBC 2.42 (L) 4.35 - 5.65 M/uL    HGB 6.7 (L) 13.0 - 16.0 g/dL    HCT 21.6 (L) 36.0 - 48.0 %    MCV 89.3 74.0 - 97.0 FL    MCH 27.7 24.0 - 34.0 PG    MCHC 31.0 31.0 - 37.0 g/dL    RDW 13.7 11.6 - 14.5 %    PLATELET 377 (H) 293 - 420 K/uL    MPV 8.7 (L) 9.2 - 11.8 FL    NEUTROPHILS 80 (H) 40 - 73 %    LYMPHOCYTES 13 (L) 21 - 52 %    MONOCYTES 6 3 - 10 %    EOSINOPHILS 1 0 - 5 %    BASOPHILS 0 0 - 2 %    ABS. NEUTROPHILS 15.7 (H) 1.8 - 8.0 K/UL    ABS. LYMPHOCYTES 2.5 0.9 - 3.6 K/UL    ABS. MONOCYTES 1.2 0.05 - 1.2 K/UL    ABS. EOSINOPHILS 0.2 0.0 - 0.4 K/UL    ABS. BASOPHILS 0.0 0.0 - 0.1 K/UL    DF AUTOMATED      PLATELET COMMENTS ADEQUATE PLATELETS      RBC COMMENTS NORMOCYTIC, NORMOCHROMIC     RBC, ALLOCATE    Collection Time: 07/30/21  1:15 AM   Result Value Ref Range    HISTORY CHECKED?  Historical check performed    TYPE & SCREEN    Collection Time: 07/30/21  1:32 AM   Result Value Ref Range    Crossmatch Expiration 08/02/2021,2359     ABO/Rh(D) O POSITIVE     Antibody screen NEG     CALLED TO: Kathy Werner [id number] ER BY [id number]     Unit number [id number]     Blood component type RC LR     Unit division 00     Status of unit ISSUED     Crossmatch result Compatible    OCCULT BLOOD, STOOL    Collection Time: 07/30/21  1:32 AM   Result Value Ref Range    Occult blood, stool Positive (A) NEG     METABOLIC PANEL, BASIC    Collection Time: 07/30/21  3:42 AM   Result Value Ref Range    Sodium 137 136 - 145 mmol/L    Potassium 3.0 (L) 3.5 - 5.5 mmol/L    Chloride 107 100 - 111 mmol/L    CO2 23 21 - 32 mmol/L    Anion gap 7 3.0 - 18 mmol/L    Glucose 131 (H) 74 - 99 mg/dL    BUN 59 (H) 7.0 - 18 MG/DL    Creatinine 1.36 (H) 0.6 - 1.3 MG/DL    BUN/Creatinine ratio 43 (H) 12 - 20      GFR est AA >60 >60 ml/min/1.73m2    GFR est non-AA 53 (L) >60 ml/min/1.73m2    Calcium 7.7 (L) 8.5 - 10.1 MG/DL   RESPIRATORY VIRUS PANEL W/COVID-19, PCR    Collection Time: 07/30/21  3:42 AM    Specimen: Nasopharyngeal   Result Value Ref Range    Adenovirus Not detected NOTD      Coronavirus 229E Not detected NOTD      Coronavirus HKU1 Not detected NOTD      Coronavirus CVNL63 Not detected NOTD      Coronavirus OC43 Not detected NOTD      SARS-CoV-2, PCR Not detected NOTD      Metapneumovirus Not detected NOTD      Rhinovirus and Enterovirus Not detected NOTD      Influenza A Not detected NOTD      Influenza B Not detected NOTD      Parainfluenza 1 Not detected NOTD      Parainfluenza 2 Not detected NOTD      Parainfluenza 3 Not detected NOTD      Parainfluenza virus 4 Not detected NOTD      RSV by PCR Not detected NOTD      B. parapertussis, PCR Not detected NOTD      Bordetella pertussis - PCR Not detected NOTD      Chlamydophila pneumoniae DNA, QL, PCR Not detected NOTD      Mycoplasma pneumoniae DNA, QL, PCR Not detected NOTD     C REACTIVE PROTEIN, QT    Collection Time: 07/30/21  3:42 AM   Result Value Ref Range    C-Reactive protein 6.5 (H) 0 - 0.3 mg/dL   PROCALCITONIN    Collection Time: 07/30/21  3:42 AM   Result Value Ref Range    Procalcitonin 0.49 ng/mL   CBC WITH AUTOMATED DIFF    Collection Time: 07/30/21  3:42 AM   Result Value Ref Range    WBC 16.9 (H) 4.6 - 13.2 K/uL    RBC 2.49 (L) 4.35 - 5.65 M/uL    HGB 6.9 (L) 13.0 - 16.0 g/dL    HCT 22.2 (L) 36.0 - 48.0 %    MCV 89.2 74.0 - 97.0 FL    MCH 27.7 24.0 - 34.0 PG    MCHC 31.1 31.0 - 37.0 g/dL    RDW 13.7 11.6 - 14.5 %    PLATELET 110 665 - 633 K/uL    MPV 8.8 (L) 9.2 - 11.8 FL    NEUTROPHILS 78 (H) 40 - 73 %    LYMPHOCYTES 14 (L) 21 - 52 %    MONOCYTES 6 3 - 10 %    EOSINOPHILS 1 0 - 5 %    BASOPHILS 0 0 - 2 %    ABS. NEUTROPHILS 13.2 (H) 1.8 - 8.0 K/UL    ABS. LYMPHOCYTES 2.4 0.9 - 3.6 K/UL    ABS. MONOCYTES 1.0 0.05 - 1.2 K/UL    ABS. EOSINOPHILS 0.2 0.0 - 0.4 K/UL    ABS. BASOPHILS 0.0 0.0 - 0.1 K/UL    DF AUTOMATED     MAGNESIUM    Collection Time: 07/30/21  3:42 AM   Result Value Ref Range    Magnesium 1.3 (L) 1.6 - 2.6 mg/dL   PHOSPHORUS    Collection Time: 07/30/21  3:42 AM   Result Value Ref Range    Phosphorus 3.7 2.5 - 4.9 MG/DL   PROTHROMBIN TIME + INR    Collection Time: 07/30/21  3:42 AM   Result Value Ref Range    Prothrombin time 15.7 (H) 11.5 - 15.2 sec    INR 1.3 (H) 0.8 - 1.2     CALCIUM, IONIZED    Collection Time: 07/30/21  3:42 AM   Result Value Ref Range    Ionized Calcium 1.16 1.12 - 1.32 MMOL/L   HEMOGLOBIN A1C WITH EAG    Collection Time: 07/30/21  3:42 AM   Result Value Ref Range    Hemoglobin A1c 5.5 4.2 - 5.6 %    Est. average glucose 111 mg/dL   TSH 3RD GENERATION    Collection Time: 07/30/21  3:42 AM   Result Value Ref Range    TSH 1.72 0.36 - 3.74 uIU/mL       images and reports reviewed    Assessment:   Gato Gonzales is a 64 y.o. male with multiple medical issues and now presenting with a picture of sepsis that seems to be resolving. I do not believe that his sacral decubitus ulcer is the reason for his infection. Though the ulcer is stage IV but is well open with good granulation tissue. Also there are some necrotic tissue but they are minimal to cause significant sepsis. I think the patient can be treated with local wound care for now and it seems that he is improving from whatever the source is for a sepsis which is could be a UTI as well. Plan:      Thank you for the consultation  Management of his sepsis  Wound care consult for wound care management  No need for any urgent surgical intervention for his decubitus ulcer currently but he may need some debridement at some point  We will follow    Please call me if you have any questions (cell phone: [phone])     Signed By: Sherryle South, MD     July 30, 2021

## 2021-07-30 NOTE — PROGRESS NOTES
Physical Exam  Skin:     Findings: Wound present. Upon admission to ICU wound 1 was cleaned with wound cleanser spray. Opticell AG rope x1 placed in undermined area on left side of wound, opticell square placed in wound bed, sacral mepilex placed overtop. Site 2 was covered with a thin foam dressing for comfort and prevention as patient sites pain when the area is touched.

## 2021-07-31 ENCOUNTER — APPOINTMENT (OUTPATIENT)
Dept: VASCULAR SURGERY | Age: 61
DRG: 466 | End: 2021-07-31
Attending: PHYSICIAN ASSISTANT
Payer: MEDICAID

## 2021-07-31 LAB
ABO + RH BLD: NORMAL
ANION GAP SERPL CALC-SCNC: 6 MMOL/L (ref 3–18)
APTT PPP: 96.3 SEC (ref 23–36.4)
BACTERIA SPEC CULT: ABNORMAL
BACTERIA SPEC CULT: ABNORMAL
BASOPHILS # BLD: 0.1 K/UL (ref 0–0.1)
BASOPHILS # BLD: 0.1 K/UL (ref 0–0.1)
BASOPHILS NFR BLD: 1 % (ref 0–2)
BASOPHILS NFR BLD: 1 % (ref 0–2)
BLD PROD TYP BPU: NORMAL
BLOOD GROUP ANTIBODIES SERPL: NORMAL
BPU ID: NORMAL
BUN SERPL-MCNC: 27 MG/DL (ref 7–18)
BUN/CREAT SERPL: 28 (ref 12–20)
CA-I SERPL-SCNC: 1.13 MMOL/L (ref 1.12–1.32)
CALCIUM SERPL-MCNC: 7.6 MG/DL (ref 8.5–10.1)
CALLED TO:,BCALL1: NORMAL
CHLORIDE SERPL-SCNC: 109 MMOL/L (ref 100–111)
CK SERPL-CCNC: 41 U/L (ref 39–308)
CO2 SERPL-SCNC: 26 MMOL/L (ref 21–32)
CREAT SERPL-MCNC: 0.98 MG/DL (ref 0.6–1.3)
CREAT UR-MCNC: 41 MG/DL (ref 30–125)
CROSSMATCH RESULT,%XM: NORMAL
DIFFERENTIAL METHOD BLD: ABNORMAL
DIFFERENTIAL METHOD BLD: ABNORMAL
EOSINOPHIL # BLD: 0.1 K/UL (ref 0–0.4)
EOSINOPHIL # BLD: 0.1 K/UL (ref 0–0.4)
EOSINOPHIL NFR BLD: 1 % (ref 0–5)
EOSINOPHIL NFR BLD: 1 % (ref 0–5)
ERYTHROCYTE [DISTWIDTH] IN BLOOD BY AUTOMATED COUNT: 13.7 % (ref 11.6–14.5)
ERYTHROCYTE [DISTWIDTH] IN BLOOD BY AUTOMATED COUNT: 14 % (ref 11.6–14.5)
GLUCOSE BLD STRIP.AUTO-MCNC: 113 MG/DL (ref 70–110)
GLUCOSE BLD STRIP.AUTO-MCNC: 130 MG/DL (ref 70–110)
GLUCOSE BLD STRIP.AUTO-MCNC: 98 MG/DL (ref 70–110)
GLUCOSE SERPL-MCNC: 105 MG/DL (ref 74–99)
HCT VFR BLD AUTO: 24.4 % (ref 36–48)
HCT VFR BLD AUTO: 33.7 % (ref 36–48)
HGB BLD-MCNC: 10.2 G/DL (ref 13–16)
HGB BLD-MCNC: 7.6 G/DL (ref 13–16)
INR PPP: 1.2 (ref 0.8–1.2)
LYMPHOCYTES # BLD: 1.8 K/UL (ref 0.9–3.6)
LYMPHOCYTES # BLD: 3.5 K/UL (ref 0.9–3.6)
LYMPHOCYTES NFR BLD: 18 % (ref 21–52)
LYMPHOCYTES NFR BLD: 32 % (ref 21–52)
MAGNESIUM SERPL-MCNC: 2.3 MG/DL (ref 1.6–2.6)
MCH RBC QN AUTO: 27.3 PG (ref 24–34)
MCH RBC QN AUTO: 28 PG (ref 24–34)
MCHC RBC AUTO-ENTMCNC: 30.3 G/DL (ref 31–37)
MCHC RBC AUTO-ENTMCNC: 31.1 G/DL (ref 31–37)
MCV RBC AUTO: 87.8 FL (ref 74–97)
MCV RBC AUTO: 92.6 FL (ref 74–97)
MONOCYTES # BLD: 0.6 K/UL (ref 0.05–1.2)
MONOCYTES # BLD: 0.7 K/UL (ref 0.05–1.2)
MONOCYTES NFR BLD: 5 % (ref 3–10)
MONOCYTES NFR BLD: 6 % (ref 3–10)
NEUTS SEG # BLD: 6.6 K/UL (ref 1.8–8)
NEUTS SEG # BLD: 7.8 K/UL (ref 1.8–8)
NEUTS SEG NFR BLD: 61 % (ref 40–73)
NEUTS SEG NFR BLD: 75 % (ref 40–73)
PHOSPHATE SERPL-MCNC: 1.8 MG/DL (ref 2.5–4.9)
PLATELET # BLD AUTO: 303 K/UL (ref 135–420)
PLATELET # BLD AUTO: 378 K/UL (ref 135–420)
PMV BLD AUTO: 10.4 FL (ref 9.2–11.8)
PMV BLD AUTO: 9.1 FL (ref 9.2–11.8)
POTASSIUM SERPL-SCNC: 2.5 MMOL/L (ref 3.5–5.5)
PROTHROMBIN TIME: 14.9 SEC (ref 11.5–15.2)
RBC # BLD AUTO: 2.78 M/UL (ref 4.35–5.65)
RBC # BLD AUTO: 3.64 M/UL (ref 4.35–5.65)
SERVICE CMNT-IMP: ABNORMAL
SODIUM SERPL-SCNC: 141 MMOL/L (ref 136–145)
SODIUM UR-SCNC: 38 MMOL/L (ref 20–110)
SPECIMEN EXP DATE BLD: NORMAL
STATUS OF UNIT,%ST: NORMAL
UNIT DIVISION, %UDIV: 0
WBC # BLD AUTO: 10.4 K/UL (ref 4.6–13.2)
WBC # BLD AUTO: 11 K/UL (ref 4.6–13.2)

## 2021-07-31 PROCEDURE — 74011250636 HC RX REV CODE- 250/636: Performed by: PHYSICIAN ASSISTANT

## 2021-07-31 PROCEDURE — 82962 GLUCOSE BLOOD TEST: CPT

## 2021-07-31 PROCEDURE — 94762 N-INVAS EAR/PLS OXIMTRY CONT: CPT

## 2021-07-31 PROCEDURE — 84300 ASSAY OF URINE SODIUM: CPT

## 2021-07-31 PROCEDURE — 2709999900 HC NON-CHARGEABLE SUPPLY

## 2021-07-31 PROCEDURE — 85610 PROTHROMBIN TIME: CPT

## 2021-07-31 PROCEDURE — 74011000250 HC RX REV CODE- 250: Performed by: INTERNAL MEDICINE

## 2021-07-31 PROCEDURE — 84100 ASSAY OF PHOSPHORUS: CPT

## 2021-07-31 PROCEDURE — C9113 INJ PANTOPRAZOLE SODIUM, VIA: HCPCS | Performed by: PHYSICIAN ASSISTANT

## 2021-07-31 PROCEDURE — 82550 ASSAY OF CK (CPK): CPT

## 2021-07-31 PROCEDURE — 85730 THROMBOPLASTIN TIME PARTIAL: CPT

## 2021-07-31 PROCEDURE — 85025 COMPLETE CBC W/AUTO DIFF WBC: CPT

## 2021-07-31 PROCEDURE — 93970 EXTREMITY STUDY: CPT

## 2021-07-31 PROCEDURE — 99233 SBSQ HOSP IP/OBS HIGH 50: CPT | Performed by: SURGERY

## 2021-07-31 PROCEDURE — 65610000006 HC RM INTENSIVE CARE

## 2021-07-31 PROCEDURE — 74011000250 HC RX REV CODE- 250: Performed by: PHYSICIAN ASSISTANT

## 2021-07-31 PROCEDURE — 74011250636 HC RX REV CODE- 250/636: Performed by: INTERNAL MEDICINE

## 2021-07-31 PROCEDURE — 74011250637 HC RX REV CODE- 250/637: Performed by: PHYSICIAN ASSISTANT

## 2021-07-31 PROCEDURE — 74011250636 HC RX REV CODE- 250/636

## 2021-07-31 PROCEDURE — 83735 ASSAY OF MAGNESIUM: CPT

## 2021-07-31 PROCEDURE — 80048 BASIC METABOLIC PNL TOTAL CA: CPT

## 2021-07-31 PROCEDURE — APPNB30 APP NON BILLABLE TIME 0-30 MINS: Performed by: PHYSICIAN ASSISTANT

## 2021-07-31 PROCEDURE — 74011000250 HC RX REV CODE- 250

## 2021-07-31 PROCEDURE — 36415 COLL VENOUS BLD VENIPUNCTURE: CPT

## 2021-07-31 PROCEDURE — 99291 CRITICAL CARE FIRST HOUR: CPT | Performed by: INTERNAL MEDICINE

## 2021-07-31 PROCEDURE — 82570 ASSAY OF URINE CREATININE: CPT

## 2021-07-31 PROCEDURE — 82330 ASSAY OF CALCIUM: CPT

## 2021-07-31 RX ORDER — HEPARIN SODIUM 1000 [USP'U]/ML
80 INJECTION, SOLUTION INTRAVENOUS; SUBCUTANEOUS ONCE
Status: COMPLETED | OUTPATIENT
Start: 2021-07-31 | End: 2021-07-31

## 2021-07-31 RX ORDER — POTASSIUM CHLORIDE 7.45 MG/ML
10 INJECTION INTRAVENOUS
Status: DISCONTINUED | OUTPATIENT
Start: 2021-07-31 | End: 2021-07-31

## 2021-07-31 RX ORDER — HEPARIN SODIUM 10000 [USP'U]/100ML
18-36 INJECTION, SOLUTION INTRAVENOUS
Status: DISCONTINUED | OUTPATIENT
Start: 2021-07-31 | End: 2021-08-14

## 2021-07-31 RX ORDER — POTASSIUM CHLORIDE 7.45 MG/ML
INJECTION INTRAVENOUS
Status: COMPLETED
Start: 2021-07-31 | End: 2021-07-31

## 2021-07-31 RX ORDER — POTASSIUM CHLORIDE 7.45 MG/ML
10 INJECTION INTRAVENOUS
Status: COMPLETED | OUTPATIENT
Start: 2021-07-31 | End: 2021-07-31

## 2021-07-31 RX ADMIN — MEROPENEM 500 MG: 500 INJECTION INTRAVENOUS at 17:03

## 2021-07-31 RX ADMIN — HEPARIN SODIUM 6520 UNITS: 1000 INJECTION INTRAVENOUS; SUBCUTANEOUS at 13:51

## 2021-07-31 RX ADMIN — POTASSIUM CHLORIDE 10 MEQ: 7.46 INJECTION, SOLUTION INTRAVENOUS at 13:00

## 2021-07-31 RX ADMIN — MEROPENEM 500 MG: 500 INJECTION INTRAVENOUS at 00:45

## 2021-07-31 RX ADMIN — POTASSIUM CHLORIDE 10 MEQ: 7.46 INJECTION, SOLUTION INTRAVENOUS at 11:20

## 2021-07-31 RX ADMIN — MEROPENEM 500 MG: 500 INJECTION INTRAVENOUS at 05:58

## 2021-07-31 RX ADMIN — POTASSIUM CHLORIDE 10 MEQ: 7.46 INJECTION, SOLUTION INTRAVENOUS at 09:52

## 2021-07-31 RX ADMIN — VANCOMYCIN HYDROCHLORIDE 1000 MG: 1 INJECTION, POWDER, LYOPHILIZED, FOR SOLUTION INTRAVENOUS at 11:29

## 2021-07-31 RX ADMIN — MEROPENEM 500 MG: 500 INJECTION INTRAVENOUS at 11:23

## 2021-07-31 RX ADMIN — SODIUM CHLORIDE, SODIUM ACETATE ANHYDROUS, SODIUM GLUCONATE, POTASSIUM CHLORIDE, AND MAGNESIUM CHLORIDE: 526; 222; 502; 37; 30 INJECTION, SOLUTION INTRAVENOUS at 21:00

## 2021-07-31 RX ADMIN — VANCOMYCIN HYDROCHLORIDE 1000 MG: 1 INJECTION, POWDER, LYOPHILIZED, FOR SOLUTION INTRAVENOUS at 00:45

## 2021-07-31 RX ADMIN — POTASSIUM CHLORIDE 10 MEQ: 7.45 INJECTION INTRAVENOUS at 10:00

## 2021-07-31 RX ADMIN — SODIUM CHLORIDE, SODIUM ACETATE ANHYDROUS, SODIUM GLUCONATE, POTASSIUM CHLORIDE, AND MAGNESIUM CHLORIDE: 526; 222; 502; 37; 30 INJECTION, SOLUTION INTRAVENOUS at 05:58

## 2021-07-31 RX ADMIN — POTASSIUM CHLORIDE 10 MEQ: 7.46 INJECTION, SOLUTION INTRAVENOUS at 12:00

## 2021-07-31 RX ADMIN — HEPARIN SODIUM 18 UNITS/KG/HR: 10000 INJECTION, SOLUTION INTRAVENOUS at 13:56

## 2021-07-31 RX ADMIN — SODIUM CHLORIDE 40 MG: 9 INJECTION, SOLUTION INTRAMUSCULAR; INTRAVENOUS; SUBCUTANEOUS at 08:59

## 2021-07-31 RX ADMIN — POTASSIUM CHLORIDE 10 MEQ: 7.46 INJECTION, SOLUTION INTRAVENOUS at 10:00

## 2021-07-31 RX ADMIN — POTASSIUM BICARBONATE 40 MEQ: 782 TABLET, EFFERVESCENT ORAL at 10:00
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
## 2021-07-31 NOTE — PROGRESS NOTES
Bedside and Verbal shift change report given to Cori Matos RN (oncoming nurse) by Denis Mandel   (offgoing nurse). Report included the following information SBAR, Kardex, ED Summary, Procedure Summary, Intake/Output, MAR and Recent Results.
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
## 2021-07-31 NOTE — ROUTINE PROCESS
Bedside and Verbal shift change report given to SAFIA Glover (oncoming nurse) by Huber Logan RN (offgoing nurse). Report included the following information SBAR, ED Summary, Procedure Summary, Intake/Output, MAR, Recent Results, Cardiac Rhythm SR and Quality Measures.
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
## 2021-07-31 NOTE — PROGRESS NOTES
Problem: Falls - Risk of  Goal: *Absence of Falls  Description: Document Nanette Adames Fall Risk and appropriate interventions in the flowsheet.   7/31/2021 1915 by Maximo Horvath  Outcome: Progressing Towards Goal  Note: Fall Risk Interventions:  Mobility Interventions: Communicate number of staff needed for ambulation/transfer         Medication Interventions: Assess postural VS orthostatic hypotension, Bed/chair exit alarm, Evaluate medications/consider consulting pharmacy    Elimination Interventions: Call light in reach, Bed/chair exit alarm, Toileting schedule/hourly rounds           7/31/2021 1914 by Maximo Horvath  Outcome: Progressing Towards Goal  Note: Fall Risk Interventions:  Mobility Interventions: Communicate number of staff needed for ambulation/transfer         Medication Interventions: Assess postural VS orthostatic hypotension, Bed/chair exit alarm, Evaluate medications/consider consulting pharmacy    Elimination Interventions: Call light in reach, Bed/chair exit alarm, Toileting schedule/hourly rounds              Problem: Patient Education: Go to Patient Education Activity  Goal: Patient/Family Education  Outcome: Progressing Towards Goal

## 2021-07-31 NOTE — INTERDISCIPLINARY ROUNDS
[address] [place] [address] [facility]  Pulmonary, Critical Care, and Sleep Medicine  Interdisciplinary and Ventilator Weaning Rounds    Patient discussed in morning walking rounds and interdisciplinary rounds. ICU day: 2    Overnight events:   Weaned off pressors    No acute events overnight    Assessments and best practice:   Ventilator  o N/A   Sedation  o N/A   Other pertinent drips  o Normosol   Lines noted  o PIVs   Critical labs assessed  o Yes   Antibiotics  o Vancomycin and Merrem   Medications reviewed  o Yes   Pending imaging  o BLE PVLs   Pending send out labs  o No   Pending Procedures  o None currently   Glycemic control  o SSI   Stress ulcer prophylaxis  o Protonix   DVT prophylaxis  o Contraindicated due to acute anemia   Need for Lines, lee assessed.  o Yes   Restraint Reevaluation   o No restraints required at this time. Family contact/MPOA: Quin Peralta (child)  Family will be updated by ICU staff.       Daily Plans:   PVLs + for b/l DVTs, start heparin gtt   Replacing K+ of 2.5, recheck potassium s/p repletion   Renal function improving, continue IVF    MAGALI time 22 minutes      Gabe Crocker PA-C  07/31/21  Pulmonary, [address] [facility],[address] [facility]

## 2021-08-01 LAB
ANION GAP SERPL CALC-SCNC: 4 MMOL/L (ref 3–18)
ANION GAP SERPL CALC-SCNC: 5 MMOL/L (ref 3–18)
ANION GAP SERPL CALC-SCNC: 5 MMOL/L (ref 3–18)
APTT PPP: 104.1 SEC (ref 23–36.4)
BACTERIA SPEC CULT: NORMAL
BASOPHILS # BLD: 0.1 K/UL (ref 0–0.1)
BASOPHILS NFR BLD: 1 % (ref 0–2)
BUN SERPL-MCNC: 11 MG/DL (ref 7–18)
BUN SERPL-MCNC: 14 MG/DL (ref 7–18)
BUN SERPL-MCNC: 17 MG/DL (ref 7–18)
BUN/CREAT SERPL: 14 (ref 12–20)
BUN/CREAT SERPL: 22 (ref 12–20)
BUN/CREAT SERPL: 27 (ref 12–20)
CA-I SERPL-SCNC: 1.12 MMOL/L (ref 1.12–1.32)
CALCIUM SERPL-MCNC: 7.5 MG/DL (ref 8.5–10.1)
CALCIUM SERPL-MCNC: 7.5 MG/DL (ref 8.5–10.1)
CALCIUM SERPL-MCNC: 7.8 MG/DL (ref 8.5–10.1)
CHLORIDE SERPL-SCNC: 108 MMOL/L (ref 100–111)
CHLORIDE SERPL-SCNC: 109 MMOL/L (ref 100–111)
CHLORIDE SERPL-SCNC: 110 MMOL/L (ref 100–111)
CO2 SERPL-SCNC: 25 MMOL/L (ref 21–32)
CO2 SERPL-SCNC: 25 MMOL/L (ref 21–32)
CO2 SERPL-SCNC: 26 MMOL/L (ref 21–32)
CREAT SERPL-MCNC: 0.63 MG/DL (ref 0.6–1.3)
CREAT SERPL-MCNC: 0.64 MG/DL (ref 0.6–1.3)
CREAT SERPL-MCNC: 0.76 MG/DL (ref 0.6–1.3)
DATE LAST DOSE: ABNORMAL
DIFFERENTIAL METHOD BLD: ABNORMAL
EOSINOPHIL # BLD: 0.1 K/UL (ref 0–0.4)
EOSINOPHIL NFR BLD: 1 % (ref 0–5)
ERYTHROCYTE [DISTWIDTH] IN BLOOD BY AUTOMATED COUNT: 13.7 % (ref 11.6–14.5)
GLUCOSE BLD STRIP.AUTO-MCNC: 107 MG/DL (ref 70–110)
GLUCOSE BLD STRIP.AUTO-MCNC: 109 MG/DL (ref 70–110)
GLUCOSE BLD STRIP.AUTO-MCNC: 80 MG/DL (ref 70–110)
GLUCOSE SERPL-MCNC: 102 MG/DL (ref 74–99)
GLUCOSE SERPL-MCNC: 81 MG/DL (ref 74–99)
GLUCOSE SERPL-MCNC: 88 MG/DL (ref 74–99)
GRAM STN SPEC: NORMAL
HCT VFR BLD AUTO: 28.1 % (ref 36–48)
HGB BLD-MCNC: 8.6 G/DL (ref 13–16)
INR PPP: 1.1 (ref 0.8–1.2)
LYMPHOCYTES # BLD: 3 K/UL (ref 0.9–3.6)
LYMPHOCYTES NFR BLD: 25 % (ref 21–52)
MAGNESIUM SERPL-MCNC: 1.6 MG/DL (ref 1.6–2.6)
MAGNESIUM SERPL-MCNC: 1.7 MG/DL (ref 1.6–2.6)
MCH RBC QN AUTO: 27.6 PG (ref 24–34)
MCHC RBC AUTO-ENTMCNC: 30.6 G/DL (ref 31–37)
MCV RBC AUTO: 90.1 FL (ref 74–97)
MONOCYTES # BLD: 1 K/UL (ref 0.05–1.2)
MONOCYTES NFR BLD: 9 % (ref 3–10)
NEUTS SEG # BLD: 7.5 K/UL (ref 1.8–8)
NEUTS SEG NFR BLD: 64 % (ref 40–73)
PHOSPHATE SERPL-MCNC: 1.5 MG/DL (ref 2.5–4.9)
PHOSPHATE SERPL-MCNC: 1.8 MG/DL (ref 2.5–4.9)
PLATELET # BLD AUTO: 376 K/UL (ref 135–420)
PMV BLD AUTO: 8.9 FL (ref 9.2–11.8)
POTASSIUM SERPL-SCNC: 3 MMOL/L (ref 3.5–5.5)
POTASSIUM SERPL-SCNC: 3.5 MMOL/L (ref 3.5–5.5)
POTASSIUM SERPL-SCNC: 3.7 MMOL/L (ref 3.5–5.5)
PROTHROMBIN TIME: 14 SEC (ref 11.5–15.2)
RBC # BLD AUTO: 3.12 M/UL (ref 4.35–5.65)
REPORTED DOSE,DOSE: ABNORMAL UNITS
REPORTED DOSE/TIME,TMG: 0
SERVICE CMNT-IMP: NORMAL
SODIUM SERPL-SCNC: 138 MMOL/L (ref 136–145)
SODIUM SERPL-SCNC: 139 MMOL/L (ref 136–145)
SODIUM SERPL-SCNC: 140 MMOL/L (ref 136–145)
VANCOMYCIN TROUGH SERPL-MCNC: 24.3 UG/ML (ref 10–20)
WBC # BLD AUTO: 11.7 K/UL (ref 4.6–13.2)

## 2021-08-01 PROCEDURE — 77030040392 HC DRSG OPTIFOAM MDII -A

## 2021-08-01 PROCEDURE — 83735 ASSAY OF MAGNESIUM: CPT

## 2021-08-01 PROCEDURE — 36415 COLL VENOUS BLD VENIPUNCTURE: CPT

## 2021-08-01 PROCEDURE — 2709999900 HC NON-CHARGEABLE SUPPLY

## 2021-08-01 PROCEDURE — 77030041076 HC DRSG AG OPTICELL MDII -A

## 2021-08-01 PROCEDURE — 80202 ASSAY OF VANCOMYCIN: CPT

## 2021-08-01 PROCEDURE — 80048 BASIC METABOLIC PNL TOTAL CA: CPT

## 2021-08-01 PROCEDURE — APPNB30 APP NON BILLABLE TIME 0-30 MINS: Performed by: PHYSICIAN ASSISTANT

## 2021-08-01 PROCEDURE — 99233 SBSQ HOSP IP/OBS HIGH 50: CPT | Performed by: INTERNAL MEDICINE

## 2021-08-01 PROCEDURE — 74011000250 HC RX REV CODE- 250: Performed by: INTERNAL MEDICINE

## 2021-08-01 PROCEDURE — 85025 COMPLETE CBC W/AUTO DIFF WBC: CPT

## 2021-08-01 PROCEDURE — 84100 ASSAY OF PHOSPHORUS: CPT

## 2021-08-01 PROCEDURE — 85730 THROMBOPLASTIN TIME PARTIAL: CPT

## 2021-08-01 PROCEDURE — 74011250636 HC RX REV CODE- 250/636: Performed by: INTERNAL MEDICINE

## 2021-08-01 PROCEDURE — C9113 INJ PANTOPRAZOLE SODIUM, VIA: HCPCS | Performed by: PHYSICIAN ASSISTANT

## 2021-08-01 PROCEDURE — 74011250636 HC RX REV CODE- 250/636: Performed by: PHYSICIAN ASSISTANT

## 2021-08-01 PROCEDURE — 77030038554 HC DRSG WND THERAHONEY MDII -Z

## 2021-08-01 PROCEDURE — 65660000000 HC RM CCU STEPDOWN

## 2021-08-01 PROCEDURE — 82962 GLUCOSE BLOOD TEST: CPT

## 2021-08-01 PROCEDURE — 85610 PROTHROMBIN TIME: CPT

## 2021-08-01 PROCEDURE — 74011000250 HC RX REV CODE- 250: Performed by: PHYSICIAN ASSISTANT

## 2021-08-01 PROCEDURE — 82330 ASSAY OF CALCIUM: CPT

## 2021-08-01 PROCEDURE — 74011250637 HC RX REV CODE- 250/637: Performed by: PHYSICIAN ASSISTANT

## 2021-08-01 PROCEDURE — 74011250637 HC RX REV CODE- 250/637: Performed by: STUDENT IN AN ORGANIZED HEALTH CARE EDUCATION/TRAINING PROGRAM

## 2021-08-01 PROCEDURE — APPSS60 APP SPLIT SHARED TIME 46-60 MINUTES: Performed by: NURSE PRACTITIONER

## 2021-08-01 RX ORDER — POTASSIUM CHLORIDE 7.45 MG/ML
10 INJECTION INTRAVENOUS
Status: COMPLETED | OUTPATIENT
Start: 2021-08-01 | End: 2021-08-01

## 2021-08-01 RX ORDER — MAGNESIUM SULFATE 1 G/100ML
1 INJECTION INTRAVENOUS ONCE
Status: COMPLETED | OUTPATIENT
Start: 2021-08-01 | End: 2021-08-01

## 2021-08-01 RX ORDER — SODIUM,POTASSIUM PHOSPHATES 280-250MG
2 POWDER IN PACKET (EA) ORAL 2 TIMES DAILY
Status: DISCONTINUED | OUTPATIENT
Start: 2021-08-02 | End: 2021-08-03

## 2021-08-01 RX ORDER — SODIUM,POTASSIUM PHOSPHATES 280-250MG
2 POWDER IN PACKET (EA) ORAL 2 TIMES DAILY
Status: DISCONTINUED | OUTPATIENT
Start: 2021-08-02 | End: 2021-08-01

## 2021-08-01 RX ORDER — LIDOCAINE HYDROCHLORIDE 10 MG/ML
INJECTION, SOLUTION EPIDURAL; INFILTRATION; INTRACAUDAL; PERINEURAL
Status: DISCONTINUED
Start: 2021-08-01 | End: 2021-08-01 | Stop reason: WASHOUT

## 2021-08-01 RX ORDER — LANOLIN ALCOHOL/MO/W.PET/CERES
400 CREAM (GRAM) TOPICAL ONCE
Status: COMPLETED | OUTPATIENT
Start: 2021-08-01 | End: 2021-08-01

## 2021-08-01 RX ORDER — LANOLIN ALCOHOL/MO/W.PET/CERES
800 CREAM (GRAM) TOPICAL
Status: COMPLETED | OUTPATIENT
Start: 2021-08-01 | End: 2021-08-01

## 2021-08-01 RX ADMIN — MEROPENEM 500 MG: 500 INJECTION INTRAVENOUS at 23:19

## 2021-08-01 RX ADMIN — POTASSIUM CHLORIDE 10 MEQ: 7.46 INJECTION, SOLUTION INTRAVENOUS at 05:15

## 2021-08-01 RX ADMIN — MEROPENEM 500 MG: 500 INJECTION INTRAVENOUS at 17:34

## 2021-08-01 RX ADMIN — MEROPENEM 500 MG: 500 INJECTION INTRAVENOUS at 12:00

## 2021-08-01 RX ADMIN — MEROPENEM 500 MG: 500 INJECTION INTRAVENOUS at 00:30

## 2021-08-01 RX ADMIN — MAGNESIUM SULFATE HEPTAHYDRATE 1 G: 1 INJECTION, SOLUTION INTRAVENOUS at 09:24

## 2021-08-01 RX ADMIN — Medication 400 MG: at 17:00

## 2021-08-01 RX ADMIN — SODIUM PHOSPHATE, MONOBASIC, MONOHYDRATE: 276; 142 INJECTION, SOLUTION INTRAVENOUS at 09:00

## 2021-08-01 RX ADMIN — MEROPENEM 500 MG: 500 INJECTION INTRAVENOUS at 06:45

## 2021-08-01 RX ADMIN — SODIUM CHLORIDE 40 MG: 9 INJECTION, SOLUTION INTRAMUSCULAR; INTRAVENOUS; SUBCUTANEOUS at 09:24

## 2021-08-01 RX ADMIN — SODIUM CHLORIDE 40 MG: 9 INJECTION, SOLUTION INTRAMUSCULAR; INTRAVENOUS; SUBCUTANEOUS at 00:30

## 2021-08-01 RX ADMIN — POTASSIUM & SODIUM PHOSPHATES POWDER PACK 280-160-250 MG 2 PACKET: 280-160-250 PACK at 23:52

## 2021-08-01 RX ADMIN — VANCOMYCIN HYDROCHLORIDE 1000 MG: 1 INJECTION, POWDER, LYOPHILIZED, FOR SOLUTION INTRAVENOUS at 00:30

## 2021-08-01 RX ADMIN — SODIUM CHLORIDE 40 MG: 9 INJECTION, SOLUTION INTRAMUSCULAR; INTRAVENOUS; SUBCUTANEOUS at 21:35

## 2021-08-01 RX ADMIN — POTASSIUM CHLORIDE 10 MEQ: 7.46 INJECTION, SOLUTION INTRAVENOUS at 04:15

## 2021-08-01 RX ADMIN — POTASSIUM CHLORIDE 10 MEQ: 7.46 INJECTION, SOLUTION INTRAVENOUS at 03:15

## 2021-08-01 RX ADMIN — HEPARIN SODIUM 18 UNITS/KG/HR: 10000 INJECTION, SOLUTION INTRAVENOUS at 06:45

## 2021-08-01 RX ADMIN — POTASSIUM CHLORIDE 10 MEQ: 7.46 INJECTION, SOLUTION INTRAVENOUS at 01:15

## 2021-08-01 RX ADMIN — POTASSIUM CHLORIDE 10 MEQ: 7.46 INJECTION, SOLUTION INTRAVENOUS at 02:15

## 2021-08-01 RX ADMIN — POTASSIUM PHOSPHATE, MONOBASIC AND POTASSIUM PHOSPHATE, DIBASIC: 224; 236 INJECTION, SOLUTION, CONCENTRATE INTRAVENOUS at 16:46

## 2021-08-01 RX ADMIN — VANCOMYCIN HYDROCHLORIDE 1000 MG: 1 INJECTION, POWDER, LYOPHILIZED, FOR SOLUTION INTRAVENOUS at 14:00

## 2021-08-01 RX ADMIN — Medication 800 MG: at 23:19
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
## 2021-08-01 NOTE — INTERDISCIPLINARY ROUNDS
[address] [place] [address] [facility]  Pulmonary, Critical Care, and Sleep Medicine  Interdisciplinary and Ventilator Weaning Rounds    Patient discussed in morning walking rounds and interdisciplinary rounds. ICU day: 3    Overnight events:   No acute events overnight    Assessments and best practice:   Ventilator  o N/A   Sedation  o N/A   Other pertinent drips  o Normosol, Heparin gtt   Lines noted  o PIVs   Critical labs assessed  o Yes   Antibiotics  o Vancomycin and Merrem   Medications reviewed  o Yes   Pending imaging  o None   Pending send out labs  o No   Pending Procedures  o None currently   Glycemic control  o SSI   Stress ulcer prophylaxis  o Protonix   DVT prophylaxis  o Heparin gtt for DVTs   Need for Lines, Chavez assessed  o Yes   Restraint Reevaluation   o No restraints required at this time. Family contact/MPOA: Misael Fabian (child)  With permission from patient, patient's mother Ms. Libertad Warren was updated via telephone.       Daily Plans:   Renal function is excellent, d/c maintenance fluids, pt taking PO without issues   Replace lytes   Patient stable for downgrade to Telemetry    MAGALI time 23 minutes      Lisa Salinas PA-C  08/01/21  Pulmonary, Critical Care Medicine  [address] [place] [address] [facility]
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
## 2021-08-02 ENCOUNTER — APPOINTMENT (OUTPATIENT)
Dept: INTERVENTIONAL RADIOLOGY/VASCULAR | Age: 61
DRG: 466 | End: 2021-08-02
Attending: PHYSICIAN ASSISTANT
Payer: MEDICAID

## 2021-08-02 LAB
ANION GAP SERPL CALC-SCNC: 5 MMOL/L (ref 3–18)
APTT PPP: 102.1 SEC (ref 23–36.4)
APTT PPP: 32.1 SEC (ref 23–36.4)
BASOPHILS # BLD: 0.1 K/UL (ref 0–0.1)
BASOPHILS NFR BLD: 1 % (ref 0–2)
BUN SERPL-MCNC: 8 MG/DL (ref 7–18)
BUN/CREAT SERPL: 14 (ref 12–20)
CA-I SERPL-SCNC: 1.18 MMOL/L (ref 1.12–1.32)
CALCIUM SERPL-MCNC: 7.6 MG/DL (ref 8.5–10.1)
CHLORIDE SERPL-SCNC: 107 MMOL/L (ref 100–111)
CO2 SERPL-SCNC: 26 MMOL/L (ref 21–32)
CREAT SERPL-MCNC: 0.59 MG/DL (ref 0.6–1.3)
DIFFERENTIAL METHOD BLD: ABNORMAL
EOSINOPHIL # BLD: 0.1 K/UL (ref 0–0.4)
EOSINOPHIL NFR BLD: 1 % (ref 0–5)
ERYTHROCYTE [DISTWIDTH] IN BLOOD BY AUTOMATED COUNT: 13.3 % (ref 11.6–14.5)
GLUCOSE BLD STRIP.AUTO-MCNC: 127 MG/DL (ref 70–110)
GLUCOSE BLD STRIP.AUTO-MCNC: 142 MG/DL (ref 70–110)
GLUCOSE BLD STRIP.AUTO-MCNC: 154 MG/DL (ref 70–110)
GLUCOSE BLD STRIP.AUTO-MCNC: 66 MG/DL (ref 70–110)
GLUCOSE BLD STRIP.AUTO-MCNC: 83 MG/DL (ref 70–110)
GLUCOSE SERPL-MCNC: 113 MG/DL (ref 74–99)
HCT VFR BLD AUTO: 24.3 % (ref 36–48)
HCT VFR BLD AUTO: 24.6 % (ref 36–48)
HGB BLD-MCNC: 7.5 G/DL (ref 13–16)
HGB BLD-MCNC: 7.5 G/DL (ref 13–16)
LYMPHOCYTES # BLD: 1.8 K/UL (ref 0.9–3.6)
LYMPHOCYTES NFR BLD: 23 % (ref 21–52)
MAGNESIUM SERPL-MCNC: 1.3 MG/DL (ref 1.6–2.6)
MCH RBC QN AUTO: 27.7 PG (ref 24–34)
MCHC RBC AUTO-ENTMCNC: 30.9 G/DL (ref 31–37)
MCV RBC AUTO: 89.7 FL (ref 74–97)
MONOCYTES # BLD: 0.5 K/UL (ref 0.05–1.2)
MONOCYTES NFR BLD: 7 % (ref 3–10)
NEUTS SEG # BLD: 5.4 K/UL (ref 1.8–8)
NEUTS SEG NFR BLD: 68 % (ref 40–73)
PHOSPHATE SERPL-MCNC: 2 MG/DL (ref 2.5–4.9)
PLATELET # BLD AUTO: 349 K/UL (ref 135–420)
PMV BLD AUTO: 8.7 FL (ref 9.2–11.8)
POTASSIUM SERPL-SCNC: 3.4 MMOL/L (ref 3.5–5.5)
RBC # BLD AUTO: 2.71 M/UL (ref 4.35–5.65)
SODIUM SERPL-SCNC: 138 MMOL/L (ref 136–145)
WBC # BLD AUTO: 7.8 K/UL (ref 4.6–13.2)

## 2021-08-02 PROCEDURE — 85018 HEMOGLOBIN: CPT

## 2021-08-02 PROCEDURE — 2709999900 HC NON-CHARGEABLE SUPPLY

## 2021-08-02 PROCEDURE — 74011250637 HC RX REV CODE- 250/637: Performed by: EMERGENCY MEDICINE

## 2021-08-02 PROCEDURE — 02HV33Z INSERTION OF INFUSION DEVICE INTO SUPERIOR VENA CAVA, PERCUTANEOUS APPROACH: ICD-10-PCS | Performed by: RADIOLOGY

## 2021-08-02 PROCEDURE — 85025 COMPLETE CBC W/AUTO DIFF WBC: CPT

## 2021-08-02 PROCEDURE — 80048 BASIC METABOLIC PNL TOTAL CA: CPT

## 2021-08-02 PROCEDURE — 77030040392 HC DRSG OPTIFOAM MDII -A

## 2021-08-02 PROCEDURE — 77030040393 HC DRSG OPTIFOAM GENT MDII -B

## 2021-08-02 PROCEDURE — 36592 COLLECT BLOOD FROM PICC: CPT

## 2021-08-02 PROCEDURE — 82330 ASSAY OF CALCIUM: CPT

## 2021-08-02 PROCEDURE — 74011250636 HC RX REV CODE- 250/636: Performed by: INTERNAL MEDICINE

## 2021-08-02 PROCEDURE — 74011250636 HC RX REV CODE- 250/636: Performed by: PHYSICIAN ASSISTANT

## 2021-08-02 PROCEDURE — 85730 THROMBOPLASTIN TIME PARTIAL: CPT

## 2021-08-02 PROCEDURE — C9113 INJ PANTOPRAZOLE SODIUM, VIA: HCPCS | Performed by: PHYSICIAN ASSISTANT

## 2021-08-02 PROCEDURE — 65660000000 HC RM CCU STEPDOWN

## 2021-08-02 PROCEDURE — 74011000250 HC RX REV CODE- 250: Performed by: INTERNAL MEDICINE

## 2021-08-02 PROCEDURE — 74011000250 HC RX REV CODE- 250: Performed by: PHYSICIAN ASSISTANT

## 2021-08-02 PROCEDURE — 76937 US GUIDE VASCULAR ACCESS: CPT

## 2021-08-02 PROCEDURE — 84100 ASSAY OF PHOSPHORUS: CPT

## 2021-08-02 PROCEDURE — 99232 SBSQ HOSP IP/OBS MODERATE 35: CPT | Performed by: EMERGENCY MEDICINE

## 2021-08-02 PROCEDURE — 83735 ASSAY OF MAGNESIUM: CPT

## 2021-08-02 PROCEDURE — 82962 GLUCOSE BLOOD TEST: CPT

## 2021-08-02 PROCEDURE — 74011250637 HC RX REV CODE- 250/637: Performed by: STUDENT IN AN ORGANIZED HEALTH CARE EDUCATION/TRAINING PROGRAM

## 2021-08-02 PROCEDURE — 77030018836 HC SOL IRR NACL ICUM -A

## 2021-08-02 PROCEDURE — 74011250636 HC RX REV CODE- 250/636: Performed by: EMERGENCY MEDICINE

## 2021-08-02 RX ORDER — MAGNESIUM SULFATE HEPTAHYDRATE 40 MG/ML
2 INJECTION, SOLUTION INTRAVENOUS ONCE
Status: COMPLETED | OUTPATIENT
Start: 2021-08-02 | End: 2021-08-02

## 2021-08-02 RX ORDER — HEPARIN SODIUM 1000 [USP'U]/ML
80 INJECTION, SOLUTION INTRAVENOUS; SUBCUTANEOUS ONCE
Status: COMPLETED | OUTPATIENT
Start: 2021-08-02 | End: 2021-08-02

## 2021-08-02 RX ADMIN — VANCOMYCIN HYDROCHLORIDE 1000 MG: 1 INJECTION, POWDER, LYOPHILIZED, FOR SOLUTION INTRAVENOUS at 16:52

## 2021-08-02 RX ADMIN — MEROPENEM 500 MG: 500 INJECTION INTRAVENOUS at 12:35

## 2021-08-02 RX ADMIN — POTASSIUM & SODIUM PHOSPHATES POWDER PACK 280-160-250 MG 2 PACKET: 280-160-250 PACK at 09:00

## 2021-08-02 RX ADMIN — POTASSIUM & SODIUM PHOSPHATES POWDER PACK 280-160-250 MG 2 PACKET: 280-160-250 PACK at 18:32

## 2021-08-02 RX ADMIN — MAGNESIUM SULFATE 2 G: 2 INJECTION INTRAVENOUS at 22:05

## 2021-08-02 RX ADMIN — SODIUM CHLORIDE 40 MG: 9 INJECTION, SOLUTION INTRAMUSCULAR; INTRAVENOUS; SUBCUTANEOUS at 22:01

## 2021-08-02 RX ADMIN — MEROPENEM 500 MG: 500 INJECTION INTRAVENOUS at 18:34

## 2021-08-02 RX ADMIN — MEROPENEM 500 MG: 500 INJECTION INTRAVENOUS at 06:02

## 2021-08-02 RX ADMIN — SODIUM CHLORIDE 40 MG: 9 INJECTION, SOLUTION INTRAMUSCULAR; INTRAVENOUS; SUBCUTANEOUS at 09:00

## 2021-08-02 RX ADMIN — POTASSIUM BICARBONATE 40 MEQ: 782 TABLET, EFFERVESCENT ORAL at 22:01

## 2021-08-02 RX ADMIN — HEPARIN SODIUM 6960 UNITS: 1000 INJECTION INTRAVENOUS; SUBCUTANEOUS at 15:37

## 2021-08-02 RX ADMIN — HEPARIN SODIUM 18 UNITS/KG/HR: 10000 INJECTION, SOLUTION INTRAVENOUS at 04:55
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
## 2021-08-02 NOTE — PROGRESS NOTES
Reason for Admission:  Sepsis ([facility] [A41.9]  UTI (urinary tract infection) [N39.0]  Hypotension [I95.9]                 RUR Score:    18%           Plan for utilizing home health:   [facility]                    Likelihood of Readmission:   LOW                         Transition of Care Plan:              Initial assessment completed with patient and mother at bedside. Cognitive status of patient: oriented to time, place, person and situation. Face sheet information confirmed:  yes. The patient designates mother Desai ([phone]) to participate in his discharge plan and to receive any needed information. This patient lives in a single family home with mother. Patient is not able to navigate steps as needed. Prior to hospitalization, patient was considered to be independent with ADLs/IADLS : no . If not independent,  patient needs assist with : dressing, bathing, food preparation, cooking, toileting and grooming. Patient has personal care aide 7 days a week with [facility]. Patient has a current ACP document on file: no      Healthcare Decision Maker:  no        The patient will need medicaid stretcher transport upon discharge. The patient already has Electric W/C, and hospital bed medical equipment available in the home. Patient is currently active with home health. If active, agency name is [facility]. Patient has stayed in a skilled nursing facility or rehab. Was  stay within last 60 days : yes. Patient was discharge from [facility], [facility]. This patient is on dialysis :no     Freedom of choice signed: yes, for [facility] . Currently, the discharge plan is Home with [address] [place] [address] [facility]. Patient will need home health orders prior to discharge. CM will continue to assist with transitional needs.     The patient states that he can obtain his medications from the pharmacy, and take his medications as directed with assistance. Patient's current insurance is [facility]      Care Management Interventions  PCP Verified by CM:  Yes  Mode of Transport at Discharge: BLS  Transition of Care Consult (CM Consult): Discharge Planning, [address]: No  Reason Outside [place]: Patient already serviced by other home care/hospice agency  Discharge Durable Medical Equipment: No  Physical Therapy Consult: No  Occupational Therapy Consult: No  Speech Therapy Consult: No  Current Support Network: Relative's Home  Confirm Follow Up Transport: Family  The Plan for Transition of Care is Related to the Following Treatment Goals : home with home health  The Patient and/or Patient Representative was Provided with a Choice of Provider and Agrees with the Discharge Plan?: Yes  Freedom of Choice List was Provided with Basic Dialogue that Supports the Patient's Individualized Plan of Care/Goals, Treatment Preferences and Shares the Quality Data Associated with the Providers?: Yes  Discharge Location  Discharge Placement: Home with home health        JEANNA Sotelo, RN  Pager # [phone]  Care Manager

## 2021-08-02 NOTE — ROUTINE PROCESS
Verbal report given to [facility] RN at [phone] since pt is transferring to 4N. Verbal report included Pts PMH, why he was admitted to ICU, pts physical assessment. Pt is on a Heparin drip at 18 units which is therapeutic (104.1) per this morning labs. Verbal report also included pts skin wounds (documented on pt avatar).

## 2021-08-02 NOTE — PROGRESS NOTES
Bedside and Verbal shift change report given to SAFIA LE (oncoming nurse) by Denis Mandel   (offgoing nurse). Report included the following information SBAR, Kardex, ED Summary, Procedure Summary, Intake/Output, MAR and Recent Results.

## 2021-08-02 NOTE — PROGRESS NOTES
In Patient Progress note      Admit Date: 7/29/2021          Impression:     #1 acute kidney injury etiology likely prerenal azotemia in the setting of septic shock with severe sepsis. #2 severe sepsis secondary to sacral decubitus ulcer versus UTI  #3 stage 4 sacral decubitus ulcer  #4 UTI with chronic Chavez in place  #5 history of neurogenic bladder  #6 history of hypertension  #7 history of paraplegia  #8 hypomagnesemia , replace  #9 hypokalemia , replace      Plan:     #1 strict ins and outs ,continue to support pressors map greater than 65  #2 encourage po intake   #3 renally dose antibiotics for EGFR of greater than 30  #4 avoid nephrotoxins  #5 avoid NSAIDs avoid IV contrast     Please call with questions,     Monte Harada, MD FASN  Cell [phone]  Pager: [phone]    Subjective:     - No acute over night events. - respiratory - stable  - hemodynamics - stable, no pressrs  - UOP-good   - Nutrition -poor    Objective:     Visit Vitals  /76   Pulse 90   Temp 97.7 °F (36.5 °C)   Resp 16   Ht 6' 2\" (1.88 m)   Wt 87 kg (191 lb 12.8 oz)   SpO2 95%   BMI 24.63 kg/m²         Intake/Output Summary (Last 24 hours) at 8/2/2021 1737  Last data filed at 8/2/2021 1703  Gross per 24 hour   Intake 556.4 ml   Output 2100 ml   Net -1543.6 ml       Physical Exam:        Patient is in no apparent distress. HEENT:mmm. Neck: no cervical lymphadenopathy or thyromegaly. Lungs: good air entry, clear to auscultation bilaterally. Cardiovascular system: S1, S2, regular rate and rhythm. Abdomen: soft, non tender, non distended. Extremities: no clubbing, cyanosis or edema. Integumentary: skin is grossly intact. Neurologic: Alert, oriented time three.        Data Review:    Recent Labs     08/02/21  1230   WBC 7.8   RBC 2.71*   HCT 24.3*   MCV 89.7   MCH 27.7   MCHC 30.9*   RDW 13.3     Recent Labs     08/02/21  1230 08/01/21  1930 08/01/21  0656 08/01/21  0000 07/31/21  0751   BUN 8 11 14 17 27*   CREA 0.59* 0. 76 0.63 0.64 0.98   CA 7.6* 7.5* 7.8* 7.5* 7.6*   K 3.4* 3.5 3.7 3.0* 2.5*    139 138 140 141    109 108 110 109   CO2 26 26 25 25 26   PHOS 2.0* 1.8* 1.5*  --  1.8*   * 102* 81 88 105*       John Paul Calixto MD

## 2021-08-02 NOTE — PROGRESS NOTES
Bedside shift change report given to SAFIA Dolan (oncoming nurse) by Peggy Bob RN (offgoing nurse). Report included the following information SBAR, Kardex, Intake/Output, MAR and Recent Results.
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
## 2021-08-02 NOTE — PROGRESS NOTES
Problem: Falls - Risk of  Goal: *Absence of Falls  Description: Document Julee Locket Fall Risk and appropriate interventions in the flowsheet. Outcome: Progressing Towards Goal  Note: Fall Risk Interventions:  Mobility Interventions: Bed/chair exit alarm, Patient to call before getting OOB         Medication Interventions: Bed/chair exit alarm    Elimination Interventions: Bed/chair exit alarm, Call light in reach              Problem: Patient Education: Go to Patient Education Activity  Goal: Patient/Family Education  Outcome: Progressing Towards Goal     Problem: Impaired Skin Integrity/Pressure Injury Treatment  Goal: *Improvement of Existing Pressure Injury  Outcome: Progressing Towards Goal  Goal: *Prevention of pressure injury  Description: Document Jordin Scale and appropriate interventions in the flowsheet.   Outcome: Progressing Towards Goal  Note: Pressure Injury Interventions:  Sensory Interventions: Assess changes in LOC    Moisture Interventions: Absorbent underpads, Minimize layers, Maintain skin hydration (lotion/cream)    Activity Interventions: Pressure redistribution bed/mattress(bed type)    Mobility Interventions: HOB 30 degrees or less    Nutrition Interventions: Document food/fluid/supplement intake    Friction and Shear Interventions: Apply protective barrier, creams and emollients, Minimize layers                Problem: Patient Education: Go to Patient Education Activity  Goal: Patient/Family Education  Outcome: Progressing Towards Goal     Problem: Pain  Goal: *Control of Pain  Outcome: Progressing Towards Goal  Goal: *PALLIATIVE CARE:  Alleviation of Pain  Outcome: Progressing Towards Goal     Problem: Patient Education: Go to Patient Education Activity  Goal: Patient/Family Education  Outcome: Progressing Towards Goal     Problem: Injury - Risk of, Adverse Drug Event  Goal: *Absence of adverse drug events  Outcome: Progressing Towards Goal  Goal: *Absence of medication errors  Outcome: Progressing Towards Goal  Goal: *Knowledge of prescribed medications  Outcome: Progressing Towards Goal     Problem: Patient Education: Go to Patient Education Activity  Goal: Patient/Family Education  Outcome: Progressing Towards Goal     Problem: Risk for Spread of Infection  Goal: Prevent transmission of infectious organism to others  Description: Prevent the transmission of infectious organisms to other patients, staff members, and visitors.   Outcome: Progressing Towards Goal     Problem: Patient Education:  Go to Education Activity  Goal: Patient/Family Education  Outcome: Progressing Towards Goal

## 2021-08-02 NOTE — PROCEDURES
INTERVENTIONAL RADIOLOGY POST PICC LINE NOTE     August 2, 2021       3:52 PM     Preoperative Diagnosis:   IV Access    Postoperative Diagnosis:  Same. :  Melany Gunter PA-C    Assistant:  None. Attending Physician: Dr. Paulie Vargas    Type of Anesthesia:  1% Lidocaine local    Procedure/Description: left basilic  upper extremity PICC Line. Findings:  left basilic 5 Romansh catheter placed. Tip at SVC/RA junction. OK to use. Length 44 cm. Estimated blood Loss: Minimal    Specimen Removed: None    Drains: None     Complications:  None. Condition:  Stable.     Discharge Plan:  continue present therapy

## 2021-08-03 LAB
ANION GAP SERPL CALC-SCNC: 3 MMOL/L (ref 3–18)
APTT PPP: >180 SEC (ref 23–36.4)
APTT PPP: >180 SEC (ref 23–36.4)
BASOPHILS # BLD: 0.1 K/UL (ref 0–0.1)
BASOPHILS NFR BLD: 1 % (ref 0–2)
BUN SERPL-MCNC: 7 MG/DL (ref 7–18)
BUN/CREAT SERPL: 11 (ref 12–20)
CA-I SERPL-SCNC: 1.14 MMOL/L (ref 1.12–1.32)
CALCIUM SERPL-MCNC: 7.4 MG/DL (ref 8.5–10.1)
CHLORIDE SERPL-SCNC: 110 MMOL/L (ref 100–111)
CO2 SERPL-SCNC: 27 MMOL/L (ref 21–32)
CREAT SERPL-MCNC: 0.61 MG/DL (ref 0.6–1.3)
DIFFERENTIAL METHOD BLD: ABNORMAL
EOSINOPHIL # BLD: 0.2 K/UL (ref 0–0.4)
EOSINOPHIL NFR BLD: 2 % (ref 0–5)
ERYTHROCYTE [DISTWIDTH] IN BLOOD BY AUTOMATED COUNT: 13.2 % (ref 11.6–14.5)
GLUCOSE BLD STRIP.AUTO-MCNC: 101 MG/DL (ref 70–110)
GLUCOSE BLD STRIP.AUTO-MCNC: 110 MG/DL (ref 70–110)
GLUCOSE BLD STRIP.AUTO-MCNC: 120 MG/DL (ref 70–110)
GLUCOSE BLD STRIP.AUTO-MCNC: 93 MG/DL (ref 70–110)
GLUCOSE SERPL-MCNC: 96 MG/DL (ref 74–99)
HCT VFR BLD AUTO: 22.3 % (ref 36–48)
HGB BLD-MCNC: 6.9 G/DL (ref 13–16)
HISTORY CHECKED?,CKHIST: NORMAL
INR PPP: 1.2 (ref 0.8–1.2)
LYMPHOCYTES # BLD: 2.4 K/UL (ref 0.9–3.6)
LYMPHOCYTES NFR BLD: 27 % (ref 21–52)
MAGNESIUM SERPL-MCNC: 1.5 MG/DL (ref 1.6–2.6)
MCH RBC QN AUTO: 27.8 PG (ref 24–34)
MCHC RBC AUTO-ENTMCNC: 30.9 G/DL (ref 31–37)
MCV RBC AUTO: 89.9 FL (ref 74–97)
MONOCYTES # BLD: 0.6 K/UL (ref 0.05–1.2)
MONOCYTES NFR BLD: 6 % (ref 3–10)
NEUTS SEG # BLD: 5.8 K/UL (ref 1.8–8)
NEUTS SEG NFR BLD: 64 % (ref 40–73)
PHOSPHATE SERPL-MCNC: 2.3 MG/DL (ref 2.5–4.9)
PLATELET # BLD AUTO: 297 K/UL (ref 135–420)
PMV BLD AUTO: 8.7 FL (ref 9.2–11.8)
POTASSIUM SERPL-SCNC: 3.8 MMOL/L (ref 3.5–5.5)
PROTHROMBIN TIME: 15.1 SEC (ref 11.5–15.2)
RBC # BLD AUTO: 2.48 M/UL (ref 4.35–5.65)
SODIUM SERPL-SCNC: 140 MMOL/L (ref 136–145)
WBC # BLD AUTO: 9 K/UL (ref 4.6–13.2)

## 2021-08-03 PROCEDURE — 86901 BLOOD TYPING SEROLOGIC RH(D): CPT

## 2021-08-03 PROCEDURE — 2709999900 HC NON-CHARGEABLE SUPPLY

## 2021-08-03 PROCEDURE — 74011250636 HC RX REV CODE- 250/636: Performed by: INTERNAL MEDICINE

## 2021-08-03 PROCEDURE — 77030037878 HC DRSG MEPILEX >48IN BORD MOLN -B

## 2021-08-03 PROCEDURE — 74011250636 HC RX REV CODE- 250/636: Performed by: PHYSICIAN ASSISTANT

## 2021-08-03 PROCEDURE — 76450000000

## 2021-08-03 PROCEDURE — 82330 ASSAY OF CALCIUM: CPT

## 2021-08-03 PROCEDURE — 97167 OT EVAL HIGH COMPLEX 60 MIN: CPT

## 2021-08-03 PROCEDURE — 97530 THERAPEUTIC ACTIVITIES: CPT

## 2021-08-03 PROCEDURE — 83735 ASSAY OF MAGNESIUM: CPT

## 2021-08-03 PROCEDURE — 74011000250 HC RX REV CODE- 250: Performed by: INTERNAL MEDICINE

## 2021-08-03 PROCEDURE — 97162 PT EVAL MOD COMPLEX 30 MIN: CPT

## 2021-08-03 PROCEDURE — 77030040393 HC DRSG OPTIFOAM GENT MDII -B

## 2021-08-03 PROCEDURE — 74011250637 HC RX REV CODE- 250/637: Performed by: STUDENT IN AN ORGANIZED HEALTH CARE EDUCATION/TRAINING PROGRAM

## 2021-08-03 PROCEDURE — P9016 RBC LEUKOCYTES REDUCED: HCPCS

## 2021-08-03 PROCEDURE — 77030040392 HC DRSG OPTIFOAM MDII -A

## 2021-08-03 PROCEDURE — 85730 THROMBOPLASTIN TIME PARTIAL: CPT

## 2021-08-03 PROCEDURE — 36430 TRANSFUSION BLD/BLD COMPNT: CPT

## 2021-08-03 PROCEDURE — 97166 OT EVAL MOD COMPLEX 45 MIN: CPT

## 2021-08-03 PROCEDURE — 80048 BASIC METABOLIC PNL TOTAL CA: CPT

## 2021-08-03 PROCEDURE — 77030039342 HC PAD DEBRIDEMENT LOHM -B

## 2021-08-03 PROCEDURE — 74011000250 HC RX REV CODE- 250: Performed by: PHYSICIAN ASSISTANT

## 2021-08-03 PROCEDURE — 84100 ASSAY OF PHOSPHORUS: CPT

## 2021-08-03 PROCEDURE — 85610 PROTHROMBIN TIME: CPT

## 2021-08-03 PROCEDURE — 99232 SBSQ HOSP IP/OBS MODERATE 35: CPT | Performed by: EMERGENCY MEDICINE

## 2021-08-03 PROCEDURE — C9113 INJ PANTOPRAZOLE SODIUM, VIA: HCPCS | Performed by: PHYSICIAN ASSISTANT

## 2021-08-03 PROCEDURE — 65660000000 HC RM CCU STEPDOWN

## 2021-08-03 PROCEDURE — 85025 COMPLETE CBC W/AUTO DIFF WBC: CPT

## 2021-08-03 PROCEDURE — 77030041076 HC DRSG AG OPTICELL MDII -A

## 2021-08-03 PROCEDURE — 86923 COMPATIBILITY TEST ELECTRIC: CPT

## 2021-08-03 PROCEDURE — 74011250636 HC RX REV CODE- 250/636: Performed by: EMERGENCY MEDICINE

## 2021-08-03 PROCEDURE — 36592 COLLECT BLOOD FROM PICC: CPT

## 2021-08-03 PROCEDURE — 82962 GLUCOSE BLOOD TEST: CPT

## 2021-08-03 RX ORDER — SODIUM,POTASSIUM PHOSPHATES 280-250MG
2 POWDER IN PACKET (EA) ORAL ONCE
Status: COMPLETED | OUTPATIENT
Start: 2021-08-03 | End: 2021-08-03

## 2021-08-03 RX ORDER — SODIUM CHLORIDE 9 MG/ML
250 INJECTION, SOLUTION INTRAVENOUS AS NEEDED
Status: DISCONTINUED | OUTPATIENT
Start: 2021-08-03 | End: 2021-08-18 | Stop reason: HOSPADM

## 2021-08-03 RX ORDER — MAGNESIUM SULFATE 1 G/100ML
1 INJECTION INTRAVENOUS ONCE
Status: COMPLETED | OUTPATIENT
Start: 2021-08-03 | End: 2021-08-03

## 2021-08-03 RX ADMIN — SODIUM CHLORIDE 40 MG: 9 INJECTION, SOLUTION INTRAMUSCULAR; INTRAVENOUS; SUBCUTANEOUS at 21:48

## 2021-08-03 RX ADMIN — MEROPENEM 500 MG: 500 INJECTION INTRAVENOUS at 00:34

## 2021-08-03 RX ADMIN — MEROPENEM 500 MG: 500 INJECTION INTRAVENOUS at 05:43

## 2021-08-03 RX ADMIN — MEROPENEM 500 MG: 500 INJECTION INTRAVENOUS at 18:53

## 2021-08-03 RX ADMIN — POTASSIUM & SODIUM PHOSPHATES POWDER PACK 280-160-250 MG 2 PACKET: 280-160-250 PACK at 10:06

## 2021-08-03 RX ADMIN — MEROPENEM 500 MG: 500 INJECTION INTRAVENOUS at 13:28

## 2021-08-03 RX ADMIN — HEPARIN SODIUM 22 UNITS/KG/HR: 10000 INJECTION, SOLUTION INTRAVENOUS at 01:04

## 2021-08-03 RX ADMIN — SODIUM CHLORIDE 40 MG: 9 INJECTION, SOLUTION INTRAMUSCULAR; INTRAVENOUS; SUBCUTANEOUS at 10:08

## 2021-08-03 RX ADMIN — SODIUM PHOSPHATE, MONOBASIC, MONOHYDRATE: 276; 142 INJECTION, SOLUTION INTRAVENOUS at 13:43

## 2021-08-03 RX ADMIN — MAGNESIUM SULFATE HEPTAHYDRATE 1 G: 1 INJECTION, SOLUTION INTRAVENOUS at 18:54

## 2021-08-03 NOTE — PROGRESS NOTES
OCCUPATIONAL THERAPY EVALUATION    Patient: Rasta Sevilla (4 y.o. male)  Date: 8/3/2021  Primary Diagnosis: Sepsis ([address] [A41.9]  UTI (urinary tract infection) [N39.0]  Hypotension [I95.9]        Precautions:   Fall, Skin, Contact    ASSESSMENT :  PT in room upon arrival; completed co-treat with PT to maximize pt safety and functional participation in self-care skills. Based on the objective data described below, the patient presents with generalized weakness, decreased UE ROM/strength, decreased activity tolerance, decreased participation/functional independence in self-care skills. Max A x2 for supine to sit EOB. Once sitting, pt eventually able to maintain own seated balance with min A and no UE support with occasional posterior lean; pt fatigue quickly requiring mod A-max A for trunk support. Pt able to lift both arms x1 to address functional reach. Pt able to demonstrate ability to raise one hand at a time to touch head and demonstrate functional mobility in UE for self-care tasks. Pt assisted back to supine with max A x2. Total Ax2 for scooting up in bed. Pt left semi-reclined with call bell in reach, all needs met, RN notified. Pt would benefit from practicing slide-board to w/c transfer next OT session to ensure return to baseline mobility and functional strength in UE to complete. Patient will benefit from skilled intervention to address the above impairments.   Patient's rehabilitation potential is considered to be Good/Fair  Factors which may influence rehabilitation potential include:   []             None noted  []             Mental ability/status  [x]             Medical condition  []             Home/family situation and support systems  []             Safety awareness  []             Pain tolerance/management  []             Other:      PLAN :  Recommendations and Planned Interventions:   [x]               Self Care Training                  [x]      Therapeutic Activities  [x] Functional Mobility Training   []      Cognitive Retraining  [x]               Therapeutic Exercises           [x]      Endurance Activities  [x]               Balance Training                    [x]      Neuromuscular Re-Education  []               Visual/Perceptual Training     [x]      Home Safety Training  [x]               Patient Education                   [x]      Family Training/Education  []               Other (comment):    Frequency/Duration: Patient will be followed by occupational therapy 1-2 times per day/4-7 days per week to address goals. Discharge Recommendations: Home Health with continued 24/7 care assistance  Further Equipment Recommendations for Discharge: N/A     SUBJECTIVE:   Patient stated I was my upper body, but I get help to do my legs.     OBJECTIVE DATA SUMMARY:     Past Medical History:   Diagnosis Date    Asthma     Hypertension     Ill-defined condition     Neurogenic Bladder, s/p T11 injury    Paralysis ([facility] [address] 2017    waist down,  [facility]     Past Surgical History:   Procedure Laterality Date    HX OTHER SURGICAL      Eye surgery    HX OTHER SURGICAL  2017    spinal surgery    HX OTHER SURGICAL  2017    Liver repair from [facility]    HX OTHER SURGICAL  04/2021    Decubitus Debridement    HX OTHER SURGICAL  04/29/2021    Robotic colostomy formation and Debridement of stage IV decubitus ulcer all the way to the bone    HX OTHER SURGICAL  05/03/2021    Exploratory laparotomy with small-bowel resection with primary anastomosis and Partial colectomy with revision of the colostomy     Barriers to Learning/Limitations: yes;  physical  Compensate with: visual, verbal, tactile, kinesthetic cues/model    Home Situation:   Home Situation  Home Environment: Private residence  # Steps to Enter: 0  One/Two Story Residence: One story  Living Alone: No  Support Systems: Parent  Patient Expects to be Discharged to[de-identified] [employer]  Current DME Used/[facility] at [facility]: Wheelchair, power, Hospital bed, Transfer bench  Tub or Shower Type: Other (comment) (basin bath at baseline)  [x]  Right hand dominant   []  Left hand dominant    Cognitive/Behavioral Status:  Neurologic State: Alert  Orientation Level: Oriented X4  Cognition: Follows commands  Safety/Judgement: Fall prevention    Skin: intact  Edema: none in UE; c/o feeling R SF is swollen; does not appear this way with comparison to L. Coordination: BUE  Coordination: Generally decreased, functional  Fine Motor Skills-Upper: Left Intact; Right Intact    Gross Motor Skills-Upper: Left Intact; Right Intact    Balance:  Sitting: Impaired; With support  Sitting - Static: Fair (occasional)  Sitting - Dynamic: Fair (occasional)    Strength: BUE  Strength: Generally decreased, functional       Tone & Sensation: BUE  Tone: Normal  Sensation: Intact        Range of Motion: BUE  AROM: Generally decreased, functional       Functional Mobility and Transfers for ADLs:  Bed Mobility:     Supine to Sit: Maximum assistance;Assist x2  Sit to Supine: Maximum assistance;Assist x2  Scooting: Total assistance;Assist x2  Transfers:   NT     ADL Assessment:   Feeding: Setup;Modified independent    Oral Facial Hygiene/Grooming: Minimum assistance    Bathing: Maximum assistance    Upper Body Dressing: Moderate assistance    Lower Body Dressing: Total assistance    Toileting: Total assistance          ADL Intervention:    Upper Body Dressing Assistance  Dressing Assistance: Moderate assistance  Shirt simulation with hospital gown: Moderate assistance    Lower Body Dressing Assistance  Dressing Assistance: Total assistance(dependent)  Socks: Total assistance (dependent)    Cognitive Retraining  Safety/Judgement: Fall prevention    Pain:  Pain level pre-treatment: -/10 - pt states he has some pain in his R elbow; however, no number provided   Pain level post-treatment: -/10   Pain Intervention(s): Medication (see MAR);  Rest, Ice, Repositioning   Response to intervention: Nurse notified, See doc flow    Activity Tolerance:   Poor    Please refer to the flowsheet for vital signs taken during this treatment. After treatment:   [] Patient left in no apparent distress sitting up in chair  [x] Patient left in no apparent distress in bed  [x] Call bell left within reach  [x] Nursing notified  [] Caregiver present  [] Bed alarm activated    COMMUNICATION/EDUCATION:   [x] Role of Occupational Therapy in the acute care setting  [x] Home safety education was provided and the patient/caregiver indicated understanding. [x] Patient/family have participated as able in goal setting and plan of care. [x] Patient/family agree to work toward stated goals and plan of care. [] Patient understands intent and goals of therapy, but is neutral about his/her participation. [] Patient is unable to participate in goal setting and plan of care. Thank you for this referral.  CHANTALE Alfred, OTR/L  Time Calculation: 16 mins    Eval Complexity: History: MEDIUM Complexity : Expanded review of history including physical, cognitive and psychosocial  history ; Examination: MEDIUM Complexity : 3-5 performance deficits relating to physical, cognitive , or psychosocial skils that result in activity limitations and / or participation restrictions; Decision Making:MEDIUM Complexity : Patient may present with comorbidities that affect occupational performnce.  Miniml to moderate modification of tasks or assistance (eg, physical or verbal ) with assesment(s) is necessary to enable patient to complete evaluation
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
## 2021-08-03 NOTE — PROGRESS NOTES
PT order received and chart reviewed. Patient currently has an active bedrest order. Please discontinue bedrest order for full participation in skilled PT evaluation/treatment. Will follow up as patient schedule allows.      Thank you for the referral.    Lisette Molina, PT, DPT
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
## 2021-08-03 NOTE — PROGRESS NOTES
OT order received and chart reviewed. Pt with an active bedrest order. Pt also with HgB of 6.9 at this time. Please update order as appropriate in order to allow for full pt participation in OT evaluation/treatment. Will follow up as patient schedule allows.     Thank you for this referral.    CHANTALE Melchor, OTR/L

## 2021-08-03 NOTE — PROGRESS NOTES
In Patient Progress note      Admit Date: 7/29/2021          Impression:     #1 acute kidney injury etiology likely prerenal azotemia in the setting of septic shock with severe sepsis. #2 severe sepsis secondary to sacral decubitus ulcer versus UTI  #3 stage 4 sacral decubitus ulcer  #4 UTI with chronic Chavez in place  #5 history of neurogenic bladder  #6 history of hypertension  #7 history of paraplegia  #8 hypomagnesemia , replace  #9 hypokalemia , replace      Plan:     #1 replace and recheck  phos and mag  #2 encourage po intake   #3 renally dose antibiotics for EGFR of greater than 30  #4 avoid nephrotoxins  #5 avoid NSAIDs avoid IV contrast    Renal functions recovered, follow electrolytes and replace  Will sign off   Call with questions,     Please call with questions,     Juany Pickard MD [facility]  Cell [phone]  Pager: [phone]    Subjective:     - No acute over night events. - respiratory - stable  - hemodynamics - stable, no pressrs  - UOP-good   - Nutrition -poor    Objective:     Visit Vitals  /81 (BP 1 Location: Left upper arm, BP Patient Position: At rest)   Pulse 90   Temp 98 °F (36.7 °C)   Resp 16   Ht 6' 2\" (1.88 m)   Wt 94 kg (207 lb 3.2 oz)   SpO2 100%   BMI 26.60 kg/m²         Intake/Output Summary (Last 24 hours) at 8/3/2021 1331  Last data filed at 8/3/2021 1028  Gross per 24 hour   Intake --   Output 1600 ml   Net -1600 ml       Physical Exam:        Patient is in no apparent distress. HEENT:mmm. Neck: no cervical lymphadenopathy or thyromegaly. Lungs: good air entry, clear to auscultation bilaterally. Cardiovascular system: S1, S2, regular rate and rhythm. Abdomen: soft, non tender, non distended. Extremities: no clubbing, cyanosis or edema. Integumentary: skin is grossly intact. Neurologic: Alert, oriented time three.        Data Review:    Recent Labs     08/03/21  0430   WBC 9.0   RBC 2.48*   HCT 22.3*   MCV 89.9   MCH 27.8   MCHC 30.9*   RDW 13.2     Recent Labs     08/03/21  0430 08/02/21  1230 08/01/21  1930 08/01/21  0656 08/01/21  0000   BUN 7 8 11 14 17   CREA 0.61 0.59* 0.76 0.63 0.64   CA 7.4* 7.6* 7.5* 7.8* 7.5*   K 3.8 3.4* 3.5 3.7 3.0*    138 139 138 140    107 109 108 110   CO2 27 26 26 25 25   PHOS 2.3* 2.0* 1.8* 1.5*  --    GLU 96 113* 102* 81 88       Antwon Forte MD

## 2021-08-03 NOTE — PROGRESS NOTES
Problem: Falls - Risk of  Goal: *Absence of Falls  Description: Document Kathreine Keith Fall Risk and appropriate interventions in the flowsheet. Outcome: Progressing Towards Goal  Note: Fall Risk Interventions:  Mobility Interventions: Bed/chair exit alarm         Medication Interventions: Bed/chair exit alarm    Elimination Interventions: Bed/chair exit alarm, Call light in reach              Problem: Patient Education: Go to Patient Education Activity  Goal: Patient/Family Education  Outcome: Progressing Towards Goal     Problem: Impaired Skin Integrity/Pressure Injury Treatment  Goal: *Improvement of Existing Pressure Injury  Outcome: Progressing Towards Goal  Goal: *Prevention of pressure injury  Description: Document Jordin Scale and appropriate interventions in the flowsheet.   Outcome: Progressing Towards Goal  Note: Pressure Injury Interventions:  Sensory Interventions: Assess changes in LOC    Moisture Interventions: Absorbent underpads    Activity Interventions: Pressure redistribution bed/mattress(bed type)    Mobility Interventions: Pressure redistribution bed/mattress (bed type)    Nutrition Interventions: Document food/fluid/supplement intake    Friction and Shear Interventions: Apply protective barrier, creams and emollients                Problem: Patient Education: Go to Patient Education Activity  Goal: Patient/Family Education  Outcome: Progressing Towards Goal     Problem: Pain  Goal: *Control of Pain  Outcome: Progressing Towards Goal  Goal: *PALLIATIVE CARE:  Alleviation of Pain  Outcome: Progressing Towards Goal     Problem: Patient Education: Go to Patient Education Activity  Goal: Patient/Family Education  Outcome: Progressing Towards Goal     Problem: Injury - Risk of, Adverse Drug Event  Goal: *Absence of adverse drug events  Outcome: Progressing Towards Goal  Goal: *Absence of medication errors  Outcome: Progressing Towards Goal  Goal: *Knowledge of prescribed medications  Outcome: Progressing Towards Goal     Problem: Patient Education: Go to Patient Education Activity  Goal: Patient/Family Education  Outcome: Progressing Towards Goal     Problem: Risk for Spread of Infection  Goal: Prevent transmission of infectious organism to others  Description: Prevent the transmission of infectious organisms to other patients, staff members, and visitors.   Outcome: Progressing Towards Goal     Problem: Patient Education:  Go to Education Activity  Goal: Patient/Family Education  Outcome: Progressing Towards Goal     Problem: Nutrition Deficit  Goal: *Optimize nutritional status  Outcome: Progressing Towards Goal

## 2021-08-03 NOTE — ROUTINE PROCESS
Bedside and Verbal shift change report given to SAFIA Boogie (oncoming nurse) by Amaury Taylor RN (offgoing nurse). Report included the following information SBAR, Kardex, MAR and Recent Results.
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
## 2021-08-04 LAB
ABO + RH BLD: NORMAL
ANION GAP SERPL CALC-SCNC: 1 MMOL/L (ref 3–18)
APTT PPP: 35 SEC (ref 23–36.4)
BACTERIA SPEC CULT: NORMAL
BACTERIA SPEC CULT: NORMAL
BASOPHILS # BLD: 0.1 K/UL (ref 0–0.1)
BASOPHILS NFR BLD: 1 % (ref 0–2)
BLD PROD TYP BPU: NORMAL
BLOOD GROUP ANTIBODIES SERPL: NORMAL
BPU ID: NORMAL
BUN SERPL-MCNC: 8 MG/DL (ref 7–18)
BUN/CREAT SERPL: 15 (ref 12–20)
CA-I SERPL-SCNC: 1.12 MMOL/L (ref 1.12–1.32)
CALCIUM SERPL-MCNC: 7.4 MG/DL (ref 8.5–10.1)
CALLED TO:,BCALL1: NORMAL
CHLORIDE SERPL-SCNC: 112 MMOL/L (ref 100–111)
CO2 SERPL-SCNC: 29 MMOL/L (ref 21–32)
CREAT SERPL-MCNC: 0.52 MG/DL (ref 0.6–1.3)
CROSSMATCH RESULT,%XM: NORMAL
DIFFERENTIAL METHOD BLD: ABNORMAL
EOSINOPHIL # BLD: 0.1 K/UL (ref 0–0.4)
EOSINOPHIL NFR BLD: 2 % (ref 0–5)
ERYTHROCYTE [DISTWIDTH] IN BLOOD BY AUTOMATED COUNT: 13.4 % (ref 11.6–14.5)
FERRITIN SERPL-MCNC: 1382 NG/ML (ref 8–388)
GLUCOSE BLD STRIP.AUTO-MCNC: 73 MG/DL (ref 70–110)
GLUCOSE BLD STRIP.AUTO-MCNC: 73 MG/DL (ref 70–110)
GLUCOSE BLD STRIP.AUTO-MCNC: 91 MG/DL (ref 70–110)
GLUCOSE BLD STRIP.AUTO-MCNC: 98 MG/DL (ref 70–110)
GLUCOSE SERPL-MCNC: 71 MG/DL (ref 74–99)
HCT VFR BLD AUTO: 25.5 % (ref 36–48)
HGB BLD-MCNC: 7.9 G/DL (ref 13–16)
INR PPP: 1.2 (ref 0.8–1.2)
IRON SATN MFR SERPL: 55 % (ref 20–50)
IRON SERPL-MCNC: 45 UG/DL (ref 50–175)
LYMPHOCYTES # BLD: 1.8 K/UL (ref 0.9–3.6)
LYMPHOCYTES NFR BLD: 27 % (ref 21–52)
MAGNESIUM SERPL-MCNC: 1.5 MG/DL (ref 1.6–2.6)
MCH RBC QN AUTO: 27.7 PG (ref 24–34)
MCHC RBC AUTO-ENTMCNC: 31 G/DL (ref 31–37)
MCV RBC AUTO: 89.5 FL (ref 74–97)
MONOCYTES # BLD: 0.6 K/UL (ref 0.05–1.2)
MONOCYTES NFR BLD: 9 % (ref 3–10)
NEUTS SEG # BLD: 4 K/UL (ref 1.8–8)
NEUTS SEG NFR BLD: 61 % (ref 40–73)
PHOSPHATE SERPL-MCNC: 2.9 MG/DL (ref 2.5–4.9)
PLATELET # BLD AUTO: 283 K/UL (ref 135–420)
PMV BLD AUTO: 8.9 FL (ref 9.2–11.8)
POTASSIUM SERPL-SCNC: 3.7 MMOL/L (ref 3.5–5.5)
PROTHROMBIN TIME: 14.8 SEC (ref 11.5–15.2)
RBC # BLD AUTO: 2.85 M/UL (ref 4.35–5.65)
SERVICE CMNT-IMP: NORMAL
SERVICE CMNT-IMP: NORMAL
SODIUM SERPL-SCNC: 142 MMOL/L (ref 136–145)
SPECIMEN EXP DATE BLD: NORMAL
STATUS OF UNIT,%ST: NORMAL
TIBC SERPL-MCNC: 82 UG/DL (ref 250–450)
UNIT DIVISION, %UDIV: 0
WBC # BLD AUTO: 6.5 K/UL (ref 4.6–13.2)

## 2021-08-04 PROCEDURE — 85025 COMPLETE CBC W/AUTO DIFF WBC: CPT

## 2021-08-04 PROCEDURE — 65660000000 HC RM CCU STEPDOWN

## 2021-08-04 PROCEDURE — 99232 SBSQ HOSP IP/OBS MODERATE 35: CPT | Performed by: FAMILY MEDICINE

## 2021-08-04 PROCEDURE — 74011250636 HC RX REV CODE- 250/636: Performed by: PHYSICIAN ASSISTANT

## 2021-08-04 PROCEDURE — 82330 ASSAY OF CALCIUM: CPT

## 2021-08-04 PROCEDURE — C9113 INJ PANTOPRAZOLE SODIUM, VIA: HCPCS | Performed by: PHYSICIAN ASSISTANT

## 2021-08-04 PROCEDURE — 2709999900 HC NON-CHARGEABLE SUPPLY

## 2021-08-04 PROCEDURE — 77030037878 HC DRSG MEPILEX >48IN BORD MOLN -B

## 2021-08-04 PROCEDURE — 82962 GLUCOSE BLOOD TEST: CPT

## 2021-08-04 PROCEDURE — 80048 BASIC METABOLIC PNL TOTAL CA: CPT

## 2021-08-04 PROCEDURE — 82728 ASSAY OF FERRITIN: CPT

## 2021-08-04 PROCEDURE — 83540 ASSAY OF IRON: CPT

## 2021-08-04 PROCEDURE — 74011250636 HC RX REV CODE- 250/636: Performed by: INTERNAL MEDICINE

## 2021-08-04 PROCEDURE — 84100 ASSAY OF PHOSPHORUS: CPT

## 2021-08-04 PROCEDURE — 83735 ASSAY OF MAGNESIUM: CPT

## 2021-08-04 PROCEDURE — 74011000250 HC RX REV CODE- 250: Performed by: INTERNAL MEDICINE

## 2021-08-04 PROCEDURE — 85610 PROTHROMBIN TIME: CPT

## 2021-08-04 PROCEDURE — 85730 THROMBOPLASTIN TIME PARTIAL: CPT

## 2021-08-04 PROCEDURE — 97530 THERAPEUTIC ACTIVITIES: CPT

## 2021-08-04 PROCEDURE — 77030013076 HC PCH OST BAG COLO -A

## 2021-08-04 PROCEDURE — 74011000250 HC RX REV CODE- 250: Performed by: PHYSICIAN ASSISTANT

## 2021-08-04 PROCEDURE — 74011250637 HC RX REV CODE- 250/637: Performed by: INTERNAL MEDICINE

## 2021-08-04 RX ORDER — SULFAMETHOXAZOLE AND TRIMETHOPRIM 800; 160 MG/1; MG/1
1 TABLET ORAL EVERY 12 HOURS
Status: DISPENSED | OUTPATIENT
Start: 2021-08-04 | End: 2021-08-07

## 2021-08-04 RX ADMIN — SULFAMETHOXAZOLE AND TRIMETHOPRIM 1 TABLET: 800; 160 TABLET ORAL at 23:05

## 2021-08-04 RX ADMIN — MEROPENEM 500 MG: 500 INJECTION INTRAVENOUS at 05:55

## 2021-08-04 RX ADMIN — SODIUM CHLORIDE 40 MG: 9 INJECTION, SOLUTION INTRAMUSCULAR; INTRAVENOUS; SUBCUTANEOUS at 09:50

## 2021-08-04 RX ADMIN — SODIUM CHLORIDE 40 MG: 9 INJECTION, SOLUTION INTRAMUSCULAR; INTRAVENOUS; SUBCUTANEOUS at 23:05

## 2021-08-04 RX ADMIN — MEROPENEM 500 MG: 500 INJECTION INTRAVENOUS at 14:46

## 2021-08-04 RX ADMIN — MEROPENEM 500 MG: 500 INJECTION INTRAVENOUS at 20:04

## 2021-08-04 NOTE — PROGRESS NOTES
Problem: Falls - Risk of  Goal: *Absence of Falls  Description: Document Mary Laureano Fall Risk and appropriate interventions in the flowsheet. 8/4/2021 0901 by Braden Riley  Outcome: Progressing Towards Goal  Note: Fall Risk Interventions:  Mobility Interventions: Bed/chair exit alarm         Medication Interventions: Bed/chair exit alarm    Elimination Interventions: Bed/chair exit alarm, Call light in reach           8/3/2021 2006 by Braden Riley  Outcome: Progressing Towards Goal  Note: Fall Risk Interventions:  Mobility Interventions: Bed/chair exit alarm         Medication Interventions: Bed/chair exit alarm    Elimination Interventions: Bed/chair exit alarm              Problem: Patient Education: Go to Patient Education Activity  Goal: Patient/Family Education  8/4/2021 0901 by Braden Riley  Outcome: Progressing Towards Goal  8/3/2021 2006 by Braden Riley  Outcome: Progressing Towards Goal     Problem: Impaired Skin Integrity/Pressure Injury Treatment  Goal: *Improvement of Existing Pressure Injury  8/4/2021 0901 by Braden Riley  Outcome: Progressing Towards Goal  8/3/2021 2006 by Braden Riley  Outcome: Progressing Towards Goal  Goal: *Prevention of pressure injury  Description: Document Jordin Scale and appropriate interventions in the flowsheet.   8/4/2021 0901 by Braden Riley  Outcome: Progressing Towards Goal  Note: Pressure Injury Interventions:  Sensory Interventions: Assess changes in LOC    Moisture Interventions: Absorbent underpads    Activity Interventions: Pressure redistribution bed/mattress(bed type)    Mobility Interventions: Pressure redistribution bed/mattress (bed type)    Nutrition Interventions: Document food/fluid/supplement intake    Friction and Shear Interventions: Apply protective barrier, creams and emollients             8/3/2021 2006 by Braden Riley  Outcome: Progressing Towards Goal  Note: Pressure Injury Interventions:  Sensory Interventions: Assess changes in LOC, Keep linens dry and wrinkle-free, Minimize linen layers    Moisture Interventions: Absorbent underpads    Activity Interventions: Pressure redistribution bed/mattress(bed type)    Mobility Interventions: Pressure redistribution bed/mattress (bed type)    Nutrition Interventions: Document food/fluid/supplement intake    Friction and Shear Interventions: Apply protective barrier, creams and emollients                Problem: Patient Education: Go to Patient Education Activity  Goal: Patient/Family Education  8/4/2021 0901 by Shamika Arteaga  Outcome: Progressing Towards Goal  8/3/2021 2006 by Shamika Arteaga  Outcome: Progressing Towards Goal     Problem: Pain  Goal: *Control of Pain  8/4/2021 0901 by Shamika Arteaga  Outcome: Progressing Towards Goal  8/3/2021 2006 by Shamika Arteaga  Outcome: Progressing Towards Goal  Goal: *PALLIATIVE CARE:  Alleviation of Pain  8/4/2021 0901 by Shamika Arteaga  Outcome: Progressing Towards Goal  8/3/2021 2006 by Shamika Arteaga  Outcome: Progressing Towards Goal     Problem: Patient Education: Go to Patient Education Activity  Goal: Patient/Family Education  8/4/2021 0901 by Shamika Arteaga  Outcome: Progressing Towards Goal  8/3/2021 2006 by Shamika Arteaga  Outcome: Progressing Towards Goal     Problem: Injury - Risk of, Adverse Drug Event  Goal: *Absence of adverse drug events  8/4/2021 0901 by Shamika Arteaga  Outcome: Progressing Towards Goal  8/3/2021 2006 by Shamika Arteaga  Outcome: Progressing Towards Goal  Goal: *Absence of medication errors  8/4/2021 0901 by Shamika Arteaga  Outcome: Progressing Towards Goal  8/3/2021 2006 by Shamika Arteaga  Outcome: Progressing Towards Goal  Goal: *Knowledge of prescribed medications  8/4/2021 0901 by Shamika Arteaga  Outcome: Progressing Towards Goal  8/3/2021 2006 by Shamika Arteaga  Outcome: Progressing Towards Goal     Problem: Patient Education: Go to Patient Education Activity  Goal: Patient/Family Education  8/4/2021 0901 by Ara Sotelo Adela  Outcome: Progressing Towards Goal  8/3/2021 2006 by Troy Heredia  Outcome: Progressing Towards Goal     Problem: Risk for Spread of Infection  Goal: Prevent transmission of infectious organism to others  Description: Prevent the transmission of infectious organisms to other patients, staff members, and visitors.   8/4/2021 0901 by Troy Heredia  Outcome: Progressing Towards Goal  8/3/2021 2006 by Troy Heredia  Outcome: Progressing Towards Goal     Problem: Patient Education:  Go to Education Activity  Goal: Patient/Family Education  8/4/2021 0901 by Troy Heredia  Outcome: Progressing Towards Goal  8/3/2021 2006 by Troy Heredia  Outcome: Progressing Towards Goal     Problem: Nutrition Deficit  Goal: *Optimize nutritional status  8/4/2021 0901 by Troy Heredia  Outcome: Progressing Towards Goal  8/3/2021 2006 by Troy Heredia  Outcome: Progressing Towards Goal     Problem: Patient Education: Go to Patient Education Activity  Goal: Patient/Family Education  8/4/2021 0901 by Troy Heredia  Outcome: Progressing Towards Goal  8/3/2021 2006 by Troy Heredia  Outcome: Progressing Towards Goal     Problem: Patient Education: Go to Patient Education Activity  Goal: Patient/Family Education  8/4/2021 0901 by Troy Heredia  Outcome: Progressing Towards Goal  8/3/2021 2006 by Troy Heredia  Outcome: Progressing Towards Goal
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
## 2021-08-04 NOTE — PROGRESS NOTES
Problem: Mobility Impaired (Adult and Pediatric)  Goal: *Acute Goals and Plan of Care (Insert Text)  Description: Physical Therapy Goals  Initiated 8/3/2021 and to be accomplished within 7 day(s)  1. Patient will move from supine to sit and sit to supine  in bed with minimal assistance/contact guard assist.    2.  Patient will transfer from bed to wheelchair and wheelchair to bed with minimal assistance/contact guard assist using the least restrictive device. 3.  Patient will sit on EOB for 5 min with CGA and no LOB  4. Patient will roll to R/L supine in bed with CGA    PLOF: Pt lives with his mother in a [facility], no JAMES. He has aids from 9-6 during the day to assist him OOB, into his power w/c with a slide board. Pt has a hospital bed as well. Outcome: Progressing Towards Goal     PHYSICAL THERAPY TREATMENT    Patient: Geronimo Jackson (60 y.o. male)  Date: 8/4/2021  Diagnosis: Sepsis ([address] [A41.9]  UTI (urinary tract infection) [N39.0]  Hypotension [I95.9] <principal problem not specified>       Precautions: Fall, Skin, Contact      ASSESSMENT:  Patient cleared by nursing for mobility and seen with rehab tach to maximize safety with mobility. Patient received supine with HOB elevated and BLE\"s externally rotated resting on pillows. He continues to require maximum assistance x2 for bed mobility. Patient has decreased sitting balance with posterior lean. B hands supported on bed/rail, he participate in lateral weight shifts with minimal assistance and reaching activities to work on core strength/stability. Patient reports fatigue and assisted back to bed. Rolling r/l with max Ax2 to change bed linen and nurse changed dressing on sacral wound. Dependent to scoot up toward [facility] in order to reposition for comfort. Patient reports he typically only needs assistance for mobility from one person. He may benefit from rehab to decreased caregiver burden. Call bell in reach.       Progression toward goals:   [] Improving appropriately and progressing toward goals  [x]      Improving slowly and progressing toward goals  []      Not making progress toward goals and plan of care will be adjusted     PLAN:  Patient continues to benefit from skilled intervention to address the above impairments. Continue treatment per established plan of care. Discharge Recommendations:  Home Health with 24/7 care vs SNF  Further Equipment Recommendations for Discharge:  mechanical lift     SUBJECTIVE:   Patient stated I have a sliding board but I don't use it, I just scoot to my WC.    OBJECTIVE DATA SUMMARY:   Critical Behavior:  Neurologic State: Alert  Orientation Level: Oriented X4  Cognition: Follows commands  Safety/Judgement: Fall prevention  Functional Mobility Training:  Bed Mobility:   Rolling; Maximum assistance x2  Supine to Sit: Maximum assistance;Assist x2  Sit to Supine: Maximum assistance;Assist x2  Scooting: Total assistance;Assist x2        Balance:  Sitting: Impaired; With support  Sitting - Static: Fair (occasional)  Sitting - Dynamic: Fair (occasional) (fair (-))         Neuro Re-Education:  Patient sat EOB with UE support while perform lateral weight shifts and reaching activities to improve core strength and stability. Pain:  Pain level pre-treatment: 0/10  Pain level post-treatment: 0/10   Pain Intervention(s): Medication (see MAR); Rest, Ice, Repositioning   Response to intervention: Nurse notified, See doc flow    Activity Tolerance:   Fair  Please refer to the flowsheet for vital signs taken during this treatment. After treatment:   [] Patient left in no apparent distress sitting up in chair  [x] Patient left in no apparent distress in bed  [x] Call bell left within reach  [x] Nursing notified  [] Caregiver present  [x] Bed alarm activated  [] SCDs applied      COMMUNICATION/EDUCATION:   [x]         Role of Physical Therapy in the acute care setting.   [x]         Fall prevention education was provided and the patient/caregiver indicated understanding. [x]         Patient/family have participated as able in working toward goals and plan of care. [x]         Patient/family agree to work toward stated goals and plan of care. []         Patient understands intent and goals of therapy, but is neutral about his/her participation.   []         Patient is unable to participate in stated goals/plan of care: ongoing with therapy staff.  []         Other:        Aidan Matos, PT   Time Calculation: 31 mins

## 2021-08-04 NOTE — ACP (ADVANCE CARE PLANNING)
Palliative Medicine    Pt does not have an Advance Directive on file in EMR. Primary Decision Maker (Health Care Agent):Lucius Jlnabeel  Relationship to patient: Mother  Phone number: [phone] and [phone]  [x] Named in a newly scanned document   [] Legal Next of Kin  [] Guardian      ACP documents you currently have include:  [x] Newly created Advance Directive or Living Will  [] Durable Do Not Resuscitate  [] Physician Orders for Scope of Treatment (POST)  [] Medical Power of   [] Other     This writer met with patient and his mother to offer support and to discuss advance medical directive (AMD). Patient was alert and oriented x 4 and his mother was at the bedside. Patient resides with his family and has the support of his family. Patient has an electric wheelchair and a hospital bed, at home. [facility] and also receives personal care aide through ([address] The topic of AMD was brought up and patient was willing to sign an AMD, listing his mother (Jodi Prado) as his primary decision maker. He did not want to name a secondary decision maker. Patient given a copy of the AMD and a copy was placed on his chart to be scanned in to the EMR. In terms of goals of care, patient wants full interventions. At this time, patient will remain a FULL CODE WITH FULL INTERVENTIONS. Thank you for the Palliative Medicine consult and allowing us to participate in the care of Magdalene Aguilar. Will continue to monitor and provide support. Denver Dixon MSFINN  Palliative Medicine Inpatient   [address] [place] [address]: [phone] ([phone])

## 2021-08-04 NOTE — PROGRESS NOTES
Bedside shift change report given to Isreal Lloyd RN (oncoming nurse) by Madhavi Barone RN (offgoing nurse). Report included the following information SBAR, Kardex, Intake/Output, MAR and Recent Results.

## 2021-08-04 NOTE — PROGRESS NOTES
Patient is active with [address] and will need home health orders when discharging.         JEANNA Pittman RN  Care Management  Pager: [phone]
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
## 2021-08-04 NOTE — PROGRESS NOTES
Problem: Falls - Risk of  Goal: *Absence of Falls  Description: Document Anne-Marie Frazier Fall Risk and appropriate interventions in the flowsheet. Outcome: Progressing Towards Goal  Note: Fall Risk Interventions:  Mobility Interventions: Bed/chair exit alarm         Medication Interventions: Bed/chair exit alarm    Elimination Interventions: Bed/chair exit alarm              Problem: Patient Education: Go to Patient Education Activity  Goal: Patient/Family Education  Outcome: Progressing Towards Goal     Problem: Impaired Skin Integrity/Pressure Injury Treatment  Goal: *Improvement of Existing Pressure Injury  Outcome: Progressing Towards Goal  Goal: *Prevention of pressure injury  Description: Document Jordin Scale and appropriate interventions in the flowsheet.   Outcome: Progressing Towards Goal  Note: Pressure Injury Interventions:  Sensory Interventions: Assess changes in LOC, Keep linens dry and wrinkle-free, Minimize linen layers    Moisture Interventions: Absorbent underpads    Activity Interventions: Pressure redistribution bed/mattress(bed type)    Mobility Interventions: Pressure redistribution bed/mattress (bed type)    Nutrition Interventions: Document food/fluid/supplement intake    Friction and Shear Interventions: Apply protective barrier, creams and emollients                Problem: Patient Education: Go to Patient Education Activity  Goal: Patient/Family Education  Outcome: Progressing Towards Goal     Problem: Pain  Goal: *Control of Pain  Outcome: Progressing Towards Goal  Goal: *PALLIATIVE CARE:  Alleviation of Pain  Outcome: Progressing Towards Goal     Problem: Patient Education: Go to Patient Education Activity  Goal: Patient/Family Education  Outcome: Progressing Towards Goal     Problem: Injury - Risk of, Adverse Drug Event  Goal: *Absence of adverse drug events  Outcome: Progressing Towards Goal  Goal: *Absence of medication errors  Outcome: Progressing Towards Goal  Goal: *Knowledge of prescribed medications  Outcome: Progressing Towards Goal     Problem: Patient Education: Go to Patient Education Activity  Goal: Patient/Family Education  Outcome: Progressing Towards Goal     Problem: Risk for Spread of Infection  Goal: Prevent transmission of infectious organism to others  Description: Prevent the transmission of infectious organisms to other patients, staff members, and visitors.   Outcome: Progressing Towards Goal     Problem: Patient Education:  Go to Education Activity  Goal: Patient/Family Education  Outcome: Progressing Towards Goal     Problem: Nutrition Deficit  Goal: *Optimize nutritional status  Outcome: Progressing Towards Goal     Problem: Patient Education: Go to Patient Education Activity  Goal: Patient/Family Education  Outcome: Progressing Towards Goal     Problem: Patient Education: Go to Patient Education Activity  Goal: Patient/Family Education  Outcome: Progressing Towards Goal - - -

## 2021-08-05 ENCOUNTER — ANESTHESIA EVENT (OUTPATIENT)
Dept: ENDOSCOPY | Age: 61
DRG: 466 | End: 2021-08-05
Payer: MEDICAID

## 2021-08-05 LAB
ANION GAP SERPL CALC-SCNC: 3 MMOL/L (ref 3–18)
BASOPHILS # BLD: 0.1 K/UL (ref 0–0.1)
BASOPHILS NFR BLD: 1 % (ref 0–2)
BUN SERPL-MCNC: 6 MG/DL (ref 7–18)
BUN/CREAT SERPL: 11 (ref 12–20)
CALCIUM SERPL-MCNC: 7.1 MG/DL (ref 8.5–10.1)
CHLORIDE SERPL-SCNC: 110 MMOL/L (ref 100–111)
CO2 SERPL-SCNC: 30 MMOL/L (ref 21–32)
CREAT SERPL-MCNC: 0.53 MG/DL (ref 0.6–1.3)
DIFFERENTIAL METHOD BLD: ABNORMAL
EOSINOPHIL # BLD: 0.1 K/UL (ref 0–0.4)
EOSINOPHIL NFR BLD: 2 % (ref 0–5)
ERYTHROCYTE [DISTWIDTH] IN BLOOD BY AUTOMATED COUNT: 13.3 % (ref 11.6–14.5)
GLUCOSE BLD STRIP.AUTO-MCNC: 69 MG/DL (ref 70–110)
GLUCOSE BLD STRIP.AUTO-MCNC: 75 MG/DL (ref 70–110)
GLUCOSE BLD STRIP.AUTO-MCNC: 77 MG/DL (ref 70–110)
GLUCOSE BLD STRIP.AUTO-MCNC: 95 MG/DL (ref 70–110)
GLUCOSE SERPL-MCNC: 68 MG/DL (ref 74–99)
HCT VFR BLD AUTO: 25.5 % (ref 36–48)
HCT VFR BLD AUTO: 25.9 % (ref 36–48)
HGB BLD-MCNC: 7.8 G/DL (ref 13–16)
HGB BLD-MCNC: 7.9 G/DL (ref 13–16)
LYMPHOCYTES # BLD: 2 K/UL (ref 0.9–3.6)
LYMPHOCYTES NFR BLD: 34 % (ref 21–52)
MAGNESIUM SERPL-MCNC: 1.4 MG/DL (ref 1.6–2.6)
MCH RBC QN AUTO: 27.8 PG (ref 24–34)
MCHC RBC AUTO-ENTMCNC: 30.6 G/DL (ref 31–37)
MCV RBC AUTO: 90.7 FL (ref 74–97)
MONOCYTES # BLD: 0.5 K/UL (ref 0.05–1.2)
MONOCYTES NFR BLD: 9 % (ref 3–10)
NEUTS SEG # BLD: 3.2 K/UL (ref 1.8–8)
NEUTS SEG NFR BLD: 54 % (ref 40–73)
PHOSPHATE SERPL-MCNC: 2.6 MG/DL (ref 2.5–4.9)
PLATELET # BLD AUTO: 232 K/UL (ref 135–420)
PMV BLD AUTO: 9.3 FL (ref 9.2–11.8)
POTASSIUM SERPL-SCNC: 3.9 MMOL/L (ref 3.5–5.5)
RBC # BLD AUTO: 2.81 M/UL (ref 4.35–5.65)
SODIUM SERPL-SCNC: 143 MMOL/L (ref 136–145)
WBC # BLD AUTO: 5.9 K/UL (ref 4.6–13.2)

## 2021-08-05 PROCEDURE — 74011000250 HC RX REV CODE- 250: Performed by: PODIATRIST

## 2021-08-05 PROCEDURE — 77030040162

## 2021-08-05 PROCEDURE — 97530 THERAPEUTIC ACTIVITIES: CPT

## 2021-08-05 PROCEDURE — 36415 COLL VENOUS BLD VENIPUNCTURE: CPT

## 2021-08-05 PROCEDURE — 77030041076 HC DRSG AG OPTICELL MDII -A

## 2021-08-05 PROCEDURE — 74011000250 HC RX REV CODE- 250: Performed by: PHYSICIAN ASSISTANT

## 2021-08-05 PROCEDURE — 85018 HEMOGLOBIN: CPT

## 2021-08-05 PROCEDURE — 74011250637 HC RX REV CODE- 250/637: Performed by: INTERNAL MEDICINE

## 2021-08-05 PROCEDURE — 65660000000 HC RM CCU STEPDOWN

## 2021-08-05 PROCEDURE — 82962 GLUCOSE BLOOD TEST: CPT

## 2021-08-05 PROCEDURE — 77030013076 HC PCH OST BAG COLO -A

## 2021-08-05 PROCEDURE — 99232 SBSQ HOSP IP/OBS MODERATE 35: CPT | Performed by: FAMILY MEDICINE

## 2021-08-05 PROCEDURE — C9113 INJ PANTOPRAZOLE SODIUM, VIA: HCPCS | Performed by: PHYSICIAN ASSISTANT

## 2021-08-05 PROCEDURE — 74011250636 HC RX REV CODE- 250/636: Performed by: PHYSICIAN ASSISTANT

## 2021-08-05 PROCEDURE — 84100 ASSAY OF PHOSPHORUS: CPT

## 2021-08-05 PROCEDURE — 80048 BASIC METABOLIC PNL TOTAL CA: CPT

## 2021-08-05 PROCEDURE — 83735 ASSAY OF MAGNESIUM: CPT

## 2021-08-05 PROCEDURE — 77030040392 HC DRSG OPTIFOAM MDII -A

## 2021-08-05 PROCEDURE — 2709999900 HC NON-CHARGEABLE SUPPLY

## 2021-08-05 PROCEDURE — 74011250637 HC RX REV CODE- 250/637: Performed by: PHYSICIAN ASSISTANT

## 2021-08-05 PROCEDURE — 85025 COMPLETE CBC W/AUTO DIFF WBC: CPT

## 2021-08-05 PROCEDURE — 77030027138 HC INCENT SPIROMETER -A

## 2021-08-05 PROCEDURE — 77030018836 HC SOL IRR NACL ICUM -A

## 2021-08-05 RX ORDER — BISACODYL 5 MG
10 TABLET, DELAYED RELEASE (ENTERIC COATED) ORAL
Status: COMPLETED | OUTPATIENT
Start: 2021-08-05 | End: 2021-08-05

## 2021-08-05 RX ORDER — POLYETHYLENE GLYCOL 3350, SODIUM SULFATE, SODIUM CHLORIDE, POTASSIUM CHLORIDE, SODIUM ASCORBATE, AND ASCORBIC ACID 7.5-2.691G
2 KIT ORAL ONCE
Status: COMPLETED | OUTPATIENT
Start: 2021-08-05 | End: 2021-08-05

## 2021-08-05 RX ADMIN — PEG-3350, SODIUM SULFATE, SODIUM CHLORIDE, POTASSIUM CHLORIDE, SODIUM ASCORBATE AND ASCORBIC ACID 2 L: KIT at 16:08

## 2021-08-05 RX ADMIN — DAKIN'S SOLUTION 0.125% (QUARTER STRENGTH): 0.12 SOLUTION at 18:21

## 2021-08-05 RX ADMIN — SODIUM CHLORIDE 40 MG: 9 INJECTION, SOLUTION INTRAMUSCULAR; INTRAVENOUS; SUBCUTANEOUS at 11:20

## 2021-08-05 RX ADMIN — SULFAMETHOXAZOLE AND TRIMETHOPRIM 1 TABLET: 800; 160 TABLET ORAL at 11:21

## 2021-08-05 RX ADMIN — DAKIN'S SOLUTION 0.125% (QUARTER STRENGTH): 0.12 SOLUTION at 06:29

## 2021-08-05 RX ADMIN — DAKIN'S SOLUTION 0.125% (QUARTER STRENGTH): 0.12 SOLUTION at 08:00

## 2021-08-05 RX ADMIN — BISACODYL 10 MG: 5 TABLET, COATED ORAL at 16:08

## 2021-08-05 NOTE — PROGRESS NOTES
Problem: Patient Education: Go to Patient Education Activity  Goal: Patient/Family Education  Outcome: Progressing Towards Goal     Problem: Impaired Skin Integrity/Pressure Injury Treatment  Goal: *Improvement of Existing Pressure Injury  Outcome: Progressing Towards Goal  Goal: *Prevention of pressure injury  Description: Document Jordin Scale and appropriate interventions in the flowsheet. Outcome: Progressing Towards Goal  Note: Pressure Injury Interventions:  Sensory Interventions: Assess changes in LOC    Moisture Interventions: Absorbent underpads    Activity Interventions: Pressure redistribution bed/mattress(bed type)    Mobility Interventions: Pressure redistribution bed/mattress (bed type)    Nutrition Interventions: Document food/fluid/supplement intake    Friction and Shear Interventions: Apply protective barrier, creams and emollients                Problem: Patient Education: Go to Patient Education Activity  Goal: Patient/Family Education  Outcome: Progressing Towards Goal     Problem: Pain  Goal: *Control of Pain  Outcome: Progressing Towards Goal  Goal: *PALLIATIVE CARE:  Alleviation of Pain  Outcome: Progressing Towards Goal     Problem: Patient Education: Go to Patient Education Activity  Goal: Patient/Family Education  Outcome: Progressing Towards Goal     Problem: Injury - Risk of, Adverse Drug Event  Goal: *Absence of adverse drug events  Outcome: Progressing Towards Goal  Goal: *Absence of medication errors  Outcome: Progressing Towards Goal  Goal: *Knowledge of prescribed medications  Outcome: Progressing Towards Goal     Problem: Patient Education: Go to Patient Education Activity  Goal: Patient/Family Education  Outcome: Progressing Towards Goal     Problem: Risk for Spread of Infection  Goal: Prevent transmission of infectious organism to others  Description: Prevent the transmission of infectious organisms to other patients, staff members, and visitors.   Outcome: Progressing Towards Goal     Problem: Patient Education:  Go to Education Activity  Goal: Patient/Family Education  Outcome: Progressing Towards Goal     Problem: Nutrition Deficit  Goal: *Optimize nutritional status  Outcome: Progressing Towards Goal     Problem: Patient Education: Go to Patient Education Activity  Goal: Patient/Family Education  Outcome: Progressing Towards Goal     Problem: Patient Education: Go to Patient Education Activity  Goal: Patient/Family Education  Outcome: Progressing Towards Goal

## 2021-08-05 NOTE — PROGRESS NOTES
Palliative Medicine    Goals of care defined. Pt wishes for FULL code with FULL aggressive medical management. Will sign off. Please reconsult team as needed/if appropriate. Thank you for the Palliative Medicine consult and allowing us to participate in the care of Mr. Ashly Zayas.       Shannen Lewis RN, BSN  Palliative Medicine Inpatient RN  [facility]  Palliative COPE Line: [phone] ([phone])

## 2021-08-05 NOTE — PROGRESS NOTES
[facility] Hospital[facility]  Progress Note    Patient: Karyn Garcia Age: 64 y.o. : 1960 MR#: [id number] SSN: [id number]  Date: 2021     Subjective/24-hour events:     No new complaints acutely. Family present at bedside. Assessment:   Acute cystitis with hematuria patient with chronically indwelling Chavez catheter  Severe sepsis POA secondary to above  ЕКАТЕРИНА  Acute on chronic anemia requiring PRBCs  Stage IV sacral pressure ulcer, POA,  status post debridement 2021  Bilateral lower extremity DVT  Acute encephalopathy, suspect metabolic  Severe protein calorie malnutrition  Paraplegia      Plan:   Continue current wound care as ordered. Antibiotic therapy per ID recommendations. Await podiatry recommendations on management of right great toe ulcer. GI evaluation pending. Continue to monitor H&H, transfuse as necessary received 1 unit PRBCs yesterday. - Heparin remains on hold. May need to consider vascular evaluation for consideration for IVC filter. Nutrition recommendations noted, RD continues to follow. Supportive care otherwise. Follow. Case discussed with:  [x]Patient  [x]Family  [x]Nursing  [x]Case Management  DVT Prophylaxis:  []Lovenox  []Hep SQ  [x]SCDs  []Coumadin   []On Heparin gtt    Objective:   VS:   Visit Vitals  /90 (BP 1 Location: Left lower arm, BP Patient Position: At rest)   Pulse 83   Temp 97.8 °F (36.6 °C)   Resp 17   Ht 6' 2\" (1.88 m)   Wt 94 kg (207 lb 3.2 oz)   SpO2 100%   BMI 26.60 kg/m²        General:  In NAD. Nontoxic-appearing. Cardiovascular:  RRR. Pulmonary:  Lungs clear bilaterally, no wheezes. No accessory muscle use. GI:  Abdomen soft, NTTP. Extremities:  Warm, no edema or ischemia. Neuro:  Awake and alert. Labs:    Results for Pao Kilpatrick (MRN [id number]) as of 2021 09:40   Ref.  Range 2021 06:02   Sodium Latest Ref Range: 136 - 145 mmol/L 142   Potassium Latest Ref Range: 3.5 - 5.5 mmol/L 3.7 Chloride Latest Ref Range: 100 - 111 mmol/L 112 (H)   CO2 Latest Ref Range: 21 - 32 mmol/L 29   Anion gap Latest Ref Range: 3.0 - 18 mmol/L 1 (L)   Glucose Latest Ref Range: 74 - 99 mg/dL 71 (L)   BUN Latest Ref Range: 7.0 - 18 MG/DL 8   Creatinine Latest Ref Range: 0.6 - 1.3 MG/DL 0.52 (L)   BUN/Creatinine ratio Latest Ref Range: 12 - 20   15   Calcium Latest Ref Range: 8.5 - 10.1 MG/DL 7.4 (L)   Ionized Calcium Latest Ref Range: 1.12 - 1.32 MMOL/L 1.12   Phosphorus Latest Ref Range: 2.5 - 4.9 MG/DL 2.9   Magnesium Latest Ref Range: 1.6 - 2.6 mg/dL 1.5 (L)   GFR est non-AA Latest Ref Range: >60 ml/min/1.73m2 >60   GFR est AA Latest Ref Range: >60 ml/min/1.73m2 >60       Results for Holli Rankin (MRN [id number]) as of 8/5/2021 09:40   Ref. Range 8/4/2021 06:02   WBC Latest Ref Range: 4.6 - 13.2 K/uL 6.5   RBC Latest Ref Range: 4.35 - 5.65 M/uL 2.85 (L)   HGB Latest Ref Range: 13.0 - 16.0 g/dL 7.9 (L)   HCT Latest Ref Range: 36.0 - 48.0 % 25.5 (L)   MCV Latest Ref Range: 74.0 - 97.0 FL 89.5   MCH Latest Ref Range: 24.0 - 34.0 PG 27.7   MCHC Latest Ref Range: 31.0 - 37.0 g/dL 31.0   RDW Latest Ref Range: 11.6 - 14.5 % 13.4   PLATELET Latest Ref Range: 135 - 420 K/uL 283   MPV Latest Ref Range: 9.2 - 11.8 FL 8.9 (L)   NEUTROPHILS Latest Ref Range: 40 - 73 % 61   LYMPHOCYTES Latest Ref Range: 21 - 52 % 27   MONOCYTES Latest Ref Range: 3 - 10 % 9   EOSINOPHILS Latest Ref Range: 0 - 5 % 2   BASOPHILS Latest Ref Range: 0 - 2 % 1   DF Latest Units:   AUTOMATED   ABS. NEUTROPHILS Latest Ref Range: 1.8 - 8.0 K/UL 4.0   ABS. LYMPHOCYTES Latest Ref Range: 0.9 - 3.6 K/UL 1.8   ABS. MONOCYTES Latest Ref Range: 0.05 - 1.2 K/UL 0.6   ABS. EOSINOPHILS Latest Ref Range: 0.0 - 0.4 K/UL 0.1   ABS.  BASOPHILS Latest Ref Range: 0.0 - 0.1 K/UL 0.1     Signed By: Ruel Cross MD     August 4, 2021

## 2021-08-05 NOTE — PROGRESS NOTES
Problem: Mobility Impaired (Adult and Pediatric)  Goal: *Acute Goals and Plan of Care (Insert Text)  Description: Physical Therapy Goals  Initiated 8/3/2021 and to be accomplished within 7 day(s)  1. Patient will move from supine to sit and sit to supine  in bed with minimal assistance/contact guard assist.    2.  Patient will transfer from bed to wheelchair and wheelchair to bed with minimal assistance/contact guard assist using the least restrictive device. 3.  Patient will sit on EOB for 5 min with CGA and no LOB  4. Patient will roll to R/L supine in bed with CGA    PLOF: Pt [address] with his mother in [address] [facility], no JAMES. He has aids from 9-6 during the day to assist him OOB, into his power w/c with a slide board. Pt has a hospital bed as well. Outcome: Progressing Towards Goal   PHYSICAL THERAPY TREATMENT    Patient: Jennifer Montalvo (39 y.o. male)  Date: 8/5/2021  Diagnosis: Sepsis ([facility] [A41.9]  UTI (urinary tract infection) [N39.0]  Hypotension [I95.9] <principal problem not specified>  Procedure(s) (LRB):  COLONOSCOPY (N/A)    Precautions: Fall, Skin, Contact  PLOF: see above    ASSESSMENT:  Pt was cooperative and motivated during treatment session. Pt transitioned supine to sit with max Ax1 using the bed to raise his trunk and TAPS system to bring his hips towards the edge to decrease shearing force on sacral area. In sitting patient relied heavily on UEs for support. Pt performed anterior/posterior trunk movements to increase core strength with occasional min A for balance. Pt then performed reaching activities with one hand at a time to increase balance when reaching outside of the base of support. Pt sat edge of bed approximately 15 minutes gradually requiring increased assistance as he fatigued. Pt returned to supine with max A and was scooted up in bed with max A and bed in trendelenburg position. Pt was positioned slightly on his left side to decrease pressure on his sacral wound.   Pt had needs in reach prior to leaving. Progression toward goals:   []      Improving appropriately and progressing toward goals  [x]      Improving slowly and progressing toward goals  []      Not making progress toward goals and plan of care will be adjusted     PLAN:  Patient continues to benefit from skilled intervention to address the above impairments. Continue treatment per established plan of care. Discharge Recommendations:  Home Health and 24 hour assistance versus Skilled Nursing Facility  Further Equipment Recommendations for Discharge:  mechanical lift     SUBJECTIVE:   Patient stated I move ok at home.     OBJECTIVE DATA SUMMARY:   Critical Behavior:  Neurologic State: Alert  Orientation Level: Oriented X4  Cognition: Follows commands  Safety/Judgement: Fall prevention  Functional Mobility Training:  Bed Mobility:  Supine to Sit: Maximum assistance;Assist x1  Sit to Supine: Maximum assistance;Assist x1  Scooting: Maximum assistance     Balance:  Sitting - Static:  (fair with UE support)  Sitting - Dynamic:  (fair-/poor+)        Pain:  Pain level pre-treatment: 3/10  Pain level post-treatment: 3/10   Pain Intervention(s):  Rest,Repositioning   Response to intervention: Nurse notified, See doc flow    Activity Tolerance:   Poor+  Please refer to the flowsheet for vital signs taken during this treatment. After treatment:   [] Patient left in no apparent distress sitting up in chair  [x] Patient left in no apparent distress in bed  [x] Call bell left within reach  [] Nursing notified  [] Caregiver present  [] Bed alarm activated  [] SCDs applied      COMMUNICATION/EDUCATION:   [x]         Role of Physical Therapy in the acute care setting. [x]         Fall prevention education was provided and the patient/caregiver indicated understanding. [x]         Patient/family have participated as able in working toward goals and plan of care.   [x]         Patient/family agree to work toward stated goals and plan of care.  []         Patient understands intent and goals of therapy, but is neutral about his/her participation.   []         Patient is unable to participate in stated goals/plan of care: ongoing with therapy staff.  []         Other:        Jeremi Egan, PT   Time Calculation: 23 mins
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
## 2021-08-05 NOTE — PROGRESS NOTES
Patient currently on OT caseload. Patient currently has active bedrest orders. Please discontinue when patient is medically appropriate for therapy services.     Attempt x 2    Thank you,  [facility], OTR/L
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
## 2021-08-05 NOTE — PROGRESS NOTES
Bedside shift change report given to Koki Chapa RN (oncoming nurse) by Marty Foley RN (offgoing nurse). Report included the following information SBAR, Kardex, Intake/Output, MAR and Recent Results.
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
## 2021-08-05 NOTE — PROGRESS NOTES
Problem: Patient Education: Go to Patient Education Activity  Goal: Patient/Family Education  Outcome: Not Progressing Towards Goal     Problem: Impaired Skin Integrity/Pressure Injury Treatment  Goal: *Improvement of Existing Pressure Injury  Outcome: Not Progressing Towards Goal  Goal: *Prevention of pressure injury  Description: Document Jordin Scale and appropriate interventions in the flowsheet. Outcome: Not Progressing Towards Goal  Note: Pressure Injury Interventions:  Sensory Interventions: Assess changes in LOC    Moisture Interventions: Absorbent underpads, Internal/External urinary devices, Check for incontinence Q2 hours and as needed    Activity Interventions: Pressure redistribution bed/mattress(bed type)    Mobility Interventions: Pressure redistribution bed/mattress (bed type), PT/OT evaluation, Turn and reposition approx.  every two hours(pillow and wedges)    Nutrition Interventions: Document food/fluid/supplement intake    Friction and Shear Interventions: Foam dressings/transparent film/skin sealants, Minimize layers, Lift team/patient mobility team                Problem: Patient Education: Go to Patient Education Activity  Goal: Patient/Family Education  Outcome: Not Progressing Towards Goal     Problem: Pain  Goal: *Control of Pain  Outcome: Not Progressing Towards Goal  Goal: *PALLIATIVE CARE:  Alleviation of Pain  Outcome: Not Progressing Towards Goal     Problem: Patient Education: Go to Patient Education Activity  Goal: Patient/Family Education  Outcome: Not Progressing Towards Goal     Problem: Injury - Risk of, Adverse Drug Event  Goal: *Absence of adverse drug events  Outcome: Not Progressing Towards Goal  Goal: *Absence of medication errors  Outcome: Not Progressing Towards Goal  Goal: *Knowledge of prescribed medications  Outcome: Not Progressing Towards Goal     Problem: Patient Education: Go to Patient Education Activity  Goal: Patient/Family Education  Outcome: Not Progressing Towards Goal     Problem: Risk for Spread of Infection  Goal: Prevent transmission of infectious organism to others  Description: Prevent the transmission of infectious organisms to other patients, staff members, and visitors.   Outcome: Not Progressing Towards Goal     Problem: Patient Education:  Go to Education Activity  Goal: Patient/Family Education  Outcome: Not Progressing Towards Goal     Problem: Nutrition Deficit  Goal: *Optimize nutritional status  Outcome: Not Progressing Towards Goal     Problem: Patient Education: Go to Patient Education Activity  Goal: Patient/Family Education  Outcome: Not Progressing Towards Goal     Problem: Patient Education: Go to Patient Education Activity  Goal: Patient/Family Education  Outcome: Not Progressing Towards Goal

## 2021-08-05 NOTE — PROGRESS NOTES
[facility] Hospitalists  Progress Note    Patient: Lige Cushing Age: 64 y.o. : 1960 MR#: [id number] SSN: [id number]  Date: 2021     Subjective/24-hour events:     Nothing new/acute overnight. Afebrile. Assessment:   Acute cystitis with hematuria patient with chronically indwelling Chavez catheter  Severe sepsis POA secondary to above  ЕКАТЕРИНА  Acute on chronic anemia requiring PRBCs  Stage IV sacral pressure ulcer, POA,  status post debridement 2021  Bilateral lower extremity DVT  Acute encephalopathy, suspect metabolic  Severe protein calorie malnutrition  Paraplegia    Plan:   Continue antibiotic therapy per ID recommendations. Continue to hold anticoagulation therapy for now given heme-positive stool. Case discussed with gastroenterology today. Ideally, would like to place patient back on anticoagulation therapy for bilateral lower extremity DVTs if able. Colonoscopy would be helpful in this regard. If he is not an ideal candidate to have anticoagulation resumed, will likely ask vascular to evaluate for possible IVC filter placement. Continue wound care as ordered. PT/OT as tolerated. Anticipate discharge home with home health care services when stable. Case discussed with:  [x]Patient  []Family  [x]Nursing  [x]Case Management  DVT Prophylaxis:  []Lovenox  []Hep SQ  []SCDs  []Coumadin   []On Heparin gtt    Objective:   VS:   Visit Vitals  /78 (BP 1 Location: Left lower arm, BP Patient Position: At rest)   Pulse 83   Temp 96.8 °F (36 °C)   Resp 16   Ht 6' 2\" (1.88 m)   Wt 94 kg (207 lb 3.2 oz)   SpO2 100%   BMI 26.60 kg/m²      Tmax/24hrs: Temp (24hrs), Av.6 °F (36.4 °C), Min:96.8 °F (36 °C), Max:98.2 °F (36.8 °C)      Intake/Output Summary (Last 24 hours) at 2021 0945  Last data filed at 2021 0600  Gross per 24 hour   Intake --   Output 650 ml   Net -650 ml       General:  In NAD. Nontoxic-appearing. Cardiovascular:  RRR.     Pulmonary:  Lungs clear bilaterally, no wheezes. GI:  Abdomen soft, NTTP. Extremities:  Warm, no edema or ischemia. Neuro:  Awake and alert.       Labs:    Recent Results (from the past 24 hour(s))   GLUCOSE, POC    Collection Time: 08/04/21 11:29 AM   Result Value Ref Range    Glucose (POC) 73 70 - 110 mg/dL   GLUCOSE, POC    Collection Time: 08/04/21  2:40 PM   Result Value Ref Range    Glucose (POC) 91 70 - 110 mg/dL   GLUCOSE, POC    Collection Time: 08/04/21  9:21 PM   Result Value Ref Range    Glucose (POC) 98 70 - 110 mg/dL   GLUCOSE, POC    Collection Time: 08/05/21  7:51 AM   Result Value Ref Range    Glucose (POC) 75 70 - 110 mg/dL       Signed By: Edgar Flowers MD     August 5, 2021

## 2021-08-06 ENCOUNTER — APPOINTMENT (OUTPATIENT)
Dept: GENERAL RADIOLOGY | Age: 61
DRG: 466 | End: 2021-08-06
Attending: PODIATRIST
Payer: MEDICAID

## 2021-08-06 ENCOUNTER — ANESTHESIA (OUTPATIENT)
Dept: ENDOSCOPY | Age: 61
DRG: 466 | End: 2021-08-06
Payer: MEDICAID

## 2021-08-06 LAB
ANION GAP SERPL CALC-SCNC: 3 MMOL/L (ref 3–18)
APTT PPP: 33.1 SEC (ref 23–36.4)
BACTERIA SPEC CULT: ABNORMAL
BACTERIA SPEC CULT: ABNORMAL
BASOPHILS # BLD: 0.1 K/UL (ref 0–0.1)
BASOPHILS NFR BLD: 1 % (ref 0–2)
BUN SERPL-MCNC: 6 MG/DL (ref 7–18)
BUN/CREAT SERPL: 11 (ref 12–20)
CA-I SERPL-SCNC: 1.18 MMOL/L (ref 1.12–1.32)
CALCIUM SERPL-MCNC: 7.5 MG/DL (ref 8.5–10.1)
CC UR VC: ABNORMAL
CHLORIDE SERPL-SCNC: 108 MMOL/L (ref 100–111)
CO2 SERPL-SCNC: 29 MMOL/L (ref 21–32)
COVID-19 RAPID TEST, COVR: NOT DETECTED
CREAT SERPL-MCNC: 0.53 MG/DL (ref 0.6–1.3)
DIFFERENTIAL METHOD BLD: ABNORMAL
EOSINOPHIL # BLD: 0.1 K/UL (ref 0–0.4)
EOSINOPHIL NFR BLD: 3 % (ref 0–5)
ERYTHROCYTE [DISTWIDTH] IN BLOOD BY AUTOMATED COUNT: 13.4 % (ref 11.6–14.5)
GLUCOSE BLD STRIP.AUTO-MCNC: 103 MG/DL (ref 70–110)
GLUCOSE BLD STRIP.AUTO-MCNC: 120 MG/DL (ref 70–110)
GLUCOSE BLD STRIP.AUTO-MCNC: 62 MG/DL (ref 70–110)
GLUCOSE BLD STRIP.AUTO-MCNC: 70 MG/DL (ref 70–110)
GLUCOSE BLD STRIP.AUTO-MCNC: 76 MG/DL (ref 70–110)
GLUCOSE BLD STRIP.AUTO-MCNC: 81 MG/DL (ref 70–110)
GLUCOSE BLD STRIP.AUTO-MCNC: 94 MG/DL (ref 70–110)
GLUCOSE BLD STRIP.AUTO-MCNC: 96 MG/DL (ref 70–110)
GLUCOSE SERPL-MCNC: 66 MG/DL (ref 74–99)
HCT VFR BLD AUTO: 24.8 % (ref 36–48)
HGB BLD-MCNC: 7.7 G/DL (ref 13–16)
INR PPP: 1.1 (ref 0.8–1.2)
LYMPHOCYTES # BLD: 1.9 K/UL (ref 0.9–3.6)
LYMPHOCYTES NFR BLD: 33 % (ref 21–52)
MAGNESIUM SERPL-MCNC: 1.3 MG/DL (ref 1.6–2.6)
MCH RBC QN AUTO: 28.1 PG (ref 24–34)
MCHC RBC AUTO-ENTMCNC: 31 G/DL (ref 31–37)
MCV RBC AUTO: 90.5 FL (ref 74–97)
MONOCYTES # BLD: 0.6 K/UL (ref 0.05–1.2)
MONOCYTES NFR BLD: 11 % (ref 3–10)
NEUTS SEG # BLD: 3 K/UL (ref 1.8–8)
NEUTS SEG NFR BLD: 53 % (ref 40–73)
PHOSPHATE SERPL-MCNC: 2.8 MG/DL (ref 2.5–4.9)
PLATELET # BLD AUTO: 215 K/UL (ref 135–420)
PMV BLD AUTO: 9.2 FL (ref 9.2–11.8)
POTASSIUM SERPL-SCNC: 4 MMOL/L (ref 3.5–5.5)
PROTHROMBIN TIME: 14.1 SEC (ref 11.5–15.2)
RBC # BLD AUTO: 2.74 M/UL (ref 4.35–5.65)
SARS-COV-2, COV2: NORMAL
SERVICE CMNT-IMP: ABNORMAL
SODIUM SERPL-SCNC: 140 MMOL/L (ref 136–145)
SOURCE, COVRS: NORMAL
WBC # BLD AUTO: 5.7 K/UL (ref 4.6–13.2)

## 2021-08-06 PROCEDURE — 00812 ANES LWR INTST SCR COLSC: CPT | Performed by: NURSE ANESTHETIST, CERTIFIED REGISTERED

## 2021-08-06 PROCEDURE — 36415 COLL VENOUS BLD VENIPUNCTURE: CPT

## 2021-08-06 PROCEDURE — 74011000250 HC RX REV CODE- 250: Performed by: NURSE ANESTHETIST, CERTIFIED REGISTERED

## 2021-08-06 PROCEDURE — 82962 GLUCOSE BLOOD TEST: CPT

## 2021-08-06 PROCEDURE — 80048 BASIC METABOLIC PNL TOTAL CA: CPT

## 2021-08-06 PROCEDURE — 0DJD8ZZ INSPECTION OF LOWER INTESTINAL TRACT, VIA NATURAL OR ARTIFICIAL OPENING ENDOSCOPIC: ICD-10-PCS | Performed by: INTERNAL MEDICINE

## 2021-08-06 PROCEDURE — 00812 ANES LWR INTST SCR COLSC: CPT | Performed by: ANESTHESIOLOGY

## 2021-08-06 PROCEDURE — 84100 ASSAY OF PHOSPHORUS: CPT

## 2021-08-06 PROCEDURE — 74011250636 HC RX REV CODE- 250/636: Performed by: FAMILY MEDICINE

## 2021-08-06 PROCEDURE — 83735 ASSAY OF MAGNESIUM: CPT

## 2021-08-06 PROCEDURE — 74011250636 HC RX REV CODE- 250/636: Performed by: NURSE ANESTHETIST, CERTIFIED REGISTERED

## 2021-08-06 PROCEDURE — 77030008565 HC TBNG SUC IRR ERBE -B: Performed by: INTERNAL MEDICINE

## 2021-08-06 PROCEDURE — 65660000000 HC RM CCU STEPDOWN

## 2021-08-06 PROCEDURE — 76060000032 HC ANESTHESIA 0.5 TO 1 HR: Performed by: INTERNAL MEDICINE

## 2021-08-06 PROCEDURE — 76040000007: Performed by: INTERNAL MEDICINE

## 2021-08-06 PROCEDURE — 74011000258 HC RX REV CODE- 258: Performed by: FAMILY MEDICINE

## 2021-08-06 PROCEDURE — 74011000250 HC RX REV CODE- 250: Performed by: PHYSICIAN ASSISTANT

## 2021-08-06 PROCEDURE — 82330 ASSAY OF CALCIUM: CPT

## 2021-08-06 PROCEDURE — 73620 X-RAY EXAM OF FOOT: CPT

## 2021-08-06 PROCEDURE — 87635 SARS-COV-2 COVID-19 AMP PRB: CPT

## 2021-08-06 PROCEDURE — 77030021593 HC FCPS BIOP ENDOSC BSC -A: Performed by: INTERNAL MEDICINE

## 2021-08-06 PROCEDURE — 77030013992 HC SNR POLYP ENDOSC BSC -B: Performed by: INTERNAL MEDICINE

## 2021-08-06 PROCEDURE — 74011250637 HC RX REV CODE- 250/637: Performed by: INTERNAL MEDICINE

## 2021-08-06 PROCEDURE — 2709999900 HC NON-CHARGEABLE SUPPLY

## 2021-08-06 PROCEDURE — 85025 COMPLETE CBC W/AUTO DIFF WBC: CPT

## 2021-08-06 PROCEDURE — 2709999900 HC NON-CHARGEABLE SUPPLY: Performed by: INTERNAL MEDICINE

## 2021-08-06 PROCEDURE — C9113 INJ PANTOPRAZOLE SODIUM, VIA: HCPCS | Performed by: PHYSICIAN ASSISTANT

## 2021-08-06 PROCEDURE — 99233 SBSQ HOSP IP/OBS HIGH 50: CPT | Performed by: FAMILY MEDICINE

## 2021-08-06 PROCEDURE — 74011250636 HC RX REV CODE- 250/636: Performed by: PHYSICIAN ASSISTANT

## 2021-08-06 PROCEDURE — 85610 PROTHROMBIN TIME: CPT

## 2021-08-06 PROCEDURE — 85730 THROMBOPLASTIN TIME PARTIAL: CPT

## 2021-08-06 RX ORDER — SODIUM CHLORIDE 0.9 % (FLUSH) 0.9 %
5-40 SYRINGE (ML) INJECTION AS NEEDED
Status: DISCONTINUED | OUTPATIENT
Start: 2021-08-06 | End: 2021-08-06 | Stop reason: HOSPADM

## 2021-08-06 RX ORDER — SODIUM CHLORIDE, SODIUM LACTATE, POTASSIUM CHLORIDE, CALCIUM CHLORIDE 600; 310; 30; 20 MG/100ML; MG/100ML; MG/100ML; MG/100ML
INJECTION, SOLUTION INTRAVENOUS
Status: DISCONTINUED | OUTPATIENT
Start: 2021-08-06 | End: 2021-08-06 | Stop reason: HOSPADM

## 2021-08-06 RX ORDER — SODIUM CHLORIDE 0.9 % (FLUSH) 0.9 %
5-40 SYRINGE (ML) INJECTION EVERY 8 HOURS
Status: DISCONTINUED | OUTPATIENT
Start: 2021-08-06 | End: 2021-08-06 | Stop reason: HOSPADM

## 2021-08-06 RX ORDER — SODIUM CHLORIDE, SODIUM LACTATE, POTASSIUM CHLORIDE, CALCIUM CHLORIDE 600; 310; 30; 20 MG/100ML; MG/100ML; MG/100ML; MG/100ML
75 INJECTION, SOLUTION INTRAVENOUS CONTINUOUS
Status: DISCONTINUED | OUTPATIENT
Start: 2021-08-06 | End: 2021-08-06 | Stop reason: HOSPADM

## 2021-08-06 RX ORDER — PROPOFOL 10 MG/ML
INJECTION, EMULSION INTRAVENOUS AS NEEDED
Status: DISCONTINUED | OUTPATIENT
Start: 2021-08-06 | End: 2021-08-06 | Stop reason: HOSPADM

## 2021-08-06 RX ORDER — LIDOCAINE HYDROCHLORIDE 20 MG/ML
INJECTION, SOLUTION EPIDURAL; INFILTRATION; INTRACAUDAL; PERINEURAL AS NEEDED
Status: DISCONTINUED | OUTPATIENT
Start: 2021-08-06 | End: 2021-08-06 | Stop reason: HOSPADM

## 2021-08-06 RX ORDER — MAGNESIUM SULFATE HEPTAHYDRATE 40 MG/ML
2 INJECTION, SOLUTION INTRAVENOUS ONCE
Status: COMPLETED | OUTPATIENT
Start: 2021-08-06 | End: 2021-08-06

## 2021-08-06 RX ADMIN — SULFAMETHOXAZOLE AND TRIMETHOPRIM 1 TABLET: 800; 160 TABLET ORAL at 00:21

## 2021-08-06 RX ADMIN — SODIUM CHLORIDE 40 MG: 9 INJECTION, SOLUTION INTRAMUSCULAR; INTRAVENOUS; SUBCUTANEOUS at 21:42

## 2021-08-06 RX ADMIN — PROPOFOL 50 MG: 10 INJECTION, EMULSION INTRAVENOUS at 15:15

## 2021-08-06 RX ADMIN — SULFAMETHOXAZOLE AND TRIMETHOPRIM 1 TABLET: 800; 160 TABLET ORAL at 21:41

## 2021-08-06 RX ADMIN — PROPOFOL 50 MG: 10 INJECTION, EMULSION INTRAVENOUS at 15:27

## 2021-08-06 RX ADMIN — SODIUM CHLORIDE, SODIUM LACTATE, POTASSIUM CHLORIDE, AND CALCIUM CHLORIDE: 600; 310; 30; 20 INJECTION, SOLUTION INTRAVENOUS at 15:05

## 2021-08-06 RX ADMIN — PROPOFOL 50 MG: 10 INJECTION, EMULSION INTRAVENOUS at 15:18

## 2021-08-06 RX ADMIN — PROPOFOL 50 MG: 10 INJECTION, EMULSION INTRAVENOUS at 15:12

## 2021-08-06 RX ADMIN — IRON SUCROSE 200 MG: 20 INJECTION, SOLUTION INTRAVENOUS at 21:45

## 2021-08-06 RX ADMIN — DAKIN'S SOLUTION 0.125% (QUARTER STRENGTH): 0.12 SOLUTION at 18:38

## 2021-08-06 RX ADMIN — DEXTROSE MONOHYDRATE 25 G: 25 INJECTION, SOLUTION INTRAVENOUS at 08:14

## 2021-08-06 RX ADMIN — MAGNESIUM SULFATE HEPTAHYDRATE 2 G: 2 INJECTION, SOLUTION INTRAVENOUS at 18:37

## 2021-08-06 RX ADMIN — SODIUM CHLORIDE 40 MG: 9 INJECTION, SOLUTION INTRAMUSCULAR; INTRAVENOUS; SUBCUTANEOUS at 00:21

## 2021-08-06 RX ADMIN — SODIUM CHLORIDE 40 MG: 9 INJECTION, SOLUTION INTRAMUSCULAR; INTRAVENOUS; SUBCUTANEOUS at 11:09

## 2021-08-06 RX ADMIN — LIDOCAINE HYDROCHLORIDE 80 MG: 20 INJECTION, SOLUTION EPIDURAL; INFILTRATION; INTRACAUDAL; PERINEURAL at 15:10

## 2021-08-06 RX ADMIN — PROPOFOL 50 MG: 10 INJECTION, EMULSION INTRAVENOUS at 15:20

## 2021-08-06 RX ADMIN — DAKIN'S SOLUTION 0.125% (QUARTER STRENGTH): 0.12 SOLUTION at 11:10

## 2021-08-06 RX ADMIN — PROPOFOL 50 MG: 10 INJECTION, EMULSION INTRAVENOUS at 15:11

## 2021-08-06 RX ADMIN — SODIUM CHLORIDE, SODIUM LACTATE, POTASSIUM CHLORIDE, AND CALCIUM CHLORIDE 75 ML/HR: 600; 310; 30; 20 INJECTION, SOLUTION INTRAVENOUS at 14:56

## 2021-08-06 NOTE — PROCEDURES
[facility]  [facility], [address]    Procedure Note    Patient: Gilberto Rojo MRN: [id number]  SSN: [id number]    YOB: 1960  Age: 64 y.o. Sex: male        Date/Time:  8/6/2021 3:32 PM    Colonoscopy Operative Report    Procedure Type:   Colonoscopy --diagnostic     Indications:     Occult blood in stool  Pre-operative Diagnosis: see indication above  Post-operative Diagnosis:  See findings below  :    Referring Provider: Kenyon Lyon MD    Exam:  Airway: clear, no airway problems anticipated  Heart: RRR, without gallops or rubs  Lungs: clear bilaterally without wheezes, crackles, or rhonchi  Abdomen: soft, nontender, nondistended, bowel sounds present  Mental Status: awake, alert and oriented to person, place and time    Sedation:  MAC anesthesia Propofol  Procedure Details:  After informed consent was obtained with all risks and benefits of procedure explained and preoperative exam completed, the patient was taken to the endoscopy suite and placed in the left lateral decubitus position. Upon sequential sedation as per above, a digital rectal exam was performed demonstrating no  hemorrhoids. The Olympus videocolonoscope  was inserted in the rectum and carefully advanced to the cecum, which was identified by the ileocecal valve and appendiceal orifice. The quality of preparation was sub optimal for small lesions . Sara Colder The colonoscope was slowly withdrawn with careful evaluation between folds. Retroflexion in the rectum was completed demonstrating no hemorrhoids. Findings: 1. Normal Wynne's pouch to 24cm 2. No lesions seen in colonoscopy to cecum via colostomy 3. Sub optimal prep for smaller lesions 4. No bleed source seen     Specimen Removed:  None     Complications: None. EBL:  None. Surgical assistants none   Implants none     Impression:  1. normal wynne's pouch to 24cm 2. normal colonoscopy via stoma to cecum 3. sub optimal prep for small lesions   4. no bleeding source identified     Recommendations: --no further gi work up rec at this time , call for questions or concerns, will sign off      Discharge Disposition: Return to [facility]      Eva Rizzo MD

## 2021-08-06 NOTE — ROUTINE PROCESS
TRANSFER - IN REPORT:    Verbal report received from Loyda Pan RN on Teodoro Knox  being received from [facility] for ordered procedure      Report consisted of patients Situation, Background, Assessment and   Recommendations(SBAR). Information from the following report(s) SBAR was reviewed with the receiving nurse. Opportunity for questions and clarification was provided. Assessment completed upon patients arrival to unit and care assumed.

## 2021-08-06 NOTE — PROGRESS NOTES
Problem: Impaired Skin Integrity/Pressure Injury Treatment  Goal: *Improvement of Existing Pressure Injury  8/6/2021 1401 by Ashley Gonzales RN  Outcome: Progressing Towards Goal  8/6/2021 1400 by Ashley Gonzales RN  Outcome: Progressing Towards Goal  Goal: *Prevention of pressure injury  Description: Document Jordin Scale and appropriate interventions in the flowsheet.   8/6/2021 1401 by Ashley Gonzales RN  Outcome: Progressing Towards Goal  Note: Pressure Injury Interventions:  Sensory Interventions: Assess changes in LOC    Moisture Interventions: Absorbent underpads    Activity Interventions: Pressure redistribution bed/mattress(bed type)    Mobility Interventions: Pressure redistribution bed/mattress (bed type)    Nutrition Interventions: Document food/fluid/supplement intake    Friction and Shear Interventions: Foam dressings/transparent film/skin sealants             8/6/2021 1400 by Ashley Gonzales RN  Outcome: Progressing Towards Goal  Note: Pressure Injury Interventions:  Sensory Interventions: Assess changes in LOC    Moisture Interventions: Absorbent underpads    Activity Interventions: Pressure redistribution bed/mattress(bed type)    Mobility Interventions: Pressure redistribution bed/mattress (bed type)    Nutrition Interventions: Document food/fluid/supplement intake    Friction and Shear Interventions: Foam dressings/transparent film/skin sealants                Problem: Pain  Goal: *Control of Pain  8/6/2021 1401 by Ashley Gonzales RN  Outcome: Progressing Towards Goal  8/6/2021 1400 by Ashley Gonzales RN  Outcome: Progressing Towards Goal     Problem: Injury - Risk of, Adverse Drug Event  Goal: *Absence of adverse drug events  8/6/2021 1401 by Ashley Gonzales RN  Outcome: Progressing Towards Goal  8/6/2021 1400 by Ashley Gonzales RN  Outcome: Progressing Towards Goal  Goal: *Absence of medication errors  8/6/2021 1401 by Ashley Gonzales RN  Outcome: Progressing Towards Goal  8/6/2021 1400 by Jessica Harvey RN  Outcome: Progressing Towards Goal  Goal: *Knowledge of prescribed medications  8/6/2021 1401 by Jessica Harvey RN  Outcome: Progressing Towards Goal  8/6/2021 1400 by Jessica Harvey RN  Outcome: Progressing Towards Goal     Problem: Risk for Spread of Infection  Goal: Prevent transmission of infectious organism to others  Description: Prevent the transmission of infectious organisms to other patients, staff members, and visitors.   8/6/2021 1401 by Jessica Harvey RN  Outcome: Progressing Towards Goal  8/6/2021 1400 by Jessica Harvey RN  Outcome: Progressing Towards Goal     Problem: Nutrition Deficit  Goal: *Optimize nutritional status  8/6/2021 1401 by Jessica Harvey RN  Outcome: Progressing Towards Goal  8/6/2021 1400 by Jessica Harvey RN  Outcome: Progressing Towards Goal

## 2021-08-06 NOTE — PROGRESS NOTES
[facility] Hospital[facility]  Progress Note    Patient: Gato Gonzales Age: 64 y.o. : 1960 MR#: [id number] SSN: [id number]  Date: 2021     Subjective/24-hour events:     Stable overnight no new issues. Assessment:   Acute cystitis with hematuria patient with chronically indwelling Chavez catheter  Severe sepsis POA secondary to above  ЕКАТЕРИНА  Acute on chronic anemia requiring PRBCs  Iron deficiency/low iron stores  Stage IV sacral pressure ulcer, POA,  status post debridement 2021  Bilateral lower extremity DVT  Acute encephalopathy, suspect metabolic  Severe protein calorie malnutrition  Paraplegia    Plan:   Antibiotic therapy per ID recommendations. Currently on Bactrim DS. Continue wound care. Await results of colonoscopy today. If no significant findings will likely resume anticoagulation therapy for treatment of DVTs. Replace magnesium intravenously, repeat level in the morning. We will initiate IV iron x3 doses today given results of iron profile. PT/OT as able/tolerated. Supportive care otherwise. Follow. Anticipated disposition is home with home health care services. Case discussed with:  [x]Patient  []Family  [x]Nursing  [x]Case Management  DVT Prophylaxis:  []Lovenox  []Hep SQ  []SCDs  []Coumadin   []On Heparin gtt    Objective:   VS:   Visit Vitals  /76 (BP 1 Location: Right upper arm, BP Patient Position: At rest)   Pulse 81   Temp 97.7 °F (36.5 °C)   Resp 18   Ht 6' 2\" (1.88 m)   Wt 94 kg (207 lb 3.2 oz)   SpO2 100%   BMI 26.60 kg/m²      Tmax/24hrs: Temp (24hrs), Av °F (36.7 °C), Min:97.7 °F (36.5 °C), Max:98.3 °F (36.8 °C)      Intake/Output Summary (Last 24 hours) at 2021 1130  Last data filed at 2021 1855  Gross per 24 hour   Intake --   Output 1150 ml   Net -1150 ml       General:  In NAD. Nontoxic-appearing. Cardiovascular:  RRR. Pulmonary:  Lungs clear bilaterally, no wheezes. GI:  Abdomen soft, NTTP.   Extremities:  Warm, no edema or ischemia. Neuro:  Awake and alert. Labs:    Recent Results (from the past 24 hour(s))   GLUCOSE, POC    Collection Time: 08/05/21  4:24 PM   Result Value Ref Range    Glucose (POC) 77 70 - 110 mg/dL   HGB & HCT    Collection Time: 08/05/21  8:33 PM   Result Value Ref Range    HGB 7.9 (L) 13.0 - 16.0 g/dL    HCT 25.9 (L) 36.0 - 48.0 %   GLUCOSE, POC    Collection Time: 08/05/21 10:10 PM   Result Value Ref Range    Glucose (POC) 69 (L) 70 - 110 mg/dL   GLUCOSE, POC    Collection Time: 08/06/21  1:02 AM   Result Value Ref Range    Glucose (POC) 103 70 - 110 mg/dL   MAGNESIUM    Collection Time: 08/06/21  5:50 AM   Result Value Ref Range    Magnesium 1.3 (L) 1.6 - 2.6 mg/dL   PHOSPHORUS    Collection Time: 08/06/21  5:50 AM   Result Value Ref Range    Phosphorus 2.8 2.5 - 4.9 MG/DL   PROTHROMBIN TIME + INR    Collection Time: 08/06/21  5:50 AM   Result Value Ref Range    Prothrombin time 14.1 11.5 - 15.2 sec    INR 1.1 0.8 - 1.2     CBC WITH AUTOMATED DIFF    Collection Time: 08/06/21  5:50 AM   Result Value Ref Range    WBC 5.7 4.6 - 13.2 K/uL    RBC 2.74 (L) 4.35 - 5.65 M/uL    HGB 7.7 (L) 13.0 - 16.0 g/dL    HCT 24.8 (L) 36.0 - 48.0 %    MCV 90.5 74.0 - 97.0 FL    MCH 28.1 24.0 - 34.0 PG    MCHC 31.0 31.0 - 37.0 g/dL    RDW 13.4 11.6 - 14.5 %    PLATELET 340 102 - 008 K/uL    MPV 9.2 9.2 - 11.8 FL    NEUTROPHILS 53 40 - 73 %    LYMPHOCYTES 33 21 - 52 %    MONOCYTES 11 (H) 3 - 10 %    EOSINOPHILS 3 0 - 5 %    BASOPHILS 1 0 - 2 %    ABS. NEUTROPHILS 3.0 1.8 - 8.0 K/UL    ABS. LYMPHOCYTES 1.9 0.9 - 3.6 K/UL    ABS. MONOCYTES 0.6 0.05 - 1.2 K/UL    ABS. EOSINOPHILS 0.1 0.0 - 0.4 K/UL    ABS.  BASOPHILS 0.1 0.0 - 0.1 K/UL    DF AUTOMATED     METABOLIC PANEL, BASIC    Collection Time: 08/06/21  5:50 AM   Result Value Ref Range    Sodium 140 136 - 145 mmol/L    Potassium 4.0 3.5 - 5.5 mmol/L    Chloride 108 100 - 111 mmol/L    CO2 29 21 - 32 mmol/L    Anion gap 3 3.0 - 18 mmol/L    Glucose 66 (L) 74 - 99 mg/dL    BUN 6 (L) 7.0 - 18 MG/DL    Creatinine 0.53 (L) 0.6 - 1.3 MG/DL    BUN/Creatinine ratio 11 (L) 12 - 20      GFR est AA >60 >60 ml/min/1.73m2    GFR est non-AA >60 >60 ml/min/1.73m2    Calcium 7.5 (L) 8.5 - 10.1 MG/DL   CALCIUM, IONIZED    Collection Time: 08/06/21  5:50 AM   Result Value Ref Range    Ionized Calcium 1.18 1.12 - 1.32 MMOL/L   PTT    Collection Time: 08/06/21  5:50 AM   Result Value Ref Range    aPTT 33.1 23.0 - 36.4 SEC   GLUCOSE, POC    Collection Time: 08/06/21  7:58 AM   Result Value Ref Range    Glucose (POC) 62 (L) 70 - 110 mg/dL   GLUCOSE, POC    Collection Time: 08/06/21  9:04 AM   Result Value Ref Range    Glucose (POC) 120 (H) 70 - 110 mg/dL   SARS-COV-2    Collection Time: 08/06/21 10:58 AM   Result Value Ref Range    SARS-CoV-2 Please find results under separate order     GLUCOSE, POC    Collection Time: 08/06/21 11:20 AM   Result Value Ref Range    Glucose (POC) 81 70 - 110 mg/dL       Signed By: Laxmi Briceno MD     August 6, 2021
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
## 2021-08-06 NOTE — PERIOP NOTES
TRANSFER - OUT REPORT:    Verbal report given to Katalina(name) on Jennifer Montalvo  being transferred to [address] [place] [address](unit) for routine post - op       Report consisted of patients Situation, Background, Assessment and   Recommendations(SBAR). Information from the following report(s) SBAR, Kardex and Procedure Summary was reviewed with the receiving nurse. Lines:   PICC Double Lumen 86/25/67 Left;Basilic (Active)   Central Line Being Utilized Yes 08/06/21 1444   Criteria for Appropriate Use Limited/no vessel suitable for conventional peripheral access 08/06/21 1444   Site Assessment Clean, dry, & intact 08/06/21 1444   Phlebitis Assessment 0 08/06/21 1444   Infiltration Assessment 0 08/06/21 1444   Date of Last Dressing Change 08/02/21 08/06/21 0819   Dressing Status Clean, dry, & intact 08/06/21 1444   Action Taken Open ports on tubing capped 08/06/21 0819   Dressing Type Disk with Chlorhexadine gluconate (CHG); Transparent 08/06/21 0819   Hub Color/Line Status Purple;Flushed 08/06/21 1444   Positive Blood Return (Site #1) Yes 08/06/21 1444   Hub Color/Line Status Purple 08/06/21 0819   Positive Blood Return (Site #2) Yes 08/06/21 0819   Alcohol Cap Used Yes 08/05/21 0800        Opportunity for questions and clarification was provided.       Patient transported with:  Transport tech
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
## 2021-08-07 LAB
ANION GAP SERPL CALC-SCNC: 2 MMOL/L (ref 3–18)
APTT PPP: 37.1 SEC (ref 23–36.4)
BASOPHILS # BLD: 0 K/UL (ref 0–0.1)
BASOPHILS NFR BLD: 1 % (ref 0–2)
BUN SERPL-MCNC: 6 MG/DL (ref 7–18)
BUN/CREAT SERPL: 9 (ref 12–20)
CA-I SERPL-SCNC: 1.19 MMOL/L (ref 1.12–1.32)
CALCIUM SERPL-MCNC: 7.4 MG/DL (ref 8.5–10.1)
CHLORIDE SERPL-SCNC: 107 MMOL/L (ref 100–111)
CO2 SERPL-SCNC: 29 MMOL/L (ref 21–32)
CREAT SERPL-MCNC: 0.64 MG/DL (ref 0.6–1.3)
DIFFERENTIAL METHOD BLD: ABNORMAL
EOSINOPHIL # BLD: 0.1 K/UL (ref 0–0.4)
EOSINOPHIL NFR BLD: 2 % (ref 0–5)
ERYTHROCYTE [DISTWIDTH] IN BLOOD BY AUTOMATED COUNT: 13.4 % (ref 11.6–14.5)
GLUCOSE BLD STRIP.AUTO-MCNC: 103 MG/DL (ref 70–110)
GLUCOSE BLD STRIP.AUTO-MCNC: 87 MG/DL (ref 70–110)
GLUCOSE BLD STRIP.AUTO-MCNC: 88 MG/DL (ref 70–110)
GLUCOSE BLD STRIP.AUTO-MCNC: 95 MG/DL (ref 70–110)
GLUCOSE SERPL-MCNC: 69 MG/DL (ref 74–99)
HCT VFR BLD AUTO: 24.5 % (ref 36–48)
HGB BLD-MCNC: 7.6 G/DL (ref 13–16)
INR PPP: 1.2 (ref 0.8–1.2)
LYMPHOCYTES # BLD: 2.2 K/UL (ref 0.9–3.6)
LYMPHOCYTES NFR BLD: 37 % (ref 21–52)
MAGNESIUM SERPL-MCNC: 1.6 MG/DL (ref 1.6–2.6)
MCH RBC QN AUTO: 28 PG (ref 24–34)
MCHC RBC AUTO-ENTMCNC: 31 G/DL (ref 31–37)
MCV RBC AUTO: 90.4 FL (ref 74–97)
MONOCYTES # BLD: 0.6 K/UL (ref 0.05–1.2)
MONOCYTES NFR BLD: 10 % (ref 3–10)
NEUTS SEG # BLD: 3 K/UL (ref 1.8–8)
NEUTS SEG NFR BLD: 50 % (ref 40–73)
PHOSPHATE SERPL-MCNC: 2.8 MG/DL (ref 2.5–4.9)
PLATELET # BLD AUTO: 191 K/UL (ref 135–420)
PMV BLD AUTO: 9.4 FL (ref 9.2–11.8)
POTASSIUM SERPL-SCNC: 4.2 MMOL/L (ref 3.5–5.5)
PROTHROMBIN TIME: 14.6 SEC (ref 11.5–15.2)
RBC # BLD AUTO: 2.71 M/UL (ref 4.35–5.65)
SODIUM SERPL-SCNC: 138 MMOL/L (ref 136–145)
WBC # BLD AUTO: 6 K/UL (ref 4.6–13.2)

## 2021-08-07 PROCEDURE — 74011250637 HC RX REV CODE- 250/637: Performed by: FAMILY MEDICINE

## 2021-08-07 PROCEDURE — 74011000258 HC RX REV CODE- 258: Performed by: FAMILY MEDICINE

## 2021-08-07 PROCEDURE — 82962 GLUCOSE BLOOD TEST: CPT

## 2021-08-07 PROCEDURE — 36415 COLL VENOUS BLD VENIPUNCTURE: CPT

## 2021-08-07 PROCEDURE — 83735 ASSAY OF MAGNESIUM: CPT

## 2021-08-07 PROCEDURE — 74011250636 HC RX REV CODE- 250/636: Performed by: PHYSICIAN ASSISTANT

## 2021-08-07 PROCEDURE — 74011250636 HC RX REV CODE- 250/636: Performed by: FAMILY MEDICINE

## 2021-08-07 PROCEDURE — 84100 ASSAY OF PHOSPHORUS: CPT

## 2021-08-07 PROCEDURE — 85730 THROMBOPLASTIN TIME PARTIAL: CPT

## 2021-08-07 PROCEDURE — 36592 COLLECT BLOOD FROM PICC: CPT

## 2021-08-07 PROCEDURE — 82330 ASSAY OF CALCIUM: CPT

## 2021-08-07 PROCEDURE — 65660000000 HC RM CCU STEPDOWN

## 2021-08-07 PROCEDURE — 99232 SBSQ HOSP IP/OBS MODERATE 35: CPT | Performed by: FAMILY MEDICINE

## 2021-08-07 PROCEDURE — 74011000250 HC RX REV CODE- 250: Performed by: PHYSICIAN ASSISTANT

## 2021-08-07 PROCEDURE — 74011250637 HC RX REV CODE- 250/637: Performed by: INTERNAL MEDICINE

## 2021-08-07 PROCEDURE — 85610 PROTHROMBIN TIME: CPT

## 2021-08-07 PROCEDURE — 80048 BASIC METABOLIC PNL TOTAL CA: CPT

## 2021-08-07 PROCEDURE — C9113 INJ PANTOPRAZOLE SODIUM, VIA: HCPCS | Performed by: PHYSICIAN ASSISTANT

## 2021-08-07 PROCEDURE — 85025 COMPLETE CBC W/AUTO DIFF WBC: CPT

## 2021-08-07 RX ORDER — PANTOPRAZOLE SODIUM 40 MG/1
40 TABLET, DELAYED RELEASE ORAL 2 TIMES DAILY
Status: DISCONTINUED | OUTPATIENT
Start: 2021-08-07 | End: 2021-08-18 | Stop reason: HOSPADM

## 2021-08-07 RX ADMIN — DAKIN'S SOLUTION 0.125% (QUARTER STRENGTH): 0.12 SOLUTION at 18:28

## 2021-08-07 RX ADMIN — SODIUM CHLORIDE 40 MG: 9 INJECTION, SOLUTION INTRAMUSCULAR; INTRAVENOUS; SUBCUTANEOUS at 08:20

## 2021-08-07 RX ADMIN — SULFAMETHOXAZOLE AND TRIMETHOPRIM 1 TABLET: 800; 160 TABLET ORAL at 08:20

## 2021-08-07 RX ADMIN — IRON SUCROSE 200 MG: 20 INJECTION, SOLUTION INTRAVENOUS at 19:57

## 2021-08-07 RX ADMIN — PANTOPRAZOLE 40 MG: 40 TABLET, DELAYED RELEASE ORAL at 20:21

## 2021-08-07 RX ADMIN — DAKIN'S SOLUTION 0.125% (QUARTER STRENGTH): 0.12 SOLUTION at 08:20

## 2021-08-07 RX ADMIN — SULFAMETHOXAZOLE AND TRIMETHOPRIM 1 TABLET: 800; 160 TABLET ORAL at 20:21

## 2021-08-07 NOTE — PROGRESS NOTES
Problem: Pain  Goal: *Control of Pain  Outcome: Progressing Towards Goal  Goal: *PALLIATIVE CARE:  Alleviation of Pain  Outcome: Progressing Towards Goal     Problem: Nutrition Deficit  Goal: *Optimize nutritional status  Outcome: Progressing Towards Goal     Problem: Infection - Risk of, Urinary Catheter-Associated Urinary Tract Infection  Goal: *Absence of infection signs and symptoms  Outcome: Progressing Towards Goal

## 2021-08-07 NOTE — ANESTHESIA POSTPROCEDURE EVALUATION
Procedure(s):  COLONOSCOPY. MAC    Anesthesia Post Evaluation      Multimodal analgesia: multimodal analgesia used between 6 hours prior to anesthesia start to PACU discharge  Patient location during evaluation: bedside  Patient participation: complete - patient participated  Level of consciousness: awake  Pain management: adequate  Airway patency: patent  Anesthetic complications: no  Cardiovascular status: stable  Respiratory status: acceptable  Hydration status: acceptable  Post anesthesia nausea and vomiting:  controlled      INITIAL Post-op Vital signs:   Vitals Value Taken Time   /72 08/06/21 1559   Temp     Pulse 84 08/06/21 1601   Resp 19 08/06/21 1601   SpO2 100 % 08/06/21 1601   Vitals shown include unvalidated device data.

## 2021-08-07 NOTE — PROGRESS NOTES
Problem: Patient Education: Go to Patient Education Activity  Goal: Patient/Family Education  Outcome: Progressing Towards Goal     Problem: Impaired Skin Integrity/Pressure Injury Treatment  Goal: *Improvement of Existing Pressure Injury  Outcome: Progressing Towards Goal  Goal: *Prevention of pressure injury  Description: Document Jordin Scale and appropriate interventions in the flowsheet. Outcome: Progressing Towards Goal  Note: Pressure Injury Interventions:  Sensory Interventions: Assess changes in LOC    Moisture Interventions: Absorbent underpads    Activity Interventions: Pressure redistribution bed/mattress(bed type)    Mobility Interventions: Pressure redistribution bed/mattress (bed type)    Nutrition Interventions: Document food/fluid/supplement intake    Friction and Shear Interventions: Foam dressings/transparent film/skin sealants                Problem: Patient Education: Go to Patient Education Activity  Goal: Patient/Family Education  Outcome: Progressing Towards Goal     Problem: Pain  Goal: *Control of Pain  Outcome: Progressing Towards Goal  Goal: *PALLIATIVE CARE:  Alleviation of Pain  Outcome: Progressing Towards Goal     Problem: Patient Education: Go to Patient Education Activity  Goal: Patient/Family Education  Outcome: Progressing Towards Goal     Problem: Injury - Risk of, Adverse Drug Event  Goal: *Absence of adverse drug events  Outcome: Progressing Towards Goal  Goal: *Absence of medication errors  Outcome: Progressing Towards Goal  Goal: *Knowledge of prescribed medications  Outcome: Progressing Towards Goal     Problem: Patient Education: Go to Patient Education Activity  Goal: Patient/Family Education  Outcome: Progressing Towards Goal     Problem: Risk for Spread of Infection  Goal: Prevent transmission of infectious organism to others  Description: Prevent the transmission of infectious organisms to other patients, staff members, and visitors.   Outcome: Progressing Towards Goal     Problem: Patient Education:  Go to Education Activity  Goal: Patient/Family Education  Outcome: Progressing Towards Goal     Problem: Nutrition Deficit  Goal: *Optimize nutritional status  Outcome: Progressing Towards Goal     Problem: Patient Education: Go to Patient Education Activity  Goal: Patient/Family Education  Outcome: Progressing Towards Goal     Problem: Patient Education: Go to Patient Education Activity  Goal: Patient/Family Education  Outcome: Progressing Towards Goal     Problem: Infection - Risk of, Urinary Catheter-Associated Urinary Tract Infection  Goal: *Absence of infection signs and symptoms  Outcome: Progressing Towards Goal     Problem: Patient Education: Go to Patient Education Activity  Goal: Patient/Family Education  Outcome: Progressing Towards Goal

## 2021-08-07 NOTE — PROGRESS NOTES
Pharmacy Dosing Services: IV to PO P&T Therapeutic Interchange    The pharmacist has determined that this patient meets P & T approved criteria for conversion from IV to oral therapy for the following medication: Pantoprazole    The pharmacist has written the following order for the patient: Pantoprazole 40 mg PO BID. The pharmacist will continue to monitor the patient's status and advise the physician if conversion back to IV therapy is recommended.     Signed Noel Tobar PHARMD

## 2021-08-07 NOTE — PROGRESS NOTES
Bedside shift change report given to Kp Lin RN (oncoming nurse) by Rhina Reed RN (offgoing nurse). Report included the following information SBAR, Kardex and MAR.

## 2021-08-07 NOTE — PROGRESS NOTES
[facility]  Progress Note    Patient: Jonathan Posada Age: 64 y.o. : 1960 MR#: [id number] SSN: [id number]  Date: 2021     Subjective/24-hour events:     Colonoscopy completed yesterday. Assessment:   Acute cystitis with hematuria patient with chronically indwelling Chavez catheter  Severe sepsis POA secondary to above  ЕКАТЕРИНА  Acute on chronic anemia requiring PRBCs  Iron deficiency/low iron stores  Stage IV sacral pressure ulcer, POA,  status post debridement 2021  Bilateral lower extremity DVT  Acute encephalopathy, suspect metabolic  Severe protein calorie malnutrition  Paraplegia    Plan:   Colonoscopy results reviewed. Prep not great but no lesions and no bleeding source identified. Will resume heparin as previously ordered and transition to oral AC therapy when appropriate. Monitor H/H, platelets. Podiatry evaluation appreciated - await plain films. Mg2+, K+ and Phos OK today. Monitor and continue to replace as necessary. IV iron x 3 doses - first dose yesterday, last dose tomorrow. PT/OT as tolerated. Wound care to continue as ordered. Other care primarily supportive. folow. Case discussed with:  [x]Patient  []Family  [x]Nursing  []Case Management  DVT Prophylaxis:  []Lovenox  []Hep SQ  []SCDs  []Coumadin   []On Heparin gtt    Objective:   VS:   Visit Vitals  /67 (BP 1 Location: Right upper arm, BP Patient Position: At rest)   Pulse 90   Temp 98.7 °F (37.1 °C)   Resp 18   Ht 6' 2\" (1.88 m)   Wt 89.8 kg (197 lb 14.4 oz)   SpO2 98%   BMI 25.41 kg/m²      Tmax/24hrs: Temp (24hrs), Av.2 °F (36.8 °C), Min:97.7 °F (36.5 °C), Max:99.2 °F (37.3 °C)      Intake/Output Summary (Last 24 hours) at 2021 0900  Last data filed at 2021 4341  Gross per 24 hour   Intake 680 ml   Output 700 ml   Net -20 ml       General:  In NAD. Nontoxic-appearing. Cardiovascular:  RRR. Pulmonary:  Lungs clear bilaterally, no wheezes.     GI:  Abdomen soft, NTTP. Extremities:  Warm, no edema or ischemia. Neuro:  Awake and alert.       Labs:    Recent Results (from the past 24 hour(s))   GLUCOSE, POC    Collection Time: 08/06/21  9:04 AM   Result Value Ref Range    Glucose (POC) 120 (H) 70 - 110 mg/dL   SARS-COV-2    Collection Time: 08/06/21 10:58 AM   Result Value Ref Range    SARS-CoV-2 Please find results under separate order     COVID-19 RAPID TEST    Collection Time: 08/06/21 10:58 AM   Result Value Ref Range    Specimen source Nasopharyngeal      COVID-19 rapid test Not detected NOTD     GLUCOSE, POC    Collection Time: 08/06/21 11:20 AM   Result Value Ref Range    Glucose (POC) 81 70 - 110 mg/dL   GLUCOSE, POC    Collection Time: 08/06/21  2:49 PM   Result Value Ref Range    Glucose (POC) 76 70 - 110 mg/dL   GLUCOSE, POC    Collection Time: 08/06/21  3:42 PM   Result Value Ref Range    Glucose (POC) 70 70 - 110 mg/dL   GLUCOSE, POC    Collection Time: 08/06/21  4:26 PM   Result Value Ref Range    Glucose (POC) 96 70 - 110 mg/dL   GLUCOSE, POC    Collection Time: 08/06/21  9:20 PM   Result Value Ref Range    Glucose (POC) 94 70 - 110 mg/dL   MAGNESIUM    Collection Time: 08/07/21  4:28 AM   Result Value Ref Range    Magnesium 1.6 1.6 - 2.6 mg/dL   PHOSPHORUS    Collection Time: 08/07/21  4:28 AM   Result Value Ref Range    Phosphorus 2.8 2.5 - 4.9 MG/DL   PROTHROMBIN TIME + INR    Collection Time: 08/07/21  4:28 AM   Result Value Ref Range    Prothrombin time 14.6 11.5 - 15.2 sec    INR 1.2 0.8 - 1.2     CBC WITH AUTOMATED DIFF    Collection Time: 08/07/21  4:28 AM   Result Value Ref Range    WBC 6.0 4.6 - 13.2 K/uL    RBC 2.71 (L) 4.35 - 5.65 M/uL    HGB 7.6 (L) 13.0 - 16.0 g/dL    HCT 24.5 (L) 36.0 - 48.0 %    MCV 90.4 74.0 - 97.0 FL    MCH 28.0 24.0 - 34.0 PG    MCHC 31.0 31.0 - 37.0 g/dL    RDW 13.4 11.6 - 14.5 %    PLATELET 097 168 - 007 K/uL    MPV 9.4 9.2 - 11.8 FL    NEUTROPHILS 50 40 - 73 %    LYMPHOCYTES 37 21 - 52 %    MONOCYTES 10 3 - 10 % EOSINOPHILS 2 0 - 5 %    BASOPHILS 1 0 - 2 %    ABS. NEUTROPHILS 3.0 1.8 - 8.0 K/UL    ABS. LYMPHOCYTES 2.2 0.9 - 3.6 K/UL    ABS. MONOCYTES 0.6 0.05 - 1.2 K/UL    ABS. EOSINOPHILS 0.1 0.0 - 0.4 K/UL    ABS.  BASOPHILS 0.0 0.0 - 0.1 K/UL    DF AUTOMATED     METABOLIC PANEL, BASIC    Collection Time: 08/07/21  4:28 AM   Result Value Ref Range    Sodium 138 136 - 145 mmol/L    Potassium 4.2 3.5 - 5.5 mmol/L    Chloride 107 100 - 111 mmol/L    CO2 29 21 - 32 mmol/L    Anion gap 2 (L) 3.0 - 18 mmol/L    Glucose 69 (L) 74 - 99 mg/dL    BUN 6 (L) 7.0 - 18 MG/DL    Creatinine 0.64 0.6 - 1.3 MG/DL    BUN/Creatinine ratio 9 (L) 12 - 20      GFR est AA >60 >60 ml/min/1.73m2    GFR est non-AA >60 >60 ml/min/1.73m2    Calcium 7.4 (L) 8.5 - 10.1 MG/DL   CALCIUM, IONIZED    Collection Time: 08/07/21  4:28 AM   Result Value Ref Range    Ionized Calcium 1.19 1.12 - 1.32 MMOL/L   PTT    Collection Time: 08/07/21  4:28 AM   Result Value Ref Range    aPTT 37.1 (H) 23.0 - 36.4 SEC   GLUCOSE, POC    Collection Time: 08/07/21  8:56 AM   Result Value Ref Range    Glucose (POC) 87 70 - 110 mg/dL       Signed By: Kushal Wu MD     August 7, 2021
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
## 2021-08-08 LAB
ANION GAP SERPL CALC-SCNC: 2 MMOL/L (ref 3–18)
APTT PPP: 39.7 SEC (ref 23–36.4)
BASOPHILS # BLD: 0 K/UL (ref 0–0.1)
BASOPHILS NFR BLD: 1 % (ref 0–2)
BUN SERPL-MCNC: 7 MG/DL (ref 7–18)
BUN/CREAT SERPL: 10 (ref 12–20)
CA-I SERPL-SCNC: 1.18 MMOL/L (ref 1.12–1.32)
CALCIUM SERPL-MCNC: 7.4 MG/DL (ref 8.5–10.1)
CHLORIDE SERPL-SCNC: 108 MMOL/L (ref 100–111)
CO2 SERPL-SCNC: 27 MMOL/L (ref 21–32)
CREAT SERPL-MCNC: 0.68 MG/DL (ref 0.6–1.3)
DIFFERENTIAL METHOD BLD: ABNORMAL
EOSINOPHIL # BLD: 0.2 K/UL (ref 0–0.4)
EOSINOPHIL NFR BLD: 3 % (ref 0–5)
ERYTHROCYTE [DISTWIDTH] IN BLOOD BY AUTOMATED COUNT: 13.6 % (ref 11.6–14.5)
GLUCOSE BLD STRIP.AUTO-MCNC: 100 MG/DL (ref 70–110)
GLUCOSE BLD STRIP.AUTO-MCNC: 115 MG/DL (ref 70–110)
GLUCOSE BLD STRIP.AUTO-MCNC: 81 MG/DL (ref 70–110)
GLUCOSE BLD STRIP.AUTO-MCNC: 92 MG/DL (ref 70–110)
GLUCOSE SERPL-MCNC: 73 MG/DL (ref 74–99)
HCT VFR BLD AUTO: 22.7 % (ref 36–48)
HCT VFR BLD AUTO: 25 % (ref 36–48)
HGB BLD-MCNC: 7 G/DL (ref 13–16)
HGB BLD-MCNC: 7.7 G/DL (ref 13–16)
INR PPP: 1.1 (ref 0.8–1.2)
LYMPHOCYTES # BLD: 2.3 K/UL (ref 0.9–3.6)
LYMPHOCYTES NFR BLD: 41 % (ref 21–52)
MAGNESIUM SERPL-MCNC: 1.5 MG/DL (ref 1.6–2.6)
MCH RBC QN AUTO: 27.6 PG (ref 24–34)
MCHC RBC AUTO-ENTMCNC: 30.8 G/DL (ref 31–37)
MCV RBC AUTO: 89.4 FL (ref 74–97)
MONOCYTES # BLD: 0.5 K/UL (ref 0.05–1.2)
MONOCYTES NFR BLD: 9 % (ref 3–10)
NEUTS SEG # BLD: 2.6 K/UL (ref 1.8–8)
NEUTS SEG NFR BLD: 47 % (ref 40–73)
PHOSPHATE SERPL-MCNC: 2.7 MG/DL (ref 2.5–4.9)
PLATELET # BLD AUTO: 170 K/UL (ref 135–420)
PMV BLD AUTO: 9.2 FL (ref 9.2–11.8)
POTASSIUM SERPL-SCNC: 4 MMOL/L (ref 3.5–5.5)
PROTHROMBIN TIME: 14.5 SEC (ref 11.5–15.2)
RBC # BLD AUTO: 2.54 M/UL (ref 4.35–5.65)
SODIUM SERPL-SCNC: 137 MMOL/L (ref 136–145)
WBC # BLD AUTO: 5.6 K/UL (ref 4.6–13.2)

## 2021-08-08 PROCEDURE — 83735 ASSAY OF MAGNESIUM: CPT

## 2021-08-08 PROCEDURE — 74011000258 HC RX REV CODE- 258: Performed by: FAMILY MEDICINE

## 2021-08-08 PROCEDURE — 99232 SBSQ HOSP IP/OBS MODERATE 35: CPT | Performed by: FAMILY MEDICINE

## 2021-08-08 PROCEDURE — 80048 BASIC METABOLIC PNL TOTAL CA: CPT

## 2021-08-08 PROCEDURE — 85025 COMPLETE CBC W/AUTO DIFF WBC: CPT

## 2021-08-08 PROCEDURE — 85610 PROTHROMBIN TIME: CPT

## 2021-08-08 PROCEDURE — 85730 THROMBOPLASTIN TIME PARTIAL: CPT

## 2021-08-08 PROCEDURE — 65660000000 HC RM CCU STEPDOWN

## 2021-08-08 PROCEDURE — 36415 COLL VENOUS BLD VENIPUNCTURE: CPT

## 2021-08-08 PROCEDURE — 74011250637 HC RX REV CODE- 250/637: Performed by: FAMILY MEDICINE

## 2021-08-08 PROCEDURE — 85018 HEMOGLOBIN: CPT

## 2021-08-08 PROCEDURE — 82330 ASSAY OF CALCIUM: CPT

## 2021-08-08 PROCEDURE — 74011250636 HC RX REV CODE- 250/636: Performed by: FAMILY MEDICINE

## 2021-08-08 PROCEDURE — 36592 COLLECT BLOOD FROM PICC: CPT

## 2021-08-08 PROCEDURE — 84100 ASSAY OF PHOSPHORUS: CPT

## 2021-08-08 PROCEDURE — 82962 GLUCOSE BLOOD TEST: CPT

## 2021-08-08 RX ORDER — MAGNESIUM SULFATE HEPTAHYDRATE 40 MG/ML
2 INJECTION, SOLUTION INTRAVENOUS ONCE
Status: COMPLETED | OUTPATIENT
Start: 2021-08-08 | End: 2021-08-08

## 2021-08-08 RX ADMIN — DAKIN'S SOLUTION 0.125% (QUARTER STRENGTH): 0.12 SOLUTION at 09:10

## 2021-08-08 RX ADMIN — PANTOPRAZOLE 40 MG: 40 TABLET, DELAYED RELEASE ORAL at 09:10

## 2021-08-08 RX ADMIN — DAKIN'S SOLUTION 0.125% (QUARTER STRENGTH): 0.12 SOLUTION at 18:59

## 2021-08-08 RX ADMIN — IRON SUCROSE 200 MG: 20 INJECTION, SOLUTION INTRAVENOUS at 21:29

## 2021-08-08 RX ADMIN — MAGNESIUM SULFATE 2 G: 2 INJECTION INTRAVENOUS at 09:10

## 2021-08-08 RX ADMIN — PANTOPRAZOLE 40 MG: 40 TABLET, DELAYED RELEASE ORAL at 21:24
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
## 2021-08-08 NOTE — PROGRESS NOTES
Problem: Patient Education: Go to Patient Education Activity  Goal: Patient/Family Education  Outcome: Progressing Towards Goal     Problem: Impaired Skin Integrity/Pressure Injury Treatment  Goal: *Improvement of Existing Pressure Injury  Outcome: Progressing Towards Goal  Goal: *Prevention of pressure injury  Description: Document Jrodin Scale and appropriate interventions in the flowsheet. Outcome: Progressing Towards Goal  Note: Pressure Injury Interventions:  Sensory Interventions: Assess changes in LOC, Check visual cues for pain, Float heels, Keep linens dry and wrinkle-free, Minimize linen layers, Pressure redistribution bed/mattress (bed type), Turn and reposition approx. every two hours (pillows and wedges if needed)    Moisture Interventions: Absorbent underpads, Apply protective barrier, creams and emollients, Contain wound drainage, Internal/External urinary devices, Internal/External fecal devices, Minimize layers, Offer toileting Q_hr, Moisture barrier    Activity Interventions: Increase time out of bed, Pressure redistribution bed/mattress(bed type)    Mobility Interventions: Float heels, HOB 30 degrees or less, Pressure redistribution bed/mattress (bed type), Suspension boots, Turn and reposition approx.  every two hours(pillow and wedges)    Nutrition Interventions: Document food/fluid/supplement intake    Friction and Shear Interventions: Foam dressings/transparent film/skin sealants, HOB 30 degrees or less, Lift sheet, Apply protective barrier, creams and emollients, Minimize layers                Problem: Patient Education: Go to Patient Education Activity  Goal: Patient/Family Education  Outcome: Progressing Towards Goal     Problem: Pain  Goal: *Control of Pain  Outcome: Progressing Towards Goal  Goal: *PALLIATIVE CARE:  Alleviation of Pain  Outcome: Progressing Towards Goal     Problem: Patient Education: Go to Patient Education Activity  Goal: Patient/Family Education  Outcome: Progressing Towards Goal     Problem: Injury - Risk of, Adverse Drug Event  Goal: *Absence of adverse drug events  Outcome: Progressing Towards Goal  Goal: *Absence of medication errors  Outcome: Progressing Towards Goal  Goal: *Knowledge of prescribed medications  Outcome: Progressing Towards Goal     Problem: Patient Education: Go to Patient Education Activity  Goal: Patient/Family Education  Outcome: Progressing Towards Goal     Problem: Risk for Spread of Infection  Goal: Prevent transmission of infectious organism to others  Description: Prevent the transmission of infectious organisms to other patients, staff members, and visitors. Outcome: Progressing Towards Goal     Problem: Patient Education:  Go to Education Activity  Goal: Patient/Family Education  Outcome: Progressing Towards Goal     Problem: Nutrition Deficit  Goal: *Optimize nutritional status  Outcome: Progressing Towards Goal     Problem: Patient Education: Go to Patient Education Activity  Goal: Patient/Family Education  Outcome: Progressing Towards Goal     Problem: Patient Education: Go to Patient Education Activity  Goal: Patient/Family Education  Outcome: Progressing Towards Goal     Problem: Infection - Risk of, Urinary Catheter-Associated Urinary Tract Infection  Goal: *Absence of infection signs and symptoms  Outcome: Progressing Towards Goal     Problem: Patient Education: Go to Patient Education Activity  Goal: Patient/Family Education  Outcome: Progressing Towards Goal     Problem: Pressure Injury - Risk of  Goal: *Prevention of pressure injury  Description: Document Jordin Scale and appropriate interventions in the flowsheet. Outcome: Progressing Towards Goal  Note: Pressure Injury Interventions:  Sensory Interventions: Assess changes in LOC, Check visual cues for pain, Float heels, Keep linens dry and wrinkle-free, Minimize linen layers, Pressure redistribution bed/mattress (bed type), Turn and reposition approx.  every two hours (pillows and wedges if needed)    Moisture Interventions: Absorbent underpads, Apply protective barrier, creams and emollients, Contain wound drainage, Internal/External urinary devices, Internal/External fecal devices, Minimize layers, Offer toileting Q_hr, Moisture barrier    Activity Interventions: Increase time out of bed, Pressure redistribution bed/mattress(bed type)    Mobility Interventions: Float heels, HOB 30 degrees or less, Pressure redistribution bed/mattress (bed type), Suspension boots, Turn and reposition approx.  every two hours(pillow and wedges)    Nutrition Interventions: Document food/fluid/supplement intake    Friction and Shear Interventions: Foam dressings/transparent film/skin sealants, HOB 30 degrees or less, Lift sheet, Apply protective barrier, creams and emollients, Minimize layers                Problem: Patient Education: Go to Patient Education Activity  Goal: Patient/Family Education  Outcome: Progressing Towards Goal

## 2021-08-08 NOTE — PROGRESS NOTES
Bedside shift change report given to SAFIA Edward (oncoming nurse) by Ellis Price RN (offgoing nurse). Report included the following information SBAR, Kardex and MAR.

## 2021-08-08 NOTE — PROGRESS NOTES
[facility] Hospital[facility]  Progress Note    Patient: Gilberto Rojo Age: 64 y.o. : 1960 MR#: [id number] SSN: [id number]  Date: 2021     Subjective/24-hour events:     Resting comfortably currently, no new issues overnight. Assessment:   Acute cystitis with hematuria patient with chronically indwelling Chavez catheter  Severe sepsis POA secondary to above  ЕКАТЕРИНА  Acute on chronic anemia requiring PRBCs  Iron deficiency/low iron stores  Stage IV sacral pressure ulcer, POA,  status post debridement 2021  Bilateral lower extremity DVT  Acute encephalopathy, suspect metabolic  Severe protein calorie malnutrition  Paraplegia    Plan:   H/H down slightly this AM.  Continue to hold IV heparin for now but will likely resume once counts more stable as colonoscopy was without any significant pathology. Monitor H/H, transfuse as necessary - d/c scheduled Q12 hours H/H, but will repeat this afternoon. Final dose of venofer today. Replace magnesium today, monitor. PT/OT, wound care. Possible discharge in next couple of days depending on course. Plan is home with [address]. Case discussed with:  [x]Patient  []Family  [x]Nursing  []Case Management  DVT Prophylaxis:  []Lovenox  []Hep SQ  []SCDs  []Coumadin   []On Heparin gtt    Objective:   VS:   Visit Vitals  /65   Pulse 83   Temp 98.7 °F (37.1 °C)   Resp 20   Ht 6' 2\" (1.88 m)   Wt 89.8 kg (197 lb 14.4 oz)   SpO2 98%   BMI 25.41 kg/m²      Tmax/24hrs: Temp (24hrs), Av.8 °F (37.1 °C), Min:98.3 °F (36.8 °C), Max:99.2 °F (37.3 °C)      Intake/Output Summary (Last 24 hours) at 2021 0844  Last data filed at 2021 0647  Gross per 24 hour   Intake 120 ml   Output 1350 ml   Net -1230 ml       General:  In NAD. Nontoxic-appearing. Cardiovascular:  RRR. Pulmonary:  Lungs clear bilaterally, no wheezes. GI:  Abdomen soft, NTTP. Extremities:  Warm, no edema or ischemia. Neuro:  Awake and alert.       Labs:    Recent Results (from the past 24 hour(s))   GLUCOSE, POC    Collection Time: 08/07/21  8:56 AM   Result Value Ref Range    Glucose (POC) 87 70 - 110 mg/dL   GLUCOSE, POC    Collection Time: 08/07/21 12:10 PM   Result Value Ref Range    Glucose (POC) 88 70 - 110 mg/dL   GLUCOSE, POC    Collection Time: 08/07/21  3:46 PM   Result Value Ref Range    Glucose (POC) 95 70 - 110 mg/dL   GLUCOSE, POC    Collection Time: 08/07/21  9:44 PM   Result Value Ref Range    Glucose (POC) 103 70 - 110 mg/dL   MAGNESIUM    Collection Time: 08/08/21  5:32 AM   Result Value Ref Range    Magnesium 1.5 (L) 1.6 - 2.6 mg/dL   PHOSPHORUS    Collection Time: 08/08/21  5:32 AM   Result Value Ref Range    Phosphorus 2.7 2.5 - 4.9 MG/DL   PROTHROMBIN TIME + INR    Collection Time: 08/08/21  5:32 AM   Result Value Ref Range    Prothrombin time 14.5 11.5 - 15.2 sec    INR 1.1 0.8 - 1.2     CBC WITH AUTOMATED DIFF    Collection Time: 08/08/21  5:32 AM   Result Value Ref Range    WBC 5.6 4.6 - 13.2 K/uL    RBC 2.54 (L) 4.35 - 5.65 M/uL    HGB 7.0 (L) 13.0 - 16.0 g/dL    HCT 22.7 (L) 36.0 - 48.0 %    MCV 89.4 74.0 - 97.0 FL    MCH 27.6 24.0 - 34.0 PG    MCHC 30.8 (L) 31.0 - 37.0 g/dL    RDW 13.6 11.6 - 14.5 %    PLATELET 869 435 - 735 K/uL    MPV 9.2 9.2 - 11.8 FL    NEUTROPHILS 47 40 - 73 %    LYMPHOCYTES 41 21 - 52 %    MONOCYTES 9 3 - 10 %    EOSINOPHILS 3 0 - 5 %    BASOPHILS 1 0 - 2 %    ABS. NEUTROPHILS 2.6 1.8 - 8.0 K/UL    ABS. LYMPHOCYTES 2.3 0.9 - 3.6 K/UL    ABS. MONOCYTES 0.5 0.05 - 1.2 K/UL    ABS. EOSINOPHILS 0.2 0.0 - 0.4 K/UL    ABS.  BASOPHILS 0.0 0.0 - 0.1 K/UL    DF AUTOMATED     METABOLIC PANEL, BASIC    Collection Time: 08/08/21  5:32 AM   Result Value Ref Range    Sodium 137 136 - 145 mmol/L    Potassium 4.0 3.5 - 5.5 mmol/L    Chloride 108 100 - 111 mmol/L    CO2 27 21 - 32 mmol/L    Anion gap 2 (L) 3.0 - 18 mmol/L    Glucose 73 (L) 74 - 99 mg/dL    BUN 7 7.0 - 18 MG/DL    Creatinine 0.68 0.6 - 1.3 MG/DL    BUN/Creatinine ratio 10 (L) 12 - 20      GFR est AA >60 >60 ml/min/1.73m2    GFR est non-AA >60 >60 ml/min/1.73m2    Calcium 7.4 (L) 8.5 - 10.1 MG/DL   CALCIUM, IONIZED    Collection Time: 08/08/21  5:32 AM   Result Value Ref Range    Ionized Calcium 1.18 1.12 - 1.32 MMOL/L   PTT    Collection Time: 08/08/21  5:32 AM   Result Value Ref Range    aPTT 39.7 (H) 23.0 - 36.4 SEC   GLUCOSE, POC    Collection Time: 08/08/21  7:48 AM   Result Value Ref Range    Glucose (POC) 81 70 - 110 mg/dL       Signed By: Jennifer Harley MD     August 8, 2021

## 2021-08-09 LAB
ANION GAP SERPL CALC-SCNC: 1 MMOL/L (ref 3–18)
APTT PPP: 41.4 SEC (ref 23–36.4)
BASOPHILS # BLD: 0.1 K/UL (ref 0–0.1)
BASOPHILS NFR BLD: 1 % (ref 0–2)
BUN SERPL-MCNC: 9 MG/DL (ref 7–18)
BUN/CREAT SERPL: 11 (ref 12–20)
CA-I BLD-SCNC: 1.2 MMOL/L (ref 1.12–1.32)
CALCIUM SERPL-MCNC: 7.2 MG/DL (ref 8.5–10.1)
CHLORIDE SERPL-SCNC: 108 MMOL/L (ref 100–111)
CO2 SERPL-SCNC: 29 MMOL/L (ref 21–32)
CREAT SERPL-MCNC: 0.84 MG/DL (ref 0.6–1.3)
DIFFERENTIAL METHOD BLD: ABNORMAL
EOSINOPHIL # BLD: 0.2 K/UL (ref 0–0.4)
EOSINOPHIL NFR BLD: 3 % (ref 0–5)
ERYTHROCYTE [DISTWIDTH] IN BLOOD BY AUTOMATED COUNT: 13.7 % (ref 11.6–14.5)
GLUCOSE BLD STRIP.AUTO-MCNC: 105 MG/DL (ref 70–110)
GLUCOSE BLD STRIP.AUTO-MCNC: 113 MG/DL (ref 70–110)
GLUCOSE BLD STRIP.AUTO-MCNC: 84 MG/DL (ref 70–110)
GLUCOSE BLD STRIP.AUTO-MCNC: 89 MG/DL (ref 70–110)
GLUCOSE SERPL-MCNC: 91 MG/DL (ref 74–99)
HCT VFR BLD AUTO: 23.5 % (ref 36–48)
HGB BLD-MCNC: 7.2 G/DL (ref 13–16)
INR PPP: 1.1 (ref 0.8–1.2)
LYMPHOCYTES # BLD: 2.4 K/UL (ref 0.9–3.6)
LYMPHOCYTES NFR BLD: 36 % (ref 21–52)
MAGNESIUM SERPL-MCNC: 1.6 MG/DL (ref 1.6–2.6)
MCH RBC QN AUTO: 27.7 PG (ref 24–34)
MCHC RBC AUTO-ENTMCNC: 30.6 G/DL (ref 31–37)
MCV RBC AUTO: 90.4 FL (ref 74–97)
MONOCYTES # BLD: 0.6 K/UL (ref 0.05–1.2)
MONOCYTES NFR BLD: 9 % (ref 3–10)
NEUTS SEG # BLD: 3.4 K/UL (ref 1.8–8)
NEUTS SEG NFR BLD: 51 % (ref 40–73)
PHOSPHATE SERPL-MCNC: 2.5 MG/DL (ref 2.5–4.9)
PLATELET # BLD AUTO: 189 K/UL (ref 135–420)
PMV BLD AUTO: 9 FL (ref 9.2–11.8)
POTASSIUM SERPL-SCNC: 4.6 MMOL/L (ref 3.5–5.5)
PROTHROMBIN TIME: 14.5 SEC (ref 11.5–15.2)
RBC # BLD AUTO: 2.6 M/UL (ref 4.35–5.65)
SODIUM SERPL-SCNC: 138 MMOL/L (ref 136–145)
WBC # BLD AUTO: 6.6 K/UL (ref 4.6–13.2)

## 2021-08-09 PROCEDURE — 97535 SELF CARE MNGMENT TRAINING: CPT

## 2021-08-09 PROCEDURE — 85610 PROTHROMBIN TIME: CPT

## 2021-08-09 PROCEDURE — 97530 THERAPEUTIC ACTIVITIES: CPT

## 2021-08-09 PROCEDURE — 85730 THROMBOPLASTIN TIME PARTIAL: CPT

## 2021-08-09 PROCEDURE — 80048 BASIC METABOLIC PNL TOTAL CA: CPT

## 2021-08-09 PROCEDURE — 2709999900 HC NON-CHARGEABLE SUPPLY

## 2021-08-09 PROCEDURE — 77030041076 HC DRSG AG OPTICELL MDII -A

## 2021-08-09 PROCEDURE — 77030040393 HC DRSG OPTIFOAM GENT MDII -B

## 2021-08-09 PROCEDURE — 74011250637 HC RX REV CODE- 250/637: Performed by: FAMILY MEDICINE

## 2021-08-09 PROCEDURE — 99232 SBSQ HOSP IP/OBS MODERATE 35: CPT | Performed by: FAMILY MEDICINE

## 2021-08-09 PROCEDURE — 77030040392 HC DRSG OPTIFOAM MDII -A

## 2021-08-09 PROCEDURE — 82962 GLUCOSE BLOOD TEST: CPT

## 2021-08-09 PROCEDURE — 82330 ASSAY OF CALCIUM: CPT

## 2021-08-09 PROCEDURE — 65660000000 HC RM CCU STEPDOWN

## 2021-08-09 PROCEDURE — 85025 COMPLETE CBC W/AUTO DIFF WBC: CPT

## 2021-08-09 PROCEDURE — 84100 ASSAY OF PHOSPHORUS: CPT

## 2021-08-09 PROCEDURE — 83735 ASSAY OF MAGNESIUM: CPT

## 2021-08-09 PROCEDURE — 97110 THERAPEUTIC EXERCISES: CPT

## 2021-08-09 RX ADMIN — DAKIN'S SOLUTION 0.125% (QUARTER STRENGTH): 0.12 SOLUTION at 06:00

## 2021-08-09 RX ADMIN — DAKIN'S SOLUTION 0.125% (QUARTER STRENGTH): 0.12 SOLUTION at 18:59

## 2021-08-09 RX ADMIN — PANTOPRAZOLE 40 MG: 40 TABLET, DELAYED RELEASE ORAL at 12:34

## 2021-08-09 RX ADMIN — PANTOPRAZOLE 40 MG: 40 TABLET, DELAYED RELEASE ORAL at 21:05

## 2021-08-09 NOTE — ROUTINE PROCESS
Bedside shift change report given to Sintia Sibley RN (oncoming nurse) by Shiloh Rodriguez RN (offgoing nurse). Report included the following information SBAR, Kardex, Intake/Output and MAR.

## 2021-08-09 NOTE — PROGRESS NOTES
Problem: Self Care Deficits Care Plan (Adult)  Goal: *Acute Goals and Plan of Care (Insert Text)  Description: Occupational Therapy Goals  Initiated 8/3/2021 within 7 day(s). 1.  Patient will perform bed mobility in preparation for self-care task with moderate assistance. 2.  Patient will perform grooming while seated EOB with supervision/set-up. 3.  Patient will perform upper body dressing with minimal assistance/contact guard assist.  4.  Patient will perform upper body bathing with minimal assistance/contact guard assist.  5.  Patient will perform toilet transfers with moderate assistance . 6. Patient will perform all aspects of toileting with moderate assistance . 7. Patient will participate in upper extremity therapeutic exercise/activities with supervision/set-up for 5 minutes. 8.  Patient will utilize energy conservation techniques during functional activities with verbal cues. Prior Level of Function: Pt lives with his mother in a single story house with no JAMES. He has care aids from 9-6 during the day to assist him OOB, into his power w/c with a slide board as well as with bathing, dressing, toileting and grooming tasks. Pt has a hospital bed and trapeze-like system. Outcome: Progressing Towards Goal   OCCUPATIONAL THERAPY TREATMENT    Patient: Lisa Cedillo (55 y.o. male)  Date: 8/9/2021  Diagnosis: Sepsis ([facility] [address] [A41.9]  UTI (urinary tract infection) [N39.0]  Hypotension [I95.9] <principal problem not specified>  Procedure(s) (LRB):  COLONOSCOPY (N/A) 3 Days Post-Op  Precautions: Fall, Skin, Contact    Chart, occupational therapy assessment, plan of care, and goals were reviewed. ASSESSMENT:  Pt co-treated w/PT to maximize safety w/EOB activity and functional transfer training. Pt is pleasant and cooperative, daughter at bedside. Pt requires assist w/BLE and vc's for use of side rail to maneuver to EOB.  Pt requires close guarding and utilizes UE for support while performing UE TherEx. Deferred functional transfer training w/slide board 2/2 sacral wound and bandage coming off. Alerted Yamel Sauceda. Pt returned to supine for BLE care. Pt requires Total Assist and educated to advocate for proper hygiene w/BLE. Provided Prevlon boots and wedges to reposition for pressure relief. Pt left w/all items within reach. Progression toward goals:  []          Improving appropriately and progressing toward goals  [x]          Improving slowly and progressing toward goals  []          Not making progress toward goals and plan of care will be adjusted     PLAN:  Patient continues to benefit from skilled intervention to address the above impairments. Continue treatment per established plan of care. Discharge Recommendations:  Home Health  Further Equipment Recommendations for Discharge:  hospital bed w/pressure relief     SUBJECTIVE:   Patient stated They don't always do my feet.     OBJECTIVE DATA SUMMARY:   Cognitive/Behavioral Status:  Neurologic State: Alert  Orientation Level: Oriented X4  Cognition: Follows commands  Safety/Judgement: Fall prevention    Functional Mobility and Transfers for ADLs:   Bed Mobility:  Rolling: Maximum assistance (for LEs, able to pull with UEs on bedrail )  Supine to Sit: Moderate assistance  Sit to Supine: Moderate assistance  Scooting: Total assistance;Assist x2    Balance:  Sitting: Impaired; With support  Sitting - Static: Fair (occasional)  Sitting - Dynamic: Poor (constant support)  Standing:  (does not stand at baseline )    ADL Intervention:  Grooming  Position Performed: Other (comment) (w/HOB raised)  Washing Face: Set-up  Washing Hands: Set-up    Lower Body Dressing Assistance  Socks:  Total assistance (dependent)  Leg Crossed Method Used: No  Position Performed: Supine    UE Therapeutic Exercises:   AROM BUE shoulder flexion/rolls  AROM BUE elbow flexion/extension    Pain:  Pain level pre-treatment: 0/10   Pain level post-treatment: 0/10    Activity Tolerance:    Fair    Please refer to the flowsheet for vital signs taken during this treatment. After treatment:   []  Patient left in no apparent distress sitting up in chair  [x]  Patient left in no apparent distress in bed  [x]  Call bell left within reach  [x]  Nursing notified  []  Caregiver present  []  Bed alarm activated    COMMUNICATION/EDUCATION:   [] Role of Occupational Therapy in the acute care setting  [] Home safety education was provided and the patient/caregiver indicated understanding. [] Patient/family have participated as able in working towards goals and plan of care. [x] Patient/family agree to work toward stated goals and plan of care. [] Patient understands intent and goals of therapy, but is neutral about his/her participation. [] Patient is unable to participate in goal setting and plan of care.       Thank you for this referral.  DINESH Whitfield  Time Calculation: 39 mins

## 2021-08-09 NOTE — PROGRESS NOTES
Problem: Patient Education: Go to Patient Education Activity  Goal: Patient/Family Education  Outcome: Progressing Towards Goal     Problem: Impaired Skin Integrity/Pressure Injury Treatment  Goal: *Improvement of Existing Pressure Injury  Outcome: Progressing Towards Goal  Goal: *Prevention of pressure injury  Description: Document Jordin Scale and appropriate interventions in the flowsheet.   Outcome: Progressing Towards Goal  Note: Pressure Injury Interventions:  Sensory Interventions: Assess changes in LOC    Moisture Interventions: Absorbent underpads, Maintain skin hydration (lotion/cream)    Activity Interventions: Increase time out of bed, Pressure redistribution bed/mattress(bed type)    Mobility Interventions: HOB 30 degrees or less    Nutrition Interventions: Document food/fluid/supplement intake    Friction and Shear Interventions: HOB 30 degrees or less                Problem: Pain  Goal: *Control of Pain  Outcome: Progressing Towards Goal  Goal: *PALLIATIVE CARE:  Alleviation of Pain  Outcome: Progressing Towards Goal     Problem: Patient Education: Go to Patient Education Activity  Goal: Patient/Family Education  Outcome: Progressing Towards Goal     Problem: Injury - Risk of, Adverse Drug Event  Goal: *Absence of adverse drug events  Outcome: Progressing Towards Goal  Goal: *Absence of medication errors  Outcome: Progressing Towards Goal  Goal: *Knowledge of prescribed medications  Outcome: Progressing Towards Goal     Problem: Patient Education: Go to Patient Education Activity  Goal: Patient/Family Education  Outcome: Progressing Towards Goal     Problem: Patient Education: Go to Patient Education Activity  Goal: Patient/Family Education  Outcome: Progressing Towards Goal

## 2021-08-09 NOTE — PROGRESS NOTES
[facility]    Patient: Abdullahi Baugh Age: 64 y.o. : 1960 MR#: [id number] SSN: [id number]  Date: 2021     Subjective/24-hour events:     Resting comfortably, no new issues overnight. Assessment:   Acute cystitis with hematuria patient with chronically indwelling Chavez catheter  Severe sepsis POA secondary to above  ЕКАТЕРИНА  Acute on chronic anemia requiring PRBCs  Iron deficiency/low iron stores  Hypocalcemia and hypomagnesemia  Stage IV sacral pressure ulcer, POA,  status post debridement 2021  Bilateral lower extremity DVT  Acute encephalopathy, suspect metabolic  Severe protein calorie malnutrition  Paraplegia    Plan:   H&H up status post transfusion but drifted down slightly since yesterday. Continue to monitor. Final Venofer dose given yesterday. Continue to hold heparin for now given marginal blood counts. No evidence for any active bleeding and platelets remain normal. Ideally, would like to resume anticoagulation therapy for DVTs but if marginal H&H remains an issue, may need to hold off on this and evaluate for possible IVC filter. Antibiotics have been completed. No further ID recommendations. Monitor and replace electrolytes as necessary. Magnesium low normal today - give an additional dose of IV magnesium sulfate and continue to follow. Continue wound care as ordered. PT/OT as tolerated. Disposition pending stability of H/H. Order for [place] [id number] services placed.     Case discussed with:  [x]Patient  []Family  [x]Nursing  []Case Management  DVT Prophylaxis:  []Lovenox  []Hep SQ  []SCDs  []Coumadin   []On Heparin gtt    Objective:   VS:   Visit Vitals  /71   Pulse 79   Temp 98.1 °F (36.7 °C)   Resp 18   Ht 6' 2\" (1.88 m)   Wt 89.5 kg (197 lb 6.4 oz)   SpO2 98%   BMI 25.34 kg/m²      Tmax/24hrs: Temp (24hrs), Av.2 °F (36.8 °C), Min:97.8 °F (36.6 °C), Max:98.4 °F (36.9 °C)      Intake/Output Summary (Last 24 hours) at 2021 [address] [facility] filed at 8/9/2021 1114  Gross per 24 hour   Intake --   Output 2700 ml   Net -2700 ml       General:  In NAD. Nontoxic-appearing. Cardiovascular:  RRR. Pulmonary:  Lungs clear bilaterally, no wheezes. GI:  Abdomen soft, NTTP. Extremities:  Warm, no edema or ischemia. Neuro:  Awake and alert. Labs:    Recent Results (from the past 24 hour(s))   GLUCOSE, POC    Collection Time: 08/08/21 11:43 AM   Result Value Ref Range    Glucose (POC) 115 (H) 70 - 110 mg/dL   GLUCOSE, POC    Collection Time: 08/08/21  3:51 PM   Result Value Ref Range    Glucose (POC) 92 70 - 110 mg/dL   HGB & HCT    Collection Time: 08/08/21  6:27 PM   Result Value Ref Range    HGB 7.7 (L) 13.0 - 16.0 g/dL    HCT 25.0 (L) 36.0 - 48.0 %   GLUCOSE, POC    Collection Time: 08/08/21  9:08 PM   Result Value Ref Range    Glucose (POC) 100 70 - 110 mg/dL   MAGNESIUM    Collection Time: 08/09/21  3:55 AM   Result Value Ref Range    Magnesium 1.6 1.6 - 2.6 mg/dL   PHOSPHORUS    Collection Time: 08/09/21  3:55 AM   Result Value Ref Range    Phosphorus 2.5 2.5 - 4.9 MG/DL   PROTHROMBIN TIME + INR    Collection Time: 08/09/21  3:55 AM   Result Value Ref Range    Prothrombin time 14.5 11.5 - 15.2 sec    INR 1.1 0.8 - 1.2     CBC WITH AUTOMATED DIFF    Collection Time: 08/09/21  3:55 AM   Result Value Ref Range    WBC 6.6 4.6 - 13.2 K/uL    RBC 2.60 (L) 4.35 - 5.65 M/uL    HGB 7.2 (L) 13.0 - 16.0 g/dL    HCT 23.5 (L) 36.0 - 48.0 %    MCV 90.4 74.0 - 97.0 FL    MCH 27.7 24.0 - 34.0 PG    MCHC 30.6 (L) 31.0 - 37.0 g/dL    RDW 13.7 11.6 - 14.5 %    PLATELET 975 068 - 182 K/uL    MPV 9.0 (L) 9.2 - 11.8 FL    NEUTROPHILS 51 40 - 73 %    LYMPHOCYTES 36 21 - 52 %    MONOCYTES 9 3 - 10 %    EOSINOPHILS 3 0 - 5 %    BASOPHILS 1 0 - 2 %    ABS. NEUTROPHILS 3.4 1.8 - 8.0 K/UL    ABS. LYMPHOCYTES 2.4 0.9 - 3.6 K/UL    ABS. MONOCYTES 0.6 0.05 - 1.2 K/UL    ABS. EOSINOPHILS 0.2 0.0 - 0.4 K/UL    ABS.  BASOPHILS 0.1 0.0 - 0.1 K/UL    DF AUTOMATED METABOLIC PANEL, BASIC    Collection Time: 08/09/21  3:55 AM   Result Value Ref Range    Sodium 138 136 - 145 mmol/L    Potassium 4.6 3.5 - 5.5 mmol/L    Chloride 108 100 - 111 mmol/L    CO2 29 21 - 32 mmol/L    Anion gap 1 (L) 3.0 - 18 mmol/L    Glucose 91 74 - 99 mg/dL    BUN 9 7.0 - 18 MG/DL    Creatinine 0.84 0.6 - 1.3 MG/DL    BUN/Creatinine ratio 11 (L) 12 - 20      GFR est AA >60 >60 ml/min/1.73m2    GFR est non-AA >60 >60 ml/min/1.73m2    Calcium 7.2 (L) 8.5 - 10.1 MG/DL   PTT    Collection Time: 08/09/21  3:55 AM   Result Value Ref Range    aPTT 41.4 (H) 23.0 - 36.4 SEC   IONIZED CALCIUM    Collection Time: 08/09/21  4:25 AM   Result Value Ref Range    Calcium, ionized 1.20 1. 12 - 1.32 mmol/L   GLUCOSE, POC    Collection Time: 08/09/21  8:27 AM   Result Value Ref Range    Glucose (POC) 84 70 - 110 mg/dL   GLUCOSE, POC    Collection Time: 08/09/21 11:17 AM   Result Value Ref Range    Glucose (POC) 113 (H) 70 - 110 mg/dL       Signed By: Chucho Wharton MD     August 9, 2021

## 2021-08-09 NOTE — PROGRESS NOTES
Problem: Impaired Skin Integrity/Pressure Injury Treatment  Goal: *Improvement of Existing Pressure Injury  Outcome: Progressing Towards Goal  Goal: *Prevention of pressure injury  Description: Document Jordin Scale and appropriate interventions in the flowsheet. Outcome: Progressing Towards Goal  Note: Pressure Injury Interventions:  Sensory Interventions: Assess changes in LOC, Keep linens dry and wrinkle-free, Minimize linen layers    Moisture Interventions: Absorbent underpads, Apply protective barrier, creams and emollients    Activity Interventions: Pressure redistribution bed/mattress(bed type), Increase time out of bed    Mobility Interventions: HOB 30 degrees or less, Pressure redistribution bed/mattress (bed type)    Nutrition Interventions: Document food/fluid/supplement intake    Friction and Shear Interventions: HOB 30 degrees or less, Apply protective barrier, creams and emollients, Minimize layers                Problem: Pain  Goal: *Control of Pain  Outcome: Progressing Towards Goal     Problem: Injury - Risk of, Adverse Drug Event  Goal: *Absence of adverse drug events  Outcome: Progressing Towards Goal  Goal: *Absence of medication errors  Outcome: Progressing Towards Goal  Goal: *Knowledge of prescribed medications  Outcome: Progressing Towards Goal     Problem: Risk for Spread of Infection  Goal: Prevent transmission of infectious organism to others  Description: Prevent the transmission of infectious organisms to other patients, staff members, and visitors.   Outcome: Progressing Towards Goal     Problem: Nutrition Deficit  Goal: *Optimize nutritional status  Outcome: Progressing Towards Goal     Problem: Infection - Risk of, Urinary Catheter-Associated Urinary Tract Infection  Goal: *Absence of infection signs and symptoms  Outcome: Progressing Towards Goal     Problem: Pressure Injury - Risk of  Goal: *Prevention of pressure injury  Description: Document Jordin Scale and appropriate interventions in the flowsheet.   Outcome: Progressing Towards Goal  Note: Pressure Injury Interventions:  Sensory Interventions: Assess changes in LOC, Keep linens dry and wrinkle-free, Minimize linen layers    Moisture Interventions: Absorbent underpads, Apply protective barrier, creams and emollients    Activity Interventions: Pressure redistribution bed/mattress(bed type), Increase time out of bed    Mobility Interventions: HOB 30 degrees or less, Pressure redistribution bed/mattress (bed type)    Nutrition Interventions: Document food/fluid/supplement intake    Friction and Shear Interventions: HOB 30 degrees or less, Apply protective barrier, creams and emollients, Minimize layers

## 2021-08-09 NOTE — PROGRESS NOTES
CM uploaded patient, clinicals and home health order to [facility], and sent booking request to [address] [place] [address].            Papito Pinto RN  Case Management [phone]
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
## 2021-08-09 NOTE — PROGRESS NOTES
Problem: Mobility Impaired (Adult and Pediatric)  Goal: *Acute Goals and Plan of Care (Insert Text)  Description: Physical Therapy Goals  Initiated 8/3/2021 and to be accomplished within 7 day(s)  1. Patient will move from supine to sit and sit to supine  in bed with minimal assistance/contact guard assist.    2.  Patient will transfer from bed to wheelchair and wheelchair to bed with minimal assistance/contact guard assist using the least restrictive device. 3.  Patient will sit on EOB for 5 min with CGA and no LOB (8/9 seen with BUE support , reporting baseline)  4. Patient will roll to R/L supine in bed with CGA    PLOF: Pt lives with his mother in a [address] [place] [address], no [facility]. He has aids from 9-6 during the day to assist him OOB, into his power w/c with a slide board. Pt has a hospital bed as well. 8.9: pt reporting he has a trapeze bar above his bed, he is able to lift himself and his aide will push him over to the wheelchair/BSC. Outcome: Progressing Towards Goal  PHYSICAL THERAPY TREATMENT    Patient: Emmanuel Pena (89 y.o. male)  Date: 8/9/2021  Diagnosis: Sepsis ([facility] [address] [A41.9]  UTI (urinary tract infection) [N39.0]  Hypotension [I95.9] <principal problem not specified>  Procedure(s) (LRB):  COLONOSCOPY (N/A) 3 Days Post-Op  Precautions: Fall, Skin, Contact      ASSESSMENT:  Pt cleared to participate in PT session, pt received semi-reclined in bed and agreeable to therapy session with daughter at bedside. Completing with OT to maximize safety and mobility. Pt able to move trunk and UEs in bed but needing totalA for LEs, modA for complete transfer to sitting EOB. Pt with trunk flexion and BUE support on bedrails throughout sitting. Pt with mepolex coming off, per daughter pt uses trapeze and aide for mobility, does not use a slide board and unable to complete depression lift. Pt not safe for BSC transfer at this time. Pt returned to supine and totalAx2 towards HOB.  Pt positioned for comfort and educated to call for assist before getting up, pt verbalized understanding. Pt left with all needs met and call bell in reach. RN notified of position and participation. Pt may be at baseline will continue to follow. Progression toward goals:   []      Improving appropriately and progressing toward goals  [x]      Improving slowly and progressing toward goals  []      Not making progress toward goals and plan of care will be adjusted     PLAN:  Patient continues to benefit from skilled intervention to address the above impairments. Continue treatment per established plan of care. Discharge Recommendations:  Home Health with 24/7 assist from family/aides vs Rehab pending progress   Further Equipment Recommendations for Discharge:  pressure relieving hospital bed     SUBJECTIVE:   Patient stated I don't remember how I get to my wheelchair.     OBJECTIVE DATA SUMMARY:   Critical Behavior:  Neurologic State: Alert  Orientation Level: Oriented X4  Cognition: Follows commands  Safety/Judgement: Fall prevention  Functional Mobility Training:  Bed Mobility:  Rolling: Maximum assistance (for LEs, able to pull with UEs on bedrail )  Supine to Sit: Moderate assistance  Sit to Supine: Moderate assistance  Scooting: Total assistance;Assist x2    Balance:  Sitting: Impaired; With support  Sitting - Static: Fair (occasional)  Sitting - Dynamic: Poor (constant support)  Standing:  (does not stand at baseline )       Posture:   Posture (WDL): Exceptions to WDL   Posture Assessment: Trunk flexion (decreased hip flexion )    Therapeutic Exercises:   Completed the following      EXERCISE   Sets   Reps   Active Active Assist   Passive Self ROM   Comments   Ankle Pumps 1 10  [] [] [x] []    Quad Sets/Glut Sets    [] [] [] [] Hold for 5 secs   Hamstring Sets   [] [] [] []    Short Arc Quads   [] [] [] []    Heel Slides 1 10 [] [] [x] []    Straight Leg Raises 1 10 [] [] [x] []    Hip Add   [] [] [] [] Hold for 5 secs, w/ pillow squeeze   Long Arc Quads   [] [] [] []    Seated Marching   [] [] [] []    Standing Marching   [] [] [] []       [] [] [] []        Pain:  Pain level pre-treatment: 0/10  Pain level post-treatment: 0/10       Activity Tolerance:   Fair tolerance     Please refer to the flowsheet for vital signs taken during this treatment. After treatment:   [] Patient left in no apparent distress sitting up in chair  [x] Patient left in no apparent distress in bed  [x] Call bell left within reach  [x] Nursing notified  [] Caregiver present  [] Bed alarm activated  [] SCDs applied      COMMUNICATION/EDUCATION:   [x]         Role of Physical Therapy in the acute care setting. [x]         Fall prevention education was provided and the patient/caregiver indicated understanding. [x]         Patient/family have participated as able in working toward goals and plan of care. [x]         Patient/family agree to work toward stated goals and plan of care. []         Patient understands intent and goals of therapy, but is neutral about his/her participation.   []         Patient is unable to participate in stated goals/plan of care: ongoing with therapy staff.  []         Other:        Loree Tran, PT   Time Calculation: 39 mins

## 2021-08-10 LAB
ANION GAP SERPL CALC-SCNC: 2 MMOL/L (ref 3–18)
APTT PPP: 111 SEC (ref 23–36.4)
APTT PPP: 32.8 SEC (ref 23–36.4)
APTT PPP: 33.5 SEC (ref 23–36.4)
BASOPHILS # BLD: 0 K/UL (ref 0–0.1)
BASOPHILS # BLD: 0.1 K/UL (ref 0–0.1)
BASOPHILS NFR BLD: 1 % (ref 0–2)
BASOPHILS NFR BLD: 1 % (ref 0–2)
BUN SERPL-MCNC: 13 MG/DL (ref 7–18)
BUN/CREAT SERPL: 15 (ref 12–20)
CA-I BLD-SCNC: 1.27 MMOL/L (ref 1.12–1.32)
CALCIUM SERPL-MCNC: 7.5 MG/DL (ref 8.5–10.1)
CHLORIDE SERPL-SCNC: 107 MMOL/L (ref 100–111)
CO2 SERPL-SCNC: 29 MMOL/L (ref 21–32)
CREAT SERPL-MCNC: 0.87 MG/DL (ref 0.6–1.3)
DIFFERENTIAL METHOD BLD: ABNORMAL
DIFFERENTIAL METHOD BLD: ABNORMAL
EOSINOPHIL # BLD: 0.2 K/UL (ref 0–0.4)
EOSINOPHIL # BLD: 0.2 K/UL (ref 0–0.4)
EOSINOPHIL NFR BLD: 2 % (ref 0–5)
EOSINOPHIL NFR BLD: 3 % (ref 0–5)
ERYTHROCYTE [DISTWIDTH] IN BLOOD BY AUTOMATED COUNT: 13.5 % (ref 11.6–14.5)
ERYTHROCYTE [DISTWIDTH] IN BLOOD BY AUTOMATED COUNT: 13.7 % (ref 11.6–14.5)
GLUCOSE BLD STRIP.AUTO-MCNC: 101 MG/DL (ref 70–110)
GLUCOSE BLD STRIP.AUTO-MCNC: 90 MG/DL (ref 70–110)
GLUCOSE BLD STRIP.AUTO-MCNC: 92 MG/DL (ref 70–110)
GLUCOSE SERPL-MCNC: 81 MG/DL (ref 74–99)
HCT VFR BLD AUTO: 23.9 % (ref 36–48)
HCT VFR BLD AUTO: 23.9 % (ref 36–48)
HGB BLD-MCNC: 7.2 G/DL (ref 13–16)
HGB BLD-MCNC: 7.5 G/DL (ref 13–16)
INR PPP: 1 (ref 0.8–1.2)
LACTATE SERPL-SCNC: 0.6 MMOL/L (ref 0.4–2)
LYMPHOCYTES # BLD: 2.1 K/UL (ref 0.9–3.6)
LYMPHOCYTES # BLD: 2.6 K/UL (ref 0.9–3.6)
LYMPHOCYTES NFR BLD: 28 % (ref 21–52)
LYMPHOCYTES NFR BLD: 37 % (ref 21–52)
MAGNESIUM SERPL-MCNC: 1.5 MG/DL (ref 1.6–2.6)
MCH RBC QN AUTO: 27.4 PG (ref 24–34)
MCH RBC QN AUTO: 28.3 PG (ref 24–34)
MCHC RBC AUTO-ENTMCNC: 30.1 G/DL (ref 31–37)
MCHC RBC AUTO-ENTMCNC: 31.4 G/DL (ref 31–37)
MCV RBC AUTO: 90.2 FL (ref 74–97)
MCV RBC AUTO: 90.9 FL (ref 74–97)
MONOCYTES # BLD: 0.6 K/UL (ref 0.05–1.2)
MONOCYTES # BLD: 0.6 K/UL (ref 0.05–1.2)
MONOCYTES NFR BLD: 8 % (ref 3–10)
MONOCYTES NFR BLD: 9 % (ref 3–10)
NEUTS SEG # BLD: 3.5 K/UL (ref 1.8–8)
NEUTS SEG # BLD: 4.6 K/UL (ref 1.8–8)
NEUTS SEG NFR BLD: 51 % (ref 40–73)
NEUTS SEG NFR BLD: 61 % (ref 40–73)
PHOSPHATE SERPL-MCNC: 2.5 MG/DL (ref 2.5–4.9)
PLATELET # BLD AUTO: 211 K/UL (ref 135–420)
PLATELET # BLD AUTO: 217 K/UL (ref 135–420)
PMV BLD AUTO: 9.5 FL (ref 9.2–11.8)
PMV BLD AUTO: 9.5 FL (ref 9.2–11.8)
POTASSIUM SERPL-SCNC: 4.8 MMOL/L (ref 3.5–5.5)
PROTHROMBIN TIME: 13.5 SEC (ref 11.5–15.2)
RBC # BLD AUTO: 2.63 M/UL (ref 4.35–5.65)
RBC # BLD AUTO: 2.65 M/UL (ref 4.35–5.65)
SODIUM SERPL-SCNC: 138 MMOL/L (ref 136–145)
WBC # BLD AUTO: 7 K/UL (ref 4.6–13.2)
WBC # BLD AUTO: 7.6 K/UL (ref 4.6–13.2)

## 2021-08-10 PROCEDURE — 84100 ASSAY OF PHOSPHORUS: CPT

## 2021-08-10 PROCEDURE — 77030041076 HC DRSG AG OPTICELL MDII -A

## 2021-08-10 PROCEDURE — 99232 SBSQ HOSP IP/OBS MODERATE 35: CPT | Performed by: EMERGENCY MEDICINE

## 2021-08-10 PROCEDURE — 85025 COMPLETE CBC W/AUTO DIFF WBC: CPT

## 2021-08-10 PROCEDURE — 74011250636 HC RX REV CODE- 250/636: Performed by: EMERGENCY MEDICINE

## 2021-08-10 PROCEDURE — 2709999900 HC NON-CHARGEABLE SUPPLY

## 2021-08-10 PROCEDURE — 82962 GLUCOSE BLOOD TEST: CPT

## 2021-08-10 PROCEDURE — 83605 ASSAY OF LACTIC ACID: CPT

## 2021-08-10 PROCEDURE — 85610 PROTHROMBIN TIME: CPT

## 2021-08-10 PROCEDURE — 74011250636 HC RX REV CODE- 250/636: Performed by: PHYSICIAN ASSISTANT

## 2021-08-10 PROCEDURE — 97110 THERAPEUTIC EXERCISES: CPT

## 2021-08-10 PROCEDURE — 80048 BASIC METABOLIC PNL TOTAL CA: CPT

## 2021-08-10 PROCEDURE — 85730 THROMBOPLASTIN TIME PARTIAL: CPT

## 2021-08-10 PROCEDURE — 99252 IP/OBS CONSLTJ NEW/EST SF 35: CPT | Performed by: PHYSICIAN ASSISTANT

## 2021-08-10 PROCEDURE — 36415 COLL VENOUS BLD VENIPUNCTURE: CPT

## 2021-08-10 PROCEDURE — 83735 ASSAY OF MAGNESIUM: CPT

## 2021-08-10 PROCEDURE — 82330 ASSAY OF CALCIUM: CPT

## 2021-08-10 PROCEDURE — 77030040392 HC DRSG OPTIFOAM MDII -A

## 2021-08-10 PROCEDURE — 74011250637 HC RX REV CODE- 250/637: Performed by: FAMILY MEDICINE

## 2021-08-10 PROCEDURE — 87040 BLOOD CULTURE FOR BACTERIA: CPT

## 2021-08-10 PROCEDURE — 97164 PT RE-EVAL EST PLAN CARE: CPT

## 2021-08-10 PROCEDURE — 65660000000 HC RM CCU STEPDOWN

## 2021-08-10 RX ORDER — MAGNESIUM SULFATE HEPTAHYDRATE 40 MG/ML
2 INJECTION, SOLUTION INTRAVENOUS ONCE
Status: COMPLETED | OUTPATIENT
Start: 2021-08-10 | End: 2021-08-10

## 2021-08-10 RX ORDER — SODIUM CHLORIDE 9 MG/ML
75 INJECTION, SOLUTION INTRAVENOUS CONTINUOUS
Status: DISCONTINUED | OUTPATIENT
Start: 2021-08-10 | End: 2021-08-13

## 2021-08-10 RX ADMIN — PANTOPRAZOLE 40 MG: 40 TABLET, DELAYED RELEASE ORAL at 21:39

## 2021-08-10 RX ADMIN — MAGNESIUM SULFATE 2 G: 2 INJECTION INTRAVENOUS at 17:47

## 2021-08-10 RX ADMIN — SODIUM CHLORIDE 75 ML/HR: 900 INJECTION, SOLUTION INTRAVENOUS at 12:14

## 2021-08-10 RX ADMIN — PANTOPRAZOLE 40 MG: 40 TABLET, DELAYED RELEASE ORAL at 09:26

## 2021-08-10 RX ADMIN — DAKIN'S SOLUTION 0.125% (QUARTER STRENGTH): 0.12 SOLUTION at 09:26

## 2021-08-10 RX ADMIN — HEPARIN SODIUM 18 UNITS/KG/HR: 10000 INJECTION, SOLUTION INTRAVENOUS at 15:00

## 2021-08-10 RX ADMIN — SODIUM CHLORIDE 500 ML: 900 INJECTION, SOLUTION INTRAVENOUS at 12:14

## 2021-08-10 RX ADMIN — SODIUM CHLORIDE 75 ML/HR: 900 INJECTION, SOLUTION INTRAVENOUS at 21:56

## 2021-08-10 RX ADMIN — DAKIN'S SOLUTION 0.125% (QUARTER STRENGTH): 0.12 SOLUTION at 17:42

## 2021-08-10 NOTE — ROUTINE PROCESS
Bedside shift change report given to Michelle Donato RN (oncoming nurse) by Abel Ching RN (offgoing nurse). Report included the following information SBAR, Kardex, Intake/Output and MAR.

## 2021-08-10 NOTE — PROGRESS NOTES
[facility] Hospital[facility]  Progress Note    Patient: Hema Jaramillo Age: 64 y.o. : 1960 MR#: [id number] SSN: [id number]  Date: 8/10/2021     Subjective/24-hour events:     Patient is sitting in bed in no apparent distress, awake. Denies any discomfort at this time    Assessment:   Acute cystitis with hematuria patient with chronically indwelling Chavez catheter  Severe sepsis POA secondary to above  ЕКАТЕРИНА  Acute on chronic anemia requiring PRBCs  Iron deficiency/low iron stores  Hypocalcemia and hypomagnesemia  Stage IV sacral pressure ulcer, POA,  status post debridement 2021  Bilateral lower extremity DVT  Acute encephalopathy, suspect metabolic  Severe protein calorie malnutrition  Paraplegia    Plan:   Patient is status post colonoscopy without any clear source of bleeding  At this time will challenge the patient again with a heparin drip for the DVTs. Vascular surgery also consulted. Monitor hemoglobin and hematocrit  Patient noted to have some low blood pressures earlier today which responded to IV fluids. Continue IV fluids. Patient appears nontoxic. Check blood cultures. Normal lactate is reassuring. Monitor labs  Wound care  Podiatry input noted  PT/OT as tolerated.   Disposition-home with home health care when ready    Case discussed with:  [x]Patient  []Family  [x]Nursing  []Case Management  DVT Prophylaxis:  []Lovenox  []Hep SQ  []SCDs  []Coumadin   [x]On Heparin gtt    Objective:   VS:   Visit Vitals  BP 93/62   Pulse 88   Temp 99.4 °F (37.4 °C)   Resp 20   Ht 6' 2\" (1.88 m)   Wt 87.2 kg (192 lb 3.2 oz)   SpO2 100%   BMI 24.68 kg/m²      Tmax/24hrs: Temp (24hrs), Av.1 °F (37.3 °C), Min:98.7 °F (37.1 °C), Max:99.5 °F (37.5 °C)      Intake/Output Summary (Last 24 hours) at 8/10/2021 1950  Last data filed at 8/10/2021 1445  Gross per 24 hour   Intake 240 ml   Output 2950 ml   Net -2710 ml       General:  Awake, alert  Cardiovascular:  S1S2+, RRR  Pulmonary:  CTA b/l  GI:  Soft, BS+, NT, ND, has colostomy  Extremities:  trace edema  Sacral decub    Labs:    Recent Results (from the past 24 hour(s))   GLUCOSE, POC    Collection Time: 08/09/21  9:08 PM   Result Value Ref Range    Glucose (POC) 89 70 - 110 mg/dL   MAGNESIUM    Collection Time: 08/10/21  3:10 AM   Result Value Ref Range    Magnesium 1.5 (L) 1.6 - 2.6 mg/dL   PHOSPHORUS    Collection Time: 08/10/21  3:10 AM   Result Value Ref Range    Phosphorus 2.5 2.5 - 4.9 MG/DL   PROTHROMBIN TIME + INR    Collection Time: 08/10/21  3:10 AM   Result Value Ref Range    Prothrombin time 13.5 11.5 - 15.2 sec    INR 1.0 0.8 - 1.2     CBC WITH AUTOMATED DIFF    Collection Time: 08/10/21  3:10 AM   Result Value Ref Range    WBC 7.0 4.6 - 13.2 K/uL    RBC 2.65 (L) 4.35 - 5.65 M/uL    HGB 7.5 (L) 13.0 - 16.0 g/dL    HCT 23.9 (L) 36.0 - 48.0 %    MCV 90.2 74.0 - 97.0 FL    MCH 28.3 24.0 - 34.0 PG    MCHC 31.4 31.0 - 37.0 g/dL    RDW 13.5 11.6 - 14.5 %    PLATELET 329 231 - 747 K/uL    MPV 9.5 9.2 - 11.8 FL    NEUTROPHILS 51 40 - 73 %    LYMPHOCYTES 37 21 - 52 %    MONOCYTES 9 3 - 10 %    EOSINOPHILS 3 0 - 5 %    BASOPHILS 1 0 - 2 %    ABS. NEUTROPHILS 3.5 1.8 - 8.0 K/UL    ABS. LYMPHOCYTES 2.6 0.9 - 3.6 K/UL    ABS. MONOCYTES 0.6 0.05 - 1.2 K/UL    ABS. EOSINOPHILS 0.2 0.0 - 0.4 K/UL    ABS.  BASOPHILS 0.1 0.0 - 0.1 K/UL    DF AUTOMATED     METABOLIC PANEL, BASIC    Collection Time: 08/10/21  3:10 AM   Result Value Ref Range    Sodium 138 136 - 145 mmol/L    Potassium 4.8 3.5 - 5.5 mmol/L    Chloride 107 100 - 111 mmol/L    CO2 29 21 - 32 mmol/L    Anion gap 2 (L) 3.0 - 18 mmol/L    Glucose 81 74 - 99 mg/dL    BUN 13 7.0 - 18 MG/DL    Creatinine 0.87 0.6 - 1.3 MG/DL    BUN/Creatinine ratio 15 12 - 20      GFR est AA >60 >60 ml/min/1.73m2    GFR est non-AA >60 >60 ml/min/1.73m2    Calcium 7.5 (L) 8.5 - 10.1 MG/DL   PTT    Collection Time: 08/10/21  3:10 AM   Result Value Ref Range    aPTT 32.8 23.0 - 36.4 SEC   IONIZED CALCIUM Collection Time: 08/10/21  4:46 AM   Result Value Ref Range    Calcium, ionized 1.27 1.12 - 1.32 mmol/L   GLUCOSE, POC    Collection Time: 08/10/21  8:15 AM   Result Value Ref Range    Glucose (POC) 90 70 - 110 mg/dL   GLUCOSE, POC    Collection Time: 08/10/21 11:09 AM   Result Value Ref Range    Glucose (POC) 101 70 - 110 mg/dL   PTT    Collection Time: 08/10/21 11:29 AM   Result Value Ref Range    aPTT 33.5 23.0 - 36.4 SEC   LACTIC ACID    Collection Time: 08/10/21 12:31 PM   Result Value Ref Range    Lactic acid 0.6 0.4 - 2.0 MMOL/L   CBC WITH AUTOMATED DIFF    Collection Time: 08/10/21 12:31 PM   Result Value Ref Range    WBC 7.6 4.6 - 13.2 K/uL    RBC 2.63 (L) 4.35 - 5.65 M/uL    HGB 7.2 (L) 13.0 - 16.0 g/dL    HCT 23.9 (L) 36.0 - 48.0 %    MCV 90.9 74.0 - 97.0 FL    MCH 27.4 24.0 - 34.0 PG    MCHC 30.1 (L) 31.0 - 37.0 g/dL    RDW 13.7 11.6 - 14.5 %    PLATELET 321 461 - 257 K/uL    MPV 9.5 9.2 - 11.8 FL    NEUTROPHILS 61 40 - 73 %    LYMPHOCYTES 28 21 - 52 %    MONOCYTES 8 3 - 10 %    EOSINOPHILS 2 0 - 5 %    BASOPHILS 1 0 - 2 %    ABS. NEUTROPHILS 4.6 1.8 - 8.0 K/UL    ABS. LYMPHOCYTES 2.1 0.9 - 3.6 K/UL    ABS. MONOCYTES 0.6 0.05 - 1.2 K/UL    ABS. EOSINOPHILS 0.2 0.0 - 0.4 K/UL    ABS.  BASOPHILS 0.0 0.0 - 0.1 K/UL    DF AUTOMATED     GLUCOSE, POC    Collection Time: 08/10/21  3:24 PM   Result Value Ref Range    Glucose (POC) 92 70 - 110 mg/dL       Signed By: Isaura Mcgowan MD     August 10, 2021
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
## 2021-08-10 NOTE — PROGRESS NOTES
Messaged attending provider in regards to below stated vitals. Noted pt sbp lower than patient baseline of sbp low 100's. Awaiting response or new orders from attending. Will continue to monitor patient .        08/10/21 0812 08/10/21 0927   Vital Signs   Temp 99.5 °F (37.5 °C)  --    Temp Source Oral  --    Pulse (Heart Rate) 85 81   Heart Rate Source  --  Monitor   Resp Rate 16  --    O2 Sat (%) 99 %  --    Level of Consciousness Alert (0)  --    BP (!) 82/55 (!) 95/57   MAP (Calculated) (!) 64 70   MEWS Score 2  --

## 2021-08-10 NOTE — PROGRESS NOTES
Seen at bedside awake and alert. Inspected right foot. Right great toe wound is dry necrosis. Toe is viable. No signs infection. Continue with wound care.

## 2021-08-10 NOTE — PROGRESS NOTES
Alexis Gleason with [address] accepted patient in [facility], with Anticipated Start of Care 08/12/2021. CM messaged Alexis Gleason in [facility], and let her know patient is a possible discharge for tomorrow.          Erica Grullon, RN  Case Management [phone]

## 2021-08-10 NOTE — PROGRESS NOTES
Problem: Patient Education: Go to Patient Education Activity  Goal: Patient/Family Education  Outcome: Progressing Towards Goal     Problem: Impaired Skin Integrity/Pressure Injury Treatment  Goal: *Improvement of Existing Pressure Injury  Outcome: Progressing Towards Goal  Goal: *Prevention of pressure injury  Description: Document Jordin Scale and appropriate interventions in the flowsheet.   Outcome: Progressing Towards Goal  Note: Pressure Injury Interventions:  Sensory Interventions: Assess changes in LOC    Moisture Interventions: Absorbent underpads    Activity Interventions: Increase time out of bed    Mobility Interventions: HOB 30 degrees or less    Nutrition Interventions: Document food/fluid/supplement intake    Friction and Shear Interventions: Apply protective barrier, creams and emollients

## 2021-08-10 NOTE — PROGRESS NOTES
Problem: Patient Education: Go to Patient Education Activity  Goal: Patient/Family Education  Outcome: Progressing Towards Goal     Problem: Impaired Skin Integrity/Pressure Injury Treatment  Goal: *Improvement of Existing Pressure Injury  Outcome: Progressing Towards Goal  Goal: *Prevention of pressure injury  Description: Document Jordin Scale and appropriate interventions in the flowsheet.   Outcome: Progressing Towards Goal  Note: Pressure Injury Interventions:  Sensory Interventions: Assess changes in LOC    Moisture Interventions: Absorbent underpads    Activity Interventions: Increase time out of bed, Pressure redistribution bed/mattress(bed type)    Mobility Interventions: HOB 30 degrees or less    Nutrition Interventions: Document food/fluid/supplement intake    Friction and Shear Interventions: Apply protective barrier, creams and emollients, HOB 30 degrees or less                Problem: Patient Education: Go to Patient Education Activity  Goal: Patient/Family Education  Outcome: Progressing Towards Goal     Problem: Pain  Goal: *Control of Pain  Outcome: Progressing Towards Goal  Goal: *PALLIATIVE CARE:  Alleviation of Pain  Outcome: Progressing Towards Goal     Problem: Patient Education: Go to Patient Education Activity  Goal: Patient/Family Education  Outcome: Progressing Towards Goal     Problem: Injury - Risk of, Adverse Drug Event  Goal: *Absence of adverse drug events  Outcome: Progressing Towards Goal  Goal: *Absence of medication errors  Outcome: Progressing Towards Goal  Goal: *Knowledge of prescribed medications  Outcome: Progressing Towards Goal     Problem: Nutrition Deficit  Goal: *Optimize nutritional status  Outcome: Progressing Towards Goal     Problem: Patient Education: Go to Patient Education Activity  Goal: Patient/Family Education  Outcome: Progressing Towards Goal     Problem: Patient Education: Go to Patient Education Activity  Goal: Patient/Family Education  Outcome: Progressing Towards Goal     Problem: Infection - Risk of, Urinary Catheter-Associated Urinary Tract Infection  Goal: *Absence of infection signs and symptoms  Outcome: Progressing Towards Goal     Problem: Patient Education: Go to Patient Education Activity  Goal: Patient/Family Education  Outcome: Progressing Towards Goal
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
## 2021-08-11 LAB
ANION GAP SERPL CALC-SCNC: 4 MMOL/L (ref 3–18)
APTT PPP: 71.3 SEC (ref 23–36.4)
APTT PPP: 81.8 SEC (ref 23–36.4)
BASOPHILS # BLD: 0.1 K/UL (ref 0–0.1)
BASOPHILS NFR BLD: 1 % (ref 0–2)
BUN SERPL-MCNC: 11 MG/DL (ref 7–18)
BUN/CREAT SERPL: 16 (ref 12–20)
CALCIUM SERPL-MCNC: 7.6 MG/DL (ref 8.5–10.1)
CHLORIDE SERPL-SCNC: 106 MMOL/L (ref 100–111)
CO2 SERPL-SCNC: 25 MMOL/L (ref 21–32)
CREAT SERPL-MCNC: 0.67 MG/DL (ref 0.6–1.3)
DIFFERENTIAL METHOD BLD: ABNORMAL
EOSINOPHIL # BLD: 0.2 K/UL (ref 0–0.4)
EOSINOPHIL NFR BLD: 2 % (ref 0–5)
ERYTHROCYTE [DISTWIDTH] IN BLOOD BY AUTOMATED COUNT: 13.8 % (ref 11.6–14.5)
GLUCOSE BLD STRIP.AUTO-MCNC: 101 MG/DL (ref 70–110)
GLUCOSE BLD STRIP.AUTO-MCNC: 106 MG/DL (ref 70–110)
GLUCOSE BLD STRIP.AUTO-MCNC: 117 MG/DL (ref 70–110)
GLUCOSE BLD STRIP.AUTO-MCNC: 78 MG/DL (ref 70–110)
GLUCOSE BLD STRIP.AUTO-MCNC: 82 MG/DL (ref 70–110)
GLUCOSE BLD STRIP.AUTO-MCNC: 84 MG/DL (ref 70–110)
GLUCOSE SERPL-MCNC: 75 MG/DL (ref 74–99)
HCT VFR BLD AUTO: 20.6 % (ref 36–48)
HGB BLD-MCNC: 6.3 G/DL (ref 13–16)
HISTORY CHECKED?,CKHIST: NORMAL
INR PPP: 1.1 (ref 0.8–1.2)
LYMPHOCYTES # BLD: 3.2 K/UL (ref 0.9–3.6)
LYMPHOCYTES NFR BLD: 39 % (ref 21–52)
MAGNESIUM SERPL-MCNC: 1.7 MG/DL (ref 1.6–2.6)
MCH RBC QN AUTO: 27.6 PG (ref 24–34)
MCHC RBC AUTO-ENTMCNC: 30.6 G/DL (ref 31–37)
MCV RBC AUTO: 90.4 FL (ref 74–97)
MONOCYTES # BLD: 0.7 K/UL (ref 0.05–1.2)
MONOCYTES NFR BLD: 8 % (ref 3–10)
NEUTS SEG # BLD: 4 K/UL (ref 1.8–8)
NEUTS SEG NFR BLD: 50 % (ref 40–73)
PHOSPHATE SERPL-MCNC: 2.9 MG/DL (ref 2.5–4.9)
PLATELET # BLD AUTO: 266 K/UL (ref 135–420)
PMV BLD AUTO: 9.2 FL (ref 9.2–11.8)
POTASSIUM SERPL-SCNC: 4.2 MMOL/L (ref 3.5–5.5)
PROTHROMBIN TIME: 14.1 SEC (ref 11.5–15.2)
RBC # BLD AUTO: 2.28 M/UL (ref 4.35–5.65)
SODIUM SERPL-SCNC: 135 MMOL/L (ref 136–145)
WBC # BLD AUTO: 8.1 K/UL (ref 4.6–13.2)

## 2021-08-11 PROCEDURE — 85730 THROMBOPLASTIN TIME PARTIAL: CPT

## 2021-08-11 PROCEDURE — 84100 ASSAY OF PHOSPHORUS: CPT

## 2021-08-11 PROCEDURE — 74011250637 HC RX REV CODE- 250/637: Performed by: FAMILY MEDICINE

## 2021-08-11 PROCEDURE — 83735 ASSAY OF MAGNESIUM: CPT

## 2021-08-11 PROCEDURE — 99232 SBSQ HOSP IP/OBS MODERATE 35: CPT | Performed by: EMERGENCY MEDICINE

## 2021-08-11 PROCEDURE — 74011250636 HC RX REV CODE- 250/636: Performed by: PHYSICIAN ASSISTANT

## 2021-08-11 PROCEDURE — 80048 BASIC METABOLIC PNL TOTAL CA: CPT

## 2021-08-11 PROCEDURE — 2709999900 HC NON-CHARGEABLE SUPPLY

## 2021-08-11 PROCEDURE — 85025 COMPLETE CBC W/AUTO DIFF WBC: CPT

## 2021-08-11 PROCEDURE — 86923 COMPATIBILITY TEST ELECTRIC: CPT

## 2021-08-11 PROCEDURE — 86901 BLOOD TYPING SEROLOGIC RH(D): CPT

## 2021-08-11 PROCEDURE — 65660000000 HC RM CCU STEPDOWN

## 2021-08-11 PROCEDURE — 82962 GLUCOSE BLOOD TEST: CPT

## 2021-08-11 PROCEDURE — 99232 SBSQ HOSP IP/OBS MODERATE 35: CPT | Performed by: PHYSICIAN ASSISTANT

## 2021-08-11 PROCEDURE — 85610 PROTHROMBIN TIME: CPT

## 2021-08-11 RX ORDER — SODIUM CHLORIDE 9 MG/ML
250 INJECTION, SOLUTION INTRAVENOUS AS NEEDED
Status: DISCONTINUED | OUTPATIENT
Start: 2021-08-11 | End: 2021-08-18 | Stop reason: HOSPADM

## 2021-08-11 RX ADMIN — PANTOPRAZOLE 40 MG: 40 TABLET, DELAYED RELEASE ORAL at 08:15

## 2021-08-11 RX ADMIN — DAKIN'S SOLUTION 0.125% (QUARTER STRENGTH): 0.12 SOLUTION at 09:13

## 2021-08-11 RX ADMIN — HEPARIN SODIUM 16 UNITS/KG/HR: 10000 INJECTION, SOLUTION INTRAVENOUS at 07:41

## 2021-08-11 RX ADMIN — PANTOPRAZOLE 40 MG: 40 TABLET, DELAYED RELEASE ORAL at 22:35

## 2021-08-11 NOTE — PROGRESS NOTES
Pt not seen for skilled OT due to:  []  Nausea/vomiting  []  Eating  []  Pain  []  Pt lethargic  [x]  Downward trending H/H (6.3/20. 6)  Will f/u later as schedule allows. Thank you.   Vinod Malagon, MS OTR/L
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
## 2021-08-11 NOTE — PROGRESS NOTES
Problem: Patient Education: Go to Patient Education Activity  Goal: Patient/Family Education  Outcome: Progressing Towards Goal     Problem: Patient Education: Go to Patient Education Activity  Goal: Patient/Family Education  Outcome: Progressing Towards Goal     Problem: Pain  Goal: *Control of Pain  Outcome: Progressing Towards Goal  Goal: *PALLIATIVE CARE:  Alleviation of Pain  Outcome: Progressing Towards Goal     Problem: Patient Education: Go to Patient Education Activity  Goal: Patient/Family Education  Outcome: Progressing Towards Goal     Problem: Patient Education: Go to Patient Education Activity  Goal: Patient/Family Education  Outcome: Progressing Towards Goal     Problem: Injury - Risk of, Adverse Drug Event  Goal: *Absence of adverse drug events  Outcome: Progressing Towards Goal  Goal: *Absence of medication errors  Outcome: Progressing Towards Goal  Goal: *Knowledge of prescribed medications  Outcome: Progressing Towards Goal     Problem: Patient Education: Go to Patient Education Activity  Goal: Patient/Family Education  Outcome: Progressing Towards Goal     Problem: Nutrition Deficit  Goal: *Optimize nutritional status  Outcome: Progressing Towards Goal     Problem: Patient Education: Go to Patient Education Activity  Goal: Patient/Family Education  Outcome: Progressing Towards Goal     Problem: Patient Education: Go to Patient Education Activity  Goal: Patient/Family Education  Outcome: Progressing Towards Goal     Problem: Infection - Risk of, Urinary Catheter-Associated Urinary Tract Infection  Goal: *Absence of infection signs and symptoms  Outcome: Progressing Towards Goal     Problem: Patient Education: Go to Patient Education Activity  Goal: Patient/Family Education  Outcome: Progressing Towards Goal

## 2021-08-11 NOTE — PROGRESS NOTES
Bedside shift change report given to Blayne Zeng RN (oncoming nurse) by Joy Ku RN  (offgoing nurse). Report included the following information SBAR, MAR and Cardiac Rhythm Sinus Rhythm.

## 2021-08-11 NOTE — PROGRESS NOTES
Nutrition Assessment     Type and Reason for Visit: Reassess, RD nutrition re-screen/LOS, NPO/clear liquid    Nutrition Recommendations/Plan:   - Modify supplements: increase Ensure Enlive to TID  - Continue all other nutrition interventions. Encourage/ monitor po intake of meals and supplements. Nutrition Assessment:  Pt reported fair appetite/ meal intake, eating 50% of most meals. tolerating diet. fair/good intake of Ensure drinks; likes them. Discussed adding other nutrition supplement; pt declined all options. Denied having any nutrition concerns at time of visit. Malnutrition Assessment:  Malnutrition Status: No malnutrition     Estimated Daily Nutrient Needs:  Energy (kcal):  8160-7376  Protein (g):         Fluid (ml/day):  1333-2698    Nutrition Related Findings:  BM 8/11 loose. Mg improved, WNL. Current Nutrition Therapies:  ADULT ORAL NUTRITION SUPPLEMENT Breakfast, Dinner; Standard High Calorie/High Protein  ADULT DIET Regular; 3 carb choices (45 gm/meal)    Anthropometric Measures:  · Height:  6' 2\" (188 cm)  · Current Body Wt:  87.2 kg (192 lb 3.9 oz)  · BMI: 24.7    Nutrition Diagnosis:   · Increased nutrient needs (protein/ energy) related to increased demand for energy/nutrients (promotion of wound healing) as evidenced by wounds    ·   related to   as evidenced by      ·   related to   as evidenced by        Nutrition Intervention:  Food and/or Nutrient Delivery: Continue current diet, Modify oral nutrition supplement  Nutrition Education and Counseling: Education not indicated  Coordination of Nutrition Care: Continue to monitor while inpatient    Goals:  Nutritional needs will be met through adequate oral intake or nutrition support within the next 7 days.        Nutrition Monitoring and Evaluation:   Behavioral-Environmental Outcomes: None identified  Food/Nutrient Intake Outcomes: Food and nutrient intake, Supplement intake  Physical Signs/Symptoms Outcomes: Biochemical data, Meal time behavior, Nutrition focused physical findings, Skin    Discharge Planning:     Too soon to determine     Electronically signed by Nitin Katz RD on 8/11/2021 at 2:30 PM    Contact Number: [phone]

## 2021-08-11 NOTE — PROGRESS NOTES
Holding PT treatment d/t low H/H (6.3/20.6). Will follow up as appropriate to maximize safety and participation in skilled PT treatment.

## 2021-08-11 NOTE — PROGRESS NOTES
Problem: Patient Education: Go to Patient Education Activity  Goal: Patient/Family Education  Outcome: Progressing Towards Goal     Problem: Impaired Skin Integrity/Pressure Injury Treatment  Goal: *Improvement of Existing Pressure Injury  Outcome: Progressing Towards Goal  Goal: *Prevention of pressure injury  Description: Document Jordin Scale and appropriate interventions in the flowsheet. Outcome: Progressing Towards Goal  Note: Pressure Injury Interventions:  Sensory Interventions: Assess changes in LOC    Moisture Interventions: Check for incontinence Q2 hours and as needed    Activity Interventions: Pressure redistribution bed/mattress(bed type)    Mobility Interventions: HOB 30 degrees or less    Nutrition Interventions: Document food/fluid/supplement intake, Offer support with meals,snacks and hydration    Friction and Shear Interventions: Apply protective barrier, creams and emollients                Problem: Patient Education: Go to Patient Education Activity  Goal: Patient/Family Education  Outcome: Progressing Towards Goal     Problem: Pain  Goal: *Control of Pain  Outcome: Progressing Towards Goal  Goal: *PALLIATIVE CARE:  Alleviation of Pain  Outcome: Progressing Towards Goal     Problem: Injury - Risk of, Adverse Drug Event  Goal: *Absence of adverse drug events  Outcome: Progressing Towards Goal  Goal: *Absence of medication errors  Outcome: Progressing Towards Goal  Goal: *Knowledge of prescribed medications  Outcome: Progressing Towards Goal     Problem: Patient Education: Go to Patient Education Activity  Goal: Patient/Family Education  Outcome: Progressing Towards Goal     Problem: Risk for Spread of Infection  Goal: Prevent transmission of infectious organism to others  Description: Prevent the transmission of infectious organisms to other patients, staff members, and visitors.   Outcome: Progressing Towards Goal     Problem: Patient Education:  Go to Education Activity  Goal: Patient/Family Education  Outcome: Progressing Towards Goal     Problem: Nutrition Deficit  Goal: *Optimize nutritional status  Outcome: Progressing Towards Goal     Problem: Nutrition Deficit  Goal: *Optimize nutritional status  Outcome: Progressing Towards Goal     Problem: Patient Education: Go to Patient Education Activity  Goal: Patient/Family Education  Outcome: Progressing Towards Goal

## 2021-08-11 NOTE — ROUTINE PROCESS
Bedside and Verbal shift change report given to Nika (oncoming nurse) by Alberto Stone RN (offgoing nurse). Report included the following information SBAR and Kardex.
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
## 2021-08-12 PROBLEM — I82.409 DEEP VEIN THROMBOSIS (DVT) (HCC): Status: ACTIVE | Noted: 2021-07-29

## 2021-08-12 LAB
ANION GAP SERPL CALC-SCNC: 2 MMOL/L (ref 3–18)
APTT PPP: 34.5 SEC (ref 23–36.4)
BASOPHILS # BLD: 0.1 K/UL (ref 0–0.1)
BASOPHILS NFR BLD: 1 % (ref 0–2)
BUN SERPL-MCNC: 6 MG/DL (ref 7–18)
BUN/CREAT SERPL: 11 (ref 12–20)
CALCIUM SERPL-MCNC: 7.4 MG/DL (ref 8.5–10.1)
CHLORIDE SERPL-SCNC: 107 MMOL/L (ref 100–111)
CO2 SERPL-SCNC: 29 MMOL/L (ref 21–32)
CREAT SERPL-MCNC: 0.55 MG/DL (ref 0.6–1.3)
DIFFERENTIAL METHOD BLD: ABNORMAL
EOSINOPHIL # BLD: 0.2 K/UL (ref 0–0.4)
EOSINOPHIL NFR BLD: 2 % (ref 0–5)
ERYTHROCYTE [DISTWIDTH] IN BLOOD BY AUTOMATED COUNT: 13.8 % (ref 11.6–14.5)
GLUCOSE BLD STRIP.AUTO-MCNC: 102 MG/DL (ref 70–110)
GLUCOSE BLD STRIP.AUTO-MCNC: 113 MG/DL (ref 70–110)
GLUCOSE BLD STRIP.AUTO-MCNC: 134 MG/DL (ref 70–110)
GLUCOSE BLD STRIP.AUTO-MCNC: 87 MG/DL (ref 70–110)
GLUCOSE SERPL-MCNC: 70 MG/DL (ref 74–99)
HCT VFR BLD AUTO: 23.2 % (ref 36–48)
HCT VFR BLD AUTO: 25.1 % (ref 36–48)
HGB BLD-MCNC: 7.1 G/DL (ref 13–16)
HGB BLD-MCNC: 7.8 G/DL (ref 13–16)
INR PPP: 1.1 (ref 0.8–1.2)
LYMPHOCYTES # BLD: 2.5 K/UL (ref 0.9–3.6)
LYMPHOCYTES NFR BLD: 36 % (ref 21–52)
MAGNESIUM SERPL-MCNC: 1.6 MG/DL (ref 1.6–2.6)
MCH RBC QN AUTO: 28 PG (ref 24–34)
MCHC RBC AUTO-ENTMCNC: 31.1 G/DL (ref 31–37)
MCV RBC AUTO: 90 FL (ref 74–97)
MONOCYTES # BLD: 0.6 K/UL (ref 0.05–1.2)
MONOCYTES NFR BLD: 8 % (ref 3–10)
NEUTS SEG # BLD: 3.6 K/UL (ref 1.8–8)
NEUTS SEG NFR BLD: 52 % (ref 40–73)
PHOSPHATE SERPL-MCNC: 3 MG/DL (ref 2.5–4.9)
PLATELET # BLD AUTO: 255 K/UL (ref 135–420)
PMV BLD AUTO: 9.1 FL (ref 9.2–11.8)
POTASSIUM SERPL-SCNC: 4.4 MMOL/L (ref 3.5–5.5)
PROTHROMBIN TIME: 14.3 SEC (ref 11.5–15.2)
RBC # BLD AUTO: 2.79 M/UL (ref 4.35–5.65)
SODIUM SERPL-SCNC: 138 MMOL/L (ref 136–145)
WBC # BLD AUTO: 6.9 K/UL (ref 4.6–13.2)

## 2021-08-12 PROCEDURE — 85610 PROTHROMBIN TIME: CPT

## 2021-08-12 PROCEDURE — 36430 TRANSFUSION BLD/BLD COMPNT: CPT

## 2021-08-12 PROCEDURE — 82962 GLUCOSE BLOOD TEST: CPT

## 2021-08-12 PROCEDURE — 65660000000 HC RM CCU STEPDOWN

## 2021-08-12 PROCEDURE — 99232 SBSQ HOSP IP/OBS MODERATE 35: CPT | Performed by: EMERGENCY MEDICINE

## 2021-08-12 PROCEDURE — 85018 HEMOGLOBIN: CPT

## 2021-08-12 PROCEDURE — 84100 ASSAY OF PHOSPHORUS: CPT

## 2021-08-12 PROCEDURE — 85730 THROMBOPLASTIN TIME PARTIAL: CPT

## 2021-08-12 PROCEDURE — 2709999900 HC NON-CHARGEABLE SUPPLY

## 2021-08-12 PROCEDURE — 80048 BASIC METABOLIC PNL TOTAL CA: CPT

## 2021-08-12 PROCEDURE — 85025 COMPLETE CBC W/AUTO DIFF WBC: CPT

## 2021-08-12 PROCEDURE — 83735 ASSAY OF MAGNESIUM: CPT

## 2021-08-12 PROCEDURE — 74011250637 HC RX REV CODE- 250/637: Performed by: FAMILY MEDICINE

## 2021-08-12 PROCEDURE — P9016 RBC LEUKOCYTES REDUCED: HCPCS

## 2021-08-12 RX ADMIN — DAKIN'S SOLUTION 0.125% (QUARTER STRENGTH): 0.12 SOLUTION at 18:00

## 2021-08-12 RX ADMIN — DAKIN'S SOLUTION 0.125% (QUARTER STRENGTH): 0.12 SOLUTION at 09:55

## 2021-08-12 RX ADMIN — PANTOPRAZOLE 40 MG: 40 TABLET, DELAYED RELEASE ORAL at 09:55

## 2021-08-12 RX ADMIN — PANTOPRAZOLE 40 MG: 40 TABLET, DELAYED RELEASE ORAL at 22:00

## 2021-08-12 NOTE — PROGRESS NOTES
Need for IVC filter discussed with primary team as Mr Alexis Marr failed heparin challenge. Will schedule for filter placement tomorrow morning. Npo after midnight.     Dora Walker PA-C

## 2021-08-12 NOTE — PROGRESS NOTES
Problem: Patient Education: Go to Patient Education Activity  Goal: Patient/Family Education  Outcome: Progressing Towards Goal     Problem: Impaired Skin Integrity/Pressure Injury Treatment  Goal: *Improvement of Existing Pressure Injury  Outcome: Progressing Towards Goal  Goal: *Prevention of pressure injury  Description: Document Jordin Scale and appropriate interventions in the flowsheet.   Outcome: Progressing Towards Goal  Note: Pressure Injury Interventions:  Sensory Interventions: Assess changes in LOC, Keep linens dry and wrinkle-free, Minimize linen layers, Monitor skin under medical devices    Moisture Interventions: Check for incontinence Q2 hours and as needed    Activity Interventions: Pressure redistribution bed/mattress(bed type)    Mobility Interventions: HOB 30 degrees or less    Nutrition Interventions: Document food/fluid/supplement intake    Friction and Shear Interventions: Apply protective barrier, creams and emollients                Problem: Patient Education: Go to Patient Education Activity  Goal: Patient/Family Education  Outcome: Progressing Towards Goal     Problem: Pain  Goal: *Control of Pain  Outcome: Progressing Towards Goal  Goal: *PALLIATIVE CARE:  Alleviation of Pain  Outcome: Progressing Towards Goal     Problem: Patient Education: Go to Patient Education Activity  Goal: Patient/Family Education  Outcome: Progressing Towards Goal     Problem: Injury - Risk of, Adverse Drug Event  Goal: *Absence of adverse drug events  Outcome: Progressing Towards Goal  Goal: *Absence of medication errors  Outcome: Progressing Towards Goal  Goal: *Knowledge of prescribed medications  Outcome: Progressing Towards Goal     Problem: Patient Education: Go to Patient Education Activity  Goal: Patient/Family Education  Outcome: Progressing Towards Goal     Problem: Risk for Spread of Infection  Goal: Prevent transmission of infectious organism to others  Description: Prevent the transmission of infectious organisms to other patients, staff members, and visitors. Outcome: Progressing Towards Goal     Problem: Patient Education:  Go to Education Activity  Goal: Patient/Family Education  Outcome: Progressing Towards Goal     Problem: Nutrition Deficit  Goal: *Optimize nutritional status  Outcome: Progressing Towards Goal     Problem: Patient Education: Go to Patient Education Activity  Goal: Patient/Family Education  Outcome: Progressing Towards Goal     Problem: Patient Education: Go to Patient Education Activity  Goal: Patient/Family Education  Outcome: Progressing Towards Goal     Problem: Infection - Risk of, Urinary Catheter-Associated Urinary Tract Infection  Goal: *Absence of infection signs and symptoms  Outcome: Progressing Towards Goal     Problem: Patient Education: Go to Patient Education Activity  Goal: Patient/Family Education  Outcome: Progressing Towards Goal     Problem: Pressure Injury - Risk of  Goal: *Prevention of pressure injury  Description: Document Jordin Scale and appropriate interventions in the flowsheet.   Outcome: Progressing Towards Goal     Problem: Patient Education: Go to Patient Education Activity  Goal: Patient/Family Education  Outcome: Progressing Towards Goal

## 2021-08-12 NOTE — PROGRESS NOTES
[facility] Hospitalists  Progress Note    Patient: Lige Cushing Age: 64 y.o. : 1960 MR#: [id number] SSN: [id number]  Date: 2021     Subjective/24-hour events:     Patient is laying in bed in no apparent distress, awake and alert. Denies any pain at this time    Assessment:   Acute cystitis with hematuria patient with chronically indwelling Chavez catheter  Severe sepsis POA secondary to above  ЕКАТЕРИНА  Acute on chronic anemia requiring PRBCs  Iron deficiency/low iron stores  Hypocalcemia and hypomagnesemia  Stage IV sacral pressure ulcer, POA,  status post debridement 2021  Bilateral lower extremity DVT  Acute encephalopathy, suspect metabolic  Severe protein calorie malnutrition  Paraplegia    Plan:   Patient was started on heparin drip yesterday and has dropped his hemoglobin to 6.3. Heparin drip held. Await vascular decision regarding IVC filter. Will transfuse 1 unit PRBCs today. I discussed the risks and benefits and alternatives of blood transfusions with the patient. Patient verbalized understanding and is agreeable to a blood transfusion. Monitor blood pressures  Monitor labs  Wound care  Podiatry input noted  PT/OT as tolerated.   Disposition-home with home health care when ready  Discussed with RN    Case discussed with:  [x]Patient  []Family  [x]Nursing  []Case Management  DVT Prophylaxis:  []Lovenox  []Hep SQ  []SCDs  []Coumadin   []On Heparin gtt    Objective:   VS:   Visit Vitals  BP 97/65   Pulse 90   Temp 99 °F (37.2 °C)   Resp 20   Ht 6' 2\" (1.88 m)   Wt 87.2 kg (192 lb 3.2 oz)   SpO2 99%   BMI 24.68 kg/m²      Tmax/24hrs: Temp (24hrs), Av.6 °F (37 °C), Min:98.3 °F (36.8 °C), Max:99 °F (37.2 °C)      Intake/Output Summary (Last 24 hours) at 2021  Last data filed at 2021 1725  Gross per 24 hour   Intake 930 ml   Output 3825 ml   Net -2895 ml       General:  Awake, alert, follows commands  Cardiovascular:  S1S2+, RRR  Pulmonary:  CTA b/l  GI:  Soft, BS+, NT, ND, has colostomy. Yellow stool noted in colostomy bag  Extremities:  trace edema  Paraplegia  Has sacral decubitus    Labs:    Recent Results (from the past 24 hour(s))   PTT    Collection Time: 08/10/21 10:00 PM   Result Value Ref Range    aPTT 111.0 (H) 23.0 - 36.4 SEC   MAGNESIUM    Collection Time: 08/11/21  6:35 AM   Result Value Ref Range    Magnesium 1.7 1.6 - 2.6 mg/dL   PHOSPHORUS    Collection Time: 08/11/21  6:35 AM   Result Value Ref Range    Phosphorus 2.9 2.5 - 4.9 MG/DL   PROTHROMBIN TIME + INR    Collection Time: 08/11/21  6:35 AM   Result Value Ref Range    Prothrombin time 14.1 11.5 - 15.2 sec    INR 1.1 0.8 - 1.2     CBC WITH AUTOMATED DIFF    Collection Time: 08/11/21  6:35 AM   Result Value Ref Range    WBC 8.1 4.6 - 13.2 K/uL    RBC 2.28 (L) 4.35 - 5.65 M/uL    HGB 6.3 (L) 13.0 - 16.0 g/dL    HCT 20.6 (L) 36.0 - 48.0 %    MCV 90.4 74.0 - 97.0 FL    MCH 27.6 24.0 - 34.0 PG    MCHC 30.6 (L) 31.0 - 37.0 g/dL    RDW 13.8 11.6 - 14.5 %    PLATELET 432 678 - 695 K/uL    MPV 9.2 9.2 - 11.8 FL    NEUTROPHILS 50 40 - 73 %    LYMPHOCYTES 39 21 - 52 %    MONOCYTES 8 3 - 10 %    EOSINOPHILS 2 0 - 5 %    BASOPHILS 1 0 - 2 %    ABS. NEUTROPHILS 4.0 1.8 - 8.0 K/UL    ABS. LYMPHOCYTES 3.2 0.9 - 3.6 K/UL    ABS. MONOCYTES 0.7 0.05 - 1.2 K/UL    ABS. EOSINOPHILS 0.2 0.0 - 0.4 K/UL    ABS.  BASOPHILS 0.1 0.0 - 0.1 K/UL    DF AUTOMATED     METABOLIC PANEL, BASIC    Collection Time: 08/11/21  6:35 AM   Result Value Ref Range    Sodium 135 (L) 136 - 145 mmol/L    Potassium 4.2 3.5 - 5.5 mmol/L    Chloride 106 100 - 111 mmol/L    CO2 25 21 - 32 mmol/L    Anion gap 4 3.0 - 18 mmol/L    Glucose 75 74 - 99 mg/dL    BUN 11 7.0 - 18 MG/DL    Creatinine 0.67 0.6 - 1.3 MG/DL    BUN/Creatinine ratio 16 12 - 20      GFR est AA >60 >60 ml/min/1.73m2    GFR est non-AA >60 >60 ml/min/1.73m2    Calcium 7.6 (L) 8.5 - 10.1 MG/DL   PTT    Collection Time: 08/11/21  6:35 AM   Result Value Ref Range    aPTT 81. 8 (H) 23.0 - 36.4 SEC   GLUCOSE, POC    Collection Time: 08/11/21  7:00 AM   Result Value Ref Range    Glucose (POC) 106 70 - 110 mg/dL   GLUCOSE, POC    Collection Time: 08/11/21  8:36 AM   Result Value Ref Range    Glucose (POC) 82 70 - 110 mg/dL   GLUCOSE, POC    Collection Time: 08/11/21 11:31 AM   Result Value Ref Range    Glucose (POC) 117 (H) 70 - 110 mg/dL   PTT    Collection Time: 08/11/21  2:00 PM   Result Value Ref Range    aPTT 71.3 (H) 23.0 - 36.4 SEC   RBC, ALLOCATE    Collection Time: 08/11/21  3:00 PM   Result Value Ref Range    HISTORY CHECKED?  Historical check performed    GLUCOSE, POC    Collection Time: 08/11/21  3:03 PM   Result Value Ref Range    Glucose (POC) 101 70 - 110 mg/dL   GLUCOSE, POC    Collection Time: 08/11/21  5:31 PM   Result Value Ref Range    Glucose (POC) 84 70 - 110 mg/dL   TYPE & SCREEN    Collection Time: 08/11/21  6:00 PM   Result Value Ref Range    Crossmatch Expiration 08/14/2021,2359     ABO/Rh(D) O POSITIVE     Antibody screen NEG     CALLED TO: Marven Fothergill 4N AT 19:17 ON 08/11/2021 BY RIMA     Unit number [id number]     Blood component type RC LR,2     Unit division 00     Status of unit ALLOCATED     Crossmatch result Compatible        Signed By: Valerie Platt MD     August 11, 2021

## 2021-08-12 NOTE — PROGRESS NOTES
OCCUPATIONAL THERAPY NOTE    Patient: Carol Vidal (2 y.o. male)  Date: 8/12/2021  Diagnosis: Sepsis ([address] [A41.9]  UTI (urinary tract infection) [N39.0]  Hypotension [I95.9] <principal problem not specified>  Procedure(s) (LRB):  COLONOSCOPY (N/A) 6 Days Post-Op  Precautions: Fall, Skin, Contact  Chart, occupational therapy assessment, plan of care, and goals were reviewed. Occupational Therapy re-evaluation attempted. Patient is unable to participate due to:  []  Nausea/vomiting  []  Eating  []  Pain  []  Pt lethargic  []  Off Unit  [x] Other:  Attempt x1- pt is receiving nursing care (10:36)  Attempt x2 pt is speaking with MD (14:36)  Will f/u later as schedule allows. Thank you.     Brisa Mckeon, MS, OTR/L

## 2021-08-12 NOTE — PROGRESS NOTES
Problem: Patient Education: Go to Patient Education Activity  Goal: Patient/Family Education  Outcome: Progressing Towards Goal     Problem: Impaired Skin Integrity/Pressure Injury Treatment  Goal: *Improvement of Existing Pressure Injury  Outcome: Progressing Towards Goal

## 2021-08-12 NOTE — PROGRESS NOTES
[facility] Hospitalists  Progress Note    Patient: Hema Jaramillo Age: 64 y.o. : 1960 MR#: [id number] SSN: [id number]  Date: 2021     Subjective/24-hour events:     Patient is sitting in bed in no apparent distress, awake and alert. Mother at bedside. Patient denies any overt bleeding    Assessment:   Acute cystitis with hematuria patient with chronically indwelling Chavez catheter  Severe sepsis POA secondary to above  ЕКАТЕРИНА  Acute on chronic anemia requiring PRBCs  Iron deficiency/low iron stores  Hypocalcemia and hypomagnesemia  Stage IV sacral pressure ulcer, POA,  status post debridement 2021  Bilateral lower extremity DVT  Acute encephalopathy, suspect metabolic  Severe protein calorie malnutrition  Paraplegia    Plan:   Heparin drip on hold. Status post PRBC transfusion yesterday. Discussed with vascular surgeon earlier today. Plans for IVC filter noted. N.p.o. after midnight. Monitor hemoglobin and hematocrit  Monitor blood pressures  Wound care  Podiatry input noted  PT/OT as tolerated.   Disposition-home with home health care when ready  Discussed with , discussed with RN  Discussed with patient and mother at bedside      Case discussed with:  [x]Patient  []Family  [x]Nursing  []Case Management  DVT Prophylaxis:  []Lovenox  []Hep SQ  []SCDs  []Coumadin   []On Heparin gtt    Objective:   VS:   Visit Vitals  BP (!) 103/58 (BP 1 Location: Left upper arm, BP Patient Position: At rest)   Pulse 85   Temp 98.5 °F (36.9 °C)   Resp 20   Ht 6' 2\" (1.88 m)   Wt 87.2 kg (192 lb 3.2 oz)   SpO2 100%   BMI 24.68 kg/m²      Tmax/24hrs: Temp (24hrs), Av.2 °F (36.8 °C), Min:97 °F (36.1 °C), Max:98.8 °F (37.1 °C)      Intake/Output Summary (Last 24 hours) at 2021 1840  Last data filed at 2021 1341  Gross per 24 hour   Intake 480 ml   Output 2100 ml   Net -1620 ml       General:  Awake, follows commands, responds appropriately  Cardiovascular:  S1S2+, RRR  Pulmonary:  CTA b/l  GI:  Soft, BS+, NT, ND, has colostomy. Yellow stool noted in colostomy  Extremities:  No edema  Sacral decubitus ulcer  Paraplegia    Labs:    Recent Results (from the past 24 hour(s))   GLUCOSE, POC    Collection Time: 08/11/21  9:13 PM   Result Value Ref Range    Glucose (POC) 78 70 - 110 mg/dL   HGB & HCT    Collection Time: 08/12/21 12:00 AM   Result Value Ref Range    HGB 7.1 (L) 13.0 - 16.0 g/dL    HCT 23.2 (L) 36.0 - 48.0 %   MAGNESIUM    Collection Time: 08/12/21  8:19 AM   Result Value Ref Range    Magnesium 1.6 1.6 - 2.6 mg/dL   PHOSPHORUS    Collection Time: 08/12/21  8:19 AM   Result Value Ref Range    Phosphorus 3.0 2.5 - 4.9 MG/DL   PROTHROMBIN TIME + INR    Collection Time: 08/12/21  8:19 AM   Result Value Ref Range    Prothrombin time 14.3 11.5 - 15.2 sec    INR 1.1 0.8 - 1.2     CBC WITH AUTOMATED DIFF    Collection Time: 08/12/21  8:19 AM   Result Value Ref Range    WBC 6.9 4.6 - 13.2 K/uL    RBC 2.79 (L) 4.35 - 5.65 M/uL    HGB 7.8 (L) 13.0 - 16.0 g/dL    HCT 25.1 (L) 36.0 - 48.0 %    MCV 90.0 74.0 - 97.0 FL    MCH 28.0 24.0 - 34.0 PG    MCHC 31.1 31.0 - 37.0 g/dL    RDW 13.8 11.6 - 14.5 %    PLATELET 264 201 - 842 K/uL    MPV 9.1 (L) 9.2 - 11.8 FL    NEUTROPHILS 52 40 - 73 %    LYMPHOCYTES 36 21 - 52 %    MONOCYTES 8 3 - 10 %    EOSINOPHILS 2 0 - 5 %    BASOPHILS 1 0 - 2 %    ABS. NEUTROPHILS 3.6 1.8 - 8.0 K/UL    ABS. LYMPHOCYTES 2.5 0.9 - 3.6 K/UL    ABS. MONOCYTES 0.6 0.05 - 1.2 K/UL    ABS. EOSINOPHILS 0.2 0.0 - 0.4 K/UL    ABS.  BASOPHILS 0.1 0.0 - 0.1 K/UL    DF AUTOMATED     METABOLIC PANEL, BASIC    Collection Time: 08/12/21  8:19 AM   Result Value Ref Range    Sodium 138 136 - 145 mmol/L    Potassium 4.4 3.5 - 5.5 mmol/L    Chloride 107 100 - 111 mmol/L    CO2 29 21 - 32 mmol/L    Anion gap 2 (L) 3.0 - 18 mmol/L    Glucose 70 (L) 74 - 99 mg/dL    BUN 6 (L) 7.0 - 18 MG/DL    Creatinine 0.55 (L) 0.6 - 1.3 MG/DL    BUN/Creatinine ratio 11 (L) 12 - 20      GFR est AA >60 >60 ml/min/1.73m2    GFR est non-AA >60 >60 ml/min/1.73m2    Calcium 7.4 (L) 8.5 - 10.1 MG/DL   PTT    Collection Time: 08/12/21  8:19 AM   Result Value Ref Range    aPTT 34.5 23.0 - 36.4 SEC   GLUCOSE, POC    Collection Time: 08/12/21  8:47 AM   Result Value Ref Range    Glucose (POC) 102 70 - 110 mg/dL   GLUCOSE, POC    Collection Time: 08/12/21 11:35 AM   Result Value Ref Range    Glucose (POC) 113 (H) 70 - 110 mg/dL   GLUCOSE, POC    Collection Time: 08/12/21  3:17 PM   Result Value Ref Range    Glucose (POC) 134 (H) 70 - 110 mg/dL       Signed By: Jania Harmon MD     August 12, 2021

## 2021-08-12 NOTE — PROGRESS NOTES
I called and discussed with vascular surgeon Dr. Wilton Ledesma regarding patient's drop in hemoglobin while on the heparin drip and the need for transfusion. Dr. Wilton Ledesma is considering a IVC filter placement.   We will keep patient n.p.o. after midnight

## 2021-08-13 LAB
ANION GAP SERPL CALC-SCNC: 3 MMOL/L (ref 3–18)
APTT PPP: 31.8 SEC (ref 23–36.4)
APTT PPP: 32.6 SEC (ref 23–36.4)
APTT PPP: 32.8 SEC (ref 23–36.4)
APTT PPP: 33.5 SEC (ref 23–36.4)
BASOPHILS # BLD: 0.1 K/UL (ref 0–0.1)
BASOPHILS NFR BLD: 1 % (ref 0–2)
BUN SERPL-MCNC: 8 MG/DL (ref 7–18)
BUN/CREAT SERPL: 15 (ref 12–20)
CALCIUM SERPL-MCNC: 7.7 MG/DL (ref 8.5–10.1)
CHLORIDE SERPL-SCNC: 108 MMOL/L (ref 100–111)
CO2 SERPL-SCNC: 26 MMOL/L (ref 21–32)
CREAT SERPL-MCNC: 0.52 MG/DL (ref 0.6–1.3)
DIFFERENTIAL METHOD BLD: ABNORMAL
EOSINOPHIL # BLD: 0.2 K/UL (ref 0–0.4)
EOSINOPHIL NFR BLD: 2 % (ref 0–5)
ERYTHROCYTE [DISTWIDTH] IN BLOOD BY AUTOMATED COUNT: 14 % (ref 11.6–14.5)
GLUCOSE BLD STRIP.AUTO-MCNC: 110 MG/DL (ref 70–110)
GLUCOSE BLD STRIP.AUTO-MCNC: 117 MG/DL (ref 70–110)
GLUCOSE BLD STRIP.AUTO-MCNC: 82 MG/DL (ref 70–110)
GLUCOSE BLD STRIP.AUTO-MCNC: 98 MG/DL (ref 70–110)
GLUCOSE SERPL-MCNC: 75 MG/DL (ref 74–99)
HCT VFR BLD AUTO: 23.8 % (ref 36–48)
HCT VFR BLD AUTO: 24.5 % (ref 36–48)
HCT VFR BLD AUTO: 25.3 % (ref 36–48)
HCT VFR BLD AUTO: 27.4 % (ref 36–48)
HGB BLD-MCNC: 7.2 G/DL (ref 13–16)
HGB BLD-MCNC: 7.5 G/DL (ref 13–16)
HGB BLD-MCNC: 7.9 G/DL (ref 13–16)
HGB BLD-MCNC: 8.4 G/DL (ref 13–16)
INR PPP: 1.1 (ref 0.8–1.2)
LACTATE SERPL-SCNC: 0.5 MMOL/L (ref 0.4–2)
LYMPHOCYTES # BLD: 2.7 K/UL (ref 0.9–3.6)
LYMPHOCYTES NFR BLD: 39 % (ref 21–52)
MAGNESIUM SERPL-MCNC: 1.5 MG/DL (ref 1.6–2.6)
MCH RBC QN AUTO: 27.5 PG (ref 24–34)
MCHC RBC AUTO-ENTMCNC: 30.6 G/DL (ref 31–37)
MCV RBC AUTO: 89.7 FL (ref 74–97)
MONOCYTES # BLD: 0.6 K/UL (ref 0.05–1.2)
MONOCYTES NFR BLD: 8 % (ref 3–10)
NEUTS SEG # BLD: 3.4 K/UL (ref 1.8–8)
NEUTS SEG NFR BLD: 49 % (ref 40–73)
PHOSPHATE SERPL-MCNC: 2.8 MG/DL (ref 2.5–4.9)
PLATELET # BLD AUTO: 260 K/UL (ref 135–420)
PMV BLD AUTO: 8.9 FL (ref 9.2–11.8)
POTASSIUM SERPL-SCNC: 4.1 MMOL/L (ref 3.5–5.5)
PROTHROMBIN TIME: 14 SEC (ref 11.5–15.2)
RBC # BLD AUTO: 2.73 M/UL (ref 4.35–5.65)
SODIUM SERPL-SCNC: 137 MMOL/L (ref 136–145)
WBC # BLD AUTO: 7 K/UL (ref 4.6–13.2)

## 2021-08-13 PROCEDURE — 84100 ASSAY OF PHOSPHORUS: CPT

## 2021-08-13 PROCEDURE — 87040 BLOOD CULTURE FOR BACTERIA: CPT

## 2021-08-13 PROCEDURE — 85014 HEMATOCRIT: CPT

## 2021-08-13 PROCEDURE — 80048 BASIC METABOLIC PNL TOTAL CA: CPT

## 2021-08-13 PROCEDURE — 36415 COLL VENOUS BLD VENIPUNCTURE: CPT

## 2021-08-13 PROCEDURE — 83605 ASSAY OF LACTIC ACID: CPT

## 2021-08-13 PROCEDURE — 85610 PROTHROMBIN TIME: CPT

## 2021-08-13 PROCEDURE — 65660000000 HC RM CCU STEPDOWN

## 2021-08-13 PROCEDURE — P9047 ALBUMIN (HUMAN), 25%, 50ML: HCPCS | Performed by: EMERGENCY MEDICINE

## 2021-08-13 PROCEDURE — 74011000250 HC RX REV CODE- 250: Performed by: PHYSICIAN ASSISTANT

## 2021-08-13 PROCEDURE — 82962 GLUCOSE BLOOD TEST: CPT

## 2021-08-13 PROCEDURE — 74011250636 HC RX REV CODE- 250/636: Performed by: SURGERY

## 2021-08-13 PROCEDURE — 74011000250 HC RX REV CODE- 250: Performed by: SURGERY

## 2021-08-13 PROCEDURE — 85730 THROMBOPLASTIN TIME PARTIAL: CPT

## 2021-08-13 PROCEDURE — 83735 ASSAY OF MAGNESIUM: CPT

## 2021-08-13 PROCEDURE — 74011000250 HC RX REV CODE- 250: Performed by: PODIATRIST

## 2021-08-13 PROCEDURE — 85025 COMPLETE CBC W/AUTO DIFF WBC: CPT

## 2021-08-13 PROCEDURE — 74011250636 HC RX REV CODE- 250/636: Performed by: EMERGENCY MEDICINE

## 2021-08-13 PROCEDURE — 85018 HEMOGLOBIN: CPT

## 2021-08-13 PROCEDURE — 2709999900 HC NON-CHARGEABLE SUPPLY

## 2021-08-13 PROCEDURE — 99232 SBSQ HOSP IP/OBS MODERATE 35: CPT | Performed by: EMERGENCY MEDICINE

## 2021-08-13 PROCEDURE — 74011250637 HC RX REV CODE- 250/637: Performed by: FAMILY MEDICINE

## 2021-08-13 RX ORDER — MAGNESIUM SULFATE 1 G/100ML
1 INJECTION INTRAVENOUS ONCE
Status: COMPLETED | OUTPATIENT
Start: 2021-08-13 | End: 2021-08-13

## 2021-08-13 RX ORDER — DEXTROSE MONOHYDRATE AND SODIUM CHLORIDE 5; .45 G/100ML; G/100ML
75 INJECTION, SOLUTION INTRAVENOUS CONTINUOUS
Status: DISCONTINUED | OUTPATIENT
Start: 2021-08-13 | End: 2021-08-13

## 2021-08-13 RX ORDER — ALBUMIN HUMAN 250 G/1000ML
12.5 SOLUTION INTRAVENOUS EVERY 6 HOURS
Status: COMPLETED | OUTPATIENT
Start: 2021-08-13 | End: 2021-08-14

## 2021-08-13 RX ORDER — SODIUM CHLORIDE 9 MG/ML
60 INJECTION, SOLUTION INTRAVENOUS CONTINUOUS
Status: DISCONTINUED | OUTPATIENT
Start: 2021-08-13 | End: 2021-08-18 | Stop reason: HOSPADM

## 2021-08-13 RX ORDER — SODIUM CHLORIDE 9 MG/ML
500 INJECTION, SOLUTION INTRAVENOUS ONCE
Status: COMPLETED | OUTPATIENT
Start: 2021-08-13 | End: 2021-08-13

## 2021-08-13 RX ADMIN — SODIUM CHLORIDE 500 ML: 900 INJECTION, SOLUTION INTRAVENOUS at 10:01

## 2021-08-13 RX ADMIN — PANTOPRAZOLE 40 MG: 40 TABLET, DELAYED RELEASE ORAL at 21:00

## 2021-08-13 RX ADMIN — ALBUMIN (HUMAN) 12.5 G: 0.25 INJECTION, SOLUTION INTRAVENOUS at 17:27

## 2021-08-13 RX ADMIN — SODIUM CHLORIDE 75 ML/HR: 900 INJECTION, SOLUTION INTRAVENOUS at 01:23

## 2021-08-13 RX ADMIN — ALBUMIN (HUMAN) 12.5 G: 0.25 INJECTION, SOLUTION INTRAVENOUS at 13:25

## 2021-08-13 RX ADMIN — DEXTROSE MONOHYDRATE 25 G: 25 INJECTION, SOLUTION INTRAVENOUS at 08:39

## 2021-08-13 RX ADMIN — SODIUM CHLORIDE 100 ML/HR: 900 INJECTION, SOLUTION INTRAVENOUS at 13:26

## 2021-08-13 RX ADMIN — DAKIN'S SOLUTION 0.125% (QUARTER STRENGTH): 0.12 SOLUTION at 17:26

## 2021-08-13 RX ADMIN — MAGNESIUM SULFATE HEPTAHYDRATE 1 G: 1 INJECTION, SOLUTION INTRAVENOUS at 13:26

## 2021-08-13 RX ADMIN — DEXTROSE MONOHYDRATE AND SODIUM CHLORIDE 75 ML/HR: 5; .45 INJECTION, SOLUTION INTRAVENOUS at 08:38

## 2021-08-13 RX ADMIN — SODIUM CHLORIDE 100 ML/HR: 900 INJECTION, SOLUTION INTRAVENOUS at 17:28

## 2021-08-13 NOTE — PROGRESS NOTES
[facility]    Patient: Cheryl Liao Age: 64 y.o. : 1960 MR#: [id number] SSN: [id number]  Date: 2021     Subjective/24-hour events:     I received a call from the vascular surgeon that the patient was noted to be hypotensive and Holding prior to IVC filter placement and so the procedure was canceled. The vascular surgeon has ordered IV normal saline bolus of 500 cc. I came and assessed the patient. Patient is laying in bed in no apparent distress. Patient is awake and follows commands. Patient denies any chest pain or shortness of breath or lightheadedness. Patient states that he feels like his normal self. No fever chills. No cough. Patient denies any abdominal pain. Assessment:   Acute cystitis with hematuria patient with chronically indwelling Chavez catheter  Severe sepsis POA secondary to above  ЕКАТЕРИНА  Acute on chronic anemia requiring PRBCs  Iron deficiency/low iron stores  Hypocalcemia and hypomagnesemia  Stage IV sacral pressure ulcer, POA,  status post debridement 2021  Bilateral lower extremity DVT  Acute encephalopathy, suspect metabolic  Severe protein calorie malnutrition  Paraplegia  Hypotension    Plan:     Patient appears nontoxic. Will check blood cultures and lactate and UA CNS. Will give IV albumin and continue normal saline. Will upgrade patient to stepdown. Continue to monitor hemoglobin and hematocrit. Transfuse as needed. Heparin drip on hold. Status post PRBC transfusion yesterday. Wound care  Podiatry input noted  PT/OT as tolerated. Discussed with vascular surgeon at bedside. Discussed with cath holding nurse taking care of the patient. Discussed with patient  Disposition-home with home health care when ready  I called patient's mother at phone #[phone] but was not able to leave a message as the voicemail box has not been set up.       Case discussed with:  [x]Patient  []Family  [x]Nursing  []Case Management  DVT Prophylaxis:  []Lovenox  []Hep SQ  []SCDs  []Coumadin   []On Heparin gtt    Objective:   VS:   Visit Vitals  BP (!) 88/59 (BP 1 Location: Left upper arm, BP Patient Position: At rest)   Pulse 76   Temp 98.4 °F (36.9 °C)   Resp 19   Ht 6' 2\" (1.88 m)   Wt 87.2 kg (192 lb 3.2 oz)   SpO2 100%   BMI 24.68 kg/m²      Tmax/24hrs: Temp (24hrs), Av.6 °F (37 °C), Min:98.4 °F (36.9 °C), Max:98.8 °F (37.1 °C)      Intake/Output Summary (Last 24 hours) at 2021 1024  Last data filed at 2021 0123  Gross per 24 hour   Intake 600 ml   Output 1625 ml   Net -1025 ml       General:  Awake, alert, follows commands, responds appropriately  Cardiovascular:  S1S2+, RRR  Pulmonary:  CTA b/l  GI:  Soft, BS+, NT, ND, has colostomy colostomy noted to have yellow stool.   No red blood noted  Extremities:  +edema  Paraplegia  Sacral decubitus ulcer    Labs:    Recent Results (from the past 24 hour(s))   GLUCOSE, POC    Collection Time: 21 11:35 AM   Result Value Ref Range    Glucose (POC) 113 (H) 70 - 110 mg/dL   GLUCOSE, POC    Collection Time: 21  3:17 PM   Result Value Ref Range    Glucose (POC) 134 (H) 70 - 110 mg/dL   GLUCOSE, POC    Collection Time: 21  9:10 PM   Result Value Ref Range    Glucose (POC) 87 70 - 110 mg/dL   HGB & HCT    Collection Time: 21 12:30 AM   Result Value Ref Range    HGB 7.2 (L) 13.0 - 16.0 g/dL    HCT 23.8 (L) 36.0 - 48.0 %   PTT    Collection Time: 21 12:30 AM   Result Value Ref Range    aPTT 33.5 23.0 - 36.4 SEC   MAGNESIUM    Collection Time: 21  5:11 AM   Result Value Ref Range    Magnesium 1.5 (L) 1.6 - 2.6 mg/dL   PHOSPHORUS    Collection Time: 21  5:11 AM   Result Value Ref Range    Phosphorus 2.8 2.5 - 4.9 MG/DL   PROTHROMBIN TIME + INR    Collection Time: 21  5:11 AM   Result Value Ref Range    Prothrombin time 14.0 11.5 - 15.2 sec    INR 1.1 0.8 - 1.2     CBC WITH AUTOMATED DIFF    Collection Time: 21  5:11 AM   Result Value Ref Range    WBC 7.0 4.6 - 13.2 K/uL    RBC 2.73 (L) 4.35 - 5.65 M/uL    HGB 7.5 (L) 13.0 - 16.0 g/dL    HCT 24.5 (L) 36.0 - 48.0 %    MCV 89.7 74.0 - 97.0 FL    MCH 27.5 24.0 - 34.0 PG    MCHC 30.6 (L) 31.0 - 37.0 g/dL    RDW 14.0 11.6 - 14.5 %    PLATELET 335 337 - 747 K/uL    MPV 8.9 (L) 9.2 - 11.8 FL    NEUTROPHILS 49 40 - 73 %    LYMPHOCYTES 39 21 - 52 %    MONOCYTES 8 3 - 10 %    EOSINOPHILS 2 0 - 5 %    BASOPHILS 1 0 - 2 %    ABS. NEUTROPHILS 3.4 1.8 - 8.0 K/UL    ABS. LYMPHOCYTES 2.7 0.9 - 3.6 K/UL    ABS. MONOCYTES 0.6 0.05 - 1.2 K/UL    ABS. EOSINOPHILS 0.2 0.0 - 0.4 K/UL    ABS.  BASOPHILS 0.1 0.0 - 0.1 K/UL    DF AUTOMATED     METABOLIC PANEL, BASIC    Collection Time: 08/13/21  5:11 AM   Result Value Ref Range    Sodium 137 136 - 145 mmol/L    Potassium 4.1 3.5 - 5.5 mmol/L    Chloride 108 100 - 111 mmol/L    CO2 26 21 - 32 mmol/L    Anion gap 3 3.0 - 18 mmol/L    Glucose 75 74 - 99 mg/dL    BUN 8 7.0 - 18 MG/DL    Creatinine 0.52 (L) 0.6 - 1.3 MG/DL    BUN/Creatinine ratio 15 12 - 20      GFR est AA >60 >60 ml/min/1.73m2    GFR est non-AA >60 >60 ml/min/1.73m2    Calcium 7.7 (L) 8.5 - 10.1 MG/DL   PTT    Collection Time: 08/13/21  5:11 AM   Result Value Ref Range    aPTT 32.6 23.0 - 36.4 SEC   GLUCOSE, POC    Collection Time: 08/13/21  7:46 AM   Result Value Ref Range    Glucose (POC) 82 70 - 110 mg/dL   GLUCOSE, POC    Collection Time: 08/13/21  9:19 AM   Result Value Ref Range    Glucose (POC) 117 (H) 70 - 110 mg/dL       Signed By: Lexi Wall MD     August 13, 2021

## 2021-08-13 NOTE — PROGRESS NOTES
Zoran Gonzales [phone] with pt's insurance [facility] [employer] called to check up on pt's status. She stated if there are any discharge needs we can contact her to assist.    Pt's discharge plan is to return home with [address] and personal care aids pending pt's progress medically.           JEANNA Houston RN  Care Management  Pager: [phone]

## 2021-08-13 NOTE — PROGRESS NOTES
Attempted PT treatment. Patient off floor on two attempts (0813, 1041). Will follow up as appropriate.

## 2021-08-13 NOTE — PROGRESS NOTES
I saw patient again today. Patient is sitting in bed in no apparent distress. Patient appears nontoxic and denies discomfort. Mother is at bedside    Visit Vitals  /67 (BP 1 Location: Right upper arm, BP Patient Position: Supine)   Pulse 78   Temp 97.6 °F (36.4 °C)   Resp 20   Ht 6' 2\" (1.88 m)   Wt 87.2 kg (192 lb 3.2 oz)   SpO2 100%   BMI 24.68 kg/m²     Lactate of 0.5 is reassuring. Patient appears nontoxic. Continue IV fluids and albumin.   Discussed with patient and his mother at bedside

## 2021-08-13 NOTE — PROGRESS NOTES
Unable to have procedure today        TRANSFER - OUT REPORT:    Verbal report given to Vitor Jeronimo RN(name) on Hema Jaramillo  being transferred to [address] [place] [address]) for routine progression of care       Report consisted of patients Situation, Background, Assessment and   Recommendations(SBAR). Information from the following report(s) SBAR, Procedure Summary and MAR was reviewed with the receiving nurse. Lines:   PICC Double Lumen 50/08/14 Left;Basilic (Active)   Central Line Being Utilized Yes 08/11/21 0434   Criteria for Appropriate Use Limited/no vessel suitable for conventional peripheral access 08/11/21 1222   Site Assessment Clean, dry, & intact 08/11/21 1222   Phlebitis Assessment 0 08/11/21 1222   Infiltration Assessment 0 08/11/21 1222   Date of Last Dressing Change 08/09/21 08/11/21 1222   Dressing Status Clean, dry, & intact 08/11/21 1222   Action Taken Open ports on tubing capped 08/11/21 0434   Dressing Type Disk with Chlorhexadine gluconate (CHG) 08/11/21 1222   Hub Color/Line Status Purple;Flushed;Capped; Patent 08/11/21 1222   Positive Blood Return (Site #1) Yes 08/11/21 1222   Hub Color/Line Status Red;Patent;Capped;Flushed 08/11/21 1222   Positive Blood Return (Site #2) Yes 08/11/21 1222   Alcohol Cap Used Yes 08/11/21 1222        Opportunity for questions and clarification was provided.       Patient transported with:   Registered Nurse

## 2021-08-13 NOTE — PROGRESS NOTES
TRANSFER - OUT REPORT:    Verbal report given to [facility] RN(name) on Emmanuel Pena  being transferred to [facility](unit) for routine progression of care       Report consisted of patients Situation, Background, Assessment and   Recommendations(SBAR). Information from the following report(s) SBAR, Kardex, [facility] and Recent Results was reviewed with the receiving nurse. Lines:   PICC Double Lumen 68/74/72 Left;Basilic (Active)   Central Line Being Utilized Yes 08/11/21 0434   Criteria for Appropriate Use Limited/no vessel suitable for conventional peripheral access 08/11/21 1222   Site Assessment Clean, dry, & intact 08/11/21 1222   Phlebitis Assessment 0 08/11/21 1222   Infiltration Assessment 0 08/11/21 1222   Date of Last Dressing Change 08/09/21 08/11/21 1222   Dressing Status Clean, dry, & intact 08/11/21 1222   Action Taken Open ports on tubing capped 08/11/21 0434   Dressing Type Disk with Chlorhexadine gluconate (CHG) 08/11/21 1222   Hub Color/Line Status Purple;Flushed;Capped; Patent 08/11/21 1222   Positive Blood Return (Site #1) Yes 08/11/21 1222   Hub Color/Line Status Red;Patent;Capped;Flushed 08/11/21 1222   Positive Blood Return (Site #2) Yes 08/11/21 1222   Alcohol Cap Used Yes 08/11/21 1222        Opportunity for questions and clarification was provided.

## 2021-08-13 NOTE — PROGRESS NOTES
Problem: Patient Education: Go to Patient Education Activity  Goal: Patient/Family Education  Outcome: Progressing Towards Goal     Problem: Impaired Skin Integrity/Pressure Injury Treatment  Goal: *Improvement of Existing Pressure Injury  Outcome: Progressing Towards Goal  Goal: *Prevention of pressure injury  Description: Document Jordin Scale and appropriate interventions in the flowsheet. Outcome: Progressing Towards Goal  Note: Pressure Injury Interventions:  Sensory Interventions: Assess changes in LOC    Moisture Interventions: Check for incontinence Q2 hours and as needed    Activity Interventions: Pressure redistribution bed/mattress(bed type)    Mobility Interventions: HOB 30 degrees or less    Nutrition Interventions: Document food/fluid/supplement intake    Friction and Shear Interventions: Apply protective barrier, creams and emollients                Problem: Patient Education: Go to Patient Education Activity  Goal: Patient/Family Education  Outcome: Progressing Towards Goal     Problem: Pain  Goal: *Control of Pain  Outcome: Progressing Towards Goal  Goal: *PALLIATIVE CARE:  Alleviation of Pain  Outcome: Progressing Towards Goal     Problem: Patient Education: Go to Patient Education Activity  Goal: Patient/Family Education  Outcome: Progressing Towards Goal     Problem: Injury - Risk of, Adverse Drug Event  Goal: *Absence of adverse drug events  Outcome: Progressing Towards Goal  Goal: *Absence of medication errors  Outcome: Progressing Towards Goal  Goal: *Knowledge of prescribed medications  Outcome: Progressing Towards Goal     Problem: Patient Education: Go to Patient Education Activity  Goal: Patient/Family Education  Outcome: Progressing Towards Goal     Problem: Risk for Spread of Infection  Goal: Prevent transmission of infectious organism to others  Description: Prevent the transmission of infectious organisms to other patients, staff members, and visitors.   Outcome: Progressing Towards Goal     Problem: Patient Education:  Go to Education Activity  Goal: Patient/Family Education  Outcome: Progressing Towards Goal     Problem: Nutrition Deficit  Goal: *Optimize nutritional status  Outcome: Progressing Towards Goal     Problem: Patient Education: Go to Patient Education Activity  Goal: Patient/Family Education  Outcome: Progressing Towards Goal     Problem: Patient Education: Go to Patient Education Activity  Goal: Patient/Family Education  Outcome: Progressing Towards Goal     Problem: Infection - Risk of, Urinary Catheter-Associated Urinary Tract Infection  Goal: *Absence of infection signs and symptoms  Outcome: Progressing Towards Goal     Problem: Patient Education: Go to Patient Education Activity  Goal: Patient/Family Education  Outcome: Progressing Towards Goal     Problem: Pressure Injury - Risk of  Goal: *Prevention of pressure injury  Description: Document Jordin Scale and appropriate interventions in the flowsheet.   Outcome: Progressing Towards Goal     Problem: Patient Education: Go to Patient Education Activity  Goal: Patient/Family Education  Outcome: Progressing Towards Goal

## 2021-08-13 NOTE — PROGRESS NOTES
Vascular surgery attending note  Patient was scheduled for IVC filter placement following failure of anticoagulation for bilateral acute lower extremity DVTs. Patient was transferred to the the Cath Lab holding area from the floor. Vitals on arrival with systolic of 44T. This progressively trended down to 80s and 70s with a MAP less than 60. He was afebrile and not tachycardic. His hemoglobin this morning was 7.5 from 7.2. It is unclear why he is hypotensive. A bolus of 500 cc normal saline was administered. He was also hypoglycemic and D50 was administered as well. I contacted the hospitalist and he was present at bedside to evaluate the patient. He would be admitted to CVT stepdown for further work-up. Sepsis is in the differential.  Patient has sacral decubiti as well as bilateral foot wounds. They do not appear to be septic and he has been evaluated by ID as well as general surgery. The IVC filter placement was canceled. We will reevaluate for placement after the weekend. Continue medical management. Thank you for allowing me to participate in the care of this patient.

## 2021-08-13 NOTE — PROGRESS NOTES
Entered patient chart to assist primary nurse. SBP <80. Dr. Checo Argueta notified. See MAR for medication and interventions. Will continue to monitor.

## 2021-08-13 NOTE — PROGRESS NOTES
TRANSFER - IN REPORT:    Verbal report received from [address] [place] [address] RN(name) on Barb Powell  being received from [address] [place] [address](unit) for ordered procedure      Report consisted of patients Situation, Background, Assessment and   Recommendations(SBAR). Information from the following report(s) SBAR, MAR and Pre Procedure Checklist was reviewed with the receiving nurse. Opportunity for questions and clarification was provided. Assessment completed upon patients arrival to unit and care assumed.

## 2021-08-13 NOTE — PROGRESS NOTES
Patient's chart reviewed for OT re-evaluation. Patient with BLE DVTs at this time and heparin on hold due to pending IVC filter placement. Will hold OT until after procedure. 11:30am--Patient's IVC filter procedure cancelled due to patient hypotensive. Patient pending possible transfer to stepdown unit. OT will f/u when patient is medically stable.     Thank you,  [facility], OTR/L

## 2021-08-14 LAB
ALBUMIN SERPL-MCNC: 1.8 G/DL (ref 3.4–5)
ALBUMIN/GLOB SERPL: 0.4 {RATIO} (ref 0.8–1.7)
ALP SERPL-CCNC: 49 U/L (ref 45–117)
ALT SERPL-CCNC: 14 U/L (ref 16–61)
ANION GAP SERPL CALC-SCNC: 2 MMOL/L (ref 3–18)
APTT PPP: 33.5 SEC (ref 23–36.4)
AST SERPL-CCNC: 17 U/L (ref 10–38)
BASOPHILS # BLD: 0 K/UL (ref 0–0.1)
BASOPHILS NFR BLD: 1 % (ref 0–2)
BILIRUB SERPL-MCNC: 0.1 MG/DL (ref 0.2–1)
BUN SERPL-MCNC: 6 MG/DL (ref 7–18)
BUN/CREAT SERPL: 9 (ref 12–20)
CA-I SERPL-SCNC: 1.15 MMOL/L (ref 1.15–1.33)
CALCIUM SERPL-MCNC: 6.9 MG/DL (ref 8.5–10.1)
CHLORIDE SERPL-SCNC: 116 MMOL/L (ref 100–111)
CO2 SERPL-SCNC: 24 MMOL/L (ref 21–32)
CREAT SERPL-MCNC: 0.68 MG/DL (ref 0.6–1.3)
DIFFERENTIAL METHOD BLD: ABNORMAL
EOSINOPHIL # BLD: 0.2 K/UL (ref 0–0.4)
EOSINOPHIL NFR BLD: 2 % (ref 0–5)
ERYTHROCYTE [DISTWIDTH] IN BLOOD BY AUTOMATED COUNT: 14.2 % (ref 11.6–14.5)
GLOBULIN SER CALC-MCNC: 4.2 G/DL (ref 2–4)
GLUCOSE BLD STRIP.AUTO-MCNC: 101 MG/DL (ref 70–110)
GLUCOSE BLD STRIP.AUTO-MCNC: 126 MG/DL (ref 70–110)
GLUCOSE BLD STRIP.AUTO-MCNC: 166 MG/DL (ref 70–110)
GLUCOSE BLD STRIP.AUTO-MCNC: 63 MG/DL (ref 70–110)
GLUCOSE SERPL-MCNC: 96 MG/DL (ref 74–99)
HCT VFR BLD AUTO: 20.4 % (ref 36–48)
HCT VFR BLD AUTO: 22.4 % (ref 36–48)
HGB BLD-MCNC: 6.2 G/DL (ref 13–16)
HGB BLD-MCNC: 6.9 G/DL (ref 13–16)
HISTORY CHECKED?,CKHIST: NORMAL
INR PPP: 1.2 (ref 0.8–1.2)
LYMPHOCYTES # BLD: 1.9 K/UL (ref 0.9–3.6)
LYMPHOCYTES NFR BLD: 29 % (ref 21–52)
MAGNESIUM SERPL-MCNC: 1.5 MG/DL (ref 1.6–2.6)
MCH RBC QN AUTO: 28.1 PG (ref 24–34)
MCHC RBC AUTO-ENTMCNC: 30.4 G/DL (ref 31–37)
MCV RBC AUTO: 92.3 FL (ref 74–97)
MONOCYTES # BLD: 0.6 K/UL (ref 0.05–1.2)
MONOCYTES NFR BLD: 9 % (ref 3–10)
NEUTS SEG # BLD: 3.8 K/UL (ref 1.8–8)
NEUTS SEG NFR BLD: 59 % (ref 40–73)
PHOSPHATE SERPL-MCNC: 2.5 MG/DL (ref 2.5–4.9)
PLATELET # BLD AUTO: 256 K/UL (ref 135–420)
PMV BLD AUTO: 9.2 FL (ref 9.2–11.8)
POTASSIUM SERPL-SCNC: 3.6 MMOL/L (ref 3.5–5.5)
PROT SERPL-MCNC: 6 G/DL (ref 6.4–8.2)
PROTHROMBIN TIME: 14.7 SEC (ref 11.5–15.2)
RBC # BLD AUTO: 2.21 M/UL (ref 4.35–5.65)
SODIUM SERPL-SCNC: 142 MMOL/L (ref 136–145)
WBC # BLD AUTO: 6.5 K/UL (ref 4.6–13.2)

## 2021-08-14 PROCEDURE — 84100 ASSAY OF PHOSPHORUS: CPT

## 2021-08-14 PROCEDURE — 85730 THROMBOPLASTIN TIME PARTIAL: CPT

## 2021-08-14 PROCEDURE — 74011250637 HC RX REV CODE- 250/637: Performed by: FAMILY MEDICINE

## 2021-08-14 PROCEDURE — P9016 RBC LEUKOCYTES REDUCED: HCPCS

## 2021-08-14 PROCEDURE — 82330 ASSAY OF CALCIUM: CPT

## 2021-08-14 PROCEDURE — 85610 PROTHROMBIN TIME: CPT

## 2021-08-14 PROCEDURE — 2709999900 HC NON-CHARGEABLE SUPPLY

## 2021-08-14 PROCEDURE — 83735 ASSAY OF MAGNESIUM: CPT

## 2021-08-14 PROCEDURE — 77030040392 HC DRSG OPTIFOAM MDII -A

## 2021-08-14 PROCEDURE — 74011250636 HC RX REV CODE- 250/636: Performed by: EMERGENCY MEDICINE

## 2021-08-14 PROCEDURE — 74011000250 HC RX REV CODE- 250: Performed by: PHYSICIAN ASSISTANT

## 2021-08-14 PROCEDURE — 85025 COMPLETE CBC W/AUTO DIFF WBC: CPT

## 2021-08-14 PROCEDURE — 82962 GLUCOSE BLOOD TEST: CPT

## 2021-08-14 PROCEDURE — 65660000000 HC RM CCU STEPDOWN

## 2021-08-14 PROCEDURE — 99232 SBSQ HOSP IP/OBS MODERATE 35: CPT | Performed by: EMERGENCY MEDICINE

## 2021-08-14 PROCEDURE — 36430 TRANSFUSION BLD/BLD COMPNT: CPT

## 2021-08-14 PROCEDURE — 80053 COMPREHEN METABOLIC PANEL: CPT

## 2021-08-14 PROCEDURE — P9047 ALBUMIN (HUMAN), 25%, 50ML: HCPCS | Performed by: EMERGENCY MEDICINE

## 2021-08-14 RX ORDER — SODIUM CHLORIDE 9 MG/ML
250 INJECTION, SOLUTION INTRAVENOUS AS NEEDED
Status: DISCONTINUED | OUTPATIENT
Start: 2021-08-14 | End: 2021-08-18 | Stop reason: HOSPADM

## 2021-08-14 RX ORDER — MAGNESIUM SULFATE 1 G/100ML
1 INJECTION INTRAVENOUS ONCE
Status: COMPLETED | OUTPATIENT
Start: 2021-08-14 | End: 2021-08-14

## 2021-08-14 RX ADMIN — MAGNESIUM SULFATE HEPTAHYDRATE 1 G: 1 INJECTION, SOLUTION INTRAVENOUS at 15:37

## 2021-08-14 RX ADMIN — DAKIN'S SOLUTION 0.125% (QUARTER STRENGTH): 0.12 SOLUTION at 09:17

## 2021-08-14 RX ADMIN — PANTOPRAZOLE 40 MG: 40 TABLET, DELAYED RELEASE ORAL at 09:17

## 2021-08-14 RX ADMIN — DAKIN'S SOLUTION 0.125% (QUARTER STRENGTH): 0.12 SOLUTION at 17:21

## 2021-08-14 RX ADMIN — ALBUMIN (HUMAN) 12.5 G: 0.25 INJECTION, SOLUTION INTRAVENOUS at 11:56

## 2021-08-14 RX ADMIN — ALBUMIN (HUMAN) 12.5 G: 0.25 INJECTION, SOLUTION INTRAVENOUS at 05:59

## 2021-08-14 RX ADMIN — ALBUMIN (HUMAN) 12.5 G: 0.25 INJECTION, SOLUTION INTRAVENOUS at 00:10

## 2021-08-14 RX ADMIN — ALBUMIN (HUMAN) 12.5 G: 0.25 INJECTION, SOLUTION INTRAVENOUS at 17:19

## 2021-08-14 RX ADMIN — DEXTROSE MONOHYDRATE 25 G: 25 INJECTION, SOLUTION INTRAVENOUS at 21:32

## 2021-08-14 RX ADMIN — PANTOPRAZOLE 40 MG: 40 TABLET, DELAYED RELEASE ORAL at 21:32
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
## 2021-08-14 NOTE — PROGRESS NOTES
Problem: Patient Education: Go to Patient Education Activity  Goal: Patient/Family Education  Outcome: Progressing Towards Goal     Problem: Impaired Skin Integrity/Pressure Injury Treatment  Goal: *Improvement of Existing Pressure Injury  Outcome: Progressing Towards Goal  Goal: *Prevention of pressure injury  Description: Document Jordin Scale and appropriate interventions in the flowsheet.   Outcome: Progressing Towards Goal  Note: Pressure Injury Interventions:  Sensory Interventions: Assess need for specialty bed, Avoid rigorous massage over bony prominences, Check visual cues for pain, Float heels, Keep linens dry and wrinkle-free, Maintain/enhance activity level, Minimize linen layers, Monitor skin under medical devices, Pad between skin to skin, Pressure redistribution bed/mattress (bed type)    Moisture Interventions: Absorbent underpads, Apply protective barrier, creams and emollients, Assess need for specialty bed, Internal/External urinary devices, Internal/External fecal devices, Minimize layers, Maintain skin hydration (lotion/cream)    Activity Interventions: Assess need for specialty bed, Pressure redistribution bed/mattress(bed type)    Mobility Interventions: HOB 30 degrees or less    Nutrition Interventions: Document food/fluid/supplement intake    Friction and Shear Interventions: Apply protective barrier, creams and emollients, HOB 30 degrees or less, Lift sheet, Minimize layers                Problem: Patient Education: Go to Patient Education Activity  Goal: Patient/Family Education  Outcome: Progressing Towards Goal     Problem: Pain  Goal: *Control of Pain  Outcome: Progressing Towards Goal  Goal: *PALLIATIVE CARE:  Alleviation of Pain  Outcome: Progressing Towards Goal     Problem: Patient Education: Go to Patient Education Activity  Goal: Patient/Family Education  Outcome: Progressing Towards Goal     Problem: Injury - Risk of, Adverse Drug Event  Goal: *Absence of adverse drug events  Outcome: Progressing Towards Goal  Goal: *Absence of medication errors  Outcome: Progressing Towards Goal  Goal: *Knowledge of prescribed medications  Outcome: Progressing Towards Goal     Problem: Patient Education: Go to Patient Education Activity  Goal: Patient/Family Education  Outcome: Progressing Towards Goal     Problem: Risk for Spread of Infection  Goal: Prevent transmission of infectious organism to others  Description: Prevent the transmission of infectious organisms to other patients, staff members, and visitors. Outcome: Progressing Towards Goal     Problem: Patient Education:  Go to Education Activity  Goal: Patient/Family Education  Outcome: Progressing Towards Goal     Problem: Nutrition Deficit  Goal: *Optimize nutritional status  Outcome: Progressing Towards Goal     Problem: Patient Education: Go to Patient Education Activity  Goal: Patient/Family Education  Outcome: Progressing Towards Goal     Problem: Patient Education: Go to Patient Education Activity  Goal: Patient/Family Education  Outcome: Progressing Towards Goal     Problem: Infection - Risk of, Urinary Catheter-Associated Urinary Tract Infection  Goal: *Absence of infection signs and symptoms  Outcome: Progressing Towards Goal     Problem: Patient Education: Go to Patient Education Activity  Goal: Patient/Family Education  Outcome: Progressing Towards Goal     Problem: Pressure Injury - Risk of  Goal: *Prevention of pressure injury  Description: Document Jordin Scale and appropriate interventions in the flowsheet.   Outcome: Progressing Towards Goal  Note: Pressure Injury Interventions:  Sensory Interventions: Assess need for specialty bed, Avoid rigorous massage over bony prominences, Check visual cues for pain, Float heels, Keep linens dry and wrinkle-free, Maintain/enhance activity level, Minimize linen layers, Monitor skin under medical devices, Pad between skin to skin, Pressure redistribution bed/mattress (bed type)    Moisture Interventions: Absorbent underpads, Apply protective barrier, creams and emollients, Assess need for specialty bed, Internal/External urinary devices, Internal/External fecal devices, Minimize layers, Maintain skin hydration (lotion/cream)    Activity Interventions: Assess need for specialty bed, Pressure redistribution bed/mattress(bed type)    Mobility Interventions: HOB 30 degrees or less    Nutrition Interventions: Document food/fluid/supplement intake    Friction and Shear Interventions: Apply protective barrier, creams and emollients, HOB 30 degrees or less, Lift sheet, Minimize layers                Problem: Patient Education: Go to Patient Education Activity  Goal: Patient/Family Education  Outcome: Progressing Towards Goal     Problem:  Moderate Sedation (Adult)  Goal: *Patent airway  Outcome: Progressing Towards Goal  Goal: *Adequate oxygenation  Outcome: Progressing Towards Goal  Goal: *Absence of aspiration  Outcome: Progressing Towards Goal  Goal: *Hemodynamically stable  Outcome: Progressing Towards Goal  Goal: *Optimal pain control at patient's stated goal  Outcome: Progressing Towards Goal  Goal: *Absence of nausea/vomiting  Outcome: Progressing Towards Goal  Goal: *Anxiety reduced or absent  Outcome: Progressing Towards Goal  Goal: *Absence of injury  Outcome: Progressing Towards Goal  Goal: *Level of consciousness returns to baseline  Outcome: Progressing Towards Goal  Goal: Interventions  Outcome: Progressing Towards Goal     Problem: Patient Education: Go to Patient Education Activity  Goal: Patient/Family Education  Outcome: Progressing Towards Goal

## 2021-08-14 NOTE — PROGRESS NOTES
[facility] Hospitalists  Progress Note    Patient: Maddie Mena Age: 64 y.o. : 1960 MR#: [id number] SSN: [id number]  Date: 2021     Subjective/24-hour events:     Patient is sitting in bed in no apparent distress. Denies any chest pain or shortness of breath or lightheadedness. Denies any bleeding    Assessment:   Acute cystitis with hematuria patient with chronically indwelling Chavez catheter  Severe sepsis POA secondary to above  ЕКАТЕРИНА  Acute on chronic anemia requiring PRBCs  Iron deficiency/low iron stores  Hypocalcemia and hypomagnesemia  Stage IV sacral pressure ulcer, POA,  status post debridement 2021  Bilateral lower extremity DVT  Acute encephalopathy, suspect metabolic  Severe protein calorie malnutrition  Paraplegia  Hypotension    Plan:     Continue IV fluids and albumin  Transfuse 1 unit PRBCs for hemoglobin of 6.0 today. Off heparin drip  Continue to monitor hemoglobin  Await IVC filter by vascular surgery  Wound care  Podiatry input noted  PT/OT as tolerated.   Discussed with patient  Disposition-home with home health care when ready  Discussed with RN    Case discussed with:  [x]Patient  []Family  [x]Nursing  []Case Management  DVT Prophylaxis:  []Lovenox  []Hep SQ  []SCDs  []Coumadin   []On Heparin gtt    Objective:   VS:   Visit Vitals  BP (!) 93/59 Comment: Asleep   Pulse 83   Temp 98.5 °F (36.9 °C)   Resp 17   Ht 6' 2\" (1.88 m)   Wt 87.2 kg (192 lb 3.2 oz)   SpO2 100%   BMI 24.68 kg/m²      Tmax/24hrs: Temp (24hrs), Av.2 °F (36.8 °C), Min:97.6 °F (36.4 °C), Max:98.5 °F (36.9 °C)      Intake/Output Summary (Last 24 hours) at 2021 1410  Last data filed at 2021 0800  Gross per 24 hour   Intake 0 ml   Output 1625 ml   Net -1625 ml       General:  Awake, follows commands  Cardiovascular:  S1S2+, RRR  Pulmonary:  CTA b/l  GI:  Soft, BS+, NT, ND, colostomy with yellow stool noted  Extremities:  + edema  Paraplegia  Has sacral decub    Labs: Recent Results (from the past 24 hour(s))   HGB & HCT    Collection Time: 08/13/21  2:22 PM   Result Value Ref Range    HGB 8.4 (L) 13.0 - 16.0 g/dL    HCT 27.4 (L) 36.0 - 48.0 %   PTT    Collection Time: 08/13/21  2:22 PM   Result Value Ref Range    aPTT 32.8 23.0 - 36.4 SEC   GLUCOSE, POC    Collection Time: 08/13/21  3:53 PM   Result Value Ref Range    Glucose (POC) 110 70 - 110 mg/dL   HGB & HCT    Collection Time: 08/13/21  5:45 PM   Result Value Ref Range    HGB 7.9 (L) 13.0 - 16.0 g/dL    HCT 25.3 (L) 36.0 - 48.0 %   PTT    Collection Time: 08/13/21  5:45 PM   Result Value Ref Range    aPTT 31.8 23.0 - 36.4 SEC   GLUCOSE, POC    Collection Time: 08/13/21  9:10 PM   Result Value Ref Range    Glucose (POC) 98 70 - 110 mg/dL   HGB & HCT    Collection Time: 08/13/21 11:50 PM   Result Value Ref Range    HGB 6.9 (L) 13.0 - 16.0 g/dL    HCT 22.4 (L) 36.0 - 48.0 %   MAGNESIUM    Collection Time: 08/14/21  3:00 AM   Result Value Ref Range    Magnesium 1.5 (L) 1.6 - 2.6 mg/dL   PHOSPHORUS    Collection Time: 08/14/21  3:00 AM   Result Value Ref Range    Phosphorus 2.5 2.5 - 4.9 MG/DL   PROTHROMBIN TIME + INR    Collection Time: 08/14/21  3:00 AM   Result Value Ref Range    Prothrombin time 14.7 11.5 - 15.2 sec    INR 1.2 0.8 - 1.2     CALCIUM, IONIZED    Collection Time: 08/14/21  3:00 AM   Result Value Ref Range    Ionized Calcium 1.15 1.15 - 1.33 MMOL/L   PTT    Collection Time: 08/14/21  3:00 AM   Result Value Ref Range    aPTT 33.5 23.0 - 36.4 SEC   CBC WITH AUTOMATED DIFF    Collection Time: 08/14/21  3:00 AM   Result Value Ref Range    WBC 6.5 4.6 - 13.2 K/uL    RBC 2.21 (L) 4.35 - 5.65 M/uL    HGB 6.2 (L) 13.0 - 16.0 g/dL    HCT 20.4 (L) 36.0 - 48.0 %    MCV 92.3 74.0 - 97.0 FL    MCH 28.1 24.0 - 34.0 PG    MCHC 30.4 (L) 31.0 - 37.0 g/dL    RDW 14.2 11.6 - 14.5 %    PLATELET 319 989 - 545 K/uL    MPV 9.2 9.2 - 11.8 FL    NEUTROPHILS 59 40 - 73 %    LYMPHOCYTES 29 21 - 52 %    MONOCYTES 9 3 - 10 % EOSINOPHILS 2 0 - 5 %    BASOPHILS 1 0 - 2 %    ABS. NEUTROPHILS 3.8 1.8 - 8.0 K/UL    ABS. LYMPHOCYTES 1.9 0.9 - 3.6 K/UL    ABS. MONOCYTES 0.6 0.05 - 1.2 K/UL    ABS. EOSINOPHILS 0.2 0.0 - 0.4 K/UL    ABS. BASOPHILS 0.0 0.0 - 0.1 K/UL    DF AUTOMATED     METABOLIC PANEL, COMPREHENSIVE    Collection Time: 08/14/21  3:00 AM   Result Value Ref Range    Sodium 142 136 - 145 mmol/L    Potassium 3.6 3.5 - 5.5 mmol/L    Chloride 116 (H) 100 - 111 mmol/L    CO2 24 21 - 32 mmol/L    Anion gap 2 (L) 3.0 - 18 mmol/L    Glucose 96 74 - 99 mg/dL    BUN 6 (L) 7.0 - 18 MG/DL    Creatinine 0.68 0.6 - 1.3 MG/DL    BUN/Creatinine ratio 9 (L) 12 - 20      GFR est AA >60 >60 ml/min/1.73m2    GFR est non-AA >60 >60 ml/min/1.73m2    Calcium 6.9 (L) 8.5 - 10.1 MG/DL    Bilirubin, total 0.1 (L) 0.2 - 1.0 MG/DL    ALT (SGPT) 14 (L) 16 - 61 U/L    AST (SGOT) 17 10 - 38 U/L    Alk.  phosphatase 49 45 - 117 U/L    Protein, total 6.0 (L) 6.4 - 8.2 g/dL    Albumin 1.8 (L) 3.4 - 5.0 g/dL    Globulin 4.2 (H) 2.0 - 4.0 g/dL    A-G Ratio 0.4 (L) 0.8 - 1.7     GLUCOSE, POC    Collection Time: 08/14/21 11:00 AM   Result Value Ref Range    Glucose (POC) 126 (H) 70 - 110 mg/dL       Signed By: Matt Garcia MD     August 14, 2021

## 2021-08-15 LAB
ABO + RH BLD: NORMAL
ANION GAP SERPL CALC-SCNC: 3 MMOL/L (ref 3–18)
BASOPHILS # BLD: 0 K/UL (ref 0–0.1)
BASOPHILS NFR BLD: 1 % (ref 0–2)
BLD PROD TYP BPU: NORMAL
BLD PROD TYP BPU: NORMAL
BLOOD GROUP ANTIBODIES SERPL: NORMAL
BPU ID: NORMAL
BPU ID: NORMAL
BUN SERPL-MCNC: 7 MG/DL (ref 7–18)
BUN/CREAT SERPL: 15 (ref 12–20)
CA-I SERPL-SCNC: 1.28 MMOL/L (ref 1.15–1.33)
CALCIUM SERPL-MCNC: 7.8 MG/DL (ref 8.5–10.1)
CALLED TO:,BCALL1: NORMAL
CALLED TO:,BCALL2: NORMAL
CHLORIDE SERPL-SCNC: 111 MMOL/L (ref 100–111)
CO2 SERPL-SCNC: 27 MMOL/L (ref 21–32)
CREAT SERPL-MCNC: 0.47 MG/DL (ref 0.6–1.3)
CROSSMATCH RESULT,%XM: NORMAL
CROSSMATCH RESULT,%XM: NORMAL
DIFFERENTIAL METHOD BLD: ABNORMAL
EOSINOPHIL # BLD: 0.2 K/UL (ref 0–0.4)
EOSINOPHIL NFR BLD: 3 % (ref 0–5)
ERYTHROCYTE [DISTWIDTH] IN BLOOD BY AUTOMATED COUNT: 14.2 % (ref 11.6–14.5)
GLUCOSE BLD STRIP.AUTO-MCNC: 71 MG/DL (ref 70–110)
GLUCOSE BLD STRIP.AUTO-MCNC: 83 MG/DL (ref 70–110)
GLUCOSE BLD STRIP.AUTO-MCNC: 86 MG/DL (ref 70–110)
GLUCOSE BLD STRIP.AUTO-MCNC: 99 MG/DL (ref 70–110)
GLUCOSE SERPL-MCNC: 73 MG/DL (ref 74–99)
HCT VFR BLD AUTO: 26.3 % (ref 36–48)
HCT VFR BLD AUTO: 27 % (ref 36–48)
HGB BLD-MCNC: 8.1 G/DL (ref 13–16)
HGB BLD-MCNC: 8.3 G/DL (ref 13–16)
INR PPP: 1.1 (ref 0.8–1.2)
LYMPHOCYTES # BLD: 2.1 K/UL (ref 0.9–3.6)
LYMPHOCYTES NFR BLD: 35 % (ref 21–52)
MAGNESIUM SERPL-MCNC: 1.7 MG/DL (ref 1.6–2.6)
MCH RBC QN AUTO: 27.9 PG (ref 24–34)
MCHC RBC AUTO-ENTMCNC: 30.7 G/DL (ref 31–37)
MCV RBC AUTO: 90.6 FL (ref 74–97)
MONOCYTES # BLD: 0.5 K/UL (ref 0.05–1.2)
MONOCYTES NFR BLD: 8 % (ref 3–10)
NEUTS SEG # BLD: 3.2 K/UL (ref 1.8–8)
NEUTS SEG NFR BLD: 53 % (ref 40–73)
PHOSPHATE SERPL-MCNC: 3 MG/DL (ref 2.5–4.9)
PLATELET # BLD AUTO: 289 K/UL (ref 135–420)
PMV BLD AUTO: 9.7 FL (ref 9.2–11.8)
POTASSIUM SERPL-SCNC: 3.9 MMOL/L (ref 3.5–5.5)
PROTHROMBIN TIME: 13.9 SEC (ref 11.5–15.2)
RBC # BLD AUTO: 2.98 M/UL (ref 4.35–5.65)
SODIUM SERPL-SCNC: 141 MMOL/L (ref 136–145)
SPECIMEN EXP DATE BLD: NORMAL
STATUS OF UNIT,%ST: NORMAL
STATUS OF UNIT,%ST: NORMAL
UNIT DIVISION, %UDIV: 0
UNIT DIVISION, %UDIV: 0
WBC # BLD AUTO: 6.1 K/UL (ref 4.6–13.2)

## 2021-08-15 PROCEDURE — 84100 ASSAY OF PHOSPHORUS: CPT

## 2021-08-15 PROCEDURE — 99232 SBSQ HOSP IP/OBS MODERATE 35: CPT | Performed by: EMERGENCY MEDICINE

## 2021-08-15 PROCEDURE — 65660000000 HC RM CCU STEPDOWN

## 2021-08-15 PROCEDURE — 82330 ASSAY OF CALCIUM: CPT

## 2021-08-15 PROCEDURE — 80048 BASIC METABOLIC PNL TOTAL CA: CPT

## 2021-08-15 PROCEDURE — 85025 COMPLETE CBC W/AUTO DIFF WBC: CPT

## 2021-08-15 PROCEDURE — 77030040393 HC DRSG OPTIFOAM GENT MDII -B

## 2021-08-15 PROCEDURE — 82962 GLUCOSE BLOOD TEST: CPT

## 2021-08-15 PROCEDURE — 85610 PROTHROMBIN TIME: CPT

## 2021-08-15 PROCEDURE — 74011250637 HC RX REV CODE- 250/637: Performed by: FAMILY MEDICINE

## 2021-08-15 PROCEDURE — 74011250636 HC RX REV CODE- 250/636: Performed by: EMERGENCY MEDICINE

## 2021-08-15 PROCEDURE — 83735 ASSAY OF MAGNESIUM: CPT

## 2021-08-15 PROCEDURE — 2709999900 HC NON-CHARGEABLE SUPPLY

## 2021-08-15 PROCEDURE — 85018 HEMOGLOBIN: CPT

## 2021-08-15 RX ADMIN — PANTOPRAZOLE 40 MG: 40 TABLET, DELAYED RELEASE ORAL at 21:32

## 2021-08-15 RX ADMIN — SODIUM CHLORIDE 100 ML/HR: 900 INJECTION, SOLUTION INTRAVENOUS at 15:33

## 2021-08-15 RX ADMIN — PANTOPRAZOLE 40 MG: 40 TABLET, DELAYED RELEASE ORAL at 10:19

## 2021-08-15 RX ADMIN — DAKIN'S SOLUTION 0.125% (QUARTER STRENGTH): 0.12 SOLUTION at 17:32

## 2021-08-15 RX ADMIN — DAKIN'S SOLUTION 0.125% (QUARTER STRENGTH): 0.12 SOLUTION at 10:22

## 2021-08-15 NOTE — PROGRESS NOTES
Report received from [facility]. Patient resting in bed. 1030 - Chavez care provided. Patient repositioned. Bath pack for cleansing. Linen changed. Patient family at the bedside, patient ate meal bought in by family. Bedside shift report given to SAFIA Manrique.

## 2021-08-15 NOTE — ROUTINE PROCESS
1900: Bedside and Verbal shift change report given to Beverly Zapata RN (oncoming nurse) by Tessa Peña RN (offgoing nurse). Report included the following information SBAR, Kardex and Cardiac Rhythm NSR.     0700: Bedside and Verbal shift change report given to Kristal Mandujano RN (oncoming nurse) by Beverly Zapata RN (offgoing nurse). Report included the following information SBAR, Kardex and Cardiac Rhythm NSR.

## 2021-08-15 NOTE — PROGRESS NOTES
[facility] Hospitalists  Progress Note    Patient: Rasta Sevilla Age: 64 y.o. : 1960 MR#: [id number] SSN: [id number]  Date: 8/15/2021     Subjective/24-hour events:     Patient is sitting in bed in no apparent distress, awake but denies any discomfort. Denies any bleeding    Assessment:   Acute cystitis with hematuria patient with chronically indwelling Chavez catheter  Severe sepsis POA secondary to above  ЕКАТЕРИНА  Acute on chronic anemia requiring PRBCs  Iron deficiency/low iron stores  Hypocalcemia and hypomagnesemia  Stage IV sacral pressure ulcer, POA,  status post debridement 2021  Bilateral lower extremity DVT  Acute encephalopathy, suspect metabolic  Severe protein calorie malnutrition  Paraplegia  Hypotension-improved    Plan:     Status post PRBC transfusion yesterday  Continue IV fluids  Continue to monitor hemoglobin  Await IVC filter by vascular surgery.  Off heparin drip  Wound care  Podiatry input noted  PT OT  Discussed with patient  Disposition-home with home health care when ready      Case discussed with:  [x]Patient  []Family  [x]Nursing  []Case Management  DVT Prophylaxis:  []Lovenox  []Hep SQ  []SCDs  []Coumadin   []On Heparin gtt    Objective:   VS:   Visit Vitals  /83 (BP 1 Location: Right upper arm, BP Patient Position: Supine)   Pulse 73   Temp 97.9 °F (36.6 °C)   Resp 18   Ht 6' 2\" (1.88 m)   Wt 87.2 kg (192 lb 3.2 oz)   SpO2 100%   BMI 24.68 kg/m²      Tmax/24hrs: Temp (24hrs), Av.2 °F (36.8 °C), Min:97.8 °F (36.6 °C), Max:99.3 °F (37.4 °C)      Intake/Output Summary (Last 24 hours) at 8/15/2021 1215  Last data filed at 8/15/2021 1018  Gross per 24 hour   Intake 425 ml   Output 2500 ml   Net -2075 ml       General:  Awake, follows commands  Cardiovascular:  S1S2+, RRR  Pulmonary:  CTA b/l  GI:  Soft, BS+, NT, ND, colostomy with yellow stool  Extremities:  trace edema  Has sacral decub,  Paraplegia    Labs:    Recent Results (from the past 24 hour(s))   RBC, ALLOCATE    Collection Time: 08/14/21  2:15 PM   Result Value Ref Range    HISTORY CHECKED? Historical check performed    GLUCOSE, POC    Collection Time: 08/14/21  4:08 PM   Result Value Ref Range    Glucose (POC) 101 70 - 110 mg/dL   GLUCOSE, POC    Collection Time: 08/14/21  9:22 PM   Result Value Ref Range    Glucose (POC) 63 (L) 70 - 110 mg/dL   GLUCOSE, POC    Collection Time: 08/14/21  9:53 PM   Result Value Ref Range    Glucose (POC) 166 (H) 70 - 110 mg/dL   MAGNESIUM    Collection Time: 08/15/21  4:45 AM   Result Value Ref Range    Magnesium 1.7 1.6 - 2.6 mg/dL   PHOSPHORUS    Collection Time: 08/15/21  4:45 AM   Result Value Ref Range    Phosphorus 3.0 2.5 - 4.9 MG/DL   CALCIUM, IONIZED    Collection Time: 08/15/21  4:45 AM   Result Value Ref Range    Ionized Calcium 1.28 1.15 - 1.33 MMOL/L   CBC WITH AUTOMATED DIFF    Collection Time: 08/15/21  4:45 AM   Result Value Ref Range    WBC 6.1 4.6 - 13.2 K/uL    RBC 2.98 (L) 4.35 - 5.65 M/uL    HGB 8.3 (L) 13.0 - 16.0 g/dL    HCT 27.0 (L) 36.0 - 48.0 %    MCV 90.6 74.0 - 97.0 FL    MCH 27.9 24.0 - 34.0 PG    MCHC 30.7 (L) 31.0 - 37.0 g/dL    RDW 14.2 11.6 - 14.5 %    PLATELET 956 234 - 295 K/uL    MPV 9.7 9.2 - 11.8 FL    NEUTROPHILS 53 40 - 73 %    LYMPHOCYTES 35 21 - 52 %    MONOCYTES 8 3 - 10 %    EOSINOPHILS 3 0 - 5 %    BASOPHILS 1 0 - 2 %    ABS. NEUTROPHILS 3.2 1.8 - 8.0 K/UL    ABS. LYMPHOCYTES 2.1 0.9 - 3.6 K/UL    ABS. MONOCYTES 0.5 0.05 - 1.2 K/UL    ABS. EOSINOPHILS 0.2 0.0 - 0.4 K/UL    ABS.  BASOPHILS 0.0 0.0 - 0.1 K/UL    DF AUTOMATED     METABOLIC PANEL, BASIC    Collection Time: 08/15/21  4:45 AM   Result Value Ref Range    Sodium 141 136 - 145 mmol/L    Potassium 3.9 3.5 - 5.5 mmol/L    Chloride 111 100 - 111 mmol/L    CO2 27 21 - 32 mmol/L    Anion gap 3 3.0 - 18 mmol/L    Glucose 73 (L) 74 - 99 mg/dL    BUN 7 7.0 - 18 MG/DL    Creatinine 0.47 (L) 0.6 - 1.3 MG/DL    BUN/Creatinine ratio 15 12 - 20      GFR est AA >60 >60 ml/min/1.73m2    GFR est non-AA >60 >60 ml/min/1.73m2    Calcium 7.8 (L) 8.5 - 10.1 MG/DL   GLUCOSE, POC    Collection Time: 08/15/21  8:07 AM   Result Value Ref Range    Glucose (POC) 71 70 - 110 mg/dL   GLUCOSE, POC    Collection Time: 08/15/21 11:21 AM   Result Value Ref Range    Glucose (POC) 83 70 - 110 mg/dL       Signed By: Nohemi Cornejo MD     August 15, 2021

## 2021-08-16 LAB
BACTERIA SPEC CULT: NORMAL
BACTERIA SPEC CULT: NORMAL
CA-I SERPL-SCNC: 1.25 MMOL/L (ref 1.15–1.33)
GLUCOSE BLD STRIP.AUTO-MCNC: 107 MG/DL (ref 70–110)
GLUCOSE BLD STRIP.AUTO-MCNC: 116 MG/DL (ref 70–110)
GLUCOSE BLD STRIP.AUTO-MCNC: 81 MG/DL (ref 70–110)
GLUCOSE BLD STRIP.AUTO-MCNC: 83 MG/DL (ref 70–110)
HCT VFR BLD AUTO: 26.6 % (ref 36–48)
HGB BLD-MCNC: 8.2 G/DL (ref 13–16)
INR PPP: 1.1 (ref 0.8–1.2)
MAGNESIUM SERPL-MCNC: 1.5 MG/DL (ref 1.6–2.6)
PHOSPHATE SERPL-MCNC: 3 MG/DL (ref 2.5–4.9)
PROTHROMBIN TIME: 14.3 SEC (ref 11.5–15.2)
SERVICE CMNT-IMP: NORMAL
SERVICE CMNT-IMP: NORMAL

## 2021-08-16 PROCEDURE — 82962 GLUCOSE BLOOD TEST: CPT

## 2021-08-16 PROCEDURE — 82330 ASSAY OF CALCIUM: CPT

## 2021-08-16 PROCEDURE — 97168 OT RE-EVAL EST PLAN CARE: CPT

## 2021-08-16 PROCEDURE — 74011250636 HC RX REV CODE- 250/636: Performed by: EMERGENCY MEDICINE

## 2021-08-16 PROCEDURE — 85018 HEMOGLOBIN: CPT

## 2021-08-16 PROCEDURE — 65660000000 HC RM CCU STEPDOWN

## 2021-08-16 PROCEDURE — 84100 ASSAY OF PHOSPHORUS: CPT

## 2021-08-16 PROCEDURE — 83735 ASSAY OF MAGNESIUM: CPT

## 2021-08-16 PROCEDURE — 74011250637 HC RX REV CODE- 250/637: Performed by: FAMILY MEDICINE

## 2021-08-16 PROCEDURE — 85610 PROTHROMBIN TIME: CPT

## 2021-08-16 PROCEDURE — 99232 SBSQ HOSP IP/OBS MODERATE 35: CPT | Performed by: EMERGENCY MEDICINE

## 2021-08-16 PROCEDURE — 2709999900 HC NON-CHARGEABLE SUPPLY

## 2021-08-16 PROCEDURE — 97530 THERAPEUTIC ACTIVITIES: CPT

## 2021-08-16 RX ADMIN — PANTOPRAZOLE 40 MG: 40 TABLET, DELAYED RELEASE ORAL at 22:59

## 2021-08-16 RX ADMIN — SODIUM CHLORIDE 100 ML/HR: 900 INJECTION, SOLUTION INTRAVENOUS at 12:55

## 2021-08-16 RX ADMIN — DAKIN'S SOLUTION 0.125% (QUARTER STRENGTH): 0.12 SOLUTION at 17:46

## 2021-08-16 RX ADMIN — SODIUM CHLORIDE 100 ML/HR: 900 INJECTION, SOLUTION INTRAVENOUS at 23:09

## 2021-08-16 RX ADMIN — DAKIN'S SOLUTION 0.125% (QUARTER STRENGTH): 0.12 SOLUTION at 09:13

## 2021-08-16 RX ADMIN — PANTOPRAZOLE 40 MG: 40 TABLET, DELAYED RELEASE ORAL at 09:12
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
## 2021-08-16 NOTE — PROGRESS NOTES
0736 AM  TRANSFER - IN REPORT:    Verbal report received from Ghislaine Badillo, Pending sale to [facility] (name) on Lisa Cedillo  being received from  (unit) for ordered procedure      Report consisted of patients Situation, Background, Assessment and   Recommendations(SBAR). Information from the following report(s) SBAR, Procedure Summary, Intake/Output, MAR and Recent Results was reviewed with the receiving nurse. Opportunity for questions and clarification was provided. Assessment completed upon patients arrival to unit and care assumed. 7:48 AM  Dr. Aurea Gbison called and notified of pt. Being prepped and ready for procedure. Dr. Aurea Gibson does not plan to do procedure at this time. Transport patient back to room per Dr. Aurea Gibson.     8:03 AM    TRANSFER - OUT REPORT:    Verbal report given to Ghislaine Badillo RN (name) on Lisa Cedillo  being transferred to  ([facility]) for routine progression of care       Report consisted of patients Situation, Background, Assessment and   Recommendations(SBAR). Information from the following report(s) SBAR, Intake/Output, MAR and Recent Results was reviewed with the receiving nurse. Opportunity for questions and clarification was provided.

## 2021-08-16 NOTE — PROGRESS NOTES
[facility] Hospitalists  Progress Note    Patient: Maddie Mena Age: 64 y.o. : 1960 MR#: [id number] SSN: [id number]  Date: 2021     Subjective/24-hour events:     Patient is sitting in bed in no apparent distress, awake, follows commands. Denies any chest pain or shortness of breath    Assessment:   Acute cystitis with hematuria patient with chronically indwelling Chavez catheter  Severe sepsis POA secondary to above  ЕКАТЕРИНА  Acute on chronic anemia requiring PRBCs  Iron deficiency/low iron stores  Hypocalcemia and hypomagnesemia  Stage IV sacral pressure ulcer, POA,  status post debridement 2021  Bilateral lower extremity DVT  Acute encephalopathy, suspect metabolic  Severe protein calorie malnutrition  Paraplegia  Hypotension-improved    Plan:     Blood pressure has shown improvement  ID input appreciated  Continue to monitor hemoglobin  Await IVC filter by vascular surgery.   Discussed with Dr. Artie Stark input noted  PT OT  Discussed with patient  Disposition-home with home health care when ready      Case discussed with:  [x]Patient  []Family  [x]Nursing  []Case Management  DVT Prophylaxis:  []Lovenox  []Hep SQ  []SCDs  []Coumadin   []On Heparin gtt    Objective:   VS:   Visit Vitals  /84 (BP 1 Location: Right upper arm, BP Patient Position: At rest)   Pulse 76   Temp 98.2 °F (36.8 °C)   Resp 22   Ht 6' 2\" (1.88 m)   Wt 87.2 kg (192 lb 3.2 oz)   SpO2 100%   BMI 24.68 kg/m²      Tmax/24hrs: Temp (24hrs), Av.5 °F (36.9 °C), Min:98.2 °F (36.8 °C), Max:98.8 °F (37.1 °C)      Intake/Output Summary (Last 24 hours) at 2021 1801  Last data filed at 2021 1604  Gross per 24 hour   Intake 240 ml   Output 2250 ml   Net -2010 ml       General:  Awake, follows commands, responds appropriately  Cardiovascular:  S1S2+, RRR  Pulmonary:  CTA b/l  GI:  Soft, BS+, NT, ND, colostomy with yellow stool noted  Extremities:  trace edema  Paraplegia  Sacral decubitus ulcer    Labs:    Recent Results (from the past 24 hour(s))   GLUCOSE, POC    Collection Time: 08/15/21  9:03 PM   Result Value Ref Range    Glucose (POC) 99 70 - 110 mg/dL   MAGNESIUM    Collection Time: 08/16/21  2:20 AM   Result Value Ref Range    Magnesium 1.5 (L) 1.6 - 2.6 mg/dL   PHOSPHORUS    Collection Time: 08/16/21  2:20 AM   Result Value Ref Range    Phosphorus 3.0 2.5 - 4.9 MG/DL   PROTHROMBIN TIME + INR    Collection Time: 08/16/21  2:20 AM   Result Value Ref Range    Prothrombin time 14.3 11.5 - 15.2 sec    INR 1.1 0.8 - 1.2     CALCIUM, IONIZED    Collection Time: 08/16/21  2:20 AM   Result Value Ref Range    Ionized Calcium 1.25 1.15 - 1.33 MMOL/L   HGB & HCT    Collection Time: 08/16/21  2:20 AM   Result Value Ref Range    HGB 8.2 (L) 13.0 - 16.0 g/dL    HCT 26.6 (L) 36.0 - 48.0 %   GLUCOSE, POC    Collection Time: 08/16/21  9:12 AM   Result Value Ref Range    Glucose (POC) 83 70 - 110 mg/dL   GLUCOSE, POC    Collection Time: 08/16/21 11:37 AM   Result Value Ref Range    Glucose (POC) 107 70 - 110 mg/dL   GLUCOSE, POC    Collection Time: 08/16/21  4:01 PM   Result Value Ref Range    Glucose (POC) 81 70 - 110 mg/dL       Signed By: Daniel Johnson MD     August 16, 2021

## 2021-08-16 NOTE — PROGRESS NOTES
conducted a Follow up consultation and Spiritual Assessment for Teodoro Knox, who is a 64 y.o.,male. The  provided the following Interventions:  Continued the relationship of care and support. Listened empathically. Patient shared that he's okay. Offered to serve his food on food tray but he refused to eat it because it's not on a plate. Informed the RN of patient's preference. Offered assurance of continued prayer on patient's behalf. Chart reviewed. The following outcomes were achieved:  Patient expressed gratitude for 's visit. Assessment:  There are no further spiritual or Jainism issues which require Spiritual Care Services interventions at this time. Plan:  Chaplains will continue to follow and will provide pastoral care on an as needed/requested basis.  recommends bedside caregivers page  on duty if patient shows signs of acute spiritual or emotional distress.      Radha Dunham 605   [phone]

## 2021-08-16 NOTE — ROUTINE PROCESS
1900: Bedside and Verbal shift change report given to Millie Meléndez RN (oncoming nurse) by Trilby Cranker, RN (offgoing nurse). Report included the following information SBAR, Kardex and Cardiac Rhythm NSR.     0700:Bedside and Verbal shift change report given to Jose Miles (oncoming nurse) by Millie Meléndez RN (offgoing nurse). Report included the following information SBAR, Kardex and Cardiac Rhythm NSR.     0730: Report given to cath lab.

## 2021-08-16 NOTE — PROGRESS NOTES
Problem: Impaired Skin Integrity/Pressure Injury Treatment  Goal: *Improvement of Existing Pressure Injury  Outcome: Progressing Towards Goal  Goal: *Prevention of pressure injury  Description: Document Jordin Scale and appropriate interventions in the flowsheet. Outcome: Progressing Towards Goal  Note: Pressure Injury Interventions:  Sensory Interventions: Assess changes in LOC, Avoid rigorous massage over bony prominences, Check visual cues for pain, Keep linens dry and wrinkle-free, Minimize linen layers, Pressure redistribution bed/mattress (bed type)    Moisture Interventions: Absorbent underpads, Check for incontinence Q2 hours and as needed, Assess need for specialty bed, Internal/External urinary devices, Minimize layers    Activity Interventions: Pressure redistribution bed/mattress(bed type), PT/OT evaluation    Mobility Interventions: Float heels, HOB 30 degrees or less, Pressure redistribution bed/mattress (bed type)    Nutrition Interventions: Document food/fluid/supplement intake, Discuss nutritional consult with provider    Friction and Shear Interventions: Apply protective barrier, creams and emollients, Foam dressings/transparent film/skin sealants, HOB 30 degrees or less, Lift sheet, Sit at 90-degree angle            Problem: Risk for Spread of Infection  Goal: Prevent transmission of infectious organism to others  Description: Prevent the transmission of infectious organisms to other patients, staff members, and visitors. Outcome: Progressing Towards Goal         Problem: Pressure Injury - Risk of  Goal: *Prevention of pressure injury  Description: Document Jordin Scale and appropriate interventions in the flowsheet.   Outcome: Progressing Towards Goal  Note: Pressure Injury Interventions:  Sensory Interventions: Assess changes in LOC, Avoid rigorous massage over bony prominences, Check visual cues for pain, Keep linens dry and wrinkle-free, Minimize linen layers, Pressure redistribution bed/mattress (bed type)    Moisture Interventions: Absorbent underpads, Check for incontinence Q2 hours and as needed, Assess need for specialty bed, Internal/External urinary devices, Minimize layers    Activity Interventions: Pressure redistribution bed/mattress(bed type), PT/OT evaluation    Mobility Interventions: Float heels, HOB 30 degrees or less, Pressure redistribution bed/mattress (bed type)    Nutrition Interventions: Document food/fluid/supplement intake, Discuss nutritional consult with provider    Friction and Shear Interventions: Apply protective barrier, creams and emollients, Foam dressings/transparent film/skin sealants, HOB 30 degrees or less, Lift sheet, Sit at 90-degree angle           Problem: Patient Education: Go to Patient Education Activity  Goal: Patient/Family Education  Outcome: Progressing Towards Goal

## 2021-08-16 NOTE — PROGRESS NOTES
Need for IVC filter discussed with primary team as Mr Crispin Doss failed heparin challenge. ID has cleared him for IVC filter placement. Will schedule for filter placement tomorrow morning. Risks and benefits of the procedure were explained and his questions answered. He wishes to proceed. Npo after midnight.     Semaj Viveros PA-C

## 2021-08-17 LAB
CA-I BLD-MCNC: 1.25 MMOL/L (ref 1.12–1.32)
CHLORIDE BLD-SCNC: 109 MMOL/L (ref 100–108)
CREAT UR-MCNC: 0.7 MG/DL (ref 0.6–1.3)
GLUCOSE BLD STRIP.AUTO-MCNC: 101 MG/DL (ref 70–110)
GLUCOSE BLD STRIP.AUTO-MCNC: 76 MG/DL (ref 70–110)
GLUCOSE BLD STRIP.AUTO-MCNC: 83 MG/DL (ref 70–110)
GLUCOSE BLD STRIP.AUTO-MCNC: 85 MG/DL (ref 74–106)
GLUCOSE BLD STRIP.AUTO-MCNC: 91 MG/DL (ref 70–110)
HCT VFR BLD AUTO: 27.1 % (ref 36–48)
HGB BLD-MCNC: 8.4 G/DL (ref 13–16)
POTASSIUM BLD-SCNC: 3.6 MMOL/L (ref 3.5–5.5)
SODIUM BLD-SCNC: 142 MMOL/L (ref 136–145)

## 2021-08-17 PROCEDURE — 65660000000 HC RM CCU STEPDOWN

## 2021-08-17 PROCEDURE — 74011000250 HC RX REV CODE- 250: Performed by: SURGERY

## 2021-08-17 PROCEDURE — 76937 US GUIDE VASCULAR ACCESS: CPT | Performed by: SURGERY

## 2021-08-17 PROCEDURE — 06H03DZ INSERTION OF INTRALUMINAL DEVICE INTO INFERIOR VENA CAVA, PERCUTANEOUS APPROACH: ICD-10-PCS | Performed by: SURGERY

## 2021-08-17 PROCEDURE — 77030040392 HC DRSG OPTIFOAM MDII -A

## 2021-08-17 PROCEDURE — 80047 BASIC METABLC PNL IONIZED CA: CPT

## 2021-08-17 PROCEDURE — 37191 INS ENDOVAS VENA CAVA FILTR: CPT | Performed by: SURGERY

## 2021-08-17 PROCEDURE — 82962 GLUCOSE BLOOD TEST: CPT

## 2021-08-17 PROCEDURE — C1880 VENA CAVA FILTER: HCPCS | Performed by: SURGERY

## 2021-08-17 PROCEDURE — 74011250637 HC RX REV CODE- 250/637: Performed by: FAMILY MEDICINE

## 2021-08-17 PROCEDURE — 99152 MOD SED SAME PHYS/QHP 5/>YRS: CPT | Performed by: SURGERY

## 2021-08-17 PROCEDURE — 74011250636 HC RX REV CODE- 250/636: Performed by: EMERGENCY MEDICINE

## 2021-08-17 PROCEDURE — 99232 SBSQ HOSP IP/OBS MODERATE 35: CPT | Performed by: EMERGENCY MEDICINE

## 2021-08-17 PROCEDURE — 85018 HEMOGLOBIN: CPT

## 2021-08-17 PROCEDURE — 99153 MOD SED SAME PHYS/QHP EA: CPT | Performed by: SURGERY

## 2021-08-17 PROCEDURE — 77030013744: Performed by: SURGERY

## 2021-08-17 PROCEDURE — 74011000636 HC RX REV CODE- 636: Performed by: SURGERY

## 2021-08-17 PROCEDURE — C1894 INTRO/SHEATH, NON-LASER: HCPCS | Performed by: SURGERY

## 2021-08-17 PROCEDURE — 2709999900 HC NON-CHARGEABLE SUPPLY

## 2021-08-17 PROCEDURE — 74011250636 HC RX REV CODE- 250/636: Performed by: SURGERY

## 2021-08-17 PROCEDURE — B5191ZZ FLUOROSCOPY OF INFERIOR VENA CAVA USING LOW OSMOLAR CONTRAST: ICD-10-PCS | Performed by: SURGERY

## 2021-08-17 DEVICE — IMPLANTABLE DEVICE: Type: IMPLANTABLE DEVICE | Status: FUNCTIONAL

## 2021-08-17 RX ORDER — MIDAZOLAM HYDROCHLORIDE 1 MG/ML
INJECTION, SOLUTION INTRAMUSCULAR; INTRAVENOUS AS NEEDED
Status: DISCONTINUED | OUTPATIENT
Start: 2021-08-17 | End: 2021-08-17 | Stop reason: HOSPADM

## 2021-08-17 RX ORDER — FENTANYL CITRATE 50 UG/ML
INJECTION, SOLUTION INTRAMUSCULAR; INTRAVENOUS AS NEEDED
Status: DISCONTINUED | OUTPATIENT
Start: 2021-08-17 | End: 2021-08-17 | Stop reason: HOSPADM

## 2021-08-17 RX ORDER — LIDOCAINE HYDROCHLORIDE 10 MG/ML
INJECTION, SOLUTION EPIDURAL; INFILTRATION; INTRACAUDAL; PERINEURAL AS NEEDED
Status: DISCONTINUED | OUTPATIENT
Start: 2021-08-17 | End: 2021-08-17 | Stop reason: HOSPADM

## 2021-08-17 RX ORDER — HEPARIN SODIUM 200 [USP'U]/100ML
INJECTION, SOLUTION INTRAVENOUS
Status: COMPLETED | OUTPATIENT
Start: 2021-08-17 | End: 2021-08-17

## 2021-08-17 RX ADMIN — SODIUM CHLORIDE 100 ML/HR: 900 INJECTION, SOLUTION INTRAVENOUS at 07:55

## 2021-08-17 RX ADMIN — SODIUM CHLORIDE 60 ML/HR: 900 INJECTION, SOLUTION INTRAVENOUS at 21:29

## 2021-08-17 RX ADMIN — PANTOPRAZOLE 40 MG: 40 TABLET, DELAYED RELEASE ORAL at 12:08

## 2021-08-17 RX ADMIN — DAKIN'S SOLUTION 0.125% (QUARTER STRENGTH): 0.12 SOLUTION at 17:07

## 2021-08-17 RX ADMIN — PANTOPRAZOLE 40 MG: 40 TABLET, DELAYED RELEASE ORAL at 21:29

## 2021-08-17 RX ADMIN — Medication 10 ML: at 21:31

## 2021-08-17 NOTE — ROUTINE PROCESS
Pt has slept through out the night w/o complaint. Cardiac rhythm in sinus rhythm. Colostomy bag intact. Blood specimens drawn via PICC line and hand carried to lab.

## 2021-08-17 NOTE — PROGRESS NOTES
Problem: Impaired Skin Integrity/Pressure Injury Treatment  Goal: *Improvement of Existing Pressure Injury  Outcome: Progressing Towards Goal  Goal: *Prevention of pressure injury  Description: Document Jordin Scale and appropriate interventions in the flowsheet. Outcome: Progressing Towards Goal  Note: Pressure Injury Interventions:  Sensory Interventions: Assess changes in LOC, Avoid rigorous massage over bony prominences, Check visual cues for pain, Minimize linen layers, Pressure redistribution bed/mattress (bed type), Keep linens dry and wrinkle-free, Float heels    Moisture Interventions: Absorbent underpads, Apply protective barrier, creams and emollients, Check for incontinence Q2 hours and as needed, Internal/External fecal devices, Internal/External urinary devices, Minimize layers, Moisture barrier    Activity Interventions: Pressure redistribution bed/mattress(bed type)    Mobility Interventions: Float heels, HOB 30 degrees or less, Assess need for specialty bed, Pressure redistribution bed/mattress (bed type)    Nutrition Interventions: Document food/fluid/supplement intake, Discuss nutritional consult with provider    Friction and Shear Interventions: Foam dressings/transparent film/skin sealants, HOB 30 degrees or less, Lift sheet, Minimize layers, Lift team/patient mobility team            Problem: Risk for Spread of Infection  Goal: Prevent transmission of infectious organism to others  Description: Prevent the transmission of infectious organisms to other patients, staff members, and visitors.   Outcome: Progressing Towards Goal       Problem: Patient Education: Go to Patient Education Activity  Goal: Patient/Family Education  Outcome: Progressing Towards Goal

## 2021-08-17 NOTE — PROGRESS NOTES
0745:  TRANSFER - IN REPORT:    [facility]imo [facility]  being received from 3 (unit) for ordered procedure      Report consisted of patients Situation, Background, Assessment and   Recommendations(SBAR). Information from the following report(s) SBAR, Procedure Summary, Intake/Output, MAR and Recent Results was reviewed with the receiving nurse. Opportunity for questions and clarification was provided. Assessment completed upon patients arrival to unit and care assumed. 5968:    TRANSFER - OUT REPORT:    Verbal report given to DAVID Hope RN (name) on Geronimo Jackson  being transferred to Cath Lab (unit) for ordered procedure       Report consisted of patients Situation, Background, Assessment and   Recommendations(SBAR). Information from the following report(s) SBAR, Procedure Summary, Intake/Output, MAR and Recent Results was reviewed with the receiving nurse. Opportunity for questions and clarification was provided. Patient transported with:   Registered Nurse    [id number]    TRANSFER - IN REPORT:    Verbal report received from Rosales Antonioleah (name) on Geronimo Jackson  being received from [facility] (unit) for routine post - op      Report consisted of patients Situation, Background, Assessment and   Recommendations(SBAR). Information from the following report(s) SBAR, Procedure Summary, Intake/Output, MAR and Med Rec Status was reviewed with the receiving nurse. Opportunity for questions and clarification was provided. Assessment completed upon patients arrival to unit and care assumed. 1046:     TRANSFER - OUT REPORT:    Verbal report given to LAURA Baez RN (name) on Geronimo Jackson  being transferred to  (unit) for routine progression of care       Report consisted of patients Situation, Background, Assessment and   Recommendations(SBAR).      Information from the following report(s) SBAR, Procedure Summary, Intake/Output, MAR and Recent Results was reviewed with the receiving nurse. Lines:   PICC Double Lumen 42/93/50 Left;Basilic (Active)   Central Line Being Utilized Yes 08/17/21 0715   Criteria for Appropriate Use Limited/no vessel suitable for conventional peripheral access 08/17/21 0715   Site Assessment Clean, dry, & intact 08/17/21 0715   Phlebitis Assessment 0 08/17/21 0715   Infiltration Assessment 0 08/17/21 0715   Date of Last Dressing Change 08/09/21 08/17/21 0715   Dressing Status Clean, dry, & intact 08/17/21 0715   Action Taken Open ports on tubing capped 08/17/21 0715   Dressing Type Disk with Chlorhexadine gluconate (CHG); Transparent 08/17/21 0715   Hub Color/Line Status Purple;Patent;Capped 08/17/21 0715   Positive Blood Return (Site #1) Yes 08/17/21 0715   Hub Color/Line Status Red; Infusing 08/17/21 0715   Positive Blood Return (Site #2) Yes 08/17/21 0715   Alcohol Cap Used Yes 08/17/21 0715        Opportunity for questions and clarification was provided. Patient transported with:   Registered Nurse, dual site assessment performed at bedside with primary nurse.

## 2021-08-17 NOTE — ADT AUTH CERT NOTES
Deep Venous Thrombosis of Lower Extremities - Care Day 14 (8/13/2021) by Bari Hoover RN       Review Status Review Entered   Completed 8/13/2021 14:19      Criteria Review      Care Day: 14 Care Date: 8/13/2021 Level of Care: Telemetry    Guideline Day 5    Clinical Status    (X) * Hemodynamic stability    8/13/2021 14:19:13 EDT by Bari Hoover      VITALS WNL    ( ) * No evidence of embolization    ( ) * No bleeding    ( ) * Lower extremity exam at baseline, stable, or improved    ( ) * INR therapeutic or not indicated    ( ) * Pain absent or managed    ( ) * Discharge plans and education understood    Activity    ( ) * Ambulatory or acceptable for next level of care    Routes    ( ) * Oral hydration    ( ) * Oral medications or regimen acceptable for next level of care    ( ) * Oral diet or acceptable for next level of care    Medications    ( ) * Anticoagulants regimen established for next level of care    * Milestone   Additional Notes   IM 8/13:     Subjective/24-hour events:       I received a call from the vascular surgeon that the patient was noted to be hypotensive and Holding prior to IVC filter placement and so the procedure was canceled.  The vascular surgeon has ordered IV normal saline bolus of 500 cc.  I came and assessed the patient.    Patient is laying in bed in no apparent distress.  Patient is awake and follows commands.  Patient denies any chest pain or shortness of breath or lightheadedness.  Patient states that he feels like his normal self.  No fever chills.  No cough.  Patient denies any abdominal pain.       Assessment:   Acute cystitis with hematuria patient with chronically indwelling Chavez catheter   Severe sepsis POA secondary to above   ЕКАТЕРИНА   Acute on chronic anemia requiring PRBCs   Iron deficiency/low iron stores   Hypocalcemia and hypomagnesemia   Stage IV sacral pressure ulcer, POA,  status post debridement 4/16/2021   Bilateral lower extremity DVT   Acute encephalopathy, suspect metabolic   Severe protein calorie malnutrition   Paraplegia   Hypotension       Plan:       Patient appears nontoxic.  Will check blood cultures and lactate and UA CNS.  Will give IV albumin and continue normal saline.  Will upgrade patient to stepdown. Continue to monitor hemoglobin and hematocrit.  Transfuse as needed. Heparin drip on hold.  Status post PRBC transfusion yesterday. Wound care   Podiatry input noted   PT/OT as tolerated. Discussed with vascular surgeon at bedside.  Discussed with cath holding nurse taking care of the patient.    Discussed with patient   Disposition-home with home health care when ready   I called patient's mother at phone #[phone] but was not able to leave a message as the voicemail box has not been set up.        General:  Awake, alert, follows commands, responds appropriately   Cardiovascular:  S1S2+, RRR   Pulmonary:  CTA b/l   GI:  Soft, BS+, NT, ND, has colostomy colostomy noted to have yellow stool.  No red blood noted   Extremities:  +edema   Paraplegia   Sacral decubitus ulcer                   VASCULAR SURGERY 8/13: Vascular surgery attending note   Patient was scheduled for IVC filter placement following failure of anticoagulation for bilateral acute lower extremity DVTs.  Patient was transferred to the the Cath Lab holding area from the floor.  Vitals on arrival with systolic of 46W.  This progressively trended down to 80s and 70s with a MAP less than 60.  He was afebrile and not tachycardic.  His hemoglobin this morning was 7.5 from 7.2.  It is unclear why he is hypotensive.  A bolus of 500 cc normal saline was administered.  He was also hypoglycemic and D50 was administered as well.  I contacted the hospitalist and he was present at bedside to evaluate the patient.  He would be admitted to CVT stepdown for further work-up.  Sepsis is in the differential.  Patient has sacral decubiti as well as bilateral foot wounds.  They do not appear to be septic and he has been evaluated by ID as well as general surgery.  The IVC filter placement was canceled.  We will reevaluate for placement after the weekend.  Continue medical management.  Thank you for allowing me to participate in the care of this patient.               HG 7.2, 7.5    HCT 23.8, 24.5    CA 7.7    MG 1.5    BLOOD CULTURES            95/51    98.6F    78HR    19RR    100%RA            NS 100ML/HR IV    ALBUMIN Q6H 12.5G IV X 1    D50W PRN 25G IV X 1    MAG SULFATE 1G IV X 1    NS 500ML IV X 1    NS 75ML/HR IV    D51/2NS 75ML/HR IV         Deep Venous Thrombosis of Lower Extremities - Care Day 13 (8/12/2021) by Arden Babcock RN       Review Status Review Entered   Completed 8/13/2021 11:33      Criteria Review      Care Day: 13 Care Date: 8/12/2021 Level of Care: Telemetry    Guideline Day 4    Clinical Status    ( ) * INR therapeutic or not indicated    ( ) * Lower extremity exam at baseline or improved    * Milestone   Additional Notes   IM 8/12/21: Subjective/24-hour events:       Patient is sitting in bed in no apparent distress, awake and alert.  Mother at bedside.  Patient denies any overt bleeding       Assessment:   Acute cystitis with hematuria patient with chronically indwelling Chavez catheter   Severe sepsis POA secondary to above   ЕКАТЕРИНА   Acute on chronic anemia requiring PRBCs   Iron deficiency/low iron stores   Hypocalcemia and hypomagnesemia   Stage IV sacral pressure ulcer, POA,  status post debridement 4/16/2021   Bilateral lower extremity DVT   Acute encephalopathy, suspect metabolic   Severe protein calorie malnutrition   Paraplegia       Plan:   Heparin drip on hold.  Status post PRBC transfusion yesterday. Discussed with vascular surgeon earlier today.  Plans for IVC filter noted.  N.p.o. after midnight. Monitor hemoglobin and hematocrit   Monitor blood pressures   Wound care   Podiatry input noted   PT/OT as tolerated.    Disposition-home with home health care when ready   Discussed with , discussed with RN   Discussed with patient and mother at bedside    General:  Awake, follows commands, responds appropriately   Cardiovascular:  S1S2+, RRR   Pulmonary:  CTA b/l   GI:  Soft, BS+, NT, ND, has colostomy.  Yellow stool noted in colostomy   Extremities:  No edema   Sacral decubitus ulcer   Paraplegia               HG 7.1, 7.8   HCT 23.2, 25.1   GLUCOSE 70   CA 7.4           107/69   98.8F   83HR   18RR   99%RA               NO IV MEDS GIVEN 8/12/21

## 2021-08-17 NOTE — ROUTINE PROCESS
Bedside and Verbal shift change report given to Sharon Melo RN (oncoming nurse) by Adolph Torres RN  (offgoing nurse). Report included the following information SBAR, Kardex, Intake/Output, MAR, Recent Results and Cardiac Rhythm NSR.

## 2021-08-17 NOTE — ROUTINE PROCESS
Bedside and Verbal shift change report given to Hao Loving RN (oncoming nurse) by Aminah Hart RN  (offgoing nurse). Report included the following information SBAR, Kardex, MAR, Recent Results and Cardiac Rhythm NSR.

## 2021-08-17 NOTE — PROGRESS NOTES
[facility] Hospitalists  Progress Note    Patient: Irma Dia Age: 64 y.o. : 1960 MR#: [id number] SSN: [id number]  Date: 2021     Subjective/24-hour events:     Patient is sitting in bed in no apparent distress, awake and alert.   Denies any discomfort    Assessment:   Acute cystitis with hematuria patient with chronically indwelling Chavez catheter  Severe sepsis POA secondary to above  ЕКАТЕРИНА  Acute on chronic anemia requiring PRBCs  Iron deficiency/low iron stores  Hypocalcemia and hypomagnesemia  Stage IV sacral pressure ulcer, POA,  status post debridement 2021  Bilateral lower extremity DVT  Acute encephalopathy, suspect metabolic  Severe protein calorie malnutrition  Paraplegia  Hypotension-improved    Plan:     Blood pressure has shown improvement  ID input appreciated  Continue to monitor hemoglobin  Status post IVC filter placement by vascular surgery  Wound care  Podiatry input noted  PT OT  Discussed with patient  Disposition-home with home health care tomorrow  Discussed with patient's mother who came to visit    Case discussed with:  [x]Patient  []Family  [x]Nursing  []Case Management  DVT Prophylaxis:  []Lovenox  []Hep SQ  []SCDs  []Coumadin   []On Heparin gtt    Objective:   VS:   Visit Vitals  /72 (BP 1 Location: Right upper arm, BP Patient Position: At rest)   Pulse 83   Temp 98.4 °F (36.9 °C)   Resp 21   Ht 6' 2\" (1.88 m)   Wt 94.3 kg (208 lb)   SpO2 100%   BMI 26.71 kg/m²      Tmax/24hrs: Temp (24hrs), Av.2 °F (36.8 °C), Min:97.4 °F (36.3 °C), Max:98.6 °F (37 °C)      Intake/Output Summary (Last 24 hours) at 2021 1627  Last data filed at 2021 1551  Gross per 24 hour   Intake --   Output 1850 ml   Net -1850 ml       General:  Awake, alert  Cardiovascular:  S1S2+, RRR  Pulmonary:  CTA b/l  GI:  Soft, BS+, NT, ND, colostomy with yellow stool noted  Extremities:  trace edema  Paraplegia  Sacral decubitus ulcer    Labs:    Recent Results (from the past 24 hour(s))   GLUCOSE, POC    Collection Time: 08/16/21 10:46 PM   Result Value Ref Range    Glucose (POC) 116 (H) 70 - 110 mg/dL   HGB & HCT    Collection Time: 08/17/21  5:19 AM   Result Value Ref Range    HGB 8.4 (L) 13.0 - 16.0 g/dL    HCT 27.1 (L) 36.0 - 48.0 %   GLUCOSE, POC    Collection Time: 08/17/21  7:21 AM   Result Value Ref Range    Glucose (POC) 83 70 - 110 mg/dL   POC CHEM8    Collection Time: 08/17/21  7:57 AM   Result Value Ref Range    Glucose, POC 85 74 - 106 MG/DL    Creatinine, POC 0.7 0.6 - 1.3 MG/DL    GFRAA, POC >60 >60 ml/min/1.73m2    GFRNA, POC >60 >60 ml/min/1.73m2    Sodium,  136 - 145 MMOL/L    Potassium, POC 3.6 3.5 - 5.5 MMOL/L    Calcium, ionized (POC) 1.25 1.12 - 1.32 mmol/L    Chloride,  (H) 100 - 108 MMOL/L   GLUCOSE, POC    Collection Time: 08/17/21 11:38 AM   Result Value Ref Range    Glucose (POC) 76 70 - 110 mg/dL   GLUCOSE, POC    Collection Time: 08/17/21  3:43 PM   Result Value Ref Range    Glucose (POC) 91 70 - 110 mg/dL       Signed By: Marylen Gale, MD     August 17, 2021
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
## 2021-08-17 NOTE — PROGRESS NOTES
0800: Attempted PT re-evaluation. Off the floor for procedure. Will follow up as appropriate. 1305: 2nd PT attempt. Patient sleeping; eyes open to voice. Remains drowsy. Declines further skilled PT treatment at this time. Will follow up.

## 2021-08-17 NOTE — ROUTINE PROCESS
Meds provided w/o incident. Pt denies pain. PICC line clean, dry and intact. Repositioning provided.

## 2021-08-17 NOTE — OP NOTES
VENA CAVA FILTER OP NOTE    8/17/2021    [facility]   Surgeon(s): Roseann Aguero MD  Assistant(s): None  Pre-operative Diagnosis: #1 Bilateral lower extremity DVT #2 Failure of long-term anticoagulation. Post-operative Diagnosis: Same  Procedure(s) Performed:  Procedure(s):  VENA CAVA FILTER PLACEMENT  Anesthesia:  Moderate sedation  Findings:  none  Complications: none  Estimated Blood Loss:  Minimal <10  Tubes and Drains:  none  Specimens: * No specimens in log *    The patient was brought to the Angio suite and positioned in the supine position on a fluoroscopy table. The right neck was prepped with chlorhexidine then draped in a sterile manner. A portable ultrasound was used for vein imaging and needle guidance. The table was placed in the Trendelenberg position. The skin was anesthetized with 1% Xylocaine (lidocaine). The vein was accessed with a 21 gauge needle and a guidewire inserted and advanced to the inferior vena cava under fluoroscopic guidance . Over the guidewire a sheath was advanced into the vena cava, the wire and obturator were removed. 75 mLs of contrast were injected and images were interpreted as showing a dilated Thea at the level of the renal veins. The location of the renal veins was established. The Cook filter was inserted into the sheath and advanced to the end of the sheath just below the renal vein location and deployed within the vena cava by withdrawal of the sheath. The filter position was fixed at the L2 and L3 vertebral body level by imaging. The sheath was pulled back for a completion venogram demonstrating a patent filter with no migration or extravasation. Pressure was applied to the puncture site after sheath removal followed by an occlusive gauze dressing. There were no complications and the patient tolerated the procedure well.      Rosenan Aguero MD  8/17/2021  9:43 AM

## 2021-08-17 NOTE — INTERVAL H&P NOTE
Update History & Physical    The Patient's History and Physical of August 11, 2021 was reviewed with the patient and I examined the patient. There was no change except for improvement resolution of sepsis and clearance by ID to poceed. The surgical site was confirmed by the patient and me. Plan:  The risk, benefits, expected outcome, and alternative to the recommended procedure have been discussed with the patient. Patient understands and wants to proceed with the procedure.     Electronically signed by Lukas Purdy MD on 8/17/2021 at 6:45 AM

## 2021-08-18 VITALS
WEIGHT: 208 LBS | OXYGEN SATURATION: 100 % | HEIGHT: 74 IN | DIASTOLIC BLOOD PRESSURE: 89 MMHG | BODY MASS INDEX: 26.69 KG/M2 | SYSTOLIC BLOOD PRESSURE: 147 MMHG | RESPIRATION RATE: 16 BRPM | HEART RATE: 88 BPM | TEMPERATURE: 97.1 F

## 2021-08-18 LAB
ANION GAP SERPL CALC-SCNC: 4 MMOL/L (ref 3–18)
BUN SERPL-MCNC: 9 MG/DL (ref 7–18)
BUN/CREAT SERPL: 17 (ref 12–20)
CALCIUM SERPL-MCNC: 7.3 MG/DL (ref 8.5–10.1)
CHLORIDE SERPL-SCNC: 112 MMOL/L (ref 100–111)
CO2 SERPL-SCNC: 26 MMOL/L (ref 21–32)
CREAT SERPL-MCNC: 0.53 MG/DL (ref 0.6–1.3)
GLUCOSE BLD STRIP.AUTO-MCNC: 78 MG/DL (ref 70–110)
GLUCOSE BLD STRIP.AUTO-MCNC: 93 MG/DL (ref 70–110)
GLUCOSE SERPL-MCNC: 81 MG/DL (ref 74–99)
HCT VFR BLD AUTO: 27.1 % (ref 36–48)
HGB BLD-MCNC: 8.5 G/DL (ref 13–16)
POTASSIUM SERPL-SCNC: 3.4 MMOL/L (ref 3.5–5.5)
SODIUM SERPL-SCNC: 142 MMOL/L (ref 136–145)

## 2021-08-18 PROCEDURE — 77030041076 HC DRSG AG OPTICELL MDII -A

## 2021-08-18 PROCEDURE — 99239 HOSP IP/OBS DSCHRG MGMT >30: CPT | Performed by: EMERGENCY MEDICINE

## 2021-08-18 PROCEDURE — 97164 PT RE-EVAL EST PLAN CARE: CPT

## 2021-08-18 PROCEDURE — 2709999900 HC NON-CHARGEABLE SUPPLY

## 2021-08-18 PROCEDURE — 85018 HEMOGLOBIN: CPT

## 2021-08-18 PROCEDURE — 77030040392 HC DRSG OPTIFOAM MDII -A

## 2021-08-18 PROCEDURE — 80048 BASIC METABOLIC PNL TOTAL CA: CPT

## 2021-08-18 PROCEDURE — 74011250637 HC RX REV CODE- 250/637: Performed by: FAMILY MEDICINE

## 2021-08-18 PROCEDURE — 74011250637 HC RX REV CODE- 250/637: Performed by: EMERGENCY MEDICINE

## 2021-08-18 PROCEDURE — 82962 GLUCOSE BLOOD TEST: CPT

## 2021-08-18 RX ORDER — PANTOPRAZOLE SODIUM 40 MG/1
40 TABLET, DELAYED RELEASE ORAL 2 TIMES DAILY
Qty: 60 TABLET | Refills: 0 | Status: SHIPPED | OUTPATIENT
Start: 2021-08-18 | End: 2021-10-12

## 2021-08-18 RX ORDER — LANOLIN ALCOHOL/MO/W.PET/CERES
400 CREAM (GRAM) TOPICAL ONCE
Status: COMPLETED | OUTPATIENT
Start: 2021-08-18 | End: 2021-08-18

## 2021-08-18 RX ORDER — ACETAMINOPHEN 325 MG/1
650 TABLET ORAL
Qty: 20 TABLET | Refills: 0 | Status: SHIPPED | OUTPATIENT
Start: 2021-08-18 | End: 2022-06-01

## 2021-08-18 RX ADMIN — PANTOPRAZOLE 40 MG: 40 TABLET, DELAYED RELEASE ORAL at 09:05

## 2021-08-18 RX ADMIN — POTASSIUM BICARBONATE 40 MEQ: 782 TABLET, EFFERVESCENT ORAL at 11:31

## 2021-08-18 RX ADMIN — Medication 400 MG: at 11:32

## 2021-08-18 RX ADMIN — DAKIN'S SOLUTION 0.125% (QUARTER STRENGTH): 0.12 SOLUTION at 09:07

## 2021-08-18 NOTE — PROGRESS NOTES
0937- contacted pt's mother to inform her of likely discharge today. Pt's mother stated \"well do you have everything he needs? \" CM informed pt's mother that home health has been ordered to resume with his previous home health agency [facility]. CM informed pt's mother that CM will arrange for transportation to their home. Pt's mother asking for CM to contact the personal care agency. CM asked for their contact information and informed the mother that CM would let them know pt was discharging. Pt's mother asking for colostomy supplies. Pt had an ostomy prior to hospitalization. Pt's mother reports that pt was not home long enough to order the supplies. CM informed the mother that CM would ask for nursing staff to send a few extra ostomy supplies home with pt until they could be ordered. Mother states she will be home all day to receive pt.     0919- CM contacted [facility] at [phone] and spoke with Michelle. CM informed Michelle that pt would be discharging home. Michelle states that she will send an RN out so pt can resume his personal care services. [employer] asking for a copy of pt's discharge summary to be faxed to [phone]. [phone]- CM contacted Amy Pramod with pt's [employer] transportation and scheduled stretcher transportation for  of noon. Confirmation # [id number]    0209- CM spoke with SAFIA Hunt and updated her on discharge plans. CM informed Wayne Hunt that pt's mother is requesting pt to be sent home with a few colostomy supplies. Discharge order noted for today. Pt has been accepted to [address] [place] [address] agency. Called pt's mother Rachel Dumont and she is agreeable to the transition plan today. Transport has been arranged through [facility]. Stretcher transport with ETA of Noon.   Discharge information has been documented on the AVS.       Bailey Elam RN BSN  Care Manager  [phone]            [address]  [phone]

## 2021-08-18 NOTE — PROGRESS NOTES
1030:  New discharge orders received.  has ambulance transport set up for 12noon today, see  notes for details. 1115:  Dr Carline Khanna called for clarification regarding current PICC line; order received to discontinue PICC prior to discharge. 1145:  Left upper arm dual lumen PICC discontinued, tip intact at 44cm. Patient gives this RN permission to discuss discharge instructions via phone with his mother. 1200:   staff is assisting with discharge appointments. Appropriate return demonstration for Incentive Spirometer 1250ml, tolerated well. 1210:  Discharge instructions reviewed at length with mother, Casey Garzon, at [facility] via phone, she verbalizes understanding all info. Three prescriptions in discharge packet to go home with patient. Incentive Spirometer, colostomy and wound care supplies sent home with patient. All personal belongings packed for discharge including cell phone and . ETA ambulance now [id number]. Mother verbalizes understanding all info. 1410:  Report to ambulance staff. Discharged home via ambulance stretcher, all personal belongings, care supplies, discharge instructions/ prescription with patient as noted above.
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
## 2021-08-18 NOTE — DISCHARGE INSTRUCTIONS
MRSA: Care Instructions  Your Care Instructions     MRSA stands for methicillin-resistant Staphylococcus aureus. It is a type of bacteria that can cause a staph infection. But it cannot be killed by the antibiotic methicillin and some other antibiotics. This sometimes makes it harder to treat. The bacteria are widespread on skin and in the nose. MRSA can cause infections of the skin, heart, blood, and bones. The bacteria can spread quickly in the body and cause serious problems. MRSA can also be spread from person to person. Depending on how serious your infection is, the doctor may drain your wound and you may get antibiotics through a small tube placed in a vein (IV). Your doctor may also give you an antibiotic ointment to use on sores or in your nose. Follow-up care is a key part of your treatment and safety. Be sure to make and go to all appointments, and call your doctor if you are having problems. It's also a good idea to know your test results and keep a list of the medicines you take. How can you care for yourself at home? · Take your antibiotics as directed. Do not stop taking them just because you feel better. You need to take the full course of antibiotics. · Keep any cuts or other wounds covered while they heal.  · Wash your hands often, especially after you touch elastic bandages or other dressings over a wound. This can keep the bacteria from spreading. Wrap bandages in a plastic bag before you throw them away. · Do not share towels, washcloths, razors, clothing, or other items that touched your wound or bandage. Wash your sheets, towels, and clothes with warm water and detergent. Dry them in a hot dryer, if possible. · Keep shared areas clean by wiping down surfaces (such as countertops, doorknobs, and light switches) with a disinfectant. When should you call for help?    Call your doctor now or seek immediate medical care if:    · You have worse symptoms of infection, such as:  ? Increased pain, swelling, warmth, or redness. ? Red streaks leading from the area. ? Pus draining from the area. ? A fever. Watch closely for changes in your health, and be sure to contact your doctor if:    · You do not get better as expected. Where can you learn more? Go to http://www.gray.com/  Enter A043 in the search box to learn more about \"MRSA: Care Instructions. \"  Current as of: September 23, 2020               Content Version: 12.8  © 2006-2021 [employer]. Care instructions adapted under license by [employer] (which disclaims liability or warranty for this information). If you have questions about a medical condition or this instruction, always ask your healthcare professional. [address] any warranty or liability for your use of this information. Learning About Using an [employer]  What is an incentive spirometer? An incentive spirometer is a handheld device that exercises your lungs and measures how much air you can breathe in. It tells you and your doctor how well your lungs are working. The spirometer can help you practice taking deep breaths. Deep breaths can help open your airways and prevent fluid or mucus from building up in your lungs, and make it easier for you to breathe. Using the device can help prevent serious lung infections like pneumonia, improve your breathing after you've had pneumonia or surgery, and keep your airways open and lungs active if you can't get out of bed. How do you use an incentive spirometer? When you use an incentive spirometer, you breathe in air through a tube that is connected to a large air column containing a piston or ball. As you breathe in, the piston or ball inside the column moves up. The height of the piston or ball shows how much air you breathed in. You may feel lightheaded when you breathe in deeply for this exercise.  If you feel dizzy or feel like you're going to pass out, stop the exercise and rest.  Each time you do this exercise, keep track of your progress by writing down how high the piston or ball moves up the column. 1. Move the slider on the outside of the large column to the level that you want to reach or that your doctor recommended. 2. Sit or stand up straight, and hold the spirometer in front of you. Be sure to keep it level. 3. To start, breathe out normally. Then close your lips tightly around the mouthpiece. Make sure that you don't block the mouthpiece with your tongue. 4. Take a slow, deep breath. Breathe in as deeply as you can. As you breathe in, the piston or ball inside the large column will move up. 1. Try to move the piston or ball as high up as you can or to the level your doctor recommended. 2. When you can't breathe in anymore, hold your breath for 2 to 5 seconds. 5. Relax, take the mouthpiece out of your mouth, and breathe out normally. 6. Repeat steps 1 through 5 as many times as your doctor tells you to.  7. After you've taken the recommended number of breaths, try to cough a few times. This will help loosen any mucus that has built up in your lungs. It will make it easier for you to breathe. If you just had surgery on your belly or chest, hold a pillow over your cut (incision) when you cough. Follow-up care is a key part of your treatment and safety. Be sure to make and go to all appointments, and call your doctor if you are having problems. It's also a good idea to know your test results and keep a list of the medicines you take. Where can you learn more? Go to http://www.gray.com/  Enter B979 in the search box to learn more about \"Learning About Using an [employer]. \"  Current as of: October 26, 2020               Content Version: 12.8  © 3665-1583 [employer].    Care instructions adapted under license by [employer] (which disclaims liability or warranty for this information). If you have questions about a medical condition or this instruction, always ask your healthcare professional. [address] any warranty or liability for your use of this information. Pressure Injuries: Care Instructions  Your Care Instructions     A pressure injury on the skin is caused by constant pressure to that area. These injuries--also called decubitus ulcers or bedsores--may happen when you lie in bed or sit in a wheelchair for a long time. The constant pressure blocks the blood supply to the skin. This causes skin cells to die and creates a sore. Pressure injuries usually occur over bony areas, such as the hips, lower back, elbows, heels, and shoulders. They also can occur in places where the skin folds over on itself. You may have mild redness or open sores that are harder to heal.  Good care at home can help heal pressure injuries. You or your caregiver needs to check your skin every day for sores. You need good nutrition and plenty of fluids to keep your skin healthy and prevent new pressure injuries. Follow-up care is a key part of your treatment and safety. Be sure to make and go to all appointments, and call your doctor if you are having problems. It's also a good idea to know your test results and keep a list of the medicines you take. How can you care for yourself at home? · If your doctor prescribed a medicated ointment or cream, use it exactly as prescribed. Call your doctor if you think you are having a problem with your medicine. · Wash pressure injuries every day, or as often as your doctor recommends. Most tap water is safe, but follow the advice of your doctor or nurse. He or she may recommend that you use a saline solution. This is a salt and water solution that you can buy over the counter. · Put on bandages as your doctor or wound care specialist says.   · Keep healthy tissue around the sore clean and dry.  · Check your skin every day for sores (or have a caregiver do it). · If you know what is causing the pressure that caused the sore, find a way to remove that pressure. To prevent pressure injuries  · Change your position or have your caregiver help you change your position often. You may need to do this every 2 hours if you are in bed or every 15 minutes if you are in a wheelchair. This lowers the chance of making sores worse and getting new sores. · Use special mattresses or other support. These may include low-pressure mattresses or cushions made of foam that can be filled with air, water, beads, or fiber. · Eat healthy foods with plenty of protein to help heal damaged skin and to help new skin grow. · Try to stay at a healthy weight. Being overweight can lead to more pressure on your skin. · Do not slide across sheets or slump in a chair or bed. · Do not smoke. Smoking dries the skin and reduces its blood supply. If you need help quitting, talk to your doctor about stop-smoking programs and medicines. These can increase your chances of quitting for good. When should you call for help? Call your doctor now or seek immediate medical care if:    · You have signs of infection, such as:  ? Increased pain, swelling, warmth, or redness. ? Red streaks leading from the sore. ? Pus draining from the sore. ? A fever. Watch closely for changes in your health, and be sure to contact your doctor if:    · Your pressure injuries are not healing.     · You have new pressure injuries.     · You need help changing positions in bed or in a chair.     · Your caregiver needs help to move you. Where can you learn more? Go to http://www.gray.com/  Enter F114 in the search box to learn more about \"Pressure Injuries: Care Instructions. \"  Current as of: March 4, 2020               Content Version: 12.8  © 8087-3500 [employer], [employer].    Care instructions adapted under license by [facility] (which disclaims liability or warranty for this information). If you have questions about a medical condition or this instruction, always ask your healthcare professional. [address] any warranty or liability for your use of this information. Learning About Indwelling Urinary Catheter Care to Prevent Infection  Overview     A urinary catheter is a flexible plastic tube that's used to drain urine from your bladder when you can't urinate on your own. The catheter allows urine to drain from the bladder into a bag. Two types of drainage bags may be used with a urinary catheter. · A bedside bag is a large bag that you can hang on the side of your bed or on a chair. You can use it overnight or anytime you will be sitting or lying down for a long time. · A leg bag is a small bag that you can use during the day. It is usually attached to your thigh or calf and hidden under your clothes. Having a urinary catheter increases your risk of getting a urinary tract infection. Germs may get on the catheter and cause an infection in your bladder or kidneys. The longer you have a catheter, the more likely it is that you will get an infection. You can help prevent this problem with good hygiene and careful handling of your catheter and drainage bags. How can you help prevent infection? Take care to stay clean  · Always wash your hands well before and after you handle your catheter. · Clean the skin around the catheter daily using soap and water. Dry with a clean towel afterward. You can shower with your catheter and drainage bag in place unless your doctor told you not to. · When you clean around the catheter, check the surrounding skin for signs of infection. Look for things like pus and irritated, swollen, red, or tender skin around the catheter. Be careful with your drainage bag  · Always keep the drainage bag below the level of your bladder.  This will help keep urine from flowing back into your bladder. · Check often to see that urine is flowing through the catheter into the drainage bag. · Empty the drainage bag when it is half full. This will keep it from overflowing or backing up. · When you empty the drainage bag, do not let the tubing or drain spout touch anything. · Keep the cap that comes with the tubing, and cover the tip of the tubing when not in use. Be careful with your catheter  · Do not unhook the catheter from the drain tube until you are ready to change the tubing and bag. That could let germs get into the tube. · Make sure that the catheter tubing does not get twisted or kinked. · Do not tug or pull on the catheter. And make sure that the drainage bag does not drag or pull on the catheter. · Do not put powder or lotion on the skin around the catheter. · Talk with your doctor about your options for sexual intercourse while wearing a catheter. How do you empty the bag? If your doctor has asked you to keep a record, write down the amount of urine in the bag before you empty it. Wash your hands before and after you touch the bag. 1. Remove the drain spout from its sleeve at the bottom of the drainage bag.  2. Open the valve on the drain spout. Let the urine flow out into the toilet or a container. Be careful not to let the tubing or drain spout touch anything. 3. After you empty the bag, close the valve. Then put the drain spout back into its sleeve at the bottom of the collection bag. How do you switch to a bedside bag for overnight use? Wash your hands before and after you handle the bags. 1. Empty the leg bag that is attached to the tubing and catheter. 2. Put a clean towel under the tubing attached to the leg bag.  3. Use an alcohol wipe to clean the tip of the tubing attached to the bedside bag.  4. To stop the flow of urine, pinch the catheter with your fingers just above the tubing connection.   5. Use a twisting motion to disconnect the leg bag tubing from the catheter. 6. Then securely connect the catheter to the tubing from the bedside bag. How do you clean a bedside bag? Many people clean their bedside bag in the morning if they switch to a leg bag. To clean a bedside drainage ba. Remove the bedside bag and attach the leg bag.  2. Fill the bedside bag with 2 parts vinegar and 3 parts water. Let it stand for 20 minutes. 3. Empty the bag, and let it air dry. When should you call for help? Call your doctor now or seek immediate medical care if:    · You have symptoms of a urinary infection. These may include:  ? Pain or burning when you urinate. ? A frequent need to urinate without being able to pass much urine. ? Pain in the flank, which is just below the rib cage and above the waist on either side of the back. ? Blood in your urine. ? A fever.     · Your urine smells bad.     · You see large blood clots in your urine.     · No urine or very little urine is flowing into the bag for 4 or more hours. Watch closely for changes in your health, and be sure to contact your doctor if:    · The area around the catheter becomes irritated, swollen, red, or tender, or there is pus draining from it.     · Urine is leaking from the place where the catheter enters your body. Follow-up care is a key part of your treatment and safety. Be sure to make and go to all appointments, and call your doctor if you are having problems. It's also a good idea to know your test results and keep a list of the medicines you take. Where can you learn more? Go to http://www.gray.com/  Enter U010 in the search box to learn more about \"Learning About Indwelling Urinary Catheter Care to Prevent Infection. \"  Current as of: 2020               Content Version: 12.8   Consulted. Care instructions adapted under license by [employer] (which disclaims liability or warranty for this information).  If you have questions about a medical condition or this instruction, always ask your healthcare professional. [address] any warranty or liability for your use of this information. Vena Cava Filter Placement: What to Expect at [address]     A vena cava filter was put into the vena cava using a thin, flexible tube (catheter) that was inserted through a vein in your neck or groin. A vena cava filter helps prevent blood clots from traveling to the lungs and heart, where they may block blood flow. The filter may be permanent, or it may be removed later. Vena cava filters may be used if you have problems taking a medicine (called a blood thinner) that prevents blood clots. After having a vena cava filter placed, you may feel tired and have some pain for several days. You may have a small bandage where the catheter was placed. This care sheet gives you a general idea about how long it will take for you to recover. But each person recovers at a different pace. Follow the steps below to feel better as quickly as possible. How can you care for yourself at home? Activity    · Take it easy for a day or two. Avoid strenuous activities, such as bicycle riding, jogging, weight lifting, or aerobic exercise, until your doctor says it is okay. Diet    · You can eat your normal diet. If your stomach is upset, try bland, low-fat foods like plain rice, broiled chicken, toast, and yogurt.     · Drink plenty of fluids to avoid becoming dehydrated. Medicines    · Your doctor will tell you if and when you can restart your medicines. He or she will also give you instructions about taking any new medicines.     · If you take aspirin or some other blood thinner, ask your doctor if and when to start taking it again. Make sure that you understand exactly what your doctor wants you to do.     · Be safe with medicines. Take pain medicines exactly as directed.   ? If the doctor gave you a prescription medicine for pain, take it as prescribed. ? If you are not taking a prescription pain medicine, ask your doctor if you can take an over-the-counter medicine.     · If you think your pain medicine is making you sick to your stomach:  ? Take your medicine after meals (unless your doctor has told you not to). ? Ask your doctor for a different pain medicine. Care of the catheter site    · Keep a bandage over the spot where the catheter was inserted for the first day, or for as long as your doctor recommends.     · Put ice or a cold pack on the area for 10 to 20 minutes at a time to help with soreness or swelling. Do this every few hours. Put a thin cloth between the ice and your skin.     · You may shower 24 to 48 hours after the procedure, if your doctor okays it. Pat the incision dry.     · Do not soak the catheter site until it is healed. Don't take a bath for 1 week, or until your doctor tells you it is okay.     · Watch for bleeding from the site. A small amount of blood (up to the size of a quarter) on the bandage can be normal.     · If you are bleeding, lie down and press on the area for 15 minutes to try to make it stop. If the bleeding does not stop, call your doctor or seek immediate medical care. Follow-up care is a key part of your treatment and safety. Be sure to make and go to all appointments, and call your doctor if you are having problems. It's also a good idea to know your test results and keep a list of the medicines you take. When should you call for help? Call 911 anytime you think you may need emergency care. For example, call if:    · You passed out (lost consciousness).     · You have severe trouble breathing.     · You have sudden chest pain and shortness of breath, or you cough up blood. Call your doctor now or seek immediate medical care if:    · You have pain that does not get better after you take pain medicine.     · You are bleeding through your dressing.  A small amount of bleeding is normal.     · You have a fast-growing, painful lump at the catheter site.     · You have signs of infection, such as:  ? Increased pain, swelling, warmth, or redness. ? Red streaks leading from the incision. ? Pus draining from the incision. ? A fever.     · You have symptoms of a blood clot in your leg, such as:  ? Pain in the calf, back of the knee, thigh, or groin. ? Redness and swelling in your leg or groin. Watch closely for any changes in your health, and be sure to contact your doctor if you have any problems. Where can you learn more? Go to http://www.gray.com/  Enter N974 in the search box to learn more about \"Vena Cava Filter Placement: What to Expect at Home. \"  Current as of: March 4, 2020               Content Version: 12.8  © 2006-2021 [employer]. Care instructions adapted under license by [employer] (which disclaims liability or warranty for this information). If you have questions about a medical condition or this instruction, always ask your healthcare professional. [address] any warranty or liability for your use of this information. Sepsis: Care Instructions  Overview     Sepsis is an intense reaction to an infection. It can cause damage to the body and lead to dangerously low blood pressure. You may have inflammation across large areas of your body. It can damage tissue and even go deep into your organs. Infections that can lead to sepsis include:  · A skin infection such as from a cut. · A lung infection like pneumonia. · A kidney infection. · A gut infection such as E. coli. Sepsis is treated with antibiotics. Your doctor will try to find the infection that led to sepsis. Bo Sinclairbrigitte also get fluids through a vein (IV). Machines will track your vital signs, including temperature, blood pressure, breathing rate, and pulse rate.   The physical and mental effects of sepsis may not be seen for several weeks after treatment. And they may last long after the infection is gone. Physical problems may include:  · Feeling weak and tired. · Feeling out of breath. · Aches and pains. · Problems with getting around. · Trouble falling asleep or staying asleep. · Dry and itchy skin, brittle nails, and hair loss. Some of these effects can lead to problems with your organs or your feet, legs, hands, or arms. Sepsis can also affect your mind and emotions. Problems may include:  · Self-doubt. · Anxiety. · Nightmares. · Depression and mood problems. · Wanting to avoid other people. · Confusion. · Flashbacks and bad memories of your illness. It's important to care for yourself and try to avoid infections. This may lower your risk of getting sepsis again. Follow-up care is a key part of your treatment and safety. Be sure to make and go to all appointments, and call your doctor if you are having problems. It's also a good idea to know your test results and keep a list of the medicines you take. How can you care for yourself at home? · Be safe with medicines. Take your medicines exactly as prescribed. Call your doctor if you think you are having a problem with your medicine. · If your doctor prescribed antibiotics, take them as directed. Do not stop taking them just because you feel better. You need to take the full course of antibiotics. · Help prevent infections that could again lead to sepsis. ? Try to avoid colds and flu. If you must be around people who have a cold or the flu, wash your hands often. And get a flu vaccine every year. ? Ask your doctor if you need a pneumococcal vaccine (to prevent pneumonia, meningitis, and other infections). If you have had one before, ask your doctor if you need another dose. ? Clean any wounds or scrapes. · Do not smoke or use other tobacco products. When you quit smoking, you are less likely to get a cold, the flu, bronchitis, and pneumonia.  If you need help quitting, talk to your doctor about stop-smoking programs and medicines. These can increase your chances of quitting for good. · Drink plenty of fluids to prevent dehydration. Choose water and other caffeine-free clear liquids until you feel better. If you have kidney, heart, or liver disease and have to limit fluids, talk with your doctor before you increase the amount of fluids you drink. · Eat a healthy diet. Include fruits, vegetables, and whole grains in your diet every day. · If your doctor recommends it, try doing some physical activity. Walking is a good choice. Bit by bit, increase the amount you walk every day. · Talk with your family and friends about your challenges. Ask for help if you need it. · Keep a journal. Writing down your thoughts and feelings can help reduce your stress. · Ask family members to fill in gaps in your memory. · Set small goals for yourself that you can reach. Reward yourself for success. When should you call for help? Call  911 anytime you think you may need emergency care. For example, call if:    · You passed out (lost consciousness). Call your doctor now or seek immediate medical care if:    · You have symptoms such as:  ? Shortness of breath. ? Feeling very sick. ? Severe pain. ? A fast heart rate. ? Cool, pale, or clammy skin. ? Feeling confused. ? Feeling very sleepy, or you are hard to wake up.     · You are dizzy or lightheaded, or you feel like you may faint.     · You have a fever or chills. Watch closely for changes in your health, and be sure to contact your doctor if:    · You do not get better as expected. Where can you learn more? Go to http://www.gray.com/  Enter T383 in the search box to learn more about \"Sepsis: Care Instructions. \"  Current as of: September 23, 2020               Content Version: 12.8  © 3357-1161 [employer]wise, [employer].    Care instructions adapted under license by [employer] (which disclaims liability or warranty for this information). If you have questions about a medical condition or this instruction, always ask your healthcare professional. [address] any warranty or liability for your use of this information.

## 2021-08-18 NOTE — ROUTINE PROCESS
Pt's colostomy has an extremely foul odor. Dr. Kacy Nails phoned by nurse to obtain a stool specimen. Reply \"no just let the day physician know. \"

## 2021-08-18 NOTE — PROGRESS NOTES
Patient has podiatry follow up with Dr. Noel Mcpherson on 8/24/2021 at 9:00 am, patient should arrive at 8:30 am.

## 2021-08-18 NOTE — PROGRESS NOTES
Patient has transitional care follow up with Dr. Jonnie Baez and Liver Specialist, [facility] location on 9/23/2021 at 11:00 am.
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
## 2021-08-18 NOTE — ROUTINE PROCESS
Blood specimens have been drawn via PICC line and sent to lab. Pt is refusing bath. He would like to be bath on day shift.

## 2021-08-18 NOTE — PROGRESS NOTES
Call made to PCP's office Dr. Polina Mooney [phone], left voicemail as instructed by office, office will contact patient with follow up appointment.

## 2021-08-23 ENCOUNTER — HOSPITAL ENCOUNTER (OUTPATIENT)
Dept: LAB | Age: 61
Discharge: HOME | End: 2021-08-23

## 2021-08-23 LAB — XX-LABCORP SPECIMEN COL,LCBCF: NORMAL

## 2021-08-23 PROCEDURE — 99001 SPECIMEN HANDLING PT-LAB: CPT

## 2021-09-06 NOTE — DISCHARGE SUMMARY
[address], [address]     DISCHARGE SUMMARY    Name: Marjorie Eugene MRN: [id number]   Age / Sex: 64 y.o. / male CSN: [id number]   YOB: 1960 Length of Stay: 19 days   Admit Date: 7/29/2021 Discharge Date:        PRIMARY CARE PHYSICIAN: Stuart Boudreaux MD      DISCHARGE DIAGNOSES:    Acute cystitis with hematuria patient with chronically indwelling Chavez catheter  Severe sepsis POA secondary to above  ЕКАТЕРИНА  Acute on chronic anemia requiring PRBCs  Iron deficiency/low iron stores  Hypocalcemia and hypomagnesemia  Stage IV sacral pressure ulcer, POA,  status post debridement 4/16/2021  Bilateral lower extremity DVT  Acute encephalopathy, suspect metabolic  Severe protein calorie malnutrition  Paraplegia  Hypotension-improved    CONSULTS CALLED: General surgery, nephrology, ID, palliative care, GI, podiatry, vascular surgery      PROCEDURES DONE: PICC line placement, colonoscopy, IVC filter placement      Aida Bolton 65: This is a 57-year-old male with a past medical history of hypertension and paraplegia and neurogenic bladder and left eye blindness who presented to the ED with a change in mental status and confusion. Patient was noted to have severe sepsis with shock secondary to UTI in the setting of chronic Chavez. Patient was admitted to ICU. Patient was started on broad-spectrum antibiotics. Patient received IV fluids. Patient was placed on pressors. Cultures were sent. Patient was also noted to have acute kidney injury. Nephrology was consulted. Patient's blood pressure showed improvement. Patient was transitioned out of ICU. Antibiotics were continued. ID was consulted. Electrolytes were monitored and repleted. Surgery saw the patient and did not recommend any more debridement. ID was also consulted. Patient's mentation improved to baseline. Renal function also showed improvement.   Patient was noted to have bilateral lower extremity DVTs. Anticoagulation was initiated. Patient had a drop in hemoglobin. Patient received PRBC transfusion. GI saw the patient. Patient underwent a colonoscopy which did not show any active bleeding. Trial of anticoagulation was done and patient again dropped his hemoglobin. Vascular surgery was consulted. IVC filter was placed. ID cleared the patient to be off antibiotics. Podiatry also saw the patient during that admission. Case management was involved. Discharge planning was initiated. Discharge plans were discussed with patient and mother. Patient was discharged to home. Patient had a prolonged hospital stay. For full details please refer to chart. MEDICATIONS ON DISCHARGE:    Discharge Medication List as of 8/19/2021  9:57 AM      START taking these medications    Details   !! OTHER Check a CBC, CMP, magnesium in 3 days. Results to PCP immediately. Diagnosis-bilateral DVT, Print, Disp-1 Each, R-0      !! OTHER Incentive spirometry-use as directed, Print, Disp-1 Each, R-0       !! - Potential duplicate medications found. Please discuss with provider. CONTINUE these medications which have CHANGED    Details   acetaminophen (TYLENOL) 325 mg tablet Take 2 Tablets by mouth every six (6) hours as needed for Pain or Fever., Print, Disp-20 Tablet, R-0      pantoprazole (PROTONIX) 40 mg tablet Take 1 Tablet by mouth two (2) times a day., Print, Disp-60 Tablet, R-0         CONTINUE these medications which have NOT CHANGED    Details   therapeutic multivitamin (THERAGRAN) tablet Take 1 Tablet by mouth daily. , No Print, Disp-30 Tablet, R-0      mirtazapine (REMERON) 7.5 mg tablet Take 1 Tablet by mouth nightly., No Print, Disp-30 Tablet, R-0         STOP taking these medications       thiamine HCL (B-1) 100 mg tablet Comments:   Reason for Stopping:         amLODIPine (NORVASC) 10 mg tablet Comments:   Reason for Stopping:         naloxone (NARCAN) 0.4 mg/mL injection Comments:   Reason for Stopping:         ergocalciferol (ERGOCALCIFEROL) 1,250 mcg (50,000 unit) capsule Comments:   Reason for Stopping:         tamsulosin (FLOMAX) 0.4 mg capsule Comments:   Reason for Stopping:         naloxone (NARCAN) 2 mg/actuation spry Comments:   Reason for Stopping:         furosemide (LASIX) 20 mg tablet Comments:   Reason for Stopping:         lisinopril-hydroCHLOROthiazide (PRINZIDE, ZESTORETIC) 20-12.5 mg per tablet Comments:   Reason for Stopping:         MULTIVIT-MINERALS/FOLIC ACID (SPECTRAVITE ADULT PO) Comments:   Reason for Stopping:         escitalopram oxalate (LEXAPRO) 10 mg tablet Comments:   Reason for Stopping:                 DISCHARGE VITAL SIGNS:  Visit Vitals  BP (!) 147/89 (BP 1 Location: Right upper arm, BP Patient Position: At rest)   Pulse 88   Temp 97.1 °F (36.2 °C)   Resp 16   Ht 6' 2\" (1.88 m)   Wt 94.3 kg (208 lb)   SpO2 100%   BMI 26.71 kg/m²         CONDITION ON DISCHARGE: Stable. DISPOSITION: Home with home health care      FOLLOW-UP RECOMMENDATIONS:   Follow-up Information     Follow up With Specialties Details Why Contact Info    Flavio Herrera on 10/8/2021 at [facility] for nephrology follow up. Nephrology  [address]   [address], [address]  [address]  [facility]  Your preferred agency, Chosen to continue managing health care needs, Home health nurse to call prior to coming to home.  [address] [place] [address] [address] [place] [address]    Shay Aguiar MD Family Medicine  OFFICE WILL CONTACT PATIENT WITH FOLLOW UP [address]  [address] [facility]      Nathanael Méndez MD Gastroenterology On 9/23/2021 at 11:00 am [facility] [address] [facility]      [facility] On 8/24/2021 arrive at 8:30 am for a 9:00 am appointment [id number]      Esan, Jamari Bueno MD Vascular Surgery, General Surgery On 9/3/2021 9:45 am [address] [place] [address]      Candida Clayton MD Surgery Schedule an appointment as soon as possible for a visit in 2 weeks follow up appointment needed for 2weeks from discharge date. [facility] [facility] [address]  [address] [facility]            OTHER INSTRUCTIONS:        TIME SPENT ON DISCHARGE ACTIVITIES: More than 35 minutes. Dragon medical dictation software was used for portions of this report. Unintended errors may occur.       Signed:  Jack Tee MD      9/6/2021

## 2021-09-07 ENCOUNTER — TELEPHONE (OUTPATIENT)
Dept: SURGERY | Age: 61
End: 2021-09-07

## 2021-09-07 NOTE — TELEPHONE ENCOUNTER
Patient's mother Nahid Ray requesting an order colostomy change. Nahid Ray states patient will be visited by home health nurse today between [address] [facility] nurse requesting an order. Nahid Ray states please call nurse Vivian Haider [phone] for verbal order. Left message with Dr Reina Lomeli. Awaiting response.

## 2021-09-09 ENCOUNTER — APPOINTMENT (OUTPATIENT)
Dept: GENERAL RADIOLOGY | Age: 61
DRG: 466 | End: 2021-09-09
Attending: FAMILY MEDICINE
Payer: MEDICAID

## 2021-09-09 ENCOUNTER — HOSPITAL ENCOUNTER (INPATIENT)
Age: 61
LOS: 15 days | Discharge: HOME HEALTH | DRG: 466 | End: 2021-09-24
Attending: STUDENT IN AN ORGANIZED HEALTH CARE EDUCATION/TRAINING PROGRAM | Admitting: FAMILY MEDICINE
Payer: MEDICAID

## 2021-09-09 ENCOUNTER — APPOINTMENT (OUTPATIENT)
Dept: CT IMAGING | Age: 61
DRG: 466 | End: 2021-09-09
Attending: FAMILY MEDICINE
Payer: MEDICAID

## 2021-09-09 ENCOUNTER — APPOINTMENT (OUTPATIENT)
Dept: ULTRASOUND IMAGING | Age: 61
DRG: 466 | End: 2021-09-09
Attending: FAMILY MEDICINE
Payer: MEDICAID

## 2021-09-09 DIAGNOSIS — R34 DECREASED URINE OUTPUT: ICD-10-CM

## 2021-09-09 DIAGNOSIS — G82.20 PARAPLEGIA (HCC): ICD-10-CM

## 2021-09-09 DIAGNOSIS — N17.9 ACUTE RENAL FAILURE, UNSPECIFIED ACUTE RENAL FAILURE TYPE (HCC): Primary | ICD-10-CM

## 2021-09-09 LAB
ALBUMIN SERPL-MCNC: 2 G/DL (ref 3.4–5)
ALBUMIN/GLOB SERPL: 0.3 {RATIO} (ref 0.8–1.7)
ALP SERPL-CCNC: 93 U/L (ref 45–117)
ALT SERPL-CCNC: 28 U/L (ref 16–61)
ANION GAP SERPL CALC-SCNC: 8 MMOL/L (ref 3–18)
APPEARANCE UR: NORMAL
AST SERPL-CCNC: 38 U/L (ref 10–38)
ATRIAL RATE: 119 BPM
BACTERIA URNS QL MICRO: ABNORMAL /HPF
BASOPHILS # BLD: 0 K/UL (ref 0–0.1)
BASOPHILS NFR BLD: 0 % (ref 0–2)
BILIRUB SERPL-MCNC: 1.4 MG/DL (ref 0.2–1)
BILIRUB UR QL: NORMAL
BUN SERPL-MCNC: 21 MG/DL (ref 7–18)
BUN/CREAT SERPL: 9 (ref 12–20)
CALCIUM SERPL-MCNC: 8 MG/DL (ref 8.5–10.1)
CALCULATED P AXIS, ECG09: 49 DEGREES
CALCULATED R AXIS, ECG10: 36 DEGREES
CALCULATED T AXIS, ECG11: 42 DEGREES
CHLORIDE SERPL-SCNC: 99 MMOL/L (ref 100–111)
CO2 SERPL-SCNC: 24 MMOL/L (ref 21–32)
COLOR UR: NORMAL
CREAT SERPL-MCNC: 2.26 MG/DL (ref 0.6–1.3)
CREAT UR-MCNC: 145 MG/DL (ref 30–125)
DIAGNOSIS, 93000: NORMAL
DIFFERENTIAL METHOD BLD: ABNORMAL
EOSINOPHIL # BLD: 0 K/UL (ref 0–0.4)
EOSINOPHIL NFR BLD: 0 % (ref 0–5)
EPITH CASTS URNS QL MICRO: ABNORMAL /LPF (ref 0–5)
ERYTHROCYTE [DISTWIDTH] IN BLOOD BY AUTOMATED COUNT: 15.6 % (ref 11.6–14.5)
GLOBULIN SER CALC-MCNC: 7.3 G/DL (ref 2–4)
GLUCOSE SERPL-MCNC: 109 MG/DL (ref 74–99)
GLUCOSE UR STRIP.AUTO-MCNC: NEGATIVE MG/DL
HCT VFR BLD AUTO: 34.9 % (ref 36–48)
HGB BLD-MCNC: 11 G/DL (ref 13–16)
HGB UR QL STRIP: NORMAL
KETONES UR QL STRIP.AUTO: NEGATIVE MG/DL
LACTATE BLD-SCNC: 2.17 MMOL/L (ref 0.4–2)
LACTATE BLD-SCNC: 3.48 MMOL/L (ref 0.4–2)
LEUKOCYTE ESTERASE UR QL STRIP.AUTO: NORMAL
LYMPHOCYTES # BLD: 1.6 K/UL (ref 0.9–3.6)
LYMPHOCYTES NFR BLD: 13 % (ref 21–52)
MCH RBC QN AUTO: 27.7 PG (ref 24–34)
MCHC RBC AUTO-ENTMCNC: 31.5 G/DL (ref 31–37)
MCV RBC AUTO: 87.9 FL (ref 78–100)
MONOCYTES # BLD: 0.6 K/UL (ref 0.05–1.2)
MONOCYTES NFR BLD: 5 % (ref 3–10)
NEUTS SEG # BLD: 10.1 K/UL (ref 1.8–8)
NEUTS SEG NFR BLD: 81 % (ref 40–73)
NITRITE UR QL STRIP.AUTO: NEGATIVE
P-R INTERVAL, ECG05: 144 MS
PH UR STRIP: 5.5 [PH]
PLATELET # BLD AUTO: 273 K/UL (ref 135–420)
PMV BLD AUTO: 10.2 FL (ref 9.2–11.8)
POTASSIUM SERPL-SCNC: 5.1 MMOL/L (ref 3.5–5.5)
PROT SERPL-MCNC: 9.3 G/DL (ref 6.4–8.2)
PROT UR STRIP-MCNC: >300 MG/DL
Q-T INTERVAL, ECG07: 314 MS
QRS DURATION, ECG06: 80 MS
QTC CALCULATION (BEZET), ECG08: 441 MS
RBC # BLD AUTO: 3.97 M/UL (ref 4.35–5.65)
RBC #/AREA URNS HPF: ABNORMAL /HPF (ref 0–5)
SODIUM SERPL-SCNC: 131 MMOL/L (ref 136–145)
SODIUM UR-SCNC: 45 MMOL/L (ref 20–110)
SP GR UR REFRACTOMETRY: 1.02 (ref 1–1.04)
TROPONIN I SERPL-MCNC: <0.02 NG/ML (ref 0–0.04)
UROBILINOGEN UR QL STRIP.AUTO: 1 EU/DL
VENTRICULAR RATE, ECG03: 119 BPM
WBC # BLD AUTO: 12.4 K/UL (ref 4.6–13.2)
WBC URNS QL MICRO: ABNORMAL /HPF (ref 0–5)

## 2021-09-09 PROCEDURE — 83605 ASSAY OF LACTIC ACID: CPT

## 2021-09-09 PROCEDURE — 87186 SC STD MICRODIL/AGAR DIL: CPT

## 2021-09-09 PROCEDURE — 80053 COMPREHEN METABOLIC PANEL: CPT

## 2021-09-09 PROCEDURE — 65270000029 HC RM PRIVATE

## 2021-09-09 PROCEDURE — 99223 1ST HOSP IP/OBS HIGH 75: CPT | Performed by: FAMILY MEDICINE

## 2021-09-09 PROCEDURE — 84484 ASSAY OF TROPONIN QUANT: CPT

## 2021-09-09 PROCEDURE — 99285 EMERGENCY DEPT VISIT HI MDM: CPT

## 2021-09-09 PROCEDURE — 87086 URINE CULTURE/COLONY COUNT: CPT

## 2021-09-09 PROCEDURE — 71045 X-RAY EXAM CHEST 1 VIEW: CPT

## 2021-09-09 PROCEDURE — 82570 ASSAY OF URINE CREATININE: CPT

## 2021-09-09 PROCEDURE — 84300 ASSAY OF URINE SODIUM: CPT

## 2021-09-09 PROCEDURE — 51702 INSERT TEMP BLADDER CATH: CPT

## 2021-09-09 PROCEDURE — 87077 CULTURE AEROBIC IDENTIFY: CPT

## 2021-09-09 PROCEDURE — 74011250636 HC RX REV CODE- 250/636: Performed by: FAMILY MEDICINE

## 2021-09-09 PROCEDURE — 85025 COMPLETE CBC W/AUTO DIFF WBC: CPT

## 2021-09-09 PROCEDURE — 74011250636 HC RX REV CODE- 250/636: Performed by: STUDENT IN AN ORGANIZED HEALTH CARE EDUCATION/TRAINING PROGRAM

## 2021-09-09 PROCEDURE — 87040 BLOOD CULTURE FOR BACTERIA: CPT

## 2021-09-09 PROCEDURE — 81001 URINALYSIS AUTO W/SCOPE: CPT

## 2021-09-09 PROCEDURE — 74011000258 HC RX REV CODE- 258: Performed by: INTERNAL MEDICINE

## 2021-09-09 PROCEDURE — 74011250636 HC RX REV CODE- 250/636: Performed by: INTERNAL MEDICINE

## 2021-09-09 PROCEDURE — 74176 CT ABD & PELVIS W/O CONTRAST: CPT

## 2021-09-09 PROCEDURE — 74011250637 HC RX REV CODE- 250/637: Performed by: FAMILY MEDICINE

## 2021-09-09 PROCEDURE — 93005 ELECTROCARDIOGRAM TRACING: CPT

## 2021-09-09 RX ORDER — ACETAMINOPHEN 650 MG/1
650 SUPPOSITORY RECTAL
Status: DISCONTINUED | OUTPATIENT
Start: 2021-09-09 | End: 2021-09-24 | Stop reason: HOSPADM

## 2021-09-09 RX ORDER — SODIUM CHLORIDE 0.9 % (FLUSH) 0.9 %
5-40 SYRINGE (ML) INJECTION AS NEEDED
Status: DISCONTINUED | OUTPATIENT
Start: 2021-09-09 | End: 2021-09-24 | Stop reason: HOSPADM

## 2021-09-09 RX ORDER — PANTOPRAZOLE SODIUM 40 MG/1
40 TABLET, DELAYED RELEASE ORAL DAILY
Status: DISCONTINUED | OUTPATIENT
Start: 2021-09-10 | End: 2021-09-24 | Stop reason: HOSPADM

## 2021-09-09 RX ORDER — SODIUM CHLORIDE 0.9 % (FLUSH) 0.9 %
5-40 SYRINGE (ML) INJECTION EVERY 8 HOURS
Status: DISCONTINUED | OUTPATIENT
Start: 2021-09-09 | End: 2021-09-24 | Stop reason: HOSPADM

## 2021-09-09 RX ORDER — ENOXAPARIN SODIUM 100 MG/ML
40 INJECTION SUBCUTANEOUS DAILY
Status: DISCONTINUED | OUTPATIENT
Start: 2021-09-10 | End: 2021-09-10

## 2021-09-09 RX ORDER — ONDANSETRON 4 MG/1
4 TABLET, ORALLY DISINTEGRATING ORAL
Status: DISCONTINUED | OUTPATIENT
Start: 2021-09-09 | End: 2021-09-24 | Stop reason: HOSPADM

## 2021-09-09 RX ORDER — ONDANSETRON 2 MG/ML
4 INJECTION INTRAMUSCULAR; INTRAVENOUS
Status: DISCONTINUED | OUTPATIENT
Start: 2021-09-09 | End: 2021-09-24 | Stop reason: HOSPADM

## 2021-09-09 RX ORDER — THERA TABS 400 MCG
1 TAB ORAL DAILY
Status: DISCONTINUED | OUTPATIENT
Start: 2021-09-10 | End: 2021-09-24 | Stop reason: HOSPADM

## 2021-09-09 RX ORDER — VANCOMYCIN 1.75 GRAM/500 ML IN 0.9 % SODIUM CHLORIDE INTRAVENOUS
1750 ONCE
Status: COMPLETED | OUTPATIENT
Start: 2021-09-09 | End: 2021-09-09

## 2021-09-09 RX ORDER — ACETAMINOPHEN 325 MG/1
650 TABLET ORAL
Status: DISCONTINUED | OUTPATIENT
Start: 2021-09-09 | End: 2021-09-24 | Stop reason: HOSPADM

## 2021-09-09 RX ORDER — POLYETHYLENE GLYCOL 3350 17 G/17G
17 POWDER, FOR SOLUTION ORAL DAILY PRN
Status: DISCONTINUED | OUTPATIENT
Start: 2021-09-09 | End: 2021-09-24 | Stop reason: HOSPADM

## 2021-09-09 RX ADMIN — Medication 10 ML: at 19:15

## 2021-09-09 RX ADMIN — PIPERACILLIN AND TAZOBACTAM 2.25 G: 2; .25 INJECTION, POWDER, LYOPHILIZED, FOR SOLUTION INTRAVENOUS at 19:14

## 2021-09-09 RX ADMIN — VANCOMYCIN HYDROCHLORIDE 1750 MG: 10 INJECTION, POWDER, LYOPHILIZED, FOR SOLUTION INTRAVENOUS at 19:14

## 2021-09-09 RX ADMIN — SODIUM CHLORIDE 1000 ML: 900 INJECTION, SOLUTION INTRAVENOUS at 19:14

## 2021-09-09 RX ADMIN — ACETAMINOPHEN 650 MG: 325 TABLET ORAL at 18:00

## 2021-09-09 NOTE — TELEPHONE ENCOUNTER
Per Dr. Kimberly Mclean, Nurse Reilly Turner was called for further explanation on ostomy change. Reilly Turner states it was not for ostomy it was for Chavez catheter and already spoke to pcp office regarding this matter. I explained to Dr Kimberly Mclean Patient's mother called and stated this is what the nurse requested. Dr Kimberly Mclean states matter has been taken care of. Encounter closed.

## 2021-09-09 NOTE — ED TRIAGE NOTES
Patient arrived by EMS for urinary retention since last night. Pt has hx of paraplegic and bed bound with indwelling urinary catheter.  Alert and oriented
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
## 2021-09-09 NOTE — PROGRESS NOTES
Reason for Renal Dosing:  Per Renal Dosing Policy    Patient clinical status and labs ordered/reviewed. Pt Weight Weight: 90.7 kg (200 lb)   Serum Creatinine Lab Results   Component Value Date/Time    Creatinine 2.26 (H) 09/09/2021 12:25 PM    Creatinine, POC 0.7 08/17/2021 07:57 AM       Creatinine Clearance Estimated Creatinine Clearance: 39.9 mL/min (A) (based on SCr of 2.26 mg/dL (H)). BUN Lab Results   Component Value Date/Time    BUN 21 (H) 09/09/2021 12:25 PM       WBC Lab Results   Component Value Date/Time    WBC 12.4 09/09/2021 12:25 PM      Temperature Temp: 99.3 °F (37.4 °C)   HR Pulse (Heart Rate): (!) 114     BP BP: 121/74           Drug type: Antibiotic indicated for UTI  Duration of therapy: 7 days    Per nephrology: Please dose all medications for approximate creatinine clearance 30-15 mL/min    Drug/dose: was adjusted to : 2.25 g every 6 hours per renal dosing policy     Continue to monitor.     Signed Brian Lopez  Date 9/9/2021  Time 6:29 PM
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
## 2021-09-09 NOTE — ED NOTES
I performed a brief history of the patient here in triage and I have determined that pt will need further treatment and evaluation from the main side ER physician or MAGALI. I have placed initial orders based on the history to help in expediting patients care.        Visit Vitals  /74   Pulse (!) 114   Temp 99.3 °F (37.4 °C)   Resp 18   Ht 6' 2\" (1.88 m)   Wt 90.7 kg (200 lb)   SpO2 100%   BMI 25.68 kg/m²      MELVINA Kovacs 12:14 PM

## 2021-09-09 NOTE — Clinical Note
Status[de-identified] INPATIENT [101]   Type of Bed: Medical [8]   Inpatient Hospitalization Certified Necessary for the Following Reasons: 3.  Patient receiving treatment that can only be provided in an inpatient setting (further clarification in H&P documentation)   Admitting Diagnosis: Acute renal failure (ARF) [facility]) [[id number]]   Admitting Physician: Lissa Terrell [[id number]]   Attending Physician: Lissa Terrell [[id number]]   Estimated Length of Stay: 3-4 Midnights   Discharge Plan[de-identified] Home with Office Follow-up
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
## 2021-09-10 ENCOUNTER — HOSPITAL ENCOUNTER (OUTPATIENT)
Dept: CT IMAGING | Age: 61
Discharge: HOME | End: 2021-09-10
Attending: PHYSICIAN ASSISTANT

## 2021-09-10 ENCOUNTER — APPOINTMENT (OUTPATIENT)
Dept: ULTRASOUND IMAGING | Age: 61
DRG: 466 | End: 2021-09-10
Attending: FAMILY MEDICINE
Payer: MEDICAID

## 2021-09-10 ENCOUNTER — APPOINTMENT (OUTPATIENT)
Dept: CT IMAGING | Age: 61
DRG: 466 | End: 2021-09-10
Attending: HOSPITALIST
Payer: MEDICAID

## 2021-09-10 LAB
ALBUMIN SERPL-MCNC: 1.4 G/DL (ref 3.4–5)
ANION GAP SERPL CALC-SCNC: 7 MMOL/L (ref 3–18)
APTT PPP: 29.5 SEC (ref 23–36.4)
BASOPHILS # BLD: 0 K/UL (ref 0–0.1)
BASOPHILS NFR BLD: 0 % (ref 0–2)
BUN SERPL-MCNC: 19 MG/DL (ref 7–18)
BUN/CREAT SERPL: 13 (ref 12–20)
CALCIUM SERPL-MCNC: 6.4 MG/DL (ref 8.5–10.1)
CHLORIDE SERPL-SCNC: 106 MMOL/L (ref 100–111)
CO2 SERPL-SCNC: 25 MMOL/L (ref 21–32)
CREAT SERPL-MCNC: 1.42 MG/DL (ref 0.6–1.3)
DIFFERENTIAL METHOD BLD: ABNORMAL
EOSINOPHIL # BLD: 0.1 K/UL (ref 0–0.4)
EOSINOPHIL NFR BLD: 1 % (ref 0–5)
ERYTHROCYTE [DISTWIDTH] IN BLOOD BY AUTOMATED COUNT: 15.5 % (ref 11.6–14.5)
GLUCOSE SERPL-MCNC: 85 MG/DL (ref 74–99)
HCT VFR BLD AUTO: 25.3 % (ref 36–48)
HGB BLD-MCNC: 8 G/DL (ref 13–16)
INR PPP: 1.4 (ref 0.8–1.2)
LYMPHOCYTES # BLD: 0 K/UL (ref 0.9–3.6)
LYMPHOCYTES NFR BLD: 0 % (ref 21–52)
MAGNESIUM SERPL-MCNC: 1.2 MG/DL (ref 1.6–2.6)
MCH RBC QN AUTO: 28 PG (ref 24–34)
MCHC RBC AUTO-ENTMCNC: 31.6 G/DL (ref 31–37)
MCV RBC AUTO: 88.5 FL (ref 78–100)
MONOCYTES # BLD: 0 K/UL (ref 0.05–1.2)
MONOCYTES NFR BLD: 0 % (ref 3–10)
NEUTS BAND NFR BLD MANUAL: 4 % (ref 0–5)
NEUTS SEG # BLD: 13.3 K/UL (ref 1.8–8)
NEUTS SEG NFR BLD: 95 % (ref 40–73)
PHOSPHATE SERPL-MCNC: 2.9 MG/DL (ref 2.5–4.9)
PLATELET # BLD AUTO: 215 K/UL (ref 135–420)
PLATELET COMMENTS,PCOM: ABNORMAL
PMV BLD AUTO: 9.5 FL (ref 9.2–11.8)
POTASSIUM SERPL-SCNC: 2.8 MMOL/L (ref 3.5–5.5)
PROTHROMBIN TIME: 17 SEC (ref 11.5–15.2)
RBC # BLD AUTO: 2.86 M/UL (ref 4.35–5.65)
RBC MORPH BLD: ABNORMAL
SODIUM SERPL-SCNC: 138 MMOL/L (ref 136–145)
VANCOMYCIN SERPL-MCNC: 12.6 UG/ML (ref 5–40)
WBC # BLD AUTO: 13.4 K/UL (ref 4.6–13.2)

## 2021-09-10 PROCEDURE — 76770 US EXAM ABDO BACK WALL COMP: CPT

## 2021-09-10 PROCEDURE — 74011250637 HC RX REV CODE- 250/637: Performed by: FAMILY MEDICINE

## 2021-09-10 PROCEDURE — 77030040392 HC DRSG OPTIFOAM MDII -A

## 2021-09-10 PROCEDURE — 77030037878 HC DRSG MEPILEX >48IN BORD MOLN -B

## 2021-09-10 PROCEDURE — 80202 ASSAY OF VANCOMYCIN: CPT

## 2021-09-10 PROCEDURE — 2709999900 HC NON-CHARGEABLE SUPPLY

## 2021-09-10 PROCEDURE — 77030041076 HC DRSG AG OPTICELL MDII -A

## 2021-09-10 PROCEDURE — 80069 RENAL FUNCTION PANEL: CPT

## 2021-09-10 PROCEDURE — 36415 COLL VENOUS BLD VENIPUNCTURE: CPT

## 2021-09-10 PROCEDURE — 65270000029 HC RM PRIVATE

## 2021-09-10 PROCEDURE — 77030040162

## 2021-09-10 PROCEDURE — 74011000258 HC RX REV CODE- 258: Performed by: INTERNAL MEDICINE

## 2021-09-10 PROCEDURE — 72192 CT PELVIS W/O DYE: CPT

## 2021-09-10 PROCEDURE — 74011250636 HC RX REV CODE- 250/636: Performed by: HOSPITALIST

## 2021-09-10 PROCEDURE — 85610 PROTHROMBIN TIME: CPT

## 2021-09-10 PROCEDURE — 99233 SBSQ HOSP IP/OBS HIGH 50: CPT | Performed by: INTERNAL MEDICINE

## 2021-09-10 PROCEDURE — 85730 THROMBOPLASTIN TIME PARTIAL: CPT

## 2021-09-10 PROCEDURE — 74011250636 HC RX REV CODE- 250/636: Performed by: INTERNAL MEDICINE

## 2021-09-10 PROCEDURE — 77030040393 HC DRSG OPTIFOAM GENT MDII -B

## 2021-09-10 PROCEDURE — 77030013076 HC PCH OST BAG COLO -A

## 2021-09-10 PROCEDURE — 74011000636 HC RX REV CODE- 636: Performed by: FAMILY MEDICINE

## 2021-09-10 PROCEDURE — 77030018836 HC SOL IRR NACL ICUM -A

## 2021-09-10 PROCEDURE — 85025 COMPLETE CBC W/AUTO DIFF WBC: CPT

## 2021-09-10 PROCEDURE — 83735 ASSAY OF MAGNESIUM: CPT

## 2021-09-10 RX ORDER — FENTANYL CITRATE 50 UG/ML
12.5-5 INJECTION, SOLUTION INTRAMUSCULAR; INTRAVENOUS
Status: CANCELLED | OUTPATIENT
Start: 2021-09-10

## 2021-09-10 RX ORDER — POTASSIUM CHLORIDE 7.45 MG/ML
10 INJECTION INTRAVENOUS
Status: DISCONTINUED | OUTPATIENT
Start: 2021-09-10 | End: 2021-09-10

## 2021-09-10 RX ORDER — MIDAZOLAM HYDROCHLORIDE 1 MG/ML
.5-2 INJECTION, SOLUTION INTRAMUSCULAR; INTRAVENOUS
Status: CANCELLED | OUTPATIENT
Start: 2021-09-10

## 2021-09-10 RX ORDER — POTASSIUM CHLORIDE 7.45 MG/ML
10 INJECTION INTRAVENOUS
Status: COMPLETED | OUTPATIENT
Start: 2021-09-10 | End: 2021-09-10

## 2021-09-10 RX ORDER — SODIUM CHLORIDE 9 MG/ML
50 INJECTION, SOLUTION INTRAVENOUS CONTINUOUS
Status: DISCONTINUED | OUTPATIENT
Start: 2021-09-10 | End: 2021-09-18

## 2021-09-10 RX ORDER — VANCOMYCIN/0.9 % SOD CHLORIDE 1.5G/250ML
1500 PLASTIC BAG, INJECTION (ML) INTRAVENOUS EVERY 24 HOURS
Status: DISCONTINUED | OUTPATIENT
Start: 2021-09-10 | End: 2021-09-11

## 2021-09-10 RX ORDER — IODIXANOL 320 MG/ML
1-50 INJECTION, SOLUTION INTRAVASCULAR
Status: COMPLETED | OUTPATIENT
Start: 2021-09-10 | End: 2021-09-10

## 2021-09-10 RX ADMIN — IODIXANOL 20 ML: 320 INJECTION, SOLUTION INTRAVASCULAR at 04:46

## 2021-09-10 RX ADMIN — POTASSIUM CHLORIDE 10 MEQ: 7.46 INJECTION, SOLUTION INTRAVENOUS at 10:12

## 2021-09-10 RX ADMIN — PIPERACILLIN AND TAZOBACTAM 2.25 G: 2; .25 INJECTION, POWDER, LYOPHILIZED, FOR SOLUTION INTRAVENOUS at 12:29

## 2021-09-10 RX ADMIN — Medication 10 ML: at 16:25

## 2021-09-10 RX ADMIN — POTASSIUM CHLORIDE 10 MEQ: 10 INJECTION, SOLUTION INTRAVENOUS at 16:23

## 2021-09-10 RX ADMIN — SODIUM CHLORIDE 75 ML/HR: 900 INJECTION, SOLUTION INTRAVENOUS at 01:05

## 2021-09-10 RX ADMIN — PIPERACILLIN AND TAZOBACTAM 2.25 G: 2; .25 INJECTION, POWDER, LYOPHILIZED, FOR SOLUTION INTRAVENOUS at 01:05

## 2021-09-10 RX ADMIN — POTASSIUM CHLORIDE 10 MEQ: 10 INJECTION, SOLUTION INTRAVENOUS at 12:29

## 2021-09-10 RX ADMIN — POTASSIUM CHLORIDE 10 MEQ: 10 INJECTION, SOLUTION INTRAVENOUS at 14:04

## 2021-09-10 RX ADMIN — POTASSIUM CHLORIDE 10 MEQ: 10 INJECTION, SOLUTION INTRAVENOUS at 15:04

## 2021-09-10 RX ADMIN — POTASSIUM CHLORIDE 10 MEQ: 10 INJECTION, SOLUTION INTRAVENOUS at 17:36

## 2021-09-10 RX ADMIN — Medication 10 ML: at 00:39

## 2021-09-10 RX ADMIN — PIPERACILLIN AND TAZOBACTAM 2.25 G: 2; .25 INJECTION, POWDER, LYOPHILIZED, FOR SOLUTION INTRAVENOUS at 10:12

## 2021-09-10 NOTE — PROGRESS NOTES
Pharmacy Dosing Services: Vancomycin    Indication: Bloodstream Infection and Urinary Tract Infection    Day of therapy: 2    Other Antimicrobials (Include dose, start day & day of therapy):  Piperacillin/Tazobactam (Zosyn)    Loading dose (date given): 1750 mg  Current Maintenance dose: 1500 mg IV every 24 hours    Goal Vancomycin Level: Vancomycin Trough: 15 - 20 mcg/mL (most infections)    Vancomycin Level (if drawn):   Recent Labs     09/10/21  1040   VANCR 12.6       Significant Cultures:   Results       Procedure Component Value Units Date/Time    CULTURE, BLOOD [[id number]] Collected: 21    Order Status: Completed Specimen: Blood Updated: 09/10/21 09     Special Requests: NO SPECIAL REQUESTS        GRAM STAIN       ANAEROBIC BOTTLE GRAM NEGATIVE RODS                  SMEAR CALLED TO AND CORRECTLY REPEATED BY: MARY Little, [address] [place] [address], [facility] TO [facility]           Culture result:       CULTURE IN PROGRESS,FURTHER UPDATES TO FOLLOW                  Sent to [facility] for ID/Susceptibility if indicated. CULTURE, URINE [[id number]] Collected: 21    Order Status: Completed Specimen: Cath Urine Updated: 09/10/21 0432    CULTURE, BLOOD [[id number]] Collected: 21    Order Status: Completed Specimen: Blood Updated: 09/10/21 1220     Special Requests: NO SPECIAL REQUESTS        GRAM STAIN       AEROBIC AND ANAEROBIC BOTTLES GRAM NEGATIVE RODS                  SMEAR CALLED TO AND CORRECTLY REPEATED BY: MARY Little RN, 5S, 0062 9/10/21 TO [facility]           Culture result:       CULTURE IN PROGRESS,FURTHER UPDATES TO FOLLOW                  Weight 87.6 kg (193 lb 1.6 oz)  Recent Labs     09/10/21  0305 21  1225   CREA 1.42* 2.26*   BUN 19* 21*   WBC 13.4* 12.4     Temp (72hrs), Av.1 °F (37.3 °C), Min:97.1 °F (36.2 °C), Max:101.2 °F (38.4 °C)    Estimated Creatinine Clearance Estimated Creatinine Clearance: 63.5 mL/min (A) (based on SCr of 1.42 mg/dL (H)).   Estimated Creatinine Clearance (using IBW): 63.5 mL/min       CAPD, Hemodialysis or Renal Replacement Therapy: N/A    Renal function stable? (unstable defined as SCr increase of 0.5 mg/dL or > 50% increase from baseline, whichever is greater) (Y/N): N    Ins[facility] RX patient specific PK predictions:  Exposure target: AUC24 (range)400-600 mg/L.hr   AUC24,ss: 543 mg/L.hr  Probability of AUC24 > 400: 97 %  Ctrough,ss: 14.2 mg/L  Probability of Ctrough,ss > 20: 8 %  Probability of nephrotoxicity (Lodise ROGELIO 2009): 9 %      Regimen assessment:   - patient received 1 LD of vancomycin 1750 mg. Random level ~ 15.5 hours later 12. 6. er nephrology wants medications dose at CrCl 15-30 due to ЕКАТЕРИНА following preperioneal bladder rupture/abscess which resulted from malfunctioning lee. Will give patient vancomycin 1500 mg IV q24h. Will order trough for tomorrow 3 hours prior to dose to allow for time to adjust dose if too high. - renal function improved today, but will remain conservative in increasing doses to protect renal function. - GNR in blood, ID pending. Urine cultures pending. WBC elevated. Now afebrile. Maintenance dose: 1500 mg IV every 24 hours  Next scheduled level: 09/11/21 @ 1500       Pharmacy will follow daily and adjust medications as appropriate for renal function and/or serum levels.     Thank you,  Yazmin Silva, PHARMD

## 2021-09-10 NOTE — PROGRESS NOTES
9/10/2021  9:22 AM    Critical Result Notification    Received and verbally repeated the following test results Blood Culture, positive for gram negative rods from the anaerobic sample of one set drawn on 9/9/21 from kelton Aguilera (NAME OF TECHNICIAN) on 9/10/21 (DATE), at [phone] (TIME).  was notified via VFA Message     Additional comments:    Annmarie Bains RN      12:22 PM  Notified by lab Juan Grigsby) of critical value for positive blood culture - 2/2 bottles from second set, both aerobic and anaerobic positive for gram negative rods. Notified primary RN, Hair Hummel and notified Dr. Wagner Will via [employer].
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
## 2021-09-10 NOTE — PROGRESS NOTES
Reason for Readmission:    Acute renal failure (ARF) (HCC) [N17.9]  ЕКАТЕРИНА (acute kidney injury) ([facility] [address] [N17.9]         RUR Score and Risk Level:      22 and moderate     Level of Readmission:    1   (1-3)   (1 - First Readmission, 2- Second Readmission within 30 days or multiple admissions over previous 90 days, 3- Greater than two readmissions within 30 days or multiple admissions over previous 90 days)      Care Conference scheduled:   (consider for all patient's with readmission level of 2, should definitely be scheduled for all patients with readmission level of 3)       Resources/supports as identified by patient/family:  mom       Top Challenges facing patient (as identified by patient/family and CM): Finances/Medication cost?     The [employer] transport  Support system or lack thereof? supportive  Living arrangements? With mom   Self-care/ADLs/Cognition? Needs assist. Has Personal care 7 days a week. [facility]         Current Advanced Directive/Advance Care Plan:        Healthcare Decision Maker:     Click here to complete [address] including selection of the Healthcare Decision Maker Relationship (ie \"Primary\")           Plan for utilizing home health:   yes. Likelihood of additional readmission:                Transition of Care Plan:    Based on readmission, the patient's previous Plan of Care   has been evaluated and/or modified. The current Transition of Care Plan is:            Initial assessment completed with patient. Cognitive status of patient: oriented to time, place, person and situation. Face sheet information confirmed:  yes. The patient designates mom  to participate in his discharge plan and to receive any needed information. This patient lives in a single family home with mother. Patient is not able to navigate steps as needed.   Prior to hospitalization, patient was considered to be independent with ADLs/IADLS Brandin Vaughn no . If not independent,  patient needs assist with : dressing, bathing, food preparation, cooking, toileting and grooming    Patient has a current ACP document on file: yes  The patient and other:  Medical transport will be available to transport patient home upon discharge. The patient already has Electric W/C, and hospital bed, ostomy supplies, lee medical equipment available in the home. Patient is currently active with home health. If active, agency name is [facility]. Patient has not stayed in a skilled nursing facility or rehab. Was  stay within last 60 days : no. This patient is on dialysis :no        List of available Home Health agencies were provided and reviewed with the patient prior to discharge. Freedom of choice signed: yes, for [facility]. Currently, the discharge plan is Home with [address] [place] [address] [facility]. The patient states that he can obtain his medications from the pharmacy, and take his medications as directed. Patient's current insurance is [employer] . Care Management Interventions  PCP Verified by CM:  Yes  Mode of Transport at Discharge: BLS  Transition of Care Consult (CM Consult): [address]: No  Reason Outside [place]: Patient already serviced by other home care/hospice agency  Support Systems: Other Family Member(s)  Confirm Follow Up Transport: Other (see comment)  The Plan for Transition of Care is Related to the Following Treatment Goals : home health and resume personal care  Discharge Location  Discharge Placement: Home with home health    Readmission Assessment  Number of days since last admission?: 8-30 days  Previous disposition: Home with Home Health  Who is being interviewed?: Patient  What was the patient's/caregiver's perception as to why they think they needed to return back to the hospital?: Other (Comment)  Did you visit your Primary Care Physician after you left the hospital, before you returned this time?: Yes  Did you see a specialist, such as Cardiac, Pulmonary, Orthopedic Physician, etc. after you left the hospital?: Yes  Who advised the patient to return to the hospital?: Self-referral  Does the patient report anything that got in the way of taking their medications?: No  In our efforts to provide the best possible care to you and others like you, can you think of anything that we could have done to help you after you left the hospital the first time, so that you might not have needed to return so soon?: Other (Comment)    Pipo Trujillo RN BSN  Outcomes Manager    Pager # [phone]

## 2021-09-10 NOTE — PROGRESS NOTES
Shift Progress Note:  Assumed care of patient in bed from emergency room, VSS, down to radiology for ct cystogram. Tolerated satisfactorily. Ostomy draining small amounts of liquid contents. Chavez remains to gravity, call bell within reach. Sacral wound packed with wet nss gauze and covered with mepilex, mepilex placed on lower back with mepilex on heels for pervention. Bilatal gauze placed on feet for wounds noted on great toes and lateral area of feet. Call bell within reach.   Patient Vitals for the past 12 hrs:   Temp Pulse Resp BP SpO2   09/10/21 0402 97.1 °F (36.2 °C) 95 16 115/69 99 %   09/10/21 0033 98.4 °F (36.9 °C) 92 18 104/69 100 %   09/09/21 2149     100 %   09/09/21 2139 (!) 100.6 °F (38.1 °C) (!) 107 18 (!) 96/48 99 %

## 2021-09-10 NOTE — WOUND CARE
Physical Exam  Musculoskeletal:        Legs:         Focused assessment   Patient received lying in bed, on [place] bed. A & O x 4, he reports pain to his IV site in his left hand that resolved after his potassium infusion completed. His ostomy pouch is leaking from the bag/wafer junction. Old appliance removed and site cleaned with water and wash cloths. Skin prep applied followed by barrier ring stretched and molded to fit. Wafer cut to fit then applied to patient, followed by pouch. Patient held for 2-3 minutes to ensure adherence. Stoma is pink, budded and moist.  Hien-stomal skin is clean/dry/intact. Stool is COLOR light brown, CONSISTENCY thin, AMOUNT unable to assess due to leakage. He is turned onto his right side, he is able to assist with turning his upper body and able to assist with maintaining his position somewhat. Upon turning, clear gel-like mucus pours from his rectum. This is cleaned prior to wound care. POA sacral pressure injury, stage 4. 9.5 x 13 x 2cm with 3cm undermining from 5-9 o'clock. Base is fully granulating, moderate serosanguinous drainage. Edges epithelialized. POA unstageable pressure injury over right iliac crest. It does not communicate to larger sacral wound. 4 x 5 2cm. Slough covered but unroofing. Minimal serous drainage. Hien-wound skin is hyperpigmented. POA healed pressure injury right upper back. 0.5x0.5cm hypopigmented skin. No drainage. POA left great toe traumatic injury. 1x1cm. Skin appears avulsed but adhered. This is left in place. No drainage noted. Heel intact. POA wound of unknown origin, suspected trauma to right great toe. 3x2cm, eschar covered with some granulation noted around edges. Minimal sanguinous drainage noted. Toe appears hyperpigmented. Heel intact. Topical treatment protocol in place as follows:   Heel suspension boots x2. Turn and position every two hours.   Clean wound to sacrum and right iliac crest with wound spray then pat dry. Apply Opticell Ag to wound beds and cover with silicone dressing. Change every 2 days and prn soilage or dislodgement. Paint bilateral great toes with betadine and leave open to air. Please consult podiatry. Care discussed with primary nurse, Jasmin Self RN. Care turned over to nursing staff at this time. Loretta Renteria RN, BSN, [facility]

## 2021-09-10 NOTE — PROGRESS NOTES
New OT orders received and chart reviewed. Unable to see pt for OT evaluation at this time due to: Attempt x1- Pt is off Unit  Attempt x2- Pt is refusing to participate in spite of Max encouragement and education. Will follow up later as pt's schedule allows.  Thank you for this referral.    Leigh Cespedes MS, OTR/L

## 2021-09-10 NOTE — ROUTINE PROCESS
Bedside and verbal shift change report given to Damon Kong by Haile Rankin RN.  Report included the following information:  -procedure summary  -MAR  -Recent Results  -Med Rec Status  -SBAR

## 2021-09-10 NOTE — CONSULTS
Consult Note    Patient: Lisa Cedillo               Sex: male          DOA: 9/9/2021       YOB: 1960      Age:  64 y.o.        LOS:  LOS: 1 day              Referring Provider: Cr Quiroga     ASSESSMENT:   1. Spontaneous preperitoneal bladder rupture with resultant periumbilical abscess   2. Neurogenic bladder secondary to SCI T9 from [facility] managed with chronic lee    3. End colostomy     Abdominal exam reassuring, no need for xlap  Tiny pinhole and pre-peritoneal, does not appear intraperitoneal, would favor conservative management     Will plan for lee drainage  Will ask IR to place large bore SP tube +/- drain/aspirate collection       Mary Perales MD  [phone] Office  [phone]  Pager    Chief Complaint   Patient presents with    Urinary Retention       HISTORY OF PRESENT ILLNESS:  Lisa Cedillo is a 64 y.o. male  With neurogenic bladder secondary T11 spinal cord injury from [facility] managed with chronic lee presented with poorly draining catheter. Catheter exchanged and return 1400mL. Has some mild abdominal discomfort but otherwise asymptomatic. Denies fever. Denies vomiting. Colostomy functional.      Location: Bladder  Onset: Several days ago  Duration: 1 day  Associated symptoms: No urine output     No flowsheet data found.     Past Medical History:   Diagnosis Date    Asthma     Hypertension     Ill-defined condition     Neurogenic Bladder, s/p T11 injury    Paralysis ([address] 2017    waist down,  [facility]       Past Surgical History:   Procedure Laterality Date    COLONOSCOPY N/A 8/6/2021    COLONOSCOPY performed by Tonia Atwood MD at [facility] surgery    HX OTHER SURGICAL  2017    spinal surgery    HX OTHER SURGICAL  2017    Liver repair from [address] OTHER SURGICAL  04/2021    Decubitus Debridement    HX OTHER SURGICAL  04/29/2021    Robotic colostomy formation and Debridement of stage IV decubitus ulcer all the way to the bone    HX OTHER SURGICAL  05/03/2021    Exploratory laparotomy with small-bowel resection with primary anastomosis and Partial colectomy with revision of the colostomy       Social History     Tobacco Use    Smoking status: Never Smoker    Smokeless tobacco: Never Used   Vaping Use    Vaping Use: Never used   Substance Use Topics    Alcohol use: No    Drug use: Never       No Known Allergies    History reviewed. No pertinent family history.     Current Facility-Administered Medications   Medication Dose Route Frequency Provider Last Rate Last Admin    0.9% sodium chloride infusion  75 mL/hr IntraVENous CONTINUOUS Jose Spivey MD 75 mL/hr at 09/10/21 0105 75 mL/hr at 09/10/21 0105    potassium chloride 10 mEq in 100 ml IVPB  10 mEq IntraVENous Q1H Jose Spivey  mL/hr at 09/10/21 1012 10 mEq at 09/10/21 1012    sodium chloride (NS) flush 5-40 mL  5-40 mL IntraVENous Q8H Karolina Speaker, DO   10 mL at 09/10/21 0039    sodium chloride (NS) flush 5-40 mL  5-40 mL IntraVENous PRN Karolina Speaker, DO        acetaminophen (TYLENOL) tablet 650 mg  650 mg Oral Q6H PRN Karolina Van, DO   650 mg at 09/09/21 1800    Or    acetaminophen (TYLENOL) suppository 650 mg  650 mg Rectal Q6H PRN Karolina Speaker, DO        polyethylene glycol (MIRALAX) packet 17 g  17 g Oral DAILY PRN Karolina Speaker, DO        ondansetron (ZOFRAN ODT) tablet 4 mg  4 mg Oral Q8H PRN Karolina Van, DO        Or    [facility] injection 4 mg  4 mg IntraVENous Q6H PRN Karolina Speaker, DO        pantoprazole (PROTONIX) tablet 40 mg  40 mg Oral DAILY Karolina Speaker, DO        [Held by provider] therapeutic multivitamin [facility]) tablet 1 Tablet  1 Tablet Oral DAILY Karolina Speaker,         piperacillin-tazobactam (ZOSYN) 2.25 g in 0.9% sodium chloride (MBP/ADV) 50 mL MBP  2.25 g IntraVENous Q6H Riley Lozada  mL/hr at 09/10/21 1012 2.25 g at 09/10/21 1012    VANCOMYCIN: PHARMACY TO DOSE   Other Rx Dosing/Monitoring Delories Pro, DO        Vancomycin Lab Information: Random Level Due at 0800 on 9/10/21  1 Each Other ONCE Delories Pro, DO           Review of Systems  Constitutional: No fever, chills, or weight loss  Respiratory: No dyspnea  Cardiovascular: No chest pain  Gastrointestinal: +abdominal pain   Genitourinary: Poorly draining catheter previously, now draining well . Neurological: No focal motor changes. PHYSICAL EXAMINATION:   Visit Vitals  /71   Pulse (!) 106   Temp 98.7 °F (37.1 °C)   Resp 18   Ht 6' 2\" (1.88 m)   Wt 193 lb 1.6 oz (87.6 kg)   SpO2 100%   BMI 24.79 kg/m²     Constitutional: Well developed, well nourished male. No acute distress. HEENT: Normocephalic, Atraumatic, EOM's intact   CV:  Normal radial pulse. Respiratory: No respiratory distress or difficulties breathing   Abdomen:  Mild tenderneses michel=umbilical. Colostomy functional.     Male:  No CVA tenderness  PENIS: Urethral meatus normal in location and size. No urethral discharge. Chavez in place draining cloudy urine. Skin: No evidence of jaundice. Normal color  Neuro/Psych:  Alert and oriented. Affect appropriate. Lymphatic:   No enlarged inguinal lymph nodes. REVIEW OF LABS AND IMAGING:      Labs: Results:   Chemistry    Recent Labs     09/10/21  0305 09/09/21  1225   GLU 85 109*    131*   K 2.8* 5.1    99*   CO2 25 24   BUN 19* 21*   CREA 1.42* 2.26*   CA 6.4* 8.0*   AGAP 7 8   BUCR 13 9*   AP  --  93   TP  --  9.3*   ALB 1.4* 2.0*   GLOB  --  7.3*   AGRAT  --  0.3*      CBC w/Diff Recent Labs     09/10/21  0305 09/09/21  1225   WBC 13.4* 12.4   RBC 2.86* 3.97*   HGB 8.0* 11.0*   HCT 25.3* 34.9*    273   GRANS 95* 81*   LYMPH 0* 13*   EOS 1 0      Cultures Recent Labs     09/09/21  1915 09/09/21  1900   CULT CULTURE IN PROGRESS,FURTHER UPDATES TO FOLLOW  Sent to [facility] for ID/Susceptibility if indicated.  NO GROWTH AFTER 10 HOURS     All Micro Results     Procedure Component Value Units Date/Time    CULTURE, BLOOD [[id number]] Collected: 09/09/21 1915    Order Status: Completed Specimen: Blood Updated: 09/10/21 0923     Special Requests: NO SPECIAL REQUESTS        GRAM STAIN       ANAEROBIC BOTTLE GRAM NEGATIVE RODS                  SMEAR CALLED TO AND CORRECTLY REPEATED BY: MARY Cortze, [facility], [facility] President  9/10/21 TO [facility]           Culture result:       CULTURE IN PROGRESS,FURTHER UPDATES TO FOLLOW                  [facility] ID/Susceptibility if indicated.           CULTURE, BLOOD [[id number]] Collected: 09/09/21 1900    Order Status: Completed Specimen: Blood Updated: 09/10/21 0751     Special Requests: NO SPECIAL REQUESTS        Culture result: NO GROWTH AFTER 10 HOURS       CULTURE, URINE [[id number]] Collected: 09/09/21 1900    Order Status: Completed Specimen: Cath Urine Updated: 09/10/21 0432            Urinalysis Color   Date Value Ref Range Status   09/09/2021 DARK YELLOW   Final     Appearance   Date Value Ref Range Status   09/09/2021 TURBID  Final     Specific gravity   Date Value Ref Range Status   09/09/2021 1.019 1.003 - 1.040   Final     pH (UA)   Date Value Ref Range Status   09/09/2021 5.5   Final     Protein   Date Value Ref Range Status   09/09/2021 >300 mg/dL Final     Ketone   Date Value Ref Range Status   09/09/2021 Negative mg/dL Final     Bilirubin   Date Value Ref Range Status   09/09/2021 SMALL   Final     Blood   Date Value Ref Range Status   09/09/2021 LARGE   Final     Urobilinogen   Date Value Ref Range Status   09/09/2021 1.0 EU/dL Final     Nitrites   Date Value Ref Range Status   09/09/2021 Negative   Final     Leukocyte Esterase   Date Value Ref Range Status   09/09/2021 LARGE   Final     Potassium   Date Value Ref Range Status   09/10/2021 2.8 (LL) 3.5 - 5.5 mmol/L Final     Comment:     CALLED TO AND CORRECTLY REPEATED BY:   SAFIA CHEW, 5S, 0658,09/10/2021, NJE       Creatinine   Date Value Ref Range Status 09/10/2021 1.42 (H) 0.6 - 1.3 MG/DL Final     Comment:     INVESTIGATED PER DELTA CHECK PROTOCOL     BUN   Date Value Ref Range Status   09/10/2021 19 (H) 7.0 - 18 MG/DL Final      PSA No results for input(s): PSA in the last 72 hours. Coagulation Lab Results   Component Value Date/Time    Prothrombin time 14.3 08/16/2021 02:20 AM    Prothrombin time 13.9 08/15/2021 11:35 AM    INR 1.1 08/16/2021 02:20 AM    INR 1.1 08/15/2021 11:35 AM    aPTT 33.5 08/14/2021 03:00 AM    aPTT 31.8 08/13/2021 05:45 PM           US Results (most recent):  Results from Hospital Encounter encounter on 04/29/21    US RETROPERITONEUM COMP    Narrative  EXAMINATION: Ultrasound retroperitoneum    INDICATION: Acute renal injury    COMPARISON: CT 4/7/2021    TECHNIQUE: Grayscale and color sonographic images of the retroperitoneum  obtained. FINDINGS:    Right kidney: 10.9 cm length. Normal echotexture. No hydronephrosis. No focal  abnormality. Bladder: Incompletely distended with Chavez catheter without obvious focal  abnormality. Left kidney: There is visualization due to position or roughly 11.8 cm length. Normal appearing architecture. No obvious hydronephrosis or focal abnormality. Miscellaneous: 3.6 x 7.5 x 5.2 cm complex collection with septations and  echogenic material above the bladder. 1.3 x 1.5 cm nodule above the left kidney,  likely accessory spleen. Impression  Complex fluid collection above the bladder, nonspecific. Abscess or hematoma not  excluded. From an imaging standpoint, contrast enhanced CT may be useful. Kidneys unremarkable although limited visualization of the left kidney. chest    CT Results (most recent):   Results from Hospital Encounter encounter on 09/09/21    CT CYSTOGRAM    Narrative  EXAM: CT CYSTOGRAM    HISTORY: Concern for bladder wall perforation.  History of neurogenic bladder  status post T11 injury, paraplegia, asthma and hypertension presenting with  decreased urine output. COMPARISON: Noncontrast CT earlier in the day    TECHNIQUE: Contrast was injected into the urinary bladder by the Chavez catheter. Axial imaging through the pelvis with coronal and sagittal reformats and    FINDINGS: Extraluminal contrast extravasation at the left bladder dome, best  appreciated on sagittal image 50, with a tract of contrast extending into the  fluid and gas collection along the midline ventral abdominal wall. There is also  a small pocket of contrast dissecting through the lower ventral bladder wall  where there is a pocket of gas extending outside the wall. Impression  1. Confirmation of urinary bladder wall perforation at the left bladder dome  where there is extraluminal contrast extravasation ventral to the bladder and  extending into the fluid collection in the abdominal wall. 2.  Possible second focus of perforation along the lower ventral bladder wall. ABD      MRI Results (most recent):  No valid procedures specified. 1. Acute renal failure, unspecified acute renal failure type ([address]    2. Decreased urine output    3.  Paraplegia ([address]

## 2021-09-10 NOTE — PROGRESS NOTES
I was paged by [facility] to inform of the CT abdomen pelvis without contrast.  There is concern for bladder wall perforation per radiology and newly developed phlegmon/abscess along the ventral abdominal wall along with infraumbilical incision. We will consult urology. Chart reviewed, will make patient n.p.o., start on gentle hydration, hold Lovenox. Continue IV Abx   We will continue to monitor. Spoke with urology , will order CT cystogram stat. Will continue to monitor , Keep NPO     Discussed results of CT cystogram with Dr. Carlos Corona. Dr. Nura Sams will follow up with patient today. We will continue to monitor.
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
## 2021-09-10 NOTE — ED NOTES
TRANSFER - OUT REPORT:    Verbal report given to Hoda(name) on Janie Manzano  being transferred to [address] [place] [address](unit) for routine progression of care       Report consisted of patients Situation, Background, Assessment and   Recommendations(SBAR). Information from the following report(s) SBAR, Kardex, ED Summary, Procedure Summary, Intake/Output, MAR, Recent Results, Cardiac Rhythm Sinus Tachy, Alarm Parameters  and Quality Measures was reviewed with the receiving nurse. Lines:   Peripheral IV 09/09/21 Left Hand (Active)   Site Assessment Clean, dry, & intact 09/09/21 1225   Phlebitis Assessment 0 09/09/21 1225   Infiltration Assessment 0 09/09/21 1225   Dressing Status Clean, dry, & intact 09/09/21 1225   Dressing Type Transparent 09/09/21 1225   Hub Color/Line Status Blue;Flushed;Patent 09/09/21 1225   Action Taken Blood drawn 09/09/21 1225   Alcohol Cap Used No 09/09/21 1225       Peripheral IV 09/09/21 Right Antecubital (Active)   Site Assessment Clean, dry, & intact 09/09/21 2040   Phlebitis Assessment 0 09/09/21 2040   Infiltration Assessment 0 09/09/21 2040   Dressing Status Clean, dry, & intact 09/09/21 2040   Dressing Type Transparent 09/09/21 2040   Hub Color/Line Status Pink 09/09/21 2040        Opportunity for questions and clarification was provided.       Patient transported with:   [employer]

## 2021-09-10 NOTE — PROGRESS NOTES
Problem: Risk for Spread of Infection  Goal: Prevent transmission of infectious organism to others  Description: Prevent the transmission of infectious organisms to other patients, staff members, and visitors. Outcome: Progressing Towards Goal     Problem: Patient Education:  Go to Education Activity  Goal: Patient/Family Education  Outcome: Progressing Towards Goal     Problem: Pressure Injury - Risk of  Goal: *Prevention of pressure injury  Description: Document Jordin Scale and appropriate interventions in the flowsheet. Outcome: Progressing Towards Goal  Note: Pressure Injury Interventions:  Sensory Interventions: Keep linens dry and wrinkle-free, Minimize linen layers    Moisture Interventions: Absorbent underpads, Minimize layers    Activity Interventions: Pressure redistribution bed/mattress(bed type)    Mobility Interventions: HOB 30 degrees or less, Pressure redistribution bed/mattress (bed type)    Nutrition Interventions: Document food/fluid/supplement intake    Friction and Shear Interventions: Foam dressings/transparent film/skin sealants, Minimize layers                Problem: Patient Education: Go to Patient Education Activity  Goal: Patient/Family Education  Outcome: Progressing Towards Goal     Problem: Falls - Risk of  Goal: *Absence of Falls  Description: Document Carisa Fall Risk and appropriate interventions in the flowsheet.   Outcome: Progressing Towards Goal  Note: Fall Risk Interventions:            Medication Interventions: Bed/chair exit alarm    Elimination Interventions: Call light in reach              Problem: Patient Education: Go to Patient Education Activity  Goal: Patient/Family Education  Outcome: Progressing Towards Goal     Problem: Nutrition Deficit  Goal: *Optimize nutritional status  Outcome: Progressing Towards Goal

## 2021-09-10 NOTE — PROGRESS NOTES
Interventional Radiology     Consult received from Dr. Omayra Alba for evaluation of perforated bladder. Case and images reviewed by Dr. Luciano Yuan. Will plan for image-guided abdominal fluid collection aspiration v drainage of pre peritoneal fluid collection under CT scanner guidance with moderate sedation as IR schedule allows this afternoon v next week. Suprapubic catheter placement under CT scanner guidance planned for this afternoon as schedule allows due to chronic lee catheter with bladder perforation. Patient to remain NPO after midnight with any blood thinning medications held prior to procedure. Discussed with Dr. Paresh Boles and Dr. Omayra Alba. Full consult note to follow.       Elisha Cuellar, [address] [place], [place] [address]

## 2021-09-10 NOTE — ED NOTES
Called to CT and updated CT Tech pt will be transported to room 519 after CT for routine progression of care to [address]

## 2021-09-10 NOTE — PROGRESS NOTES
Interventional Radiology    Unfortunately due to CT shut down this afternoon, the patient will be rescheduled for suprapubic catheter placement next week as schedule allows. Small abdominal fluid collection can be addressed at this time as well. OK to resume diet from IR perspective.     Thank you,  Piter Mercer, [address]

## 2021-09-10 NOTE — PROGRESS NOTES
PT orders received and chart reviewed. PT eval attempted at 21 , pt off floor. Will follow up. 2nd attempt at 0910, pt asleep but arousable. Pt reporting it was too early. Pt politely declining to participate at this time. Will continue to follow.      Thank you for this referral.   David Gibbons PT DPT
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
## 2021-09-11 LAB
ALBUMIN SERPL-MCNC: 1.5 G/DL (ref 3.4–5)
ANION GAP SERPL CALC-SCNC: 7 MMOL/L (ref 3–18)
BUN SERPL-MCNC: 13 MG/DL (ref 7–18)
BUN/CREAT SERPL: 16 (ref 12–20)
CA-I SERPL-SCNC: 1.09 MMOL/L (ref 1.15–1.33)
CALCIUM SERPL-MCNC: 7.2 MG/DL (ref 8.5–10.1)
CHLORIDE SERPL-SCNC: 108 MMOL/L (ref 100–111)
CO2 SERPL-SCNC: 25 MMOL/L (ref 21–32)
CREAT SERPL-MCNC: 0.8 MG/DL (ref 0.6–1.3)
DATE LAST DOSE: NORMAL
ERYTHROCYTE [DISTWIDTH] IN BLOOD BY AUTOMATED COUNT: 15.6 % (ref 11.6–14.5)
GLUCOSE SERPL-MCNC: 102 MG/DL (ref 74–99)
HCT VFR BLD AUTO: 24.8 % (ref 36–48)
HGB BLD-MCNC: 7.8 G/DL (ref 13–16)
MAGNESIUM SERPL-MCNC: 1.4 MG/DL (ref 1.6–2.6)
MCH RBC QN AUTO: 27.6 PG (ref 24–34)
MCHC RBC AUTO-ENTMCNC: 31.5 G/DL (ref 31–37)
MCV RBC AUTO: 87.6 FL (ref 78–100)
PHOSPHATE SERPL-MCNC: 2.2 MG/DL (ref 2.5–4.9)
PLATELET # BLD AUTO: 297 K/UL (ref 135–420)
PMV BLD AUTO: 9.8 FL (ref 9.2–11.8)
POTASSIUM SERPL-SCNC: 2.6 MMOL/L (ref 3.5–5.5)
RBC # BLD AUTO: 2.83 M/UL (ref 4.35–5.65)
REPORTED DOSE,DOSE: NORMAL UNITS
REPORTED DOSE/TIME,TMG: 1800
SODIUM SERPL-SCNC: 140 MMOL/L (ref 136–145)
VANCOMYCIN TROUGH SERPL-MCNC: 13.6 UG/ML (ref 10–20)
WBC # BLD AUTO: 21.8 K/UL (ref 4.6–13.2)

## 2021-09-11 PROCEDURE — 74011250637 HC RX REV CODE- 250/637: Performed by: FAMILY MEDICINE

## 2021-09-11 PROCEDURE — 99233 SBSQ HOSP IP/OBS HIGH 50: CPT | Performed by: INTERNAL MEDICINE

## 2021-09-11 PROCEDURE — 36415 COLL VENOUS BLD VENIPUNCTURE: CPT

## 2021-09-11 PROCEDURE — 65270000029 HC RM PRIVATE

## 2021-09-11 PROCEDURE — 97530 THERAPEUTIC ACTIVITIES: CPT

## 2021-09-11 PROCEDURE — 97161 PT EVAL LOW COMPLEX 20 MIN: CPT

## 2021-09-11 PROCEDURE — 74011000258 HC RX REV CODE- 258: Performed by: INTERNAL MEDICINE

## 2021-09-11 PROCEDURE — 82330 ASSAY OF CALCIUM: CPT

## 2021-09-11 PROCEDURE — 83735 ASSAY OF MAGNESIUM: CPT

## 2021-09-11 PROCEDURE — 85027 COMPLETE CBC AUTOMATED: CPT

## 2021-09-11 PROCEDURE — 80202 ASSAY OF VANCOMYCIN: CPT

## 2021-09-11 PROCEDURE — 80069 RENAL FUNCTION PANEL: CPT

## 2021-09-11 PROCEDURE — 74011250637 HC RX REV CODE- 250/637: Performed by: INTERNAL MEDICINE

## 2021-09-11 PROCEDURE — 74011250636 HC RX REV CODE- 250/636: Performed by: INTERNAL MEDICINE

## 2021-09-11 PROCEDURE — 2709999900 HC NON-CHARGEABLE SUPPLY

## 2021-09-11 PROCEDURE — 74011250636 HC RX REV CODE- 250/636: Performed by: HOSPITALIST

## 2021-09-11 PROCEDURE — 97110 THERAPEUTIC EXERCISES: CPT

## 2021-09-11 RX ORDER — VANCOMYCIN HYDROCHLORIDE
1250 EVERY 12 HOURS
Status: DISCONTINUED | OUTPATIENT
Start: 2021-09-11 | End: 2021-09-12

## 2021-09-11 RX ORDER — POTASSIUM CHLORIDE 20 MEQ/1
40 TABLET, EXTENDED RELEASE ORAL 3 TIMES DAILY
Status: DISCONTINUED | OUTPATIENT
Start: 2021-09-11 | End: 2021-09-13

## 2021-09-11 RX ORDER — POTASSIUM CHLORIDE 7.45 MG/ML
10 INJECTION INTRAVENOUS
Status: COMPLETED | OUTPATIENT
Start: 2021-09-11 | End: 2021-09-11

## 2021-09-11 RX ADMIN — SODIUM CHLORIDE 75 ML/HR: 900 INJECTION, SOLUTION INTRAVENOUS at 09:47

## 2021-09-11 RX ADMIN — POTASSIUM CHLORIDE 10 MEQ: 7.46 INJECTION, SOLUTION INTRAVENOUS at 18:43

## 2021-09-11 RX ADMIN — PIPERACILLIN AND TAZOBACTAM 2.25 G: 2; .25 INJECTION, POWDER, LYOPHILIZED, FOR SOLUTION INTRAVENOUS at 19:59

## 2021-09-11 RX ADMIN — POTASSIUM CHLORIDE 10 MEQ: 7.46 INJECTION, SOLUTION INTRAVENOUS at 22:42

## 2021-09-11 RX ADMIN — PIPERACILLIN AND TAZOBACTAM 2.25 G: 2; .25 INJECTION, POWDER, LYOPHILIZED, FOR SOLUTION INTRAVENOUS at 06:51

## 2021-09-11 RX ADMIN — PIPERACILLIN AND TAZOBACTAM 2.25 G: 2; .25 INJECTION, POWDER, LYOPHILIZED, FOR SOLUTION INTRAVENOUS at 00:50

## 2021-09-11 RX ADMIN — Medication 10 ML: at 21:47

## 2021-09-11 RX ADMIN — PIPERACILLIN AND TAZOBACTAM 2.25 G: 2; .25 INJECTION, POWDER, LYOPHILIZED, FOR SOLUTION INTRAVENOUS at 13:27

## 2021-09-11 RX ADMIN — POTASSIUM CHLORIDE 10 MEQ: 7.46 INJECTION, SOLUTION INTRAVENOUS at 20:34

## 2021-09-11 RX ADMIN — PANTOPRAZOLE SODIUM 40 MG: 40 TABLET, DELAYED RELEASE ORAL at 09:48

## 2021-09-11 RX ADMIN — Medication 10 ML: at 18:14

## 2021-09-11 RX ADMIN — POTASSIUM CHLORIDE 40 MEQ: 1500 TABLET, EXTENDED RELEASE ORAL at 21:47

## 2021-09-11 RX ADMIN — POTASSIUM CHLORIDE 10 MEQ: 7.46 INJECTION, SOLUTION INTRAVENOUS at 21:45

## 2021-09-11 RX ADMIN — Medication 10 ML: at 06:54

## 2021-09-11 RX ADMIN — VANCOMYCIN HYDROCHLORIDE 1500 MG: 10 INJECTION, POWDER, LYOPHILIZED, FOR SOLUTION INTRAVENOUS at 00:55
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
## 2021-09-11 NOTE — PROGRESS NOTES
Problem: Risk for Spread of Infection  Goal: Prevent transmission of infectious organism to others  Description: Prevent the transmission of infectious organisms to other patients, staff members, and visitors. Outcome: Progressing Towards Goal     Problem: Patient Education:  Go to Education Activity  Goal: Patient/Family Education  Outcome: Progressing Towards Goal     Problem: Pressure Injury - Risk of  Goal: *Prevention of pressure injury  Description: Document Jordin Scale and appropriate interventions in the flowsheet. Outcome: Progressing Towards Goal  Note: Pressure Injury Interventions:  Sensory Interventions: Float heels, Keep linens dry and wrinkle-free, Minimize linen layers    Moisture Interventions: Absorbent underpads, Minimize layers    Activity Interventions: Pressure redistribution bed/mattress(bed type)    Mobility Interventions: Pressure redistribution bed/mattress (bed type)    Nutrition Interventions: Document food/fluid/supplement intake    Friction and Shear Interventions: Minimize layers, Foam dressings/transparent film/skin sealants                Problem: Patient Education: Go to Patient Education Activity  Goal: Patient/Family Education  Outcome: Progressing Towards Goal     Problem: Falls - Risk of  Goal: *Absence of Falls  Description: Document Carisa Fall Risk and appropriate interventions in the flowsheet.   Outcome: Progressing Towards Goal  Note: Fall Risk Interventions:            Medication Interventions: Patient to call before getting OOB, Teach patient to arise slowly, Bed/chair exit alarm    Elimination Interventions: Call light in reach, Patient to call for help with toileting needs              Problem: Patient Education: Go to Patient Education Activity  Goal: Patient/Family Education  Outcome: Progressing Towards Goal     Problem: Nutrition Deficit  Goal: *Optimize nutritional status  Outcome: Progressing Towards Goal
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
## 2021-09-12 LAB
ALBUMIN SERPL-MCNC: 1.4 G/DL (ref 3.4–5)
ANION GAP SERPL CALC-SCNC: 4 MMOL/L (ref 3–18)
ANION GAP SERPL CALC-SCNC: 5 MMOL/L (ref 3–18)
BACTERIA SPEC CULT: ABNORMAL
BUN SERPL-MCNC: 8 MG/DL (ref 7–18)
BUN SERPL-MCNC: 9 MG/DL (ref 7–18)
BUN/CREAT SERPL: 11 (ref 12–20)
BUN/CREAT SERPL: 13 (ref 12–20)
CALCIUM SERPL-MCNC: 7.1 MG/DL (ref 8.5–10.1)
CALCIUM SERPL-MCNC: 7.2 MG/DL (ref 8.5–10.1)
CC UR VC: ABNORMAL
CHLORIDE SERPL-SCNC: 111 MMOL/L (ref 100–111)
CHLORIDE SERPL-SCNC: 111 MMOL/L (ref 100–111)
CO2 SERPL-SCNC: 25 MMOL/L (ref 21–32)
CO2 SERPL-SCNC: 26 MMOL/L (ref 21–32)
CREAT SERPL-MCNC: 0.68 MG/DL (ref 0.6–1.3)
CREAT SERPL-MCNC: 0.72 MG/DL (ref 0.6–1.3)
ERYTHROCYTE [DISTWIDTH] IN BLOOD BY AUTOMATED COUNT: 15.8 % (ref 11.6–14.5)
ERYTHROCYTE [DISTWIDTH] IN BLOOD BY AUTOMATED COUNT: 15.8 % (ref 11.6–14.5)
GLUCOSE SERPL-MCNC: 117 MG/DL (ref 74–99)
GLUCOSE SERPL-MCNC: 124 MG/DL (ref 74–99)
GRAM STN SPEC: ABNORMAL
HCT VFR BLD AUTO: 23.4 % (ref 36–48)
HCT VFR BLD AUTO: 24.9 % (ref 36–48)
HGB BLD-MCNC: 7.6 G/DL (ref 13–16)
HGB BLD-MCNC: 8 G/DL (ref 13–16)
MCH RBC QN AUTO: 28.6 PG (ref 24–34)
MCH RBC QN AUTO: 28.7 PG (ref 24–34)
MCHC RBC AUTO-ENTMCNC: 32.1 G/DL (ref 31–37)
MCHC RBC AUTO-ENTMCNC: 32.5 G/DL (ref 31–37)
MCV RBC AUTO: 88.3 FL (ref 78–100)
MCV RBC AUTO: 88.9 FL (ref 78–100)
PHOSPHATE SERPL-MCNC: 1.8 MG/DL (ref 2.5–4.9)
PLATELET # BLD AUTO: 309 K/UL (ref 135–420)
PLATELET # BLD AUTO: 329 K/UL (ref 135–420)
PMV BLD AUTO: 10 FL (ref 9.2–11.8)
PMV BLD AUTO: 9.8 FL (ref 9.2–11.8)
POTASSIUM SERPL-SCNC: 2.8 MMOL/L (ref 3.5–5.5)
POTASSIUM SERPL-SCNC: 3.4 MMOL/L (ref 3.5–5.5)
RBC # BLD AUTO: 2.65 M/UL (ref 4.35–5.65)
RBC # BLD AUTO: 2.8 M/UL (ref 4.35–5.65)
SERVICE CMNT-IMP: ABNORMAL
SODIUM SERPL-SCNC: 141 MMOL/L (ref 136–145)
SODIUM SERPL-SCNC: 141 MMOL/L (ref 136–145)
WBC # BLD AUTO: 18.8 K/UL (ref 4.6–13.2)
WBC # BLD AUTO: 19 K/UL (ref 4.6–13.2)

## 2021-09-12 PROCEDURE — 74011250637 HC RX REV CODE- 250/637: Performed by: FAMILY MEDICINE

## 2021-09-12 PROCEDURE — 2709999900 HC NON-CHARGEABLE SUPPLY

## 2021-09-12 PROCEDURE — 74011250637 HC RX REV CODE- 250/637: Performed by: INTERNAL MEDICINE

## 2021-09-12 PROCEDURE — 36415 COLL VENOUS BLD VENIPUNCTURE: CPT

## 2021-09-12 PROCEDURE — 74011000258 HC RX REV CODE- 258: Performed by: INTERNAL MEDICINE

## 2021-09-12 PROCEDURE — 65270000029 HC RM PRIVATE

## 2021-09-12 PROCEDURE — 74011250636 HC RX REV CODE- 250/636: Performed by: HOSPITALIST

## 2021-09-12 PROCEDURE — 74011250636 HC RX REV CODE- 250/636: Performed by: INTERNAL MEDICINE

## 2021-09-12 PROCEDURE — 80048 BASIC METABOLIC PNL TOTAL CA: CPT

## 2021-09-12 PROCEDURE — 99233 SBSQ HOSP IP/OBS HIGH 50: CPT | Performed by: INTERNAL MEDICINE

## 2021-09-12 PROCEDURE — 85027 COMPLETE CBC AUTOMATED: CPT

## 2021-09-12 PROCEDURE — 80069 RENAL FUNCTION PANEL: CPT

## 2021-09-12 PROCEDURE — 74011000250 HC RX REV CODE- 250: Performed by: INTERNAL MEDICINE

## 2021-09-12 PROCEDURE — 77030041076 HC DRSG AG OPTICELL MDII -A

## 2021-09-12 PROCEDURE — 87040 BLOOD CULTURE FOR BACTERIA: CPT

## 2021-09-12 RX ORDER — POTASSIUM CHLORIDE 7.45 MG/ML
10 INJECTION INTRAVENOUS
Status: COMPLETED | OUTPATIENT
Start: 2021-09-12 | End: 2021-09-12

## 2021-09-12 RX ADMIN — PIPERACILLIN AND TAZOBACTAM 2.25 G: 2; .25 INJECTION, POWDER, LYOPHILIZED, FOR SOLUTION INTRAVENOUS at 06:28

## 2021-09-12 RX ADMIN — Medication 10 ML: at 21:28

## 2021-09-12 RX ADMIN — WATER 1 G: 1 INJECTION INTRAMUSCULAR; INTRAVENOUS; SUBCUTANEOUS at 11:30

## 2021-09-12 RX ADMIN — SODIUM CHLORIDE 75 ML/HR: 900 INJECTION, SOLUTION INTRAVENOUS at 11:30

## 2021-09-12 RX ADMIN — POTASSIUM CHLORIDE 10 MEQ: 7.46 INJECTION, SOLUTION INTRAVENOUS at 15:53

## 2021-09-12 RX ADMIN — Medication 10 ML: at 15:26

## 2021-09-12 RX ADMIN — POTASSIUM CHLORIDE 10 MEQ: 7.46 INJECTION, SOLUTION INTRAVENOUS at 14:16

## 2021-09-12 RX ADMIN — PIPERACILLIN AND TAZOBACTAM 2.25 G: 2; .25 INJECTION, POWDER, LYOPHILIZED, FOR SOLUTION INTRAVENOUS at 02:07

## 2021-09-12 RX ADMIN — POTASSIUM CHLORIDE 10 MEQ: 7.46 INJECTION, SOLUTION INTRAVENOUS at 13:13

## 2021-09-12 RX ADMIN — POTASSIUM CHLORIDE 10 MEQ: 7.46 INJECTION, SOLUTION INTRAVENOUS at 15:24

## 2021-09-12 RX ADMIN — POTASSIUM CHLORIDE 40 MEQ: 1500 TABLET, EXTENDED RELEASE ORAL at 21:26

## 2021-09-12 RX ADMIN — POTASSIUM CHLORIDE 10 MEQ: 7.46 INJECTION, SOLUTION INTRAVENOUS at 16:51

## 2021-09-12 RX ADMIN — VANCOMYCIN HYDROCHLORIDE 1250 MG: 10 INJECTION, POWDER, LYOPHILIZED, FOR SOLUTION INTRAVENOUS at 00:17

## 2021-09-12 RX ADMIN — POTASSIUM CHLORIDE 40 MEQ: 1500 TABLET, EXTENDED RELEASE ORAL at 10:26

## 2021-09-12 RX ADMIN — POTASSIUM CHLORIDE 10 MEQ: 7.46 INJECTION, SOLUTION INTRAVENOUS at 11:30

## 2021-09-12 RX ADMIN — PANTOPRAZOLE SODIUM 40 MG: 40 TABLET, DELAYED RELEASE ORAL at 10:26

## 2021-09-12 RX ADMIN — POTASSIUM CHLORIDE 40 MEQ: 1500 TABLET, EXTENDED RELEASE ORAL at 15:26
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
## 2021-09-12 NOTE — ROUTINE PROCESS
Bedside and verbal shift change report given to Pat Bryant RN by Devante Crowley RN.  Report included the following information:  -procedure summary  -MAR  -Recent Results  -Med Rec Statu  -SBAR

## 2021-09-12 NOTE — PROGRESS NOTES
1910 Received Report from [address]    Bedside and Verbal shift change report given to Inocente Howell 44 (oncoming nurse) by William Tran RN (offgoing nurse). Report included the following information SBAR, Kardex, Intake/Output and MAR.

## 2021-09-12 NOTE — ROUTINE PROCESS
9/12/2021  Bedside and Verbal shift change report given to Sister KEYON (oncoming nurse) by Erwin Gonzalez RN (offgoing nurse). Report included the following information SBAR, Kardex, ED Summary, Procedure Summary, Intake/Output, MAR, Accordion, Recent Results and Med Rec Status.

## 2021-09-13 ENCOUNTER — APPOINTMENT (OUTPATIENT)
Dept: CT IMAGING | Age: 61
DRG: 466 | End: 2021-09-13
Attending: PHYSICIAN ASSISTANT
Payer: MEDICAID

## 2021-09-13 PROCEDURE — 0W9F3ZZ DRAINAGE OF ABDOMINAL WALL, PERCUTANEOUS APPROACH: ICD-10-PCS | Performed by: RADIOLOGY

## 2021-09-13 PROCEDURE — 99232 SBSQ HOSP IP/OBS MODERATE 35: CPT | Performed by: EMERGENCY MEDICINE

## 2021-09-13 PROCEDURE — 74011250636 HC RX REV CODE- 250/636: Performed by: INTERNAL MEDICINE

## 2021-09-13 PROCEDURE — 97166 OT EVAL MOD COMPLEX 45 MIN: CPT

## 2021-09-13 PROCEDURE — 77012 CT SCAN FOR NEEDLE BIOPSY: CPT

## 2021-09-13 PROCEDURE — 87077 CULTURE AEROBIC IDENTIFY: CPT

## 2021-09-13 PROCEDURE — 87205 SMEAR GRAM STAIN: CPT

## 2021-09-13 PROCEDURE — 74011000250 HC RX REV CODE- 250: Performed by: INTERNAL MEDICINE

## 2021-09-13 PROCEDURE — 87186 SC STD MICRODIL/AGAR DIL: CPT

## 2021-09-13 PROCEDURE — 65270000029 HC RM PRIVATE

## 2021-09-13 PROCEDURE — 77030003472

## 2021-09-13 PROCEDURE — 74011250636 HC RX REV CODE- 250/636: Performed by: RADIOLOGY

## 2021-09-13 PROCEDURE — 74011250636 HC RX REV CODE- 250/636: Performed by: HOSPITALIST

## 2021-09-13 RX ORDER — FENTANYL CITRATE 50 UG/ML
12.5-5 INJECTION, SOLUTION INTRAMUSCULAR; INTRAVENOUS
Status: DISPENSED | OUTPATIENT
Start: 2021-09-13 | End: 2021-09-13

## 2021-09-13 RX ORDER — POTASSIUM CHLORIDE 7.45 MG/ML
10 INJECTION INTRAVENOUS
Status: COMPLETED | OUTPATIENT
Start: 2021-09-13 | End: 2021-09-13

## 2021-09-13 RX ORDER — ONDANSETRON 2 MG/ML
4 INJECTION INTRAMUSCULAR; INTRAVENOUS
Status: ACTIVE | OUTPATIENT
Start: 2021-09-13 | End: 2021-09-14

## 2021-09-13 RX ORDER — FLUMAZENIL 0.1 MG/ML
0.2 INJECTION INTRAVENOUS AS NEEDED
Status: ACTIVE | OUTPATIENT
Start: 2021-09-13 | End: 2021-09-13

## 2021-09-13 RX ORDER — NALOXONE HYDROCHLORIDE 0.4 MG/ML
0.4 INJECTION, SOLUTION INTRAMUSCULAR; INTRAVENOUS; SUBCUTANEOUS AS NEEDED
Status: ACTIVE | OUTPATIENT
Start: 2021-09-13 | End: 2021-09-13

## 2021-09-13 RX ORDER — POTASSIUM CHLORIDE 14.9 MG/ML
10 INJECTION INTRAVENOUS ONCE
Status: DISCONTINUED | OUTPATIENT
Start: 2021-09-13 | End: 2021-09-13

## 2021-09-13 RX ORDER — MIDAZOLAM HYDROCHLORIDE 1 MG/ML
.5-2 INJECTION, SOLUTION INTRAMUSCULAR; INTRAVENOUS
Status: DISPENSED | OUTPATIENT
Start: 2021-09-13 | End: 2021-09-13

## 2021-09-13 RX ADMIN — POTASSIUM CHLORIDE 10 MEQ: 7.46 INJECTION, SOLUTION INTRAVENOUS at 12:12

## 2021-09-13 RX ADMIN — FENTANYL CITRATE 50 MCG: 50 INJECTION INTRAMUSCULAR; INTRAVENOUS at 14:20

## 2021-09-13 RX ADMIN — WATER 1 G: 1 INJECTION INTRAMUSCULAR; INTRAVENOUS; SUBCUTANEOUS at 12:11

## 2021-09-13 RX ADMIN — Medication 10 ML: at 15:28

## 2021-09-13 RX ADMIN — SODIUM CHLORIDE 75 ML/HR: 900 INJECTION, SOLUTION INTRAVENOUS at 06:35

## 2021-09-13 RX ADMIN — MIDAZOLAM 1 MG: 1 INJECTION INTRAMUSCULAR; INTRAVENOUS at 14:20

## 2021-09-13 RX ADMIN — MIDAZOLAM 1 MG: 1 INJECTION INTRAMUSCULAR; INTRAVENOUS at 14:25

## 2021-09-13 RX ADMIN — POTASSIUM CHLORIDE 10 MEQ: 7.46 INJECTION, SOLUTION INTRAVENOUS at 15:27

## 2021-09-13 RX ADMIN — FENTANYL CITRATE 50 MCG: 50 INJECTION INTRAMUSCULAR; INTRAVENOUS at 14:25

## 2021-09-13 RX ADMIN — Medication 10 ML: at 21:21

## 2021-09-13 NOTE — PROGRESS NOTES
Problem: Risk for Spread of Infection  Goal: Prevent transmission of infectious organism to others  Description: Prevent the transmission of infectious organisms to other patients, staff members, and visitors. Outcome: Progressing Towards Goal     Problem: Patient Education:  Go to Education Activity  Goal: Patient/Family Education  Outcome: Progressing Towards Goal     Problem: Pressure Injury - Risk of  Goal: *Prevention of pressure injury  Description: Document Jordin Scale and appropriate interventions in the flowsheet. Outcome: Progressing Towards Goal  Note: Pressure Injury Interventions:  Sensory Interventions: Assess changes in LOC    Moisture Interventions: Absorbent underpads    Activity Interventions: Increase time out of bed, Pressure redistribution bed/mattress(bed type), PT/OT evaluation    Mobility Interventions: Pressure redistribution bed/mattress (bed type), HOB 30 degrees or less    Nutrition Interventions: Document food/fluid/supplement intake    Friction and Shear Interventions: Minimize layers                Problem: Patient Education: Go to Patient Education Activity  Goal: Patient/Family Education  Outcome: Progressing Towards Goal     Problem: Falls - Risk of  Goal: *Absence of Falls  Description: Document Carisa Fall Risk and appropriate interventions in the flowsheet.   Outcome: Progressing Towards Goal  Note: Fall Risk Interventions:            Medication Interventions: Assess postural VS orthostatic hypotension    Elimination Interventions: Call light in reach              Problem: Patient Education: Go to Patient Education Activity  Goal: Patient/Family Education  Outcome: Progressing Towards Goal     Problem: Nutrition Deficit  Goal: *Optimize nutritional status  Outcome: Progressing Towards Goal     Problem: Patient Education: Go to Patient Education Activity  Goal: Patient/Family Education  Outcome: Progressing Towards Goal     Problem: Patient Education: Go to Patient Education Activity  Goal: Patient/Family Education  Outcome: Progressing Towards Goal   Pt is cooperative with staff and plan of care. Keeping pt clean and dry, monitoring wounds.

## 2021-09-13 NOTE — PROGRESS NOTES
Nutrition Note    Pt off floor again today during visit. NPO since this morning for CT guided suprapubic catheter placement. 26-50% of dinner meal consumed on 9/11 per nursing documentation, otherwise meal intake unknown. Loose stools via ostomy, remains on IVF and K replaced. Slow progress towards nutritional goals. Nutrition Recommendations/Plan:  - Diet resumption, IVF & electrolyte replacement per MD.  - Recommend starting Ensure Enlive, TID once diet resumed.     Electronically signed by Michelle Garcia RD on 9/13/2021 at 3:38 PM    Contact: [phone]
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
## 2021-09-13 NOTE — ROUTINE PROCESS
TRANSFER - OUT REPORT:    Verbal report given to [facility] on Judith Monet  being transferred to [address] [place] [address] for routine progression of care       Report consisted of patients Situation, Background, Assessment and   Recommendations(SBAR). Information from the following report(s) SBAR, Procedure Summary, Intake/Output, MAR and Recent Results was reviewed with the receiving nurse. Lines:   Peripheral IV 09/09/21 Left Hand (Active)   Site Assessment Clean, dry, & intact 09/12/21 2012   Phlebitis Assessment 0 09/12/21 2012   Infiltration Assessment 0 09/12/21 2012   Dressing Status Clean, dry, & intact 09/12/21 2012   Dressing Type Tape;Transparent 09/12/21 2012   Hub Color/Line Status Blue;Capped 09/12/21 2012   Action Taken Open ports on tubing capped 09/12/21 2012   Alcohol Cap Used Yes 09/12/21 2012       Peripheral IV 09/09/21 Right Antecubital (Active)   Site Assessment Clean, dry, & intact 09/12/21 2012   Phlebitis Assessment 0 09/12/21 2012   Infiltration Assessment 0 09/12/21 2012   Dressing Status Clean, dry, & intact 09/12/21 2012   Dressing Type Tape;Transparent 09/12/21 2012   Hub Color/Line Status Pink; Infusing 09/12/21 2012   Action Taken Open ports on tubing capped 09/12/21 2012   Alcohol Cap Used Yes 09/12/21 2012        Opportunity for questions and clarification was provided.       Patient transported with:   Registered Nurse

## 2021-09-13 NOTE — PROGRESS NOTES
Bedside and Verbal shift change report given to [place] Kevin (oncoming nurse) by Lauren Lopez RN (offgoing nurse). Report included the following information SBAR, Kardex, Intake/Output and MAR.

## 2021-09-13 NOTE — PROGRESS NOTES
Interventional Radiology    Patient seen in follow up for neurogenic bladder. CT guided suprapubic catheter placement planned for this afternoon as schedule allows  Please maintain NPO prior to procedure except meds with sips of water    Anterior abdominal fluid collection appears too small to aspirated based off CT images 9/10/21 per IR Dr. Iris Garibay. We will reassess fluid collection during procedure  CT.     Thank you,  Meghan Vasquez, [address]

## 2021-09-13 NOTE — PROGRESS NOTES
In Patient Progress note      Admit Date: 9/9/2021     Impression:     1. ЕКАТЕРИНА. improved ,good uo. Continue ivf.   2. Hypokalemia. replace   3. HTN, stable  4. Spontaneous preperitoneal bladder rupture with resultant periumbilical abscess. On abx. Urology recommendations noted. 5. Ecoli sepsis /UTI  6. Hyponatremia resolved    Plan:  1) continue IVF  2) follow ID recs   3) replace K and follow electrolytes     Please call with questions,    Emmett Lincoln MD [facility]  Cell [phone]  Pager: [phone]       Subjective:     - No acute over night events. - respiratory - stable  - hemodynamics - stable  - UOP- good  - Nutrition -ok    Objective:     Visit Vitals  BP (!) 148/87   Pulse 88   Temp 98.5 °F (36.9 °C)   Resp 20   Ht 6' 2\" (1.88 m)   Wt 95.4 kg (210 lb 6.4 oz)   SpO2 100%   BMI 27.01 kg/m²         Intake/Output Summary (Last 24 hours) at 9/13/2021 1646  Last data filed at 9/13/2021 1007  Gross per 24 hour   Intake    Output 975 ml   Net -975 ml       Physical Exam:     General: NAD, alert and oriented. Neck: No jvd. LUNGS: Clear to Auscultation, No rales, rhonchi or wheezes. CVS EXM: S1, S2  RRR, no murmurs/gallops/rubs. Abdomen: soft, non tender. Chavez. Lower Extremities: 1+ edema.        Data Review:    Recent Labs     09/12/21  1543   WBC 18.8*   RBC 2.80*   HCT 24.9*   MCV 88.9   MCH 28.6   MCHC 32.1   RDW 15.8*     Recent Labs     09/12/21  1543 09/12/21  0819 09/11/21  1550   BUN 8 9 13   CREA 0.72 0.68 0.80   CA 7.1* 7.2* 7.2*   ALB  --  1.4* 1.5*   K 3.4* 2.8* 2.6*    141 140    111 108   CO2 26 25 25   PHOS  --  1.8* 2.2*   * 117* 102*       Irving Quan MD
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
## 2021-09-13 NOTE — PROGRESS NOTES
Discharge/Transition Planning    Plan remains home to mothers and [facility] with personal Touch and personal care

## 2021-09-13 NOTE — PROGRESS NOTES
Preprocedure Assessment      Today 9/13/2021     Indication/Symptoms:   Sherrie Guzmán is a 64 y.o. with history of neurogenic bladder and chronic lee catheter with bladder perforation, here for a suprapubic catheter placement and abdominal fluid collection evaluation / possible aspiration with moderate sedation. The H & P and/or progress notes and any available imaging were reviewed. The risks, indications and possible alternatives to the procedure, including doing nothing, were discussed and informed consent was obtained. Physical Exam:      Heart:   RRR   Lungs:   Normal work of breathing    The patient is an appropriate candidate to undergo the planned procedure and sedation.     MELVINA Staton

## 2021-09-14 LAB
ERYTHROCYTE [DISTWIDTH] IN BLOOD BY AUTOMATED COUNT: 15.7 % (ref 11.6–14.5)
HCT VFR BLD AUTO: 26.3 % (ref 36–48)
HGB BLD-MCNC: 8.1 G/DL (ref 13–16)
MCH RBC QN AUTO: 27.8 PG (ref 24–34)
MCHC RBC AUTO-ENTMCNC: 30.8 G/DL (ref 31–37)
MCV RBC AUTO: 90.4 FL (ref 78–100)
PLATELET # BLD AUTO: 375 K/UL (ref 135–420)
PMV BLD AUTO: 9.6 FL (ref 9.2–11.8)
RBC # BLD AUTO: 2.91 M/UL (ref 4.35–5.65)
WBC # BLD AUTO: 15 K/UL (ref 4.6–13.2)

## 2021-09-14 PROCEDURE — 99232 SBSQ HOSP IP/OBS MODERATE 35: CPT | Performed by: EMERGENCY MEDICINE

## 2021-09-14 PROCEDURE — 2709999900 HC NON-CHARGEABLE SUPPLY

## 2021-09-14 PROCEDURE — 74011000250 HC RX REV CODE- 250: Performed by: INTERNAL MEDICINE

## 2021-09-14 PROCEDURE — 74011250636 HC RX REV CODE- 250/636: Performed by: INTERNAL MEDICINE

## 2021-09-14 PROCEDURE — 92610 EVALUATE SWALLOWING FUNCTION: CPT

## 2021-09-14 PROCEDURE — 65270000029 HC RM PRIVATE

## 2021-09-14 PROCEDURE — 36415 COLL VENOUS BLD VENIPUNCTURE: CPT

## 2021-09-14 PROCEDURE — 99222 1ST HOSP IP/OBS MODERATE 55: CPT | Performed by: NURSE PRACTITIONER

## 2021-09-14 PROCEDURE — 92526 ORAL FUNCTION THERAPY: CPT

## 2021-09-14 PROCEDURE — 77030013076 HC PCH OST BAG COLO -A

## 2021-09-14 PROCEDURE — 77030040392 HC DRSG OPTIFOAM MDII -A

## 2021-09-14 PROCEDURE — 74011250637 HC RX REV CODE- 250/637: Performed by: FAMILY MEDICINE

## 2021-09-14 PROCEDURE — 85027 COMPLETE CBC AUTOMATED: CPT

## 2021-09-14 RX ADMIN — Medication 10 ML: at 14:48

## 2021-09-14 RX ADMIN — PANTOPRAZOLE SODIUM 40 MG: 40 TABLET, DELAYED RELEASE ORAL at 09:57

## 2021-09-14 RX ADMIN — Medication 10 ML: at 21:14

## 2021-09-14 RX ADMIN — Medication 10 ML: at 05:12

## 2021-09-14 RX ADMIN — WATER 1 G: 1 INJECTION INTRAMUSCULAR; INTRAVENOUS; SUBCUTANEOUS at 11:20

## 2021-09-14 RX ADMIN — ACETAMINOPHEN 650 MG: 325 TABLET ORAL at 20:51
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
## 2021-09-14 NOTE — PROGRESS NOTES
I called and discussed with RN that morning labs have still not been done.   RN stated she is calling the lab
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
## 2021-09-14 NOTE — PROGRESS NOTES
New OT order received and chart reviewed. Patient evaluated and discharged from skilled OT caseload this am.  Please see full note for details. Will acknowledge and complete the order.   Thank you for the referral.  Maximus Johnson MS OTR/L
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
## 2021-09-14 NOTE — ROUTINE PROCESS
Bedside shift change report given to deborah good (oncoming nurse) by Audra Tinoco (offgoing nurse). Report included the following information SBAR and Kardex.

## 2021-09-14 NOTE — PROGRESS NOTES
Problem: Risk for Spread of Infection  Goal: Prevent transmission of infectious organism to others  Description: Prevent the transmission of infectious organisms to other patients, staff members, and visitors. Outcome: Progressing Towards Goal     Problem: Patient Education:  Go to Education Activity  Goal: Patient/Family Education  Outcome: Progressing Towards Goal     Problem: Pressure Injury - Risk of  Goal: *Prevention of pressure injury  Description: Document Jordin Scale and appropriate interventions in the flowsheet. Outcome: Progressing Towards Goal  Note: Pressure Injury Interventions:  Sensory Interventions: Assess changes in LOC, Avoid rigorous massage over bony prominences, Check visual cues for pain, Discuss PT/OT consult with provider, Keep linens dry and wrinkle-free    Moisture Interventions: Absorbent underpads, Assess need for specialty bed, Check for incontinence Q2 hours and as needed, Contain wound drainage, Internal/External urinary devices    Activity Interventions: Assess need for specialty bed, Increase time out of bed, Pressure redistribution bed/mattress(bed type), PT/OT evaluation    Mobility Interventions: Assess need for specialty bed, HOB 30 degrees or less, Pressure redistribution bed/mattress (bed type), PT/OT evaluation    Nutrition Interventions: Document food/fluid/supplement intake, Offer support with meals,snacks and hydration    Friction and Shear Interventions: Apply protective barrier, creams and emollients, HOB 30 degrees or less                Problem: Patient Education: Go to Patient Education Activity  Goal: Patient/Family Education  Outcome: Progressing Towards Goal     Problem: Falls - Risk of  Goal: *Absence of Falls  Description: Document Carisa Fall Risk and appropriate interventions in the flowsheet.   Outcome: Progressing Towards Goal  Note: Fall Risk Interventions:            Medication Interventions: Assess postural VS orthostatic hypotension, Evaluate medications/consider consulting pharmacy, Patient to call before getting OOB, Teach patient to arise slowly    Elimination Interventions: Call light in reach, Elevated toilet seat, Patient to call for help with toileting needs, Toilet paper/wipes in reach, Toileting schedule/hourly rounds              Problem: Patient Education: Go to Patient Education Activity  Goal: Patient/Family Education  Outcome: Progressing Towards Goal     Problem: Nutrition Deficit  Goal: *Optimize nutritional status  Outcome: Progressing Towards Goal     Problem: Patient Education: Go to Patient Education Activity  Goal: Patient/Family Education  Outcome: Progressing Towards Goal     Problem: Patient Education: Go to Patient Education Activity  Goal: Patient/Family Education  Outcome: Progressing Towards Goal     Problem: Patient Education: Go to Patient Education Activity  Goal: Patient/Family Education  Outcome: Progressing Towards Goal   Pt is cooperative with plan of care, wound care completed and pt being repositioned Q2. Will continue to assist with care need and ensure pt is comfortable, clean and dry.

## 2021-09-14 NOTE — PROGRESS NOTES
Problem: Risk for Spread of Infection  Goal: Prevent transmission of infectious organism to others  Description: Prevent the transmission of infectious organisms to other patients, staff members, and visitors. Outcome: Progressing Towards Goal     Problem: Patient Education:  Go to Education Activity  Goal: Patient/Family Education  Outcome: Progressing Towards Goal     Problem: Pressure Injury - Risk of  Goal: *Prevention of pressure injury  Description: Document Jordin Scale and appropriate interventions in the flowsheet. Outcome: Progressing Towards Goal  Note: Pressure Injury Interventions:  Sensory Interventions: Assess changes in LOC, Avoid rigorous massage over bony prominences, Check visual cues for pain, Discuss PT/OT consult with provider, Keep linens dry and wrinkle-free    Moisture Interventions: Absorbent underpads, Assess need for specialty bed, Check for incontinence Q2 hours and as needed, Contain wound drainage, Internal/External urinary devices    Activity Interventions: Assess need for specialty bed, Increase time out of bed, Pressure redistribution bed/mattress(bed type), PT/OT evaluation    Mobility Interventions: Assess need for specialty bed, HOB 30 degrees or less, Pressure redistribution bed/mattress (bed type), PT/OT evaluation    Nutrition Interventions: Document food/fluid/supplement intake, Offer support with meals,snacks and hydration    Friction and Shear Interventions: Apply protective barrier, creams and emollients, HOB 30 degrees or less                Problem: Patient Education: Go to Patient Education Activity  Goal: Patient/Family Education  Outcome: Progressing Towards Goal     Problem: Falls - Risk of  Goal: *Absence of Falls  Description: Document Carisa Fall Risk and appropriate interventions in the flowsheet.   Outcome: Progressing Towards Goal  Note: Fall Risk Interventions:            Medication Interventions: Assess postural VS orthostatic hypotension, Evaluate medications/consider consulting pharmacy, Patient to call before getting OOB, Teach patient to arise slowly    Elimination Interventions: Call light in reach, Elevated toilet seat, Patient to call for help with toileting needs, Toilet paper/wipes in reach, Toileting schedule/hourly rounds              Problem: Patient Education: Go to Patient Education Activity  Goal: Patient/Family Education  Outcome: Progressing Towards Goal     Problem: Nutrition Deficit  Goal: *Optimize nutritional status  Outcome: Progressing Towards Goal     Problem: Patient Education: Go to Patient Education Activity  Goal: Patient/Family Education  Outcome: Progressing Towards Goal     Problem: Patient Education: Go to Patient Education Activity  Goal: Patient/Family Education  Outcome: Progressing Towards Goal
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
## 2021-09-14 NOTE — PROGRESS NOTES
New PT orders received and chart reviewed. Evaluated and discharge on 9/11/2021; patient at baseline mobility level and has necessary equipment and assistance at home. Plan is to return home at discharge. Skilled PT intervention not indicated.

## 2021-09-14 NOTE — PROGRESS NOTES
Nutrition Assessment     Type and Reason for Visit: Reassess, Wound    Nutrition Recommendations/Plan:  - Remove diabetic diet restriction and add Ensure Enlive, TID.  - Monitor and encourage po intake as tolerated, update food preferences. - IVF per MD.    Nutrition Assessment:  Pt sleeping soundly during visit today. Spoke with pts mother at bedside today who reported pt with good appetite and meal intake PTA, with UBW around 230#. Food preferences obtained and pt consuming Boost supplements PTA at least 1x daily. Per nursing pt with poor po intake today of hospital meals, snacks noted at bedside. Malnutrition Assessment:  Malnutrition Status: Mild malnutrition  Context: Chronic illness  Findings of clinical characteristics of malnutrition:   Energy Intake:  No significant decrease in energy intake (per pt's mother)  Weight Loss:  7.00 - Greater than 10% over 6 months     Body Fat Loss:  Unable to assess,     Muscle Mass Loss:  Unable to assess,    Fluid Accumulation:  Unable to assess,     Strength:  Not performed     Estimated Daily Nutrient Needs:  Energy (kcal):  2370-9984  Protein (g):  104-131       Fluid (ml/day):  2179-8706    Nutrition Related Findings:  Loose stool via colostomy. NS at 75 mL/hr. Andrey Feliciano held. SLP signed off 9/14      Current Nutrition Therapies:  ADULT DIET Regular; 5 carb choices (75 gm/meal);  Low Fat/Low Chol/High Fiber/2 gm Na    Anthropometric Measures:  · Height:  6' 2\" (188 cm)  · Current Body Wt:  95.3 kg (210 lb)  · BMI: 27    Nutrition Diagnosis:   · Increased nutrient needs related to increased demand for energy/nutrients as evidenced by wounds    · Inadequate energy intake related to  (appetite and food preferences) as evidenced by weight loss, intake 0-25%    Nutrition Intervention:  Food and/or Nutrient Delivery: Modify current diet, Start oral nutrition supplement, IV fluid delivery  Nutrition Education and Counseling: No recommendations at this time  Coordination of Nutrition Care: Continue to monitor while inpatient    Goals:  PO nutrition intake will meet >75% of patient estimated nutritional needs within the next 7 days.        Nutrition Monitoring and Evaluation:   Behavioral-Environmental Outcomes: None identified  Food/Nutrient Intake Outcomes: Diet advancement/tolerance, Food and nutrient intake, Supplement intake, Vitamin/mineral intake, IVF intake  Physical Signs/Symptoms Outcomes: Biochemical data, GI status, Meal time behavior, Nutrition focused physical findings    Discharge Planning:    Continue oral nutrition supplement, Continue current diet     Electronically signed by Kwasi Velasco RD on 9/14/2021 at 3:28 PM    Contact Number: [phone]

## 2021-09-15 LAB
ALBUMIN SERPL-MCNC: 1.6 G/DL (ref 3.4–5)
ANION GAP SERPL CALC-SCNC: 5 MMOL/L (ref 3–18)
BASOPHILS # BLD: 0.1 K/UL (ref 0–0.1)
BASOPHILS NFR BLD: 0 % (ref 0–2)
BUN SERPL-MCNC: 4 MG/DL (ref 7–18)
BUN/CREAT SERPL: 7 (ref 12–20)
CALCIUM SERPL-MCNC: 7.6 MG/DL (ref 8.5–10.1)
CHLORIDE SERPL-SCNC: 110 MMOL/L (ref 100–111)
CO2 SERPL-SCNC: 25 MMOL/L (ref 21–32)
CREAT SERPL-MCNC: 0.55 MG/DL (ref 0.6–1.3)
DIFFERENTIAL METHOD BLD: ABNORMAL
EOSINOPHIL # BLD: 0.2 K/UL (ref 0–0.4)
EOSINOPHIL NFR BLD: 2 % (ref 0–5)
ERYTHROCYTE [DISTWIDTH] IN BLOOD BY AUTOMATED COUNT: 15.7 % (ref 11.6–14.5)
GLUCOSE SERPL-MCNC: 105 MG/DL (ref 74–99)
HCT VFR BLD AUTO: 28.7 % (ref 36–48)
HGB BLD-MCNC: 9 G/DL (ref 13–16)
LYMPHOCYTES # BLD: 2.5 K/UL (ref 0.9–3.6)
LYMPHOCYTES NFR BLD: 16 % (ref 21–52)
MAGNESIUM SERPL-MCNC: 1.3 MG/DL (ref 1.6–2.6)
MCH RBC QN AUTO: 28.4 PG (ref 24–34)
MCHC RBC AUTO-ENTMCNC: 31.4 G/DL (ref 31–37)
MCV RBC AUTO: 90.5 FL (ref 78–100)
MONOCYTES # BLD: 0.7 K/UL (ref 0.05–1.2)
MONOCYTES NFR BLD: 4 % (ref 3–10)
NEUTS SEG # BLD: 12.2 K/UL (ref 1.8–8)
NEUTS SEG NFR BLD: 76 % (ref 40–73)
PHOSPHATE SERPL-MCNC: 2.7 MG/DL (ref 2.5–4.9)
PLATELET # BLD AUTO: 422 K/UL (ref 135–420)
PMV BLD AUTO: 9.9 FL (ref 9.2–11.8)
POTASSIUM SERPL-SCNC: 3.7 MMOL/L (ref 3.5–5.5)
RBC # BLD AUTO: 3.17 M/UL (ref 4.35–5.65)
SODIUM SERPL-SCNC: 140 MMOL/L (ref 136–145)
WBC # BLD AUTO: 16 K/UL (ref 4.6–13.2)

## 2021-09-15 PROCEDURE — 36415 COLL VENOUS BLD VENIPUNCTURE: CPT

## 2021-09-15 PROCEDURE — 74011250636 HC RX REV CODE- 250/636: Performed by: INTERNAL MEDICINE

## 2021-09-15 PROCEDURE — 74011250637 HC RX REV CODE- 250/637: Performed by: FAMILY MEDICINE

## 2021-09-15 PROCEDURE — 74011250636 HC RX REV CODE- 250/636: Performed by: EMERGENCY MEDICINE

## 2021-09-15 PROCEDURE — 83735 ASSAY OF MAGNESIUM: CPT

## 2021-09-15 PROCEDURE — 85025 COMPLETE CBC W/AUTO DIFF WBC: CPT

## 2021-09-15 PROCEDURE — 80069 RENAL FUNCTION PANEL: CPT

## 2021-09-15 PROCEDURE — 99232 SBSQ HOSP IP/OBS MODERATE 35: CPT | Performed by: EMERGENCY MEDICINE

## 2021-09-15 PROCEDURE — 74011000250 HC RX REV CODE- 250: Performed by: INTERNAL MEDICINE

## 2021-09-15 PROCEDURE — 65270000029 HC RM PRIVATE

## 2021-09-15 PROCEDURE — 74011000258 HC RX REV CODE- 258: Performed by: INTERNAL MEDICINE

## 2021-09-15 RX ORDER — MAGNESIUM SULFATE HEPTAHYDRATE 40 MG/ML
2 INJECTION, SOLUTION INTRAVENOUS ONCE
Status: COMPLETED | OUTPATIENT
Start: 2021-09-15 | End: 2021-09-15

## 2021-09-15 RX ADMIN — MAGNESIUM SULFATE 2 G: 2 INJECTION INTRAVENOUS at 20:50

## 2021-09-15 RX ADMIN — CEFTRIAXONE SODIUM 2 G: 2 INJECTION, POWDER, FOR SOLUTION INTRAMUSCULAR; INTRAVENOUS at 10:07

## 2021-09-15 RX ADMIN — PANTOPRAZOLE SODIUM 40 MG: 40 TABLET, DELAYED RELEASE ORAL at 09:36

## 2021-09-15 RX ADMIN — PIPERACILLIN AND TAZOBACTAM 3.38 G: 3; .375 INJECTION, POWDER, LYOPHILIZED, FOR SOLUTION INTRAVENOUS at 23:33

## 2021-09-15 RX ADMIN — Medication 10 ML: at 15:32

## 2021-09-15 RX ADMIN — Medication 10 ML: at 05:06

## 2021-09-15 RX ADMIN — Medication 10 ML: at 21:26

## 2021-09-15 RX ADMIN — PIPERACILLIN AND TAZOBACTAM 3.38 G: 3; .375 INJECTION, POWDER, LYOPHILIZED, FOR SOLUTION INTRAVENOUS at 15:32

## 2021-09-15 NOTE — ROUTINE PROCESS
Patient is alert and oriented times three with no signs or symptoms of distress. Drain is intact and lee is intact.
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
## 2021-09-15 NOTE — PROGRESS NOTES
Problem: Risk for Spread of Infection  Goal: Prevent transmission of infectious organism to others  Description: Prevent the transmission of infectious organisms to other patients, staff members, and visitors. Outcome: Progressing Towards Goal     Problem: Patient Education:  Go to Education Activity  Goal: Patient/Family Education  Outcome: Progressing Towards Goal     Problem: Pressure Injury - Risk of  Goal: *Prevention of pressure injury  Description: Document Jordin Scale and appropriate interventions in the flowsheet.   Outcome: Progressing Towards Goal  Note: Pressure Injury Interventions:  Sensory Interventions: Assess need for specialty bed    Moisture Interventions: Absorbent underpads    Activity Interventions: Assess need for specialty bed    Mobility Interventions: Assess need for specialty bed    Nutrition Interventions: Document food/fluid/supplement intake    Friction and Shear Interventions: Apply protective barrier, creams and emollients

## 2021-09-15 NOTE — PROGRESS NOTES
Problem: Risk for Spread of Infection  Goal: Prevent transmission of infectious organism to others  Description: Prevent the transmission of infectious organisms to other patients, staff members, and visitors. Outcome: Progressing Towards Goal     Problem: Patient Education:  Go to Education Activity  Goal: Patient/Family Education  Outcome: Progressing Towards Goal     Problem: Pressure Injury - Risk of  Goal: *Prevention of pressure injury  Description: Document Jordin Scale and appropriate interventions in the flowsheet. Outcome: Progressing Towards Goal  Note: Pressure Injury Interventions:  Sensory Interventions: Assess need for specialty bed, Avoid rigorous massage over bony prominences, Check visual cues for pain, Discuss PT/OT consult with provider, Keep linens dry and wrinkle-free    Moisture Interventions: Absorbent underpads, Assess need for specialty bed, Check for incontinence Q2 hours and as needed    Activity Interventions: Assess need for specialty bed, Increase time out of bed, Pressure redistribution bed/mattress(bed type), PT/OT evaluation    Mobility Interventions: Assess need for specialty bed, HOB 30 degrees or less, Pressure redistribution bed/mattress (bed type), PT/OT evaluation    Nutrition Interventions: Document food/fluid/supplement intake, Offer support with meals,snacks and hydration    Friction and Shear Interventions: Apply protective barrier, creams and emollients, Foam dressings/transparent film/skin sealants, HOB 30 degrees or less, Lift sheet                Problem: Patient Education: Go to Patient Education Activity  Goal: Patient/Family Education  Outcome: Progressing Towards Goal     Problem: Falls - Risk of  Goal: *Absence of Falls  Description: Document Carisa Fall Risk and appropriate interventions in the flowsheet.   Outcome: Progressing Towards Goal  Note: Fall Risk Interventions:            Medication Interventions: Assess postural VS orthostatic hypotension, Evaluate medications/consider consulting pharmacy, Patient to call before getting OOB, Teach patient to arise slowly    Elimination Interventions: Call light in reach, Elevated toilet seat, Patient to call for help with toileting needs, Toilet paper/wipes in reach, Toileting schedule/hourly rounds              Problem: Patient Education: Go to Patient Education Activity  Goal: Patient/Family Education  Outcome: Progressing Towards Goal     Problem: Nutrition Deficit  Goal: *Optimize nutritional status  Outcome: Progressing Towards Goal     Problem: Patient Education: Go to Patient Education Activity  Goal: Patient/Family Education  Outcome: Progressing Towards Goal     Problem: Patient Education: Go to Patient Education Activity  Goal: Patient/Family Education  Outcome: Progressing Towards Goal     Problem: Patient Education: Go to Patient Education Activity  Goal: Patient/Family Education  Outcome: Progressing Towards Goal normal sinus rhythm
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
## 2021-09-15 NOTE — ROUTINE PROCESS
Bedside shift change report given to susanne good (oncoming nurse) by Leopold Garnet (offgoing nurse). Report included the following information SBAR and Kardex.

## 2021-09-15 NOTE — PROGRESS NOTES
Physician Progress Note      Chapincito Garay  CSN #:                  [id number]  :                       1960  ADMIT DATE:       2021 12:13 PM  [address] [place] DATE:  RESPONDING  PROVIDER #:        Karol Lemus MD          QUERY TEXT:    Dear Dr. Ysabel Antunez      Patient admitted with UTI  and perforated  bladder. Noted documentation of  severe malnutrition in attending notes  and mild malnutrition in  nutritional  evaluation. If possible, please document in progress notes and discharge summary if you are evaluating and /or treating any of the following: The medical record reflects the following:    Risk Factors:   good appetite and meal intake PTA, with UBW around 230#. Food preferences obtained and pt consuming Boost supplements PTA at least 1x daily. Per nursing pt with poor po intake today of hospital meals, snacks noted at bedside    Clinical Indicators- per attending note- Severe protein calorie nutrition: Albumin 1.4    : Malnutrition Status: Mild malnutrition  Context: Chronic illness  Findings of clinical characteristics of malnutrition:  Energy Intake:  No significant decrease in energy intake (per pt's mother)  Weight Loss:  7.00 - Greater than 10% over 6 months  Body Fat Loss:  Unable to assess,  Muscle Mass Loss:  Unable to assess,  Fluid Accumulation:  Unable to assess,   Strength:  Not performed    Treatment: diabetic diet restriction  removed  and add Ensure Enlive, TID.     Thank you,   Volodymyr Willard RN  CCDS  x 26  Options provided:  -- severe malnutrition  confirmed and mild malnutrition  ruled out  -- mild malnutrition confirmed and  severe malnutrition  ruled out  -- Other - I will add my own diagnosis  -- Disagree - Not applicable / Not valid  -- Disagree - Clinically unable to determine / Unknown  -- Refer to Clinical Documentation Reviewer    PROVIDER RESPONSE TEXT:    After study, mild malnutrition confirmed and severe malnutrition ruled out.    [place] created by: Kayy aMy on 9/15/2021 3:08 PM      Electronically signed by:  Beckey Holding MD Aron Goltz MD 9/15/2021 6:57 PM

## 2021-09-15 NOTE — PROGRESS NOTES
Interventional Radiology Progress Note    Patient: Mary Jo Torres               Sex: male          DOA: 9/9/2021       YOB: 1960      Age:  64 y.o.        LOS:  LOS: 6 days       Assessment/Plan     Patient is POD#2 s /p CT guided 12 Fr suprapubic catheter placement and suprapubic fluid collection aspiration by Dr. Veronica Zavala. Suprapubic fluid collection cultures showing many WBCs, moderate GNR and light strep - on ceftriaxone    From an IR standpoint, patient doing well without any apparent complications. Continue management and follow up per Urology    Subjective: The patient endorses feeling well without any changes in his brian status. The patient denies fever, chills, nausea, vomiting,chest pain, shortness of breath,  abdominal pain, or pelvic pressure. Objective:      Visit Vitals  /75   Pulse 91   Temp 97.3 °F (36.3 °C)   Resp 16   Ht 6' 2\" (1.88 m)   Wt 95.4 kg (210 lb 6.4 oz)   SpO2 98%   BMI 27.01 kg/m²     Lab/Data Reviewed:  H & H stable   Cultures resulted, listed above  Improving leukocytosis 15.0 < 18.8 < 19 < 21.8    Recent images:  Procedure images are available. Interval history:  No acute events overnight     Physical Exam:  Constitutional: NAD. A&Ox4. Respiratory: Normal respiratory effort. Symmetrical rise and fall of chest.    Cardiovascular: Regular rate. Gastrointestinal: Soft, NT, ND. Genitourinary: No penile discharge. Chavez and suprapubic catheter drainage is adryan in color with minimal sedimentation. Procedure site: suprapubic 12 Fr drain in place. Procedure site with clean, dry, without drainage, erythema, ecchymosis, tenderness to palpation, or warmth. Intake and Output:  Current Shift:  No intake/output data recorded. Last three shifts:  09/13 1901 - 09/15 0700  In: 4608.8 [P.O.:3120;  I.V.:1478.8]  Out: 2270 [Urine:1525; Drains:245]    Thank you,  MELVINA Coppola

## 2021-09-15 NOTE — ROUTINE PROCESS
Patient is alert and oriented times three with no signs or symptoms of distress. Drains are all intact. Colonstomy put out a small amount of liquid stool. Stoma is beefy red.

## 2021-09-16 LAB
ALBUMIN SERPL-MCNC: 1.4 G/DL (ref 3.4–5)
ANION GAP SERPL CALC-SCNC: 4 MMOL/L (ref 3–18)
BACTERIA SPEC CULT: ABNORMAL
BACTERIA SPEC CULT: ABNORMAL
BUN SERPL-MCNC: 4 MG/DL (ref 7–18)
BUN/CREAT SERPL: 7 (ref 12–20)
CALCIUM SERPL-MCNC: 7.2 MG/DL (ref 8.5–10.1)
CHLORIDE SERPL-SCNC: 111 MMOL/L (ref 100–111)
CO2 SERPL-SCNC: 27 MMOL/L (ref 21–32)
CREAT SERPL-MCNC: 0.55 MG/DL (ref 0.6–1.3)
ERYTHROCYTE [DISTWIDTH] IN BLOOD BY AUTOMATED COUNT: 15.6 % (ref 11.6–14.5)
GLUCOSE SERPL-MCNC: 109 MG/DL (ref 74–99)
GRAM STN SPEC: ABNORMAL
HCT VFR BLD AUTO: 25.6 % (ref 36–48)
HGB BLD-MCNC: 7.9 G/DL (ref 13–16)
MCH RBC QN AUTO: 27.8 PG (ref 24–34)
MCHC RBC AUTO-ENTMCNC: 30.9 G/DL (ref 31–37)
MCV RBC AUTO: 90.1 FL (ref 78–100)
PHOSPHATE SERPL-MCNC: 2.8 MG/DL (ref 2.5–4.9)
PLATELET # BLD AUTO: 509 K/UL (ref 135–420)
PMV BLD AUTO: 9.6 FL (ref 9.2–11.8)
POTASSIUM SERPL-SCNC: 3.8 MMOL/L (ref 3.5–5.5)
RBC # BLD AUTO: 2.84 M/UL (ref 4.35–5.65)
SERVICE CMNT-IMP: ABNORMAL
SODIUM SERPL-SCNC: 142 MMOL/L (ref 136–145)
WBC # BLD AUTO: 12.3 K/UL (ref 4.6–13.2)

## 2021-09-16 PROCEDURE — 99231 SBSQ HOSP IP/OBS SF/LOW 25: CPT | Performed by: NURSE PRACTITIONER

## 2021-09-16 PROCEDURE — 36415 COLL VENOUS BLD VENIPUNCTURE: CPT

## 2021-09-16 PROCEDURE — 85027 COMPLETE CBC AUTOMATED: CPT

## 2021-09-16 PROCEDURE — 74011250637 HC RX REV CODE- 250/637: Performed by: FAMILY MEDICINE

## 2021-09-16 PROCEDURE — 77010033678 HC OXYGEN DAILY

## 2021-09-16 PROCEDURE — 77030040392 HC DRSG OPTIFOAM MDII -A

## 2021-09-16 PROCEDURE — 99232 SBSQ HOSP IP/OBS MODERATE 35: CPT | Performed by: EMERGENCY MEDICINE

## 2021-09-16 PROCEDURE — 77030037878 HC DRSG MEPILEX >48IN BORD MOLN -B

## 2021-09-16 PROCEDURE — 74011000258 HC RX REV CODE- 258: Performed by: INTERNAL MEDICINE

## 2021-09-16 PROCEDURE — 74011250636 HC RX REV CODE- 250/636: Performed by: INTERNAL MEDICINE

## 2021-09-16 PROCEDURE — 80069 RENAL FUNCTION PANEL: CPT

## 2021-09-16 PROCEDURE — 65270000029 HC RM PRIVATE

## 2021-09-16 PROCEDURE — 2709999900 HC NON-CHARGEABLE SUPPLY

## 2021-09-16 RX ADMIN — PANTOPRAZOLE SODIUM 40 MG: 40 TABLET, DELAYED RELEASE ORAL at 09:04

## 2021-09-16 RX ADMIN — PIPERACILLIN AND TAZOBACTAM 3.38 G: 3; .375 INJECTION, POWDER, LYOPHILIZED, FOR SOLUTION INTRAVENOUS at 23:29

## 2021-09-16 RX ADMIN — PIPERACILLIN AND TAZOBACTAM 3.38 G: 3; .375 INJECTION, POWDER, LYOPHILIZED, FOR SOLUTION INTRAVENOUS at 09:03

## 2021-09-16 RX ADMIN — PIPERACILLIN AND TAZOBACTAM 3.38 G: 3; .375 INJECTION, POWDER, LYOPHILIZED, FOR SOLUTION INTRAVENOUS at 16:41

## 2021-09-16 RX ADMIN — Medication 10 ML: at 06:08
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
## 2021-09-16 NOTE — PROGRESS NOTES
Problem: Risk for Spread of Infection  Goal: Prevent transmission of infectious organism to others  Description: Prevent the transmission of infectious organisms to other patients, staff members, and visitors. Outcome: Progressing Towards Goal     Problem: Patient Education:  Go to Education Activity  Goal: Patient/Family Education  Outcome: Progressing Towards Goal     Problem: Pressure Injury - Risk of  Goal: *Prevention of pressure injury  Description: Document Jordin Scale and appropriate interventions in the flowsheet.   Outcome: Progressing Towards Goal  Note: Pressure Injury Interventions:  Sensory Interventions: Assess changes in LOC, Assess need for specialty bed, Check visual cues for pain, Discuss PT/OT consult with provider, Keep linens dry and wrinkle-free    Moisture Interventions: Absorbent underpads, Check for incontinence Q2 hours and as needed, Contain wound drainage, Internal/External urinary devices    Activity Interventions: Assess need for specialty bed, Increase time out of bed, Pressure redistribution bed/mattress(bed type), PT/OT evaluation    Mobility Interventions: Assess need for specialty bed, HOB 30 degrees or less, Pressure redistribution bed/mattress (bed type), PT/OT evaluation    Nutrition Interventions: Document food/fluid/supplement intake, Offer support with meals,snacks and hydration    Friction and Shear Interventions: Apply protective barrier, creams and emollients, Foam dressings/transparent film/skin sealants, HOB 30 degrees or less

## 2021-09-16 NOTE — ROUTINE PROCESS
Bedside and Verbal shift change report given to SAFIA Drummond (oncoming nurse) by Dereck Osborne (offgoing nurse). Report included the following information SBAR, Kardex, Procedure Summary, Intake/Output, MAR and Recent Results.
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
## 2021-09-16 NOTE — ROUTINE PROCESS
Bedside shift change report given to Alaina Rowell (oncoming nurse) by Venkata Larios (offgoing nurse). Report included the following information SBAR and Kardex.
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
## 2021-09-16 NOTE — ROUTINE PROCESS
Patient is alert and oriented times three with no signs or symptoms of distress.  All drains are intact

## 2021-09-17 LAB
ALBUMIN SERPL-MCNC: 1.5 G/DL (ref 3.4–5)
ANION GAP SERPL CALC-SCNC: 2 MMOL/L (ref 3–18)
BASOPHILS # BLD: 0.1 K/UL (ref 0–0.1)
BASOPHILS NFR BLD: 1 % (ref 0–2)
BUN SERPL-MCNC: 4 MG/DL (ref 7–18)
BUN/CREAT SERPL: 7 (ref 12–20)
CALCIUM SERPL-MCNC: 7.6 MG/DL (ref 8.5–10.1)
CHLORIDE SERPL-SCNC: 110 MMOL/L (ref 100–111)
CO2 SERPL-SCNC: 29 MMOL/L (ref 21–32)
CREAT SERPL-MCNC: 0.58 MG/DL (ref 0.6–1.3)
DIFFERENTIAL METHOD BLD: ABNORMAL
EOSINOPHIL # BLD: 0.3 K/UL (ref 0–0.4)
EOSINOPHIL NFR BLD: 3 % (ref 0–5)
ERYTHROCYTE [DISTWIDTH] IN BLOOD BY AUTOMATED COUNT: 15.7 % (ref 11.6–14.5)
GLUCOSE SERPL-MCNC: 93 MG/DL (ref 74–99)
HCT VFR BLD AUTO: 27 % (ref 36–48)
HGB BLD-MCNC: 8.3 G/DL (ref 13–16)
LYMPHOCYTES # BLD: 3 K/UL (ref 0.9–3.6)
LYMPHOCYTES NFR BLD: 26 % (ref 21–52)
MCH RBC QN AUTO: 27.7 PG (ref 24–34)
MCHC RBC AUTO-ENTMCNC: 30.7 G/DL (ref 31–37)
MCV RBC AUTO: 90 FL (ref 78–100)
MONOCYTES # BLD: 0.5 K/UL (ref 0.05–1.2)
MONOCYTES NFR BLD: 4 % (ref 3–10)
NEUTS SEG # BLD: 7.5 K/UL (ref 1.8–8)
NEUTS SEG NFR BLD: 65 % (ref 40–73)
PHOSPHATE SERPL-MCNC: 2.8 MG/DL (ref 2.5–4.9)
PLATELET # BLD AUTO: 465 K/UL (ref 135–420)
PMV BLD AUTO: 9.8 FL (ref 9.2–11.8)
POTASSIUM SERPL-SCNC: 3.8 MMOL/L (ref 3.5–5.5)
RBC # BLD AUTO: 3 M/UL (ref 4.35–5.65)
SODIUM SERPL-SCNC: 141 MMOL/L (ref 136–145)
WBC # BLD AUTO: 11.6 K/UL (ref 4.6–13.2)

## 2021-09-17 PROCEDURE — 80069 RENAL FUNCTION PANEL: CPT

## 2021-09-17 PROCEDURE — 74011250636 HC RX REV CODE- 250/636: Performed by: INTERNAL MEDICINE

## 2021-09-17 PROCEDURE — 2709999900 HC NON-CHARGEABLE SUPPLY

## 2021-09-17 PROCEDURE — 74011000258 HC RX REV CODE- 258: Performed by: INTERNAL MEDICINE

## 2021-09-17 PROCEDURE — 74011250637 HC RX REV CODE- 250/637: Performed by: FAMILY MEDICINE

## 2021-09-17 PROCEDURE — 65270000029 HC RM PRIVATE

## 2021-09-17 PROCEDURE — 36415 COLL VENOUS BLD VENIPUNCTURE: CPT

## 2021-09-17 PROCEDURE — 85025 COMPLETE CBC W/AUTO DIFF WBC: CPT

## 2021-09-17 PROCEDURE — 99232 SBSQ HOSP IP/OBS MODERATE 35: CPT | Performed by: EMERGENCY MEDICINE

## 2021-09-17 PROCEDURE — 74011250637 HC RX REV CODE- 250/637: Performed by: INTERNAL MEDICINE

## 2021-09-17 RX ORDER — LINEZOLID 600 MG/1
600 TABLET, FILM COATED ORAL EVERY 12 HOURS
Status: DISCONTINUED | OUTPATIENT
Start: 2021-09-17 | End: 2021-09-24 | Stop reason: HOSPADM

## 2021-09-17 RX ORDER — CEPHALEXIN 250 MG/1
500 CAPSULE ORAL EVERY 6 HOURS
Status: DISCONTINUED | OUTPATIENT
Start: 2021-09-17 | End: 2021-09-24 | Stop reason: HOSPADM

## 2021-09-17 RX ADMIN — LINEZOLID 600 MG: 600 TABLET, FILM COATED ORAL at 22:40

## 2021-09-17 RX ADMIN — CEPHALEXIN 500 MG: 250 CAPSULE ORAL at 12:23

## 2021-09-17 RX ADMIN — LINEZOLID 600 MG: 600 TABLET, FILM COATED ORAL at 15:13

## 2021-09-17 RX ADMIN — SODIUM CHLORIDE 50 ML/HR: 900 INJECTION, SOLUTION INTRAVENOUS at 19:40

## 2021-09-17 RX ADMIN — CEPHALEXIN 500 MG: 250 CAPSULE ORAL at 23:48

## 2021-09-17 RX ADMIN — PANTOPRAZOLE SODIUM 40 MG: 40 TABLET, DELAYED RELEASE ORAL at 08:18

## 2021-09-17 RX ADMIN — CEPHALEXIN 500 MG: 250 CAPSULE ORAL at 18:25

## 2021-09-17 RX ADMIN — Medication 10 ML: at 15:13

## 2021-09-17 RX ADMIN — PIPERACILLIN AND TAZOBACTAM 3.38 G: 3; .375 INJECTION, POWDER, LYOPHILIZED, FOR SOLUTION INTRAVENOUS at 08:18

## 2021-09-17 NOTE — PROGRESS NOTES
Problem: Risk for Spread of Infection  Goal: Prevent transmission of infectious organism to others  Description: Prevent the transmission of infectious organisms to other patients, staff members, and visitors. Outcome: Progressing Towards Goal     Problem: Patient Education:  Go to Education Activity  Goal: Patient/Family Education  Outcome: Progressing Towards Goal     Problem: Pressure Injury - Risk of  Goal: *Prevention of pressure injury  Description: Document Jordin Scale and appropriate interventions in the flowsheet. Outcome: Progressing Towards Goal  Note: Pressure Injury Interventions:  Sensory Interventions: Assess changes in LOC, Check visual cues for pain, Keep linens dry and wrinkle-free, Maintain/enhance activity level, Minimize linen layers, Monitor skin under medical devices, Turn and reposition approx. every two hours (pillows and wedges if needed)    Moisture Interventions: Absorbent underpads, Apply protective barrier, creams and emollients, Assess need for specialty bed, Maintain skin hydration (lotion/cream), Minimize layers, Moisture barrier    Activity Interventions: Pressure redistribution bed/mattress(bed type)    Mobility Interventions: HOB 30 degrees or less, Pressure redistribution bed/mattress (bed type), Float heels    Nutrition Interventions: Document food/fluid/supplement intake    Friction and Shear Interventions: Lift sheet, Foam dressings/transparent film/skin sealants, Apply protective barrier, creams and emollients, Lift team/patient mobility team, Minimize layers                Problem: Patient Education: Go to Patient Education Activity  Goal: Patient/Family Education  Outcome: Progressing Towards Goal     Problem: Falls - Risk of  Goal: *Absence of Falls  Description: Document Carisa Fall Risk and appropriate interventions in the flowsheet.   Outcome: Progressing Towards Goal  Note: Fall Risk Interventions:            Medication Interventions: Evaluate medications/consider consulting pharmacy, Bed/chair exit alarm    Elimination Interventions: Call light in reach, Toileting schedule/hourly rounds              Problem: Patient Education: Go to Patient Education Activity  Goal: Patient/Family Education  Outcome: Progressing Towards Goal     Problem: Nutrition Deficit  Goal: *Optimize nutritional status  Outcome: Progressing Towards Goal     Problem: Patient Education: Go to Patient Education Activity  Goal: Patient/Family Education  Outcome: Progressing Towards Goal     Problem: Patient Education: Go to Patient Education Activity  Goal: Patient/Family Education  Outcome: Progressing Towards Goal     Problem: Patient Education: Go to Patient Education Activity  Goal: Patient/Family Education  Outcome: Progressing Towards Goal

## 2021-09-17 NOTE — PROGRESS NOTES
Shift Summary Note:  Assumed care of patient in bed with lee to gravity, awake with Mom @ bedside. No complaints of pain , No s/s of distress or discomfort, uneventful night.   Patient Vitals for the past 12 hrs:   Temp Pulse Resp BP SpO2   09/17/21 0356 98.4 °F (36.9 °C) 78 16 139/78 100 %   09/16/21 2024 98.1 °F (36.7 °C) 86 18 132/78 99 %

## 2021-09-17 NOTE — PROGRESS NOTES
Nutrition Note    Pt reporting good appetite, consumed 100% intake of cereal this morning and a few bites of [place] toast. Variable intake of meals yesterday per nursing documentation & pt reports consuming Ensure supplements approximately once per day. Remains on NS at 50 mL/hr. Progress towards nutritional goals. Nutrition Recommendations/Plan:  - Continue current diet with Ensure Enlive, TID.  - Monitor and encourage po intake as tolerated.   - IVF per MD.    Electronically signed by Paula Rios RD on 9/17/2021 at 12:25 PM    Contact: [phone]
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
## 2021-09-17 NOTE — PROGRESS NOTES
Chart reviewed. [facility] lives with mother and [facility] and personal care with [facility]. Plan is to return home with [facility].     Brando Warner RN - Outcomes Manager  [phone]

## 2021-09-17 NOTE — ROUTINE PROCESS
End of Shift Note     Bedside and verbal shift change report given to Damon Kong (On coming nurse) by Macey Sen RN (Off going nurse).   Report included the following information:      --Procedure Summary     --MAR,     --Recent Results     --Med Rec Status

## 2021-09-18 LAB
ALBUMIN SERPL-MCNC: 1.4 G/DL (ref 3.4–5)
ANION GAP SERPL CALC-SCNC: 5 MMOL/L (ref 3–18)
BACTERIA SPEC CULT: NORMAL
BACTERIA SPEC CULT: NORMAL
BASOPHILS # BLD: 0 K/UL (ref 0–0.1)
BASOPHILS NFR BLD: 0 % (ref 0–2)
BUN SERPL-MCNC: 4 MG/DL (ref 7–18)
BUN/CREAT SERPL: 8 (ref 12–20)
CALCIUM SERPL-MCNC: 7.5 MG/DL (ref 8.5–10.1)
CHLORIDE SERPL-SCNC: 111 MMOL/L (ref 100–111)
CO2 SERPL-SCNC: 27 MMOL/L (ref 21–32)
CREAT SERPL-MCNC: 0.49 MG/DL (ref 0.6–1.3)
DIFFERENTIAL METHOD BLD: ABNORMAL
EOSINOPHIL # BLD: 0.2 K/UL (ref 0–0.4)
EOSINOPHIL NFR BLD: 2 % (ref 0–5)
ERYTHROCYTE [DISTWIDTH] IN BLOOD BY AUTOMATED COUNT: 15.8 % (ref 11.6–14.5)
GLUCOSE SERPL-MCNC: 85 MG/DL (ref 74–99)
HCT VFR BLD AUTO: 24.2 % (ref 36–48)
HGB BLD-MCNC: 7.5 G/DL (ref 13–16)
LYMPHOCYTES # BLD: 2.4 K/UL (ref 0.9–3.6)
LYMPHOCYTES NFR BLD: 22 % (ref 21–52)
MAGNESIUM SERPL-MCNC: 1.6 MG/DL (ref 1.6–2.6)
MCH RBC QN AUTO: 28.2 PG (ref 24–34)
MCHC RBC AUTO-ENTMCNC: 31 G/DL (ref 31–37)
MCV RBC AUTO: 91 FL (ref 78–100)
MONOCYTES # BLD: 0.6 K/UL (ref 0.05–1.2)
MONOCYTES NFR BLD: 5 % (ref 3–10)
NEUTS SEG # BLD: 7.1 K/UL (ref 1.8–8)
NEUTS SEG NFR BLD: 68 % (ref 40–73)
PHOSPHATE SERPL-MCNC: 2.4 MG/DL (ref 2.5–4.9)
PLATELET # BLD AUTO: 473 K/UL (ref 135–420)
PMV BLD AUTO: 9.5 FL (ref 9.2–11.8)
POTASSIUM SERPL-SCNC: 3.3 MMOL/L (ref 3.5–5.5)
RBC # BLD AUTO: 2.66 M/UL (ref 4.35–5.65)
SERVICE CMNT-IMP: NORMAL
SERVICE CMNT-IMP: NORMAL
SODIUM SERPL-SCNC: 143 MMOL/L (ref 136–145)
WBC # BLD AUTO: 10.5 K/UL (ref 4.6–13.2)

## 2021-09-18 PROCEDURE — 83735 ASSAY OF MAGNESIUM: CPT

## 2021-09-18 PROCEDURE — 74011250637 HC RX REV CODE- 250/637: Performed by: INTERNAL MEDICINE

## 2021-09-18 PROCEDURE — 74011250637 HC RX REV CODE- 250/637: Performed by: FAMILY MEDICINE

## 2021-09-18 PROCEDURE — 77030040393 HC DRSG OPTIFOAM GENT MDII -B

## 2021-09-18 PROCEDURE — 77030037878 HC DRSG MEPILEX >48IN BORD MOLN -B

## 2021-09-18 PROCEDURE — 2709999900 HC NON-CHARGEABLE SUPPLY

## 2021-09-18 PROCEDURE — 99239 HOSP IP/OBS DSCHRG MGMT >30: CPT | Performed by: INTERNAL MEDICINE

## 2021-09-18 PROCEDURE — 85025 COMPLETE CBC W/AUTO DIFF WBC: CPT

## 2021-09-18 PROCEDURE — 77030040392 HC DRSG OPTIFOAM MDII -A

## 2021-09-18 PROCEDURE — 77030041076 HC DRSG AG OPTICELL MDII -A

## 2021-09-18 PROCEDURE — 36415 COLL VENOUS BLD VENIPUNCTURE: CPT

## 2021-09-18 PROCEDURE — 65270000029 HC RM PRIVATE

## 2021-09-18 PROCEDURE — 80069 RENAL FUNCTION PANEL: CPT

## 2021-09-18 RX ORDER — LINEZOLID 600 MG/1
600 TABLET, FILM COATED ORAL EVERY 12 HOURS
Qty: 22 TABLET | Refills: 0 | Status: SHIPPED | OUTPATIENT
Start: 2021-09-18 | End: 2021-09-22 | Stop reason: SDUPTHER

## 2021-09-18 RX ORDER — POTASSIUM CHLORIDE 20 MEQ/1
20 TABLET, EXTENDED RELEASE ORAL DAILY
Qty: 10 TABLET | Refills: 0 | Status: SHIPPED | OUTPATIENT
Start: 2021-09-18 | End: 2021-09-22 | Stop reason: SDUPTHER

## 2021-09-18 RX ORDER — POTASSIUM CHLORIDE 20 MEQ/1
40 TABLET, EXTENDED RELEASE ORAL
Status: COMPLETED | OUTPATIENT
Start: 2021-09-18 | End: 2021-09-18

## 2021-09-18 RX ORDER — CEPHALEXIN 500 MG/1
500 CAPSULE ORAL EVERY 6 HOURS
Qty: 44 CAPSULE | Refills: 0 | Status: SHIPPED | OUTPATIENT
Start: 2021-09-18 | End: 2021-09-22 | Stop reason: SDUPTHER

## 2021-09-18 RX ADMIN — CEPHALEXIN 500 MG: 250 CAPSULE ORAL at 06:32

## 2021-09-18 RX ADMIN — THERA TABS 1 TABLET: TAB at 09:02

## 2021-09-18 RX ADMIN — LINEZOLID 600 MG: 600 TABLET, FILM COATED ORAL at 22:41

## 2021-09-18 RX ADMIN — CEPHALEXIN 500 MG: 250 CAPSULE ORAL at 11:30

## 2021-09-18 RX ADMIN — POTASSIUM CHLORIDE 40 MEQ: 1500 TABLET, EXTENDED RELEASE ORAL at 18:37

## 2021-09-18 RX ADMIN — PANTOPRAZOLE SODIUM 40 MG: 40 TABLET, DELAYED RELEASE ORAL at 09:02

## 2021-09-18 RX ADMIN — LINEZOLID 600 MG: 600 TABLET, FILM COATED ORAL at 09:01

## 2021-09-18 RX ADMIN — CEPHALEXIN 500 MG: 250 CAPSULE ORAL at 18:37

## 2021-09-18 RX ADMIN — Medication 10 ML: at 18:38
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
## 2021-09-18 NOTE — PROGRESS NOTES
Shift Progress Note:  Assumed care of patient awake, alert and orient, Call bell within reach Chavez & suprapubic continues to drain yellow urine. VSS, No requests for pain medication, sleeping most of the night. Ate no fod and drank very little thru the night. IV fluids continue. Dressing dry & intact.   Patient Vitals for the past 12 hrs:   Temp Pulse Resp BP SpO2   09/18/21 0415 98.9 °F (37.2 °C) 81 16 (!) 150/86 99 %   09/17/21 2045 97.8 °F (36.6 °C) 81 16 (!) 147/81 100 %

## 2021-09-18 NOTE — ROUTINE PROCESS
Bedside shift change report given to Keshawn (oncoming nurse) by Yael Calvert (offgoing nurse). Report included the following information SBAR, Kardex, Intake/Output, MAR and Recent Results.

## 2021-09-18 NOTE — PROGRESS NOTES
Problem: Risk for Spread of Infection  Goal: Prevent transmission of infectious organism to others  Description: Prevent the transmission of infectious organisms to other patients, staff members, and visitors. Outcome: Progressing Towards Goal     Problem: Pressure Injury - Risk of  Goal: *Prevention of pressure injury  Description: Document Jordin Scale and appropriate interventions in the flowsheet.   Outcome: Progressing Towards Goal  Note: Pressure Injury Interventions:  Sensory Interventions: Pressure redistribution bed/mattress (bed type), Keep linens dry and wrinkle-free    Moisture Interventions: Check for incontinence Q2 hours and as needed, Internal/External urinary devices, Apply protective barrier, creams and emollients, Absorbent underpads    Activity Interventions: Pressure redistribution bed/mattress(bed type)    Mobility Interventions: Pressure redistribution bed/mattress (bed type)    Nutrition Interventions: Document food/fluid/supplement intake    Friction and Shear Interventions: Apply protective barrier, creams and emollients, HOB 30 degrees or less, Foam dressings/transparent film/skin sealants

## 2021-09-19 LAB
ALBUMIN SERPL-MCNC: 1.4 G/DL (ref 3.4–5)
ANION GAP SERPL CALC-SCNC: 4 MMOL/L (ref 3–18)
BUN SERPL-MCNC: 3 MG/DL (ref 7–18)
BUN/CREAT SERPL: 7 (ref 12–20)
CALCIUM SERPL-MCNC: 7.5 MG/DL (ref 8.5–10.1)
CHLORIDE SERPL-SCNC: 108 MMOL/L (ref 100–111)
CO2 SERPL-SCNC: 27 MMOL/L (ref 21–32)
CREAT SERPL-MCNC: 0.45 MG/DL (ref 0.6–1.3)
GLUCOSE SERPL-MCNC: 75 MG/DL (ref 74–99)
PHOSPHATE SERPL-MCNC: 2.3 MG/DL (ref 2.5–4.9)
POTASSIUM SERPL-SCNC: 3.6 MMOL/L (ref 3.5–5.5)
SODIUM SERPL-SCNC: 139 MMOL/L (ref 136–145)

## 2021-09-19 PROCEDURE — 65270000029 HC RM PRIVATE

## 2021-09-19 PROCEDURE — 74011250637 HC RX REV CODE- 250/637: Performed by: INTERNAL MEDICINE

## 2021-09-19 PROCEDURE — 77030037878 HC DRSG MEPILEX >48IN BORD MOLN -B

## 2021-09-19 PROCEDURE — 80069 RENAL FUNCTION PANEL: CPT

## 2021-09-19 PROCEDURE — 99232 SBSQ HOSP IP/OBS MODERATE 35: CPT | Performed by: INTERNAL MEDICINE

## 2021-09-19 PROCEDURE — 2709999900 HC NON-CHARGEABLE SUPPLY

## 2021-09-19 PROCEDURE — 77030040392 HC DRSG OPTIFOAM MDII -A

## 2021-09-19 PROCEDURE — 77030041076 HC DRSG AG OPTICELL MDII -A

## 2021-09-19 PROCEDURE — 36415 COLL VENOUS BLD VENIPUNCTURE: CPT

## 2021-09-19 PROCEDURE — 74011250637 HC RX REV CODE- 250/637: Performed by: FAMILY MEDICINE

## 2021-09-19 RX ADMIN — LINEZOLID 600 MG: 600 TABLET, FILM COATED ORAL at 11:29

## 2021-09-19 RX ADMIN — CEPHALEXIN 500 MG: 250 CAPSULE ORAL at 06:26

## 2021-09-19 RX ADMIN — CEPHALEXIN 500 MG: 250 CAPSULE ORAL at 11:29

## 2021-09-19 RX ADMIN — PANTOPRAZOLE SODIUM 40 MG: 40 TABLET, DELAYED RELEASE ORAL at 11:29

## 2021-09-19 RX ADMIN — CEPHALEXIN 500 MG: 250 CAPSULE ORAL at 00:28

## 2021-09-19 RX ADMIN — CEPHALEXIN 500 MG: 250 CAPSULE ORAL at 17:00

## 2021-09-19 RX ADMIN — THERA TABS 1 TABLET: TAB at 11:29

## 2021-09-19 RX ADMIN — LINEZOLID 600 MG: 600 TABLET, FILM COATED ORAL at 20:40

## 2021-09-19 RX ADMIN — Medication 10 ML: at 17:01

## 2021-09-19 RX ADMIN — Medication 10 ML: at 21:57
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
## 2021-09-19 NOTE — PROGRESS NOTES
Shift Summary Note:  Assumed care of patient in bed awake and quiet. Uneventful night, vss, call bell within reach.   Patient Vitals for the past 12 hrs:   Temp Pulse Resp BP SpO2   09/19/21 0430 98.9 °F (37.2 °C) 82 15 (!) 161/86 100 %   09/18/21 2054 99 °F (37.2 °C) 78 14 (!) 149/78 100 %

## 2021-09-20 ENCOUNTER — APPOINTMENT (OUTPATIENT)
Dept: CT IMAGING | Age: 61
DRG: 466 | End: 2021-09-20
Attending: INTERNAL MEDICINE
Payer: MEDICAID

## 2021-09-20 LAB
ALBUMIN SERPL-MCNC: 1.8 G/DL (ref 3.4–5)
ANION GAP SERPL CALC-SCNC: 4 MMOL/L (ref 3–18)
BUN SERPL-MCNC: 6 MG/DL (ref 7–18)
BUN/CREAT SERPL: 11 (ref 12–20)
CALCIUM SERPL-MCNC: 7.8 MG/DL (ref 8.5–10.1)
CHLORIDE SERPL-SCNC: 107 MMOL/L (ref 100–111)
CO2 SERPL-SCNC: 29 MMOL/L (ref 21–32)
CREAT SERPL-MCNC: 0.56 MG/DL (ref 0.6–1.3)
GLUCOSE SERPL-MCNC: 102 MG/DL (ref 74–99)
PHOSPHATE SERPL-MCNC: 3.1 MG/DL (ref 2.5–4.9)
POTASSIUM SERPL-SCNC: 4 MMOL/L (ref 3.5–5.5)
SODIUM SERPL-SCNC: 140 MMOL/L (ref 136–145)

## 2021-09-20 PROCEDURE — 2709999900 HC NON-CHARGEABLE SUPPLY

## 2021-09-20 PROCEDURE — 80069 RENAL FUNCTION PANEL: CPT

## 2021-09-20 PROCEDURE — 99221 1ST HOSP IP/OBS SF/LOW 40: CPT | Performed by: SURGERY

## 2021-09-20 PROCEDURE — 65270000029 HC RM PRIVATE

## 2021-09-20 PROCEDURE — 74011250637 HC RX REV CODE- 250/637: Performed by: INTERNAL MEDICINE

## 2021-09-20 PROCEDURE — 74177 CT ABD & PELVIS W/CONTRAST: CPT

## 2021-09-20 PROCEDURE — 74011250637 HC RX REV CODE- 250/637: Performed by: FAMILY MEDICINE

## 2021-09-20 PROCEDURE — 99233 SBSQ HOSP IP/OBS HIGH 50: CPT | Performed by: INTERNAL MEDICINE

## 2021-09-20 PROCEDURE — 74011000636 HC RX REV CODE- 636: Performed by: INTERNAL MEDICINE

## 2021-09-20 RX ADMIN — LINEZOLID 600 MG: 600 TABLET, FILM COATED ORAL at 08:42

## 2021-09-20 RX ADMIN — CEPHALEXIN 500 MG: 250 CAPSULE ORAL at 12:49

## 2021-09-20 RX ADMIN — CEPHALEXIN 500 MG: 250 CAPSULE ORAL at 05:50

## 2021-09-20 RX ADMIN — CEPHALEXIN 500 MG: 250 CAPSULE ORAL at 17:33

## 2021-09-20 RX ADMIN — CEPHALEXIN 500 MG: 250 CAPSULE ORAL at 00:20

## 2021-09-20 RX ADMIN — THERA TABS 1 TABLET: TAB at 08:42

## 2021-09-20 RX ADMIN — LINEZOLID 600 MG: 600 TABLET, FILM COATED ORAL at 21:30

## 2021-09-20 RX ADMIN — Medication 10 ML: at 05:51

## 2021-09-20 RX ADMIN — PANTOPRAZOLE SODIUM 40 MG: 40 TABLET, DELAYED RELEASE ORAL at 08:42

## 2021-09-20 RX ADMIN — IOPAMIDOL 100 ML: 612 INJECTION, SOLUTION INTRAVENOUS at 14:11

## 2021-09-20 NOTE — PROGRESS NOTES
MD paged concerning wound that is approximately 0.5 cm x 0.5 cm and approximately 6 cm superior to his suprapubic catheter. Wound is producing moderate purulent drainage. Wound dressed by RN with abdominal pad. Family also made aware.
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
## 2021-09-20 NOTE — ROUTINE PROCESS
Bedside and Verbal shift change report given to SAFIA Drummond (oncoming nurse) by Libia Hannah (offgoing nurse). Report included the following information SBAR, Kardex, Intake/Output, MAR and Recent Results.

## 2021-09-20 NOTE — ROUTINE PROCESS
Bedside shift change report given to Liz Garcia (oncoming nurse) by Camila Elizabeth (offgoing nurse). Report included the following information SBAR and Kardex.

## 2021-09-20 NOTE — PROGRESS NOTES
Problem: Risk for Spread of Infection  Goal: Prevent transmission of infectious organism to others  Description: Prevent the transmission of infectious organisms to other patients, staff members, and visitors. Outcome: Progressing Towards Goal     Problem: Patient Education:  Go to Education Activity  Goal: Patient/Family Education  Outcome: Progressing Towards Goal     Problem: Pressure Injury - Risk of  Goal: *Prevention of pressure injury  Description: Document Jordin Scale and appropriate interventions in the flowsheet. Outcome: Progressing Towards Goal  Note: Pressure Injury Interventions:  Sensory Interventions: Keep linens dry and wrinkle-free, Maintain/enhance activity level, Minimize linen layers    Moisture Interventions: Absorbent underpads    Activity Interventions: Assess need for specialty bed    Mobility Interventions: Float heels, Pressure redistribution bed/mattress (bed type)    Nutrition Interventions: Document food/fluid/supplement intake    Friction and Shear Interventions: Apply protective barrier, creams and emollients, Foam dressings/transparent film/skin sealants, HOB 30 degrees or less                Problem: Patient Education: Go to Patient Education Activity  Goal: Patient/Family Education  Outcome: Progressing Towards Goal     Problem: Falls - Risk of  Goal: *Absence of Falls  Description: Document Carisa Fall Risk and appropriate interventions in the flowsheet.   Outcome: Progressing Towards Goal  Note: Fall Risk Interventions:            Medication Interventions: Bed/chair exit alarm    Elimination Interventions: Bed/chair exit alarm              Problem: Patient Education: Go to Patient Education Activity  Goal: Patient/Family Education  Outcome: Progressing Towards Goal     Problem: Nutrition Deficit  Goal: *Optimize nutritional status  Outcome: Progressing Towards Goal     Problem: Patient Education: Go to Patient Education Activity  Goal: Patient/Family Education  Outcome: Progressing Towards Goal     Problem: Patient Education: Go to Patient Education Activity  Goal: Patient/Family Education  Outcome: Progressing Towards Goal     Problem: Patient Education: Go to Patient Education Activity  Goal: Patient/Family Education  Outcome: Progressing Towards Goal     Problem: Pain  Goal: *Control of Pain  Outcome: Progressing Towards Goal  Goal: *PALLIATIVE CARE:  Alleviation of Pain  Outcome: Progressing Towards Goal     Problem: Patient Education: Go to Patient Education Activity  Goal: Patient/Family Education  Outcome: Progressing Towards Goal
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
## 2021-09-20 NOTE — PROGRESS NOTES
Problem: Risk for Spread of Infection  Goal: Prevent transmission of infectious organism to others  Description: Prevent the transmission of infectious organisms to other patients, staff members, and visitors. Outcome: Progressing Towards Goal     Problem: Patient Education:  Go to Education Activity  Goal: Patient/Family Education  Outcome: Progressing Towards Goal     Problem: Pressure Injury - Risk of  Goal: *Prevention of pressure injury  Description: Document Jordin Scale and appropriate interventions in the flowsheet. Outcome: Progressing Towards Goal  Note: Pressure Injury Interventions:  Sensory Interventions: Assess changes in LOC, Avoid rigorous massage over bony prominences, Check visual cues for pain, Discuss PT/OT consult with provider, Keep linens dry and wrinkle-free    Moisture Interventions: Absorbent underpads, Assess need for specialty bed, Check for incontinence Q2 hours and as needed, Internal/External urinary devices    Activity Interventions: Assess need for specialty bed, Increase time out of bed, Pressure redistribution bed/mattress(bed type), PT/OT evaluation    Mobility Interventions: Assess need for specialty bed, HOB 30 degrees or less, Pressure redistribution bed/mattress (bed type), PT/OT evaluation    Nutrition Interventions: Document food/fluid/supplement intake, Offer support with meals,snacks and hydration    Friction and Shear Interventions: Apply protective barrier, creams and emollients, Foam dressings/transparent film/skin sealants, HOB 30 degrees or less, Lift team/patient mobility team                Problem: Patient Education: Go to Patient Education Activity  Goal: Patient/Family Education  Outcome: Progressing Towards Goal     Problem: Falls - Risk of  Goal: *Absence of Falls  Description: Document Carisa Fall Risk and appropriate interventions in the flowsheet.   Outcome: Progressing Towards Goal  Note: Fall Risk Interventions:            Medication Interventions: Assess postural VS orthostatic hypotension, Evaluate medications/consider consulting pharmacy, Patient to call before getting OOB, Teach patient to arise slowly    Elimination Interventions: Call light in reach, Elevated toilet seat, Stay With Me (per policy)

## 2021-09-20 NOTE — ROUTINE PROCESS
Patient has an open area at the proximal end of a prior abdominal incision; milky, tan secretions noted. 4x4 & ABD dressing applied.

## 2021-09-21 LAB
ALBUMIN SERPL-MCNC: 1.6 G/DL (ref 3.4–5)
ANION GAP SERPL CALC-SCNC: 5 MMOL/L (ref 3–18)
BUN SERPL-MCNC: 5 MG/DL (ref 7–18)
BUN/CREAT SERPL: 9 (ref 12–20)
CALCIUM SERPL-MCNC: 7.6 MG/DL (ref 8.5–10.1)
CHLORIDE SERPL-SCNC: 105 MMOL/L (ref 100–111)
CO2 SERPL-SCNC: 28 MMOL/L (ref 21–32)
CREAT SERPL-MCNC: 0.53 MG/DL (ref 0.6–1.3)
GLUCOSE SERPL-MCNC: 87 MG/DL (ref 74–99)
PHOSPHATE SERPL-MCNC: 3 MG/DL (ref 2.5–4.9)
POTASSIUM SERPL-SCNC: 3.1 MMOL/L (ref 3.5–5.5)
SODIUM SERPL-SCNC: 138 MMOL/L (ref 136–145)

## 2021-09-21 PROCEDURE — 2709999900 HC NON-CHARGEABLE SUPPLY

## 2021-09-21 PROCEDURE — 36415 COLL VENOUS BLD VENIPUNCTURE: CPT

## 2021-09-21 PROCEDURE — 74011250637 HC RX REV CODE- 250/637: Performed by: INTERNAL MEDICINE

## 2021-09-21 PROCEDURE — 74011250637 HC RX REV CODE- 250/637: Performed by: FAMILY MEDICINE

## 2021-09-21 PROCEDURE — 80069 RENAL FUNCTION PANEL: CPT

## 2021-09-21 PROCEDURE — 99233 SBSQ HOSP IP/OBS HIGH 50: CPT | Performed by: INTERNAL MEDICINE

## 2021-09-21 PROCEDURE — 65270000029 HC RM PRIVATE

## 2021-09-21 RX ADMIN — PANTOPRAZOLE SODIUM 40 MG: 40 TABLET, DELAYED RELEASE ORAL at 08:38

## 2021-09-21 RX ADMIN — Medication 10 ML: at 15:25

## 2021-09-21 RX ADMIN — CEPHALEXIN 500 MG: 250 CAPSULE ORAL at 06:15

## 2021-09-21 RX ADMIN — CEPHALEXIN 500 MG: 250 CAPSULE ORAL at 23:57

## 2021-09-21 RX ADMIN — THERA TABS 1 TABLET: TAB at 08:39

## 2021-09-21 RX ADMIN — CEPHALEXIN 500 MG: 250 CAPSULE ORAL at 17:27

## 2021-09-21 RX ADMIN — CEPHALEXIN 500 MG: 250 CAPSULE ORAL at 01:15

## 2021-09-21 RX ADMIN — LINEZOLID 600 MG: 600 TABLET, FILM COATED ORAL at 20:40

## 2021-09-21 RX ADMIN — CEPHALEXIN 500 MG: 250 CAPSULE ORAL at 12:04

## 2021-09-21 RX ADMIN — LINEZOLID 600 MG: 600 TABLET, FILM COATED ORAL at 08:38

## 2021-09-21 NOTE — PROGRESS NOTES
Shift Summary Note:  Assumed care of patient in be awake and quiet, no c/o pain, no s/s of pain Uneventful night, VSS, call bell within reach.   Patient Vitals for the past 12 hrs:   Temp Pulse Resp BP SpO2   09/21/21 0407 98.5 °F (36.9 °C) 86 15 123/76 98 %   09/20/21 2017 97.8 °F (36.6 °C) 77 14 138/88 97 %

## 2021-09-21 NOTE — WOUND CARE
Physical Exam  Abdominal:            Focused assessment   Patient received reclining in bed. A & O x 3, flat affect, minimal conversation. Midline abdominal dehiscence/sinus tract from abscess. 8x3x2. 6cm with tunneling tract at 6 o'clock 5.8cm. Purulent drainage is expressed. Hien-wound skin below wound appears fragile, pink and soft. Patient denies pain with inspection and palpation. Topical treatment protocol in place as follows:   Wound specialist to apply Wound VAC VeraFlo. Dressing change to Midline abd dehiscence/abscess  Every other day and prn seal breach or vac malfunction. Settings to be determined upon initiation of therapy. To start therapy 9/22/2021. Until then pack with Mesalt and cover with dry dressing. Care turned over to nursing staff at this time. Pasquale Renteria RN, BSN, [address] [place] [address],[address]
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
## 2021-09-21 NOTE — PROGRESS NOTES
Problem: Risk for Spread of Infection  Goal: Prevent transmission of infectious organism to others  Description: Prevent the transmission of infectious organisms to other patients, staff members, and visitors. Outcome: Progressing Towards Goal     Problem: Patient Education:  Go to Education Activity  Goal: Patient/Family Education  Outcome: Progressing Towards Goal     Problem: Pressure Injury - Risk of  Goal: *Prevention of pressure injury  Description: Document Jordin Scale and appropriate interventions in the flowsheet. Outcome: Progressing Towards Goal  Note: Pressure Injury Interventions:  Sensory Interventions: Pressure redistribution bed/mattress (bed type)    Moisture Interventions: Absorbent underpads, Internal/External fecal devices, Internal/External urinary devices    Activity Interventions: Pressure redistribution bed/mattress(bed type)    Mobility Interventions: Pressure redistribution bed/mattress (bed type)    Nutrition Interventions: Document food/fluid/supplement intake    Friction and Shear Interventions: Apply protective barrier, creams and emollients                Problem: Patient Education: Go to Patient Education Activity  Goal: Patient/Family Education  Outcome: Progressing Towards Goal     Problem: Falls - Risk of  Goal: *Absence of Falls  Description: Document Carisa Fall Risk and appropriate interventions in the flowsheet.   Outcome: Progressing Towards Goal  Note: Fall Risk Interventions:            Medication Interventions: Bed/chair exit alarm    Elimination Interventions: Call light in reach              Problem: Patient Education: Go to Patient Education Activity  Goal: Patient/Family Education  Outcome: Progressing Towards Goal     Problem: Nutrition Deficit  Goal: *Optimize nutritional status  Outcome: Progressing Towards Goal     Problem: Patient Education: Go to Patient Education Activity  Goal: Patient/Family Education  Outcome: Progressing Towards Goal     Problem: Patient Education: Go to Patient Education Activity  Goal: Patient/Family Education  Outcome: Progressing Towards Goal     Problem: Patient Education: Go to Patient Education Activity  Goal: Patient/Family Education  Outcome: Progressing Towards Goal     Problem: Pain  Goal: *Control of Pain  Outcome: Progressing Towards Goal  Goal: *PALLIATIVE CARE:  Alleviation of Pain  Outcome: Progressing Towards Goal     Problem: Patient Education: Go to Patient Education Activity  Goal: Patient/Family Education  Outcome: Progressing Towards Goal   Pt is cooperative with staff, agreed to getting bathed this afternoon and is eating meals.
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
## 2021-09-21 NOTE — PROGRESS NOTES
Chart reviewed. Per Dr Zach Moy, pt should be ready for discharge tomorrow. Pt already active with [facility]. Pt will need stretcher transport home.   Blake Sanchez RN - Outcomes Manager  [phone]
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
## 2021-09-22 LAB
ALBUMIN SERPL-MCNC: 1.6 G/DL (ref 3.4–5)
ANION GAP SERPL CALC-SCNC: 4 MMOL/L (ref 3–18)
ANION GAP SERPL CALC-SCNC: 6 MMOL/L (ref 3–18)
BUN SERPL-MCNC: 7 MG/DL (ref 7–18)
BUN SERPL-MCNC: 8 MG/DL (ref 7–18)
BUN/CREAT SERPL: 12 (ref 12–20)
BUN/CREAT SERPL: 13 (ref 12–20)
CALCIUM SERPL-MCNC: 7.7 MG/DL (ref 8.5–10.1)
CALCIUM SERPL-MCNC: 7.8 MG/DL (ref 8.5–10.1)
CHLORIDE SERPL-SCNC: 106 MMOL/L (ref 100–111)
CHLORIDE SERPL-SCNC: 106 MMOL/L (ref 100–111)
CO2 SERPL-SCNC: 28 MMOL/L (ref 21–32)
CO2 SERPL-SCNC: 30 MMOL/L (ref 21–32)
CREAT SERPL-MCNC: 0.59 MG/DL (ref 0.6–1.3)
CREAT SERPL-MCNC: 0.61 MG/DL (ref 0.6–1.3)
ERYTHROCYTE [DISTWIDTH] IN BLOOD BY AUTOMATED COUNT: 16.3 % (ref 11.6–14.5)
GLUCOSE SERPL-MCNC: 100 MG/DL (ref 74–99)
GLUCOSE SERPL-MCNC: 101 MG/DL (ref 74–99)
HCT VFR BLD AUTO: 25.6 % (ref 36–48)
HGB BLD-MCNC: 7.9 G/DL (ref 13–16)
MCH RBC QN AUTO: 28.1 PG (ref 24–34)
MCHC RBC AUTO-ENTMCNC: 30.9 G/DL (ref 31–37)
MCV RBC AUTO: 91.1 FL (ref 78–100)
PHOSPHATE SERPL-MCNC: 2.6 MG/DL (ref 2.5–4.9)
PLATELET # BLD AUTO: 432 K/UL (ref 135–420)
PMV BLD AUTO: 9.4 FL (ref 9.2–11.8)
POTASSIUM SERPL-SCNC: 3.2 MMOL/L (ref 3.5–5.5)
POTASSIUM SERPL-SCNC: 3.6 MMOL/L (ref 3.5–5.5)
RBC # BLD AUTO: 2.81 M/UL (ref 4.35–5.65)
SODIUM SERPL-SCNC: 140 MMOL/L (ref 136–145)
SODIUM SERPL-SCNC: 140 MMOL/L (ref 136–145)
WBC # BLD AUTO: 11.3 K/UL (ref 4.6–13.2)

## 2021-09-22 PROCEDURE — 77030041076 HC DRSG AG OPTICELL MDII -A

## 2021-09-22 PROCEDURE — 80048 BASIC METABOLIC PNL TOTAL CA: CPT

## 2021-09-22 PROCEDURE — 80069 RENAL FUNCTION PANEL: CPT

## 2021-09-22 PROCEDURE — 99233 SBSQ HOSP IP/OBS HIGH 50: CPT | Performed by: FAMILY MEDICINE

## 2021-09-22 PROCEDURE — 74011250637 HC RX REV CODE- 250/637: Performed by: FAMILY MEDICINE

## 2021-09-22 PROCEDURE — 77030040392 HC DRSG OPTIFOAM MDII -A

## 2021-09-22 PROCEDURE — 74011250637 HC RX REV CODE- 250/637: Performed by: INTERNAL MEDICINE

## 2021-09-22 PROCEDURE — 2709999900 HC NON-CHARGEABLE SUPPLY

## 2021-09-22 PROCEDURE — 36415 COLL VENOUS BLD VENIPUNCTURE: CPT

## 2021-09-22 PROCEDURE — 85027 COMPLETE CBC AUTOMATED: CPT

## 2021-09-22 PROCEDURE — 65270000029 HC RM PRIVATE

## 2021-09-22 RX ORDER — POTASSIUM CHLORIDE 20 MEQ/1
20 TABLET, EXTENDED RELEASE ORAL DAILY
Qty: 10 TABLET | Refills: 0 | Status: SHIPPED | OUTPATIENT
Start: 2021-09-22 | End: 2021-10-02

## 2021-09-22 RX ORDER — CEPHALEXIN 500 MG/1
500 CAPSULE ORAL EVERY 6 HOURS
Qty: 44 CAPSULE | Refills: 0 | Status: SHIPPED | OUTPATIENT
Start: 2021-09-22 | End: 2021-10-03

## 2021-09-22 RX ORDER — LINEZOLID 600 MG/1
600 TABLET, FILM COATED ORAL EVERY 12 HOURS
Qty: 22 TABLET | Refills: 0 | Status: SHIPPED | OUTPATIENT
Start: 2021-09-22 | End: 2021-10-03

## 2021-09-22 RX ADMIN — THERA TABS 1 TABLET: TAB at 09:48

## 2021-09-22 RX ADMIN — LINEZOLID 600 MG: 600 TABLET, FILM COATED ORAL at 09:48

## 2021-09-22 RX ADMIN — PANTOPRAZOLE SODIUM 40 MG: 40 TABLET, DELAYED RELEASE ORAL at 09:48

## 2021-09-22 RX ADMIN — CEPHALEXIN 500 MG: 250 CAPSULE ORAL at 18:19

## 2021-09-22 RX ADMIN — CEPHALEXIN 500 MG: 250 CAPSULE ORAL at 05:23

## 2021-09-22 RX ADMIN — CEPHALEXIN 500 MG: 250 CAPSULE ORAL at 12:09

## 2021-09-22 RX ADMIN — Medication 10 ML: at 14:46

## 2021-09-22 RX ADMIN — Medication 10 ML: at 21:40

## 2021-09-22 RX ADMIN — LINEZOLID 600 MG: 600 TABLET, FILM COATED ORAL at 21:40
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
## 2021-09-22 NOTE — PROGRESS NOTES
Problem: Risk for Spread of Infection  Goal: Prevent transmission of infectious organism to others  Description: Prevent the transmission of infectious organisms to other patients, staff members, and visitors. Outcome: Progressing Towards Goal     Problem: Patient Education:  Go to Education Activity  Goal: Patient/Family Education  Outcome: Progressing Towards Goal     Problem: Pressure Injury - Risk of  Goal: *Prevention of pressure injury  Description: Document Jordin Scale and appropriate interventions in the flowsheet. Outcome: Progressing Towards Goal  Note: Pressure Injury Interventions:  Sensory Interventions: Keep linens dry and wrinkle-free, Pressure redistribution bed/mattress (bed type)    Moisture Interventions: Absorbent underpads, Internal/External urinary devices, Internal/External fecal devices    Activity Interventions: Pressure redistribution bed/mattress(bed type)    Mobility Interventions: Pressure redistribution bed/mattress (bed type)    Nutrition Interventions: Document food/fluid/supplement intake    Friction and Shear Interventions: Apply protective barrier, creams and emollients                Problem: Patient Education: Go to Patient Education Activity  Goal: Patient/Family Education  Outcome: Progressing Towards Goal     Problem: Falls - Risk of  Goal: *Absence of Falls  Description: Document Carisa Fall Risk and appropriate interventions in the flowsheet.   Outcome: Progressing Towards Goal  Note: Fall Risk Interventions:            Medication Interventions: Patient to call before getting OOB    Elimination Interventions: Call light in reach              Problem: Patient Education: Go to Patient Education Activity  Goal: Patient/Family Education  Outcome: Progressing Towards Goal     Problem: Nutrition Deficit  Goal: *Optimize nutritional status  Outcome: Progressing Towards Goal     Problem: Patient Education: Go to Patient Education Activity  Goal: Patient/Family Education  Outcome: Progressing Towards Goal     Problem: Patient Education: Go to Patient Education Activity  Goal: Patient/Family Education  Outcome: Progressing Towards Goal     Problem: Patient Education: Go to Patient Education Activity  Goal: Patient/Family Education  Outcome: Progressing Towards Goal     Problem: Pain  Goal: *Control of Pain  Outcome: Progressing Towards Goal  Goal: *PALLIATIVE CARE:  Alleviation of Pain  Outcome: Progressing Towards Goal     Problem: Patient Education: Go to Patient Education Activity  Goal: Patient/Family Education  Outcome: Progressing Towards Goal

## 2021-09-22 NOTE — ROUTINE PROCESS
Bedside verbal report given to Shannan Eden Oncoming Nurse. Report consisted of patients Situation, Background, Assessment and   Recommendations(SBAR). Information from the following report(s): Kardex, MAR and Recent Results was reviewed with the oncoming nurse. Opportunity for questions and clarification was provided.

## 2021-09-22 NOTE — PROGRESS NOTES
Shift Progress Note:  Assumed care of patient in bed awake and alert, no complaints offered VSS, uneventful night, call bell within reach.   Patient Vitals for the past 12 hrs:   Temp Pulse Resp BP SpO2   09/22/21 0326 98.4 °F (36.9 °C) 82 18 124/71    09/21/21 1932 99.5 °F (37.5 °C) 85 18 134/80 99 %

## 2021-09-23 LAB
ALBUMIN SERPL-MCNC: 1.6 G/DL (ref 3.4–5)
ANION GAP SERPL CALC-SCNC: 5 MMOL/L (ref 3–18)
BUN SERPL-MCNC: 7 MG/DL (ref 7–18)
BUN/CREAT SERPL: 15 (ref 12–20)
CALCIUM SERPL-MCNC: 8 MG/DL (ref 8.5–10.1)
CHLORIDE SERPL-SCNC: 106 MMOL/L (ref 100–111)
CO2 SERPL-SCNC: 29 MMOL/L (ref 21–32)
CREAT SERPL-MCNC: 0.48 MG/DL (ref 0.6–1.3)
ERYTHROCYTE [DISTWIDTH] IN BLOOD BY AUTOMATED COUNT: 16 % (ref 11.6–14.5)
GLUCOSE SERPL-MCNC: 81 MG/DL (ref 74–99)
HCT VFR BLD AUTO: 25.3 % (ref 36–48)
HGB BLD-MCNC: 7.8 G/DL (ref 13–16)
MCH RBC QN AUTO: 28 PG (ref 24–34)
MCHC RBC AUTO-ENTMCNC: 30.8 G/DL (ref 31–37)
MCV RBC AUTO: 90.7 FL (ref 78–100)
PHOSPHATE SERPL-MCNC: 2.6 MG/DL (ref 2.5–4.9)
PLATELET # BLD AUTO: 382 K/UL (ref 135–420)
PMV BLD AUTO: 9.8 FL (ref 9.2–11.8)
POTASSIUM SERPL-SCNC: 3.3 MMOL/L (ref 3.5–5.5)
RBC # BLD AUTO: 2.79 M/UL (ref 4.35–5.65)
SODIUM SERPL-SCNC: 140 MMOL/L (ref 136–145)
WBC # BLD AUTO: 9.8 K/UL (ref 4.6–13.2)

## 2021-09-23 PROCEDURE — 65270000029 HC RM PRIVATE

## 2021-09-23 PROCEDURE — 99233 SBSQ HOSP IP/OBS HIGH 50: CPT | Performed by: FAMILY MEDICINE

## 2021-09-23 PROCEDURE — 36415 COLL VENOUS BLD VENIPUNCTURE: CPT

## 2021-09-23 PROCEDURE — 85027 COMPLETE CBC AUTOMATED: CPT

## 2021-09-23 PROCEDURE — 74011250637 HC RX REV CODE- 250/637: Performed by: FAMILY MEDICINE

## 2021-09-23 PROCEDURE — 80069 RENAL FUNCTION PANEL: CPT

## 2021-09-23 PROCEDURE — 74011250637 HC RX REV CODE- 250/637: Performed by: INTERNAL MEDICINE

## 2021-09-23 PROCEDURE — 2709999900 HC NON-CHARGEABLE SUPPLY

## 2021-09-23 RX ADMIN — Medication 10 ML: at 14:00

## 2021-09-23 RX ADMIN — CEPHALEXIN 500 MG: 250 CAPSULE ORAL at 01:16

## 2021-09-23 RX ADMIN — LINEZOLID 600 MG: 600 TABLET, FILM COATED ORAL at 09:05

## 2021-09-23 RX ADMIN — THERA TABS 1 TABLET: TAB at 09:05

## 2021-09-23 RX ADMIN — PANTOPRAZOLE SODIUM 40 MG: 40 TABLET, DELAYED RELEASE ORAL at 09:05

## 2021-09-23 RX ADMIN — CEPHALEXIN 500 MG: 250 CAPSULE ORAL at 12:10

## 2021-09-23 RX ADMIN — Medication 10 ML: at 05:48

## 2021-09-23 RX ADMIN — ACETAMINOPHEN 650 MG: 325 TABLET ORAL at 20:32

## 2021-09-23 RX ADMIN — CEPHALEXIN 500 MG: 250 CAPSULE ORAL at 17:54

## 2021-09-23 RX ADMIN — Medication 10 ML: at 22:11

## 2021-09-23 RX ADMIN — CEPHALEXIN 500 MG: 250 CAPSULE ORAL at 05:48

## 2021-09-23 RX ADMIN — LINEZOLID 600 MG: 600 TABLET, FILM COATED ORAL at 20:22
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
## 2021-09-23 NOTE — PROGRESS NOTES
Received pt alert and oriented resting quietly in bed. No complaints voiced. Suprapubic and lee cath draining well. Colostomy intact .

## 2021-09-23 NOTE — PROGRESS NOTES
Problem: Risk for Spread of Infection  Goal: Prevent transmission of infectious organism to others  Description: Prevent the transmission of infectious organisms to other patients, staff members, and visitors. Outcome: Progressing Towards Goal     Problem: Patient Education:  Go to Education Activity  Goal: Patient/Family Education  Outcome: Progressing Towards Goal     Problem: Pressure Injury - Risk of  Goal: *Prevention of pressure injury  Description: Document Jordin Scale and appropriate interventions in the flowsheet. Outcome: Progressing Towards Goal  Note: Pressure Injury Interventions:  Sensory Interventions: Assess changes in LOC    Moisture Interventions: Absorbent underpads    Activity Interventions: Pressure redistribution bed/mattress(bed type)    Mobility Interventions: Pressure redistribution bed/mattress (bed type)    Nutrition Interventions: Document food/fluid/supplement intake, Offer support with meals,snacks and hydration    Friction and Shear Interventions: Foam dressings/transparent film/skin sealants                Problem: Patient Education: Go to Patient Education Activity  Goal: Patient/Family Education  Outcome: Progressing Towards Goal     Problem: Falls - Risk of  Goal: *Absence of Falls  Description: Document Carisa Fall Risk and appropriate interventions in the flowsheet.   Outcome: Progressing Towards Goal  Note: Fall Risk Interventions:            Medication Interventions: Utilize gait belt for transfers/ambulation    Elimination Interventions: Call light in reach              Problem: Patient Education: Go to Patient Education Activity  Goal: Patient/Family Education  Outcome: Progressing Towards Goal     Problem: Nutrition Deficit  Goal: *Optimize nutritional status  Outcome: Progressing Towards Goal     Problem: Patient Education: Go to Patient Education Activity  Goal: Patient/Family Education  Outcome: Progressing Towards Goal     Problem: Patient Education: Go to Patient Education Activity  Goal: Patient/Family Education  Outcome: Progressing Towards Goal     Problem: Patient Education: Go to Patient Education Activity  Goal: Patient/Family Education  Outcome: Progressing Towards Goal     Problem: Pain  Goal: *Control of Pain  Outcome: Progressing Towards Goal  Goal: *PALLIATIVE CARE:  Alleviation of Pain  Outcome: Progressing Towards Goal     Problem: Patient Education: Go to Patient Education Activity  Goal: Patient/Family Education  Outcome: Progressing Towards Goal

## 2021-09-23 NOTE — PROGRESS NOTES
Discharge planning    Placed updated home health order in [place] for [facility].     JEANNA Acevedo, RN  Pager # [phone]  Care Manager

## 2021-09-23 NOTE — PROGRESS NOTES
Discharge planning    Called [phone] with reference # [id number] for pharmacy approval for [facility]. [facility] Garcia was approved and will sen dapproval information to [employer] fax number ([phone]). Notified Pinky Aviles with [employer].     JEANNA Penn, RN  Pager # [phone]  Care Manager

## 2021-09-23 NOTE — ROUTINE PROCESS
Bedside verbal report given to [address] , Oncoming Nurse. Report consisted of patients Situation, Background, Assessment and   Recommendations(SBAR). Information from the following report(s): Kardex, MAR and Recent Results was reviewed with the oncoming nurse. Opportunity for questions and clarification was provided.

## 2021-09-24 VITALS
OXYGEN SATURATION: 100 % | WEIGHT: 210.4 LBS | BODY MASS INDEX: 27 KG/M2 | RESPIRATION RATE: 20 BRPM | TEMPERATURE: 98.9 F | SYSTOLIC BLOOD PRESSURE: 131 MMHG | HEART RATE: 87 BPM | DIASTOLIC BLOOD PRESSURE: 74 MMHG | HEIGHT: 74 IN

## 2021-09-24 LAB
ANION GAP SERPL CALC-SCNC: 3 MMOL/L (ref 3–18)
BUN SERPL-MCNC: 10 MG/DL (ref 7–18)
BUN/CREAT SERPL: 14 (ref 12–20)
CALCIUM SERPL-MCNC: 7.6 MG/DL (ref 8.5–10.1)
CHLORIDE SERPL-SCNC: 108 MMOL/L (ref 100–111)
CO2 SERPL-SCNC: 30 MMOL/L (ref 21–32)
CREAT SERPL-MCNC: 0.72 MG/DL (ref 0.6–1.3)
ERYTHROCYTE [DISTWIDTH] IN BLOOD BY AUTOMATED COUNT: 16.1 % (ref 11.6–14.5)
GLUCOSE SERPL-MCNC: 83 MG/DL (ref 74–99)
HCT VFR BLD AUTO: 23.8 % (ref 36–48)
HGB BLD-MCNC: 7.4 G/DL (ref 13–16)
MCH RBC QN AUTO: 28.1 PG (ref 24–34)
MCHC RBC AUTO-ENTMCNC: 31.1 G/DL (ref 31–37)
MCV RBC AUTO: 90.5 FL (ref 78–100)
PLATELET # BLD AUTO: 370 K/UL (ref 135–420)
PMV BLD AUTO: 9.8 FL (ref 9.2–11.8)
POTASSIUM SERPL-SCNC: 3.6 MMOL/L (ref 3.5–5.5)
RBC # BLD AUTO: 2.63 M/UL (ref 4.35–5.65)
SODIUM SERPL-SCNC: 141 MMOL/L (ref 136–145)
WBC # BLD AUTO: 9.8 K/UL (ref 4.6–13.2)

## 2021-09-24 PROCEDURE — 77030041076 HC DRSG AG OPTICELL MDII -A

## 2021-09-24 PROCEDURE — 99239 HOSP IP/OBS DSCHRG MGMT >30: CPT | Performed by: FAMILY MEDICINE

## 2021-09-24 PROCEDURE — 36415 COLL VENOUS BLD VENIPUNCTURE: CPT

## 2021-09-24 PROCEDURE — 85027 COMPLETE CBC AUTOMATED: CPT

## 2021-09-24 PROCEDURE — 80048 BASIC METABOLIC PNL TOTAL CA: CPT

## 2021-09-24 PROCEDURE — 74011250637 HC RX REV CODE- 250/637: Performed by: INTERNAL MEDICINE

## 2021-09-24 PROCEDURE — 2709999900 HC NON-CHARGEABLE SUPPLY

## 2021-09-24 PROCEDURE — 74011250637 HC RX REV CODE- 250/637: Performed by: FAMILY MEDICINE

## 2021-09-24 RX ADMIN — CEPHALEXIN 500 MG: 250 CAPSULE ORAL at 00:10

## 2021-09-24 RX ADMIN — PANTOPRAZOLE SODIUM 40 MG: 40 TABLET, DELAYED RELEASE ORAL at 10:10

## 2021-09-24 RX ADMIN — CEPHALEXIN 500 MG: 250 CAPSULE ORAL at 13:38

## 2021-09-24 RX ADMIN — CEPHALEXIN 500 MG: 250 CAPSULE ORAL at 06:00

## 2021-09-24 RX ADMIN — Medication 10 ML: at 05:00

## 2021-09-24 RX ADMIN — LINEZOLID 600 MG: 600 TABLET, FILM COATED ORAL at 10:10

## 2021-09-24 RX ADMIN — THERA TABS 1 TABLET: TAB at 10:10

## 2021-09-24 NOTE — DISCHARGE INSTRUCTIONS
Patient Education        Spinal Cord Injury (Paraplegic): Care Instructions  Your Care Instructions  A spinal cord injury occurs when a bone of the spine (vertebra) cuts or presses on the spinal cord. This can happen after a fall, car accident, or sports injury. It can also happen if something pierces the spinal cord. A spinal cord injury stops the communication between the brain and the rest of the body. The closer the injury is to the head, the more the body is affected. A serious spinal cord injury in the middle of the back usually causes loss of use of the legs (paralysis). It also usually causes loss of feeling in the legs. Loss of use and feeling in the legs is called paraplegia. First treatments for spinal cord injuries include preventing more damage to the spine and spinal cord. This can be done with braces, casts, straps, or surgery. Medicine may reduce swelling in the spinal cord. You may need surgery to remove bone, straighten the spine, or make the spine more stable. Long-term rehabilitation includes exercises to strengthen muscles that still work. You will also get help learning how to use braces and other tools to do everyday tasks. Researchers are working on new treatments. Some medicines may help the spinal cord heal. Implanted devices may help restore lost function. Realizing you are paralyzed is scary. You will feel many emotions. And you may need help coping. Seek out family, friends, and counselors for support. You also can do things at home to make yourself feel better while you go through treatment. Follow-up care is a key part of your treatment and safety. Be sure to make and go to all appointments, and call your doctor if you are having problems. It's also a good idea to know your test results and keep a list of the medicines you take. How can you care for yourself at home? · Let yourself grieve for the things you can no longer do.  Talk about your feelings with a family member, friend, or counselor. This can help you recover as much as possible. · Learn to take care of your bladder. This helps you avoid getting a urinary tract infection. You may need to insert a thin tube into your bladder regularly. This tube is called a catheter. It drains the urine from your bladder. A nurse will show you how to do this. · Work with your doctor or other health professional to make a bowel management program. This will help you have regular bowel movements. · Check your body often for signs of pressure injuries. These can be slow to heal and may become infected. They usually occur on the skin over bony areas such as the knees, hips, heels, or tailbone. And pressure injuries can occur in places where the skin folds over on itself. Change positions often to help prevent these sores. · Be alert for signs of a common problem called autonomic dysreflexia. This can happen when your body can't control blood pressure. It can cause headache, clammy skin, sweating, nausea, and a slow heart rate. · Do the exercises recommended by your therapist. Strong muscles can help you do everyday activities. · Get help with coping if you need it. Discuss your concerns with your doctor, counselor, or other health professional.  · Join a support group. Talking about your injury with other people who have problems like yours can help you learn to live with a spinal injury. When should you call for help? Call 911 anytime you think you may need emergency care. For example, call if:    · You have signs of a common problem called autonomic dysreflexia and the symptoms do not go away after 20 minutes. These include:  ? A pounding headache. ? A flushed face or red patches on your skin above the level of the spinal injury. ? Sweating above the level of the spinal injury. ? Nausea. ? Slow heart rate. ? Cold, clammy skin above the level of the spinal injury.    Call your doctor now or seek immediate medical care if:    · You have signs of infection, such as:  ? Increased pain, swelling, warmth, or redness. ? Red streaks leading from an incision. ? Pus draining from an incision. ? A fever.     · You need help with urination and bowel movements.     · You have cloudy or foul-smelling urine.     · You have pressure injuries.     · You feel hopeless and depressed. Watch closely for changes in your health, and be sure to contact your doctor if you have any problems. Where can you learn more? Go to http://www.gray.com/  Enter Z114 in the search box to learn more about \"Spinal Cord Injury (Paraplegic): Care Instructions. \"  Current as of: April 8, 2021               Content Version: 13.0  © 2006-2021 [employer]. Care instructions adapted under license by [employer] (which disclaims liability or warranty for this information). If you have questions about a medical condition or this instruction, always ask your healthcare professional. [place] [address] any warranty or liability for your use of this information. DISCHARGE SUMMARY from Nurse    PATIENT INSTRUCTIONS:    After general anesthesia or intravenous sedation, for 24 hours or while taking prescription Narcotics:  · Limit your activities  · Do not drive and operate hazardous machinery  · Do not make important personal or business decisions  · Do  not drink alcoholic beverages  · If you have not urinated within 8 hours after discharge, please contact your surgeon on call.     Report the following to your surgeon:  · Excessive pain, swelling, redness or odor of or around the surgical area  · Temperature over 100.5  · Nausea and vomiting lasting longer than 4 hours or if unable to take medications  · Any signs of decreased circulation or nerve impairment to extremity: change in color, persistent  numbness, tingling, coldness or increase pain  · Any questions    What to do at Home:  Recommended activity: Activity as tolerated, as tolerated, turn frequently    If you experience any of the following symptoms elevated temperature notify doctor    *  Please give a list of your current medications to your Primary Care Provider. *  Please update this list whenever your medications are discontinued, doses are      changed, or new medications (including over-the-counter products) are added. *  Please carry medication information at all times in case of emergency situations. These are general instructions for a healthy lifestyle:    No smoking/ No tobacco products/ Avoid exposure to second hand smoke  Surgeon General's Warning:  Quitting smoking now greatly reduces serious risk to your health. Obesity, smoking, and sedentary lifestyle greatly increases your risk for illness     Patient {ARMBANDS:81148}    A healthy diet, regular physical exercise & weight monitoring are important for maintaining a healthy lifestyle    You may be retaining fluid if you have a history of heart failure or if you experience any of the following symptoms:  Weight gain of 3 pounds or more overnight or 5 pounds in a week, increased swelling in our hands or feet or shortness of breath while lying flat in bed. Please call your doctor as soon as you notice any of these symptoms; do not wait until your next office visit. The discharge information has been reviewed with the patient. The patient verbalized understanding. Discharge medications reviewed with the patient and appropriate educational materials and side effects teaching were provided.   ___________________________________________________________________________________________________________________________________`n [id number] c`
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
## 2021-09-24 NOTE — PROGRESS NOTES
Nutrition Assessment     Type and Reason for Visit: Reassess    Nutrition Recommendations/Plan: Continue current diet and Ensure Enlive TID. Nutrition Assessment:  Tolerating diet with good meal and supplement intake. Malnutrition Assessment:  Malnutrition Status: Mild malnutrition     Estimated Daily Nutrient Needs:  Energy (kcal):  5125-2845  Protein (g):  104-131       Fluid (ml/day):  4600-4544    Nutrition Related Findings:  BM 9/23, loose. Current Nutrition Therapies:  ADULT DIET Regular; Low Fat/Low Chol/High Fiber/2 gm Na; Dislikes oatmeal.  Please send cold cereal in addition to breakfast meals. ADULT ORAL NUTRITION SUPPLEMENT Breakfast, Lunch, Dinner; Standard High Calorie/High Protein    Anthropometric Measures:  · Height:  6' 2\" (188 cm)  · Current Body Wt:  95.3 kg (210 lb)  · BMI: 27    Nutrition Diagnosis:   · Increased nutrient needs related to increased demand for energy/nutrients as evidenced by wounds    Nutrition Intervention:  Food and/or Nutrient Delivery: Continue current diet, Continue oral nutrition supplement  Nutrition Education and Counseling: No recommendations at this time  Coordination of Nutrition Care: Continue to monitor while inpatient    Goals:  PO nutrition intake will meet >75% of patient estimated nutritional needs within the next 7 days.        Nutrition Monitoring and Evaluation:   Behavioral-Environmental Outcomes: None identified  Food/Nutrient Intake Outcomes: Food and nutrient intake, Supplement intake  Physical Signs/Symptoms Outcomes: Biochemical data, Meal time behavior, Nutrition focused physical findings    Discharge Planning:    Continue oral nutrition supplement     Electronically signed by Arnoldo Daugherty RD on 9/24/2021 at 11:08 AM    Contact Number: [phone]

## 2021-09-24 NOTE — Clinical Note
Bedside and Verbal shift change report given to Susan (oncoming nurse) by Sunni Varela RN (offgoing nurse). Report included the following information SBAR, Kardex, Intake/Output, MAR and Recent Results.

## 2021-09-24 NOTE — PROGRESS NOTES
Discharge order noted for today. Pt has been accepted to [address] [place] [address]. Met with patient and he is agreeable to the transition plan today. Transport has been arranged through [facility]. Patient's discharge summary and home health  orders have been forwarded to [facility]  agency via [address]. Updated bedside RN, Sudha,  to the transition plan.   Discharge information has been documented on the AVS.       Chencho Sal RN - Outcomes Manager  [phone]

## 2021-10-11 ENCOUNTER — HOSPITAL ENCOUNTER (OUTPATIENT)
Age: 61
Setting detail: OBSERVATION
Discharge: HOME HEALTH | End: 2021-10-14
Attending: EMERGENCY MEDICINE | Admitting: STUDENT IN AN ORGANIZED HEALTH CARE EDUCATION/TRAINING PROGRAM
Payer: MEDICAID

## 2021-10-11 DIAGNOSIS — R10.31 ABDOMINAL PAIN, RIGHT LOWER QUADRANT: Primary | ICD-10-CM

## 2021-10-11 PROCEDURE — 96374 THER/PROPH/DIAG INJ IV PUSH: CPT

## 2021-10-11 PROCEDURE — 51702 INSERT TEMP BLADDER CATH: CPT

## 2021-10-11 PROCEDURE — 99284 EMERGENCY DEPT VISIT MOD MDM: CPT

## 2021-10-11 NOTE — Clinical Note
Status[de-identified] INPATIENT [101]   Type of Bed: Telemetry [19]   Cardiac Monitoring Required?: Yes   Inpatient Hospitalization Certified Necessary for the Following Reasons: 3.  Patient receiving treatment that can only be provided in an inpatient setting (further clarification in H&P documentation)   Admitting Diagnosis: Abdominal pain [906980]   Admitting Physician: Khalida Grande [[id number]]   Attending Physician: Khalida Grande [[id number]]   Estimated Length of Stay: 2 Midnights   Discharge Plan[de-identified] Home with Office Follow-up

## 2021-10-12 ENCOUNTER — APPOINTMENT (OUTPATIENT)
Dept: CT IMAGING | Age: 61
End: 2021-10-12
Attending: EMERGENCY MEDICINE
Payer: MEDICAID

## 2021-10-12 PROBLEM — R10.9 ABDOMINAL PAIN: Status: ACTIVE | Noted: 2021-10-12

## 2021-10-12 LAB
ALBUMIN SERPL-MCNC: 2.1 G/DL (ref 3.4–5)
ALBUMIN/GLOB SERPL: 0.4 {RATIO} (ref 0.8–1.7)
ALP SERPL-CCNC: 73 U/L (ref 45–117)
ALT SERPL-CCNC: 20 U/L (ref 16–61)
ANION GAP BLD CALC-SCNC: 8 MMOL/L (ref 10–20)
ANION GAP SERPL CALC-SCNC: 3 MMOL/L (ref 3–18)
APPEARANCE UR: ABNORMAL
AST SERPL-CCNC: 16 U/L (ref 10–38)
ATRIAL RATE: 80 BPM
BACTERIA URNS QL MICRO: ABNORMAL /HPF
BASE EXCESS BLD CALC-SCNC: 3.6 MMOL/L
BASOPHILS # BLD: 0 K/UL (ref 0–0.1)
BASOPHILS NFR BLD: 0 % (ref 0–2)
BILIRUB SERPL-MCNC: 0.3 MG/DL (ref 0.2–1)
BILIRUB UR QL: NEGATIVE
BUN SERPL-MCNC: 8 MG/DL (ref 7–18)
BUN/CREAT SERPL: 12 (ref 12–20)
C DIFF GDH STL QL: NEGATIVE
C DIFF TOX A+B STL QL IA: NEGATIVE
CA-I BLD-MCNC: 1.21 MMOL/L (ref 1.12–1.32)
CALCIUM SERPL-MCNC: 8.2 MG/DL (ref 8.5–10.1)
CALCULATED P AXIS, ECG09: 38 DEGREES
CALCULATED R AXIS, ECG10: 17 DEGREES
CALCULATED T AXIS, ECG11: 51 DEGREES
CHLORIDE BLD-SCNC: 102 MMOL/L (ref 98–107)
CHLORIDE SERPL-SCNC: 104 MMOL/L (ref 100–111)
CO2 BLD-SCNC: 29 MMOL/L (ref 19–24)
CO2 SERPL-SCNC: 30 MMOL/L (ref 21–32)
COLOR UR: YELLOW
CREAT BLD-MCNC: 0.7 MG/DL (ref 0.6–1.3)
CREAT SERPL-MCNC: 0.68 MG/DL (ref 0.6–1.3)
DIAGNOSIS, 93000: NORMAL
DIFFERENTIAL METHOD BLD: ABNORMAL
EOSINOPHIL # BLD: 0.1 K/UL (ref 0–0.4)
EOSINOPHIL NFR BLD: 1 % (ref 0–5)
ERYTHROCYTE [DISTWIDTH] IN BLOOD BY AUTOMATED COUNT: 17.9 % (ref 11.6–14.5)
GLOBULIN SER CALC-MCNC: 5.9 G/DL (ref 2–4)
GLUCOSE BLD STRIP.AUTO-MCNC: 95 MG/DL (ref 70–110)
GLUCOSE BLD-MCNC: 110 MG/DL (ref 65–100)
GLUCOSE SERPL-MCNC: 105 MG/DL (ref 74–99)
GLUCOSE UR STRIP.AUTO-MCNC: NEGATIVE MG/DL
HCO3 BLD-SCNC: 28.9 MMOL/L (ref 22–26)
HCT VFR BLD AUTO: 29.6 % (ref 36–48)
HGB BLD-MCNC: 9.5 G/DL (ref 13–16)
HGB UR QL STRIP: ABNORMAL
INTERPRETATION: NORMAL
KETONES UR QL STRIP.AUTO: NEGATIVE MG/DL
LACTATE BLD-SCNC: 0.84 MMOL/L (ref 0.4–2)
LACTATE BLD-SCNC: 0.97 MMOL/L (ref 0.4–2)
LEUKOCYTE ESTERASE UR QL STRIP.AUTO: ABNORMAL
LIPASE SERPL-CCNC: 47 U/L (ref 73–393)
LYMPHOCYTES # BLD: 1.6 K/UL (ref 0.9–3.6)
LYMPHOCYTES NFR BLD: 16 % (ref 21–52)
MAGNESIUM SERPL-MCNC: 1.7 MG/DL (ref 1.6–2.6)
MCH RBC QN AUTO: 29.3 PG (ref 24–34)
MCHC RBC AUTO-ENTMCNC: 32.1 G/DL (ref 31–37)
MCV RBC AUTO: 91.4 FL (ref 78–100)
MONOCYTES # BLD: 0.5 K/UL (ref 0.05–1.2)
MONOCYTES NFR BLD: 5 % (ref 3–10)
NEUTS SEG # BLD: 8.2 K/UL (ref 1.8–8)
NEUTS SEG NFR BLD: 78 % (ref 40–73)
NITRITE UR QL STRIP.AUTO: NEGATIVE
P-R INTERVAL, ECG05: 132 MS
PCO2 BLD: 45.7 MMHG (ref 35–45)
PH BLD: 7.41 [PH] (ref 7.35–7.45)
PH UR STRIP: 6.5 [PH] (ref 5–8)
PLATELET # BLD AUTO: 273 K/UL (ref 135–420)
PMV BLD AUTO: 9.2 FL (ref 9.2–11.8)
PO2 BLD: 81 MMHG (ref 80–100)
POTASSIUM BLD-SCNC: 4.3 MMOL/L (ref 3.5–5.1)
POTASSIUM SERPL-SCNC: 4.1 MMOL/L (ref 3.5–5.5)
PROT SERPL-MCNC: 8 G/DL (ref 6.4–8.2)
PROT UR STRIP-MCNC: ABNORMAL MG/DL
Q-T INTERVAL, ECG07: 406 MS
QRS DURATION, ECG06: 76 MS
QTC CALCULATION (BEZET), ECG08: 468 MS
RBC # BLD AUTO: 3.24 M/UL (ref 4.35–5.65)
RBC #/AREA URNS HPF: ABNORMAL /HPF (ref 0–5)
SAO2 % BLD: 96 %
SERVICE CMNT-IMP: ABNORMAL
SERVICE CMNT-IMP: ABNORMAL
SODIUM BLD-SCNC: 138 MMOL/L (ref 136–145)
SODIUM SERPL-SCNC: 137 MMOL/L (ref 136–145)
SP GR UR REFRACTOMETRY: 1.01 (ref 1–1.03)
SPECIMEN SITE: ABNORMAL
UROBILINOGEN UR QL STRIP.AUTO: 0.2 EU/DL (ref 0.2–1)
VENTRICULAR RATE, ECG03: 80 BPM
WBC # BLD AUTO: 10.5 K/UL (ref 4.6–13.2)
WBC URNS QL MICRO: ABNORMAL /HPF (ref 0–5)
YEAST URNS QL MICRO: ABNORMAL

## 2021-10-12 PROCEDURE — 96376 TX/PRO/DX INJ SAME DRUG ADON: CPT

## 2021-10-12 PROCEDURE — 74011000258 HC RX REV CODE- 258: Performed by: INTERNAL MEDICINE

## 2021-10-12 PROCEDURE — 74011250636 HC RX REV CODE- 250/636: Performed by: INTERNAL MEDICINE

## 2021-10-12 PROCEDURE — 87449 NOS EACH ORGANISM AG IA: CPT

## 2021-10-12 PROCEDURE — 74177 CT ABD & PELVIS W/CONTRAST: CPT

## 2021-10-12 PROCEDURE — 96375 TX/PRO/DX INJ NEW DRUG ADDON: CPT

## 2021-10-12 PROCEDURE — 84295 ASSAY OF SERUM SODIUM: CPT

## 2021-10-12 PROCEDURE — 83690 ASSAY OF LIPASE: CPT

## 2021-10-12 PROCEDURE — 85025 COMPLETE CBC W/AUTO DIFF WBC: CPT

## 2021-10-12 PROCEDURE — 65270000029 HC RM PRIVATE

## 2021-10-12 PROCEDURE — 82962 GLUCOSE BLOOD TEST: CPT

## 2021-10-12 PROCEDURE — 80053 COMPREHEN METABOLIC PANEL: CPT

## 2021-10-12 PROCEDURE — 99218 HC RM OBSERVATION: CPT

## 2021-10-12 PROCEDURE — 74011000636 HC RX REV CODE- 636: Performed by: EMERGENCY MEDICINE

## 2021-10-12 PROCEDURE — 99222 1ST HOSP IP/OBS MODERATE 55: CPT | Performed by: INTERNAL MEDICINE

## 2021-10-12 PROCEDURE — 83605 ASSAY OF LACTIC ACID: CPT

## 2021-10-12 PROCEDURE — 74011000250 HC RX REV CODE- 250: Performed by: INTERNAL MEDICINE

## 2021-10-12 PROCEDURE — 81001 URINALYSIS AUTO W/SCOPE: CPT

## 2021-10-12 PROCEDURE — 93005 ELECTROCARDIOGRAM TRACING: CPT

## 2021-10-12 PROCEDURE — 87086 URINE CULTURE/COLONY COUNT: CPT

## 2021-10-12 PROCEDURE — 87106 FUNGI IDENTIFICATION YEAST: CPT

## 2021-10-12 PROCEDURE — 74011250636 HC RX REV CODE- 250/636

## 2021-10-12 PROCEDURE — 83735 ASSAY OF MAGNESIUM: CPT

## 2021-10-12 PROCEDURE — 96374 THER/PROPH/DIAG INJ IV PUSH: CPT

## 2021-10-12 RX ORDER — PANTOPRAZOLE SODIUM 40 MG/1
40 TABLET, DELAYED RELEASE ORAL
Status: DISCONTINUED | OUTPATIENT
Start: 2021-10-13 | End: 2021-10-14 | Stop reason: HOSPADM

## 2021-10-12 RX ORDER — MORPHINE SULFATE 4 MG/ML
4 INJECTION INTRAVENOUS
Status: COMPLETED | OUTPATIENT
Start: 2021-10-12 | End: 2021-10-12

## 2021-10-12 RX ORDER — PANTOPRAZOLE SODIUM 40 MG/1
40 GRANULE, DELAYED RELEASE ORAL 2 TIMES DAILY
COMMUNITY
End: 2022-04-21

## 2021-10-12 RX ORDER — MIRTAZAPINE 15 MG/1
TABLET, ORALLY DISINTEGRATING ORAL
COMMUNITY
Start: 2021-07-19 | End: 2022-04-21

## 2021-10-12 RX ORDER — ACETAMINOPHEN 325 MG/1
650 TABLET ORAL
Status: DISCONTINUED | OUTPATIENT
Start: 2021-10-12 | End: 2021-10-14 | Stop reason: HOSPADM

## 2021-10-12 RX ORDER — SODIUM CHLORIDE 0.9 % (FLUSH) 0.9 %
5-40 SYRINGE (ML) INJECTION EVERY 8 HOURS
Status: DISCONTINUED | OUTPATIENT
Start: 2021-10-12 | End: 2021-10-14 | Stop reason: HOSPADM

## 2021-10-12 RX ORDER — ONDANSETRON 2 MG/ML
4 INJECTION INTRAMUSCULAR; INTRAVENOUS
Status: DISCONTINUED | OUTPATIENT
Start: 2021-10-12 | End: 2021-10-14 | Stop reason: HOSPADM

## 2021-10-12 RX ORDER — ENOXAPARIN SODIUM 100 MG/ML
40 INJECTION SUBCUTANEOUS DAILY
Status: DISCONTINUED | OUTPATIENT
Start: 2021-10-13 | End: 2021-10-14 | Stop reason: HOSPADM

## 2021-10-12 RX ORDER — DICYCLOMINE HYDROCHLORIDE 10 MG/1
10 CAPSULE ORAL
Status: DISCONTINUED | OUTPATIENT
Start: 2021-10-12 | End: 2021-10-12

## 2021-10-12 RX ORDER — MORPHINE SULFATE 4 MG/ML
4 INJECTION INTRAVENOUS
Status: DISCONTINUED | OUTPATIENT
Start: 2021-10-12 | End: 2021-10-14 | Stop reason: HOSPADM

## 2021-10-12 RX ORDER — GLUCOSA SU 2KCL/CHONDROITIN SU 500-400 MG
1 CAPSULE ORAL DAILY
COMMUNITY
Start: 2021-07-28 | End: 2022-04-21

## 2021-10-12 RX ORDER — THERA TABS 400 MCG
1 TAB ORAL DAILY
Status: DISCONTINUED | OUTPATIENT
Start: 2021-10-13 | End: 2021-10-14 | Stop reason: HOSPADM

## 2021-10-12 RX ORDER — MORPHINE SULFATE 4 MG/ML
INJECTION, SOLUTION INTRAMUSCULAR; INTRAVENOUS
Status: COMPLETED
Start: 2021-10-12 | End: 2021-10-12

## 2021-10-12 RX ORDER — SODIUM CHLORIDE 0.9 % (FLUSH) 0.9 %
5-40 SYRINGE (ML) INJECTION AS NEEDED
Status: DISCONTINUED | OUTPATIENT
Start: 2021-10-12 | End: 2021-10-14 | Stop reason: HOSPADM

## 2021-10-12 RX ORDER — HONEY 100 %
PASTE (ML) TOPICAL
COMMUNITY
Start: 2021-08-30 | End: 2022-04-21

## 2021-10-12 RX ORDER — POLYETHYLENE GLYCOL 3350 17 G/17G
17 POWDER, FOR SOLUTION ORAL DAILY PRN
Status: DISCONTINUED | OUTPATIENT
Start: 2021-10-12 | End: 2021-10-14 | Stop reason: HOSPADM

## 2021-10-12 RX ORDER — PROMETHAZINE HYDROCHLORIDE 12.5 MG/1
12.5 TABLET ORAL
Status: DISCONTINUED | OUTPATIENT
Start: 2021-10-12 | End: 2021-10-14 | Stop reason: HOSPADM

## 2021-10-12 RX ORDER — ACETAMINOPHEN 650 MG/1
650 SUPPOSITORY RECTAL
Status: DISCONTINUED | OUTPATIENT
Start: 2021-10-12 | End: 2021-10-14 | Stop reason: HOSPADM

## 2021-10-12 RX ORDER — ESCITALOPRAM OXALATE 20 MG/1
20 TABLET ORAL DAILY
COMMUNITY
Start: 2021-08-03

## 2021-10-12 RX ADMIN — PIPERACILLIN AND TAZOBACTAM 3.38 G: 3; .375 INJECTION, POWDER, LYOPHILIZED, FOR SOLUTION INTRAVENOUS at 14:47

## 2021-10-12 RX ADMIN — PIPERACILLIN AND TAZOBACTAM 3.38 G: 3; .375 INJECTION, POWDER, LYOPHILIZED, FOR SOLUTION INTRAVENOUS at 22:38

## 2021-10-12 RX ADMIN — Medication 10 ML: at 22:00

## 2021-10-12 RX ADMIN — IOPAMIDOL 100 ML: 612 INJECTION, SOLUTION INTRAVENOUS at 02:50

## 2021-10-12 RX ADMIN — MORPHINE SULFATE 4 MG: 4 INJECTION INTRAVENOUS at 05:50

## 2021-10-12 RX ADMIN — CEFTRIAXONE SODIUM 2 G: 2 INJECTION, POWDER, FOR SOLUTION INTRAMUSCULAR; INTRAVENOUS at 12:34

## 2021-10-12 RX ADMIN — Medication 10 ML: at 14:48

## 2021-10-12 NOTE — ED PROVIDER NOTES
EMERGENCY DEPARTMENT HISTORY AND PHYSICAL EXAM    10:55 PM  Date: 10/11/2021  Patient Name: Elizabeth Egan    History of Presenting Illness     No chief complaint on file. History Provided By: Patient and Patient's Mother    HPI: Elizabeth Egan is a 64 y.o. male with history of hypertension, paraplegia and status post partial colectomy with colostomy bag. Suprapubic recently placed after bladder rupture due to abscess and sepsis. Patient was brought in by ambulance for right-sided abdominal pain that started approximately 30 minutes prior to arrival.  Pain is sharp radiates to his right upper quadrant but originates from his right lower quadrant. Its been constant however improved since it started. Denies nausea or vomiting. Denies change in his colostomy output or his urine no history of fever or chills. No chest pain respiratory symptoms. He is producing gas from his colostomy bag. Location:  Severity:  Timing/course:    Onset/Duration:     PCP: Fanny Ochoa MD    Past History     Past Medical History:  Past Medical History:   Diagnosis Date    Asthma     Hypertension     Ill-defined condition     Neurogenic Bladder, s/p T11 injury    Paralysis ([address] 2017    waist down,  [facility]       Past Surgical History:  Past Surgical History:   Procedure Laterality Date    COLONOSCOPY N/A 8/6/2021    COLONOSCOPY performed by Aishwarya Burnette MD at [address] [facility] surgery    HX OTHER SURGICAL  2017    spinal surgery    HX OTHER SURGICAL  2017    Liver repair from [facility]    HX OTHER SURGICAL  04/2021    Decubitus Debridement    HX OTHER SURGICAL  04/29/2021    Robotic colostomy formation and Debridement of stage IV decubitus ulcer all the way to the bone    HX OTHER SURGICAL  05/03/2021    Exploratory laparotomy with small-bowel resection with primary anastomosis and Partial colectomy with revision of the colostomy       Family History:  No family history on file.    Social History:  Social History     Tobacco Use    Smoking status: Never Smoker    Smokeless tobacco: Never Used   Vaping Use    Vaping Use: Never used   Substance Use Topics    Alcohol use: No    Drug use: Never       Allergies:  No Known Allergies    Review of Systems   Review of Systems   Gastrointestinal: Positive for abdominal pain. All other systems reviewed and are negative. Physical Exam     No data found. Physical Exam  Vitals and nursing note reviewed. Constitutional:       Appearance: Normal appearance. HENT:      Head: Normocephalic and atraumatic. Eyes:      Extraocular Movements: Extraocular movements intact. Cardiovascular:      Rate and Rhythm: Normal rate. Pulses: Normal pulses. Pulmonary:      Effort: Pulmonary effort is normal. No respiratory distress. Abdominal:      Palpations: Abdomen is soft. Tenderness: There is abdominal tenderness in the right upper quadrant and right lower quadrant. Musculoskeletal:      Cervical back: Normal range of motion and neck supple. Skin:     General: Skin is warm and dry. Neurological:      Mental Status: He is alert and oriented to person, place, and time. Mental status is at baseline. Comments: Paraplegic   Psychiatric:         Mood and Affect: Mood normal.         Behavior: Behavior normal.         Diagnostic Study Results     Labs -  No results found for this or any previous visit (from the past 12 hour(s)). Radiologic Studies -   No results found. Medical Decision Making     ED Course: Progress Notes, Reevaluation, and Consults:    10:55 PM Initial assessment performed. The patients presenting problems have been discussed, and they/their family are in agreement with the care plan formulated and outlined with them. I have encouraged them to ask questions as they arise throughout their visit. 4:31 AM  Results discussed with patient and mother.   He is getting home wound care for his decubitus ulcer and he is on Linzolid and Keflex. He has an appointment with his urologist next week. I discussed with the mom that we sent a urine culture and if he needed different antibiotics will call it in but his labs are reassuring and his exam is improving with no acute abdominal pathologies. Provided with care instructions and return precautions. Mom feels comfortable going home. 6:00 AM  Patient had sudden onset worsening of his abdominal pain while waiting to be discharged. He had abdominal distention and I noticed that there is minimal drainage in his Chavez. The suprapubic catheter sutures were avulsed but it was still in the bladder per CT scan. It was actually locked and not draining, we opened the valve and flushed it and it started draining urine. I fixed it in place using tape temporarily. Will reevaluate. Pain is probably related to urinary retention. Per chart his suprapubic cath was draining prior to discharge    6:24 AM  The patient drained over 500 cc of urine but is still pretty uncomfortable. Point-of-care lactic is 0.9. Will page urology for evaluation especially with a fresh suprapubic cath and recent bladder rupture. Bladder scan showed only 20 cc of urine.    7:01 AM  Case discussed with Dr. John Golden, unclear why the patient has pain its most likely to be related to his bladder since it is now decompressed but he recommended admitting the patient to medicine and they will evaluate him later today. Provider Notes (Medical Decision Making): 57-year-old male with multiple comorbidities, status post colostomy and suprapubic cath. Patient is presenting with sudden onset right lower quadrant abdominal pain radiating to right upper quadrant. Well-appearing on exam with stable vitals, afebrile. Abdomen is soft with focal tenderness in the right upper and right lower quadrant. No rebound. Soft otherwise. Colostomy is patent, pink and productive.   Will obtain screening labs to evaluate for his liver function versus pancreatitis versus infectious etiology. CT of his abdomen pelvis to evaluate for pancreatitis versus strangulated hernia. He recently had an infected fistula in his pelvis. Ginettetyharriett Cooper of care lactic acid as well. Urine culture. Procedures:     Critical Care Time:     Vital Signs-Reviewed the patient's vital signs. Reviewed pt's pulse ox reading. EKG: Interpreted by the EP. Time Interpreted:    Rate:    Rhythm:    Interpretation:   Comparison:     Records Reviewed: Nursing Notes, Old Medical Records, Previous electrocardiograms, Previous Radiology Studies and Previous Laboratory Studies (Time of Review: 10:55 PM)  -I am the first provider for this patient.  -I reviewed the vital signs, available nursing notes, past medical history, past surgical history, family history and social history. Current Outpatient Medications   Medication Sig Dispense Refill    acetaminophen (TYLENOL) 325 mg tablet Take 2 Tablets by mouth every six (6) hours as needed for Pain or Fever. 20 Tablet 0    pantoprazole (PROTONIX) 40 mg tablet Take 1 Tablet by mouth two (2) times a day. (Patient taking differently: Take 40 mg by mouth daily.) 60 Tablet 0    therapeutic multivitamin (THERAGRAN) tablet Take 1 Tablet by mouth daily. 30 Tablet 0        Clinical Impression     Clinical Impression: No diagnosis found. Disposition: dc        This note was dictated utilizing voice recognition software which may lead to typographical errors. I apologize in advance if the situation occurs. If questions arise please do not hesitate to contact me or call our department.     Kimmy Tolliver MD  10:55 PM
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
## 2021-10-12 NOTE — PROGRESS NOTES
attempted to complete the initial Spiritual Assessment of the patient in bed5 of the emergency room and offer Pastoral Care support to the patient but found the patient non-responsive at this time. , Patient does have an advance directive/Power of  on file here. Patient does not have any Jain/cultural needs that will affect patients preferences in health care. Chaplains will continue to follow and will provide pastoral care on an as needed/requested basis.     Keely Medford  Spiritual Care Department  [phone]
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
## 2021-10-12 NOTE — ED NOTES
Patient awake complaining of severe abdominal pain, Dr Faisal Wade made aware now at bedside, patients SP tube noted to be out some repostio

## 2021-10-12 NOTE — PROGRESS NOTES
Reason for Renal Dosing:  Per Renal Dosing Policy    Patient clinical status and labs ordered/reviewed. Pt Weight     Serum Creatinine Lab Results   Component Value Date/Time    Creatinine 0.68 10/12/2021 01:51 AM    Creatinine, POC 0.7 08/17/2021 07:57 AM    Creatinine (POC) 0.70 10/12/2021 01:45 AM       Creatinine Clearance CrCl cannot be calculated (Unknown ideal weight.). BUN Lab Results   Component Value Date/Time    BUN 8 10/12/2021 01:51 AM       WBC Lab Results   Component Value Date/Time    WBC 10.5 10/12/2021 01:51 AM      Temperature Temp: 98.4 °F (36.9 °C)   HR Pulse (Heart Rate): 78     BP BP: (!) 154/90           Drug type: Antibiotic indicated for urinary tract infection x 7 days      Drug/dose: was adjusted to : Zosyn 3.375 g IV q6h x 7 days    Continue to monitor.     Signed Bing Nina, PHARMYAN  Date 10/12/2021  Time 2:09 PM

## 2021-10-13 LAB
ANION GAP SERPL CALC-SCNC: 4 MMOL/L (ref 3–18)
BUN SERPL-MCNC: 8 MG/DL (ref 7–18)
BUN/CREAT SERPL: 14 (ref 12–20)
CALCIUM SERPL-MCNC: 7.9 MG/DL (ref 8.5–10.1)
CHLORIDE SERPL-SCNC: 104 MMOL/L (ref 100–111)
CO2 SERPL-SCNC: 28 MMOL/L (ref 21–32)
CREAT SERPL-MCNC: 0.57 MG/DL (ref 0.6–1.3)
ERYTHROCYTE [DISTWIDTH] IN BLOOD BY AUTOMATED COUNT: 18.3 % (ref 11.6–14.5)
GLUCOSE SERPL-MCNC: 79 MG/DL (ref 74–99)
HCT VFR BLD AUTO: 29.9 % (ref 36–48)
HGB BLD-MCNC: 9.6 G/DL (ref 13–16)
MAGNESIUM SERPL-MCNC: 1.8 MG/DL (ref 1.6–2.6)
MCH RBC QN AUTO: 29.4 PG (ref 24–34)
MCHC RBC AUTO-ENTMCNC: 32.1 G/DL (ref 31–37)
MCV RBC AUTO: 91.7 FL (ref 78–100)
PLATELET # BLD AUTO: 269 K/UL (ref 135–420)
PMV BLD AUTO: 9.5 FL (ref 9.2–11.8)
POTASSIUM SERPL-SCNC: 4.2 MMOL/L (ref 3.5–5.5)
RBC # BLD AUTO: 3.26 M/UL (ref 4.35–5.65)
SODIUM SERPL-SCNC: 136 MMOL/L (ref 136–145)
WBC # BLD AUTO: 11.9 K/UL (ref 4.6–13.2)

## 2021-10-13 PROCEDURE — 65270000029 HC RM PRIVATE

## 2021-10-13 PROCEDURE — 96372 THER/PROPH/DIAG INJ SC/IM: CPT

## 2021-10-13 PROCEDURE — 96376 TX/PRO/DX INJ SAME DRUG ADON: CPT

## 2021-10-13 PROCEDURE — 74011000258 HC RX REV CODE- 258: Performed by: INTERNAL MEDICINE

## 2021-10-13 PROCEDURE — 80048 BASIC METABOLIC PNL TOTAL CA: CPT

## 2021-10-13 PROCEDURE — 74011250637 HC RX REV CODE- 250/637: Performed by: INTERNAL MEDICINE

## 2021-10-13 PROCEDURE — 2709999900 HC NON-CHARGEABLE SUPPLY

## 2021-10-13 PROCEDURE — 99218 HC RM OBSERVATION: CPT

## 2021-10-13 PROCEDURE — 83735 ASSAY OF MAGNESIUM: CPT

## 2021-10-13 PROCEDURE — 85027 COMPLETE CBC AUTOMATED: CPT

## 2021-10-13 PROCEDURE — 74011250636 HC RX REV CODE- 250/636: Performed by: INTERNAL MEDICINE

## 2021-10-13 PROCEDURE — 99232 SBSQ HOSP IP/OBS MODERATE 35: CPT | Performed by: INTERNAL MEDICINE

## 2021-10-13 RX ORDER — FLUCONAZOLE 2 MG/ML
200 INJECTION, SOLUTION INTRAVENOUS DAILY
Status: DISCONTINUED | OUTPATIENT
Start: 2021-10-14 | End: 2021-10-14 | Stop reason: HOSPADM

## 2021-10-13 RX ADMIN — PANTOPRAZOLE SODIUM 40 MG: 40 TABLET, DELAYED RELEASE ORAL at 10:04

## 2021-10-13 RX ADMIN — PIPERACILLIN AND TAZOBACTAM 3.38 G: 3; .375 INJECTION, POWDER, LYOPHILIZED, FOR SOLUTION INTRAVENOUS at 04:39

## 2021-10-13 RX ADMIN — Medication 10 ML: at 16:50

## 2021-10-13 RX ADMIN — Medication 10 ML: at 21:05

## 2021-10-13 RX ADMIN — PIPERACILLIN AND TAZOBACTAM 3.38 G: 3; .375 INJECTION, POWDER, LYOPHILIZED, FOR SOLUTION INTRAVENOUS at 21:05

## 2021-10-13 RX ADMIN — Medication 10 ML: at 06:17

## 2021-10-13 RX ADMIN — THERA TABS 1 TABLET: TAB at 10:04

## 2021-10-13 RX ADMIN — PIPERACILLIN AND TAZOBACTAM 3.38 G: 3; .375 INJECTION, POWDER, LYOPHILIZED, FOR SOLUTION INTRAVENOUS at 10:04

## 2021-10-13 RX ADMIN — PIPERACILLIN AND TAZOBACTAM 3.38 G: 3; .375 INJECTION, POWDER, LYOPHILIZED, FOR SOLUTION INTRAVENOUS at 16:49

## 2021-10-13 RX ADMIN — ENOXAPARIN SODIUM 40 MG: 40 INJECTION SUBCUTANEOUS at 10:04
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
## 2021-10-13 NOTE — CONSULTS
Comprehensive Nutrition Assessment    Type and Reason for Visit: Initial, Consult    Nutrition Recommendations/Plan:   - Add supplement: Ensure nutrition drink BID  - Continue all other nutrition interventions. Monitor po intake of meals/ supplements    Nutrition Assessment:  Pt reported good appetite/ po intake PTA. Has been on po diet, but pt stated he did not receive any meals today yet. Had unplanned wt loss PTA. Agreeable to nutrition supplement. Pt was lethargic during nutrition visit. Has multiple pressure injuries; not staged at this time. Malnutrition Assessment:  Malnutrition Status:  Mild malnutrition    Context:  Acute illness     Findings of the 6 clinical characteristics of malnutrition:   Energy Intake:  No significant decrease in energy intake  Weight Loss:  7.0 - Greater than 5% over 1 month         Nutrition History and Allergies: Past medical hx:  HTN, paraplegia (waist down). UBW is 210 lb. -22 lb, 10.5% x month PTA per chart hx. Pt reported experiencing unplanned wt loss PTA. Good appetite/ po intake PTA. No known food allergies     Estimated Daily Nutrient Needs:  Energy (kcal): 1311-7211; Weight Used for Energy Requirements: Admission (85.4 kg)  Protein (g): ; Weight Used for Protein Requirements: Admission (x1-1.2)  Fluid (ml/day): 8980-4577; Method Used for Fluid Requirements: 1 ml/kcal      Nutrition Related Findings:  BM: pt unsure. No edema. Meds:  MVI      Wounds:    Pressure injury, Multiple       Current Nutrition Therapies:  ADULT DIET Regular; 3 carb choices (45 gm/meal)    Anthropometric Measures:  · Height:  6' 2\" (188 cm)  · Current Body Wt:  85.4 kg (188 lb 4.4 oz)   · Admission Body Wt:  188 lb 4.4 oz    · Usual Body Wt:  95.3 kg (210 lb)     · Ideal Body Wt:  190 lbs:  99.1 %   · Adjusted Body Weight:  202.4; Weight Adjustment for: Paraplegia   · Adjusted BMI:  26    · BMI Category:   Overweight (BMI 25.0-29.9) (due to paraplegia, adjusted BMI) Nutrition Diagnosis:   · Mild malnutrition, In context of acute illness or injury related to inadequate protein-energy intake as evidenced by weight loss greater than or equal to 5% in 1 month      Nutrition Interventions:   Food and/or Nutrient Delivery: Continue current diet, Vitamin supplement, Start oral nutrition supplement  Nutrition Education and Counseling: No recommendations at this time  Coordination of Nutrition Care: Continue to monitor while inpatient    Goals:  PO nutrition intake will meet >75% of patient estimated nutritional needs within the next 7 days. Nutrition Monitoring and Evaluation:   Behavioral-Environmental Outcomes: None identified  Food/Nutrient Intake Outcomes: Food and nutrient intake, Vitamin/mineral intake, Supplement intake  Physical Signs/Symptoms Outcomes: Biochemical data, Meal time behavior, Nutrition focused physical findings    Discharge Planning:     Too soon to determine     Electronically signed by Leilani Bain RD on 10/13/2021 at 3:58 PM    Contact: [phone]

## 2021-10-13 NOTE — WOUND CARE
Physical Exam  Abdominal:       Musculoskeletal:        Legs:         Feet:         Focused assessment  ER06  Patient received lying on stretcher, sleeping. A & O x 3, lee draining cloudy yellow urine, suprapubic drain draining cloudy yellow urine. This drain is unsecured. Colostomy to left lower quad, liquid brown feces in pouch. Pouch seal intact. POA Midline abdominal incision dehiscence. 3x1x1.5cm with tunnel 3.5cm at 6 o'clock. Bright pink granulating base and edges. Purulent drainage expressed. POA Right posterior first toe: 3x2cm with slough and eschar. Minimal serous drainage noted. Hien-wound is hyperpigmented. POA Left posterior first toe: 7x3cm fluid filled blister. Hien-wound hyperpigmented. No drainage noted. POASacral pressure injury stage 4. 77v36m6.5cm. Fully granulating with epithelializing edges. Moderate serosanguinous drainage. POA right iliac wing pressure injury stage 2. 3x3x0.5cm. base is fully granulating with epithelializing edges. Topical treatment protocol in place as follows:   Clean wound to sacrum and right iliac crest with wound spray then pat dry. Apply Opticell Ag to wound bed and cover with silicone dressing. Change every 2 days and prn soilage or dislodgement. Silicone dressing to bilateral heels. Change every 3 days and prn soilage. Heel suspension boots x2. Clean midline abdominal wound dehiscence with wound spray then pack with Opticel Ag rope, cover with silicone. Change every 2 days. Place patient on [place] bed with [place] pump for low air loss mattress functionality. Minimize layers under patient  One breathable under pad to wick away moisture  Paint bilateral great toes with betadine daily. Dry dressing to cover. Care turned over to nursing staff at this time. Renata Renteria RN, BSN, [facility]
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
## 2021-10-13 NOTE — ROUTINE PROCESS
TRANSFER - OUT REPORT:    Verbal report given to JOEL, RN(name) on Steffen Younger  being transferred to (unit) for routine progression of care       Report consisted of patients Situation, Background, Assessment and   Recommendations(SBAR). Information from the following report(s) SBAR, ED Summary, Procedure Summary, MAR, Recent Results and Procedure Verification was reviewed with the receiving nurse. Lines:   Peripheral IV 10/12/21 Right Antecubital (Active)        Opportunity for questions and clarification was provided.       Patient transported with:   [employer]

## 2021-10-13 NOTE — ED NOTES
Pt in bed resting quietly throughout night, continues on IV ABT per MAR. Sacral wound cleansed and dressed at this time. 16 fr 10 ml lee cath removed, new 16 fr lee cath inserted via sterile procedure, tolerated well by pt, concentrated adryan urine with sediment noted. Pt denies pain at this time, does grimace when being turned and repositioned in bed, no other changes at this time, pts stable in no distress, will continue to monitor.

## 2021-10-14 VITALS
WEIGHT: 188.3 LBS | OXYGEN SATURATION: 98 % | HEIGHT: 74 IN | DIASTOLIC BLOOD PRESSURE: 70 MMHG | BODY MASS INDEX: 24.17 KG/M2 | RESPIRATION RATE: 18 BRPM | HEART RATE: 89 BPM | SYSTOLIC BLOOD PRESSURE: 112 MMHG | TEMPERATURE: 98.7 F

## 2021-10-14 LAB
ANION GAP SERPL CALC-SCNC: 4 MMOL/L (ref 3–18)
BACTERIA SPEC CULT: ABNORMAL
BUN SERPL-MCNC: 7 MG/DL (ref 7–18)
BUN/CREAT SERPL: 11 (ref 12–20)
CALCIUM SERPL-MCNC: 7.9 MG/DL (ref 8.5–10.1)
CC UR VC: ABNORMAL
CHLORIDE SERPL-SCNC: 104 MMOL/L (ref 100–111)
CO2 SERPL-SCNC: 28 MMOL/L (ref 21–32)
CREAT SERPL-MCNC: 0.63 MG/DL (ref 0.6–1.3)
ERYTHROCYTE [DISTWIDTH] IN BLOOD BY AUTOMATED COUNT: 18.1 % (ref 11.6–14.5)
GLUCOSE SERPL-MCNC: 66 MG/DL (ref 74–99)
HCT VFR BLD AUTO: 27.1 % (ref 36–48)
HGB BLD-MCNC: 8.5 G/DL (ref 13–16)
MAGNESIUM SERPL-MCNC: 1.8 MG/DL (ref 1.6–2.6)
MCH RBC QN AUTO: 29.3 PG (ref 24–34)
MCHC RBC AUTO-ENTMCNC: 31.4 G/DL (ref 31–37)
MCV RBC AUTO: 93.4 FL (ref 78–100)
PLATELET # BLD AUTO: 285 K/UL (ref 135–420)
PMV BLD AUTO: 9.3 FL (ref 9.2–11.8)
POTASSIUM SERPL-SCNC: 3.8 MMOL/L (ref 3.5–5.5)
RBC # BLD AUTO: 2.9 M/UL (ref 4.35–5.65)
SERVICE CMNT-IMP: ABNORMAL
SODIUM SERPL-SCNC: 136 MMOL/L (ref 136–145)
WBC # BLD AUTO: 9.9 K/UL (ref 4.6–13.2)

## 2021-10-14 PROCEDURE — 74011250636 HC RX REV CODE- 250/636: Performed by: INTERNAL MEDICINE

## 2021-10-14 PROCEDURE — 99218 HC RM OBSERVATION: CPT

## 2021-10-14 PROCEDURE — 96375 TX/PRO/DX INJ NEW DRUG ADDON: CPT

## 2021-10-14 PROCEDURE — 74011250637 HC RX REV CODE- 250/637: Performed by: INTERNAL MEDICINE

## 2021-10-14 PROCEDURE — 99239 HOSP IP/OBS DSCHRG MGMT >30: CPT | Performed by: INTERNAL MEDICINE

## 2021-10-14 PROCEDURE — 83735 ASSAY OF MAGNESIUM: CPT

## 2021-10-14 PROCEDURE — 80048 BASIC METABOLIC PNL TOTAL CA: CPT

## 2021-10-14 PROCEDURE — 96376 TX/PRO/DX INJ SAME DRUG ADON: CPT

## 2021-10-14 PROCEDURE — 96372 THER/PROPH/DIAG INJ SC/IM: CPT

## 2021-10-14 PROCEDURE — 74011000258 HC RX REV CODE- 258: Performed by: INTERNAL MEDICINE

## 2021-10-14 PROCEDURE — 85027 COMPLETE CBC AUTOMATED: CPT

## 2021-10-14 PROCEDURE — 36415 COLL VENOUS BLD VENIPUNCTURE: CPT

## 2021-10-14 PROCEDURE — 74011250636 HC RX REV CODE- 250/636: Performed by: STUDENT IN AN ORGANIZED HEALTH CARE EDUCATION/TRAINING PROGRAM

## 2021-10-14 RX ORDER — FLUCONAZOLE 200 MG/1
200 TABLET ORAL DAILY
Qty: 7 TABLET | Refills: 0 | Status: SHIPPED | OUTPATIENT
Start: 2021-10-14 | End: 2021-10-21

## 2021-10-14 RX ADMIN — PANTOPRAZOLE SODIUM 40 MG: 40 TABLET, DELAYED RELEASE ORAL at 08:58

## 2021-10-14 RX ADMIN — ENOXAPARIN SODIUM 40 MG: 40 INJECTION SUBCUTANEOUS at 08:58

## 2021-10-14 RX ADMIN — PIPERACILLIN AND TAZOBACTAM 3.38 G: 3; .375 INJECTION, POWDER, LYOPHILIZED, FOR SOLUTION INTRAVENOUS at 04:07

## 2021-10-14 RX ADMIN — THERA TABS 1 TABLET: TAB at 08:58

## 2021-10-14 RX ADMIN — FLUCONAZOLE 200 MG: 200 INJECTION, SOLUTION INTRAVENOUS at 09:00

## 2021-10-14 RX ADMIN — Medication 10 ML: at 05:31

## 2021-10-14 RX ADMIN — PIPERACILLIN AND TAZOBACTAM 3.38 G: 3; .375 INJECTION, POWDER, LYOPHILIZED, FOR SOLUTION INTRAVENOUS at 08:10

## 2021-10-14 NOTE — PROGRESS NOTES
Reason for Admission:  Abdominal pain [R10.9]                 RUR Score:    19%            Plan for utilizing home health:    Yes, FOC verbal consent given for [facility]. Patient is Active with [facility]. Likelihood of Readmission:   Moderate                         Do you (patient/family) have any concerns for transition/discharge?  no, not at this time. Transition of Care Plan:       Initial assessment completed with patient. Cognitive status of patient: oriented to time, place, person and situation. Face sheet information confirmed:  yes. The patient designates his mother Jay Perales [phone] to participate in his discharge plan and to receive any needed information. This patient lives in a single family home with his mother, with no steps to enter. Patient is not able to navigate steps as needed. Prior to hospitalization, patient was considered to be independent with ADLs/IADLS : no . If not independent,  patient needs assist with : dressing, bathing, food preparation, cooking, toileting and grooming. Patient has [address] from [facility] 7 hours day, 7 days a week. Patient has a current ACP document on file: yes. Healthcare Decision Maker:     Click here to complete [address] including selection of the Healthcare Decision Maker Relationship (ie \"Primary\")    Medicaid transport  will need to be available to transport patient home upon discharge. The patient already has Electric W/C, Hospital Bed, Ostomy and [employer] medical supplies,  medical equipment available in the home. Patient is currently active with home health. If active, agency name is [address] [place] [address]. Patient has stayed in a skilled nursing facility or rehab. Was  stay within last 60 days : no.       This patient is on dialysis :no.      List of available Home Health agencies were provided and reviewed with the patient prior to discharge. [place] of choice verbal consent given: yes, for [facility]. Patient is Active with [facility]. Currently, the discharge plan is Home with [address]. The patient states that he can obtain his medications from the pharmacy, and take his medications as directed. Patient's current insurance is [employer]. Care Management Interventions  PCP Verified by CM:  Yes  Mode of Transport at Discharge: BLS  Transition of Care Consult (CM Consult): [address]: No  Reason Outside [place]: Patient already serviced by other home care/hospice agency  Discharge Durable Medical Equipment: No  Physical Therapy Consult: No  Occupational Therapy Consult: No  Speech Therapy Consult: No  Support Systems: Parent(s)  Confirm Follow Up Transport: Other (see comment) (Medicaid transport)  The Plan for Transition of Care is Related to the Following Treatment Goals : Home with [address]  The Patient and/or Patient Representative was Provided with a Choice of Provider and Agrees with the Discharge Plan?: Yes  Freedom of Choice List was Provided with Basic Dialogue that Supports the Patient's Individualized Plan of Care/Goals, Treatment Preferences and Shares the Quality Data Associated with the Providers?: Yes  Discharge Location  Discharge Placement: Home with home health        Gus Tierney RN  Case Management [phone]      Readmission Assessment  Number of days since last admission?: 8-30 days  Previous disposition: Home with Home Health  Who is being interviewed?: Patient  What was the patient's/caregiver's perception as to why they think they needed to return back to the hospital?: Other (Comment) (Patient came to Right sided abdominal pain.)  Did you visit your Primary Care Physician after you left the hospital, before you returned this time?: No  Why weren't you able to visit your PCP?: Other (Comment) (Patient had home health care.)  Did you see a specialist, such as Cardiac, Pulmonary, Orthopedic Physician, etc. after you left the hospital?: No  Who advised the patient to return to the hospital?: Self-referral  Does the patient report anything that got in the way of taking their medications?: No  In our efforts to provide the best possible care to you and others like you, can you think of anything that we could have done to help you after you left the hospital the first time, so that you might not have needed to return so soon?: Other (Comment) (Nothing.  Patient came to Right sided abdominal pain.)

## 2021-10-14 NOTE — DISCHARGE INSTRUCTIONS
DISCHARGE SUMMARY from Nurse    PATIENT INSTRUCTIONS:    After general anesthesia or intravenous sedation, for 24 hours or while taking prescription Narcotics:  · Limit your activities  · Do not drive and operate hazardous machinery  · Do not make important personal or business decisions  · Do  not drink alcoholic beverages  · If you have not urinated within 8 hours after discharge, please contact your surgeon on call. Report the following to your surgeon:  · Excessive pain, swelling, redness or odor of or around the surgical area  · Temperature over 100.5  · Nausea and vomiting lasting longer than 4 hours or if unable to take medications  · Any signs of decreased circulation or nerve impairment to extremity: change in color, persistent  numbness, tingling, coldness or increase pain  · Any questions    What to do at Home:  Recommended activity: Activity as tolerated, ***    If you experience any of the following symptoms chest pain, shortness of breath, fever greater than 100.5, pain unrelieved by medication, please follow up with Dr. Brittny Loving. *  Please give a list of your current medications to your Primary Care Provider. *  Please update this list whenever your medications are discontinued, doses are      changed, or new medications (including over-the-counter products) are added. *  Please carry medication information at all times in case of emergency situations. These are general instructions for a healthy lifestyle:    No smoking/ No tobacco products/ Avoid exposure to second hand smoke  Surgeon General's Warning:  Quitting smoking now greatly reduces serious risk to your health.     Obesity, smoking, and sedentary lifestyle greatly increases your risk for illness    A healthy diet, regular physical exercise & weight monitoring are important for maintaining a healthy lifestyle    You may be retaining fluid if you have a history of heart failure or if you experience any of the following symptoms:  Weight gain of 3 pounds or more overnight or 5 pounds in a week, increased swelling in our hands or feet or shortness of breath while lying flat in bed. Please call your doctor as soon as you notice any of these symptoms; do not wait until your next office visit. Patient armband removed and shredded  MyChart Activation    Thank you for requesting access to [facility]. Please follow the instructions below to securely access and download your online medical record. [facility] allows you to send messages to your doctor, view your test results, renew your prescriptions, schedule appointments, and more. How Do I Sign Up? 1. In your internet browser, go to [email or web address]  2. Click on the First Time User? Click Here link in the Sign In box. You will be redirect to the New Member Sign Up page. 3. Enter your [facility] Access Code exactly as it appears below. You will not need to use this code after youve completed the sign-up process. If you do not sign up before the expiration date, you must request a new code. [facility] Access Code: [email or web address]  Expires: 2021  8:07 AM (This is the date your [facility] access code will )    4. Enter the last four digits of your Social Security Number (xxxx) and Date of Birth (mm/dd/yyyy) as indicated and click Submit. You will be taken to the next sign-up page. 5. Create a [facility] ID. This will be your [facility] login ID and cannot be changed, so think of one that is secure and easy to remember. 6. Create a [facility] password. You can change your password at any time. 7. Enter your Password Reset Question and Answer. This can be used at a later time if you forget your password. 8. Enter your e-mail address. You will receive e-mail notification when new information is available in [address] 9. Click Sign Up. You can now view and download portions of your medical record.   10. Click the Download Summary menu link to download a portable copy of your medical information. Additional Information    If you have questions, please visit the Frequently Asked Questions section of the [employer] website at [email or web address] Remember, [employer] is NOT to be used for urgent needs. For medical emergencies, dial 911. The discharge information has been reviewed with the {PATIENT PARENT GUARDIAN:27020}. The {PATIENT PARENT GUARDIAN:11079} verbalized understanding. Discharge medications reviewed with the {D[employer] meds reviewed JQOW:85749} and appropriate educational materials and side effects teaching were provided. ___________________________________________________________________________________________________________________________________  Discharge Instructions    Patient: Edna Madera MRN: [id number]  CSN: [id number]    YOB: 1960  Age: 64 y.o. Sex: male    DOA: 10/11/2021 LOS:  LOS: 2 days   Discharge Date:      ACUTE DIAGNOSES:  1.  Abdominal pain without clear pathology, possible acute fungal cystitis   2.  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  Recent hospitalization for bladder rupture  4.  Neurogenic bladder due to T9 injury, now with suprapubic catheter   5.  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   Stage IV sacral ulcers POA, right iliac wing pressure injury stage II, POA        Midline abdominal wall incision dehiscence, no infection   7. Mild malnutrition        DISCHARGE MEDICATIONS:     PLEASE DO NOT TAKE LEXAPRO WHILE TAKING DIFLUCAN. YOU CAN RESUME LEXAPRO after finished Diflucan course of treatment    · It is important that you take the medication exactly as they are prescribed. · Keep your medication in the bottles provided by the pharmacist and keep a list of the medication names, dosages, and times to be taken in your wallet. · Do not take other medications without consulting your doctor.        DIET: Cardiac Diet    ACTIVITY: Activity as tolerated, fall precaution     ADDITIONAL INFORMATION: If you experience any of the following symptoms then please call your primary care physician or return to the emergency room if you cannot get hold of your doctor: Fever, chills, nausea, vomiting, diarrhea, change in mentation, falling, bleeding, shortness of breath. FOLLOW UP CARE:  Dr. Madiha Bundy, Mary Davenport MD  you are to call and set up an appointment to see them in 1-2 weeks. Follow-up with [facility], Dr. Paul Olivares or Dr Carlin Garcia, in 1-2 weeks for further care       Information obtained by :  I understand that if any problems occur once I am at home I am to contact my physician. I understand and acknowledge receipt of the instructions indicated above.                                                                                                                                            Physician's or R.N.'s Signature                                                                  Date/Time                                                                                                                                              Patient or Representative Signature                                                          Date/Time    Berna Torres MD  10/14/2021  10:21 AM

## 2021-10-14 NOTE — PROGRESS NOTES
Discharge order noted for today. Pt has been accepted to [address] [place] [address] [facility]. Spoke with patient and he is agreeable to the transition plan today. Transport has been arranged through [employer] transport. Patient's  home health  orders have been forwarded to [address] [place] [address] via [facility]. Updated bedside RN, Lynsey Barron,  to the transition plan.   Discharge information has been documented on the AVS.         Marilee Nur RN  Case Management [phone]
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
## 2021-10-14 NOTE — PROGRESS NOTES
Bedside and Verbal shift change report given to Rashaun Sanders (oncoming nurse) by Derrek Ormond LPN (offgoing nurse). Report included the following information SBAR, Kardex, Procedure Summary, Intake/Output, MAR and Recent Results.

## 2021-10-14 NOTE — ACP (ADVANCE CARE PLANNING)
Advance Care Planning     General Advance Care Planning (ACP) Conversation      Date of Conversation: 10/14/2021  Conducted with: Patient with Decision Making Capacity    Healthcare Decision Maker:   No healthcare decision makers have been documented. Click here to complete [address] including selection of the Healthcare Decision Maker Relationship (ie \"Primary\")    Today we documented Decision Maker(s) consistent with ACP documents on file. Content/Action Overview:    Has ACP document(s) on file - reflects the patient's care preferences      Length of Voluntary ACP Conversation in minutes:  [address]

## 2021-10-14 NOTE — PROGRESS NOTES
Call made to [facility] [phone], spoke with Kell Wood, dispatch will call nurses station with RAMAN.   Trip # [id number]

## 2021-10-14 NOTE — PROGRESS NOTES
Requested Case Management specialist to assist with transportation to:      Patient's home address, verified      Address:    [address]         and phone number is:    Patient's mother, Marilyn George    [phone]        Patient will require BLS transport. Pt requires Stretcher If stretcher, reason: Paraplegic, Stage IV Sacral Pressure Ulcers, Malnutrition  Patient is currently requiring oxygen No No  Height: 6\"2   Weight: 188 lbs  Pt is on isolation: No    Is the pt ready now? yes  Requested time: Next Available  PCS Faxed: no  Insurance verified on face sheet: yes  Auth needed for transport: yes  CM completed PCS/ Envelope and placed on chart.

## 2021-10-14 NOTE — PROGRESS NOTES
Problem: Risk for Spread of Infection  Goal: Prevent transmission of infectious organism to others  Description: Prevent the transmission of infectious organisms to other patients, staff members, and visitors. Outcome: Progressing Towards Goal     Problem: Patient Education:  Go to Education Activity  Goal: Patient/Family Education  Outcome: Progressing Towards Goal     Problem: Pressure Injury - Risk of  Goal: *Prevention of pressure injury  Description: Document Jordin Scale and appropriate interventions in the flowsheet. Outcome: Progressing Towards Goal  Note: Pressure Injury Interventions:  Sensory Interventions: Assess changes in LOC    Moisture Interventions: Absorbent underpads    Activity Interventions: Pressure redistribution bed/mattress(bed type)    Mobility Interventions: Assess need for specialty bed    Nutrition Interventions: Document food/fluid/supplement intake    Friction and Shear Interventions: HOB 30 degrees or less, Lift sheet, Minimize layers, Foam dressings/transparent film/skin sealants                Problem: Patient Education: Go to Patient Education Activity  Goal: Patient/Family Education  Outcome: Progressing Towards Goal     Problem: Falls - Risk of  Goal: *Absence of Falls  Description: Document Carisa Fall Risk and appropriate interventions in the flowsheet.   Outcome: Progressing Towards Goal  Note: Fall Risk Interventions:            Medication Interventions: Bed/chair exit alarm    Elimination Interventions: Bed/chair exit alarm, Call light in reach              Problem: Patient Education: Go to Patient Education Activity  Goal: Patient/Family Education  Outcome: Progressing Towards Goal     Problem: Nutrition Deficit  Goal: *Optimize nutritional status  Outcome: Progressing Towards Goal

## 2021-10-14 NOTE — PROGRESS NOTES
CM uploaded patient to [place] with clinicals and Home Health order, and sent booking request to [address]. [facility] with [address] accepted patient is Niels, with Anticipated Start of Care date of 10/14/2021.          Marilee Nur RN  Case Management [phone]

## 2021-10-15 NOTE — PROGRESS NOTES
Late Entry 10/15/2021 2:00pm     Theresa Hoang RN asked CM to call patient's mother, she said [employer] has not contacted her. CM called patient's mother Evelia Martel cell [phone], received voicemail, mailbox full, CM could not leave a message. CM called patient's mother's home phone [phone], and spoke with patient's mother, she said someone from [employer] called her, and said they would call her back, and no one has. CM updated her that Carol Calderon with [employer] called this morning and asked if patient discharged today. CM let patient's mother know that CM will call [employer] and ask them to call her. CM called and spoke with Carol Calderon with [employer] and updated her with patient's mother conversation. Carol Calderon said she will send an email to Scheduling, requesting them to call patient's mother, name and home phone number given for them to call patient's mother, updated Carol Calderon to not call patient's mother cell phone, voicemail box is full.          Isamar Arevalo RN  Case Management [phone]

## 2021-10-27 ENCOUNTER — HOME HEALTH ADMISSION (OUTPATIENT)
Dept: HOME HEALTH SERVICES | Facility: HOME HEALTH | Age: 61
End: 2021-10-27
Payer: MEDICAID

## 2021-10-28 ENCOUNTER — HOME CARE VISIT (OUTPATIENT)
Dept: HOME HEALTH SERVICES | Facility: HOME HEALTH | Age: 61
End: 2021-10-28
Payer: MEDICAID

## 2021-10-29 ENCOUNTER — HOME CARE VISIT (OUTPATIENT)
Dept: SCHEDULING | Facility: HOME HEALTH | Age: 61
End: 2021-10-29
Payer: MEDICAID

## 2021-10-29 ENCOUNTER — HOME CARE VISIT (OUTPATIENT)
Dept: HOME HEALTH SERVICES | Facility: HOME HEALTH | Age: 61
End: 2021-10-29
Payer: MEDICAID

## 2021-10-29 PROCEDURE — A6197 ALGINATE DRSG >16 <=48 SQ IN: HCPCS

## 2021-10-29 PROCEDURE — A4333 URINARY CATH ANCHOR DEVICE: HCPCS

## 2021-10-29 PROCEDURE — G0299 HHS/HOSPICE OF RN EA 15 MIN: HCPCS

## 2021-10-29 PROCEDURE — A4649 SURGICAL SUPPLIES: HCPCS

## 2021-10-29 PROCEDURE — MED10821 BANDAGE,GAUZE,4.5"X4.1YD,STERILE,LF

## 2021-10-29 PROCEDURE — A6199 ALGINATE DRSG WOUND FILLER: HCPCS

## 2021-10-29 PROCEDURE — A6212 FOAM DRG <=16 SQ IN W/BORDER: HCPCS

## 2021-10-29 PROCEDURE — A6213 FOAM DRG >16<=48 SQ IN W/BDR: HCPCS

## 2021-10-29 PROCEDURE — A4320 IRRIGATION TRAY: HCPCS

## 2021-10-29 PROCEDURE — 400013 HH SOC

## 2021-10-30 VITALS
OXYGEN SATURATION: 99 % | RESPIRATION RATE: 15 BRPM | TEMPERATURE: 97.6 F | SYSTOLIC BLOOD PRESSURE: 130 MMHG | DIASTOLIC BLOOD PRESSURE: 80 MMHG | HEART RATE: 79 BPM

## 2021-10-30 NOTE — HOME HEALTH
Summary of clinical condition: Face to face encounter occurred on 10/27/21 by Pascual Dawson MD. [facility] Dx: Infection following a procedure, Abdominal Wall. The encounter with the patient was in whole, or in part, for the following medical condition, which is the primary reason for home health care. (List medical condition) Parapalegic, urinary retention , Presence of Superpubic cath, stage 4 pressure injury, indwelling catheter, colostomy. The clinical findings that support the need for home care and homebound status are fall risk/balance issues, gait abnormality, medication management, wound care, ostomy care, catheter care and procedure teaching. SN 1w1, then 3w9    Medications review completed. Acetaminophen (TYLENOL) 325 mg tablet Take 2 Tablets by mouth every six (6) hours as needed for Pain or Fever. back pain, fever 8/18/2021  cephALEXin (KEFLEX) 500 mg capsule Dose: 500 mg / Route: Oral / Freq: 4 TIMES DAILY  linezolid (ZYVOX) 600 mg tablet Dose: 600 mg / Route: Oral / Freq: 2 TIMES DAILY  MediHoney, honey, 80 % topical gel 8/30/2021  pantoprazole (PROTONIX) 40 mg granules for oral suspension Dose: 40 mg / Route: Oral / Freq: 2 TIMES DAILY  therapeutic multivitamin (THERAGRAN) tablet Take 1 Tablet by mouth daily. Teaching provided for ZYVOX: Concomitant use of MAOIs or use within 2 weeks of taking an MAOI such as phenelzine or isocarboxazid   Education provided on:Patient/CG was instructed on another leading type of chronic wounds is pressure ulcers. That occurs when pressure on the tissue is grater than the pressure in capillaries, and thus restricts blood flow into the area. Muscle tissues, which needs more oxygen and nutrients than skin does, show the worst effects from prolonged pressure. As in other chronic ulcers, reperfusion injury damage tissue. Lee catheter care and management, lee changed. Patient tolerated procedure well.    Dr. Smitha Vogel office called to notify regarding new pressure ulcers, SN awaiting call back. Pt/Caregiver instructed on plan of care and are agreeable to plan of care at this time. Plan of care and admission to home health status called to attending physician: Dr Ewa Dao, next visit 11/10. As well as [facility]    Discharge planning discussed with patient and caregiver. Discharge planning as follows: wehn caregiver can perform wound care, ostomy care, and can manage medications as well as disease process. Pt/Caregiver did verbalize understanding.

## 2021-11-01 ENCOUNTER — HOME CARE VISIT (OUTPATIENT)
Dept: HOME HEALTH SERVICES | Facility: HOME HEALTH | Age: 61
End: 2021-11-01
Payer: MEDICAID

## 2021-11-02 ENCOUNTER — HOME CARE VISIT (OUTPATIENT)
Dept: SCHEDULING | Facility: HOME HEALTH | Age: 61
End: 2021-11-02
Payer: MEDICAID

## 2021-11-02 VITALS
HEART RATE: 68 BPM | TEMPERATURE: 97.5 F | SYSTOLIC BLOOD PRESSURE: 118 MMHG | OXYGEN SATURATION: 99 % | RESPIRATION RATE: 17 BRPM | DIASTOLIC BLOOD PRESSURE: 64 MMHG

## 2021-11-02 PROCEDURE — G0299 HHS/HOSPICE OF RN EA 15 MIN: HCPCS

## 2021-11-02 NOTE — HOME HEALTH
Caregiver: mom to assist with daily meals, ADLs, medications, groceries/errands, appointments. Paid caregiver to assist with bathing and hygiene    Skilled care provided: Teaching, medication management, completed assessment, performed wound care(see wound addendum). Medications reveiwed, all medications are at home. Discussed importance of compliance, timely taking all prescribed meds, proper dosage and freq, No new medication added. Medications are effective at this time. Patient education provided this visit to include: Instructed in materials used in wound care. However, even with proper treatment, a wound infection may occur. Check the wound daily for signs of infection like increased drainage or bleeding from the wound that wont stop with direct pressure, redness in or around the wound, foul odor or pus coming from the wound, increased swelling around the wound and ever above 101.0°F or shaking chills. Patient was instructed on how to manage pressure that is necessary to avoid future complications. Provide appropriate support surface, repositioning every two hours in bed, off-load heel using pillows or positioning boot, use pillow between legs for side lying. Caregiver verbalized 100% understanding. Home health supplies by type and quantity ordered/delivered this visit include: in home    Progress towards goals: Partially met. Continue the need of skilled care: SN    Home exercise program/Homework provided: HEP deep breathing exercises 10x when having SOB, pain and anxiety. Discharge planning discussed with patient and caregiver. When caregiver is able to manage wound care, manage disease processes and medications independently or patients health condition stable.

## 2021-11-04 ENCOUNTER — HOME CARE VISIT (OUTPATIENT)
Dept: SCHEDULING | Facility: HOME HEALTH | Age: 61
End: 2021-11-04
Payer: MEDICAID

## 2021-11-04 ENCOUNTER — HOSPITAL ENCOUNTER (OUTPATIENT)
Dept: LAB | Age: 61
Discharge: HOME | End: 2021-11-04

## 2021-11-04 LAB — XX-LABCORP SPECIMEN COL,LCBCF: NORMAL

## 2021-11-04 PROCEDURE — G0300 HHS/HOSPICE OF LPN EA 15 MIN: HCPCS

## 2021-11-04 PROCEDURE — A6445 CONFORM BAND S W <3"/YD: HCPCS

## 2021-11-04 PROCEDURE — 99001 SPECIMEN HANDLING PT-LAB: CPT

## 2021-11-04 NOTE — HOME HEALTH
Skilled reason for visit: Skilled nursing assessment, wound care and medication review and education. Sacral wound specimen obtained for culture and transported to lab    Caregiver involvement: Mother is in the home as well as a private aide    Medications reviewed and all medications are available in the home this visit. The following education was provided regarding medications, medication interactions, and look alike medications (specify): tylenol, use for pain relief, importance of not exceeding 3000mg daily and potenital for nausea. Medications  are effective at this time.       Home health supplies by type and quantity ordered/delivered this visit include: cotton tipped applicators and roll gauze ordered 11/4/21  Patient education provided this visit: Patient/ caregiver advised on repositioning at least every two hours as patient is not out of bed as much, to avoid further pressure injury    Skilled Care Performed this visit: Skilled nursing assessment, wound care and medication review and education    Patient's Progress towards personal goals: Patient continues to comply with physicians orders and keep medical appointments to work toward accomplishing goals set and discharge    Home exercise program: sn instructed patient to perform deep breathing exercises, 5-6 breaths 5 x daily to promote air exchange and prevent pneumonia     Continued need for the following skills: Nursing    Plan for next visit: Skilled nursing assessment, wound care and medication review and education    Patient and/or caregiver notified and agrees to changes in the Plan of Care YES      The following discharge planning was discussed with the pt/caregiver: Patient to discharge when goals are met and patient/ caregiver is able to independently manage medications and wounds

## 2021-11-06 ENCOUNTER — HOSPITAL ENCOUNTER (EMERGENCY)
Age: 61
Discharge: HOME | End: 2021-11-06
Attending: STUDENT IN AN ORGANIZED HEALTH CARE EDUCATION/TRAINING PROGRAM
Payer: MEDICAID

## 2021-11-06 ENCOUNTER — APPOINTMENT (OUTPATIENT)
Dept: GENERAL RADIOLOGY | Age: 61
End: 2021-11-06
Attending: NURSE PRACTITIONER
Payer: MEDICAID

## 2021-11-06 ENCOUNTER — HOME CARE VISIT (OUTPATIENT)
Dept: HOME HEALTH SERVICES | Facility: HOME HEALTH | Age: 61
End: 2021-11-06
Payer: MEDICAID

## 2021-11-06 VITALS
HEART RATE: 88 BPM | WEIGHT: 200 LBS | OXYGEN SATURATION: 98 % | RESPIRATION RATE: 16 BRPM | TEMPERATURE: 97.7 F | BODY MASS INDEX: 25.67 KG/M2 | SYSTOLIC BLOOD PRESSURE: 146 MMHG | DIASTOLIC BLOOD PRESSURE: 86 MMHG | HEIGHT: 74 IN

## 2021-11-06 DIAGNOSIS — T50.904A DRUG OVERDOSE, UNDETERMINED INTENT, INITIAL ENCOUNTER: Primary | ICD-10-CM

## 2021-11-06 DIAGNOSIS — Z93.59 SUPRAPUBIC CATHETER (HCC): ICD-10-CM

## 2021-11-06 DIAGNOSIS — Z93.3 COLOSTOMY PRESENT (HCC): ICD-10-CM

## 2021-11-06 DIAGNOSIS — L89.159 PRESSURE INJURY OF SKIN OF SACRAL REGION, UNSPECIFIED INJURY STAGE: ICD-10-CM

## 2021-11-06 DIAGNOSIS — D64.9 NORMOCYTIC ANEMIA: ICD-10-CM

## 2021-11-06 LAB
ALBUMIN SERPL-MCNC: 2.4 G/DL (ref 3.4–5)
ALBUMIN/GLOB SERPL: 0.4 {RATIO} (ref 0.8–1.7)
ALP SERPL-CCNC: 65 U/L (ref 45–117)
ALT SERPL-CCNC: 37 U/L (ref 16–61)
AMPHET UR QL SCN: NEGATIVE
ANION GAP SERPL CALC-SCNC: 5 MMOL/L (ref 3–18)
APPEARANCE UR: ABNORMAL
AST SERPL-CCNC: 26 U/L (ref 10–38)
BACTERIA URNS QL MICRO: ABNORMAL /HPF
BARBITURATES UR QL SCN: NEGATIVE
BASOPHILS # BLD: 0.1 K/UL (ref 0–0.1)
BASOPHILS NFR BLD: 0 % (ref 0–2)
BENZODIAZ UR QL: NEGATIVE
BILIRUB SERPL-MCNC: 0.2 MG/DL (ref 0.2–1)
BILIRUB UR QL: NEGATIVE
BUN SERPL-MCNC: 12 MG/DL (ref 7–18)
BUN/CREAT SERPL: 15 (ref 12–20)
CALCIUM SERPL-MCNC: 8.8 MG/DL (ref 8.5–10.1)
CANNABINOIDS UR QL SCN: NEGATIVE
CHLORIDE SERPL-SCNC: 105 MMOL/L (ref 100–111)
CO2 SERPL-SCNC: 27 MMOL/L (ref 21–32)
COCAINE UR QL SCN: NEGATIVE
COLOR UR: YELLOW
CREAT SERPL-MCNC: 0.78 MG/DL (ref 0.6–1.3)
DIFFERENTIAL METHOD BLD: ABNORMAL
EOSINOPHIL # BLD: 0.2 K/UL (ref 0–0.4)
EOSINOPHIL NFR BLD: 2 % (ref 0–5)
EPITH CASTS URNS QL MICRO: ABNORMAL /LPF (ref 0–5)
ERYTHROCYTE [DISTWIDTH] IN BLOOD BY AUTOMATED COUNT: 14.6 % (ref 11.6–14.5)
ETHANOL SERPL-MCNC: <3 MG/DL (ref 0–3)
GLOBULIN SER CALC-MCNC: 6.1 G/DL (ref 2–4)
GLUCOSE SERPL-MCNC: 161 MG/DL (ref 74–99)
GLUCOSE UR STRIP.AUTO-MCNC: NEGATIVE MG/DL
GRAN CASTS URNS QL MICRO: ABNORMAL /LPF
HCT VFR BLD AUTO: 32.5 % (ref 36–48)
HDSCOM,HDSCOM: ABNORMAL
HGB BLD-MCNC: 10 G/DL (ref 13–16)
HGB UR QL STRIP: ABNORMAL
KETONES UR QL STRIP.AUTO: NEGATIVE MG/DL
LEUKOCYTE ESTERASE UR QL STRIP.AUTO: ABNORMAL
LIPASE SERPL-CCNC: 23 U/L (ref 73–393)
LYMPHOCYTES # BLD: 2.2 K/UL (ref 0.9–3.6)
LYMPHOCYTES NFR BLD: 19 % (ref 21–52)
MCH RBC QN AUTO: 29.2 PG (ref 24–34)
MCHC RBC AUTO-ENTMCNC: 30.8 G/DL (ref 31–37)
MCV RBC AUTO: 95 FL (ref 78–100)
METHADONE UR QL: NEGATIVE
MONOCYTES # BLD: 0.7 K/UL (ref 0.05–1.2)
MONOCYTES NFR BLD: 6 % (ref 3–10)
NEUTS SEG # BLD: 8.2 K/UL (ref 1.8–8)
NEUTS SEG NFR BLD: 72 % (ref 40–73)
NITRITE UR QL STRIP.AUTO: NEGATIVE
OPIATES UR QL: POSITIVE
PCP UR QL: NEGATIVE
PH UR STRIP: 5 [PH] (ref 5–8)
PLATELET # BLD AUTO: 269 K/UL (ref 135–420)
PMV BLD AUTO: 9.7 FL (ref 9.2–11.8)
POTASSIUM SERPL-SCNC: 3.7 MMOL/L (ref 3.5–5.5)
PROT SERPL-MCNC: 8.5 G/DL (ref 6.4–8.2)
PROT UR STRIP-MCNC: 100 MG/DL
RBC # BLD AUTO: 3.42 M/UL (ref 4.35–5.65)
RBC #/AREA URNS HPF: ABNORMAL /HPF (ref 0–5)
SODIUM SERPL-SCNC: 137 MMOL/L (ref 136–145)
SP GR UR REFRACTOMETRY: 1.02 (ref 1–1.03)
UROBILINOGEN UR QL STRIP.AUTO: 0.2 EU/DL (ref 0.2–1)
WBC # BLD AUTO: 11.4 K/UL (ref 4.6–13.2)
WBC URNS QL MICRO: ABNORMAL /HPF (ref 0–4)

## 2021-11-06 PROCEDURE — 96374 THER/PROPH/DIAG INJ IV PUSH: CPT

## 2021-11-06 PROCEDURE — 81001 URINALYSIS AUTO W/SCOPE: CPT

## 2021-11-06 PROCEDURE — 80307 DRUG TEST PRSMV CHEM ANLYZR: CPT

## 2021-11-06 PROCEDURE — 74011250636 HC RX REV CODE- 250/636: Performed by: NURSE PRACTITIONER

## 2021-11-06 PROCEDURE — 85025 COMPLETE CBC W/AUTO DIFF WBC: CPT

## 2021-11-06 PROCEDURE — 80053 COMPREHEN METABOLIC PANEL: CPT

## 2021-11-06 PROCEDURE — 96375 TX/PRO/DX INJ NEW DRUG ADDON: CPT

## 2021-11-06 PROCEDURE — 71045 X-RAY EXAM CHEST 1 VIEW: CPT

## 2021-11-06 PROCEDURE — 99285 EMERGENCY DEPT VISIT HI MDM: CPT

## 2021-11-06 PROCEDURE — 82077 ASSAY SPEC XCP UR&BREATH IA: CPT

## 2021-11-06 PROCEDURE — 83690 ASSAY OF LIPASE: CPT

## 2021-11-06 RX ORDER — NALOXONE HYDROCHLORIDE 4 MG/.1ML
SPRAY NASAL
Qty: 2 EACH | Refills: 0 | Status: SHIPPED | OUTPATIENT
Start: 2021-11-06 | End: 2022-06-01

## 2021-11-06 RX ORDER — ONDANSETRON 2 MG/ML
4 INJECTION INTRAMUSCULAR; INTRAVENOUS
Status: COMPLETED | OUTPATIENT
Start: 2021-11-06 | End: 2021-11-06

## 2021-11-06 RX ORDER — NALOXONE HYDROCHLORIDE 0.4 MG/ML
0.4 INJECTION, SOLUTION INTRAMUSCULAR; INTRAVENOUS; SUBCUTANEOUS ONCE
Status: COMPLETED | OUTPATIENT
Start: 2021-11-06 | End: 2021-11-06

## 2021-11-06 RX ADMIN — NALOXONE HYDROCHLORIDE 0.4 MG: 0.4 INJECTION, SOLUTION INTRAMUSCULAR; INTRAVENOUS; SUBCUTANEOUS at 11:05

## 2021-11-06 RX ADMIN — ONDANSETRON 4 MG: 2 INJECTION INTRAMUSCULAR; INTRAVENOUS at 10:54

## 2021-11-06 NOTE — ED NOTES
Pt medicated per order (See MAR). Pt has stopped vomiting. Pt is snoring with eyes closed and RR is 6-7. Pt given medication and now RR is 17. Pt educated on why he was given Narcan at this time.

## 2021-11-06 NOTE — ED TRIAGE NOTES
Pt arrived via EMS. Per EMS pt was at home and was responsive only to painful stimuli and then his respiratory rate dropped. Pt had pin point pupil and hx of overdose. Pt given 1 mg Narcan by EMS and pt is AOx4, on RA, and able to speak in complete sentences. Pt is currently vomiting and has been since getting the Narcan. Pt is a paraplegic and has ostomy and chronic lee in place. Pt has wounds to bottom of his feet with dressings intact. Pt is blind in left eye.

## 2021-11-06 NOTE — ED NOTES
Wound care performed on rather large sacral wound. Lots of slough was cleansed, Slough was slightly green- tinted with NO odor/ excess 'oozing' noted. Cleansed with wound cleanser, and pat dry. Wound bed appears beefy red, wound edges nice and pink/ attached to wound bed; however there is a border/ edging noted to the inferior and left (my left). The boarder (undermine) is also beefy red and pink in appearance. pat dry, applied xeroform impregnated dressing, and a foam border.
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
## 2021-11-06 NOTE — ED NOTES
Pt given discharge instructions. Pt verbalized understanding of instructions. Pt is awaiting medical transport home at this time.

## 2021-11-09 ENCOUNTER — HOME CARE VISIT (OUTPATIENT)
Dept: SCHEDULING | Facility: HOME HEALTH | Age: 61
End: 2021-11-09
Payer: MEDICAID

## 2021-11-09 PROCEDURE — G0300 HHS/HOSPICE OF LPN EA 15 MIN: HCPCS

## 2021-11-11 ENCOUNTER — HOME CARE VISIT (OUTPATIENT)
Dept: SCHEDULING | Facility: HOME HEALTH | Age: 61
End: 2021-11-11
Payer: MEDICAID

## 2021-11-11 PROCEDURE — G0300 HHS/HOSPICE OF LPN EA 15 MIN: HCPCS

## 2021-11-13 ENCOUNTER — HOME CARE VISIT (OUTPATIENT)
Dept: SCHEDULING | Facility: HOME HEALTH | Age: 61
End: 2021-11-13
Payer: MEDICAID

## 2021-11-13 PROCEDURE — G0299 HHS/HOSPICE OF RN EA 15 MIN: HCPCS

## 2021-11-14 VITALS
OXYGEN SATURATION: 94 % | TEMPERATURE: 97 F | RESPIRATION RATE: 20 BRPM | DIASTOLIC BLOOD PRESSURE: 74 MMHG | SYSTOLIC BLOOD PRESSURE: 120 MMHG | HEART RATE: 62 BPM

## 2021-11-15 VITALS
DIASTOLIC BLOOD PRESSURE: 72 MMHG | OXYGEN SATURATION: 97 % | SYSTOLIC BLOOD PRESSURE: 133 MMHG | TEMPERATURE: 97.9 F | HEART RATE: 66 BPM | RESPIRATION RATE: 16 BRPM

## 2021-11-15 VITALS
SYSTOLIC BLOOD PRESSURE: 127 MMHG | RESPIRATION RATE: 16 BRPM | OXYGEN SATURATION: 98 % | HEART RATE: 82 BPM | TEMPERATURE: 97.7 F | DIASTOLIC BLOOD PRESSURE: 79 MMHG

## 2021-11-15 NOTE — HOME HEALTH
Medications reviewed all medications are at home. The following education was provided regarding medications, medication interactions, and look a like medications: N/A all meds reviewed. Discussed importance of compliance, timely taking all prescribed meds, proper dosage and freq. Taking Tylenol for pain prn Medications are effective at this time. Denies new medication added. Caregiver: caregiver/mother & PCA is available to assist with daily meals, ADL's prn, assist w with daily medications, wound care prn, run errands, groceries and MD apt. Skilled care provided: Teaching disease and medication, completed assessment, Chavez cath & suprapubic cath draining with good amt clear urine,  performed BLE and sacral wound care following treatment order (see wound care addedum), pt tolerated well during procedure with no C/O. Pt health condition stable, sacral wound is healthy and BLE wounds healing well. Patient education provided this visit to include: Discussed intervention to prevent infection; hand washing or hand , wearing face mask during clinician's visit. Discussed importance of repositioning, taught propping pillows to back and BLE,  repositioning q 2 hrs, keeping michel-area dry, HOB 45 degrees during meals. Skin care; apply daily moisturizing cream to BLE, Reviewed S/S of infection; fever 101.3, increase pain, redness, swelling, coughing with yellow thick sputum, purulent wound drainage with foul smell, cloudy urine with foul smell, not feeling well 2-3 days, SOB, and to call [facility] or MD for assistance if experiencing any of these S/S. To call 911 with chest pains, facial drooping, difficulty talking, non arousable/unconscious and uncontrollable bleeding. Patient/caregiver degree of understanding: good     Home health supplies by type and quantity ordered/delivered this visit include: Has supplies at home.   Home exercise program/Homework provided: HEP deep breathing exercises 10x when having SOB, pain and anxiety. Discharge planning discussed with patient and caregiver. Discharge planning as follows: Pt/CG will be able to manage disease and medication independently, wounds are improved, CG able to continue wound care, Chavez cath d/c and health condition stable. Pt/Caregiver did verbalize understanding of discharge planning. Patient/caregiver encouraged/instructed to keep appointment as lack of follow through with physician appointment could result in discontinuation of home care services for non-compliance. COVID - 23 Screening completed before visit:     Denies and no family member  has any of these S/S:  Fever, dry cough, chills, sore throat diarrhea, body aches, not feeling well and loss of taste.

## 2021-11-15 NOTE — HOME HEALTH
SN reason for visit: education/teaching related to medication mgt ,disease mgt and assessment - wound care for 64year old parapelgic  patient made aware to monitor for s/s of infection (not observed) [increased swelling, increased redness around site, increased pain, foul smelling drainage, fever] aware who to report to/when. Caregiver involvement: Patient's cg is available at all times for assistance with iadls, adls, meal prep, medication management, taking to md appointments. Medications reconciled . The following education was provided regarding medications, medication interactions, and look a like medications. Home health supplies by type and quantity ordered/delivered this visit include: n/a  Patient education provided this visit: assessment, teaching  Reviewed medications and care plan for changes. patient/cg to continue to take medications as prescribed. patient aware to monitor for effectiveness and to notify staff of any adverse reactions to medications/any changes to medication regimen. reviewed side effects, purposes, dosage, frequencies  patient encouraged to monitor for increase in pain and to continue with current pain management and to notify staff/md if pain becomes excrutiating/intolerable. Patient is aware of fall risk. Reviewed safet precautions with patient. Educated on ambulation and or bedbound safety if applicable. INSTRUCTED PATIENT AND CG THAT SHOULD ANY NEEDS OR CONCERNS ARISE TO FIRST CALL OUR OFFICE, OR THE DR'S OFFICE  OR GO TO AN URGENT CARE CENTER AND NOT TO THE ED FOR NON-LIFE THREATENING EVENTS. IF IT IS LIFE THREATENING THEN CALL 911 OR GO TO THE CLOSEST ER. Progress toward goals- continuing to work towards goals as reviewed in [address] with patient on each visit  Home exercise program:    activity as tolerated, trying to get physical activity 4-5 x weekly.  stopping activity if causing shortness of breath or chest pain, dizziness or weakness Continued need for the following skills: Nursing, The following discharge planning was discussed with the pt/caregiver: Patient will be discharged once education has completed, and patient is medically stable.

## 2021-11-15 NOTE — HOME HEALTH
SN reason for visit: wound care education/teaching related to medication mgt ,disease mgt and assessment - HEALING WOUNDS  patient made aware to monitor for s/s of infection (not observed) [increased swelling, increased redness around site, increased pain, foul smelling drainage, fever] aware who to report to/when. Caregiver involvement: Patient's cg is available at all times for assistance with iadls, adls, meal prep, medication management, taking to md appointments. Medications reconciled . The following education was provided regarding medications, medication interactions, and look a like medications. Home health supplies by type and quantity ordered/delivered this visit include: n/a  Patient education provided this visit: assessment, teaching  Reviewed medications and care plan for changes. patient/cg to continue to take medications as prescribed. patient aware to monitor for effectiveness and to notify staff of any adverse reactions to medications/any changes to medication regimen. reviewed side effects, purposes, dosage, frequencies  patient encouraged to monitor for increase in pain and to continue with current pain management and to notify staff/md if pain becomes excrutiating/intolerable. Patient is aware of fall risk. Reviewed safet precautions with patient. Educated on ambulation and or bedbound safety if applicable. INSTRUCTED PATIENT AND CG THAT SHOULD ANY NEEDS OR CONCERNS ARISE TO FIRST CALL OUR OFFICE, OR THE DR'S OFFICE  OR GO TO AN URGENT CARE CENTER AND NOT TO THE ED FOR NON-LIFE THREATENING EVENTS. IF IT IS LIFE THREATENING THEN CALL 911 OR GO TO THE CLOSEST ER. Progress toward goals- continuing to work towards goals as reviewed in [address] with patient on each visit  Home exercise program:    activity as tolerated, trying to get physical activity 4-5 x weekly.  stopping activity if causing shortness of breath or chest pain, dizziness or weakness Continued need for the following skills: Nursing, The following discharge planning was discussed with the pt/caregiver: Patient will be discharged once education has completed, and patient is medically stable.

## 2021-11-16 ENCOUNTER — HOME CARE VISIT (OUTPATIENT)
Dept: SCHEDULING | Facility: HOME HEALTH | Age: 61
End: 2021-11-16
Payer: MEDICAID

## 2021-11-16 PROCEDURE — G0300 HHS/HOSPICE OF LPN EA 15 MIN: HCPCS

## 2021-11-18 ENCOUNTER — HOME CARE VISIT (OUTPATIENT)
Dept: SCHEDULING | Facility: HOME HEALTH | Age: 61
End: 2021-11-18
Payer: MEDICAID

## 2021-11-18 PROCEDURE — G0300 HHS/HOSPICE OF LPN EA 15 MIN: HCPCS

## 2021-11-19 PROCEDURE — A6212 FOAM DRG <=16 SQ IN W/BORDER: HCPCS

## 2021-11-19 PROCEDURE — A6213 FOAM DRG >16<=48 SQ IN W/BDR: HCPCS

## 2021-11-19 PROCEDURE — A4334 URINARY CATH LEG STRAP: HCPCS

## 2021-11-20 ENCOUNTER — HOME CARE VISIT (OUTPATIENT)
Dept: SCHEDULING | Facility: HOME HEALTH | Age: 61
End: 2021-11-20
Payer: MEDICAID

## 2021-11-20 PROCEDURE — G0299 HHS/HOSPICE OF RN EA 15 MIN: HCPCS

## 2021-11-21 VITALS
RESPIRATION RATE: 18 BRPM | HEART RATE: 78 BPM | TEMPERATURE: 48.2 F | SYSTOLIC BLOOD PRESSURE: 138 MMHG | OXYGEN SATURATION: 98 % | DIASTOLIC BLOOD PRESSURE: 78 MMHG

## 2021-11-21 NOTE — HOME HEALTH
Medications reviewed all medications are at home. The following education was provided regarding medications, medication interactions, and look a like medications: N/A all meds reviewed. Discussed importance of compliance, timely taking all prescribed meds, proper dosage and freq. Taking Tylenol for pain prn Medications are effective at this time. Denies new medication added. Caregiver: caregiver/mother & PCA is available to assist with daily meals, ADL's prn, assist with daily medications, wound care prn, run errands, groceries and MD apt. Skilled care provided: Teaching disease and medication, completed assessment, Chavez cath & suprapubic cath draining with good amt clear urine, performed BLE and sacral wound care following treatment order (see wound care addendum), pt tolerated well during procedure with no C/O. Pt health condition stable, sacral wound is healthy and BLE wounds healing well. Patient education provided this visit to include: Discussed intervention to prevent infection; hand washing or hand , wearing face mask during clinician's visit. Keeping BLE elevated, avoiding heel pressure, reinforce dressing when dislodge and change when soiled. Discussed importance of repositioning, taught propping pillows to back and BLE, repositioning q 2 hrs, keeping michel-area dry, HOB 45 degrees during meals. Skin care; apply daily moisturizing cream to BLE. Increasing protein intake, avoid skipping meals and good hydration. Reviewed S/S of infection; fever 101.3, increase pain, redness, swelling, coughing with yellow thick sputum, purulent wound drainage with foul smell, cloudy urine with foul smell, not feeling well 2-3 days, SOB, and to call [facility] or MD for assistance if experiencing any of these S/S. To call 911 with chest pains, facial drooping, difficulty talking, non arousable/unconscious and uncontrollable bleeding.    Patient/caregiver degree of understanding: good   Home health supplies by type and quantity ordered/delivered this visit include: Has supplies at home. Home exercise program/Homework provided: HEP deep breathing exercises 10x when having SOB, pain and anxiety. Discharge planning discussed with patient and caregiver. Discharge planning as follows: Pt/CG will be able to manage disease and medication independently, wounds are improved, CG able to continue wound care, Chavez cath d/c and health condition stable. Pt/Caregiver did verbalize understanding of discharge planning. Patient/caregiver encouraged/instructed to keep appointment as lack of follow through with physician appointment could result in discontinuation of home care services for non-compliance.                     COVID - 23 Screening completed before visit:     Denies and no family member  has any of these S/S:  Fever, dry cough, chills, sore throat diarrhea, body aches, not feeling well and loss of taste

## 2021-11-22 VITALS
OXYGEN SATURATION: 98 % | SYSTOLIC BLOOD PRESSURE: 133 MMHG | RESPIRATION RATE: 16 BRPM | TEMPERATURE: 97.7 F | DIASTOLIC BLOOD PRESSURE: 78 MMHG | HEART RATE: 67 BPM

## 2021-11-22 VITALS
DIASTOLIC BLOOD PRESSURE: 71 MMHG | TEMPERATURE: 97.4 F | SYSTOLIC BLOOD PRESSURE: 135 MMHG | HEART RATE: 61 BPM | RESPIRATION RATE: 16 BRPM | OXYGEN SATURATION: 97 %

## 2021-11-23 ENCOUNTER — HOME CARE VISIT (OUTPATIENT)
Dept: SCHEDULING | Facility: HOME HEALTH | Age: 61
End: 2021-11-23
Payer: MEDICAID

## 2021-11-23 PROCEDURE — G0300 HHS/HOSPICE OF LPN EA 15 MIN: HCPCS

## 2021-11-24 PROCEDURE — A6212 FOAM DRG <=16 SQ IN W/BORDER: HCPCS

## 2021-11-24 PROCEDURE — A6213 FOAM DRG >16<=48 SQ IN W/BDR: HCPCS

## 2021-11-25 ENCOUNTER — HOME CARE VISIT (OUTPATIENT)
Dept: SCHEDULING | Facility: HOME HEALTH | Age: 61
End: 2021-11-25
Payer: MEDICAID

## 2021-11-25 VITALS
RESPIRATION RATE: 18 BRPM | OXYGEN SATURATION: 98 % | HEART RATE: 71 BPM | SYSTOLIC BLOOD PRESSURE: 130 MMHG | TEMPERATURE: 97.6 F | DIASTOLIC BLOOD PRESSURE: 70 MMHG

## 2021-11-25 PROCEDURE — G0300 HHS/HOSPICE OF LPN EA 15 MIN: HCPCS

## 2021-11-26 NOTE — HOME HEALTH
Skilled reason for visit: Skin assessment and wound treatment    Caregiver involvement: Patient's cg is family. They lives with patient and is available 24/7 for assistance with iadls, adls, meal prep, medication management, taking to md appointments. Medications reviewed and all medications are available in the home this visit. Medications  are effective at this time. Home health supplies by type and quantity ordered/delivered this visit include: supplies available    Patient education provided this visit: patient made aware to turn every 2 hours and to keep pressure off of bony prominences, to monitor for any pressure ulcer development/worsening. Increase protein n diet and monitor for signs and symptoms of infection to include increase drainage, fever over 101.1    Patient's Progress towards personal goals: Pt and caregiver verbalzed understaning of education    Home exercise program: pt conitnues to take  medication as ordered and follows up on all  md appintment  Continued need for the following skills: nursing      Patient and/or caregiver notified and agrees to changes in the Plan of Care yes    The following discharge planning was discussed with the pt/caregiver: when patient reaches goals and medication is managed, and disease processes are understood patient agrees and understand that discharge will take place.

## 2021-11-27 ENCOUNTER — HOME CARE VISIT (OUTPATIENT)
Dept: SCHEDULING | Facility: HOME HEALTH | Age: 61
End: 2021-11-27
Payer: MEDICAID

## 2021-11-27 PROCEDURE — G0299 HHS/HOSPICE OF RN EA 15 MIN: HCPCS

## 2021-11-29 VITALS
OXYGEN SATURATION: 99 % | RESPIRATION RATE: 17 BRPM | TEMPERATURE: 99.1 F | DIASTOLIC BLOOD PRESSURE: 88 MMHG | HEART RATE: 83 BPM | SYSTOLIC BLOOD PRESSURE: 140 MMHG

## 2021-11-29 VITALS
HEART RATE: 64 BPM | TEMPERATURE: 97.7 F | SYSTOLIC BLOOD PRESSURE: 134 MMHG | RESPIRATION RATE: 17 BRPM | OXYGEN SATURATION: 98 % | DIASTOLIC BLOOD PRESSURE: 76 MMHG

## 2021-11-29 NOTE — HOME HEALTH
SN reason for visit: education/teaching related to medication mgt ,disease mgt and assessment - wound care  patient made aware to monitor for s/s of infection (not observed) [increased swelling, increased redness around site, increased pain, foul smelling drainage, fever] aware who to report to/when. Caregiver involvement: Patient's cg is available at all times for assistance with iadls, adls, meal prep, medication management, taking to md appointments. Medications reconciled . The following education was provided regarding medications, medication interactions, and look a like medications. Home health supplies by type and quantity ordered/delivered this visit include: n/a  Patient education provided this visit: assessment, teaching  Reviewed medications and care plan for changes. patient/cg to continue to take medications as prescribed. patient aware to monitor for effectiveness and to notify staff of any adverse reactions to medications/any changes to medication regimen. reviewed side effects, purposes, dosage, frequencies  patient encouraged to monitor for increase in pain and to continue with current pain management and to notify staff/md if pain becomes excrutiating/intolerable. Patient is aware of fall risk. Reviewed safet precautions with patient. Educated on ambulation and or bedbound safety if applicable. INSTRUCTED PATIENT AND CG THAT SHOULD ANY NEEDS OR CONCERNS ARISE TO FIRST CALL OUR OFFICE, OR THE DR'S OFFICE  OR GO TO AN URGENT CARE CENTER AND NOT TO THE ED FOR NON-LIFE THREATENING EVENTS. IF IT IS LIFE THREATENING THEN CALL 911 OR GO TO THE CLOSEST ER. Progress toward goals- continuing to work towards goals as reviewed in [address] with patient on each visit  Home exercise program:    activity as tolerated, trying to get physical activity 4-5 x weekly.  stopping activity if causing shortness of breath or chest pain, dizziness or weakness Continued need for the following skills: Nursing, The following discharge planning was discussed with the pt/caregiver: Patient will be discharged once education has completed, and patient is medically stable.

## 2021-11-30 ENCOUNTER — HOME CARE VISIT (OUTPATIENT)
Dept: SCHEDULING | Facility: HOME HEALTH | Age: 61
End: 2021-11-30
Payer: MEDICAID

## 2021-11-30 PROCEDURE — 400013 HH SOC

## 2021-11-30 PROCEDURE — G0300 HHS/HOSPICE OF LPN EA 15 MIN: HCPCS

## 2021-11-30 NOTE — HOME HEALTH
Skilled reason for visit: infection following procedure requiring disease process teaching and medication management, wound care. Caregiver involvement: Patient's cg is his mother/aides. cgs are available 24/7 for assistance with iadls, adls, meal prep, medication management, taking to md appointments. Medications reviewed and all medications are available in the home this visit. The following education was provided regarding medications, medication interactions, and look alike medications (specify): reviewed side effects, purposes, dosage, frequencies. Medications are effective at this time. Home health supplies by type and quantity ordered/delivered this visit include: primary nurse ordering wound supplies. Patient education provided this visit: patient/cg instructed to monitor for edema/increase in edema, to elevate extremity when edema occurs and to notify md if edema exceeds normal limits for patient, none noted. patient made aware to monitor for s/s of infection [increased swelling, increased redness around site, increased pain, foul smelling drainage, fever] aware who to report to/when. no s/s of infection noted. encouraged patient to get three nutritional meals daily and to stay hydrated. pt instructed to follow a high protein diet for healing- to try to get 90g protein daily. pt has colostomy, reporting no complications, questions or concerns with ostomy. current appliance is clean dry and intact. ostomy care reviewed step by step with patient/cg per orders. pt has lee catheter, changed recently and is not due for change at this time. discussed catheter care with patient- keeping catheter clean and discussed s/s of infection to monitor for, s/s of UTI, who to report to/when. instructed cg to notify staff/md/seek tx if complications occur. discussed fall precautions in detail- having lighted hallways, removing throw rugs, monitoring medication that may alter mental status.  patient made aware to turn every 2 hours and to keep pressure off of bony prominences, to monitor for any pressure ulcer development/worsening. pt denies any questions or concerns at this time. Skilled Care Performed this visit: bilateral legs/feet wound care performed per orders- old dressings completely removed, wound cleansed with dwc and applied medihoney, dry dressing. pt tolerated well. abdominal wound care performed per orders- old dressing completely removed, wound cleansed with dwc and applied alginate, secured with foam and tape. buttocks wound care performed per orders- old dressing completely removed, wound cleansed with dwc and applied alginate, secured with foam dressing and tape. Agency Progress toward goals: goals/teaching reviewed. patient is progressing towards goals at this time, skilled need to continue monitoring, disease process teaching and medication management. Patient's Progress towards personal goals: pt reporting they are progressing towards their goals at this time. Home exercise program: patient instructed to perform sob hep 4-5 x daily and prn for sob, to promote lung expansion. pt also encouraged to use ICS q 2 hours. Continued need for the following skills: Nursing   Plan for next visit: continue with wound care   Patient and/or caregiver notified and agrees to changes in the Plan of Care NA   The following discharge planning was discussed with the pt/caregiver: Patient will be discharged once education has completed, patient is medically stable and pt/cg are able to independently manage wound care/ wound has healed or no longer requires skilled care.

## 2021-12-02 ENCOUNTER — HOME CARE VISIT (OUTPATIENT)
Dept: SCHEDULING | Facility: HOME HEALTH | Age: 61
End: 2021-12-02
Payer: MEDICAID

## 2021-12-02 PROCEDURE — G0300 HHS/HOSPICE OF LPN EA 15 MIN: HCPCS

## 2021-12-04 ENCOUNTER — HOME CARE VISIT (OUTPATIENT)
Dept: SCHEDULING | Facility: HOME HEALTH | Age: 61
End: 2021-12-04
Payer: MEDICAID

## 2021-12-04 PROCEDURE — G0299 HHS/HOSPICE OF RN EA 15 MIN: HCPCS

## 2021-12-04 PROCEDURE — 400013 HH SOC

## 2021-12-05 VITALS
TEMPERATURE: 97 F | SYSTOLIC BLOOD PRESSURE: 118 MMHG | RESPIRATION RATE: 18 BRPM | OXYGEN SATURATION: 98 % | DIASTOLIC BLOOD PRESSURE: 68 MMHG | HEART RATE: 86 BPM

## 2021-12-06 VITALS
TEMPERATURE: 97.7 F | OXYGEN SATURATION: 98 % | DIASTOLIC BLOOD PRESSURE: 56 MMHG | RESPIRATION RATE: 16 BRPM | SYSTOLIC BLOOD PRESSURE: 133 MMHG | HEART RATE: 67 BPM

## 2021-12-06 NOTE — HOME HEALTH
Medications reviewed all medications are at home. The following education was provided regarding medications, medication interactions, and look a like medications: N/A all meds reviewed. Discussed importance of compliance, timely taking all prescribed meds, proper dosage and freq. Taking Tylenol for pain prn Medications are effective at this time. Denies new medication added. Caregiver: caregiver/mother & PCA is available to assist with daily meals, ADL's prn, assist with daily medications, wound care prn, run errands, groceries and MD apt. Skilled care provided: Teaching disease and medication, completed assessment, Chavez cath changed; inserted 16 fr with 10 ml NS balloon using sterile technique procedure and performed BLE and sacral wound care following treatment order (see wound care addendum). SN assisted by PCA during the entire procedure. Pt tolerated well during procedure with no C/O. Suprapubic cath draining with good amt clear urine, sacral wound is healthy and BLE wounds continue to improve. No S/S of infection noted. Per CG/mother pt scheduled to urologist 12/23 possible removal of suprapubic cath. Will continue to monitor. Patient education provided this visit to include: Reviewed intervention to prevent infection; hand washing or hand , wearing face mask during clinician's visit. Continue Chavez care daily to prevent infection and empty bag when half full. Reviewed keeping BLE elevated, avoiding heel pressure, reinforce dressing when dislodge and change when soiled. Reviewed importance of repositioning,  propping pillows to back and BLE, repositioning q 2 hrs and keeping michel-area dry. HOB 45 degrees during meals. Skin care; apply daily moisturizing cream to BLE. Increasing protein intake, avoid skipping meals and good hydration.   Reviewed S/S of infection; fever 101.3, increase pain, redness, swelling, coughing with yellow thick sputum, purulent wound drainage with foul smell, cloudy urine with foul smell, not feeling well 2-3 days, SOB, and to call [facility] or MD for assistance if experiencing any of these S/S. To call 911 with chest pains, facial drooping, difficulty talking, non arousable/unconscious and uncontrollable bleeding. Patient/caregiver degree of understanding: good   Home health supplies by type and quantity ordered/delivered this visit include: Has supplies at home. Home exercise program/Homework provided: HEP deep breathing exercises 10x when having SOB, pain and anxiety. Discharge planning discussed with patient and caregiver. Discharge planning as follows: Pt/CG will be able to manage disease and medication independently, wounds are improved, CG able to continue wound care, Chavez cath d/c and health condition stable. Pt/Caregiver did verbalize understanding of discharge planning. Patient/caregiver encouraged/instructed to keep appointment as lack of follow through with physician appointment could result in discontinuation of home care services for non-compliance.            COVID - 23 Screening completed before visit:     Denies and no family member  has any of these S/S:  Fever, dry cough, chills, sore throat diarrhea, body aches, not feeling well and loss of taste

## 2021-12-06 NOTE — HOME HEALTH
SN reason for visit: 65 yo male who is paraplegic education/teaching related to medication mgt ,disease mgt and assessment - wound care  patient made aware to monitor for s/s of infection (not observed) [increased swelling, increased redness around site, increased pain, foul smelling drainage, fever] aware who to report to/when. Caregiver involvement: Patient's cg is available at all times for assistance with iadls, adls, meal prep, medication management, taking to md appointments. Medications reconciled . The following education was provided regarding medications, medication interactions, and look a like medications. Home health supplies by type and quantity ordered/delivered this visit include: n/a  Patient education provided this visit: assessment, teaching  Reviewed medications and care plan for changes. patient/cg to continue to take medications as prescribed. patient aware to monitor for effectiveness and to notify staff of any adverse reactions to medications/any changes to medication regimen. reviewed side effects, purposes, dosage, frequencies  patient encouraged to monitor for increase in pain and to continue with current pain management and to notify staff/md if pain becomes excrutiating/intolerable. Patient is aware of fall risk. Reviewed safet precautions with patient. Educated on ambulation and or bedbound safety if applicable. INSTRUCTED PATIENT AND CG THAT SHOULD ANY NEEDS OR CONCERNS ARISE TO FIRST CALL OUR OFFICE, OR THE DR'S OFFICE  OR GO TO AN URGENT CARE CENTER AND NOT TO THE ED FOR NON-LIFE THREATENING EVENTS. IF IT IS LIFE THREATENING THEN CALL 911 OR GO TO THE CLOSEST ER. Progress toward goals- continuing to work towards goals as reviewed in [address] with patient on each visit  Home exercise program:    activity as tolerated, trying to get physical activity 4-5 x weekly.  stopping activity if causing shortness of breath or chest pain, dizziness or weakness Continued need for the following skills: Nursing, The following discharge planning was discussed with the pt/caregiver: Patient will be discharged once education has completed, and patient is medically stable.

## 2021-12-07 ENCOUNTER — HOME CARE VISIT (OUTPATIENT)
Dept: SCHEDULING | Facility: HOME HEALTH | Age: 61
End: 2021-12-07
Payer: MEDICAID

## 2021-12-07 PROCEDURE — G0300 HHS/HOSPICE OF LPN EA 15 MIN: HCPCS

## 2021-12-08 PROCEDURE — A4649 SURGICAL SUPPLIES: HCPCS

## 2021-12-09 ENCOUNTER — HOME CARE VISIT (OUTPATIENT)
Dept: SCHEDULING | Facility: HOME HEALTH | Age: 61
End: 2021-12-09
Payer: MEDICAID

## 2021-12-09 PROCEDURE — G0300 HHS/HOSPICE OF LPN EA 15 MIN: HCPCS

## 2021-12-11 ENCOUNTER — HOME CARE VISIT (OUTPATIENT)
Dept: SCHEDULING | Facility: HOME HEALTH | Age: 61
End: 2021-12-11
Payer: MEDICAID

## 2021-12-11 PROCEDURE — G0299 HHS/HOSPICE OF RN EA 15 MIN: HCPCS

## 2021-12-13 ENCOUNTER — HOME CARE VISIT (OUTPATIENT)
Dept: SCHEDULING | Facility: HOME HEALTH | Age: 61
End: 2021-12-13
Payer: MEDICAID

## 2021-12-13 VITALS
HEART RATE: 66 BPM | TEMPERATURE: 97.8 F | DIASTOLIC BLOOD PRESSURE: 78 MMHG | RESPIRATION RATE: 16 BRPM | SYSTOLIC BLOOD PRESSURE: 133 MMHG | OXYGEN SATURATION: 97 % | SYSTOLIC BLOOD PRESSURE: 134 MMHG | TEMPERATURE: 97.8 F | OXYGEN SATURATION: 98 % | DIASTOLIC BLOOD PRESSURE: 74 MMHG | RESPIRATION RATE: 16 BRPM | HEART RATE: 69 BPM

## 2021-12-13 VITALS
RESPIRATION RATE: 18 BRPM | DIASTOLIC BLOOD PRESSURE: 84 MMHG | TEMPERATURE: 97.9 F | OXYGEN SATURATION: 97 % | HEART RATE: 68 BPM | SYSTOLIC BLOOD PRESSURE: 138 MMHG

## 2021-12-13 VITALS
SYSTOLIC BLOOD PRESSURE: 130 MMHG | DIASTOLIC BLOOD PRESSURE: 82 MMHG | TEMPERATURE: 97.6 F | OXYGEN SATURATION: 97 % | HEART RATE: 72 BPM | RESPIRATION RATE: 18 BRPM

## 2021-12-13 PROCEDURE — G0300 HHS/HOSPICE OF LPN EA 15 MIN: HCPCS

## 2021-12-13 NOTE — HOME HEALTH
Skilled care proivded during this visit: Disease and medication management, BLE and sacral wound care  Caregiver involvement: CG/mother, PCA available to assist with daily meals, ADL's prn, assist with daily medications,  run errands, groceries  and accompany to MD appt prn. Medications reviewed and all medications are available in the home this visit. The following education was provided regarding medications, medication interactions, and look alike medications (specify): N/A. Medications are effective at this time. Home health supplies by type and quantity ordered/delivered this visit include: Has available supplies at home. Patient education provided this visit to include: Reviewed intervention to prevent infection; hand washing or hand , wearing face mask during clinician's visit. Continue Chavez care daily to prevent infection and empty bag when half full. Reviewed keeping BLE elevated, avoiding heel pressure, reinforce dressing when dislodge and change when soiled. Reviewed importance of repositioning,  propping pillows to back and BLE, repositioning q 2 hrs and keeping michel-area dry. HOB 45 degrees during meals. Skin care; apply daily moisturizing cream to BLE. Increasing protein intake, avoid skipping meals and good hydration. Reviewed S/S of infection; fever 101.3, increase pain, redness, swelling, coughing with yellow thick sputum, purulent wound drainage with foul smell, cloudy urine with foul smell, not feeling well 2-3 days, SOB, and to call [facility] or MD for assistance if experiencing any of these S/S. To call 911 with chest pains, facial drooping, difficulty talking, non arousable/unconscious and uncontrollable bleeding. Patient level of understanding of education provided: Pt/CG has good understanding with teaching. Skilled Care Performed this visit: Completed assessment, performed BLE and sacral wound care ( pt has multiple wounds and new wounds added to RLL.  See wound addendum). Wound has very slow improvement. No S/S of infection noted. Patient response to procedure performed: Pt tolerated well with no c/o. Agency Progress toward goals: On going tract to be met. [facility] service to continue for Lee care/management BLE and sacal wound care. Patient's Progress towards personal goals: Partially met. Adheres POC and good understanding with current treatment. Home exercise program/Homework provided: BLE ROM & BUE passive ROM, HEP deep breathing exercises 10x when having SOB, pain and anxiety, IS 10x q 1-2 hrs. Continued need for the following skills: SN  Plan for next visit:  Continue BLE and sacral wound care, monitor lee and supra cath with complication. Patient and/or caregiver notified and agrees to changes in the Plan of Care; N/A   Discharge planning discussed with patient and caregiver. Discharge planning as follows: Pt/CG will be able to manage disease and medication independently, wounds are improved, CG able to continue wound care, Lee cath d/c and health condition stable. Pt/Caregiver did verbalize understanding of discharge planning. Patient/caregiver encouraged/instructed to keep appointment as lack of follow through with physician appointment could result in discontinuation of home care services for non-compliance. COVID - 23 Screening completed before visit:     Denies and no family member  has any of these S/S:  Fever, dry cough, sore throat diarrhea, chills, body aches, not feeling well and loss of taste.

## 2021-12-13 NOTE — HOME HEALTH
Maira Knapp   64 y.o. parapelegic Male Wound care bedbound who livses with his mother. Has numerous pressures due being bedbound,   catheter and ostomy device  SN reason for visit: wound care and education/teaching related to medication mgt ,disease mgt and assessment -to pressure ulcers  made aware to monitor for s/s of infection (not observed) [increased swelling, increased redness around site, increased pain, foul smelling drainage, fever] aware who to report to/when. Caregiver involvement: Patient's cg mom is available at all times for assistance with iadls, adls, meal prep, medication management, taking to md appointments. Medications reconciled . The following education was provided regarding medications, medication interactions, and look a like medications. Home health supplies by type and quantity ordered/delivered this visit include: n/a Patient education provided this visit: assessment, teaching Reviewed medications and care plan for changes. patient/cg to continue to take medications as prescribed. patient aware to monitor for effectiveness and to notify staff of any adverse reactions to medications/any changes to medication regimen. reviewed side effects, purposes, dosage, frequencies patient encouraged to monitor for increase in pain and to continue with current pain management and to notify staff/md if pain becomes excrutiating/intolerable. Patient is aware of fall risk. Reviewed safet precautions with patient. Educated on ambulation and or bedbound safety if applicable. INSTRUCTED PATIENT AND CG THAT SHOULD ANY NEEDS OR CONCERNS ARISE TO FIRST CALL OUR OFFICE, OR THE DR'S OFFICE OR GO TO AN URGENT CARE CENTER AND NOT TO THE ED FOR NON-LIFE THREATENING EVENTS. IF IT IS LIFE THREATENING THEN CALL 911 OR GO TO THE CLOSEST ER.  Progress toward goals- continuing to work towards goals as reviewed in [address] with patient on each visit Home exercise program: activity as tolerated, trying to get physical activity 4-5 x weekly. stopping activity if causing shortness of breath or chest pain, dizziness or weakness Continued need for the following skills: Nursing, The following discharge planning was discussed with the pt/caregiver: Patient will be discharged once education has completed, and patient is medically stable.

## 2021-12-16 NOTE — HOME HEALTH
Skilled reason for visit: Wound, Catheter, and Ostomy Care         Caregiver involvement: Patient, mother and private aid present. Medications reviewed and all medications ARE available in the home this visit. The following education was provided regarding medications, medication interactions, and look alike medications (specify): Patient is well educated with medication and was able to educate this nurse on medications and give two side effects. Medications  are EFFECTIVE at this time. Home health supplies by type and quantity ordered/delivered this visit include: NA         Patient education provided this visit: that a high protein diet  will help to improve wound healing ex.: meat, fish, eggs, yogurt, or beans. Verbalized understanding and will attempt 5-6 small meals to improve appetite. Patient level of understanding of education provided: Patient verbalized understanding stating he has increased his protein in his diet. Skilled Care Performed this visit: Wound Care, Catheter Care, and Education         Patient response to procedure performed:  Patient did not voice any concerns of pain and none was noted. Patient was resting comfortable on left side while wound care was being completed. Agency Progress toward goals: Working close with patient to accomplish goals and wound healing. Patient's Progress towards personal goals: PATIENT IS STEADILY PROGRESSING TOWARDS GOALS, STILL NEEDS REINFORCEMENT/ENCOURAGEMENT. WILL CONTINUE TO MONITOR         Home exercise program: Patient will be sure that wounds stays clean and dry ad well as keep pressure off of pressure injury. Continued need for the following skills: Nursing will continue to follow patient until wound is healed and education is understood and able to be verbalized by patient/caregiver.         Plan for next visit: Wound Care, Catheter Care, Ostomy Care, and Education on Medication, Pressure Injury Prevention, and Infection Control         Patient and/or caregiver notified and agrees to changes in the Plan of Care NA           The following discharge planning was discussed with the pt/caregiver: when patient reaches goals and medication is managed, and disease processes are understood patient agrees and understand that discharge will take place

## 2021-12-17 ENCOUNTER — HOME CARE VISIT (OUTPATIENT)
Dept: SCHEDULING | Facility: HOME HEALTH | Age: 61
End: 2021-12-17
Payer: MEDICAID

## 2021-12-17 PROCEDURE — G0299 HHS/HOSPICE OF RN EA 15 MIN: HCPCS

## 2021-12-18 ENCOUNTER — HOME CARE VISIT (OUTPATIENT)
Dept: HOME HEALTH SERVICES | Facility: HOME HEALTH | Age: 61
End: 2021-12-18
Payer: MEDICAID

## 2021-12-18 NOTE — Clinical Note
Spoke with patients mother whom denied visit for today due to patients wounds being changed yesterday by home health nurse.  was notified.

## 2021-12-20 ENCOUNTER — HOME CARE VISIT (OUTPATIENT)
Dept: SCHEDULING | Facility: HOME HEALTH | Age: 61
End: 2021-12-20
Payer: MEDICAID

## 2021-12-20 VITALS
TEMPERATURE: 97.7 F | SYSTOLIC BLOOD PRESSURE: 130 MMHG | RESPIRATION RATE: 18 BRPM | DIASTOLIC BLOOD PRESSURE: 84 MMHG | HEART RATE: 72 BPM | OXYGEN SATURATION: 99 %

## 2021-12-20 PROCEDURE — G0300 HHS/HOSPICE OF LPN EA 15 MIN: HCPCS

## 2021-12-21 VITALS
TEMPERATURE: 97.2 F | HEART RATE: 88 BPM | OXYGEN SATURATION: 98 % | SYSTOLIC BLOOD PRESSURE: 132 MMHG | RESPIRATION RATE: 16 BRPM | DIASTOLIC BLOOD PRESSURE: 78 MMHG

## 2021-12-21 NOTE — HOME HEALTH
Caregiver involvement: COOKS, CLEANS AND TAKES PATIENT TO MD APPT. Medications reconciled and all medications are available in the home this visit. The following education was provided regarding medications, medication interactions, and look a like medications: REVIEWED MEDICATIONS WITH PATIENT. Medications  are effective at this time. Home health supplies by type and quantity ordered/delivered this visit include: NA    Patient education provided this visit:TAUGHT S/S OF INFECTON AT WOUND SITES, INSTRUCTED ON IMPORTANCE OF MEDICATION AND DIETARY COMPLIANCY. INSTRUCTED ON WHEN TO NOTIFY MD OF MEDICAL CHANGES R/T WOUNDS. Progress toward goals: WOUNDS HEALING    Home exercise program: BREATING EXERCISES TO HELP PREVENT RESPIRATORY PROBLEMS.     Continued need for the following skills: Nursing    The following discharge planning was discussed with the pt/caregiver: PATIENT WILL BE DISCHARGED ONCE ALL GOALS HAVE BEEN MET AND MEDICALLY STABLE

## 2021-12-21 NOTE — HOME HEALTH
Skilled reason for visit:  Wound/Ostomy Care, education     Caregiver involvement: Patient Mother and [address] present at time of visit. Medications reviewed and all medications are available in the home this visit. The following education was provided regarding medications, medication interactions, and look alike medications. tylenol, use for pain relief, importance of not exceeding 3000mg daily and potential for nausea. Medications  are effective at this time. Home health supplies by type and quantity ordered/delivered this visit include: n/a    Patient education provided this visit: patient  and caregiver made aware to turn every 2 hours and to keep pressure off of bony prominences, to monitor for any new pressure ulcer development/worsening    Patient level of understanding of education provided: Patient and Caregiver verbalized understanding. Skilled Care Performed this visit: Wound care,measurement and ostomy care     Patient response to procedure performed:  Patient tolerated dressing changes denied pain slight discomfort with turning. Agency Progress toward goals: Promote wound healing healing.     Patient's Progress towards personal goals: Wound healing no s/s of infection     Home exercise program: Frequent turn and reposition     Continued need for the following skills: SN  Plan for next visit: wound/ostomy care    Patient and/or caregiver notified and agrees to changes in the Plan of Care n/a    The following discharge planning was discussed with the pt/caregiver:

## 2021-12-22 ENCOUNTER — HOME CARE VISIT (OUTPATIENT)
Dept: SCHEDULING | Facility: HOME HEALTH | Age: 61
End: 2021-12-22
Payer: MEDICAID

## 2021-12-22 VITALS
OXYGEN SATURATION: 98 % | HEART RATE: 68 BPM | SYSTOLIC BLOOD PRESSURE: 140 MMHG | DIASTOLIC BLOOD PRESSURE: 86 MMHG | RESPIRATION RATE: 16 BRPM | TEMPERATURE: 98.9 F

## 2021-12-22 PROCEDURE — G0299 HHS/HOSPICE OF RN EA 15 MIN: HCPCS

## 2021-12-22 NOTE — HOME HEALTH
Skilled reason for visit: wound assessment/ dressing change, med management/education. Caregiver involvement: available for ADLs, meal prep, med management and transportation. Medications reviewed and all medications are available in the home this visit. The following education was provided regarding medications, medication interactions, and look alike medications (specify): tylenol, use for pain relief, importance of not exceeding 3000mg daily and potenital for nausea. Educated patient on antidepressants side effects which include, dizziness, nausea, fatigue, nervousness and mood swings. Medications  are effective at this time. Home health supplies by type and quantity ordered/delivered this visit include: n/a    Patient education provided this visit: perform skin inspections looking for any skin breakdown or redness. monitor for edema. frequent position changes. Watch for signs and symptoms of infection which include; fever, drainage, foul smell, lethargy. Importance on keeping lee catheter clean and free of kinks, and ensure that the bag isnt being tugged on when not using a securement device. Patient level of understanding of education provided: Patient verbalized understanding of all education provided. Skilled Care Performed this visit: wound assessment/ dressing change, med management/education. Patient response to procedure performed:  Patient tolerated dressing change well, no complaints of pain. Patient's Progress towards personal goals: good    Home exercise program: sn instructed patient to perform deep breathing exercises, 5-6 breaths 5 x daily to promote air exchange and prevent pneumonia     Continued need for the following skills: Nursing    Plan for next visit: wound assessment/ dressing change, med management/education.     Patient and/or caregiver notified and agrees to changes in the Plan of Care N/A      The following discharge planning was discussed with the pt/caregiver: DC when SN no longer needed.

## 2021-12-25 ENCOUNTER — HOME CARE VISIT (OUTPATIENT)
Dept: SCHEDULING | Facility: HOME HEALTH | Age: 61
End: 2021-12-25
Payer: MEDICAID

## 2021-12-25 PROCEDURE — G0299 HHS/HOSPICE OF RN EA 15 MIN: HCPCS

## 2021-12-27 ENCOUNTER — HOME CARE VISIT (OUTPATIENT)
Dept: SCHEDULING | Facility: HOME HEALTH | Age: 61
End: 2021-12-27
Payer: MEDICAID

## 2021-12-27 PROCEDURE — G0299 HHS/HOSPICE OF RN EA 15 MIN: HCPCS

## 2021-12-28 ENCOUNTER — HOME CARE VISIT (OUTPATIENT)
Dept: HOME HEALTH SERVICES | Facility: HOME HEALTH | Age: 61
End: 2021-12-28
Payer: MEDICAID

## 2021-12-28 VITALS
TEMPERATURE: 97.2 F | OXYGEN SATURATION: 98 % | DIASTOLIC BLOOD PRESSURE: 90 MMHG | HEART RATE: 81 BPM | RESPIRATION RATE: 16 BRPM | SYSTOLIC BLOOD PRESSURE: 140 MMHG

## 2021-12-28 VITALS
DIASTOLIC BLOOD PRESSURE: 80 MMHG | SYSTOLIC BLOOD PRESSURE: 130 MMHG | RESPIRATION RATE: 16 BRPM | HEART RATE: 77 BPM | OXYGEN SATURATION: 99 % | TEMPERATURE: 97.2 F

## 2021-12-28 NOTE — HOME HEALTH
Caregiver involvement: COOKS, CLEANS AND TAKES PATIENT TO MD APPT. Medications reconciled and all medications are available in the home this visit. The following education was provided regarding medications, medication interactions, and look a like medications: REVIEWED MEDICATIONS WITH PATIENT AND CAREGIVER. Medications  are effective at this time. Home health supplies by type and quantity ordered/delivered this visit include: NA    Patient education provided this visit:TAUGHT PATIENT ON S/S OF INFECTION AT WOUND SITES, INCREASED DRAINAGE, FOUL ODOR. INSTRUCTED ON IMPORTANCE OF MEDICATION AND DIETARY COMPLIANCY. INSTRUCTED CAREGIVERS TO TURN AND REPOSITION PATIENT EVERY 2-3 HOURS. CAREGIVER INDEPENDANT WITH OSTOMY CARE. LEE CATH INTACT AND PATENT DRAINING YELLOW URINE. WOUND BEDS HEALING SLOWLY. INSTRUCTED ON WHEN TO NOTIFY MD OF MEDICAL CHANGES R/T WOUND BEDS. Progress toward goals:WOUNDS HEALING, DECREASED DRAINAGE. Home exercise program: BREATHING EXERCISES TO HELP PREVENT RESPIRATORY PROBLEMS. Continued need for the following skills: Nursing    The following discharge planning was discussed with the pt/caregiver: PATIENT WILL BE DISCHARGED ONCE ALL GOALS HAVE BEEN MET AND MEDICALLY STABLE. Physician Notification and Justification to Continue Services: [id number] Located within [facility] for continued intermittent care: patient with wound care concerns as follows PATIENT UNABLE TO DO OWN WOUND CARE DUE TO HIS MEDCAL CONDITION, PATIENT IS A PARAPLEDGIC, HEAVY AND HARD TO TURN TO GET [address] and patient with ongoing need for skilled lee catheter changes. Pascual Dawson MD ([phone]) notified of the following: the need for continued Skilled Nursing home health services for above reasons, plan of care including visit frequency of 3W8, 2 PRN. Skilled Nursing for : additional assessment and wound care and lee care services.

## 2021-12-29 ENCOUNTER — HOME CARE VISIT (OUTPATIENT)
Dept: SCHEDULING | Facility: HOME HEALTH | Age: 61
End: 2021-12-29
Payer: MEDICAID

## 2021-12-29 PROCEDURE — 400014 HH F/U

## 2021-12-29 PROCEDURE — G0299 HHS/HOSPICE OF RN EA 15 MIN: HCPCS

## 2021-12-29 NOTE — HOME HEALTH
Caregiver involvement: COOKS, CLEANS AND TAKES PATIENT TO MD APPT. Medications reconciled and all medications are available in the home this visit. The following education was provided regarding medications, medication interactions, and look a like medications: REVIEWED MEDICATIONS WITH PATIENT. Medications  are effective at this time. Home health supplies by type and quantity ordered/delivered this visit include: NA    Patient education provided this visit:TAUGHT S/S OF INFECTON AT WOUND SITES, INSTRUCTED ON IMPORTANCE OF MEDICATION AND DIETARY COMPLIANCY. INSTRUCTED ON WHEN TO NOTIFY MD OF MEDICAL CHANGES R/T WOUNDS. INSTRUCTED PATIENT TO TURN SLIGHTLY EVERY 2 OR 3 HOURS TO AVOID ADDITIONAL SKIN BREAKDOWN. Progress toward goals: WOUNDS HEALING    Home exercise program: BREATING EXERCISES TO HELP PREVENT RESPIRATORY PROBLEMS.     Continued need for the following skills: Nursing    The following discharge planning was discussed with the pt/caregiver: PATIENT WILL BE DISCHARGED ONCE ALL GOALS HAVE BEEN MET AND MEDICALLY STABLE

## 2021-12-31 ENCOUNTER — HOME CARE VISIT (OUTPATIENT)
Dept: SCHEDULING | Facility: HOME HEALTH | Age: 61
End: 2021-12-31
Payer: MEDICAID

## 2021-12-31 PROCEDURE — G0299 HHS/HOSPICE OF RN EA 15 MIN: HCPCS

## 2022-01-01 VITALS
TEMPERATURE: 97.6 F | DIASTOLIC BLOOD PRESSURE: 88 MMHG | OXYGEN SATURATION: 98 % | SYSTOLIC BLOOD PRESSURE: 132 MMHG | HEART RATE: 76 BPM | RESPIRATION RATE: 18 BRPM

## 2022-01-01 VITALS
HEART RATE: 69 BPM | OXYGEN SATURATION: 97 % | DIASTOLIC BLOOD PRESSURE: 72 MMHG | TEMPERATURE: 96.8 F | SYSTOLIC BLOOD PRESSURE: 130 MMHG | RESPIRATION RATE: 16 BRPM

## 2022-01-02 NOTE — HOME HEALTH
Caregiver involvement: COOKS, CLEANS AND TAKES PATIENT TO MD APPT. Medications reconciled and all medications are available in the home this visit. The following education was provided regarding medications, medication interactions, and look a like medications: REVIEWED MEDICATIONS WITH PATIENT. Medications  are effective at this time. Home health supplies by type and quantity ordered/delivered this visit include: NA    Patient education provided this visit:TAUGHT S/S OF INFECTON AT WOUND SITES, INSTRUCTED ON IMPORTANCE OF MEDICATION AND DIETARY COMPLIANCY. INSTRUCTED ON WHEN TO NOTIFY MD OF MEDICAL CHANGES R/T WOUNDS. INSTRUCTED PATIENT TO TURN SLIGHTLY EVERY 2 OR 3 HOURS TO AVOID ADDITIONAL SKIN BREAKDOWN. Progress toward goals: WOUNDS HEALING    Home exercise program: BREATHING EXERCISES TO HELP PREVENT RESPIRATORY PROBLEMS.     Continued need for the following skills: Nursing    The following discharge planning was discussed with the pt/caregiver: PATIENT WILL BE DISCHARGED ONCE ALL GOALS HAVE BEEN MET AND MEDICALLY STABLE

## 2022-01-02 NOTE — HOME HEALTH
Skilled care provided during this visit: Disease and medication management, Ostomy and Chavez cath assessment, performed BLE and sacral wound care  Caregiver involvement: CG/PCA, mother available to prepare with daily meals, assist with ADL's, assist with wound care during non-nursing days, run errands and accompany to MD appt prn. Medications reviewed and all medications are available in the home this visit. The following education was provided regarding medications, medication interactions, and look alike medications (specify): N/A. Medications are effective at this time. Home health supplies by type and quantity ordered/delivered this visit include: Has available supplies at home. Patient education provided this visit to include: Reviewed intervention to prevent infection; hand washing or hand , wearing face mask during clinician's visit. Continue Chavez care daily to prevent infection and empty bag when half full. Reviewed keeping BLE elevated, avoiding heel pressure, reinforce wound dressing when dislodge and change when soiled. Reviewed importance of repositioning, propping pillows back and BLE, repositioning q 2 hrs and keeping michel-area dry. HOB 45 degrees during meals. Skin care; apply daily moisturizing cream to BLE. Increasing protein intake, avoid skipping meals and good hydration. Pt encouraged getting OOB during day time. Reviewed S/S of infection; fever 101.3, increase pain, redness, swelling, coughing with yellow thick sputum, purulent wound drainage with foul smell, cloudy urine with foul smell, not feeling well 2-3 days, SOB, and to call [facility] or MD for assistance if experiencing any of these S/S. To call 911 with chest pains, facial drooping, difficulty talking, non arousable/unconscious and uncontrollable bleeding. Patient level of understanding of education provided: Pt/CG has good understanding with teaching.   Skilled Care Performed this visit: Completed assessment, performed BLE and sacral wound care (See wound addendum). Old suprapubic cath site has open wound, cleansed with WC then applied dry dressing. CG taught to continue daily dry dressing daily and avoid exposing to air. Wounds has very slow improvement and S/S of infection noted. Patient response to procedure performed: Pt tolerated well with no c/o. Agency Progress toward goals: On going tract to be met. [place] [id number] service to continue for Chavez care/management BLE and sacral wound care. Patient's Progress towards personal goals: Partially met. Pt/CG's adheres POC and good understanding with current treatment. Home exercise program/Homework provided: BLE ROM & BUE passive ROM, HEP deep breathing exercises 10x when having SOB, pain and anxiety, IS 10x q 1-2 hrs. Continued need for the following skills: SN    Plan for next visit: Continue BLE and sacral wound care and Chavez cath care    Patient and/or caregiver notified and agrees to changes in the Plan of Care; N/A     Discharge planning discussed with patient and caregiver. Discharge planning as follows: Pt/CG will be able to manage disease and medication independently, wounds are improved, CG able to continue wound care, Chavez cath d/c and health condition stable. Pt/Caregiver did verbalize understanding of discharge planning. Patient/caregiver encouraged/instructed to keep appointment as lack of follow through with physician appointment could result in discontinuation of home care services for non-compliance. COVID - 23 Screening completed before visit:       Denies and no family member  has any of these S/S:    Fever, dry cough, sore throat diarrhea, chills, body aches, not feeling well and loss of taste.

## 2022-01-03 ENCOUNTER — HOME CARE VISIT (OUTPATIENT)
Dept: SCHEDULING | Facility: HOME HEALTH | Age: 62
End: 2022-01-03
Payer: MEDICAID

## 2022-01-03 PROCEDURE — G0299 HHS/HOSPICE OF RN EA 15 MIN: HCPCS

## 2022-01-05 ENCOUNTER — HOME CARE VISIT (OUTPATIENT)
Dept: SCHEDULING | Facility: HOME HEALTH | Age: 62
End: 2022-01-05
Payer: MEDICAID

## 2022-01-05 VITALS
HEART RATE: 94 BPM | RESPIRATION RATE: 18 BRPM | OXYGEN SATURATION: 98 % | SYSTOLIC BLOOD PRESSURE: 146 MMHG | DIASTOLIC BLOOD PRESSURE: 78 MMHG | TEMPERATURE: 98.7 F

## 2022-01-05 PROCEDURE — G0300 HHS/HOSPICE OF LPN EA 15 MIN: HCPCS

## 2022-01-05 NOTE — HOME HEALTH
Skilled reason for visit:  Wound/Ostomy Care, education        Caregiver involvement  Mother prepares meals, schedules patients appointments, transports patient to his appointments,  medications, and assist with tilting and bathing if needed     Medications reviewed and all medications are available in the home this visit. The following education was provided regarding medications, medication interactions, and look alike medications. Lexapro for treatment of depression and possible side effects,headache,constipation dizziness. Medications  are effective at this time no reported side effects. Home health supplies by type and quantity ordered/delivered this visit include: n/a      Patient education provided this visit: patient  and caregiver made aware to turn every 2 hours and to keep pressure off of bony prominences, to monitor for any new pressure ulcer development/worsening. Increase protein in diet to promote wound healing. Patient level of understanding of education provided: Patient and Caregiver verbalized understanding. Skilled Care Performed this visit: Wound care,measurement and ostomy care          Patient response to procedure performed:  Patient tolerated dressing changes denied pain slight discomfort with turning. Agency Progress toward goals: ENCOURAGED PATIENT TO HAVE PROTEIN WITH EACH MEAL TO PROMOTE WOUND HEALING.          Patient's Progress towards personal goals:  Continue wound healing prevention of infection and developing new wounds      Home exercise program: Frequent turn and reposition          Continued need for the following skills: SN    Plan for next visit: wound/ostomy care         Patient and/or caregiver notified and agrees to changes in the Plan of Care n/a        The following discharge planning was discussed with the pt/caregiver Discharge when wounds healed, pain controlled and caregiver  independent with medication regimen

## 2022-01-05 NOTE — HOME HEALTH
Caregiver involvement: COOKS, CLEANS AND TAKES PATIENT TO MD APPT. Medications reconciled and all medications are available in the home this visit. The following education was provided regarding medications, medication interactions, and look a like medications: REVIEWED MEDICATIONS WITH PATIENT. Medications  are effective at this time. Home health supplies by type and quantity ordered/delivered this visit include: NA    Patient education provided this visit:TAUGHT S/S OF INFECTON AT WOUND SITES, INSTRUCTED ON IMPORTANCE OF MEDICATION AND DIETARY COMPLIANCY. INSTRUCTED ON WHEN TO NOTIFY MD OF MEDICAL CHANGES R/T WOUNDS. INSTRUCTED PATIENT TO TURN SLIGHTLY EVERY 2 OR 3 HOURS TO AVOID ADDITIONAL SKIN BREAKDOWN. GARCIA INTACT AND DRAINING YELLOW URINE. OSTOMY INTACT AND PATENT. NO ADDITIONAL BREAKDOWN NOTED. Progress toward goals: WOUNDS HEALING    Home exercise program: BREATHING EXERCISES TO HELP PREVENT RESPIRATORY PROBLEMS.     Continued need for the following skills: Nursing    The following discharge planning was discussed with the pt/caregiver: PATIENT WILL BE DISCHARGED ONCE ALL GOALS HAVE BEEN MET AND MEDICALLY STABLE

## 2022-01-07 ENCOUNTER — HOME CARE VISIT (OUTPATIENT)
Dept: SCHEDULING | Facility: HOME HEALTH | Age: 62
End: 2022-01-07
Payer: MEDICAID

## 2022-01-07 VITALS
RESPIRATION RATE: 16 BRPM | HEART RATE: 69 BPM | DIASTOLIC BLOOD PRESSURE: 72 MMHG | TEMPERATURE: 96.8 F | SYSTOLIC BLOOD PRESSURE: 130 MMHG | OXYGEN SATURATION: 97 %

## 2022-01-07 PROCEDURE — G0300 HHS/HOSPICE OF LPN EA 15 MIN: HCPCS

## 2022-01-07 NOTE — Clinical Note
Patient right foot pinky toe  dusk in color toe was warm to touch and was actively bleeding. Mother shown the toe and asked was Patient being followed by a wound doctor, she stated yes and MD seen him the pass week. She stated that Dr. Deanna Hi is his podiatries. She called the office and spoke with his nurse who took the message about the toe while I was in the home. When asked more about that visit with podiatry she said that he referred patient to  vascular and he has an appointment on Tuesday 1/11/22. I called Dr. Deanna Hi office after visit to see if he mention the right foot pinky toe in his notes and was that the reason for vascular consult but his office was closed. I will call again on Monday.

## 2022-01-08 VITALS
RESPIRATION RATE: 18 BRPM | TEMPERATURE: 97.8 F | DIASTOLIC BLOOD PRESSURE: 78 MMHG | HEART RATE: 78 BPM | OXYGEN SATURATION: 98 % | SYSTOLIC BLOOD PRESSURE: 140 MMHG

## 2022-01-08 NOTE — HOME HEALTH
Skilled reason for visit:  Wound/Ostomy Care, education     Caregiver involvement  Mother prepares meals, schedules patients appointments, transports patient to his appointments,  medications, and assist with tilting and bathing if needed     Medications reviewed and all medications are available in the home this visit. The following education was provided regarding medications, medication interactions, and look alike medications. Lexapro for treatment of depression and possible side effects,headache,constipation dizziness. Medications  are effective at this time no reported side effects. Home health supplies by type and quantity ordered/delivered this visit include: n/a    Patient education provided this visit: Patient and CG educated on off loading BLE with pillows or wedges to promote wound healing by relieving pressure and reducing edema. Patient level of understanding of education provided: Patient and Caregiver verbalized understanding. Skilled Care Performed this visit: Wound care,measurement and ostomy care  Patient right foot pinky toe  dusk in color toe was warm to touch and was actively bleeding. Mother shown the toe and asked was Patient being followed by a wound doctor, she stated yes and MD seen him the pass week. She stated that Dr. Reynaldo Alvarez is his podiatries. She called the office and spoke with his nurse who took the message about the toe while I was in the home. When asked more about that visit with podiatry she said that he referred patient to a vascular and he has an appointment on Tuesday 1/11/22. Patient response to procedure performed:  Patient tolerated dressing changes denied pain slight discomfort with turning. Agency Progress toward goals:Agency staff have been educating patient on her disease processes and teaching her how to manage with her pain effectively.      Patient's personal goal is to have complete wound healing and remain free of infection. Home exercise program: Frequent turn and reposition, increasing protein and offloading BLE    Continued need for the following skills: SN    Plan for next visit: wound/ostomy care    Patient and/or caregiver notified and agrees to changes in the Plan of Care YES.       The following discharge planning was discussed with the pt/caregiver Discharge when wounds healed, pain controlled and caregiver  independent with medication regimen

## 2022-01-10 ENCOUNTER — HOME CARE VISIT (OUTPATIENT)
Dept: SCHEDULING | Facility: HOME HEALTH | Age: 62
End: 2022-01-10
Payer: MEDICAID

## 2022-01-10 VITALS
OXYGEN SATURATION: 98 % | TEMPERATURE: 97.6 F | HEART RATE: 76 BPM | DIASTOLIC BLOOD PRESSURE: 67 MMHG | SYSTOLIC BLOOD PRESSURE: 122 MMHG | RESPIRATION RATE: 18 BRPM

## 2022-01-10 PROCEDURE — G0300 HHS/HOSPICE OF LPN EA 15 MIN: HCPCS

## 2022-01-11 NOTE — HOME HEALTH
Skilled reason for visit:  Wound/Ostomy Care, education          Caregiver involvement  Mother prepares meals, schedules patients appointments, transports patient to his appointments,  medications, and assist with tilting and bathing if needed         Medications reviewed and all medications are available in the home this visit. The following education was provided regarding medications, medication interactions, and look alike medications. tylenol, use for pain relief, importance of not exceeding 3000mg daily and potenital for nausea. Medications  are effective at this time. Home health supplies by type and quantity ordered/delivered this visit include: Waiting for order from vascular before placing any new orders         Patient education provided this visit: Reinforced tihe importance of frequent turning and reposition off sacral wound and the use of wedge pillow. Patient level of understanding of education provided: Patient and Caregiver verbalized understanding. Skilled Care Performed this visit: Wound care,measurement and ostomy care  Patient will see vascular MD tomorrow possible new wound are orders to address right foot. Patient with f/u with podaitry after vascular. Podiatris office return mother call and  was made aware of right toe. Patient response to procedure performed:  Patient tolerated dressing changes denied pain slight discomfort with turning. Agency Progress toward goals:Agency staff have been educating patient on her disease processes and teaching her how to manage with her pain effectively. Patient's personal goal is to have complete wound healing and remain free of infection.          Home exercise program: Frequent turn and reposition, increasing protein and offloading BLE         Continued need for the following skills: SN         Plan for next visit: wound/ostomy care         Patient and/or caregiver notified and agrees to changes in the Plan of Care YES.            The following discharge planning was discussed with the pt/caregiver Discharge when wounds healed, pain controlled and caregiver  independent with medication regimen

## 2022-01-12 ENCOUNTER — HOME CARE VISIT (OUTPATIENT)
Dept: SCHEDULING | Facility: HOME HEALTH | Age: 62
End: 2022-01-12
Payer: MEDICAID

## 2022-01-12 PROCEDURE — G0300 HHS/HOSPICE OF LPN EA 15 MIN: HCPCS

## 2022-01-13 VITALS
TEMPERATURE: 98.6 F | DIASTOLIC BLOOD PRESSURE: 60 MMHG | SYSTOLIC BLOOD PRESSURE: 122 MMHG | OXYGEN SATURATION: 96 % | HEART RATE: 80 BPM | RESPIRATION RATE: 18 BRPM

## 2022-01-13 NOTE — HOME HEALTH
Skilled reason for visit:  Wound/Ostomy Care, education     Caregiver involvement  Mother prepares meals, schedules patients appointments, transports patient to his appointments,  medications, and assist with tilting and bathing if needed    Medications reviewed and all medications are available in the home this visit. The following education was provided regarding medications, medication interactions, and look alike medications. tylenol, use for pain relief, importance of not exceeding 3000mg daily and potenital for nausea. Medications  are effective at this time. Home health supplies by type and quantity ordered/delivered this visit include: n/a      Patient education provided this visit: Reinforced tihe importance of frequent turning and reposition off sacral wound and the use of wedge pillow. Patient level of understanding of education provided: Patient and Caregiver verbalized understanding but remain noncompliant with turning. Patient in same position during reach visit. Skilled Care Performed this visit: Wound care,measurement and ostomy care       Patient response to procedure performed:  Patient tolerated dressing changes denied pain, sleeping most of this visit. Agency Progress toward goals:Agency staff have been educating patient on her disease processes and teaching her how to manage with her pain effectively. Patient's personal goal is to have complete wound healing and remain free of infection. Home exercise program: Frequent turn and reposition, increasing protein and offloading BLE    Continued need for the following skills: SN    Plan for next visit: wound/ostomy care,pictures, Chavez cath change      Patient and/or caregiver notified and agrees to changes in the Plan of Care YES.         The following discharge planning was discussed with the pt/caregiver Discharge when wounds healed, pain controlled and caregiver  independent with medication regimen

## 2022-01-14 ENCOUNTER — HOME CARE VISIT (OUTPATIENT)
Dept: SCHEDULING | Facility: HOME HEALTH | Age: 62
End: 2022-01-14
Payer: MEDICAID

## 2022-01-14 VITALS
HEART RATE: 71 BPM | SYSTOLIC BLOOD PRESSURE: 135 MMHG | RESPIRATION RATE: 18 BRPM | OXYGEN SATURATION: 98 % | TEMPERATURE: 97.1 F | DIASTOLIC BLOOD PRESSURE: 86 MMHG

## 2022-01-14 PROCEDURE — G0300 HHS/HOSPICE OF LPN EA 15 MIN: HCPCS

## 2022-01-15 NOTE — HOME HEALTH
Skilled reason for visit:  Wound/Ostomy Care, education          Caregiver involvement  Mother prepares meals, schedules patients appointments, transports patient to his appointments,  medications, and assist with tilting and bathing if needed         Medications reviewed and all medications are available in the home this visit. The following education was provided regarding medications, medication interactions, and look alike medications Proor tonix for GERD assist with acid reflux importance to take as order for effectiveness              Medications  are effective at this time. Home health supplies by type and quantity ordered/delivered this visit include: yes              Patient education provided this visit: Reinforced tihe importance of frequent turning and reposition off sacral wound and the use of wedge pillow. Patient level of understanding of education provided: Patient and Caregiver verbalized understanding but remain noncompliant with turning. Patient in same position during reach visit. Skilled Care Performed this visit: Wound care,measurement and ostomy care ,lee cath changed              Patient response to procedure performed:  Patient tolerated dressing changes denied pain, sleeping most of this visit. Agency Progress toward goals:Agency staff have been educating patient on her disease processes and teaching her how to manage with her pain effectively. Patient's personal goal is to have complete wound healing and remain free of infection. Home exercise program: Frequent turn and reposition, increasing protein and offloading BLE         Continued need for the following skills: SN         Plan for next visit: wound/ostomy care              Patient and/or caregiver notified and agrees to changes in the Plan of Care YES.                 The following discharge planning was discussed with the pt/caregiver Discharge when wounds healed, pain controlled and caregiver  independent with medication regimen

## 2022-01-17 ENCOUNTER — HOME CARE VISIT (OUTPATIENT)
Dept: SCHEDULING | Facility: HOME HEALTH | Age: 62
End: 2022-01-17
Payer: MEDICAID

## 2022-01-17 PROCEDURE — G0299 HHS/HOSPICE OF RN EA 15 MIN: HCPCS

## 2022-01-18 VITALS
SYSTOLIC BLOOD PRESSURE: 132 MMHG | TEMPERATURE: 97.2 F | DIASTOLIC BLOOD PRESSURE: 80 MMHG | HEART RATE: 79 BPM | OXYGEN SATURATION: 98 % | RESPIRATION RATE: 16 BRPM

## 2022-01-19 ENCOUNTER — HOME CARE VISIT (OUTPATIENT)
Dept: SCHEDULING | Facility: HOME HEALTH | Age: 62
End: 2022-01-19
Payer: MEDICAID

## 2022-01-19 PROCEDURE — G0299 HHS/HOSPICE OF RN EA 15 MIN: HCPCS

## 2022-01-19 NOTE — HOME HEALTH
Caregiver involvement: COOKS, CLEANS AND TAKES PATIENT TO MD APPT. Medications reconciled and all medications are available in the home this visit. The following education was provided regarding medications, medication interactions, and look a like medications: REVIEWED MEDICATIONS WITH PATIENT. Medications  are effective at this time. Home health supplies by type and quantity ordered/delivered this visit include: NA    Patient education provided this visit:TAUGHT S/S OF INFECTON AT WOUND SITES, INSTRUCTED ON IMPORTANCE OF MEDICATION AND DIETARY COMPLIANCY. INSTRUCTED ON WHEN TO NOTIFY MD OF MEDICAL CHANGES R/T WOUNDS. INSTRUCTED PATIENT TO TURN SLIGHTLY EVERY 2 OR 3 HOURS TO AVOID ADDITIONAL SKIN BREAKDOWN. GARCIA INTACT AND DRAINING YELLOW URINE. OSTOMY INTACT AND PATENT. NO ADDITIONAL BREAKDOWN NOTED. NOTED RT LEG  AND LEFT LEG WOUNDS, BEEFY AND HEALING. RT FOOT TOE WRAPPED IN GAUZE, POSSIBLE SURGERY FOR REMOVAL OF TOE. Progress toward goals: WOUNDS HEALING    Home exercise program: BREATHING EXERCISES TO HELP PREVENT RESPIRATORY PROBLEMS.     Continued need for the following skills: Nursing    The following discharge planning was discussed with the pt/caregiver: PATIENT WILL BE DISCHARGED ONCE ALL GOALS HAVE BEEN MET AND MEDICALLY STABLE

## 2022-01-21 ENCOUNTER — HOME CARE VISIT (OUTPATIENT)
Dept: SCHEDULING | Facility: HOME HEALTH | Age: 62
End: 2022-01-21
Payer: MEDICAID

## 2022-01-21 VITALS
TEMPERATURE: 97.5 F | RESPIRATION RATE: 16 BRPM | DIASTOLIC BLOOD PRESSURE: 82 MMHG | HEART RATE: 68 BPM | OXYGEN SATURATION: 98 % | SYSTOLIC BLOOD PRESSURE: 130 MMHG

## 2022-01-21 PROCEDURE — G0299 HHS/HOSPICE OF RN EA 15 MIN: HCPCS

## 2022-01-21 NOTE — HOME HEALTH
Caregiver involvement: COOKS, CLEANS AND TAKES PATIENT TO MD APPT. Medications reconciled and all medications are available in the home this visit. The following education was provided regarding medications, medication interactions, and look a like medications: REVIEWED MEDICATIONS WITH PATIENT. Medications  are effective at this time. Home health supplies by type and quantity ordered/delivered this visit include: NA    Patient education provided this visit:TAUGHT S/S OF INFECTON AT WOUND SITES, INSTRUCTED ON IMPORTANCE OF MEDICATION AND DIETARY COMPLIANCY. INSTRUCTED ON WHEN TO NOTIFY MD OF MEDICAL CHANGES R/T WOUNDS. INSTRUCTED PATIENT TO TURN SLIGHTLY EVERY 2 OR 3 HOURS TO AVOID ADDITIONAL SKIN BREAKDOWN. GARCIA INTACT AND DRAINING YELLOW URINE. OSTOMY INTACT AND PATENT. NO ADDITIONAL BREAKDOWN NOTED. NOTED RT LEG  AND LEFT LEG WOUNDS, BEEFY AND HEALING. RT FOOT TOE WRAPPED IN GAUZE, POSSIBLE SURGERY FOR REMOVAL OF TOE. INSTRUCTED CAREGIVER TO TURN PATIENT FREQUENTLY TO GET PATIENT OFF OF BACK WOUND. Progress toward goals: WOUNDS HEALING    Home exercise program: BREATHING EXERCISES TO HELP PREVENT RESPIRATORY PROBLEMS.     Continued need for the following skills: Nursing    The following discharge planning was discussed with the pt/caregiver: PATIENT WILL BE DISCHARGED ONCE ALL GOALS HAVE BEEN MET AND MEDICALLY STABLE

## 2022-01-24 ENCOUNTER — HOME CARE VISIT (OUTPATIENT)
Dept: SCHEDULING | Facility: HOME HEALTH | Age: 62
End: 2022-01-24
Payer: MEDICAID

## 2022-01-24 VITALS
DIASTOLIC BLOOD PRESSURE: 80 MMHG | RESPIRATION RATE: 16 BRPM | OXYGEN SATURATION: 98 % | HEART RATE: 71 BPM | SYSTOLIC BLOOD PRESSURE: 128 MMHG | TEMPERATURE: 98.1 F

## 2022-01-24 PROCEDURE — A6446 CONFORM BAND S W>=3" <5"/YD: HCPCS

## 2022-01-24 PROCEDURE — A9270 NON-COVERED ITEM OR SERVICE: HCPCS

## 2022-01-24 PROCEDURE — G0299 HHS/HOSPICE OF RN EA 15 MIN: HCPCS

## 2022-01-24 PROCEDURE — A6213 FOAM DRG >16<=48 SQ IN W/BDR: HCPCS

## 2022-01-24 PROCEDURE — A6197 ALGINATE DRSG >16 <=48 SQ IN: HCPCS

## 2022-01-24 PROCEDURE — A4649 SURGICAL SUPPLIES: HCPCS

## 2022-01-24 PROCEDURE — A6212 FOAM DRG <=16 SQ IN W/BORDER: HCPCS

## 2022-01-24 NOTE — HOME HEALTH
Skilled reason for visit: wound assessment/ dressing change, med management/education. Caregiver involvement: available for ADLs, meal prep, med management and transportation. Medications reviewed and all medications are available in the home this visit. The following education was provided regarding medications, medication interactions, and look alike medications (specify): Educated patient on antidepressants side effects which include, dizziness, nausea, fatigue, nervousness and mood swings. tylenol, use for pain relief, importance of not exceeding 3000mg daily and potenital for nausea. Medications  are effective at this time. Home health supplies by type and quantity ordered/delivered this visit include: foam, wound spray, medihoney, silver alginate, sacral foam, roll gauze    Patient education provided this visit: perform skin inspections looking for any skin breakdown or redness. monitor for edema. frequent position changes. Watch for signs and symptoms of infection which include; fever, drainage, foul smell, lethargy. Importance on keeping lee catheter clean and free of kinks, and ensure that the bag isnt being tugged on when not using a securement device. Sharps education provided: n/a    Patient level of understanding of education provided: Patient verbalized understanding of all education provided. Skilled Care Performed this visit: wound assessment/ dressing change, med management/education. Patient response to procedure performed:  Patient tolerated dressing change well, no complaints of pain. Patient's Progress towards personal goals: good    Home exercise program: sn instructed patient to perform deep breathing exercises, 5-6 breaths 5 x daily to promote air exchange and prevent pneumonia     Continued need for the following skills: Nursing    Plan for next visit: wound assessment/ dressing change, med management/education.     Patient and/or caregiver notified and agrees to changes in the Plan of Care N/A      The following discharge planning was discussed with the pt/caregiver: DC when SN no longer needed.

## 2022-01-24 NOTE — HOME HEALTH
Caregiver involvement: COOKS, CLEANS AND TAKES PATIENT TO MD APPT. Medications reconciled and all medications are available in the home this visit. The following education was provided regarding medications, medication interactions, and look a like medications: REVIEWED MEDICATIONS WITH PATIENT. Medications  are effective at this time. Home health supplies by type and quantity ordered/delivered this visit include: NA    Patient education provided this visit:TAUGHT S/S OF INFECTON AT WOUND SITES, INSTRUCTED ON IMPORTANCE OF MEDICATION AND DIETARY COMPLIANCY. INSTRUCTED ON WHEN TO NOTIFY MD OF MEDICAL CHANGES R/T WOUNDS. INSTRUCTED PATIENT TO TURN SLIGHTLY EVERY 2 OR 3 HOURS TO AVOID ADDITIONAL SKIN BREAKDOWN. GARCIA INTACT AND DRAINING YELLOW URINE. OSTOMY INTACT AND PATENT. NO ADDITIONAL BREAKDOWN NOTED. NOTED RT LEG  AND LEFT LEG WOUNDS, BEEFY AND HEALING. RT FOOT TOE WRAPPED IN GAUZE, POSSIBLE SURGERY FOR REMOVAL OF TOE. INSTRUCTED CAREGIVER TO TURN PATIENT FREQUENTLY TO GET PATIENT OFF OF BACK WOUND. PATIENT SAW MD YESTERDAY. Progress toward goals: WOUNDS HEALING    Home exercise program: BREATHING EXERCISES TO HELP PREVENT RESPIRATORY PROBLEMS. Continued need for the following skills: Nursing    The following discharge planning was discussed with the pt/caregiver: PATIENT WILL BE DISCHARGED ONCE ALL GOALS HAVE BEEN MET AND MEDICALLY STABLE. PATIENT TOLERATED PROCEDURE WELL. PATIENT STATES UNDERSTANDS THE WOUNDS AND THE CARE THEY REQUIRE.

## 2022-01-25 VITALS
HEART RATE: 67 BPM | DIASTOLIC BLOOD PRESSURE: 80 MMHG | OXYGEN SATURATION: 97 % | SYSTOLIC BLOOD PRESSURE: 130 MMHG | RESPIRATION RATE: 16 BRPM | TEMPERATURE: 97 F

## 2022-01-26 ENCOUNTER — HOME CARE VISIT (OUTPATIENT)
Dept: SCHEDULING | Facility: HOME HEALTH | Age: 62
End: 2022-01-26
Payer: MEDICAID

## 2022-01-26 PROCEDURE — G0300 HHS/HOSPICE OF LPN EA 15 MIN: HCPCS

## 2022-01-27 VITALS
DIASTOLIC BLOOD PRESSURE: 75 MMHG | TEMPERATURE: 98.7 F | RESPIRATION RATE: 18 BRPM | HEART RATE: 71 BPM | SYSTOLIC BLOOD PRESSURE: 124 MMHG | OXYGEN SATURATION: 96 %

## 2022-01-27 NOTE — HOME HEALTH
Skilled reason for visit: Infection following surgery  Caregiver involvement: mother as caregiver, and present during visit. Medications reviewed and all medications are available in the home this visit. The following education was provided regarding medications, medication interactions, and look alike medications (specify): no medication changes noted or reported. pt mother reports pt is only taking vitamins at this time untill dr appt and medications are straighten out. Medications  are effective at this time. Home health supplies by type and quantity ordered/delivered this visit include: none  Patient education provided this visit:Colostomy/iliestomy is intact, no drainage around wafer, draining brown stool. lee is in place draining clear yellow urine. Patient/CG taught to empty lee bag several times a day and to record the day, amount, time and color of urine to take to the Dr's. Keep lee bag below waist level. discussed catheter care with patient- keeping catheter clean and discussed s/s of infection to monitor for, s/s of UTI, who to report to/when. instructed cg to notify staff/md/seek tx if complications occur. reviewed cardiac diet- monitoring sodium intake, cholesterol and fat intake. patient aware to limit sodium, no added sodium to diet. reviewed foods to avoid, how to order foods when eating out, how to read nutrition labels and measure sodium, cholesterol and fat intake. patient made aware to turn a minimum of every 2 hours and to keep pressure off of bony prominences, to monitor for any pressure ulcer development/worsening. pt instructed to follow a high protein diet for healing- to try to get 90g protein daily. Progress toward goals: Pt wound care adminstered and tolerated by pt verbally as no pain during adminstering. pt and cg reports understanding of education provided.   Home exercise program: as tolerated  Continued need for the following skills: Nursing  Patient and/or caregiver notified and agrees to changes in the Plan of Care N/A    Patient will be discharged once education and wounds if applicable has been completed, patient is medically stable, and all goals met

## 2022-01-29 ENCOUNTER — HOME CARE VISIT (OUTPATIENT)
Dept: SCHEDULING | Facility: HOME HEALTH | Age: 62
End: 2022-01-29
Payer: MEDICAID

## 2022-01-29 PROCEDURE — G0299 HHS/HOSPICE OF RN EA 15 MIN: HCPCS

## 2022-01-29 PROCEDURE — 400014 HH F/U

## 2022-01-31 ENCOUNTER — HOME CARE VISIT (OUTPATIENT)
Dept: SCHEDULING | Facility: HOME HEALTH | Age: 62
End: 2022-01-31
Payer: MEDICAID

## 2022-01-31 PROCEDURE — G0300 HHS/HOSPICE OF LPN EA 15 MIN: HCPCS

## 2022-02-01 VITALS
SYSTOLIC BLOOD PRESSURE: 124 MMHG | HEART RATE: 81 BPM | OXYGEN SATURATION: 97 % | RESPIRATION RATE: 16 BRPM | TEMPERATURE: 97.9 F | DIASTOLIC BLOOD PRESSURE: 70 MMHG

## 2022-02-01 NOTE — HOME HEALTH
Skilled reason for visit: 60-year old male who is being seen due to abdominal wound and pressure areas in multiple areas. Dressing changes performed as ordered. Left lower leg wound with heavy brown/yellowish drainage-necrotic/sloughing tissue loosening and  from wound bed. Sacral wound with heavy brown/yellowish drainage, wound bed mostly pink/red. Patient tolerated all wound care without complaints of pain. Chavez catheter patent and draining yellow urine. Caregiver involvement: Mother and personal aide-assists with ADL's, medications, meal preparation, transportation to MD appointments. Medications reviewed and all medications are available in the home this visit. The following education was provided regarding medications, medication interactions, and look alike medications (specify): side effects, purposes, dosages, frequencies. Medications are effective at this time. Home health supplies by type and quantity ordered/delivered this visit include: Adequate supplies. Patient education provided this visit:   INSTRUCTED PATIENT AND CG THAT SHOULD ANY NEEDS OR CONCERNS ARISE TO FIRST CALL OUR OFFICE, OR THE DR'S OFFICE  OR GO TO AN URGENT CARE CENTER AND NOT TO THE ED FOR NON-LIFE THREATENING EVENTS. IF IT IS LIFE THREATENING THEN CALL 911 OR GO TO THE CLOSEST ER. Patient is a fall risk. Educated pateint to sit on the side of the chair/bed, take a slow deep breaths, have feet firmly planted before standing up, use cane/walker if available, or have someone to assist.  patient instructed to maintain clear pathways in home and to minimize clutter to prevent falls from occurring/minimize fall potential.  pt instructed to follow a high protein diet for healing- to try to get 90g protein daily. pt aware to keep dressing clean, dry and intact as ordered.   patient made aware to monitor for s/s of infection [increased swelling, increased redness around site, increased pain, foul smelling drainage, fever] aware who to report to/when.  patient made aware to turn every 2 hours and to keep pressure off of bony prominences, to monitor for any pressure ulcer development/worsening. Patient level of understanding of education provided: Good, verbalized understanding. Skilled Care Performed this visit: Education and review, wound care to multiple wounds, catheter and ostomy assessment. Patient response to procedure performed:  Patient tolerated wound care without complaints of pain. Agency Progress toward goals: Progressing slowly, multiple pressure areas. Patient's Progress towards personal goals: Progressing slowly. Home exercise program: Take medications as prescribed, monitor for signs of infection, pressure relieving techniques, eat and drink nutritiously, increase protein intake to aide in wound healing, keep all MD appointments. Continued need for the following skills: Nursing    Plan for next visit: Education and review, wound care, ostomy care, catheter assessment. Patient and/or caregiver notified and agrees to changes in the Plan of Care YES      The following discharge planning was discussed with the pt/caregiver: Patient will be discharged once education has completed, patient is medically stable and pt/cg are able to independently manage wound care/ wound has healed or no longer requires skilled care.

## 2022-02-02 ENCOUNTER — HOME CARE VISIT (OUTPATIENT)
Dept: SCHEDULING | Facility: HOME HEALTH | Age: 62
End: 2022-02-02
Payer: MEDICAID

## 2022-02-02 ENCOUNTER — HOME CARE VISIT (OUTPATIENT)
Dept: HOME HEALTH SERVICES | Facility: HOME HEALTH | Age: 62
End: 2022-02-02
Payer: MEDICAID

## 2022-02-02 VITALS
TEMPERATURE: 98.8 F | OXYGEN SATURATION: 98 % | DIASTOLIC BLOOD PRESSURE: 67 MMHG | SYSTOLIC BLOOD PRESSURE: 132 MMHG | RESPIRATION RATE: 16 BRPM | HEART RATE: 64 BPM

## 2022-02-02 PROCEDURE — G0299 HHS/HOSPICE OF RN EA 15 MIN: HCPCS

## 2022-02-02 NOTE — HOME HEALTH
Skilled reason for visit: wound care, education of diagnosis and care/teaching related to medication mgt ,disease mgt and assessment   Skilled Care Performed this visit: wound care  Patient response to procedure was receptive and positive  Reviewed medications and care plan for changes. patient/cg to continue to take medications as prescribed. patient aware to monitor for effectiveness and to notify staff of any adverse reactions to medications/any changes to medication regimen. reviewed side effects, purposes, dosage, frequencies  patient encouraged to monitor for increase in pain and to continue with current pain management and to notify staff/md if pain becomes intolerable. Caregiver involvement: Family and friend are available at all times for assistance with iadls, adls, meal prep, medication management, taking to md appointments. Agency Progress toward goals: meeting goals  Home health supplies by type and quantity ordered/delivered this visit: not applicable  Patient education provided this visit: assessment, teaching   Patient level of understanding of education provided: Understanding of education went well teaching and feedback welcomed and patients questions answered. Patient's Progress towards personal goals: - progressing well  Patient is aware of fall risk. Reviewed safety precautions with patient. Educated on ambulation and or bedbound safety if applicable. INSTRUCTED PATIENT AND CG THAT SHOULD ANY NEEDS OR CONCERNS ARISE TO FIRST CALL OUR OFFICE, OR THE DR'S OFFICE  OR GO TO AN URGENT CARE CENTER AND NOT TO THE ED FOR NON-LIFE THREATENING EVENTS. IF IT IS LIFE THREATENING THEN CALL 911 OR GO TO THE CLOSEST ER. Progress toward goals- continuing to work towards goals as reviewed in careplan with patient on each visit. Careplan goals reviewed. Home exercise program:    activity as tolerated, trying to get physical activity 4-5 x weekly.  stopping activity if causing shortness of breath or chest pain, dizziness or weakness Continued need for the following skills: Nursing, The following discharge planning was discussed with the pt/caregiver: Patient will be discharged once education has completed, and patient is medically stable. Plan for next visit: continue with plan of care and teaching with each visit until discharged.       Patient and/or caregiver notified and agrees to changes in the [address]

## 2022-02-03 NOTE — HOME HEALTH
Skilled reason for visit:WOUND CARE TO BILATERAL LEGS AND SACRAL WOUND. Caregiver involvement: MOTHER COOKS, CLEANS AND TAKES PATIENT TO MD APPT. Medications reviewed and all medications are available in the home this visit. The following education was provided regarding medications, medication interactions, and look alike medications (specify): REVIEWED MEDICATIONS WITH CAREGIVER AND PATIENT. .    Medications  are effective at this time. Home health supplies by type and quantity ordered/delivered this visit include: NA    Patient education provided this visit: TAUGHT PATIENT ON S/S OF INFECTION AT OPEN WOUND SITES. COMPLETED SACRAL WOUND. PATIENT SAW MD THIS MORNING FOR CHECK ON BILATERAL LEG WOUNDS CHANGED DSG BUT DID NOT ADDRESS THE SACRAL WOUND. PATIENT IS TO BE SET UP FOR WOUND CLINIC IN NEAR FUTURE. INSTRUCTED PATIENT AND CAREGIVER ON IMPORTANCE OF  MEDICATION AND DIETARY COMPLIANCY. INSTRUCTED ON WHEN TO NOTIFY MD OF MEDICAL CHANGES R/T PRESSURE INJURYS. Patient level of understanding of education provided: [address] Performed this visit: SACRAL WOUND DSG CHANGED AND REVIEWED NEEDS TO HEAL THIS WOUND. Patient response to procedure performed:  GOOD, UNDERSTANDS PROCEDURE WELL. Agency Progress toward goals: WOUNDS HEALING SLOWLY. Patient's Progress towards personal goals: WOUNDS ARE HEALING, TAKING MEDICATIONS AS ORDERED AND IMPROVED DIETARY NEEDS AS DISCUSSED. Home exercise program: BREATHING EXERCISES TO HELP PREVENT RESPIRATORY PROBLEMS. Continued need for the following skills: Nursing    Plan for next visit: WOUND CARE. Patient and/or caregiver notified and agrees to changes in the Plan of Care N/A . The following discharge planning was discussed with the pt/caregiver: PATIENT WILL BE DISCHARGED ONCE ALL GOALS HAVE BEEN MET AND MEDICALLY STABLE.

## 2022-02-04 ENCOUNTER — HOME CARE VISIT (OUTPATIENT)
Dept: SCHEDULING | Facility: HOME HEALTH | Age: 62
End: 2022-02-04
Payer: MEDICAID

## 2022-02-04 VITALS
RESPIRATION RATE: 16 BRPM | HEART RATE: 77 BPM | DIASTOLIC BLOOD PRESSURE: 80 MMHG | TEMPERATURE: 97.5 F | OXYGEN SATURATION: 100 % | SYSTOLIC BLOOD PRESSURE: 128 MMHG

## 2022-02-04 PROCEDURE — G0299 HHS/HOSPICE OF RN EA 15 MIN: HCPCS

## 2022-02-05 VITALS
HEART RATE: 84 BPM | OXYGEN SATURATION: 98 % | RESPIRATION RATE: 16 BRPM | SYSTOLIC BLOOD PRESSURE: 140 MMHG | TEMPERATURE: 97 F | DIASTOLIC BLOOD PRESSURE: 80 MMHG

## 2022-02-05 NOTE — HOME HEALTH
Skilled reason for visit:WOUND CARE. Caregiver involvement: MOTHER COOKS, CLEANS AND TAKES PATIENT TO MD APPT. Medications reviewed and all medications are available in the home this visit. The following education was provided regarding medications, medication interactions, and look alike medications (specify): REVIEWED MEDICATIONS WITH PATIENT. Medications  are effective at this time. Home health supplies by type and quantity ordered/delivered this visit include: NA    Patient education provided this visit:REINSTRUCTED CAREGIVER ON S/S OF INFECTION AT WOUND CARE SITES. INSTRUCTED TO TURN PATIENT OFTEN TO PREVENT ADDITIONAL SKIN BREAKDOWN. GARCIA CATH CARE AND OSTOMY INDEPENDANTLY CARED FOR BY CAREGIVER. NOTED RT. HIP REDDNESS OVER BONY PROMINENCE, INSTRUCTED ON WHEN TO NOTIFY MD OF MEDICAL CHANGES R/T WOUND CARE. Patient level of understanding of education provided:GOOD. Skilled Care Performed this visit: WOUND CARE TO BACK WOUND, LT AND RT LEG WOUNDS AND RT FOOT/TOE WOUND. SEE WOUND ADDENDUM. Patient response to procedure performed:  GOOD UNDERSTANDING. Agency Progress toward goals: WOUNDS HEALING. Patient's Progress towards personal goals: GOOD    Home exercise program:BREATHING EXERCISES TO HELP PREVENT RESPIRATORY PROBLEMS. Continued need for the following skills: Nursing    Plan for next visit: [address] [facility]. Patient and/or caregiver notified and agrees to changes in the Plan of Care- N/A      The following discharge planning was discussed with the pt/caregiver: PATIENT WILL BE DISCHARGED ONCE ALL GOALS HAVE BEEN MET AND MEDICALLY STABLE.

## 2022-02-07 ENCOUNTER — HOME CARE VISIT (OUTPATIENT)
Dept: SCHEDULING | Facility: HOME HEALTH | Age: 62
End: 2022-02-07
Payer: MEDICAID

## 2022-02-07 VITALS
OXYGEN SATURATION: 99 % | TEMPERATURE: 98.6 F | HEART RATE: 78 BPM | SYSTOLIC BLOOD PRESSURE: 144 MMHG | RESPIRATION RATE: 18 BRPM | DIASTOLIC BLOOD PRESSURE: 67 MMHG

## 2022-02-07 PROCEDURE — G0300 HHS/HOSPICE OF LPN EA 15 MIN: HCPCS

## 2022-02-08 NOTE — HOME HEALTH
Skilled reason for visit:  Wound/Ostomy Care, education          Caregiver involvement  Mother prepares meals, schedules patients appointments, transports patient to his appointments,  medications, and assist with tilting and bathing if needed         Medications reviewed and all medications are available in the home this visit. The following education was provided regarding medications, medication interactions, and look alike medicationstylenol, use for pain relief, importance of not exceeding 3000mg daily and potenital for nausea. Medications  are effective at this time. Home health supplies by type and quantity ordered/delivered this visit include: n/a       Patient education provided this visit: ENCOURAGED PATIENT TO HAVE PROTEIN WITH EACH MEAL TO [facility]. Keeping sacral wound covered at all times If Foam dressing come off during ADL care between SN visit replace with new one. Patient has consult for new positioning device on Wednesday to promote wound healing. Patient level of understanding of education provided:   Skilled Care Performed this visit: Wound care,measurement and ostomy care new wound care orders per MD entered        Patient response to procedure performed:  Patient tolerated dressing changes denied pain, sleeping most of this visit. Agency Progress toward goals:Agency staff have been educating patient on her disease processes and teaching her how to manage with her pain effectively. Patient's personal goal is to have complete wound healing and remain free of infection.        Home exercise program: Frequent turn and reposition, increasing protein and offloading BLE    Continued need for the following skills: SN      Plan for next visit: wound/ostomy care      Patient and/or caregiver notified and agrees to changes in the Plan of Care yes     The following discharge planning was discussed with the pt/caregiver Discharge when wounds healed, pain controlled and caregiver  independent with medication regimen

## 2022-02-09 ENCOUNTER — HOME CARE VISIT (OUTPATIENT)
Dept: SCHEDULING | Facility: HOME HEALTH | Age: 62
End: 2022-02-09
Payer: MEDICAID

## 2022-02-09 VITALS
TEMPERATURE: 98.8 F | RESPIRATION RATE: 18 BRPM | HEART RATE: 70 BPM | DIASTOLIC BLOOD PRESSURE: 76 MMHG | SYSTOLIC BLOOD PRESSURE: 116 MMHG | OXYGEN SATURATION: 99 %

## 2022-02-09 VITALS
HEART RATE: 76 BPM | OXYGEN SATURATION: 98 % | RESPIRATION RATE: 18 BRPM | TEMPERATURE: 97.6 F | DIASTOLIC BLOOD PRESSURE: 44 MMHG | SYSTOLIC BLOOD PRESSURE: 130 MMHG

## 2022-02-09 PROCEDURE — G0300 HHS/HOSPICE OF LPN EA 15 MIN: HCPCS

## 2022-02-10 NOTE — HOME HEALTH
Skilled reason for visit:  Wound/Ostomy Care, education     Caregiver involvement  Mother prepares meals, schedules patients appointments, transports patient to his appointments,  medications, and assist with tilting and bathing if needed    Medications reviewed and all medications are available in the home this visit. The following education was provided regarding medications, medication interactions, and look alike medications Protonix medication commonly given for GERD medication should be given prior to breakfast.  Medications  are effective at this time. Home health supplies by type and quantity ordered/delivered this visit include: yes    Patient education provided this visit: ENCOURAGED PATIENT TO HAVE PROTEIN WITH EACH MEAL TO [facility]. Turn and Reposition frequently also off load BLE to promote wound healing. Patient level of understanding of education provided:  Patient, Mother and PCA verbalized understanding     Skilled Care Performed this visit: Wound care,measurement and ostomy care new wound care orders per MD entered               Patient response to procedure performed:  Patient tolerated dressing changes denied pain or discomfort          Agency Progress toward goals:Agency staff have been educating patient on her disease processes and teaching her how to manage with her pain effectively. Patient's personal goal is to have complete wound healing and remain free of infection.               Home exercise program: Frequent turn and reposition, increasing protein and offloading BLE         Continued need for the following skills: SN    Plan for next visit: wound/ostomy care    Patient and/or caregiver notified and agrees to changes in the Plan of Care yes          The following discharge planning was discussed with the pt/caregiver Discharge when wounds healed, pain controlled and caregiver  independent with medication regimen

## 2022-02-11 ENCOUNTER — HOME CARE VISIT (OUTPATIENT)
Dept: SCHEDULING | Facility: HOME HEALTH | Age: 62
End: 2022-02-11
Payer: MEDICAID

## 2022-02-11 PROCEDURE — A6213 FOAM DRG >16<=48 SQ IN W/BDR: HCPCS

## 2022-02-11 PROCEDURE — A6197 ALGINATE DRSG >16 <=48 SQ IN: HCPCS

## 2022-02-11 PROCEDURE — G0300 HHS/HOSPICE OF LPN EA 15 MIN: HCPCS

## 2022-02-12 VITALS
SYSTOLIC BLOOD PRESSURE: 124 MMHG | RESPIRATION RATE: 18 BRPM | DIASTOLIC BLOOD PRESSURE: 70 MMHG | TEMPERATURE: 97.1 F | HEART RATE: 76 BPM | OXYGEN SATURATION: 96 %

## 2022-02-12 NOTE — HOME HEALTH
Skilled reason for visit:  Wound/Ostomy Care, education          Caregiver involvement  Mother prepares meals, schedules patients appointments, transports patient to his appointments,  medications, and assist with tilting and bathing if needed         Medications reviewed and all medications are available in the home this visit. The following education was provided regarding medications, medication interactions, and look alike medications tylenol, use for pain relief, importance of not exceeding 3000mg daily and potenital for nausea. Medications  are effective at this time. Home health supplies by type and quantity ordered/delivered this visit include: yes         Patient education provided this visit: is/s of wound infection ncluding odor to drainage, warmth, redness, increased pain or temp > 101. Patient level of understanding of education provided:  Patient,  and Mother  verbalized understanding          Skilled Care Performed this visit: Wound care,measurement and ostomy care new wound care orders per MD entered         Patient response to procedure performed:  Patient tolerated dressing changes denied pain or discomfort       Agency Progress toward goals:Agency staff have been educating patient on her disease processes and teaching her how to manage with her pain effectively.          Patient's personal goal is to have complete wound healing and remain free of infection         Home exercise program: Frequent turn and reposition, increasing protein and offloading BLE        Continued need for the following skills: SN         Plan for next visit: wound/ostomy care         Patient and/or caregiver notified and agrees to changes in the Plan of Care yes       The following discharge planning was discussed with the pt/caregiver Discharge when wounds healed, pain controlled and caregiver  independent with medication regimen

## 2022-02-14 ENCOUNTER — HOME CARE VISIT (OUTPATIENT)
Dept: SCHEDULING | Facility: HOME HEALTH | Age: 62
End: 2022-02-14
Payer: MEDICAID

## 2022-02-14 VITALS
OXYGEN SATURATION: 99 % | RESPIRATION RATE: 18 BRPM | HEART RATE: 89 BPM | TEMPERATURE: 97.8 F | DIASTOLIC BLOOD PRESSURE: 65 MMHG | SYSTOLIC BLOOD PRESSURE: 154 MMHG

## 2022-02-14 PROCEDURE — G0300 HHS/HOSPICE OF LPN EA 15 MIN: HCPCS

## 2022-02-15 NOTE — HOME HEALTH
Skilled reason for visit:  Wound/Ostomy Care, education       Caregiver involvement  Mother prepares meals, schedules patients appointments, transports patient to his appointments,  medications, and assist with tilting and bathing if needed       Medications reviewed and all medications are available in the home this visit. The following education was provided regarding medications, medication interactions, and look alike medications tylenol, use for pain relief, importance of not exceeding 3000mg daily and potenital for nausea. Medications  are effective at this time. Home health supplies by type and quantity ordered/delivered this visit include: yes    Education provided this visit: Increasing protien in diet to promote wound healing. Daily skin check and report changes in skin impairment to MD or SN and continue turn and repositioin   Patient level of understanding of education provided:  Patient,  CG  and Mother  verbalized understanding       Skilled Care Performed this visit: Wound care,measurement and ostomy care new wound care orders per MD entered       Patient response to procedure performed:  Patient tolerated dressing changes denied pain or discomfort       Agency Progress toward goals:Agency staff have been educating patient on her disease processes and teaching her how to manage with her pain effectively.        Patient's personal goal is to have complete wound healing and remain free of infection           Home exercise program: Frequent turn and reposition, increasing protein and offloading BLE         Continued need for the following skills: SN      Plan for next visit: wound/ostomy care         Patient and/or caregiver notified and agrees to changes in the Plan of Care yes               The following discharge planning was discussed with the pt/caregiver Discharge when wounds healed, pain controlled and caregiver  independent with medication regimen

## 2022-02-16 ENCOUNTER — HOME CARE VISIT (OUTPATIENT)
Dept: SCHEDULING | Facility: HOME HEALTH | Age: 62
End: 2022-02-16
Payer: MEDICAID

## 2022-02-16 PROCEDURE — G0300 HHS/HOSPICE OF LPN EA 15 MIN: HCPCS

## 2022-02-17 VITALS
OXYGEN SATURATION: 98 % | HEART RATE: 76 BPM | TEMPERATURE: 97.1 F | SYSTOLIC BLOOD PRESSURE: 128 MMHG | RESPIRATION RATE: 18 BRPM | DIASTOLIC BLOOD PRESSURE: 66 MMHG

## 2022-02-17 NOTE — HOME HEALTH
Skilled reason for visit:  Wound/Ostomy Care, education       Caregiver involvement  Mother prepares meals, schedules patients appointments, transports patient to his appointments,  medications, and assist with tilting and bathing if needed      Medications reviewed and all medications are available in the home this visit. The following education was provided regarding medications, medication interactions, and look alike medications tylenol, use for pain relief, importance of not exceeding 3000mg daily and potenital for nausea. Medications  are effective at this time. Home health supplies by type and quantity ordered/delivered this visit include: yes       Patient education provided this visit: is/s of wound infection ncluding odor to drainage, warmth, redness, increased pain or temp > 101 Patient and mother educated on elevating BLE to reduce swelling and to Monitor and report finding to MD and [facility]     Patient level of understanding of education provided:  Patient,  and Mother  verbalized understanding       Skilled Care Performed this visit: Wound care,measurement and ostomy care right foot swollen this visit. Patient response to procedure performed:  Patient tolerated dressing changes denied pain or discomfort        Agency Progress toward goals:Agency staff have been educating patient on her disease processes and teaching her how to manage with her pain effectively.         Patient's personal goal is to have complete wound healing and remain free of infection      Home exercise program: Frequent turn and reposition, increasing protein and offloading BLE    Continued need for the following skills: SN        Plan for next visit: wound/ostomy care      Patient and/or caregiver notified and agrees to changes in the Plan of Care yes       The following discharge planning was discussed with the pt/caregiver Discharge when wounds healed, pain controlled and caregiver  independent with medication regimen

## 2022-02-18 ENCOUNTER — HOME CARE VISIT (OUTPATIENT)
Dept: SCHEDULING | Facility: HOME HEALTH | Age: 62
End: 2022-02-18
Payer: MEDICAID

## 2022-02-18 PROCEDURE — G0300 HHS/HOSPICE OF LPN EA 15 MIN: HCPCS

## 2022-02-20 VITALS
OXYGEN SATURATION: 97 % | RESPIRATION RATE: 18 BRPM | DIASTOLIC BLOOD PRESSURE: 80 MMHG | SYSTOLIC BLOOD PRESSURE: 129 MMHG | HEART RATE: 78 BPM

## 2022-02-21 ENCOUNTER — HOME CARE VISIT (OUTPATIENT)
Dept: SCHEDULING | Facility: HOME HEALTH | Age: 62
End: 2022-02-21
Payer: MEDICAID

## 2022-02-21 PROCEDURE — A6252 ABSORPT DRG >16 <=48 W/O BDR: HCPCS

## 2022-02-21 PROCEDURE — MED12554

## 2022-02-21 PROCEDURE — G0300 HHS/HOSPICE OF LPN EA 15 MIN: HCPCS

## 2022-02-21 NOTE — HOME HEALTH
Skilled reason for visit:WOUND CARE. Caregiver involvement: MOTHER COOKS, CLEANS AND TAKES PATIENT TO MD APPT. Medications reviewed and all medications are available in the home this visit. The following education was provided regarding medications, medication interactions, and look alike medications (tylenol, use for pain relief, importance of not exceeding 3000mg daily and potenital for nausea. Medications  are effective at this time. Home health supplies by type and quantity ordered/delivered this visit include:yes         Patient education provided this visit:Patient denied pain during visit. Patient denied pain during visit.medication teaching  safety and fall prevention education  nutrition education  skin care and assessment  pain management       Patient level of understanding of education provided: Patient and Mother verbalized understanding          Skilled Care Performed this visit: wound care      Patient tolerate wound care well denied any pain or discomfort         Agency Progress toward goals: WOUNDS HEALING. Patient's Progress towards personal goals: Continue to reinforce turn and reposition          Home exercise program:BREATHING EXERCISES TO HELP PREVENT RESPIRATORY PROBLEMS. Continued need for the following skills: Nursing         Plan for next visit: [address] [place] [address] Patient and/or caregiver notified and agrees to changes in the Plan of Care- N/A           The following discharge planning was discussed with the pt/caregiver: PATIENT WILL BE DISCHARGED ONCE ALL GOALS HAVE BEEN MET AND MEDICALLY STABLE.

## 2022-02-22 VITALS
SYSTOLIC BLOOD PRESSURE: 163 MMHG | HEART RATE: 75 BPM | RESPIRATION RATE: 18 BRPM | TEMPERATURE: 97.8 F | DIASTOLIC BLOOD PRESSURE: 92 MMHG | OXYGEN SATURATION: 99 %

## 2022-02-23 ENCOUNTER — HOME CARE VISIT (OUTPATIENT)
Dept: SCHEDULING | Facility: HOME HEALTH | Age: 62
End: 2022-02-23
Payer: MEDICAID

## 2022-02-23 PROCEDURE — G0299 HHS/HOSPICE OF RN EA 15 MIN: HCPCS

## 2022-02-23 NOTE — HOME HEALTH
Skilled reason for visit:  Wound/Ostomy Care, education          Caregiver involvement  Mother prepares meals, schedules patients appointments, transports patient to his appointments,  medications, and assist with tilting and bathing if needed         Medications reviewed and all medications are available in the home this visit. The following education was provided regarding medications, medication interactions, and look alike medications Protonix medication commonly given for GERD medication should be given prior to breakfast.    Medications  are effective at this time. Home health supplies by type and quantity ordered/delivered this visit include: yes         Patient education provided this visit: ENCOURAGED PATIENT TO HAVE PROTEIN WITH EACH MEAL TO [facility]. Turn and Reposition frequently also off load BLE to promote wound healing. Patient level of understanding of education provided:  Patient, Mother and PCA verbalized understanding          Skilled Care Performed this visit: Wound care,measurement and ostomy care new wound care orders per MD entered                              Patient response to procedure performed:  Patient tolerated dressing changes denied pain or discomfort                    Agency Progress toward goals:Agency staff have been educating patient on her disease processes and teaching her how to manage with her pain effectively. Patient's personal goal is to have complete wound healing and remain free of infection.         Home exercise program: Frequent turn and reposition, increasing protein and offloading BLE       Continued need for the following skills: SN         Plan for next visit: wound/ostomy care         Patient and/or caregiver notified and agrees to changes in the Plan of Care yes          The following discharge planning was discussed with the pt/caregiver Discharge when wounds healed, pain controlled and caregiver independent with medication regimen

## 2022-02-24 VITALS
DIASTOLIC BLOOD PRESSURE: 80 MMHG | RESPIRATION RATE: 16 BRPM | TEMPERATURE: 97.3 F | OXYGEN SATURATION: 97 % | SYSTOLIC BLOOD PRESSURE: 130 MMHG | HEART RATE: 88 BPM

## 2022-02-25 ENCOUNTER — HOME CARE VISIT (OUTPATIENT)
Dept: SCHEDULING | Facility: HOME HEALTH | Age: 62
End: 2022-02-25
Payer: MEDICAID

## 2022-02-25 PROCEDURE — G0299 HHS/HOSPICE OF RN EA 15 MIN: HCPCS

## 2022-02-25 NOTE — HOME HEALTH
Caregiver involvement:MOTHER COOKS, CLEANS AND TAKES PATIENT TO MD APPT. Medications reconciled and all medications are available in the home this visit. The following education was provided regarding medications, medication interactions, and look a like medications: RECONCILED MEDICATIONS WITH PATIENT AND CAREGIVER. Medications  are effective at this time. Home health supplies by type and quantity ordered/delivered this visit include: NA    Patient education provided this visit:TAUGHT PATIENT ON S/S OF INFECTION AT WOUND SITES, INCREASED DRAINAGE, FOUL ODOR NOTED. ODOR CLEARED AFTER CLEANSING WOUND. INSTRUCTED ON IMPORTANCE OF MEDICATION AND DIETARY COMPLIANCY. INSTRUCTED CAREGIVERS TO TURN AND REPOSITION PATIENT EVERY 2-3 HOURS. CAREGIVER INDEPENDANT WITH OSTOMY CARE. LEE CATH INTACT AND PATENT DRAINING YELLOW URINE. WOUND BEDS HEALING SLOWLY. INSTRUCTED ON WHEN TO NOTIFY MD OF MEDICAL CHANGES R/T WOUNDS. Progress toward goals:WOUNDS HEALING, DECREASED DRAINAGE. PATIENT TOLERATED PROCEDURES WELL. VERBALIZED UNDERSTANDING. HOME HEALTH PROGRESS TOWARDS GOALS: PROGRESSING   Home exercise program/Homework provided: sn instructed patient to perform deep breathing exercises 5 to  10x to prevent pneumonia. Home health supplies by type and quantity ordered/delivered this visit include: [employer]     Continued need for the following skills: Nursing    The following discharge planning was discussed with the pt/caregiver: PATIENT WILL BE DISCHARGED ONCE ALL GOALS HAVE BEEN MET AND MEDICALLY STABLE. Physician Notification and Justification to Continue Services: SKILLED NURSING/ WOUND CARE/MONTHLY LEE CHANGES. Justification for continued intermittent care: patient with wound care concerns as follows PATIENT UNABLE TO DO OWN WOUND CARE DUE TO HIS MEDCAL CONDITION, PATIENT IS A PARAPLEDGIC, HEAVY AND HARD TO TURN TO GET WOUND CARE COMPLETED. and patient with ongoing need for skilled lee catheter changes. Karyn Silva MD ([phone]) notified of the following: the need for continued Skilled Nursing home health services for above reasons, plan of care including visit frequency of 3W8, 2 PRN. Skilled Nursing for : additional assessment and wound care and lee care services.

## 2022-02-26 VITALS
RESPIRATION RATE: 18 BRPM | SYSTOLIC BLOOD PRESSURE: 120 MMHG | TEMPERATURE: 97.6 F | OXYGEN SATURATION: 97 % | HEART RATE: 78 BPM | DIASTOLIC BLOOD PRESSURE: 86 MMHG

## 2022-02-28 ENCOUNTER — HOME CARE VISIT (OUTPATIENT)
Dept: SCHEDULING | Facility: HOME HEALTH | Age: 62
End: 2022-02-28
Payer: MEDICAID

## 2022-02-28 PROCEDURE — A6213 FOAM DRG >16<=48 SQ IN W/BDR: HCPCS

## 2022-02-28 PROCEDURE — G0300 HHS/HOSPICE OF LPN EA 15 MIN: HCPCS

## 2022-02-28 PROCEDURE — A6197 ALGINATE DRSG >16 <=48 SQ IN: HCPCS

## 2022-02-28 PROCEDURE — 400014 HH F/U

## 2022-02-28 NOTE — HOME HEALTH
Skilled care provided during this visit: Disease and medication management, changed Ostomy, Chavez cath assessment, performed BLE and sacral wound care  Caregiver involvement: CG/PCA, mother available to prepare with daily meals, assist with ADL's, assist with wound care during non-nursing days, run errands and accompany to MD appt prn. Medications reviewed and all medications are available in the home this visit. The following education was provided regarding medications, medication interactions, and look alike medications (specify): N/A. Medications are effective at this time. No new medication added. Home health supplies by type and quantity ordered/delivered this visit include: Has available supplies at home. Patient education provided this visit to include: Reviewed intervention to prevent infection; hand washing, wearing face mask during clinician's visit. Taught CG/mother how to elevate BLE to prevent pressure. Continue Chavez care daily to prevent infection and empty bag when half full. Reviewed keeping BLE elevated, avoiding heel pressure, reinforce wound dressing when dislodge and change when soiled. Reviewed importance of repositioning, propping pillows back and BLE, repositioning q 2 hrs and keeping michel-area dry. HOB 45 degrees during meals. Skin care; apply daily moisturizing cream to BLE. Increasing protein intake, avoid skipping meals and good hydration. Pt encouraged getting OOB during day time. Reviewed S/S of infection; fever 101.3, increase pain, redness, swelling, coughing with yellow thick sputum, purulent wound drainage with foul smell, cloudy urine with foul smell, not feeling well 2-3 days, SOB, and to call [facility] or MD for assistance if experiencing any of these S/S. To call 911 with chest pains, facial drooping, difficulty talking, non arousable/unconscious and uncontrollable bleeding.    Patient level of understanding of education provided: Pt/CG has good understanding with teaching. Skilled Care Performed this visit: Completed assessment, performed BLE and sacral wound care (See wound addendum), changed ostomy bag & assessed Chavez cath. CG/Mother assisted SN repositioning pt during wound dressing procedure. Wounds draining large amt of foul drainage, assuming pads not changed for 2 days. BLE wounds worsening and sacral wound slowly improved. Patient response to procedure performed: Pt tolerated well with no c/o. Agency Progress toward goals: On going tract to be met. [place] [id number] service to continue for Chavez care/management BLE and sacral wound care. Patient's Progress towards personal goals: Partially met. Pt/CG's adheres POC and good understanding with current treatment. Home exercise program/Homework provided: BLE ROM & BUE passive ROM, HEP deep breathing exercises 10x when having SOB, pain and anxiety, IS 10x q 1-2 hrs. Continued need for the following skills: SN  Plan for next visit: Continue BLE and sacral wound care and Chavez cath care  Patient and/or caregiver notified and agrees to changes in the Plan of Care; N/A   Discharge planning discussed with patient and caregiver. Discharge planning as follows: Pt/CG will be able to manage disease and medication independently, wounds are improved, CG able to continue wound care, Chavez cath d/c and health condition stable. Pt/Caregiver did verbalize understanding of discharge planning. Patient/caregiver encouraged/instructed to keep appointment as lack of follow through with physician appointment could result in discontinuation of home care services for non-compliance. COVID - 23 Screening completed before visit:       Denies and no family member  has any of these S/S:    Fever, dry cough, sore throat diarrhea, chills, body aches, not feeling well and loss of taste.

## 2022-03-01 VITALS
HEART RATE: 79 BPM | TEMPERATURE: 98.1 F | DIASTOLIC BLOOD PRESSURE: 86 MMHG | RESPIRATION RATE: 18 BRPM | SYSTOLIC BLOOD PRESSURE: 149 MMHG

## 2022-03-01 NOTE — HOME HEALTH
Skilled reason for visit: Wound Care     Caregiver involvement: Mother manage medication, cook meals and make MD appointment. PCA assist with ADL;s     Medications reviewed and all medications are available in the home this visit. The following education was provided regarding medications:  Vitamin D is weekly supplement to replace vitamin D levels in the body. MD notified of any discrepancies/look a-like medications/medication interactions: n/a  Medications are effective at this time. Home health supplies by type and quantity ordered/delivered this visit include: yes    Patient education provided this visit: patient/cg instructed to monitor for edema/increase in edema, to elevate extremity when edema occurs and to notify md if edema exceeds normal limits for patient.     Sharps education provided: n/a    Patient level of understanding of education provided: Patient and CG verbalized full understanding of education provided     Skilled Care Performed this visit: Wound Care     Patient response to procedure performed:  Patient toleated wound care well denied and discomfort    Agency Progress toward goals: Patient wounds very slow healing but progressing     Patient's Progress towards personal goals: Patient progressing well edema to bilateteral feet noted this visit  Home exercise program: Turn and reposition and elevate BLE    Continued need for the following skills: Nursing    Plan for next visit: Wound Care     Patient and/or caregiver notified and agrees to changes in the Plan of Care YES    The following discharge planning was discussed with the pt/caregiver: dc when wounds are  healed, pain controlled and patient independent with medication regimen

## 2022-03-02 ENCOUNTER — HOME CARE VISIT (OUTPATIENT)
Dept: SCHEDULING | Facility: HOME HEALTH | Age: 62
End: 2022-03-02
Payer: MEDICAID

## 2022-03-02 VITALS
HEART RATE: 98 BPM | DIASTOLIC BLOOD PRESSURE: 83 MMHG | RESPIRATION RATE: 18 BRPM | TEMPERATURE: 97.8 F | SYSTOLIC BLOOD PRESSURE: 140 MMHG | OXYGEN SATURATION: 99 %

## 2022-03-02 PROCEDURE — G0300 HHS/HOSPICE OF LPN EA 15 MIN: HCPCS

## 2022-03-04 ENCOUNTER — HOME CARE VISIT (OUTPATIENT)
Dept: SCHEDULING | Facility: HOME HEALTH | Age: 62
End: 2022-03-04
Payer: MEDICAID

## 2022-03-04 PROCEDURE — G0300 HHS/HOSPICE OF LPN EA 15 MIN: HCPCS

## 2022-03-06 VITALS
OXYGEN SATURATION: 99 % | HEART RATE: 78 BPM | DIASTOLIC BLOOD PRESSURE: 81 MMHG | RESPIRATION RATE: 18 BRPM | SYSTOLIC BLOOD PRESSURE: 126 MMHG | TEMPERATURE: 98.8 F

## 2022-03-06 NOTE — HOME HEALTH
Skilled reason for visit:  Wound/Ostomy Care, education          Caregiver involvement  Mother prepares meals, schedules patients appointments, transports patient to his appointments,  medications, and assist with tilting and bathing if needed         Medications reviewed and all medications are available in the home this visit. The following education was provided regarding medications, medication interactions, and look alike medications Protonix medication commonly given for GERD medication should be given prior to breakfast.    Medications  are effective at this time. Home health supplies by type and quantity ordered/delivered this visit include: yes         Patient education provided this visit: ENCOURAGED PATIENT TO HAVE PROTEIN WITH EACH MEAL TO [facility]. Turn and Reposition frequently also off load BLE to promote wound healing. Patient level of understanding of education provided:  Patient, Mother and PCA verbalized understanding          Skilled Care Performed this visit: Wound care,measurement and ostomy care new wound care orders per MD entered                              Patient response to procedure performed:  Patient tolerated dressing changes denied pain or discomfort                    Agency Progress toward goals:Agency staff have been educating patient on her disease processes and teaching her how to manage with her pain effectively. Patient's personal goal is to have complete wound healing and remain free of infection.                              Home exercise program: Frequent turn and reposition, increasing protein and offloading BLE             Continued need for the following skills: SN         Plan for next visit: wound/ostomy care    Patient and/or caregiver notified and agrees to changes in the Plan of Care yes       The following discharge planning was discussed with the pt/caregiver Discharge when wounds healed, pain controlled and caregiver  independent with medication regimen

## 2022-03-07 ENCOUNTER — HOME CARE VISIT (OUTPATIENT)
Dept: SCHEDULING | Facility: HOME HEALTH | Age: 62
End: 2022-03-07
Payer: MEDICAID

## 2022-03-07 PROCEDURE — G0300 HHS/HOSPICE OF LPN EA 15 MIN: HCPCS

## 2022-03-08 VITALS
OXYGEN SATURATION: 100 % | RESPIRATION RATE: 18 BRPM | TEMPERATURE: 97.8 F | HEART RATE: 65 BPM | DIASTOLIC BLOOD PRESSURE: 66 MMHG | SYSTOLIC BLOOD PRESSURE: 128 MMHG

## 2022-03-09 ENCOUNTER — HOME CARE VISIT (OUTPATIENT)
Dept: SCHEDULING | Facility: HOME HEALTH | Age: 62
End: 2022-03-09
Payer: MEDICAID

## 2022-03-09 VITALS
DIASTOLIC BLOOD PRESSURE: 78 MMHG | RESPIRATION RATE: 18 BRPM | OXYGEN SATURATION: 96 % | SYSTOLIC BLOOD PRESSURE: 130 MMHG | TEMPERATURE: 97 F | HEART RATE: 78 BPM

## 2022-03-09 PROCEDURE — G0299 HHS/HOSPICE OF RN EA 15 MIN: HCPCS

## 2022-03-10 NOTE — HOME HEALTH
Skilled care provided during this visit: Disease and medication management, changed Ostomy, Lee cath assessment, performed BLE and sacral wound care  Caregiver involvement: CG/PCA, mother available to prepare with daily meals, assist with ADL's, assist with wound care during non-nursing days, run errands and accompany to MD appt prn. Medications reviewed and all medications are available in the home this visit. The following education was provided regarding medications, medication interactions, and look alike medications (specify): N/A. Pt to take daily medications  Medications are effective at this time. No new medication added. Home health supplies by type and quantity ordered/delivered this visit include: WC, gauze pads. Patient education provided this visit to include: Reviewed intervention to prevent infection; hand washing, keeping sacral area dry and clean and changing pad when soiled. Proper lee cath care, Reviewed keeping BLE elevated, reinforce sacral wound dressing when dislodge and change when soiled. Reviewed importance of repositioning, propping pillows back and BLE, repositioning q 2 hrs and keeping michel-area dry. HOB 45 degrees during meals. Skin care; apply daily moisturizing cream to BLE. Increasing protein intake, avoid skipping meals and good hydration. Pt encouraged getting OOB during day time. Reviewed S/S of infection; fever 101.3, increase pain, redness, swelling, coughing with yellow thick sputum, purulent wound drainage with foul smell, cloudy urine with foul smell, not feeling well 2-3 days, SOB, and to call [facility] or MD for assistance if experiencing any of these S/S. To call 911 with chest pains, facial drooping, difficulty talking, non arousable/unconscious and uncontrollable bleeding. Patient level of understanding of education provided: Pt/CG has good understanding with teaching.   Skilled Care Performed this visit: Completed assessment, performed BLE and sacral wound care (See wound addendum), changed ostomy bag & assessed Chavez cath. CG/Mother  & PCA assisted SN repositioning pt during wound dressing procedure. BLE wounds slowly improved since pt using plastic shoe immobilizer, preventing wound pressure, and sacral wound continue to improved. Patient response to procedure performed: Pt tolerated well with no c/o. Agency Progress toward goals: On going tract to be met. [place] [id number] service to continue for Chavez care/management BLE and sacral wound care. Patient's Progress towards personal goals: Partially met. Pt/CG's adheres POC and good understanding with current treatment. Home exercise program/Homework provided: BLE ROM & BUE passive ROM, HEP deep breathing exercises 10x when having SOB, pain and anxiety. Continued need for the following skills: SN  Plan for next visit: Continue BLE and sacral wound care and Chavez cath care  Patient and/or caregiver notified and agrees to changes in the Plan of Care; N/A  Discharge planning discussed with patient and caregiver. Discharge planning as follows: Pt/CG will be able to manage disease and medication independently, wounds are improved, CG able to continue wound care, Chavez cath d/c and health condition stable. Pt/Caregiver did verbalize understanding of discharge planning. (No tentative date for D/C at this time, [facility] will continue to manage Chavez cath q 28-30 days). COVID - 23 Screening completed before visit:      Denies and no family member  has any of these S/S:   Fever, dry cough, sore throat diarrhea, chills, body aches, not feeling well and loss of taste.

## 2022-03-11 ENCOUNTER — HOME CARE VISIT (OUTPATIENT)
Dept: SCHEDULING | Facility: HOME HEALTH | Age: 62
End: 2022-03-11
Payer: MEDICAID

## 2022-03-11 PROCEDURE — A6260 WOUND CLEANSER ANY TYPE/SIZE: HCPCS

## 2022-03-11 PROCEDURE — A6252 ABSORPT DRG >16 <=48 W/O BDR: HCPCS

## 2022-03-11 PROCEDURE — G0300 HHS/HOSPICE OF LPN EA 15 MIN: HCPCS

## 2022-03-12 VITALS
SYSTOLIC BLOOD PRESSURE: 146 MMHG | DIASTOLIC BLOOD PRESSURE: 84 MMHG | TEMPERATURE: 98.1 F | OXYGEN SATURATION: 96 % | RESPIRATION RATE: 18 BRPM | HEART RATE: 77 BPM

## 2022-03-12 NOTE — HOME HEALTH
Skilled reason for visit: Wound Care,HTN. Chavez Cath, Colostomy Care     : Caregiver involvement:  Mother  prepares meals, schedules patients appointments, set up  transports  to  appointments,  medications, and PCA assist with ADL;s if needed    Medications reviewed and all medications are available in the home this visit. The following education was provided regarding medications:  Lexapro medication common use to manage depression. MD notified of any discrepancies/look a-like medications/medication interactions: n/a  Medications are effective at this time. Home health supplies by type and quantity ordered/delivered this visit include: n/a    Patient education provided this visit: struct patient/caregiver on importance of adequate nutrition and hydration for wound healing. Healthy foods give your body the nutrients it needs to heal wounds. Protein foods like meat, fish, nuts, and soy products are important to wound healing. In addition to protein, calories, vitamin C, and zinc help wounds heal. Liquids prevent dehydration that can decrease the blood supply to wounds. Always follow physician recommended diet in regards to patient condition.  Instruct on other factors that affect wound healing such as: maintaining general hygiene, not smoking or using tobacco products, offloading pressure and pressure relieving devices,  Sharps education provided: n/a    Patient level of understanding of education provided: Patient verbalized understanding     Skilled Care Performed this visit: wound care     Patient response to procedure performed:  Patient denied any pain or discomfort during wound care     Agency Progress toward goals: Wound continue to  progress very slow healing off load boot apply     Patient's Progress towards personal goals: Wound healing with no infection noted     Home exercise program: Turn and repositioning     Continued need for the following skills: Nursing    Plan for next visit: Wound care     Patient and/or caregiver notified and agrees to changes in the Plan of Care N/A      The following discharge planning was discussed with the pt/caregiver: dc when iwounds are  healed, pain controlled and patient independent with medication regimen

## 2022-03-14 ENCOUNTER — HOME CARE VISIT (OUTPATIENT)
Dept: SCHEDULING | Facility: HOME HEALTH | Age: 62
End: 2022-03-14
Payer: MEDICAID

## 2022-03-14 VITALS
HEART RATE: 92 BPM | SYSTOLIC BLOOD PRESSURE: 136 MMHG | RESPIRATION RATE: 18 BRPM | DIASTOLIC BLOOD PRESSURE: 79 MMHG | OXYGEN SATURATION: 93 % | TEMPERATURE: 98.7 F

## 2022-03-14 PROCEDURE — G0300 HHS/HOSPICE OF LPN EA 15 MIN: HCPCS

## 2022-03-14 NOTE — HOME HEALTH
Skilled reason for visit: Wound Care,HTN. Chavez Cath, Colostomy Care     Caregiver involvement:  Mother  prepares meals, schedules patients appointments, set up  transports  to  appointments,  medications, and PCA assist with ADL;s if needed    Medications reviewed and all medications are available in the home this visit. The following education was provided regarding medications:Medication reviewed no changes made in medication this visit     MD notified of any discrepancies/look a-like medications/medication interactions: n/a    Medications are effective at this time. Home health supplies by type and quantity ordered/delivered this visit include: n/a     education provided this visit: Instruct patient/caregiver to notify home health or physician for the following wound healing complications: increased drainage, pus or a foul odor coming from the wound, fever, muscle, joint, or body aches, sweating, increased swelling, redness or red streaks surrounding the wound; dramatic change in color or size of wound; stitches coming apart or wound reopening; increase in pain at wound site in spite of interventions.     Sharps education provided: n/a         Patient level of understanding of education provided: Patient verbalized understanding          Skilled Care Performed this visit: wound care          Patient response to procedure performed:  Patient denied any pain or discomfort during wound care          Agency Progress toward goals: Wound continue to  progress very slow healing off load boot apply          Patient's Progress towards personal goals: Wound healing with no infection noted          Home exercise program: Turn and repositioning          Continued need for the following skills: Nursing         Plan for next visit: Wound care          Patient and/or caregiver notified and agrees to changes in the Plan of Care N/A           The following discharge planning was discussed with the pt/caregiver: dc when iwounds are  healed, pain controlled and patient independent with medication regimen

## 2022-03-16 ENCOUNTER — HOME CARE VISIT (OUTPATIENT)
Dept: SCHEDULING | Facility: HOME HEALTH | Age: 62
End: 2022-03-16
Payer: MEDICAID

## 2022-03-16 PROCEDURE — G0300 HHS/HOSPICE OF LPN EA 15 MIN: HCPCS

## 2022-03-17 VITALS
RESPIRATION RATE: 18 BRPM | HEART RATE: 67 BPM | SYSTOLIC BLOOD PRESSURE: 147 MMHG | OXYGEN SATURATION: 100 % | TEMPERATURE: 97.9 F | DIASTOLIC BLOOD PRESSURE: 86 MMHG

## 2022-03-17 NOTE — HOME HEALTH
Skilled reason for visit: Wound Care,HTN. Chavez Cath, Colostomy Care          Caregiver involvement:  Mother  prepares meals, schedules patients appointments, set up  transports  to  appointments,  medications, and PCA assist with ADL;s if needed         Medications reviewed and all medications are available in the home this visit. The following education was provided regarding medications:Medication reviewed no changes made in medication this visit       MD notified of any discrepancies/look a-like medications/medication interactions: n/a      Medications are effective at this time.         Home health supplies by type and quantity ordered/delivered this visit include: n/a          education provided this visit: Instruct patient/caregiver to notify home health or physician for the following wound healing complications: increased drainage, pus or a foul odor coming from the wound, fever, muscle, joint, or body aches, sweating, increased swelling, redness or red streaks surrounding the wound; dramatic change in color or size of wound; stitches coming apart or wound reopening; increase in pain at wound site in spite of interventions  Sharps education provided: n/a      Patient level of understanding of education provided: Patient verbalized understanding         Skilled Care Performed this visit: wound care       Patient response to procedure performed:  Patient denied any pain or discomfort during wound care     Agency Progress toward goals: Wound continue to  progress very slow healing off load boot apply       Patient's Progress towards personal goals: Wound healing with no infection noted     Home exercise program: Turn and repositioning     Continued need for the following skills: Nursing       Plan for next visit: Wound care     Patient and/or caregiver notified and agrees to changes in the Plan of Care N/A          The following discharge planning was discussed with the pt/caregiver: dc when iwounds are  healed, pain controlled and patient independent with medication regimen

## 2022-03-18 ENCOUNTER — HOME CARE VISIT (OUTPATIENT)
Dept: SCHEDULING | Facility: HOME HEALTH | Age: 62
End: 2022-03-18
Payer: MEDICAID

## 2022-03-18 PROCEDURE — A6213 FOAM DRG >16<=48 SQ IN W/BDR: HCPCS

## 2022-03-18 PROCEDURE — G0300 HHS/HOSPICE OF LPN EA 15 MIN: HCPCS

## 2022-03-18 PROCEDURE — A6443 CONFORM BAND N/S W>=3"<5"/YD: HCPCS

## 2022-03-18 PROCEDURE — A6197 ALGINATE DRSG >16 <=48 SQ IN: HCPCS

## 2022-03-19 VITALS
DIASTOLIC BLOOD PRESSURE: 86 MMHG | HEART RATE: 79 BPM | RESPIRATION RATE: 18 BRPM | TEMPERATURE: 97.8 F | OXYGEN SATURATION: 99 % | SYSTOLIC BLOOD PRESSURE: 150 MMHG

## 2022-03-19 NOTE — HOME HEALTH
Skilled reason for visit: Wound Care                    Caregiver involvement: Mother manage medication, cook meals and make MD appointment. PCA assist with ADL;s                    Medications reviewed and all medications are available in the home this visit. The following education was provided regarding medications:  Vitamin D is weekly supplement to replace vitamin D levels in the body. MD notified of any discrepancies/look a-like medications/medication interactions: n/a         Medications are effective at this time. Home health supplies by type and quantity ordered/delivered this visit include: yes      Patient education provided this visit: Monitor skin under off load boots daily for skin breakdown and moisture,  Off loading boots hold heat, free BLE of blanket and remove boots to allow air.      Sharps education provided: n/a      Patient level of understanding of education provided: Patient and CG verbalized full understanding of education provided    Skilled Care Performed this visit: Wound Care         Patient response to procedure performed:  Patient toleated wound care well denied and discomfort        Agency Progress toward goals: Patient wounds very slow healing but progressing          Patient's Progress towards personal goals: Patient progressing well     Home exercise program: Turn and reposition ,elevate BLE frquently monitor the skin under off loading boots for breakdown        Continued need for the following skills: Nursing      Plan for next visit: Wound Care     Patient and/or caregiver notified and agrees to changes in the Plan of Care YES      The following discharge planning was discussed with the pt/caregiver: dc when wounds are  healed, pain controlled and patient independent with medication regimen

## 2022-03-21 ENCOUNTER — HOME CARE VISIT (OUTPATIENT)
Dept: SCHEDULING | Facility: HOME HEALTH | Age: 62
End: 2022-03-21
Payer: MEDICAID

## 2022-03-21 VITALS
DIASTOLIC BLOOD PRESSURE: 66 MMHG | HEART RATE: 66 BPM | OXYGEN SATURATION: 100 % | SYSTOLIC BLOOD PRESSURE: 150 MMHG | TEMPERATURE: 98.6 F | RESPIRATION RATE: 16 BRPM

## 2022-03-21 PROCEDURE — G0300 HHS/HOSPICE OF LPN EA 15 MIN: HCPCS

## 2022-03-21 NOTE — HOME HEALTH
Skilled reason for visit: Wound Care       Caregiver involvement: Mother manage medication, cook meals and make MD appointment. PCA assist with ADL;s       Medications reviewed and all medications are available in the home this visit. The following education was provided regarding medications:  Review and monitor that all medications are available in home, medication administration, purpose, dosages, preparation, scheduling, side effects, food/drug interactions, storage, effectiveness, and potential complications. Notify physician of any changes. MD notified of any discrepancies/look a-like medications/medication interactions: n/a      Medications are effective at this time. Home health supplies by type and quantity ordered/delivered this visit include: yes      Patient education provided this visit: reviewed low sodium diet- patient aware to limit sodium, no added sodium to diet. reviewed foods to avoid, how to order foods when eating out, how to read nutrition labels and measure sodium intake.  discussed importance of monitoring blood pressure daily and recording for review, discussed hypertension, causes/long term effects of uncontrolled hypertension    Sharps education provided: n/a    Patient level of understanding of education provided: Patient and CG verbalized full understanding of education provided         Skilled Care Performed this visit: Wound Care          Patient response to procedure performed:  Patient toleated wound care well denied and discomfort         Agency Progress toward goals: Patient wounds very slow healing but progressing         Patient's Progress towards personal goals: Patient progressing well          Home exercise program: Turn and reposition ,elevate BLE frequently  monitor the skin under off loading boots for breakdown       Continued need for the following skills: Nursing    Plan for next visit: Wound Care          Patient and/or caregiver notified and agrees to changes in the Plan of Care YES      The following discharge planning was discussed with the pt/caregiver: dc when wounds are  healed, pain controlled and patient independent with medication regimen

## 2022-03-23 ENCOUNTER — HOME CARE VISIT (OUTPATIENT)
Dept: SCHEDULING | Facility: HOME HEALTH | Age: 62
End: 2022-03-23
Payer: MEDICAID

## 2022-03-23 VITALS
OXYGEN SATURATION: 100 % | RESPIRATION RATE: 18 BRPM | HEART RATE: 78 BPM | TEMPERATURE: 98.6 F | SYSTOLIC BLOOD PRESSURE: 147 MMHG | DIASTOLIC BLOOD PRESSURE: 67 MMHG

## 2022-03-23 PROCEDURE — G0300 HHS/HOSPICE OF LPN EA 15 MIN: HCPCS

## 2022-03-23 NOTE — HOME HEALTH
Skilled reason for visit: Wound Care      Caregiver involvement: Mother manage medication, cook meals and make MD appointment. PCA assist with ADL;s       Medications reviewed and all medications are available in the home this visit. The following education was provided regarding medications:  Review and monitor that all medications are available in home, medication administration, purpose, dosages, preparation, scheduling, side effects, food/drug interactions, storage, effectiveness, and potential complications. Notify physician of any changes. MD notified of any discrepancies/look a-like medications/medication interactions: n/a      Medications are effective at this time. Home health supplies by type and quantity ordered/delivered this visit include: yes       Patient education provided this visit: reviewed edema and importance of elecated both feet to reduce swelling also revived low sodium diet- patient aware to limit sodium, no added sodium to diet. reviewed foods to avoid, how to order foods when eating out, how to read nutrition labels and measure sodium intake. discussed importance of monitoring blood pressure daily and recording for review, discussed hypertension, causes/long term effects of uncontrolled hypertension.           Sharps education provided: n/a         Patient level of understanding of education provided: Patient and CG verbalized full understanding of education provided    Skilled Care Performed this visit: Wound Care       Patient response to procedure performed:  Patient toleated wound care well denied and discomfort      Agency Progress toward goals: Patient wounds very slow healing but progressing         Patient's Progress towards personal goals: Patient progressing well        Home exercise program: Turn and reposition ,elevate BLE frequently  monitor the skin under off loading boots for breakdown        Continued need for the following skills: Nursing         Plan for next visit: Wound Care       Patient and/or caregiver notified and agrees to changes in the Plan of Care YES        The following discharge planning was discussed with the pt/caregiver: dc when wounds are  healed, pain controlled and patient independent with medication regimen

## 2022-03-25 ENCOUNTER — HOME CARE VISIT (OUTPATIENT)
Dept: SCHEDULING | Facility: HOME HEALTH | Age: 62
End: 2022-03-25
Payer: MEDICAID

## 2022-03-25 PROCEDURE — G0300 HHS/HOSPICE OF LPN EA 15 MIN: HCPCS

## 2022-03-27 VITALS
SYSTOLIC BLOOD PRESSURE: 158 MMHG | OXYGEN SATURATION: 100 % | TEMPERATURE: 98.5 F | HEART RATE: 78 BPM | DIASTOLIC BLOOD PRESSURE: 93 MMHG | RESPIRATION RATE: 18 BRPM

## 2022-03-27 NOTE — HOME HEALTH
Skilled reason for visit: Wound Care              Caregiver involvement: Mother manage medication, cook meals and make MD appointment. PCA assist with ADL;s               Medications reviewed and all medications are available in the home this visit. The following education was provided regarding medications:  Review and monitor that all medications are available in home, medication administration, purpose, dosages, preparation, scheduling, side effects, food/drug interactions, storage, effectiveness, and potential complications. Notify physician of any changes. MD notified of any discrepancies/look a-like medications/medication interactions: n/a        Medications are effective at this time. Home health supplies by type and quantity ordered/delivered this visit include: n/a       Patient education provided this visit: struct patient/caregiver on importance of adequate nutrition and hydration for wound healing. Healthy foods give your body the nutrients it needs to heal wounds. Protein foods like meat, fish, nuts, and soy products are important to wound healing. In addition to protein, calories, vitamin C, and zinc help wounds heal. Liquids prevent dehydration that can decrease the blood supply to wounds. Always follow physician recommended diet in regards to patient condition. Instruct on other factors that affect wound healing such as: maintaining general hygiene, not smoking or using tobacco products, offloading pressure and pressure relieving devices.          Sharps education provided: n/a    Patient level of understanding of education provided: Patient and CG verbalized full understanding of education provided         Skilled Care Performed this visit: Wound Care               Patient response to procedure performed:  Patient toleated wound care well denied and discomfort    Agency Progress toward goals: Patient wounds very slow healing but progressing        Patient's Progress towards personal goals: Patient progressing well       Home exercise program: Turn and reposition ,elevate BLE frequently  monitor the skin under off loading boots for breakdown       Continued need for the following skills: Nursing         Plan for next visit: Wound Care     Patient and/or caregiver notified and agrees to changes in the Plan of Care YES    The following discharge planning was discussed with the pt/caregiver: dc when wounds are  healed, pain controlled and patient independent with medication regimen

## 2022-03-28 ENCOUNTER — HOME CARE VISIT (OUTPATIENT)
Dept: SCHEDULING | Facility: HOME HEALTH | Age: 62
End: 2022-03-28
Payer: MEDICAID

## 2022-03-28 VITALS
RESPIRATION RATE: 18 BRPM | HEART RATE: 78 BPM | OXYGEN SATURATION: 100 % | TEMPERATURE: 98.8 F | DIASTOLIC BLOOD PRESSURE: 65 MMHG | SYSTOLIC BLOOD PRESSURE: 148 MMHG

## 2022-03-28 PROCEDURE — G0300 HHS/HOSPICE OF LPN EA 15 MIN: HCPCS

## 2022-03-28 PROCEDURE — 400014 HH F/U

## 2022-03-30 ENCOUNTER — HOME CARE VISIT (OUTPATIENT)
Dept: SCHEDULING | Facility: HOME HEALTH | Age: 62
End: 2022-03-30
Payer: MEDICAID

## 2022-03-30 VITALS
RESPIRATION RATE: 18 BRPM | TEMPERATURE: 98.4 F | OXYGEN SATURATION: 99 % | SYSTOLIC BLOOD PRESSURE: 158 MMHG | DIASTOLIC BLOOD PRESSURE: 84 MMHG | HEART RATE: 94 BPM

## 2022-03-30 PROCEDURE — G0300 HHS/HOSPICE OF LPN EA 15 MIN: HCPCS

## 2022-03-30 NOTE — HOME HEALTH
Skilled reason for visit: Wound Care,HTN. Chavez Cath, Colostomy Care       Caregiver involvement:  Mother  prepares meals, schedules patients appointments, set up  transports  to  appointments,  medications, and PCA assist with ADL;s if needed      Medications reviewed and all medications are available in the home this visit. The following education was provided regarding medications:Medication reviewed no changes made in medication this visit          MD notified of any discrepancies/look a-like medications/medication interactions: n/a    Medications are effective at this time.         Home health supplies by type and quantity ordered/delivered this visit includ n/a        education provided this visit: Instruct patient/caregiver to notify home health or physician for the following wound healing complications: increased drainage, pus or a foul odor coming from the wound, fever, muscle, joint, or body aches, sweating, increased swelling, redness or red streaks surrounding the wound; dramatic change in color or size of wound; stitches coming apart or wound reopening; increase in pain at wound site in spite of interventions      Sharps education provided: n/a    Patient level of understanding of education provided: Patient verbalized understanding       Skilled Care Performed this visit: wound care    Patient response to procedure performed:  Patient denied any pain or discomfort during wound care     Agency Progress toward goals: Wound continue to  progress very slow healing off load boot apply       Patient's Progress towards personal goals: Wound healing with no infection noted       Home exercise program: Turn and repositioning     Continued need for the following skills: Nursing    Plan for next visit: Wound care     Patient and/or caregiver notified and agrees to changes in the Plan of Care N/A         The following discharge planning was discussed with the pt/caregiver: dc when iwounds are  healed, pain controlled and patient independent with medication regimen

## 2022-04-01 ENCOUNTER — HOME CARE VISIT (OUTPATIENT)
Dept: SCHEDULING | Facility: HOME HEALTH | Age: 62
End: 2022-04-01
Payer: MEDICAID

## 2022-04-01 PROCEDURE — G0300 HHS/HOSPICE OF LPN EA 15 MIN: HCPCS

## 2022-04-04 ENCOUNTER — HOME CARE VISIT (OUTPATIENT)
Dept: SCHEDULING | Facility: HOME HEALTH | Age: 62
End: 2022-04-04
Payer: MEDICAID

## 2022-04-04 VITALS
TEMPERATURE: 98.6 F | RESPIRATION RATE: 18 BRPM | DIASTOLIC BLOOD PRESSURE: 65 MMHG | OXYGEN SATURATION: 99 % | SYSTOLIC BLOOD PRESSURE: 144 MMHG | HEART RATE: 77 BPM

## 2022-04-04 VITALS
SYSTOLIC BLOOD PRESSURE: 144 MMHG | RESPIRATION RATE: 18 BRPM | DIASTOLIC BLOOD PRESSURE: 67 MMHG | TEMPERATURE: 98.7 F | OXYGEN SATURATION: 100 % | HEART RATE: 78 BPM

## 2022-04-04 PROCEDURE — G0300 HHS/HOSPICE OF LPN EA 15 MIN: HCPCS

## 2022-04-04 NOTE — HOME HEALTH
Skilled reason for visit: Wound Care,HTN. Chavez Cath, Colostomy Care  Caregiver involvement:  Mother  prepares meals, schedules patients appointments, set up  transports  to  appointments,  medications, and PCA assist with ADL;s if needed      Medications reviewed and all medications are available in the home this visit. The following education was provided regarding medications:Medication reviewed no changes made in medication this visit     MD notified of any discrepancies/look a-like medications/medication interactions: n/a       Medications are effective at this time. Home health supplies by type and quantity ordered/delivered this visit include: n/a     education provided this visit: I  patient/cg instructed to monitor for edema/increase in edema, to elevate extremity when edema occurs and to notify md if edema exceeds normal limits for patient. Sharps education provided: n/a  Patient level of understanding of education provided: Patient verbalized understanding  Skilled Care Performed this visit: wound care     Patient response to procedure performed:  Patient denied any pain or discomfort during wound care     Agency Progress toward goals: Wound continue to  progress very slow healing off load boot apply     Patient's Progress towards personal goals: Wound healing with no infection noted     Home exercise program: Turn and repositioning     Continued need for the following skills: Nursing    Plan for next visit: Wound care     Patient and/or caregiver notified and agrees to changes in the Plan of Care N/A        The following discharge planning was discussed with the pt/caregiver: dc when iwounds are  healed, pain controlled and patient independent with medication regimen

## 2022-04-04 NOTE — HOME HEALTH
Skilled reason for visit: Wound Care    Caregiver involvement: Mother manage medication, cook meals and make MD appointment. PCA assist with ADL;s       Medications reviewed and all medications are available in the home this visit. The following education was provided regarding medications:  Review and monitor that all medications are available in home, medication administration, purpose, dosages, preparation, scheduling, side effects, food/drug interactions, storage, effectiveness, and potential complications. Notify physician of any changes. MD notified of any discrepancies/look a-like medications/medication interactions: n/a      Medications are effective at this time. Home health supplies by type and quantity ordered/delivered this visit include: n/a    Patient education provided this visit: patient/cg instructed to monitor for edema/increase in edema, to elevate extremity when edema occurs and to notify md if edema exceeds normal limits for patient.    Sharps education provided: n/a         Patient level of understanding of education provided: Patient and CG verbalized full understanding of education provided      Skilled Care Performed this visit: Wound Care Chavez cath Flused Bag changed        Patient response to procedure performed:  Patient toleated wound care well denied and discomfort         Agency Progress toward goals: Patient wounds very slow healing but progressing     Patient's Progress towards personal goals: Patient progressing well     Home exercise program: Turn and reposition ,elevate BLE frequently  monitor the skin under off loading boots for breakdown     Continued need for the following skills: Nursing       Plan for next visit: Wound Care          Patient and/or caregiver notified and agrees to changes in the Plan of Care YES         The following discharge planning was discussed with the pt/caregiver: dc when wounds are  healed, pain controlled and patient independent with medication regimen

## 2022-04-06 ENCOUNTER — HOME CARE VISIT (OUTPATIENT)
Dept: SCHEDULING | Facility: HOME HEALTH | Age: 62
End: 2022-04-06
Payer: MEDICAID

## 2022-04-06 PROCEDURE — G0300 HHS/HOSPICE OF LPN EA 15 MIN: HCPCS

## 2022-04-07 VITALS
HEART RATE: 78 BPM | DIASTOLIC BLOOD PRESSURE: 60 MMHG | TEMPERATURE: 98.7 F | RESPIRATION RATE: 18 BRPM | SYSTOLIC BLOOD PRESSURE: 142 MMHG | OXYGEN SATURATION: 100 %

## 2022-04-07 NOTE — HOME HEALTH
Skilled reason for visit: Wound Care,HTN. Chavez Cath, Colostomy Care    Caregiver involvement:  Mother  prepares meals, schedules patients appointments, set up  transports  to  appointments,  medications, and PCA assist with ADL;s if needed              Medications reviewed and all medications are available in the home this visit. The following education was provided regarding medications:Medication reviewed no changes made in medication this visit          MD notified of any discrepancies/look a-like medications/medication interactions: n/a              Medications are effective at this time. Home health supplies by type and quantity ordered/delivered this visit include: n/a          education provided this visit medication teaching  safety and fall prevention education  nutrition education  skin care and assessment  pain management  Monitor off loading boot for moisture also Check urine for color, odor sediment daily report any abnorml finding to MD Ho education provided: n/a    Patient level of understanding of education provided: Patient verbalized understanding    Skilled Care Performed this visit: wound care, re open area noted on top of lleft foot noted this visit. Media taken MD appoint with vascular and ID next week to evaluate Patient wounds.           Patient response to procedure performed:  Patient denied any pain or discomfort during wound care          Agency Progress toward goals: Wound continue to  progress very slow healing off load boot apply          Patient's Progress towards personal goals: Wound healing with no infection noted          Home exercise program: Turn and repositioning          Continued need for the following skills: Nursing         Plan for next visit: Wound care          Patient and/or caregiver notified and agrees to changes in the Plan of Care N/A                The following discharge planning was discussed with the pt/caregiver: dc when iwounds are  healed, pain controlled and patient independent with medication regimen

## 2022-04-08 ENCOUNTER — HOME CARE VISIT (OUTPATIENT)
Dept: SCHEDULING | Facility: HOME HEALTH | Age: 62
End: 2022-04-08
Payer: MEDICAID

## 2022-04-08 PROCEDURE — G0300 HHS/HOSPICE OF LPN EA 15 MIN: HCPCS

## 2022-04-09 VITALS
OXYGEN SATURATION: 100 % | RESPIRATION RATE: 18 BRPM | TEMPERATURE: 98.8 F | SYSTOLIC BLOOD PRESSURE: 148 MMHG | DIASTOLIC BLOOD PRESSURE: 64 MMHG | HEART RATE: 78 BPM

## 2022-04-11 ENCOUNTER — HOME CARE VISIT (OUTPATIENT)
Dept: SCHEDULING | Facility: HOME HEALTH | Age: 62
End: 2022-04-11
Payer: MEDICAID

## 2022-04-11 VITALS
TEMPERATURE: 98.6 F | OXYGEN SATURATION: 98 % | RESPIRATION RATE: 16 BRPM | DIASTOLIC BLOOD PRESSURE: 80 MMHG | SYSTOLIC BLOOD PRESSURE: 144 MMHG | HEART RATE: 73 BPM

## 2022-04-11 PROCEDURE — G0299 HHS/HOSPICE OF RN EA 15 MIN: HCPCS

## 2022-04-13 ENCOUNTER — HOME CARE VISIT (OUTPATIENT)
Dept: SCHEDULING | Facility: HOME HEALTH | Age: 62
End: 2022-04-13
Payer: MEDICAID

## 2022-04-13 PROCEDURE — G0299 HHS/HOSPICE OF RN EA 15 MIN: HCPCS

## 2022-04-13 NOTE — HOME HEALTH
Skilled reason for visit: Wound care    Caregiver involvement: Yes. Patients mother and private caregiver assist patient. Medications reviewed and all medications are available in the home this visit. The following education was provided regarding medications:  Medication safety. MD notified of any discrepancies/look a-like medications/medication interactions: N/A  Medications are effective at this time. Home health supplies by type and quantity ordered/delivered this visit include: All supplies present    Patient education provided this visit: Education provided on s/s of infection, reinforcing dressing if breakthrough drainage in between SN visits. Sharps education provided: N/A    Patient level of understanding of education provided: Yes    Skilled Care Performed this visit: Yes, wound care    Patient response to procedure performed:  Patient tolerated all dressing changes w/o issue.     Patient's Progress towards personal goals: Wounds healing slowly, d/t immobility    Home exercise program: [place] exercises, reducing pressure to compromised areas d/t patient being bedbound    Continued need for the following skills: Nursing    Plan for next visit: Wound assessments and wound care    Patient and/or caregiver notified and agrees to changes in the Plan of Care N/A      The following discharge planning was discussed with the pt/caregiver: Discharged when wounds are healed

## 2022-04-14 VITALS
TEMPERATURE: 98.6 F | SYSTOLIC BLOOD PRESSURE: 142 MMHG | DIASTOLIC BLOOD PRESSURE: 84 MMHG | RESPIRATION RATE: 20 BRPM | HEART RATE: 90 BPM | OXYGEN SATURATION: 98 %

## 2022-04-15 ENCOUNTER — HOME CARE VISIT (OUTPATIENT)
Dept: SCHEDULING | Facility: HOME HEALTH | Age: 62
End: 2022-04-15
Payer: MEDICAID

## 2022-04-15 VITALS
OXYGEN SATURATION: 97 % | SYSTOLIC BLOOD PRESSURE: 110 MMHG | RESPIRATION RATE: 18 BRPM | TEMPERATURE: 97.7 F | HEART RATE: 86 BPM | DIASTOLIC BLOOD PRESSURE: 64 MMHG

## 2022-04-15 PROCEDURE — G0299 HHS/HOSPICE OF RN EA 15 MIN: HCPCS

## 2022-04-15 NOTE — HOME HEALTH
Skilled reason for visit: skilled assessment, education, medication management, wound care    Caregiver involvement:  Family assists ADL's, medications, meals, transportation, errands. Medications reviewed and all medications are available in the home this visit. The following education was provided regarding medications:  patient knowledgeable about all medications, has no questions or concerns at this time. MD notified of any discrepancies/look a-like medications/medication interactions: na  Medications are effective at this time. Home health supplies by type and quantity ordered/delivered this visit include: na    Patient education provided this visit:  instructed patient and caregiver that the key difference between a suspected deep tissue injury (sDTI) and an unstageable pressure ulcer is that sDTI involves intact skin, whereas an unstageable ulcer involves a breakdown into at least the subcutaneous tissue. An unstageable ulcer is covered with necrotic tissue, such as slough or eschar, formed from remnants of the collagen matrix of subcutaneous tissue. So its always a full-thickness ulcer either stage III or stage IV. Sharps education provided: brad    Patient level of understanding of education provided: patient/caregiver verbalized 100% understanding. Skilled Care Performed this visit: skilled assessment, education, medication management, wound care    Patient response to procedure performed:  patient denied pain and discomfort during procedure/visit    Agency Progress toward goals: progressing    Patient's Progress towards personal goals: progressing    Home exercise program:  instructed patient on pursed lip breathing. Pursed lip breathing is one of the simplest ways to control shortness of breath. It provides a quick and easy way to slow your pace of breathing, making each breath more effective.  Pursed lip breathing: Improves ventilation, releases trapped air in the lungs, keeps the airways open longer and decreases the work of breathing, prolongs exhalation to slow the breathing rate, improves breathing patterns by moving old air out of the lungs and allowing for new air to enter the lungs, relieves shortness of breath, causes general relaxation. Practice this technique 4 - 5 times a day at first so you can get the correct breathing pattern. Pursed lip breathing technique: Relax your neck and shoulder muscles, breathe in ( inhale ) slowly through your nose for two counts, keeping your mouth closed. Don't take a deep breath; a normal breath will do. It may help to count to yourself: inhale, one, two. Pucker or purse your lips as if you were going to whistle or gently flicker the flame of a candle. Breathe out ( exhale ) slowly and gently through your pursed lips while counting to four. It may help to count to yourself: exhale, one, two, three, four.     Continued need for the following skills: Nursing    Plan for next visit: skilled assessment, education, medication management, wound care    Patient and/or caregiver notified and agrees to changes in the Plan of Care N/A      The following discharge planning was discussed with the pt/caregiver:  Discharge planning as follows: when goals met, patient/caregiver able to manage disease process, medications and pain

## 2022-04-18 ENCOUNTER — HOME CARE VISIT (OUTPATIENT)
Dept: SCHEDULING | Facility: HOME HEALTH | Age: 62
End: 2022-04-18
Payer: MEDICAID

## 2022-04-18 PROCEDURE — G0300 HHS/HOSPICE OF LPN EA 15 MIN: HCPCS

## 2022-04-19 ENCOUNTER — TELEPHONE (OUTPATIENT)
Dept: INFECTIOUS DISEASES | Age: 62
End: 2022-04-19

## 2022-04-19 RX ORDER — AMOXICILLIN AND CLAVULANATE POTASSIUM 875; 125 MG/1; MG/1
1 TABLET, FILM COATED ORAL 2 TIMES DAILY
Qty: 20 TABLET | Refills: 0 | Status: SHIPPED | OUTPATIENT
Start: 2022-04-19 | End: 2022-04-29

## 2022-04-19 NOTE — TELEPHONE ENCOUNTER
Patient seen in OP follow-up for sacral wound and b/l LE wounds. Continue with current wound care with HH. Re-start doxycycline and complete meds that are in the bottle. Add Augmentin 875 mg po bid  X 10 days. Will f/u when being seen in Dr. Dunn Loss office to reduce need for ambulance transportation.

## 2022-04-20 ENCOUNTER — HOME CARE VISIT (OUTPATIENT)
Dept: SCHEDULING | Facility: HOME HEALTH | Age: 62
End: 2022-04-20
Payer: MEDICAID

## 2022-04-20 VITALS
OXYGEN SATURATION: 100 % | TEMPERATURE: 97.8 F | HEART RATE: 78 BPM | DIASTOLIC BLOOD PRESSURE: 66 MMHG | SYSTOLIC BLOOD PRESSURE: 138 MMHG | RESPIRATION RATE: 18 BRPM

## 2022-04-20 PROCEDURE — G0300 HHS/HOSPICE OF LPN EA 15 MIN: HCPCS

## 2022-04-20 NOTE — HOME HEALTH
Skilled reason for visit: Wound Care,HTN. Chaevz Cath, Colostomy Care    Caregiver involvement:  Mother  prepares meals, schedules patients appointments, set up  transports  to  appointments,  medications, and PCA assist with ADL;s if needed          Medications reviewed and all medications are available in the home this visit. The following education was provided regarding medications:Medication reviewed no changes made in medication this visit      MD notified of any discrepancies/look a-like medications/medication interactions: n/a      Medications are effective at this time. Home health supplies by type and quantity ordered/delivered this visit include: n/a    education provided this visitstruct patient/caregiver on importance of adequate nutrition and hydration for wound healing. Healthy foods give your body the nutrients it needs to heal wounds. Protein foods like meat, fish, nuts, and soy products are important to wound healing. In addition to protein, calories, vitamin C, and zinc help wounds heal. Liquids prevent dehydration that can decrease the blood supply to wounds. Always follow physician recommended diet in regards to patient condition. Instruct on other factors that affect wound healing such as: maintaining general hygiene, not smoking or using tobacco products, offloading pressure and pressure relieving devices.           Sharps education provided: n/a       Patient level of understanding of education provided: Patient verbalized understanding      Skilled Care Performed this visit: wound care,      Patient response to procedure performed:  Patient denied any pain or discomfort during wound care          Agency Progress toward goals: Wound continue to  progress very slow healing off load boot apply     Patient's Progress towards personal goals: Wound healing with no infection noted       Home exercise program: Turn and repositioning     Continued need for the following skills: Nursing    Plan for next visit: Wound care     Patient and/or caregiver notified and agrees to changes in the Plan of Care N/A      The following discharge planning was discussed with the pt/caregiver: dc when iwounds are  healed, pain controlled and patient independent with medication regimen

## 2022-04-22 ENCOUNTER — HOME CARE VISIT (OUTPATIENT)
Dept: SCHEDULING | Facility: HOME HEALTH | Age: 62
End: 2022-04-22
Payer: MEDICAID

## 2022-04-22 VITALS
SYSTOLIC BLOOD PRESSURE: 144 MMHG | DIASTOLIC BLOOD PRESSURE: 67 MMHG | TEMPERATURE: 98.8 F | OXYGEN SATURATION: 100 % | HEART RATE: 78 BPM | RESPIRATION RATE: 18 BRPM

## 2022-04-22 PROCEDURE — G0299 HHS/HOSPICE OF RN EA 15 MIN: HCPCS

## 2022-04-26 ENCOUNTER — HOME CARE VISIT (OUTPATIENT)
Dept: HOME HEALTH SERVICES | Facility: HOME HEALTH | Age: 62
End: 2022-04-26
Payer: MEDICAID

## 2022-04-26 ENCOUNTER — HOME CARE VISIT (OUTPATIENT)
Dept: SCHEDULING | Facility: HOME HEALTH | Age: 62
End: 2022-04-26
Payer: MEDICAID

## 2022-04-26 PROCEDURE — G0300 HHS/HOSPICE OF LPN EA 15 MIN: HCPCS

## 2022-04-27 VITALS
HEART RATE: 89 BPM | RESPIRATION RATE: 18 BRPM | SYSTOLIC BLOOD PRESSURE: 144 MMHG | TEMPERATURE: 98.4 F | OXYGEN SATURATION: 100 % | DIASTOLIC BLOOD PRESSURE: 67 MMHG

## 2022-04-27 VITALS
RESPIRATION RATE: 16 BRPM | DIASTOLIC BLOOD PRESSURE: 80 MMHG | OXYGEN SATURATION: 97 % | HEART RATE: 82 BPM | SYSTOLIC BLOOD PRESSURE: 140 MMHG | TEMPERATURE: 97 F

## 2022-04-27 NOTE — HOME HEALTH
Skilled reason for visit: Wound Care,HTN. Chavez Cath, Colostomy Care         Caregiver involvement:  Mother  prepares meals, schedules patients appointments, set up  transports  to  appointments,  medications, and PCA assist with ADL;s if needed         Medications reviewed and all medications are available in the home this visit. The following education was provided regarding medications:Medication reviewed no changes made in medication this visit          MD notified of any discrepancies/look a-like medications/medication interactions: n/a       Medications are effective at this time. Home health supplies by type and quantity ordered/delivered this visit include: n/a         education provided this visit struct patient/caregiver that hypertension is high blood pressure. Blood pressure is the force of blood moving against the walls of the arteries. A health care provider will report the blood pressure reading in 2 numbers. The top number is the pressure inside the arteries when the heart is hafsa, and the bottom number is the pressure inside the arteries when the heart is relaxed. Hypertension causes the blood pressure to get so high that the heart has to work harder than normal. This can damage the heart. The cause of hypertension may not be known. This is called essential or primary hypertension. Hypertension caused by another medical condition, such as kidney disease, is called secondary hypertension. There may be no signs and symptoms of hypertension or may include headache, blurred vision, chest pain, nose bleeds, dizziness, weakness, or trouble breathing. Hypertension is diagnosed after taking blood pressure readings at several doctor visits. If left untreated, hypertension increases the risk of heart attack, stroke, and kidney disease.     [place] education provided: n/a         Patient level of understanding of education provided: Patient verbalized understanding      Skilled Care Performed this visit: wound care,      Patient response to procedure performed:  Patient denied any pain or discomfort during wound care     Agency Progress toward goals: Wound continue to  progress very slow healing off load boot apply          Patient's Progress towards personal goals: Wound healing with no infection noted       Home exercise program: Turn and repositioning          Continued need for the following skills: Nursing         Plan for next visit: Wound care          Patient and/or caregiver notified and agrees to changes in the Plan of Care N/A           The following discharge planning was discussed with the pt/caregiver: dc when iwounds are  healed, pain controlled and patient independent with medication regimen

## 2022-04-28 ENCOUNTER — HOME CARE VISIT (OUTPATIENT)
Dept: SCHEDULING | Facility: HOME HEALTH | Age: 62
End: 2022-04-28
Payer: MEDICAID

## 2022-04-28 PROCEDURE — G0300 HHS/HOSPICE OF LPN EA 15 MIN: HCPCS

## 2022-04-28 PROCEDURE — 400014 HH F/U

## 2022-04-28 NOTE — HOME HEALTH
Caregiver involvement:MOTHER COOKS, CLEANS AND TAKES PATIENT TO MD APPT. Medications reconciled and all medications are available in the home this visit. The following education was provided regarding medications, medication interactions, and look a like medications: RECONCILED MEDICATIONS WITH PATIENT AND CAREGIVER. Medications  are effective at this time. Home health supplies by type and quantity ordered/delivered this visit include: NA    Patient education provided this visit:TAUGHT PATIENT ON S/S OF INFECTION AT WOUND SITES, DECREASED DRAINAGE, NO FOUL ODOR NOTED. INSTRUCTED ON IMPORTANCE OF MEDICATION AND DIETARY COMPLIANCY. INSTRUCTED CAREGIVERS TO TURN AND REPOSITION PATIENT EVERY 2-3 HOURS. CAREGIVER INDEPENDANT WITH OSTOMY CARE. LEE CATH INTACT AND PATENT DRAINING YELLOW URINE. WOUND BEDS HEALING SLOWLY. INSTRUCTED ON WHEN TO NOTIFY MD OF MEDICAL CHANGES R/T WOUNDS. PATIENT WOUNDS DECREASED IN SIZE. Progress toward goals:WOUNDS HEALING, DECREASED DRAINAGE. PATIENT TOLERATED PROCEDURES WELL. VERBALIZED UNDERSTANDING. HOME HEALTH PROGRESS TOWARDS GOALS: PROGRESSING   Home exercise program/Homework provided: sn instructed patient to perform deep breathing exercises 5 to  10x to prevent pneumonia. Home health supplies by type and quantity ordered/delivered this visit include: NA     Continued need for the following skills: Nursing    The following discharge planning was discussed with the pt/caregiver: PATIENT WILL BE DISCHARGED ONCE ALL GOALS HAVE BEEN MET AND MEDICALLY STABLE. Physician Notification and Justification to Continue Services: SKILLED NURSING/ WOUND CARE/MONTHLY LEE CHANGES.     Justification for continued intermittent care: patient with wound care concerns as follows PATIENT UNABLE TO DO OWN WOUND CARE DUE TO HIS MEDICAL CONDITION, PATIENT IS A PARAPLEDGIC, HEAVY AND HARD TO TURN TO GET WOUND CARE COMPLETED. and patient with ongoing need for skilled lee catheter changes. Adam Burkitt, MD ([phone]) notified of the following: the need for continued Skilled Nursing home health services for above reasons, plan of care including visit frequency of 3W8, 2 PRN. Skilled Nursing for : additional assessment and wound care and lee care services.

## 2022-04-29 ENCOUNTER — HOME CARE VISIT (OUTPATIENT)
Dept: HOME HEALTH SERVICES | Facility: HOME HEALTH | Age: 62
End: 2022-04-29
Payer: MEDICAID

## 2022-04-29 VITALS
HEART RATE: 78 BPM | TEMPERATURE: 98.8 F | RESPIRATION RATE: 18 BRPM | SYSTOLIC BLOOD PRESSURE: 130 MMHG | DIASTOLIC BLOOD PRESSURE: 67 MMHG | OXYGEN SATURATION: 98 %

## 2022-04-29 NOTE — HOME HEALTH
Skilled reason for visit: Wound Care,HTN. Chavez Cath, Colostomy Care                   Caregiver involvement:  Mother  prepares meals, schedules patients appointments, set up  transports  to  appointments,  medications, and PCA assist with ADL;s if needed                    Medications reviewed and all medications are available in the home this visit. The following education was provided regarding medications:Medication reviewed no changes made in medication this visit                    MD notified of any discrepancies/look a-like medications/medication interactions: n/a              Medications are effective at this time. Home health supplies by type and quantity ordered/delivered this visit include: n/a                   education provided this visit struct patient/caregiver that hypertension is high blood pressure. Blood pressure is the force of blood moving against the walls of the arteries. A health care provider will report the blood pressure reading in 2 numbers. The top number is the pressure inside the arteries when the heart is hafsa, and the bottom number is the pressure inside the arteries when the heart is relaxed. Hypertension causes the blood pressure to get so high that the heart has to work harder than normal. This can damage the heart. The cause of hypertension may not be known. This is called essential or primary hypertension. Hypertension caused by another medical condition, such as kidney disease, is called secondary hypertension. There may be no signs and symptoms of hypertension or may include headache, blurred vision, chest pain, nose bleeds, dizziness, weakness, or trouble breathing. Hypertension is diagnosed after taking blood pressure readings at several doctor visits. If left untreated, hypertension increases the risk of heart attack, stroke, and kidney disease.          Sharps education provided: n/a                   Patient level of understanding of education provided: Patient verbalized understanding              Skilled Care Performed this visit: wound care,              Patient response to procedure performed:  Patient denied any pain or discomfort during wound care          Agency Progress toward goals: Wound continue to  progress very slow healing off load boot apply                    Patient's Progress towards personal goals: Wound healing with no infection noted               Home exercise program: Turn and repositioning                    Continued need for the following skills: Nursing                   Plan for next visit: Wound care                    Patient and/or caregiver notified and agrees to changes in the Plan of Care N/A                     The following discharge planning was discussed with the pt/caregiver: dc when iwounds are  healed, pain controlled and patient independent with medication regimen

## 2022-04-30 ENCOUNTER — HOME CARE VISIT (OUTPATIENT)
Dept: SCHEDULING | Facility: HOME HEALTH | Age: 62
End: 2022-04-30
Payer: MEDICAID

## 2022-04-30 PROCEDURE — G0299 HHS/HOSPICE OF RN EA 15 MIN: HCPCS

## 2022-05-01 VITALS
TEMPERATURE: 97 F | DIASTOLIC BLOOD PRESSURE: 80 MMHG | HEART RATE: 78 BPM | OXYGEN SATURATION: 98 % | SYSTOLIC BLOOD PRESSURE: 140 MMHG | RESPIRATION RATE: 18 BRPM

## 2022-05-01 VITALS
HEART RATE: 78 BPM | OXYGEN SATURATION: 99 % | SYSTOLIC BLOOD PRESSURE: 132 MMHG | DIASTOLIC BLOOD PRESSURE: 67 MMHG | TEMPERATURE: 98.9 F | RESPIRATION RATE: 18 BRPM

## 2022-05-01 NOTE — HOME HEALTH
Skilled reason for visit: Wound Care,HTN. Chavez Cath, Colostomy Care    Caregiver involvement:  Mother  prepares meals, schedules patients appointments, set up  transports  to  appointments,  medications, and PCA assist with ADL;s if needed     Medications reviewed and all medications are available in the home this visit. The following education was provided regarding medications:Medication reviewed no changes made in medication this visit        MD notified of any discrepancies/look a-like medications/medication interactions: n/a      Medications are effective at this time. Home health supplies by type and quantity ordered/delivered this visit include: n/a       education provided this visit reviewed low sodium diet- patient aware to limit sodium, no added sodium to diet. reviewed foods to avoid, how to order foods when eating out, how to read nutrition labels and measure sodium intake. discussed importance of monitoring blood pressure daily and recording for review, discussed hypertension, causes/long term effects of uncontrolled hypertensionreviewed low sodium diet- patient aware to limit sodium, no added sodium to diet. reviewed foods to avoid, how to order foods when eating out, how to read nutrition labels and measure sodium intake.  discussed importance of monitoring blood pressure daily and recording for review, discussed hypertension, causes/long term effects of uncontrolled hypertension  Sharps education provided: n/a    Patient level of understanding of education provided: Patient verbalized understanding           Skilled Care Performed this visit: wound care,      Patient response to procedure performed:  Patient denied any pain or discomfort during wound care     Agency Progress toward goals: Wound continue to  progress very slow healing off load boot apply     Patient's Progress towards personal goals: Wound healing with no infection noted     Home exercise program: Turn and repositioning     Continued need for the following skills: Nursing    Plan for next visit: Wound care     Patient and/or caregiver notified and agrees to changes in the Plan of Care N/A      The following discharge planning was discussed with the pt/caregiver: dc when iwounds are  healed, pain controlled and patient independent with medication regimen

## 2022-05-01 NOTE — HOME HEALTH
Skilled care provided during this visit: Disease and medication management, changed Ostomy, Lee cath assessment, performed BLE and sacral wound care  Caregiver involvement: CG/PCA, mother available to prepare with daily meals, assist with ADL's, assist with wound care during non-nursing days, run errands and accompany to MD appt prn. Medications reviewed and all medications are available in the home this visit. The following education was provided regarding medications, medication interactions, and look alike medications (specify): N/A. Pt to take daily medications    Medications are effective at this time. Added Amoxicillin and discussed S/E;   Home health supplies by type and quantity ordered/delivered this visit include: WC, gauze pads. Patient education provided this visit to include: Reviewed intervention to prevent infection; hand washing, keeping sacral area dry and clean and changing pad when soiled. Proper lee cath care, Reviewed keeping BLE elevated, reinforce sacral wound dressing when dislodge and change when soiled. Reviewed importance of repositioning, propping pillows back and BLE, repositioning q 2 hrs and keeping michel-area dry. HOB 45 degrees during meals. Skin care; apply EPC cream to sacral and hip area. Increasing protein intake, avoid skipping meals and good hydration. Pt encouraged getting OOB during day time. Reviewed S/S of infection; fever 101.3, increase pain, redness, swelling, coughing with yellow thick sputum, purulent wound drainage with foul smell, cloudy urine with foul smell, not feeling well 2-3 days, SOB, and to call [facility] or MD for assistance if experiencing any of these S/S. To call 911 with chest pains, facial drooping, difficulty talking, non arousable/unconscious and uncontrollable bleeding. Patient level of understanding of education provided: Pt/CG has good understanding with teaching.     Skilled Care Performed this visit: Completed assessment, performed BLE and sacral wound care (See wound addendum), changed ostomy bag & assessed Chavez cath. CG/Mother  & PCA assisted SN repositioning pt during wound dressing procedure. BLE & sacral wounds continue to improved except L lateral wound. No S/S of infection noted. Patient response to procedure performed: Pt tolerated well with no c/o. Agency Progress toward goals: On going tract to be met. [place] [id number] service to continue for Chavez care/management BLE and sacral wound care. Patient's Progress towards personal goals: Partially met. Pt/CG's adheres POC and good understanding with current treatment. Home exercise program/Homework provided: BLE ROM & BUE passive ROM, HEP deep breathing exercises 10x when having SOB, pain and anxiety. Continued need for the following skills: SN  Plan for next visit: Continue BLE and sacral wound care and Chavez cath care  Patient and/or caregiver notified and agrees to changes in the Plan of Care; N/A    Discharge planning discussed with patient and caregiver. Discharge planning as follows: Pt/CG will be able to manage disease and medication independently, wounds are improved, CG able to continue wound care, Chavez cath d/c and health condition stable. Pt/Caregiver did verbalize understanding of discharge planning. (No tentative date for D/C at this time, [facility] will continue to manage Chavez cath q 28-30 days). COVID - 23 Screening completed before visit:     Denies and no family member  has any of these S/S:   Fever, dry cough, sore throat diarrhea, chills, body aches, not feeling well and loss of taste.

## 2022-05-02 ENCOUNTER — HOME CARE VISIT (OUTPATIENT)
Dept: SCHEDULING | Facility: HOME HEALTH | Age: 62
End: 2022-05-02
Payer: MEDICAID

## 2022-05-02 PROCEDURE — G0300 HHS/HOSPICE OF LPN EA 15 MIN: HCPCS

## 2022-05-04 ENCOUNTER — HOME CARE VISIT (OUTPATIENT)
Dept: SCHEDULING | Facility: HOME HEALTH | Age: 62
End: 2022-05-04
Payer: MEDICAID

## 2022-05-04 VITALS
TEMPERATURE: 98.8 F | DIASTOLIC BLOOD PRESSURE: 88 MMHG | RESPIRATION RATE: 18 BRPM | OXYGEN SATURATION: 98 % | HEART RATE: 77 BPM | SYSTOLIC BLOOD PRESSURE: 143 MMHG

## 2022-05-04 PROCEDURE — G0300 HHS/HOSPICE OF LPN EA 15 MIN: HCPCS

## 2022-05-06 ENCOUNTER — HOME CARE VISIT (OUTPATIENT)
Dept: SCHEDULING | Facility: HOME HEALTH | Age: 62
End: 2022-05-06
Payer: MEDICAID

## 2022-05-06 VITALS
OXYGEN SATURATION: 100 % | DIASTOLIC BLOOD PRESSURE: 67 MMHG | RESPIRATION RATE: 18 BRPM | HEART RATE: 89 BPM | TEMPERATURE: 98.9 F | SYSTOLIC BLOOD PRESSURE: 144 MMHG

## 2022-05-06 PROCEDURE — G0300 HHS/HOSPICE OF LPN EA 15 MIN: HCPCS

## 2022-05-06 NOTE — HOME HEALTH
Skilled reason for visit: Wound Care,HTN. Colostomy Care,Medication Management          Caregiver involvement:  Mother  prepares meals, schedules patients appointments, set up  transports  to  appointments,  medications, and PCA assist with ADL;s I          Medications reviewed and all medications are available in the home this visit. The following education was provided regarding medications: All medication reviewed with CG  no changes made in medication this visit. No question or concerns      MD notified of any discrepancies/look a-like medications/medication interactions: n/a      Medications are effective at this time. Home health supplies by type and quantity ordered/delivered this visit include: n/a       Education provided this visit:  SN reviewed signs and symptoms of infection such as: Fever>100.4, Chills, Confusion, Lethargy, Body aches, Urine appears bloody or purulent, foul odor, urinary frequency, or pain and Wound has increased pain, redness, swelling, purulent draining, or foul odor. Report findings to physician/and SN     [place] education provided: n/a         Patient level of understanding of education provided: Patient/CG verbalized full understanding of all education provided          Skilled Care Performed this visit: wound care,SN assessment and education     Patient response to procedure performed:  Patient denied any pain or discomfort during wound care       Agency Progress toward goals: Wounds progressing well         Patient's Progress towards personal goals:  Patient's personal goal is to have complete wound healing and remain free of infection.        Home exercise program: Patient and C/G will continue turn and repositioning to promote wound healing          Continued need for the following skills: Nursing         Plan for next visit: Wound care, education          Patient and/or caregiver notified and agrees to changes in the Plan of Care N/A           The following discharge planning was discussed with the pt/caregiver: dc when wounds are healed or healing and CG independent with wound care.

## 2022-05-09 ENCOUNTER — HOME CARE VISIT (OUTPATIENT)
Dept: SCHEDULING | Facility: HOME HEALTH | Age: 62
End: 2022-05-09
Payer: MEDICAID

## 2022-05-09 VITALS
TEMPERATURE: 98.4 F | OXYGEN SATURATION: 100 % | RESPIRATION RATE: 18 BRPM | SYSTOLIC BLOOD PRESSURE: 145 MMHG | DIASTOLIC BLOOD PRESSURE: 65 MMHG | HEART RATE: 87 BPM

## 2022-05-09 PROCEDURE — A6197 ALGINATE DRSG >16 <=48 SQ IN: HCPCS

## 2022-05-09 PROCEDURE — G0300 HHS/HOSPICE OF LPN EA 15 MIN: HCPCS

## 2022-05-09 PROCEDURE — A6213 FOAM DRG >16<=48 SQ IN W/BDR: HCPCS

## 2022-05-09 NOTE — HOME HEALTH
Skilled reason for visit: Wound Care,Colostomy Care,          Caregiver involvement:  Mother  prepares meals, schedules patients appointments, set up  transports  to  appointments,  medications, and PCA assist with ADL;s if needed          Medications reviewed and all medications are available in the home this visit. The following education was provided regarding medications:Medication reviewed no changes made in medication this visit       MD notified of any discrepancies/look a-like medications/medication interactions: n/a       Medications are effective at this time.         Home health supplies by type and quantity ordered/delivered this visit include: n/a         Education provided this visit patient made aware to turn every 2 hours and to keep pressure off of bony prominences, to monitor for any pressure ulcer development/worsening  Sharps education provided: n/a         Patient level of understanding of education provided: Patient and CG verbalized understanding      Skilled Care Performed this visit: wound care,      Patient response to procedure performed:  Patient denied any pain or discomfort during wound care          Agency Progress toward goals: Wound continue to  progress   Patient's Progress towards personal goals: Patient personal goal is complete wound healing with absent of infections         Home exercise program: Turn and repositioning frequently          Continued need for the following skills: Nursing         Plan for next visit: Wound care          Patient and/or caregiver notified and agrees to changes in the Plan of Care N/A           The following discharge planning was discussed with the pt/caregiver: dc when wounds are  healed

## 2022-05-10 VITALS
HEART RATE: 78 BPM | SYSTOLIC BLOOD PRESSURE: 146 MMHG | RESPIRATION RATE: 18 BRPM | TEMPERATURE: 98.8 F | OXYGEN SATURATION: 100 % | DIASTOLIC BLOOD PRESSURE: 76 MMHG

## 2022-05-11 ENCOUNTER — HOME CARE VISIT (OUTPATIENT)
Dept: SCHEDULING | Facility: HOME HEALTH | Age: 62
End: 2022-05-11
Payer: MEDICAID

## 2022-05-11 VITALS
TEMPERATURE: 97.6 F | OXYGEN SATURATION: 98 % | RESPIRATION RATE: 18 BRPM | SYSTOLIC BLOOD PRESSURE: 140 MMHG | DIASTOLIC BLOOD PRESSURE: 80 MMHG | HEART RATE: 76 BPM

## 2022-05-11 PROCEDURE — G0300 HHS/HOSPICE OF LPN EA 15 MIN: HCPCS

## 2022-05-11 NOTE — HOME HEALTH
Skilled reason for visit: Wound Care,Colostomy Care,         Caregiver involvement:  Mother  prepares meals, schedules patients appointments, set up  transports  to  appointments,  medications, and PCA assist with ADL;s if needed        Medications reviewed and all medications are available in the home this visit. The following education was provided regarding medications:Medication reviewed with patient and CG  no changes made in medication this visit   No question or concern at this time       MD notified of any discrepancies/look a-like medications/medication interactions: n/a      Medications are effective at this time. Home health supplies by type and quantity ordered/delivered this visit include: n/a       Education provided this visit reviewed low sodium diet- patient aware to limit sodium, no added sodium to diet. reviewed foods to avoid, how to order foods when eating out, how to read nutrition labels and measure sodium intake.  discussed importance of monitoring blood pressure daily and recording for review, discussed hypertension, causes/long term effects of uncontrolled hypertension  Sharps education provided: n/a         Patient level of understanding of education provided: Patient and CG verbalized understanding      Skilled Care Performed this visit: wound care,              Patient response to procedure performed:  Patient denied any pain or discomfort during wound care                    Agency Progress toward goals: Wound continue to  progress , Right foot toe healed     Patient's Progress towards personal goals: Patient personal goal is complete wound healing with absent of infections       Home exercise program: Turn and repositioning frequently       Continued need for the following skills: Nursing      Plan for next visit: Wound care       Patient and/or caregiver notified and agrees to changes in the Plan of Care N/A          The following discharge planning was discussed with the pt/caregiver: dc when wounds are  healed

## 2022-05-11 NOTE — HOME HEALTH
Skilled reason for visit:  Wound Care Treatment     Caregiver involvement: Patient's cg is family. They lives with patient and is available 24/7 for assistance with iadls, adls, meal prep, medication management, taking to md appointments. Medications reviewed and all medications are available in the home this visit. The following education was provided regarding medications:  CRISTOBAL  MD notified of any discrepancies/look a-like medications/medication interactions: NA  Medications are effecrtive at this time. Home health supplies by type and quantity ordered/delivered this visit include:  Supplies available   Patient education provided this visit. patient made aware to turn every 2 hours and to keep pressure off of bony prominences, to monitor for any pressure ulcer development/worsening. Increase protein n diet and monitor for signs and symptoms of infection to include increase drainage, fever over 101.1      Sharps education provided: CRISTOBAL    Patient level of understanding of education provided: Verbalzied all understanding to above education    Skilled Care Performed this visit: Education and Wound Care    Patient response to procedure performed:  NO pain during dressing change     Agency Progress toward goals: Progressing toward interventions above      Patient's Progress towards personal goals: when patient reaches goals and medication is managed, and disease processes are understood patient agrees and understand that discharge will take place.

## 2022-05-13 ENCOUNTER — HOME CARE VISIT (OUTPATIENT)
Dept: SCHEDULING | Facility: HOME HEALTH | Age: 62
End: 2022-05-13
Payer: MEDICAID

## 2022-05-13 PROCEDURE — G0300 HHS/HOSPICE OF LPN EA 15 MIN: HCPCS

## 2022-05-15 VITALS
OXYGEN SATURATION: 100 % | TEMPERATURE: 98.5 F | RESPIRATION RATE: 18 BRPM | DIASTOLIC BLOOD PRESSURE: 67 MMHG | SYSTOLIC BLOOD PRESSURE: 130 MMHG | HEART RATE: 78 BPM

## 2022-05-15 NOTE — HOME HEALTH
Skilled reason for visit: Wound Care      Caregiver involvement:  Mother prepares meals, schedules patients appointments, transports patient to her appointments,  medications, and PCA assist with ADL's  Medications reviewed and all medications are available in the home this visit. The following education was provided regarding medications:  All Medication reviewed with Mother no changes, question or concerns. MD notified of any discrepancies/look a-like medications/medication interactions: n/a  Medications are effective  at this time. Home health supplies by type and quantity ordered/delivered this visit include: n/a    Patient education provided this visit:  patient/cg instructed to monitor for edema/increase in edema, to elevate extremity when edema occurs and to notify md if edema exceeds normal limits for patient. Sharps education provided: n/a    Patient level of understanding of education provided: Patient and CG verbalized completed understanding of education provided     Skilled Care Performed this visit: Wound Care     Patient response to procedure performed:  Patient denied pain or discomfort tolerated would care well     Agency Progress toward goals: Wound healing well no s/s of infection     Patient's Progress towards personal goals: . Patient's personal goal is to have infection complete wound healing and remain free of infection. Home exercise program:    Continue to be compliant with daily medications, drink plenty of fluids, eat healthy meals and keep follow up appointments with PCP.        Continued need for the following skills: Nursing    Plan for next visit: Wound Care     Patient and/or caregiver notified and agrees to changes in the Plan of Care YES      The following discharge planning was discussed with the pt/caregiver: DC when wounds are healed

## 2022-05-16 ENCOUNTER — HOME CARE VISIT (OUTPATIENT)
Dept: SCHEDULING | Facility: HOME HEALTH | Age: 62
End: 2022-05-16
Payer: MEDICAID

## 2022-05-16 PROCEDURE — G0300 HHS/HOSPICE OF LPN EA 15 MIN: HCPCS

## 2022-05-17 VITALS
OXYGEN SATURATION: 100 % | DIASTOLIC BLOOD PRESSURE: 60 MMHG | TEMPERATURE: 97.8 F | HEART RATE: 88 BPM | SYSTOLIC BLOOD PRESSURE: 154 MMHG | RESPIRATION RATE: 18 BRPM

## 2022-05-18 ENCOUNTER — HOME CARE VISIT (OUTPATIENT)
Dept: SCHEDULING | Facility: HOME HEALTH | Age: 62
End: 2022-05-18
Payer: MEDICAID

## 2022-05-18 VITALS
DIASTOLIC BLOOD PRESSURE: 93 MMHG | OXYGEN SATURATION: 100 % | SYSTOLIC BLOOD PRESSURE: 149 MMHG | RESPIRATION RATE: 18 BRPM | HEART RATE: 68 BPM | TEMPERATURE: 97.7 F

## 2022-05-18 PROCEDURE — G0300 HHS/HOSPICE OF LPN EA 15 MIN: HCPCS

## 2022-05-18 NOTE — HOME HEALTH
Skilled reason for visit: Wound Care           Caregiver involvement:  Mother prepares meals, schedules patients appointments, transports patient to her appointments,  medications, and PCA assist with ADL's    Medications reviewed and all medications are available in the home this visit. The following education was provided regarding medications:  All Medication reviewed with Mother no changes, question or concerns. MD notified of any discrepancies/look a-like medications/medication interactions: n/a    Medications are effective  at this time. Home health supplies by type and quantity ordered/delivered this visit include: n/a         Patient education provided this visit:  lee is in place draining cloudy yellow urine, with sediment noted in line . Patient/CG taught to empty lee bag several times a day and to record the day, amount, time and color of urine to take to the Dr's. Keep lee bag below waist level. Sharps education provided: n/a         Patient level of understanding of education provided: Patient and CG verbalized completed understanding of education provided          Skilled Care Performed this visit: Wound Care ,Lee drain bag change this visit instructed patient and CG monitor urine draining if remain cloudy notify MD     Patient response to procedure performed:  Patient denied pain or discomfort tolerated would care well          Agency Progress toward goals: Wound healing well no s/s of infection          Patient's Progress towards personal goals: . Patient's personal goal is to have infection complete wound healing and remain free of infection.           Home exercise program:  Increase fluids and monitor urine color     Continued need for the following skills: Nursing         Plan for next visit: Wound Care          Patient and/or caregiver notified and agrees to changes in the Plan of Care YES           The following discharge planning was discussed with the pt/caregiver: DC when wounds are healed

## 2022-05-20 ENCOUNTER — HOME CARE VISIT (OUTPATIENT)
Dept: SCHEDULING | Facility: HOME HEALTH | Age: 62
End: 2022-05-20
Payer: MEDICAID

## 2022-05-20 PROCEDURE — G0300 HHS/HOSPICE OF LPN EA 15 MIN: HCPCS

## 2022-05-20 NOTE — HOME HEALTH
Skilled reason for visit: Wound Care         Caregiver involvement:  Mother prepares meals, schedules patients appointments, transports patient to her appointments,  medications, and PCA assist with ADL's         Medications reviewed and all medications are available in the home this visit. The following education was provided regarding medications:  All Medication reviewed with Mother no changes, question or concerns. MD notified of any discrepancies/look a-like medications/medication interactions: n/a         Medications are effective  at this time. Home health supplies by type and quantity ordered/delivered this visit include: n/a      Patient education provided this visit:   uti prevention -, patient/cg instructed to monitor for edema/increase in edema, to elevate extremity when edema occurs and to notify md if edema exceeds normal limits for patient, none noted at this time. patient made aware to monitor for s/s of infection [increased swelling, increased redness around site, increased pain, foul smelling drainage, fever] aware who to report to/when. no s/s of infection noted. encouraged patient to get three nutritional meals daily and to stay hydrated. reviewed heart healthy diet- monitoring sodium intake, cholesterol and fat intake. patient aware to limit sodium, no added sodium to diet.  reviewed foods to avoid patient Instructed on repostioning every 2 hours as necessary for prevention of pressure sores        Sharps education provided: n/a       Patient level of understanding of education provided: Patient and CG verbalized completed understanding of education provided         Skilled Care Performed this visit: Wound Care ,      Patient response to procedure performed:  Patient denied pain or discomfort tolerated would care well         Agency Progress toward goals: Wound continue to heal well no s/s of infection         Patient's Progress towards personal goals: .Patient's personal goal is to have infection complete wound healing and remain free of infection.      Home exercise program:  Add protein to each meal to promote wound healing       Continued need for the following skills: Nursing        Plan for next visit: Wound Care     Patient and/or caregiver notified and agrees to changes in the Plan of Care YES        The following discharge planning was discussed with the pt/caregiver: DC when wounds are healed

## 2022-05-22 VITALS
HEART RATE: 98 BPM | RESPIRATION RATE: 18 BRPM | SYSTOLIC BLOOD PRESSURE: 144 MMHG | DIASTOLIC BLOOD PRESSURE: 65 MMHG | OXYGEN SATURATION: 99 % | TEMPERATURE: 98.8 F

## 2022-05-22 NOTE — HOME HEALTH
Skilled reason for visit: Wound Care                Caregiver involvement:  Mother prepares meals, schedules patients appointments, transports patient to her appointments,  medications, and PCA assist with ADL's       Medications reviewed and all medications are available in the home this visit. The following education was provided regarding medications:  All Medication reviewed with Mother no changes, question or concerns. MD notified of any discrepancies/look a-like medications/medication interactions: n/a            Medications are effective  at this time. Home health supplies by type and quantity ordered/delivered this visit include: n/a              Patient education provided this visit:    Instruct patient/caregiver to notify home health or physician for the following wound healing complications: increased drainage, pus or a foul odor coming from the wound, fever, muscle, joint, or body aches, sweating, increased swelling, redness or red streaks surrounding the wound; dramatic change in color or size of wound; stitches coming apart or wound reopening; increase in pain at wound site in spite of interventions. Sharps education provided: n/a              Patient level of understanding of education provided: Patient and CG verbalized completed understanding of education provided                    Skilled Care Performed this visit: Wound Care ,           Patient response to procedure performed:  Patient denied pain or discomfort tolerated would care well                    Agency Progress toward goals: Wound continue to heal well no s/s of infection                    Patient's Progress towards personal goals: . Patient's personal goal is to have infection complete wound healing and remain free of infection.           Home exercise program:  Add protein to each meal to promote wound healing               Continued need for the following skills: Nursing          Plan for next visit: Wound Care          Patient and/or caregiver notified and agrees to changes in the Plan of Care YES                The following discharge planning was discussed with the pt/caregiver: DC when wounds are heale

## 2022-05-23 ENCOUNTER — HOME CARE VISIT (OUTPATIENT)
Dept: SCHEDULING | Facility: HOME HEALTH | Age: 62
End: 2022-05-23
Payer: MEDICAID

## 2022-05-23 VITALS
SYSTOLIC BLOOD PRESSURE: 145 MMHG | HEART RATE: 98 BPM | OXYGEN SATURATION: 99 % | DIASTOLIC BLOOD PRESSURE: 63 MMHG | RESPIRATION RATE: 18 BRPM | TEMPERATURE: 97.8 F

## 2022-05-23 PROCEDURE — G0300 HHS/HOSPICE OF LPN EA 15 MIN: HCPCS

## 2022-05-24 NOTE — HOME HEALTH
Skilled reason for visit: Wound Care        Caregiver involvement:  Mother prepares meals, schedules patients appointments, transports patient to her appointments,  medications, and PCA assist with ADL's    Medications reviewed and all medications are available in the home this visit. The following education was provided regarding medications:  All Medication reviewed with Mother no changes, question or concerns. MD notified of any discrepancies/look a-like medications/medication interactions: n/a    Medications are effective  at this time. Home health supplies by type and quantity ordered/delivered this visit include: n/a      Patient education provided this visit:   Review of cath care, turn, positioning, fall precaution, edema,hydration and increase protein             Sharps education provided: n/a        Patient level of understanding of education provided: Patient and CG verbalized completed understanding of education provided        Skilled Care Performed this visit: Wound Care ,         Patient response to procedure performed:  Patient denied pain or discomfort tolerated would care well       Agency Progress toward goals: Wound continue to heal well no s/s of infection         Patient's Progress towards personal goals: . Patient's personal goal is to have infection complete wound healing and remain free of infection.        Home exercise program:  Add protein to each meal to promote wound healing        Continued need for the following skills: Nursing        Plan for next visit: Wound Care       Patient and/or caregiver notified and agrees to changes in the Plan of Care YES          The following discharge planning was discussed with the pt/caregiver: DC when wounds are healed

## 2022-05-25 ENCOUNTER — HOME CARE VISIT (OUTPATIENT)
Dept: SCHEDULING | Facility: HOME HEALTH | Age: 62
End: 2022-05-25
Payer: MEDICAID

## 2022-05-25 PROCEDURE — G0300 HHS/HOSPICE OF LPN EA 15 MIN: HCPCS

## 2022-05-27 ENCOUNTER — HOME CARE VISIT (OUTPATIENT)
Dept: SCHEDULING | Facility: HOME HEALTH | Age: 62
End: 2022-05-27
Payer: MEDICAID

## 2022-05-27 VITALS
RESPIRATION RATE: 18 BRPM | TEMPERATURE: 98.8 F | OXYGEN SATURATION: 100 % | HEART RATE: 77 BPM | SYSTOLIC BLOOD PRESSURE: 148 MMHG | DIASTOLIC BLOOD PRESSURE: 76 MMHG

## 2022-05-27 PROCEDURE — G0300 HHS/HOSPICE OF LPN EA 15 MIN: HCPCS

## 2022-05-27 PROCEDURE — 400014 HH F/U

## 2022-05-27 PROCEDURE — A6213 FOAM DRG >16<=48 SQ IN W/BDR: HCPCS

## 2022-05-27 NOTE — HOME HEALTH
Skilled reason for visit: Wound Care         Caregiver involvement:  Mother prepares meals, schedules patients appointments, transports patient to her appointments,  medications, and PCA assist with ADL's         Medications reviewed and all medications are available in the home this visit. The following education was provided regarding medications:  All Medication reviewed with Mother no changes, question or concerns. MD notified of any discrepancies/look a-like medications/medication interactions: n/a         Medications are effective  at this time. Home health supplies by type and quantity ordered/delivered this visit include: yes              Patient education provided this visit:   instruct patient/caregiver that hypertension is high blood pressure. Blood pressure is the force of blood moving against the walls of the arteries. A health care provider will report the blood pressure reading in 2 numbers. The top number is the pressure inside the arteries when the heart is hafsa, and the bottom number is the pressure inside the arteries when the heart is relaxed. Hypertension causes the blood pressure to get so high that the heart has to work harder than normal. This can damage the heart. The cause of hypertension may not be known. This is called essential or primary hypertension. Hypertension caused by another medical condition, such as kidney disease, is called secondary hypertension. There may be no signs and symptoms of hypertension or may include headache, blurred vision, chest pain, nose bleeds, dizziness, weakness, or trouble breathing. Hypertension is diagnosed after taking blood pressure readings at several doctor visits. If left untreated, hypertension increases the risk of heart attack, stroke, and kidney disease.            [place] education provided: n/a                   Patient level of understanding of education provided: Patient and CG verbalized completed understanding of education provided               Skilled Care Performed this visit: Wound Care ,                Patient response to procedure performed:  Patient denied pain or discomfort tolerated would care well               Agency Progress toward goals: Wound continue to heal well no s/s of infection                    Patient's Progress towards personal goals: . Patient's personal goal is to have infection complete wound healing and remain free of infection.                Home exercise program:  Add protein to each meal to promote wound healing               Continued need for the following skills: Nursing     Plan for next visit: Wound Care       Patient and/or caregiver notified and agrees to changes in the Plan of Care YES        The following discharge planning was discussed with the pt/caregiver: DC when wounds are healed

## 2022-05-29 VITALS
TEMPERATURE: 98.8 F | SYSTOLIC BLOOD PRESSURE: 132 MMHG | OXYGEN SATURATION: 100 % | RESPIRATION RATE: 18 BRPM | HEART RATE: 98 BPM | DIASTOLIC BLOOD PRESSURE: 84 MMHG

## 2022-05-29 NOTE — HOME HEALTH
Skilled reason for visit: Wound Care      Caregiver involvement:  Mother prepares meals, schedules patients appointments, transports patient to her appointments,  medications, and PCA assist with ADL's        Medications reviewed and all medications are available in the home this visit. The following education was provided regarding medications:  All Medication reviewed with Mother no changes, question or concerns. MD notified of any discrepancies/look a-like medications/medication interactions: n/a       Medications are effective  at this time. Home health supplies by type and quantity ordered/delivered this visit include: yes         Patient education provided this visit: ENCOURAGED PATIENT TO HAVE PROTEIN WITH EACH MEAL TO [facility]. Sharps education provided: n/a      Patient level of understanding of education provided: Patient and CG verbalized completed understanding of education provided       Skilled Care Performed this visit: Wound Care ,      Patient response to procedure performed:  Patient denied pain or discomfort tolerated would care well     Agency Progress toward goals: Wound continue to heal well no s/s of infection       Patient's Progress towards personal goals: . Patient's personal goal is to have infection complete wound healing and remain free of infection.      Home exercise program:  Add protein to each meal to promote wound healing      Continued need for the following skills: Nursing          Plan for next visit: Wound Care     Patient and/or caregiver notified and agrees to changes in the Plan of Care YES        The following discharge planning was discussed with the pt/caregiver: DC when wounds are healed

## 2022-05-30 ENCOUNTER — HOME CARE VISIT (OUTPATIENT)
Dept: SCHEDULING | Facility: HOME HEALTH | Age: 62
End: 2022-05-30
Payer: MEDICAID

## 2022-05-30 PROCEDURE — G0300 HHS/HOSPICE OF LPN EA 15 MIN: HCPCS

## 2022-05-30 NOTE — HOME HEALTH
Skilled reason for visit: Skilled nursing assessment, wound care and medication review and education    Caregiver involvement: Mother is in the home as well as a private aid    Medications reviewed and all medications are available in the home this visit. The following education was provided regarding medications:  Medication side effects, dosages, purposes, frequencies. .    MD notified of any discrepancies/look a-like medications/medication interactions: Not needed   Medications are effective at this time. Home health supplies by type and quantity ordered/delivered this visit include: Supplies are available in the home    Patient education provided this visit: Make sure you wash your hands before touching your catheter. Always keep the drainage bag and tubing below the level of your bladder. Do not let your tubing loop over bed rails, your legs or onto the floor. Drink plenty of fluids every day--8-10 glasses of water or liquid. Sharps education provided: Clinician instructed patient/CG on proper disposal of sharps: Containers should be made of hard plastic, be puncture-resistant and leakproof,   such as a laundry detergent or bleach bottle.  When the container is ¾ full, it should be sealed with tape and labeled   DO NOT RECYCLE prior to discarding in the regular trash.      Patient level of understanding of education provided: Patient asked questions throughout nursing visit, took notes and verbalized understanding of information given. Skilled Care Performed this visit: Skilled nursing assessment, wound care and medication review and education    Patient response to procedure performed:   Patient tolerated treatment without complaints of pain or discomfort.     Patient's Progress towards personal goals: Patient continues to comply with physicians orders and keep medical appointments to work toward accomplishing goals set and discharge    Home exercise program:sn instructed patient to perform deep breathing exercises, 5-6 breaths 5 x daily to promote air exchange and prevent pneumonia     Continued need for the following skills: Nursing    Plan for next visit: Skilled nursing assessment, wound care and medication review and education    Patient and/or caregiver notified and agrees to changes in the Plan of Care YES      The following discharge planning was discussed with the pt/caregiver: Patient to discharge when goals are met, wounds have healed and patient is able to independently manage medications

## 2022-05-31 ENCOUNTER — HOSPITAL ENCOUNTER (INPATIENT)
Age: 62
LOS: 23 days | Discharge: HOME HEALTH | DRG: 441 | End: 2022-06-23
Attending: STUDENT IN AN ORGANIZED HEALTH CARE EDUCATION/TRAINING PROGRAM | Admitting: INTERNAL MEDICINE
Payer: MEDICAID

## 2022-05-31 ENCOUNTER — APPOINTMENT (OUTPATIENT)
Dept: CT IMAGING | Age: 62
DRG: 441 | End: 2022-05-31
Attending: STUDENT IN AN ORGANIZED HEALTH CARE EDUCATION/TRAINING PROGRAM
Payer: MEDICAID

## 2022-05-31 ENCOUNTER — APPOINTMENT (OUTPATIENT)
Dept: GENERAL RADIOLOGY | Age: 62
DRG: 441 | End: 2022-05-31
Attending: STUDENT IN AN ORGANIZED HEALTH CARE EDUCATION/TRAINING PROGRAM
Payer: MEDICAID

## 2022-05-31 DIAGNOSIS — N17.9 AKI (ACUTE KIDNEY INJURY) (HCC): ICD-10-CM

## 2022-05-31 DIAGNOSIS — R65.20 SEVERE SEPSIS WITH ACUTE ORGAN DYSFUNCTION DUE TO GRAM NEGATIVE BACTERIA (HCC): ICD-10-CM

## 2022-05-31 DIAGNOSIS — E87.20 LACTIC ACIDOSIS: ICD-10-CM

## 2022-05-31 DIAGNOSIS — A41.9 SEPSIS WITH ACUTE RENAL FAILURE WITHOUT SEPTIC SHOCK, DUE TO UNSPECIFIED ORGANISM, UNSPECIFIED ACUTE RENAL FAILURE TYPE (HCC): Primary | ICD-10-CM

## 2022-05-31 DIAGNOSIS — N17.9 SEPSIS WITH ACUTE RENAL FAILURE WITHOUT SEPTIC SHOCK, DUE TO UNSPECIFIED ORGANISM, UNSPECIFIED ACUTE RENAL FAILURE TYPE (HCC): Primary | ICD-10-CM

## 2022-05-31 DIAGNOSIS — Z97.8 CHRONIC INDWELLING FOLEY CATHETER: Chronic | ICD-10-CM

## 2022-05-31 DIAGNOSIS — R65.21 SEPTIC SHOCK (HCC): ICD-10-CM

## 2022-05-31 DIAGNOSIS — R65.20 SEPSIS WITH ACUTE RENAL FAILURE WITHOUT SEPTIC SHOCK, DUE TO UNSPECIFIED ORGANISM, UNSPECIFIED ACUTE RENAL FAILURE TYPE (HCC): Primary | ICD-10-CM

## 2022-05-31 DIAGNOSIS — D64.9 NORMOCYTIC ANEMIA: ICD-10-CM

## 2022-05-31 DIAGNOSIS — K63.1 BOWEL PERFORATION (HCC): ICD-10-CM

## 2022-05-31 DIAGNOSIS — A41.9 SEPTIC SHOCK (HCC): ICD-10-CM

## 2022-05-31 DIAGNOSIS — N17.9 ACUTE RENAL FAILURE, UNSPECIFIED ACUTE RENAL FAILURE TYPE (HCC): ICD-10-CM

## 2022-05-31 DIAGNOSIS — D72.829 LEUKOCYTOSIS, UNSPECIFIED TYPE: ICD-10-CM

## 2022-05-31 DIAGNOSIS — R78.81 BACTEREMIA DUE TO GRAM-NEGATIVE BACTERIA: ICD-10-CM

## 2022-05-31 DIAGNOSIS — A41.50 SEVERE SEPSIS WITH ACUTE ORGAN DYSFUNCTION DUE TO GRAM NEGATIVE BACTERIA (HCC): ICD-10-CM

## 2022-05-31 DIAGNOSIS — E80.6 HYPERBILIRUBINEMIA: ICD-10-CM

## 2022-05-31 DIAGNOSIS — B99.9 INTRA-ABDOMINAL INFECTION: ICD-10-CM

## 2022-05-31 PROBLEM — G82.20 PARAPLEGIA (HCC): Chronic | Status: ACTIVE | Noted: 2021-04-30

## 2022-05-31 PROBLEM — Z86.19 HISTORY OF INFECTION WITH VANCOMYCIN RESISTANT ENTEROCOCCUS (VRE): Chronic | Status: ACTIVE | Noted: 2021-09-13

## 2022-05-31 PROBLEM — Z87.828 HISTORY OF GUNSHOT WOUND: Chronic | Status: ACTIVE | Noted: 2021-07-30

## 2022-05-31 PROBLEM — J45.909 ASTHMA: Chronic | Status: ACTIVE | Noted: 2022-05-31

## 2022-05-31 PROBLEM — I10 HTN (HYPERTENSION): Chronic | Status: ACTIVE | Noted: 2021-04-30

## 2022-05-31 PROBLEM — N31.9 NEUROGENIC BLADDER: Chronic | Status: ACTIVE | Noted: 2021-07-30

## 2022-05-31 PROBLEM — Z86.718 HISTORY OF DVT (DEEP VEIN THROMBOSIS): Chronic | Status: ACTIVE | Noted: 2022-05-31

## 2022-05-31 PROBLEM — L89.154 SACRAL DECUBITUS ULCER, STAGE IV (HCC): Chronic | Status: ACTIVE | Noted: 2021-04-30

## 2022-05-31 PROBLEM — Z22.322 MRSA NASAL COLONIZATION: Chronic | Status: ACTIVE | Noted: 2022-05-31

## 2022-05-31 PROBLEM — Z93.3 S/P COLOSTOMY (HCC): Chronic | Status: ACTIVE | Noted: 2021-04-29

## 2022-05-31 LAB
ALBUMIN SERPL-MCNC: 2.8 G/DL (ref 3.4–5)
ALBUMIN/GLOB SERPL: 0.4 {RATIO} (ref 0.8–1.7)
ALP SERPL-CCNC: 88 U/L (ref 45–117)
ALT SERPL-CCNC: 50 U/L (ref 16–61)
ANION GAP SERPL CALC-SCNC: 12 MMOL/L (ref 3–18)
APPEARANCE UR: ABNORMAL
AST SERPL-CCNC: 43 U/L (ref 10–38)
BACTERIA URNS QL MICRO: ABNORMAL /HPF
BASOPHILS # BLD: 0 K/UL (ref 0–0.1)
BASOPHILS NFR BLD: 0 % (ref 0–2)
BILIRUB SERPL-MCNC: 1.4 MG/DL (ref 0.2–1)
BILIRUB UR QL: NEGATIVE
BUN SERPL-MCNC: 53 MG/DL (ref 7–18)
BUN/CREAT SERPL: 10 (ref 12–20)
CALCIUM SERPL-MCNC: 8.4 MG/DL (ref 8.5–10.1)
CHLORIDE SERPL-SCNC: 100 MMOL/L (ref 100–111)
CO2 SERPL-SCNC: 24 MMOL/L (ref 21–32)
COLOR UR: ABNORMAL
CREAT SERPL-MCNC: 5.2 MG/DL (ref 0.6–1.3)
CREAT UR-MCNC: 102 MG/DL (ref 30–125)
DIFFERENTIAL METHOD BLD: ABNORMAL
EOSINOPHIL # BLD: 0 K/UL (ref 0–0.4)
EOSINOPHIL NFR BLD: 0 % (ref 0–5)
ERYTHROCYTE [DISTWIDTH] IN BLOOD BY AUTOMATED COUNT: 15.3 % (ref 11.6–14.5)
GLOBULIN SER CALC-MCNC: 6.4 G/DL (ref 2–4)
GLUCOSE SERPL-MCNC: 135 MG/DL (ref 74–99)
GLUCOSE UR STRIP.AUTO-MCNC: NEGATIVE MG/DL
HCT VFR BLD AUTO: 39.4 % (ref 36–48)
HGB BLD-MCNC: 12.1 G/DL (ref 13–16)
HGB UR QL STRIP: ABNORMAL
IMM GRANULOCYTES # BLD AUTO: 0 K/UL (ref 0–0.04)
IMM GRANULOCYTES NFR BLD AUTO: 0 % (ref 0–0.5)
KETONES UR QL STRIP.AUTO: ABNORMAL MG/DL
LACTATE BLD-SCNC: 5.1 MMOL/L (ref 0.4–2)
LEUKOCYTE ESTERASE UR QL STRIP.AUTO: ABNORMAL
LIPASE SERPL-CCNC: 15 U/L (ref 73–393)
LYMPHOCYTES # BLD: 1.1 K/UL (ref 0.9–3.6)
LYMPHOCYTES NFR BLD: 8 % (ref 21–52)
MAGNESIUM SERPL-MCNC: 1.9 MG/DL (ref 1.6–2.6)
MCH RBC QN AUTO: 28.3 PG (ref 24–34)
MCHC RBC AUTO-ENTMCNC: 30.7 G/DL (ref 31–37)
MCV RBC AUTO: 92.3 FL (ref 78–100)
MONOCYTES # BLD: 0.3 K/UL (ref 0.05–1.2)
MONOCYTES NFR BLD: 2 % (ref 3–10)
NEUTS BAND NFR BLD MANUAL: 9 %
NEUTS SEG # BLD: 11.9 K/UL (ref 1.8–8)
NEUTS SEG NFR BLD: 81 % (ref 40–73)
NITRITE UR QL STRIP.AUTO: NEGATIVE
NRBC # BLD: 0 K/UL (ref 0–0.01)
NRBC BLD-RTO: 0 PER 100 WBC
PH UR STRIP: 7.5 [PH] (ref 5–8)
PLATELET # BLD AUTO: 228 K/UL (ref 135–420)
PLATELET COMMENTS,PCOM: ABNORMAL
PMV BLD AUTO: 10.2 FL (ref 9.2–11.8)
POTASSIUM SERPL-SCNC: 4.8 MMOL/L (ref 3.5–5.5)
PROT SERPL-MCNC: 9.2 G/DL (ref 6.4–8.2)
PROT UR STRIP-MCNC: 300 MG/DL
RBC # BLD AUTO: 4.27 M/UL (ref 4.35–5.65)
RBC #/AREA URNS HPF: ABNORMAL /HPF (ref 0–5)
RBC MORPH BLD: ABNORMAL
SODIUM SERPL-SCNC: 136 MMOL/L (ref 136–145)
SODIUM UR-SCNC: 28 MMOL/L (ref 20–110)
SP GR UR REFRACTOMETRY: 1.02 (ref 1–1.03)
UROBILINOGEN UR QL STRIP.AUTO: 1 EU/DL (ref 0.2–1)
WBC # BLD AUTO: 13.3 K/UL (ref 4.6–13.2)
WBC URNS QL MICRO: ABNORMAL /HPF (ref 0–4)

## 2022-05-31 PROCEDURE — 96368 THER/DIAG CONCURRENT INF: CPT

## 2022-05-31 PROCEDURE — 96365 THER/PROPH/DIAG IV INF INIT: CPT

## 2022-05-31 PROCEDURE — 82570 ASSAY OF URINE CREATININE: CPT

## 2022-05-31 PROCEDURE — 81001 URINALYSIS AUTO W/SCOPE: CPT

## 2022-05-31 PROCEDURE — 74011000258 HC RX REV CODE- 258: Performed by: STUDENT IN AN ORGANIZED HEALTH CARE EDUCATION/TRAINING PROGRAM

## 2022-05-31 PROCEDURE — 83690 ASSAY OF LIPASE: CPT

## 2022-05-31 PROCEDURE — 85610 PROTHROMBIN TIME: CPT

## 2022-05-31 PROCEDURE — 96375 TX/PRO/DX INJ NEW DRUG ADDON: CPT

## 2022-05-31 PROCEDURE — 87040 BLOOD CULTURE FOR BACTERIA: CPT

## 2022-05-31 PROCEDURE — 74176 CT ABD & PELVIS W/O CONTRAST: CPT

## 2022-05-31 PROCEDURE — 99285 EMERGENCY DEPT VISIT HI MDM: CPT

## 2022-05-31 PROCEDURE — 80053 COMPREHEN METABOLIC PANEL: CPT

## 2022-05-31 PROCEDURE — 83605 ASSAY OF LACTIC ACID: CPT

## 2022-05-31 PROCEDURE — 74011250636 HC RX REV CODE- 250/636: Performed by: STUDENT IN AN ORGANIZED HEALTH CARE EDUCATION/TRAINING PROGRAM

## 2022-05-31 PROCEDURE — 87077 CULTURE AEROBIC IDENTIFY: CPT

## 2022-05-31 PROCEDURE — 84300 ASSAY OF URINE SODIUM: CPT

## 2022-05-31 PROCEDURE — 96366 THER/PROPH/DIAG IV INF ADDON: CPT

## 2022-05-31 PROCEDURE — 51702 INSERT TEMP BLADDER CATH: CPT

## 2022-05-31 PROCEDURE — 99222 1ST HOSP IP/OBS MODERATE 55: CPT | Performed by: INTERNAL MEDICINE

## 2022-05-31 PROCEDURE — 71045 X-RAY EXAM CHEST 1 VIEW: CPT

## 2022-05-31 PROCEDURE — 85025 COMPLETE CBC W/AUTO DIFF WBC: CPT

## 2022-05-31 PROCEDURE — 65660000004 HC RM CVT STEPDOWN

## 2022-05-31 PROCEDURE — 74011250636 HC RX REV CODE- 250/636: Performed by: INTERNAL MEDICINE

## 2022-05-31 PROCEDURE — 87186 SC STD MICRODIL/AGAR DIL: CPT

## 2022-05-31 PROCEDURE — 83735 ASSAY OF MAGNESIUM: CPT

## 2022-05-31 PROCEDURE — 87086 URINE CULTURE/COLONY COUNT: CPT

## 2022-05-31 RX ORDER — MORPHINE SULFATE 4 MG/ML
4 INJECTION INTRAVENOUS ONCE
Status: COMPLETED | OUTPATIENT
Start: 2022-05-31 | End: 2022-05-31

## 2022-05-31 RX ORDER — ONDANSETRON 2 MG/ML
4 INJECTION INTRAMUSCULAR; INTRAVENOUS ONCE
Status: COMPLETED | OUTPATIENT
Start: 2022-05-31 | End: 2022-05-31

## 2022-05-31 RX ORDER — SODIUM CHLORIDE 9 MG/ML
150 INJECTION, SOLUTION INTRAVENOUS CONTINUOUS
Status: DISPENSED | OUTPATIENT
Start: 2022-05-31 | End: 2022-06-01

## 2022-05-31 RX ORDER — VANCOMYCIN 2 GRAM/500 ML IN 0.9 % SODIUM CHLORIDE INTRAVENOUS
2000 ONCE
Status: COMPLETED | OUTPATIENT
Start: 2022-05-31 | End: 2022-06-01

## 2022-05-31 RX ADMIN — SODIUM CHLORIDE 1000 ML: 9 INJECTION, SOLUTION INTRAVENOUS at 19:44

## 2022-05-31 RX ADMIN — SODIUM CHLORIDE, SODIUM LACTATE, POTASSIUM CHLORIDE, AND CALCIUM CHLORIDE 1000 ML: 600; 310; 30; 20 INJECTION, SOLUTION INTRAVENOUS at 19:45

## 2022-05-31 RX ADMIN — SODIUM CHLORIDE 150 ML/HR: 9 INJECTION, SOLUTION INTRAVENOUS at 23:50

## 2022-05-31 RX ADMIN — VANCOMYCIN HYDROCHLORIDE 2000 MG: 10 INJECTION, POWDER, LYOPHILIZED, FOR SOLUTION INTRAVENOUS at 19:44

## 2022-05-31 RX ADMIN — MORPHINE SULFATE 4 MG: 4 INJECTION, SOLUTION INTRAMUSCULAR; INTRAVENOUS at 19:44

## 2022-05-31 RX ADMIN — PIPERACILLIN AND TAZOBACTAM 4.5 G: 4; .5 INJECTION, POWDER, FOR SOLUTION INTRAVENOUS at 19:44

## 2022-05-31 RX ADMIN — ONDANSETRON 4 MG: 2 INJECTION INTRAMUSCULAR; INTRAVENOUS at 19:44

## 2022-05-31 NOTE — PROGRESS NOTES
Pharmacy Note     Zosyn 3.375 gm q8h ordered for treatment of Sepsis. Per [address] [place] [address], this order will be changed to 4.5 gm x 1. Will need creatinine to schedule extended infusion dosing. Estimated Creatinine Clearance: CrCl cannot be calculated (Patient's most recent lab result is older than the maximum 180 days allowed. ). Dialysis Status, ЕКАТЕРИНА, CKD: No result at this time  BMI:  There is no height or weight on file to calculate BMI. Rationale for Adjustment:  [facility] B-Lactam extended infusion policy    Pharmacy will continue to monitor and adjust dose as necessary. Please call with any questions.     Thank you,  Marlon Whiteside, PHARMD
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
## 2022-06-01 ENCOUNTER — HOME CARE VISIT (OUTPATIENT)
Dept: HOME HEALTH SERVICES | Facility: HOME HEALTH | Age: 62
End: 2022-06-01
Payer: MEDICAID

## 2022-06-01 ENCOUNTER — APPOINTMENT (OUTPATIENT)
Dept: ULTRASOUND IMAGING | Age: 62
DRG: 441 | End: 2022-06-01
Attending: STUDENT IN AN ORGANIZED HEALTH CARE EDUCATION/TRAINING PROGRAM
Payer: MEDICAID

## 2022-06-01 LAB
ABO + RH BLD: NORMAL
ALBUMIN SERPL-MCNC: 2.5 G/DL (ref 3.4–5)
ANION GAP SERPL CALC-SCNC: 7 MMOL/L (ref 3–18)
ATRIAL RATE: 119 BPM
BLOOD GROUP ANTIBODIES SERPL: NORMAL
BUN SERPL-MCNC: 48 MG/DL (ref 7–18)
BUN/CREAT SERPL: 12 (ref 12–20)
CALCIUM SERPL-MCNC: 8.3 MG/DL (ref 8.5–10.1)
CALCULATED P AXIS, ECG09: 57 DEGREES
CALCULATED R AXIS, ECG10: 35 DEGREES
CALCULATED T AXIS, ECG11: 47 DEGREES
CHLORIDE SERPL-SCNC: 105 MMOL/L (ref 100–111)
CO2 SERPL-SCNC: 27 MMOL/L (ref 21–32)
CREAT SERPL-MCNC: 3.92 MG/DL (ref 0.6–1.3)
DIAGNOSIS, 93000: NORMAL
GLUCOSE SERPL-MCNC: 114 MG/DL (ref 74–99)
INR PPP: 1.3 (ref 0.8–1.2)
LACTATE BLD-SCNC: 3.63 MMOL/L (ref 0.4–2)
LACTATE SERPL-SCNC: 1.5 MMOL/L (ref 0.4–2)
LACTATE SERPL-SCNC: 3.4 MMOL/L (ref 0.4–2)
P-R INTERVAL, ECG05: 154 MS
PHOSPHATE SERPL-MCNC: 4.4 MG/DL (ref 2.5–4.9)
POTASSIUM SERPL-SCNC: 4.4 MMOL/L (ref 3.5–5.5)
PROTHROMBIN TIME: 16.9 SEC (ref 11.5–15.2)
Q-T INTERVAL, ECG07: 324 MS
QRS DURATION, ECG06: 92 MS
QTC CALCULATION (BEZET), ECG08: 455 MS
SODIUM SERPL-SCNC: 139 MMOL/L (ref 136–145)
SPECIMEN EXP DATE BLD: NORMAL
VENTRICULAR RATE, ECG03: 119 BPM

## 2022-06-01 PROCEDURE — 80069 RENAL FUNCTION PANEL: CPT

## 2022-06-01 PROCEDURE — 83605 ASSAY OF LACTIC ACID: CPT

## 2022-06-01 PROCEDURE — 74011250636 HC RX REV CODE- 250/636: Performed by: INTERNAL MEDICINE

## 2022-06-01 PROCEDURE — 77030040393 HC DRSG OPTIFOAM GENT MDII -B

## 2022-06-01 PROCEDURE — 77030041076 HC DRSG AG OPTICELL MDII -A

## 2022-06-01 PROCEDURE — 74011000258 HC RX REV CODE- 258: Performed by: INTERNAL MEDICINE

## 2022-06-01 PROCEDURE — 65660000004 HC RM CVT STEPDOWN

## 2022-06-01 PROCEDURE — 36415 COLL VENOUS BLD VENIPUNCTURE: CPT

## 2022-06-01 PROCEDURE — C9113 INJ PANTOPRAZOLE SODIUM, VIA: HCPCS | Performed by: INTERNAL MEDICINE

## 2022-06-01 PROCEDURE — 76770 US EXAM ABDO BACK WALL COMP: CPT

## 2022-06-01 PROCEDURE — 93005 ELECTROCARDIOGRAM TRACING: CPT

## 2022-06-01 PROCEDURE — 74011000250 HC RX REV CODE- 250: Performed by: INTERNAL MEDICINE

## 2022-06-01 PROCEDURE — 99222 1ST HOSP IP/OBS MODERATE 55: CPT | Performed by: COLON & RECTAL SURGERY

## 2022-06-01 PROCEDURE — 77030040392 HC DRSG OPTIFOAM MDII -A

## 2022-06-01 PROCEDURE — 2709999900 HC NON-CHARGEABLE SUPPLY

## 2022-06-01 PROCEDURE — 77030018842 HC SOL IRR SOD CL 9% BAXT -A

## 2022-06-01 PROCEDURE — 99233 SBSQ HOSP IP/OBS HIGH 50: CPT | Performed by: HOSPITALIST

## 2022-06-01 PROCEDURE — 86900 BLOOD TYPING SEROLOGIC ABO: CPT

## 2022-06-01 RX ORDER — IPRATROPIUM BROMIDE AND ALBUTEROL SULFATE 2.5; .5 MG/3ML; MG/3ML
3 SOLUTION RESPIRATORY (INHALATION)
Status: DISCONTINUED | OUTPATIENT
Start: 2022-06-01 | End: 2022-06-23 | Stop reason: HOSPADM

## 2022-06-01 RX ORDER — SODIUM CHLORIDE 0.9 % (FLUSH) 0.9 %
5-40 SYRINGE (ML) INJECTION AS NEEDED
Status: DISCONTINUED | OUTPATIENT
Start: 2022-06-01 | End: 2022-06-23 | Stop reason: HOSPADM

## 2022-06-01 RX ORDER — NALOXONE HYDROCHLORIDE 0.4 MG/ML
0.4 INJECTION, SOLUTION INTRAMUSCULAR; INTRAVENOUS; SUBCUTANEOUS
Status: DISCONTINUED | OUTPATIENT
Start: 2022-06-01 | End: 2022-06-23 | Stop reason: HOSPADM

## 2022-06-01 RX ORDER — MORPHINE SULFATE 2 MG/ML
2-4 INJECTION, SOLUTION INTRAMUSCULAR; INTRAVENOUS
Status: DISCONTINUED | OUTPATIENT
Start: 2022-06-01 | End: 2022-06-03

## 2022-06-01 RX ORDER — SODIUM CHLORIDE 9 MG/ML
125 INJECTION, SOLUTION INTRAVENOUS CONTINUOUS
Status: DISPENSED | OUTPATIENT
Start: 2022-06-01 | End: 2022-06-02

## 2022-06-01 RX ORDER — ACETAMINOPHEN 650 MG/1
650 SUPPOSITORY RECTAL
Status: DISCONTINUED | OUTPATIENT
Start: 2022-06-01 | End: 2022-06-23 | Stop reason: HOSPADM

## 2022-06-01 RX ORDER — ONDANSETRON 4 MG/1
4 TABLET, ORALLY DISINTEGRATING ORAL
Status: DISCONTINUED | OUTPATIENT
Start: 2022-06-01 | End: 2022-06-06

## 2022-06-01 RX ORDER — PANTOPRAZOLE SODIUM 40 MG/10ML
40 INJECTION, POWDER, LYOPHILIZED, FOR SOLUTION INTRAVENOUS EVERY 24 HOURS
Status: DISCONTINUED | OUTPATIENT
Start: 2022-06-01 | End: 2022-06-09

## 2022-06-01 RX ORDER — ESCITALOPRAM OXALATE 20 MG/1
20 TABLET ORAL DAILY
Status: DISCONTINUED | OUTPATIENT
Start: 2022-06-02 | End: 2022-06-09

## 2022-06-01 RX ORDER — ONDANSETRON 2 MG/ML
4 INJECTION INTRAMUSCULAR; INTRAVENOUS
Status: DISCONTINUED | OUTPATIENT
Start: 2022-06-01 | End: 2022-06-06

## 2022-06-01 RX ORDER — SODIUM CHLORIDE 0.9 % (FLUSH) 0.9 %
5-40 SYRINGE (ML) INJECTION EVERY 8 HOURS
Status: DISCONTINUED | OUTPATIENT
Start: 2022-06-01 | End: 2022-06-23 | Stop reason: HOSPADM

## 2022-06-01 RX ORDER — ACETAMINOPHEN 325 MG/1
650 TABLET ORAL
Status: DISCONTINUED | OUTPATIENT
Start: 2022-06-01 | End: 2022-06-23 | Stop reason: HOSPADM

## 2022-06-01 RX ORDER — CHOLECALCIFEROL (VITAMIN D3) 125 MCG
5 CAPSULE ORAL
Status: DISCONTINUED | OUTPATIENT
Start: 2022-06-01 | End: 2022-06-23 | Stop reason: HOSPADM

## 2022-06-01 RX ORDER — ESCITALOPRAM OXALATE 20 MG/1
20 TABLET ORAL DAILY
Status: DISCONTINUED | OUTPATIENT
Start: 2022-06-01 | End: 2022-06-01

## 2022-06-01 RX ORDER — POLYETHYLENE GLYCOL 3350 17 G/17G
17 POWDER, FOR SOLUTION ORAL DAILY PRN
Status: DISCONTINUED | OUTPATIENT
Start: 2022-06-01 | End: 2022-06-23 | Stop reason: HOSPADM

## 2022-06-01 RX ADMIN — PANTOPRAZOLE SODIUM 40 MG: 40 INJECTION, POWDER, FOR SOLUTION INTRAVENOUS at 01:18

## 2022-06-01 RX ADMIN — PIPERACILLIN AND TAZOBACTAM 3.38 G: 3; .375 INJECTION, POWDER, LYOPHILIZED, FOR SOLUTION INTRAVENOUS at 09:37

## 2022-06-01 RX ADMIN — SODIUM CHLORIDE 150 ML/HR: 9 INJECTION, SOLUTION INTRAVENOUS at 09:54

## 2022-06-01 RX ADMIN — SODIUM CHLORIDE, PRESERVATIVE FREE 10 ML: 5 INJECTION INTRAVENOUS at 01:18

## 2022-06-01 RX ADMIN — PIPERACILLIN AND TAZOBACTAM 3.38 G: 3; .375 INJECTION, POWDER, LYOPHILIZED, FOR SOLUTION INTRAVENOUS at 20:30

## 2022-06-01 RX ADMIN — SODIUM CHLORIDE 125 ML/HR: 9 INJECTION, SOLUTION INTRAVENOUS at 14:20

## 2022-06-01 RX ADMIN — MORPHINE SULFATE 2 MG: 2 INJECTION, SOLUTION INTRAMUSCULAR; INTRAVENOUS at 14:15

## 2022-06-01 RX ADMIN — SODIUM CHLORIDE, PRESERVATIVE FREE 10 ML: 5 INJECTION INTRAVENOUS at 05:52

## 2022-06-01 RX ADMIN — SODIUM CHLORIDE 125 ML/HR: 9 INJECTION, SOLUTION INTRAVENOUS at 20:30

## 2022-06-01 RX ADMIN — SODIUM CHLORIDE, PRESERVATIVE FREE 10 ML: 5 INJECTION INTRAVENOUS at 23:10

## 2022-06-01 RX ADMIN — SODIUM CHLORIDE, PRESERVATIVE FREE 10 ML: 5 INJECTION INTRAVENOUS at 14:22
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
## 2022-06-01 NOTE — PROGRESS NOTES
In chart to document critical lab results. Blood culture growing gram negative rods. Attending at nurses station and made aware.   Primary nurse also informed
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
## 2022-06-01 NOTE — PROGRESS NOTES
TRANSFER - IN REPORT:    Verbal report received from SAFIA Laboy (name) on Rasta Sevilla  being received from ED (unit) for routine progression of care      Report consisted of patients Situation, Background, Assessment and   Recommendations(SBAR). Information from the following report(s) SBAR, Kardex, ED Summary, Intake/Output, MAR, Recent Results, Med Rec Status and Cardiac Rhythm Sinus Tach was reviewed with the receiving nurse. Opportunity for questions and clarification was provided. 5031: Assume care, V/S and admission assessment completed, Hooked on tele - Sinus Tach on monitor, oriented to room and unit routine, CHG bath provided, dual skin assessment with Tyra Cummins - CNA,  Cleansed wounds with wound cleanser, wet to dry dressing applied, Chavez in place, IVF infusing well    Bedside and Verbal shift change report given to Zach Mancera RN (oncoming nurse) by Chaz Thompson RN (offgoing nurse). Report included the following information SBAR, Kardex, ED Summary, Intake/Output, MAR, Recent Results, Med Rec Status and Cardiac Rhythm Sinus Tach.
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
## 2022-06-01 NOTE — ROUTINE PROCESS
1802 Received report from [facility]. Patient alert and oriented. Chavez catheter draining. 1000 Patient resting in the bed comfortably. Denies any pain at this time. Call bell and telephone placed within reach. 1245 EKG done. Patient advised on NPO for now. Call bell and telephone placed within reach. 1435 Patient off the floor to ultrasound. 32 61 16 Patient back in the room. 1959 Bedside and Verbal shift change report given to [facility] (oncoming nurse). Report included the following information SBAR, Kardex, Intake/Output, MAR and Cardiac Rhythm NSR/ST.

## 2022-06-01 NOTE — PROGRESS NOTES
Problem: Risk for Spread of Infection  Goal: Prevent transmission of infectious organism to others  Description: Prevent the transmission of infectious organisms to other patients, staff members, and visitors. Outcome: Progressing Towards Goal     Problem: Patient Education:  Go to Education Activity  Goal: Patient/Family Education  Outcome: Progressing Towards Goal     Problem: Pressure Injury - Risk of  Goal: *Prevention of pressure injury  Description: Document Jordin Scale and appropriate interventions in the flowsheet. Outcome: Progressing Towards Goal  Note: Pressure Injury Interventions:  Sensory Interventions: Assess changes in LOC,Check visual cues for pain,Keep linens dry and wrinkle-free    Moisture Interventions: Absorbent underpads,Apply protective barrier, creams and emollients,Internal/External urinary devices,Contain wound drainage    Activity Interventions: Pressure redistribution bed/mattress(bed type)    Mobility Interventions: Pressure redistribution bed/mattress (bed type),Turn and reposition approx. every two hours(pillow and wedges)    Nutrition Interventions: Discuss nutritional consult with provider    Friction and Shear Interventions: Apply protective barrier, creams and emollients,Foam dressings/transparent film/skin sealants,Feet elevated on foot rest                Problem: Patient Education: Go to Patient Education Activity  Goal: Patient/Family Education  Outcome: Progressing Towards Goal     Problem: Falls - Risk of  Goal: *Absence of Falls  Description: Document Toyin Ruiz Fall Risk and appropriate interventions in the flowsheet. Note: Fall Risk Interventions:                 Elimination Interventions:  Toileting schedule/hourly rounds,Bed/chair exit alarm

## 2022-06-01 NOTE — ED NOTES
ED Course as of 05/31/22 2259   Tue May 31, 2022   2202 Patient with acute renal failure sepsis, chronic neurogenic bladder, awaiting CT scan. Hospitalist is aware for admission. Signed out to me pending just the results of the CT. [CB]   2240 Dr Inda Brittle surgery called, we verbally discussed over the phone noted concerns from Dr. Pooja Constantino radiologist, small bowel transition point around the anastomosis concern for anastomotic leak, fluid collections. We noted no free air. I did note his leukocytosis of 13 lactate of 5 septic appearance very concerning abdominal exam with tenderness to light palpation in all areas. Dr. Andrew Spears will review films, agrees with admission to high level of care. Calling hospitalist. [CB]      ED Course User Index  [CB] Radene Seip, MD     10:59 PM d/w Dr Karen Fitzgerald, including concerning findings for anastomotic leak fluid collection in the abdomen transition point. He accepts admission to stepdown. Discussed Dr. Andrew Spears is reviewing findings.
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
## 2022-06-02 ENCOUNTER — ANESTHESIA EVENT (OUTPATIENT)
Dept: SURGERY | Age: 62
DRG: 441 | End: 2022-06-02
Payer: MEDICAID

## 2022-06-02 ENCOUNTER — APPOINTMENT (OUTPATIENT)
Dept: GENERAL RADIOLOGY | Age: 62
DRG: 441 | End: 2022-06-02
Attending: PHYSICIAN ASSISTANT
Payer: MEDICAID

## 2022-06-02 ENCOUNTER — ANESTHESIA (OUTPATIENT)
Dept: SURGERY | Age: 62
DRG: 441 | End: 2022-06-02
Payer: MEDICAID

## 2022-06-02 ENCOUNTER — APPOINTMENT (OUTPATIENT)
Dept: CT IMAGING | Age: 62
DRG: 441 | End: 2022-06-02
Attending: COLON & RECTAL SURGERY
Payer: MEDICAID

## 2022-06-02 LAB
ALBUMIN SERPL-MCNC: 2.2 G/DL (ref 3.4–5)
ALBUMIN/GLOB SERPL: 0.4 {RATIO} (ref 0.8–1.7)
ALP SERPL-CCNC: 90 U/L (ref 45–117)
ALT SERPL-CCNC: 110 U/L (ref 16–61)
ANION GAP SERPL CALC-SCNC: 8 MMOL/L (ref 3–18)
AST SERPL-CCNC: 139 U/L (ref 10–38)
BASOPHILS # BLD: 0 K/UL (ref 0–0.1)
BASOPHILS NFR BLD: 0 % (ref 0–2)
BILIRUB SERPL-MCNC: 1.5 MG/DL (ref 0.2–1)
BUN SERPL-MCNC: 37 MG/DL (ref 7–18)
BUN/CREAT SERPL: 22 (ref 12–20)
CALCIUM SERPL-MCNC: 9.1 MG/DL (ref 8.5–10.1)
CHLORIDE SERPL-SCNC: 112 MMOL/L (ref 100–111)
CO2 SERPL-SCNC: 24 MMOL/L (ref 21–32)
CREAT SERPL-MCNC: 1.66 MG/DL (ref 0.6–1.3)
DIFFERENTIAL METHOD BLD: ABNORMAL
EOSINOPHIL # BLD: 0 K/UL (ref 0–0.4)
EOSINOPHIL NFR BLD: 0 % (ref 0–5)
ERYTHROCYTE [DISTWIDTH] IN BLOOD BY AUTOMATED COUNT: 15.2 % (ref 11.6–14.5)
GLOBULIN SER CALC-MCNC: 5.6 G/DL (ref 2–4)
GLUCOSE SERPL-MCNC: 74 MG/DL (ref 74–99)
HCT VFR BLD AUTO: 34.5 % (ref 36–48)
HGB BLD-MCNC: 10.6 G/DL (ref 13–16)
IMM GRANULOCYTES # BLD AUTO: 0 K/UL
IMM GRANULOCYTES NFR BLD AUTO: 0 %
LYMPHOCYTES # BLD: 0.7 K/UL (ref 0.9–3.6)
LYMPHOCYTES NFR BLD: 6 % (ref 21–52)
MAGNESIUM SERPL-MCNC: 2.1 MG/DL (ref 1.6–2.6)
MCH RBC QN AUTO: 28 PG (ref 24–34)
MCHC RBC AUTO-ENTMCNC: 30.7 G/DL (ref 31–37)
MCV RBC AUTO: 91.3 FL (ref 78–100)
MONOCYTES # BLD: 0.2 K/UL (ref 0.05–1.2)
MONOCYTES NFR BLD: 2 % (ref 3–10)
NEUTS BAND NFR BLD MANUAL: 12 % (ref 0–5)
NEUTS SEG # BLD: 10.4 K/UL (ref 1.8–8)
NEUTS SEG NFR BLD: 80 % (ref 40–73)
NRBC # BLD: 0 K/UL (ref 0–0.01)
NRBC BLD-RTO: 0 PER 100 WBC
PHOSPHATE SERPL-MCNC: 3 MG/DL (ref 2.5–4.9)
PLATELET # BLD AUTO: 151 K/UL (ref 135–420)
PLATELET COMMENTS,PCOM: ABNORMAL
PMV BLD AUTO: 11.1 FL (ref 9.2–11.8)
POTASSIUM SERPL-SCNC: 3.7 MMOL/L (ref 3.5–5.5)
PROT SERPL-MCNC: 7.8 G/DL (ref 6.4–8.2)
RBC # BLD AUTO: 3.78 M/UL (ref 4.35–5.65)
RBC MORPH BLD: ABNORMAL
SODIUM SERPL-SCNC: 144 MMOL/L (ref 136–145)
VANCOMYCIN SERPL-MCNC: 8.7 UG/ML (ref 5–40)
WBC # BLD AUTO: 11.3 K/UL (ref 4.6–13.2)

## 2022-06-02 PROCEDURE — 77030013567 HC DRN WND RESERV BARD -A: Performed by: UROLOGY

## 2022-06-02 PROCEDURE — 74011250636 HC RX REV CODE- 250/636: Performed by: INTERNAL MEDICINE

## 2022-06-02 PROCEDURE — 77030040830 HC CATH URETH FOL MDII -A: Performed by: UROLOGY

## 2022-06-02 PROCEDURE — 85025 COMPLETE CBC W/AUTO DIFF WBC: CPT

## 2022-06-02 PROCEDURE — 76060000037 HC ANESTHESIA 3 TO 3.5 HR: Performed by: UROLOGY

## 2022-06-02 PROCEDURE — 87077 CULTURE AEROBIC IDENTIFY: CPT

## 2022-06-02 PROCEDURE — 36415 COLL VENOUS BLD VENIPUNCTURE: CPT

## 2022-06-02 PROCEDURE — 74177 CT ABD & PELVIS W/CONTRAST: CPT

## 2022-06-02 PROCEDURE — 87186 SC STD MICRODIL/AGAR DIL: CPT

## 2022-06-02 PROCEDURE — 99232 SBSQ HOSP IP/OBS MODERATE 35: CPT | Performed by: HOSPITALIST

## 2022-06-02 PROCEDURE — 77030002966 HC SUT PDS J&J -A: Performed by: UROLOGY

## 2022-06-02 PROCEDURE — 2709999900 HC NON-CHARGEABLE SUPPLY: Performed by: UROLOGY

## 2022-06-02 PROCEDURE — 74011000258 HC RX REV CODE- 258: Performed by: INTERNAL MEDICINE

## 2022-06-02 PROCEDURE — 0DQ80ZZ REPAIR SMALL INTESTINE, OPEN APPROACH: ICD-10-PCS | Performed by: UROLOGY

## 2022-06-02 PROCEDURE — 84100 ASSAY OF PHOSPHORUS: CPT

## 2022-06-02 PROCEDURE — 80053 COMPREHEN METABOLIC PANEL: CPT

## 2022-06-02 PROCEDURE — C9113 INJ PANTOPRAZOLE SODIUM, VIA: HCPCS | Performed by: INTERNAL MEDICINE

## 2022-06-02 PROCEDURE — 76210000006 HC OR PH I REC 0.5 TO 1 HR: Performed by: UROLOGY

## 2022-06-02 PROCEDURE — 80202 ASSAY OF VANCOMYCIN: CPT

## 2022-06-02 PROCEDURE — 77030031139 HC SUT VCRL2 J&J -A: Performed by: UROLOGY

## 2022-06-02 PROCEDURE — 77030008462 HC STPLR SKN PROX J&J -A: Performed by: UROLOGY

## 2022-06-02 PROCEDURE — 65660000004 HC RM CVT STEPDOWN

## 2022-06-02 PROCEDURE — 99232 SBSQ HOSP IP/OBS MODERATE 35: CPT | Performed by: COLON & RECTAL SURGERY

## 2022-06-02 PROCEDURE — 77030040504 HC DRN WND MDII -B: Performed by: UROLOGY

## 2022-06-02 PROCEDURE — 2709999900 HC NON-CHARGEABLE SUPPLY

## 2022-06-02 PROCEDURE — 77030040831 HC BAG URINE DRNG MDII -A: Performed by: UROLOGY

## 2022-06-02 PROCEDURE — 87075 CULTR BACTERIA EXCEPT BLOOD: CPT

## 2022-06-02 PROCEDURE — 76010000133 HC OR TIME 3 TO 3.5 HR: Performed by: UROLOGY

## 2022-06-02 PROCEDURE — 83735 ASSAY OF MAGNESIUM: CPT

## 2022-06-02 PROCEDURE — 0TQB0ZZ REPAIR BLADDER, OPEN APPROACH: ICD-10-PCS | Performed by: UROLOGY

## 2022-06-02 PROCEDURE — 74430 CONTRAST X-RAY BLADDER: CPT

## 2022-06-02 PROCEDURE — 77030002996 HC SUT SLK J&J -A: Performed by: UROLOGY

## 2022-06-02 PROCEDURE — 74011000250 HC RX REV CODE- 250: Performed by: INTERNAL MEDICINE

## 2022-06-02 PROCEDURE — 87040 BLOOD CULTURE FOR BACTERIA: CPT

## 2022-06-02 PROCEDURE — 77030010541: Performed by: UROLOGY

## 2022-06-02 PROCEDURE — 77030034696 HC CATH URETH FOL 2W BARD -A: Performed by: UROLOGY

## 2022-06-02 PROCEDURE — 77030002888 HC SUT CHRMC J&J -A: Performed by: UROLOGY

## 2022-06-02 PROCEDURE — 87176 TISSUE HOMOGENIZATION CULTR: CPT

## 2022-06-02 PROCEDURE — 74011000636 HC RX REV CODE- 636: Performed by: HOSPITALIST

## 2022-06-02 PROCEDURE — 77030038692 HC WND DEB SYS IRMX -B: Performed by: UROLOGY

## 2022-06-02 PROCEDURE — 87205 SMEAR GRAM STAIN: CPT

## 2022-06-02 PROCEDURE — 74011250637 HC RX REV CODE- 250/637: Performed by: INTERNAL MEDICINE

## 2022-06-02 RX ORDER — DEXTROSE, SODIUM CHLORIDE, AND POTASSIUM CHLORIDE 5; .45; .15 G/100ML; G/100ML; G/100ML
60 INJECTION INTRAVENOUS CONTINUOUS
Status: DISPENSED | OUTPATIENT
Start: 2022-06-02 | End: 2022-06-03

## 2022-06-02 RX ORDER — DIPHENHYDRAMINE HYDROCHLORIDE 50 MG/ML
12.5 INJECTION, SOLUTION INTRAMUSCULAR; INTRAVENOUS
Status: DISCONTINUED | OUTPATIENT
Start: 2022-06-02 | End: 2022-06-02 | Stop reason: HOSPADM

## 2022-06-02 RX ORDER — PROPOFOL 10 MG/ML
INJECTION, EMULSION INTRAVENOUS AS NEEDED
Status: DISCONTINUED | OUTPATIENT
Start: 2022-06-02 | End: 2022-06-02 | Stop reason: HOSPADM

## 2022-06-02 RX ORDER — FENTANYL CITRATE 50 UG/ML
INJECTION, SOLUTION INTRAMUSCULAR; INTRAVENOUS AS NEEDED
Status: DISCONTINUED | OUTPATIENT
Start: 2022-06-02 | End: 2022-06-02 | Stop reason: HOSPADM

## 2022-06-02 RX ORDER — FENTANYL CITRATE 50 UG/ML
25 INJECTION, SOLUTION INTRAMUSCULAR; INTRAVENOUS
Status: DISCONTINUED | OUTPATIENT
Start: 2022-06-02 | End: 2022-06-02 | Stop reason: HOSPADM

## 2022-06-02 RX ORDER — NEOSTIGMINE METHYLSULFATE 1 MG/ML
INJECTION, SOLUTION INTRAVENOUS AS NEEDED
Status: DISCONTINUED | OUTPATIENT
Start: 2022-06-02 | End: 2022-06-02 | Stop reason: HOSPADM

## 2022-06-02 RX ORDER — ALBUTEROL SULFATE 0.83 MG/ML
2.5 SOLUTION RESPIRATORY (INHALATION) AS NEEDED
Status: DISCONTINUED | OUTPATIENT
Start: 2022-06-02 | End: 2022-06-02 | Stop reason: HOSPADM

## 2022-06-02 RX ORDER — LIDOCAINE HYDROCHLORIDE 20 MG/ML
INJECTION, SOLUTION EPIDURAL; INFILTRATION; INTRACAUDAL; PERINEURAL AS NEEDED
Status: DISCONTINUED | OUTPATIENT
Start: 2022-06-02 | End: 2022-06-02 | Stop reason: HOSPADM

## 2022-06-02 RX ORDER — SODIUM CHLORIDE, SODIUM LACTATE, POTASSIUM CHLORIDE, CALCIUM CHLORIDE 600; 310; 30; 20 MG/100ML; MG/100ML; MG/100ML; MG/100ML
INJECTION, SOLUTION INTRAVENOUS
Status: DISCONTINUED | OUTPATIENT
Start: 2022-06-02 | End: 2022-06-02 | Stop reason: HOSPADM

## 2022-06-02 RX ORDER — DEXAMETHASONE SODIUM PHOSPHATE 4 MG/ML
INJECTION, SOLUTION INTRA-ARTICULAR; INTRALESIONAL; INTRAMUSCULAR; INTRAVENOUS; SOFT TISSUE AS NEEDED
Status: DISCONTINUED | OUTPATIENT
Start: 2022-06-02 | End: 2022-06-02 | Stop reason: HOSPADM

## 2022-06-02 RX ORDER — GLYCOPYRROLATE 0.2 MG/ML
INJECTION INTRAMUSCULAR; INTRAVENOUS AS NEEDED
Status: DISCONTINUED | OUTPATIENT
Start: 2022-06-02 | End: 2022-06-02 | Stop reason: HOSPADM

## 2022-06-02 RX ORDER — HYDROMORPHONE HYDROCHLORIDE 2 MG/ML
0.5 INJECTION, SOLUTION INTRAMUSCULAR; INTRAVENOUS; SUBCUTANEOUS
Status: DISCONTINUED | OUTPATIENT
Start: 2022-06-02 | End: 2022-06-02 | Stop reason: HOSPADM

## 2022-06-02 RX ORDER — VANCOMYCIN/0.9 % SOD CHLORIDE 1.5G/250ML
1500 PLASTIC BAG, INJECTION (ML) INTRAVENOUS ONCE
Status: COMPLETED | OUTPATIENT
Start: 2022-06-02 | End: 2022-06-02

## 2022-06-02 RX ORDER — ROCURONIUM BROMIDE 10 MG/ML
INJECTION, SOLUTION INTRAVENOUS AS NEEDED
Status: DISCONTINUED | OUTPATIENT
Start: 2022-06-02 | End: 2022-06-02 | Stop reason: HOSPADM

## 2022-06-02 RX ORDER — ONDANSETRON 2 MG/ML
INJECTION INTRAMUSCULAR; INTRAVENOUS AS NEEDED
Status: DISCONTINUED | OUTPATIENT
Start: 2022-06-02 | End: 2022-06-02 | Stop reason: HOSPADM

## 2022-06-02 RX ORDER — SODIUM CHLORIDE, SODIUM LACTATE, POTASSIUM CHLORIDE, CALCIUM CHLORIDE 600; 310; 30; 20 MG/100ML; MG/100ML; MG/100ML; MG/100ML
100 INJECTION, SOLUTION INTRAVENOUS CONTINUOUS
Status: DISCONTINUED | OUTPATIENT
Start: 2022-06-02 | End: 2022-06-02 | Stop reason: HOSPADM

## 2022-06-02 RX ORDER — EPHEDRINE SULFATE/0.9% NACL/PF 25 MG/5 ML
SYRINGE (ML) INTRAVENOUS AS NEEDED
Status: DISCONTINUED | OUTPATIENT
Start: 2022-06-02 | End: 2022-06-02 | Stop reason: HOSPADM

## 2022-06-02 RX ADMIN — PIPERACILLIN AND TAZOBACTAM 3.38 G: 3; .375 INJECTION, POWDER, LYOPHILIZED, FOR SOLUTION INTRAVENOUS at 08:15

## 2022-06-02 RX ADMIN — GLYCOPYRROLATE 0.4 MG: 0.2 INJECTION INTRAMUSCULAR; INTRAVENOUS at 21:59

## 2022-06-02 RX ADMIN — ACETAMINOPHEN 650 MG: 325 TABLET ORAL at 04:33

## 2022-06-02 RX ADMIN — PROPOFOL 200 MG: 10 INJECTION, EMULSION INTRAVENOUS at 19:09

## 2022-06-02 RX ADMIN — IOPAMIDOL 80 ML: 612 INJECTION, SOLUTION INTRAVENOUS at 10:59

## 2022-06-02 RX ADMIN — VANCOMYCIN HYDROCHLORIDE 1500 MG: 10 INJECTION, POWDER, LYOPHILIZED, FOR SOLUTION INTRAVENOUS at 09:54

## 2022-06-02 RX ADMIN — ROCURONIUM BROMIDE 10 MG: 10 INJECTION, SOLUTION INTRAVENOUS at 21:24

## 2022-06-02 RX ADMIN — SODIUM CHLORIDE, PRESERVATIVE FREE 10 ML: 5 INJECTION INTRAVENOUS at 23:51

## 2022-06-02 RX ADMIN — ROCURONIUM BROMIDE 10 MG: 10 INJECTION, SOLUTION INTRAVENOUS at 20:39

## 2022-06-02 RX ADMIN — SODIUM CHLORIDE, PRESERVATIVE FREE 10 ML: 5 INJECTION INTRAVENOUS at 06:30

## 2022-06-02 RX ADMIN — SODIUM CHLORIDE, SODIUM LACTATE, POTASSIUM CHLORIDE, CALCIUM CHLORIDE: 600; 310; 30; 20 INJECTION, SOLUTION INTRAVENOUS at 19:00

## 2022-06-02 RX ADMIN — FENTANYL CITRATE 50 MCG: 50 INJECTION, SOLUTION INTRAMUSCULAR; INTRAVENOUS at 19:15

## 2022-06-02 RX ADMIN — Medication 10 MG: at 19:39

## 2022-06-02 RX ADMIN — IOTHALAMATE MEGLUMINE 250 ML: 172 INJECTION URETERAL at 16:14

## 2022-06-02 RX ADMIN — SODIUM CHLORIDE, PRESERVATIVE FREE 10 ML: 5 INJECTION INTRAVENOUS at 13:47

## 2022-06-02 RX ADMIN — SODIUM CHLORIDE, SODIUM LACTATE, POTASSIUM CHLORIDE, CALCIUM CHLORIDE: 600; 310; 30; 20 INJECTION, SOLUTION INTRAVENOUS at 19:05

## 2022-06-02 RX ADMIN — POTASSIUM CHLORIDE, DEXTROSE MONOHYDRATE AND SODIUM CHLORIDE 60 ML/HR: 150; 5; 450 INJECTION, SOLUTION INTRAVENOUS at 23:51

## 2022-06-02 RX ADMIN — NEOSTIGMINE METHYLSULFATE 3 MG: 1 INJECTION, SOLUTION INTRAVENOUS at 21:59

## 2022-06-02 RX ADMIN — FENTANYL CITRATE 50 MCG: 50 INJECTION, SOLUTION INTRAMUSCULAR; INTRAVENOUS at 19:29

## 2022-06-02 RX ADMIN — POTASSIUM CHLORIDE, DEXTROSE MONOHYDRATE AND SODIUM CHLORIDE 60 ML/HR: 150; 5; 450 INJECTION, SOLUTION INTRAVENOUS at 13:47

## 2022-06-02 RX ADMIN — LIDOCAINE HYDROCHLORIDE 80 MG: 20 INJECTION, SOLUTION EPIDURAL; INFILTRATION; INTRACAUDAL; PERINEURAL at 19:09

## 2022-06-02 RX ADMIN — DEXAMETHASONE SODIUM PHOSPHATE 4 MG: 4 INJECTION, SOLUTION INTRA-ARTICULAR; INTRALESIONAL; INTRAMUSCULAR; INTRAVENOUS; SOFT TISSUE at 21:48

## 2022-06-02 RX ADMIN — ONDANSETRON 4 MG: 2 INJECTION INTRAMUSCULAR; INTRAVENOUS at 21:48

## 2022-06-02 RX ADMIN — PIPERACILLIN AND TAZOBACTAM 3.38 G: 3; .375 INJECTION, POWDER, LYOPHILIZED, FOR SOLUTION INTRAVENOUS at 17:48

## 2022-06-02 RX ADMIN — DIATRIZOATE MEGLUMINE AND DIATRIZOATE SODIUM 30 ML: 660; 100 LIQUID ORAL; RECTAL at 08:15

## 2022-06-02 RX ADMIN — PANTOPRAZOLE SODIUM 40 MG: 40 INJECTION, POWDER, FOR SOLUTION INTRAVENOUS at 00:47

## 2022-06-02 RX ADMIN — ROCURONIUM BROMIDE 40 MG: 10 INJECTION, SOLUTION INTRAVENOUS at 19:09

## 2022-06-02 NOTE — PROGRESS NOTES
CT imaging personally visualized and report reviewed. Chart reviewed further and the patient has a history of bladder perforation last fall which was identified on CT cystogram.  The fluid in his abdomen is likely related to this. There is no extravasation of contrast from the bowel on today's study. This was discussed with Dr. Tc Haskins. Recommend urology consultation. We will be available as necessary.

## 2022-06-02 NOTE — ROUTINE PROCESS
9804 Received report from [facility]. Patient alert and oriented. Patient on NPO.    0930 Patient completed his oral contrast. CT tech aware. 1050 Patient off the floor to CT scan. 1120 Patient back in the room from CT scan. 1500 Patient off the floor for cystogram.    1810 Patient off the floor for Surgery. Mother at bedside. 1915 Bedside and Verbal shift change report given to [facility] (oncoming nurse). Report included the following information SBAR, Kardex, Intake/Output, MAR, Recent Results and Cardiac Rhythm NSR.

## 2022-06-02 NOTE — PROGRESS NOTES
[facility] Pharmacokinetic Monitoring Service - Vancomycin    Indication: intra-abdominal infection  Target Concentration: Dosing based on anticipated concentration < 20 mg/L due to renal impairment/insufficiency  Day of Therapy: 3  Additional Antimicrobials: pip-tazo    Pertinent Laboratory Values: Wt Readings from Last 1 Encounters:   06/02/22 93.3 kg (205 lb 11 oz)     Temp Readings from Last 1 Encounters:   06/02/22 98.3 °F (36.8 °C)     No components found for: PROCAL  Estimated Creatinine Clearance: 54.3 mL/min (A) (based on SCr of 1.66 mg/dL (H)). Recent Labs     06/02/22  0432 05/31/22  1845   WBC 11.3 13.3*     Pertinent Cultures:  Culture Date Source Results   5/31 blood GNR in all bottles   5/31 urine NGTD   MRSA Nasal Swab: N/A. Non-respiratory infection.     Assessment:  Date/Time Current Dose Concentration (mg/L) Timing of Concentration (h) AUC   5/31 2,000 mg x1 - - -   6/1 - - - -   6/2  8.7 33 -   Note: Serum concentrations collected for AUC dosing may appear elevated if collected in close proximity to the dose administered, this is not necessarily an indication of toxicity    Plan:  ЕКАТЕРИНА, SCr rapidly trending down - dose by level  Dose: 1,500 mg x1  Ordered a level for 6/3 with AM labs  Pharmacy will continue to monitor patient and adjust therapy as indicated    Thank you for the consult,  HOMERO Corea  6/2/2022
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
## 2022-06-02 NOTE — PROGRESS NOTES
[facility] 2 CV STEPDOWN  [address] [facility] [address] [address]  [phone]  Colon and Rectal Surgery Progress Note      Patient: Wendi Medrano MRN: [id number]  SSN: [id number]    YOB: 1960  Age: 64 y.o. Sex: male      Admit Date: 5/31/2022    LOS: 2 days     Subjective:     Denies abdominal pain. Objective:     Vitals:    06/01/22 2000 06/02/22 0006 06/02/22 0400 06/02/22 0716   BP: 97/65 99/61 96/70 116/74   Pulse: (!) 123 (!) 120 (!) 118 (!) 113   Resp: 20 18 18 20   Temp: 98.2 °F (36.8 °C) 98.6 °F (37 °C) (!) 100.7 °F (38.2 °C) 98.3 °F (36.8 °C)   SpO2: 95% 94% 97% 96%   Weight:   93.3 kg (205 lb 11 oz)    Height:            Intake and Output:  Current Shift: No intake/output data recorded.   Last three shifts: 05/31 1901 - 06/02 0700  In: 781.3 [I.V.:781.3]  Out: 2960 [Urine:2960]    Physical Exam:     Constitutional: well developed, in NAD  Abdomen: soft, diffusely tender, ND, no guarding, unchanged from yesterday    Lab/Data Review:    CMP:   Lab Results   Component Value Date/Time     06/02/2022 04:32 AM    K 3.7 06/02/2022 04:32 AM     (H) 06/02/2022 04:32 AM    CO2 24 06/02/2022 04:32 AM    AGAP 8 06/02/2022 04:32 AM    GLU 74 06/02/2022 04:32 AM    BUN 37 (H) 06/02/2022 04:32 AM    CREA 1.66 (H) 06/02/2022 04:32 AM    GFRAA 51 (L) 06/02/2022 04:32 AM    GFRNA 42 (L) 06/02/2022 04:32 AM    CA 9.1 06/02/2022 04:32 AM    MG 2.1 06/02/2022 04:32 AM    PHOS 3.0 06/02/2022 04:32 AM    ALB 2.2 (L) 06/02/2022 04:32 AM    TP 7.8 06/02/2022 04:32 AM    GLOB 5.6 (H) 06/02/2022 04:32 AM    AGRAT 0.4 (L) 06/02/2022 04:32 AM     (H) 06/02/2022 04:32 AM     CBC:   Lab Results   Component Value Date/Time    WBC 11.3 06/02/2022 04:32 AM    HGB 10.6 (L) 06/02/2022 04:32 AM    HCT 34.5 (L) 06/02/2022 04:32 AM     06/02/2022 04:32 AM        Assessment:     Sepsis    Plan:     CT with contrast today  Continue IV abx    Signed By: Li Alvarez MD        June 2, 2022

## 2022-06-02 NOTE — PROGRESS NOTES
attempted to complete psycho assessment and patient was on the way to xray.  will attempt to try again.       LES Manoz
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
## 2022-06-02 NOTE — PROGRESS NOTES
Bedside and Verbal shift change report given to Xavier Venegas RN (oncoming nurse) by Madhavi Ruiz RN (offgoing nurse). Report included the following information SBAR, Kardex, Intake/Output, MAR, Recent Results, Med Rec Status and Cardiac Rhythm Sinus Tach. 2030: AOX4, denies pain or sob, on room air, shift assessment completed, bed locked and low position, call bell within reach, Zosyn administered, infusing well    2140: Pt 's, Dr. Estevez Backer informed, no order received    0022: Pt sleeping, no distress noted, reposition in bed, call bell within reach    0211: sleeping, lee in place    0416: CHG bath provided, linen, pad and gown changed    0433: Tylenol 650 mg PO given for fever    Bedside and Verbal shift change report given to Meagan Hernandez, [address] [place] [address] (oncoming nurse) by Chance Razo RN (offgoing nurse). Report included the following information SBAR, Kardex, Intake/Output, MAR, Recent Results, Med Rec Status and Cardiac Rhythm Sinus Tach.

## 2022-06-02 NOTE — ANESTHESIA PREPROCEDURE EVALUATION
Relevant Problems   RESPIRATORY SYSTEM   (+) Asthma   (+) History of infection with vancomycin resistant Enterococcus (VRE)      CARDIOVASCULAR   (+) HTN (hypertension)      RENAL FAILURE   (+) ЕКАТЕРИНА (acute kidney injury) ([address]   (+) Acute renal failure (ARF) (HCC)      HEMATOLOGY   (+) Acute on chronic anemia       Anesthetic History   No history of anesthetic complications            Review of Systems / Medical History  Patient summary reviewed and pertinent labs reviewed    Pulmonary            Asthma : well controlled       Neuro/Psych   Within defined limits          Comments: Paraplegia Cardiovascular    Hypertension              Exercise tolerance: <4 METS     GI/Hepatic/Renal  Within defined limits             Comments: Bladder perforation. .Sepsis with Acute renal Failure Endo/Other  Within defined limits           Other Findings            Physical Exam    Airway  Mallampati: III  TM Distance: 4 - 6 cm  Neck ROM: normal range of motion   Mouth opening: Normal     Cardiovascular  Regular rate and rhythm,  S1 and S2 normal,  no murmur, click, rub, or gallop             Dental  No notable dental hx       Pulmonary  Breath sounds clear to auscultation               Abdominal  GI exam deferred       Other Findings            Anesthetic Plan    ASA: 3  Anesthesia type: general          Induction: Intravenous  Anesthetic plan and risks discussed with: Patient

## 2022-06-02 NOTE — PROGRESS NOTES
In Patient Progress note    Admit Date: 5/31/2022    Impression:     #1 acute kidney injury etiology likely prerenal azotemia in the setting of severe sepsis , urine sodium in 20s and   low FENA indicate prerenal state   #2 severe sepsis secondary to UTI v/s intraabdominal sepsis   #3 UTI with chronic Lee in place  #4 Abdominal pain /distension , concern for intraabdominal sepsis , surgery consulted  #5 history of neurogenic bladder with chr lee in place   #6 history of hypertension  #7 history of paraplegia      Plan:  #1 strict ins and outs ,Darion Trish in place   #2 d5 1/2 ns + 20 kcl @ 60 cc/hrss X 24 hrs   #3 renally dose antibiotics  #4 avoid nephrotoxins  #5 check CK levels  #6 avoid NSAIDs avoid IV contrast  #7 follow renal parameters daily  #8 renal functions briskly improved, ok to get IV contrast ,   low risk for HILARIA.    discussed with prmary team , Dr Guy Vu     Please call with questions,     Taylor Joyce MD FASN  Cell [phone]  Pager: [phone]     Subjective:     - No acute over night events. - respiratory - stable  - hemodynamics - stable, no pressrs  - UOP-ok  - Nutrition -ok    Objective:     Visit Vitals  /75   Pulse (!) 102   Temp 97.9 °F (36.6 °C)   Resp 20   Ht 6' 2\" (1.88 m)   Wt 93.3 kg (205 lb 11 oz)   SpO2 98%   BMI 26.41 kg/m²         Intake/Output Summary (Last 24 hours) at 6/2/2022 1231  Last data filed at 6/2/2022 1149  Gross per 24 hour   Intake 781.25 ml   Output 2620 ml   Net -1838.75 ml       Physical Exam:       Patient is in no apparent distress. HEENT: dry mucosa  Lungs: good air entry, clear to auscultation bilaterally. Cardiovascular system: S1, S2, regular rate and rhythm. Abdomen: distended, tender guarding +  Extremities: no edema   Integumentary: skin is grossly intact. Neurologic: Alert, oriented time three.        Data Review:    Recent Labs     06/02/22  0432   WBC 11.3   RBC 3.78*   HCT 34.5*   MCV 91.3   MCH 28.0   MCHC 30.7*   RDW 15.2* Recent Labs     06/02/22  0432 06/01/22  0422 05/31/22  1845   BUN 37* 48* 53*   CREA 1.66* 3.92* 5.20*   CA 9.1 8.3* 8.4*   ALB 2.2* 2.5* 2.8*   K 3.7 4.4 4.8    139 136   * 105 100   CO2 24 27 24   PHOS 3.0 4.4  --    GLU 74 114* 135*       Fredy Bingham MD

## 2022-06-03 ENCOUNTER — APPOINTMENT (OUTPATIENT)
Dept: INTERVENTIONAL RADIOLOGY/VASCULAR | Age: 62
DRG: 441 | End: 2022-06-03
Attending: HOSPITALIST
Payer: MEDICAID

## 2022-06-03 ENCOUNTER — HOME CARE VISIT (OUTPATIENT)
Dept: HOME HEALTH SERVICES | Facility: HOME HEALTH | Age: 62
End: 2022-06-03
Payer: MEDICAID

## 2022-06-03 LAB
ANION GAP SERPL CALC-SCNC: 6 MMOL/L (ref 3–18)
BASOPHILS # BLD: 0 K/UL (ref 0–0.1)
BASOPHILS NFR BLD: 0 % (ref 0–2)
BUN SERPL-MCNC: 29 MG/DL (ref 7–18)
BUN/CREAT SERPL: 28 (ref 12–20)
CALCIUM SERPL-MCNC: 8.6 MG/DL (ref 8.5–10.1)
CHLORIDE SERPL-SCNC: 113 MMOL/L (ref 100–111)
CO2 SERPL-SCNC: 26 MMOL/L (ref 21–32)
CREAT SERPL-MCNC: 1.04 MG/DL (ref 0.6–1.3)
DIFFERENTIAL METHOD BLD: ABNORMAL
EOSINOPHIL # BLD: 0 K/UL (ref 0–0.4)
EOSINOPHIL NFR BLD: 0 % (ref 0–5)
ERYTHROCYTE [DISTWIDTH] IN BLOOD BY AUTOMATED COUNT: 15.4 % (ref 11.6–14.5)
GLUCOSE SERPL-MCNC: 138 MG/DL (ref 74–99)
HCT VFR BLD AUTO: 32 % (ref 36–48)
HGB BLD-MCNC: 10.1 G/DL (ref 13–16)
IMM GRANULOCYTES # BLD AUTO: 0 K/UL
IMM GRANULOCYTES NFR BLD AUTO: 0 %
LYMPHOCYTES # BLD: 1.3 K/UL (ref 0.9–3.6)
LYMPHOCYTES NFR BLD: 11 % (ref 21–52)
MCH RBC QN AUTO: 28.9 PG (ref 24–34)
MCHC RBC AUTO-ENTMCNC: 31.6 G/DL (ref 31–37)
MCV RBC AUTO: 91.7 FL (ref 78–100)
MONOCYTES # BLD: 0.2 K/UL (ref 0.05–1.2)
MONOCYTES NFR BLD: 2 % (ref 3–10)
NEUTS BAND NFR BLD MANUAL: 5 % (ref 0–5)
NEUTS SEG # BLD: 10.5 K/UL (ref 1.8–8)
NEUTS SEG NFR BLD: 82 % (ref 40–73)
NRBC # BLD: 0 K/UL (ref 0–0.01)
NRBC BLD-RTO: 0 PER 100 WBC
PLATELET # BLD AUTO: 159 K/UL (ref 135–420)
PLATELET COMMENTS,PCOM: ABNORMAL
PMV BLD AUTO: 11.8 FL (ref 9.2–11.8)
POTASSIUM SERPL-SCNC: 3.8 MMOL/L (ref 3.5–5.5)
RBC # BLD AUTO: 3.49 M/UL (ref 4.35–5.65)
RBC MORPH BLD: ABNORMAL
SODIUM SERPL-SCNC: 145 MMOL/L (ref 136–145)
VANCOMYCIN SERPL-MCNC: 14.6 UG/ML (ref 5–40)
WBC # BLD AUTO: 12 K/UL (ref 4.6–13.2)

## 2022-06-03 PROCEDURE — 80202 ASSAY OF VANCOMYCIN: CPT

## 2022-06-03 PROCEDURE — 36415 COLL VENOUS BLD VENIPUNCTURE: CPT

## 2022-06-03 PROCEDURE — 74011000250 HC RX REV CODE- 250: Performed by: INTERNAL MEDICINE

## 2022-06-03 PROCEDURE — 77030040393 HC DRSG OPTIFOAM GENT MDII -B

## 2022-06-03 PROCEDURE — C9113 INJ PANTOPRAZOLE SODIUM, VIA: HCPCS | Performed by: INTERNAL MEDICINE

## 2022-06-03 PROCEDURE — 74011000258 HC RX REV CODE- 258: Performed by: INTERNAL MEDICINE

## 2022-06-03 PROCEDURE — 77030040392 HC DRSG OPTIFOAM MDII -A

## 2022-06-03 PROCEDURE — 77030037878 HC DRSG MEPILEX >48IN BORD MOLN -B

## 2022-06-03 PROCEDURE — 11721 DEBRIDE NAIL 6 OR MORE: CPT

## 2022-06-03 PROCEDURE — 0HBRXZZ EXCISION OF TOE NAIL, EXTERNAL APPROACH: ICD-10-PCS | Performed by: INTERNAL MEDICINE

## 2022-06-03 PROCEDURE — 80048 BASIC METABOLIC PNL TOTAL CA: CPT

## 2022-06-03 PROCEDURE — 11719 TRIM NAIL(S) ANY NUMBER: CPT

## 2022-06-03 PROCEDURE — 99232 SBSQ HOSP IP/OBS MODERATE 35: CPT | Performed by: HOSPITALIST

## 2022-06-03 PROCEDURE — 2709999900 HC NON-CHARGEABLE SUPPLY

## 2022-06-03 PROCEDURE — 74011250636 HC RX REV CODE- 250/636: Performed by: INTERNAL MEDICINE

## 2022-06-03 PROCEDURE — 74011250636 HC RX REV CODE- 250/636: Performed by: HOSPITALIST

## 2022-06-03 PROCEDURE — 77030018719 IR US GUIDED VASCULAR ACCESS

## 2022-06-03 PROCEDURE — 65270000046 HC RM TELEMETRY

## 2022-06-03 PROCEDURE — 74011250637 HC RX REV CODE- 250/637: Performed by: PHYSICIAN ASSISTANT

## 2022-06-03 PROCEDURE — 85025 COMPLETE CBC W/AUTO DIFF WBC: CPT

## 2022-06-03 RX ORDER — DEXTROSE, SODIUM CHLORIDE, AND POTASSIUM CHLORIDE 5; .45; .15 G/100ML; G/100ML; G/100ML
50 INJECTION INTRAVENOUS CONTINUOUS
Status: DISPENSED | OUTPATIENT
Start: 2022-06-03 | End: 2022-06-04

## 2022-06-03 RX ORDER — VANCOMYCIN HYDROCHLORIDE
1250
Status: DISCONTINUED | OUTPATIENT
Start: 2022-06-03 | End: 2022-06-05

## 2022-06-03 RX ORDER — TROSPIUM CHLORIDE 20 MG/1
20 TABLET, FILM COATED ORAL
Status: DISCONTINUED | OUTPATIENT
Start: 2022-06-03 | End: 2022-06-12

## 2022-06-03 RX ORDER — MORPHINE SULFATE 2 MG/ML
2 INJECTION, SOLUTION INTRAMUSCULAR; INTRAVENOUS
Status: DISCONTINUED | OUTPATIENT
Start: 2022-06-03 | End: 2022-06-14

## 2022-06-03 RX ADMIN — SODIUM CHLORIDE, PRESERVATIVE FREE 10 ML: 5 INJECTION INTRAVENOUS at 00:03

## 2022-06-03 RX ADMIN — PANTOPRAZOLE SODIUM 40 MG: 40 INJECTION, POWDER, FOR SOLUTION INTRAVENOUS at 00:03

## 2022-06-03 RX ADMIN — TROSPIUM CHLORIDE 20 MG: 20 TABLET, FILM COATED ORAL at 10:16

## 2022-06-03 RX ADMIN — PIPERACILLIN AND TAZOBACTAM 3.38 G: 3; .375 INJECTION, POWDER, LYOPHILIZED, FOR SOLUTION INTRAVENOUS at 00:07

## 2022-06-03 RX ADMIN — POTASSIUM CHLORIDE, DEXTROSE MONOHYDRATE AND SODIUM CHLORIDE 50 ML/HR: 150; 5; 450 INJECTION, SOLUTION INTRAVENOUS at 15:16

## 2022-06-03 RX ADMIN — PIPERACILLIN AND TAZOBACTAM 3.38 G: 3; .375 INJECTION, POWDER, LYOPHILIZED, FOR SOLUTION INTRAVENOUS at 18:00

## 2022-06-03 RX ADMIN — VANCOMYCIN HYDROCHLORIDE 1250 MG: 10 INJECTION, POWDER, LYOPHILIZED, FOR SOLUTION INTRAVENOUS at 10:24

## 2022-06-03 RX ADMIN — PIPERACILLIN AND TAZOBACTAM 3.38 G: 3; .375 INJECTION, POWDER, LYOPHILIZED, FOR SOLUTION INTRAVENOUS at 10:16

## 2022-06-03 RX ADMIN — TROSPIUM CHLORIDE 20 MG: 20 TABLET, FILM COATED ORAL at 17:29

## 2022-06-03 RX ADMIN — SODIUM CHLORIDE, PRESERVATIVE FREE 10 ML: 5 INJECTION INTRAVENOUS at 15:15

## 2022-06-03 RX ADMIN — POTASSIUM CHLORIDE, DEXTROSE MONOHYDRATE AND SODIUM CHLORIDE 50 ML/HR: 150; 5; 450 INJECTION, SOLUTION INTRAVENOUS at 17:59

## 2022-06-03 RX ADMIN — SODIUM CHLORIDE, PRESERVATIVE FREE 10 ML: 5 INJECTION INTRAVENOUS at 07:37

## 2022-06-03 RX ADMIN — SODIUM CHLORIDE, PRESERVATIVE FREE 10 ML: 5 INJECTION INTRAVENOUS at 22:00

## 2022-06-03 RX ADMIN — MORPHINE SULFATE 2 MG: 2 INJECTION, SOLUTION INTRAMUSCULAR; INTRAVENOUS at 18:02

## 2022-06-03 NOTE — OP NOTES
Operative Report    Patient: Angelina Barrow MRN: [id number]  SSN: [id number]    YOB: 1960  Age: 58 y.o. Sex: male       Date of Surgery: 6/2/2022     Preoperative Diagnosis: Spontaneous intraperitoneal bladder peroration     Postoperative Diagnosis: Same     Surgeon(s) and Role:     * Abigail Branch MD - Primary     * Gailen Bernheim, MD - Assisting     * Justen Bray DO (intra op consult - general surgery)     Anesthesia: General     Procedure: Exploratory laparotomy,  Closure of bladder perforation     Procedure in Detail:   Patient was brought to the operating room and placed on the operating table in the supine position. A proper timeout was performed confirming pt's name, date of birth, laterality, and antibiotics. Anesthesia was induced, antibiotics were administered. He external genitalia and abdomen were prepped and draped in the usual fashion. A catheter was placed into the urethra and bladder with 10 cc placed in the balloon, this was then clamped. We then made a midline incision from the area of the umbilicus, which is not present, going through the prior scar. We carried this down to the space of retzius. Normal anatomy was altered with significant adhesions given the numerous previous surgeries. With care we were able to dissect down to the bladder, this was thick walled and small, consistent with a neurogenic bladder. Care was take to avid the end colostomy and the mesentery. On further dissection of the left dome/posterior bladder we encountered the perforation. There was significant phlegmon. Cultures were taken. We suction this out. We then excised the edges of the perforation. The bladder was then closed in a three layer fashion. This was tested and was water tight. At this point we had noted that the abscess tracked intra peritoneal where there was significant adhesions. We did call our collegue from general surgery, Dr. Rachael Muhammad in for evaluation. The recommended irrigation of the abdomen but not extensive lysis of adhesion. This was performed, please see her dictation. We then placed a JALYN drain outside the bladder closure as well as a supra pubic catheter for maximal drainage of the bladder, coming out the opposite side of the abdomen then our drain. The abdomen was closed in standard fashion using 0 PDS for the fascia. The skin was closed with staples. The wound was dressed. The patient was awoken from anesthesia and taken to the recovery room. Estimated Blood Loss:  50 cc            Specimens:   ID Type Source Tests Collected by Time Destination   1 : pre pubic abscess Wound  CULTURE, ANAEROBIC, CULTURE, WOUND W GRAM STAIN Lilia Jefferson MD 6/2/2022  8:16 PM Microbiology           Drains: Jalyn drain, SP tube, lee catheter. Complications: None    Counts: Sponge and needle counts were correct times two.     Signed By:  Shannon Benjamin MD     July 25, 2022

## 2022-06-03 NOTE — PROGRESS NOTES
0800:  Report received from [address] [facility]. Complete assessment performed. No acute changes noted from report. AAOx4. Denies pain, sob or discomfort. Call bell and phone at side. Full fall and aspiration precautions in effect. NPO per MD orders. Monitor. 1100:  Taking clear liquids well. Wound care provider has clipped nails per orders; tolerated well.   1330:  Dr Willy German on rounds, updated to status including 2 ICU RNs unable to obtain additional PIV access. MD spoke with pt and mother in room. New order for IR PIV access noted. 1400: To IR dept for PIV access. 1445:  Returned to room s/p IR line placement right upper inner arm. 1515:  Patient and mother in room updated to Transfer to Telemetry order with room assignment 456.  1800:  Morphine 2mg IV slow given pre-medicate for wound dressing change. Sacral wound dressing changed per orders; tolerated well. Repositioned for comfort. 1845:  TRANSFER - OUT REPORT:  Verbal report given to SAFIA Warren(name) on Basia Persons  being transferred to 4N room 456(unit) for routine progression of care     Report consisted of patients Situation, Background, Assessment and   Recommendations(SBAR). Information from the following report(s) SBAR, Kardex, Intake/Output, MAR, Accordion, Recent Results and Cardiac Rhythm NSR. was reviewed with the receiving nurse. Lines:   Peripheral IV 05/31/22 Right Antecubital (Active)   Site Assessment Clean, dry, & intact 06/02/22 2224   Phlebitis Assessment 0 06/02/22 2224   Infiltration Assessment 0 06/02/22 2224   Dressing Status Clean, dry, & intact 06/02/22 2224   Dressing Type Transparent;Tape 06/02/22 2224   Hub Color/Line Status Pink;Flushed;Patent;Capped 06/02/22 2224   Action Taken Open ports on tubing capped 06/02/22 1800   Alcohol Cap Used Yes 06/02/22 1800   Opportunity for questions and clarification was provided. Patient transported with:   [facility] services.     1920:  [facility] bed [id number] No personal belongings to transfer.

## 2022-06-03 NOTE — PROGRESS NOTES
In Patient Progress note    Admit Date: 5/31/2022    Impression:     #1 acute kidney injury etiology likely prerenal azotemia in the setting of severe sepsis , urine sodium in 20s and   low FENA indicate prerenal state ---> resolved   #2 severe sepsis secondary to UTI v/s intraabdominal sepsis   #3 UTI with chronic Lee in place  #4 Abdominal pain /distension , concern for intraabdominal sepsis , surgery consulted  #5 history of neurogenic bladder with chr lee in place   #6 history of hypertension  #7 history of paraplegia      Plan:  #1 strict ins and outs ,August Marah in place   #2 continue d5 1/2 ns + 20 kcl @ 50 cc/hrss ,   wean off when patient tolerates diet  #3 avoid NSAIDs avoid IV contrast  #4 follow renal parameters daily    Following labs over week end   Will see Monday     discussed with prmary team , Dr Lissette Moe     Please call with questions,     Chetan Reyes MD FAS  Cell [phone]  Pager: [phone]     Subjective:     - No acute over night events. - respiratory - stable  - hemodynamics - stable, no pressrs  - UOP-ok  - Nutrition -ok    Objective:     Visit Vitals  BP (!) 149/88   Pulse 89   Temp 98.9 °F (37.2 °C)   Resp 18   Ht 6' 2\" (1.88 m)   Wt 93.3 kg (205 lb 11 oz)   SpO2 100%   BMI 26.41 kg/m²         Intake/Output Summary (Last 24 hours) at 6/3/2022 1501  Last data filed at 6/3/2022 0700  Gross per 24 hour   Intake 5433 ml   Output 1020 ml   Net 4413 ml       Physical Exam:       Patient is in no apparent distress. HEENT: dry mucosa  Lungs: good air entry, clear to auscultation bilaterally. Cardiovascular system: S1, S2, regular rate and rhythm. Abdomen: distended, tender guarding +  Extremities: no edema   Integumentary: skin is grossly intact. Neurologic: Alert, oriented time three.        Data Review:    Recent Labs     06/03/22 0420   WBC 12.0   RBC 3.49*   HCT 32.0*   MCV 91.7   MCH 28.9   MCHC 31.6   RDW 15.4*     Recent Labs     06/03/22  0420 06/02/22  0432 06/01/22 0422 05/31/22  1845   BUN 29* 37* 48* 53*   CREA 1.04 1.66* 3.92* 5.20*   CA 8.6 9.1 8.3* 8.4*   ALB  --  2.2* 2.5* 2.8*   K 3.8 3.7 4.4 4.8    144 139 136   * 112* 105 100   CO2 26 24 27 24   PHOS  --  3.0 4.4  --    * 74 114* 135*       Tommy Sheehan MD

## 2022-06-03 NOTE — PROGRESS NOTES
Bedside shift change report given to [address] (oncoming nurse) by Easton Treadwell RN (offgoing nurse). Report included the following information SBAR, Kardex, Intake/Output, MAR and Recent Results.
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
## 2022-06-03 NOTE — PERIOP NOTES
Attempted to phone patient's mother to give her an intra-op update from the surgeon. Mobile phone number out of order and no answer on home phone. 2035 Spoke with patient's mother after calling the unit patient came from and she was in the waiting room. Update given regarding length of surgery.

## 2022-06-03 NOTE — OP NOTES
Exploratory laparotomy, washout of intraabdominal abscess, placement of yanelis drain, repair of serosal tear small bowel    Patient Name: Heide Light     SURGERY DATE: 22     : 1960     AGE: 64 y.o. Anesthesiologist: Anesthesiologist: Yue España MD  CRNA: Suzanne Thompson CRNA     Anesthesia: General     Intraoperative consult was made by primary attending Dr. Amaury Chau for general surgery due to abscess cavity, frozen abdomen. PreOp DX: Bladder wall perforation, intraabdominal abscess     PostOp DX: same     Procedure: Exploratory laparotomy, washout of intraabdominal abscess, placement of yanelis drain, repair of serosal tear small bowel    Procedure Details: Patient was already under general anesthesia prior to my arrival with repair of bladder perforation performed by urology attending. Please see their operative report for details. Wound cultures of intra peritoneal pus pocket was obtained prior to my arrival. Superior to the dome of the bladder phlegmon and slough were noted in the right paracolic gutter that covered several loops of matted small bowel. The abscess cavity appeared completely drained. No small bowel contents or stool noted. The pocket tracked superiorly and slightly to the left and was bluntly opened with finger dissection. Bowel was unable to be safely  due to extensive adhesive disease and inflammation but was otherwise normal in appearance. A small superficial serosal tear was noted on the small bowel and this was repaired with 3-0 silk suture x2 without any evidence of leak. The cavity was irrigated with saline and aricept solution. A yanelis drain was then placed in the cavity and directed superiorly and exited the skin right lower quadrant. My portion of the case was completed and patient remained stable during this portion and completion of surgery was then turned back over to urology. Please see their record for details.        Estimated Blood Loss:  See urology operative report, anesthesia records    Specimens:   ID Type Source Tests Collected by Time Destination   1 : pre pubic abscess Wound  CULTURE, ANAEROBIC, CULTURE, WOUND W GRAM STAIN Tania Walker MD 6/2/2022 2016 Microbiology                Complications: None           Disposition: see anesthesia record           Condition: Stable    Ej Rodriguez DO

## 2022-06-03 NOTE — PERIOP NOTES
TRANSFER - OUT REPORT:    Verbal report given to Tio(name) on Rogers Leblanc  being transferred to CVT (unit) for routine post - op       Report consisted of patients Situation, Background, Assessment and   Recommendations(SBAR). Information from the following report(s) SBAR, Procedure Summary and MAR was reviewed with the receiving nurse. Lines:   Peripheral IV 05/31/22 Right Antecubital (Active)   Site Assessment Clean, dry, & intact 06/02/22 2224   Phlebitis Assessment 0 06/02/22 2224   Infiltration Assessment 0 06/02/22 2224   Dressing Status Clean, dry, & intact 06/02/22 2224   Dressing Type Transparent;Tape 06/02/22 2224   Hub Color/Line Status Pink;Flushed;Patent;Capped 06/02/22 2224   Action Taken Open ports on tubing capped 06/02/22 1800   Alcohol Cap Used Yes 06/02/22 1800        Opportunity for questions and clarification was provided.       Patient transported with:   Registered Nurse
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
## 2022-06-03 NOTE — PROGRESS NOTES
1910  Verbal shift change  Received from Elle Acosta RN (outgoing nurse), to SAGE Jarrett (oncoming)  Pt. Is AOX 4. IV patent and infusing well, Ptin OR. Report included the following information SBAR, Kardex, Procedure Summary, Intake/Output, MAR, Recent Lab Results. Will resume care and monitor Pt. Condition. 2310 TRANSFER - IN REPORT:    Telephone report received from [facility] (name) on Roz Ellison  being received from PACU(unit) for routine progression of care      Report consisted of patients Situation, Background, Assessment and   Recommendations(SBAR). Information from the following report(s) Intake/Output, Recent Results and Cardiac Rhythm ST was reviewed with the receiving nurse. Opportunity for questions and clarification was provided. Assessment completed upon patients arrival to unit and care assumed. 2335 l resuming care and monitor Pt. Condition upon arrival.  AOX4, Pt denies pain, pt cardiac rhythm @ST, Pt.educated on MD's order and diet. Pt. Verbalized understanding. Pt. Educated on call bell when in need of help and assistance. Pt. verbalized understanding. Pt. Head to toe Assessment Done and documented. 0100 Pt. Resting with eyes closed, easily awaken. 0300  Pt. Not in distress. 0500  Pt. Made no complaints. 0615  Pt. Able to rest and sleep well throughout the shift. Verbal and bedside Shift changed report given to Janes Knox RN (oncoming RN) on Pt. Condition. Report consisted of patients Situation, History, Activities, intake/output,  Background, Assessment and Recommendations(SBAR). Information from the following report(s) Kardex, order Summary, Lab results and MAR was reviewed with the receiving nurse. Opportunity for questions and clarification was provided.

## 2022-06-03 NOTE — PROGRESS NOTES
Problem: Risk for Spread of Infection  Goal: Prevent transmission of infectious organism to others  Description: Prevent the transmission of infectious organisms to other patients, staff members, and visitors. Outcome: Progressing Towards Goal     Problem: Patient Education:  Go to Education Activity  Goal: Patient/Family Education  Outcome: Progressing Towards Goal     Problem: Pressure Injury - Risk of  Goal: *Prevention of pressure injury  Description: Document Jordin Scale and appropriate interventions in the flowsheet. Outcome: Progressing Towards Goal  Note: Pressure Injury Interventions:  Sensory Interventions: Assess changes in LOC    Moisture Interventions: Maintain skin hydration (lotion/cream)    Activity Interventions: Pressure redistribution bed/mattress(bed type)    Mobility Interventions: Pressure redistribution bed/mattress (bed type)    Nutrition Interventions:  (NPO)    Friction and Shear Interventions: HOB 30 degrees or less,Foam dressings/transparent film/skin sealants                Problem: Patient Education: Go to Patient Education Activity  Goal: Patient/Family Education  Outcome: Progressing Towards Goal     Problem: Falls - Risk of  Goal: *Absence of Falls  Description: Document Carisa Fall Risk and appropriate interventions in the flowsheet.   Outcome: Progressing Towards Goal  Note: Fall Risk Interventions:                 Elimination Interventions: Patient to call for help with toileting needs

## 2022-06-03 NOTE — PROGRESS NOTES
[address] [facility] Pharmacokinetic Monitoring Service - Vancomycin    Consulting Provider: Dr. Anastacio Castro   Indication: Intra-abdominal Infection  Target Concentration: Goal AUC/CAROLANN 400-600 mg*hr/L  Day of Therapy: 4  Additional Antimicrobials: Piperacillin/Tazobactam    Pertinent Laboratory Values:   Temp: 97.8 °F (36.6 °C)  Weight: 93.3 kg (205 lb 11 oz)  Recent Labs     06/03/22  0420 06/02/22  0432   CREA 1.04 1.66*   BUN 29* 37*   WBC 12.0 11.3       Estimated Creatinine Clearance: 86.7 mL/min (based on SCr of 1.04 mg/dL). Pertinent Cultures:  Culture Date Source Results   5/31 blood GNR   6/2 blood ngtd   5/31 urine Contaminated specimen   6/2 Surgical specimen pending   MRSA Nasal Swab: N/A.  Non-respiratory infection    Assessment:  Date/Time Current Dose Concentration Timing of Concentration (h) AUC   6/3 Per levels 14.6 18h post dose -   Note: Serum concentrations collected for AUC dosing may appear elevated if collected in close proximity to the dose administered, this is not necessarily an indication of toxicity    Plan:  Renal function improving, begin scheduled dosing  Begin 1250mg q18h, projected AUC/Tss 452/13.5  Renal labs as indicated   Vancomycin concentration ordered for  a.m. 6/5/22  Pharmacy will continue to monitor patient and adjust therapy as indicated    Thank you for the consult,  ANEL Villegas [facility]  6/3/2022

## 2022-06-03 NOTE — BRIEF OP NOTE
Brief Postoperative Note    Patient: Basia Keen  YOB: 1960  MRN: [id number]    Date of Procedure: 6/2/2022     Pre-Op Diagnosis: intra peritoneal bladder perforation     Post-Op Diagnosis: Same as preoperative diagnosis.       Procedure(s):  LAPAROTOMY EXPLORATORY, Bladder perforation repair, abdominal wash out, SP tube placement     Surgeon(s):  Blair Lanes, DO - General Surgery intra op consult  MD Estefani Stringer MD    Surgical Assistant: Surg Asst-1: Cathy Tavarez    Anesthesia: General     Estimated Blood Loss (mL): less than 478     Complications: None    Specimens:   ID Type Source Tests Collected by Time Destination   1 : pre pubic abscess Wound  CULTURE, ANAEROBIC, CULTURE, WOUND W GRAM STAIN Mary Rosario MD 6/2/2022  8:16 PM Microbiology        Implants: * No implants in log *    Drains:   [REMOVED] Supra-pubic Catheter 10/29/21 (Removed)    Chavez catheter   JALYN drain in abdominal abscess cavity     Findings:   Left bladder dome perforation, intra abdominal abscess     Electronically Signed by Teofilo Harmon MD on 6/2/2022 at 10:21 PM

## 2022-06-04 ENCOUNTER — APPOINTMENT (OUTPATIENT)
Dept: GENERAL RADIOLOGY | Age: 62
DRG: 441 | End: 2022-06-04
Attending: HOSPITALIST
Payer: MEDICAID

## 2022-06-04 LAB
ALBUMIN SERPL-MCNC: 2 G/DL (ref 3.4–5)
ALBUMIN/GLOB SERPL: 0.4 {RATIO} (ref 0.8–1.7)
ALP SERPL-CCNC: 70 U/L (ref 45–117)
ALT SERPL-CCNC: 64 U/L (ref 16–61)
ANION GAP SERPL CALC-SCNC: 3 MMOL/L (ref 3–18)
AST SERPL-CCNC: 36 U/L (ref 10–38)
BACTERIA SPEC CULT: ABNORMAL
BACTERIA SPEC CULT: ABNORMAL
BASOPHILS # BLD: 0 K/UL (ref 0–0.1)
BASOPHILS NFR BLD: 0 % (ref 0–2)
BILIRUB DIRECT SERPL-MCNC: 1.4 MG/DL (ref 0–0.2)
BILIRUB SERPL-MCNC: 2.3 MG/DL (ref 0.2–1)
BUN SERPL-MCNC: 21 MG/DL (ref 7–18)
BUN/CREAT SERPL: 26 (ref 12–20)
CALCIUM SERPL-MCNC: 8.2 MG/DL (ref 8.5–10.1)
CHLORIDE SERPL-SCNC: 114 MMOL/L (ref 100–111)
CO2 SERPL-SCNC: 28 MMOL/L (ref 21–32)
CREAT SERPL-MCNC: 0.82 MG/DL (ref 0.6–1.3)
DIFFERENTIAL METHOD BLD: ABNORMAL
EOSINOPHIL # BLD: 0 K/UL (ref 0–0.4)
EOSINOPHIL NFR BLD: 0 % (ref 0–5)
ERYTHROCYTE [DISTWIDTH] IN BLOOD BY AUTOMATED COUNT: 15.1 % (ref 11.6–14.5)
GLOBULIN SER CALC-MCNC: 5.5 G/DL (ref 2–4)
GLUCOSE SERPL-MCNC: 140 MG/DL (ref 74–99)
GRAM STN SPEC: ABNORMAL
GRAM STN SPEC: ABNORMAL
HCT VFR BLD AUTO: 30.6 % (ref 36–48)
HGB BLD-MCNC: 9.7 G/DL (ref 13–16)
IMM GRANULOCYTES # BLD AUTO: 0.4 K/UL (ref 0–0.04)
IMM GRANULOCYTES NFR BLD AUTO: 3 % (ref 0–0.5)
LIPASE SERPL-CCNC: 100 U/L (ref 73–393)
LYMPHOCYTES # BLD: 1.5 K/UL (ref 0.9–3.6)
LYMPHOCYTES NFR BLD: 11 % (ref 21–52)
MAGNESIUM SERPL-MCNC: 1.8 MG/DL (ref 1.6–2.6)
MCH RBC QN AUTO: 28.6 PG (ref 24–34)
MCHC RBC AUTO-ENTMCNC: 31.7 G/DL (ref 31–37)
MCV RBC AUTO: 90.3 FL (ref 78–100)
MONOCYTES # BLD: 0.6 K/UL (ref 0.05–1.2)
MONOCYTES NFR BLD: 5 % (ref 3–10)
NEUTS SEG # BLD: 10.9 K/UL (ref 1.8–8)
NEUTS SEG NFR BLD: 81 % (ref 40–73)
NRBC # BLD: 0 K/UL (ref 0–0.01)
NRBC BLD-RTO: 0 PER 100 WBC
PHOSPHATE SERPL-MCNC: 1.4 MG/DL (ref 2.5–4.9)
PLATELET # BLD AUTO: 157 K/UL (ref 135–420)
PMV BLD AUTO: 11.7 FL (ref 9.2–11.8)
POTASSIUM SERPL-SCNC: 3.2 MMOL/L (ref 3.5–5.5)
PROT SERPL-MCNC: 7.5 G/DL (ref 6.4–8.2)
RBC # BLD AUTO: 3.39 M/UL (ref 4.35–5.65)
SERVICE CMNT-IMP: ABNORMAL
SODIUM SERPL-SCNC: 145 MMOL/L (ref 136–145)
WBC # BLD AUTO: 13.5 K/UL (ref 4.6–13.2)

## 2022-06-04 PROCEDURE — 99232 SBSQ HOSP IP/OBS MODERATE 35: CPT | Performed by: HOSPITALIST

## 2022-06-04 PROCEDURE — 85025 COMPLETE CBC W/AUTO DIFF WBC: CPT

## 2022-06-04 PROCEDURE — 74011250636 HC RX REV CODE- 250/636: Performed by: INTERNAL MEDICINE

## 2022-06-04 PROCEDURE — 74011000250 HC RX REV CODE- 250: Performed by: INTERNAL MEDICINE

## 2022-06-04 PROCEDURE — 65270000046 HC RM TELEMETRY

## 2022-06-04 PROCEDURE — 2709999900 HC NON-CHARGEABLE SUPPLY

## 2022-06-04 PROCEDURE — 74011000258 HC RX REV CODE- 258: Performed by: INTERNAL MEDICINE

## 2022-06-04 PROCEDURE — 83735 ASSAY OF MAGNESIUM: CPT

## 2022-06-04 PROCEDURE — 36415 COLL VENOUS BLD VENIPUNCTURE: CPT

## 2022-06-04 PROCEDURE — 84100 ASSAY OF PHOSPHORUS: CPT

## 2022-06-04 PROCEDURE — 74022 RADEX COMPL AQT ABD SERIES: CPT

## 2022-06-04 PROCEDURE — 80048 BASIC METABOLIC PNL TOTAL CA: CPT

## 2022-06-04 PROCEDURE — 83690 ASSAY OF LIPASE: CPT

## 2022-06-04 PROCEDURE — 74011250637 HC RX REV CODE- 250/637: Performed by: PHYSICIAN ASSISTANT

## 2022-06-04 PROCEDURE — 74011000250 HC RX REV CODE- 250: Performed by: HOSPITALIST

## 2022-06-04 PROCEDURE — C9113 INJ PANTOPRAZOLE SODIUM, VIA: HCPCS | Performed by: INTERNAL MEDICINE

## 2022-06-04 PROCEDURE — 80076 HEPATIC FUNCTION PANEL: CPT

## 2022-06-04 PROCEDURE — 74011250637 HC RX REV CODE- 250/637: Performed by: HOSPITALIST

## 2022-06-04 PROCEDURE — 74011250636 HC RX REV CODE- 250/636: Performed by: HOSPITALIST

## 2022-06-04 RX ORDER — PROMETHAZINE HYDROCHLORIDE 12.5 MG/1
12.5 TABLET ORAL
Status: DISCONTINUED | OUTPATIENT
Start: 2022-06-04 | End: 2022-06-06

## 2022-06-04 RX ADMIN — PIPERACILLIN AND TAZOBACTAM 3.38 G: 3; .375 INJECTION, POWDER, LYOPHILIZED, FOR SOLUTION INTRAVENOUS at 16:53

## 2022-06-04 RX ADMIN — VANCOMYCIN HYDROCHLORIDE 1250 MG: 10 INJECTION, POWDER, LYOPHILIZED, FOR SOLUTION INTRAVENOUS at 22:20

## 2022-06-04 RX ADMIN — TROSPIUM CHLORIDE 20 MG: 20 TABLET, FILM COATED ORAL at 08:24

## 2022-06-04 RX ADMIN — PROMETHAZINE HYDROCHLORIDE 12.5 MG: 12.5 TABLET ORAL at 11:50

## 2022-06-04 RX ADMIN — SODIUM CHLORIDE, PRESERVATIVE FREE 10 ML: 5 INJECTION INTRAVENOUS at 05:39

## 2022-06-04 RX ADMIN — SODIUM CHLORIDE, PRESERVATIVE FREE 10 ML: 5 INJECTION INTRAVENOUS at 22:29

## 2022-06-04 RX ADMIN — TROSPIUM CHLORIDE 20 MG: 20 TABLET, FILM COATED ORAL at 16:52

## 2022-06-04 RX ADMIN — SODIUM CHLORIDE, PRESERVATIVE FREE 10 ML: 5 INJECTION INTRAVENOUS at 14:36

## 2022-06-04 RX ADMIN — POTASSIUM PHOSPHATE, MONOBASIC AND POTASSIUM PHOSPHATE, DIBASIC: 224; 236 INJECTION, SOLUTION, CONCENTRATE INTRAVENOUS at 15:35

## 2022-06-04 RX ADMIN — ONDANSETRON 4 MG: 2 INJECTION INTRAMUSCULAR; INTRAVENOUS at 15:35

## 2022-06-04 RX ADMIN — VANCOMYCIN HYDROCHLORIDE 1250 MG: 10 INJECTION, POWDER, LYOPHILIZED, FOR SOLUTION INTRAVENOUS at 05:40

## 2022-06-04 RX ADMIN — PIPERACILLIN AND TAZOBACTAM 3.38 G: 3; .375 INJECTION, POWDER, LYOPHILIZED, FOR SOLUTION INTRAVENOUS at 08:24

## 2022-06-04 RX ADMIN — MORPHINE SULFATE 2 MG: 2 INJECTION, SOLUTION INTRAMUSCULAR; INTRAVENOUS at 15:35

## 2022-06-04 RX ADMIN — PIPERACILLIN AND TAZOBACTAM 3.38 G: 3; .375 INJECTION, POWDER, LYOPHILIZED, FOR SOLUTION INTRAVENOUS at 00:11

## 2022-06-04 RX ADMIN — ONDANSETRON 4 MG: 2 INJECTION INTRAMUSCULAR; INTRAVENOUS at 10:11

## 2022-06-04 RX ADMIN — PANTOPRAZOLE SODIUM 40 MG: 40 INJECTION, POWDER, FOR SOLUTION INTRAVENOUS at 00:01

## 2022-06-04 RX ADMIN — MORPHINE SULFATE 2 MG: 2 INJECTION, SOLUTION INTRAMUSCULAR; INTRAVENOUS at 10:11

## 2022-06-04 NOTE — PROGRESS NOTES
Michael from patient mobility rolled patient to the right using pillows to support the body on the left side. Will continue to monitor.

## 2022-06-04 NOTE — PROGRESS NOTES
1011: Patient complaining of pain and nausea (states \"I feel like I am going to throw up\"). This nurse gave both the 2 mg of IV morphine and 4 mg IV zofran. Left call bell and phone within reach. 1140: Patient continues to complain about feelings of nausea, pagetam Portillo MD. Received phone call back at [phone]. MD to adjust orders. Will continue to monitor.

## 2022-06-04 NOTE — PROGRESS NOTES
Anterior skin assessment done with SAFIA Tabares, as follows:    Midline incision - primapore in place; clean, dry, intact, no drainage noted  R lower abdomen - JALYN drain with sanguineous drainage on tubing; primapore and gauze dressing in place, no drainage noted on dressing  R lower abdomen, slightly below JALYN drain - suprapubic catheter 20 Fr; primapore and gauze dressing in place  L lower abdomen - colostomy present; 2 wafer appliance in place, pink round, raised, no stool/ flatus/drainage present   Chavez catheter 20 Fr in place; chronic  L inner foot - open area present, old aquacel Ag present on wound, mepilex changed  L medial foot - healed pink spot, scab present  L posterior toe - open area, red, dry, no drainage present  L achilles heel - open area present, no drainage present, mepilex changed, prevalon boot in place  L lateral foot - healed pink spot, intact, no drainage present  L calf - stage 3 pressure ulcer present; aquacel Ag + clear dressing present on admission, old quarter sized yellowish drainage present on dressing  R medial foot - dime sized open area present (arterial ulcer), sanguineous drainage present, mepilex changed  R heel - intact, boggy, dry, flaky, slow to melani, mepilex changed, prevalon boot present  R lateral leg (ankle area to mid leg) - healed pink area, vascular?, no drainage present  R calf - open area present, scab present, no drainage   R lateral leg - medium sized wound present, serosanguineous drainage, mepilex ichanged  R upper arm - single lumen midline (power PICC) in place, 4Fr, CHG impregnated biopatch + transparent dressing in place  R forearm (near [facility]) - PIV, 20 gauge saline locked    Back skin assessment to follow. Will continue to monitor.

## 2022-06-04 NOTE — PROGRESS NOTES
Call received from [facility], MD to ask on status of patient. This nurse informed MD that patient had no episodes of vomitus or \"spitting up. \" Doctor reminded this nurse to call doctor if patient had an episode of emesis. Will continue to monitor.

## 2022-06-04 NOTE — PROGRESS NOTES
Bedside and Verbal shift change report given to Lynda Sibley RN (oncoming nurse) by Heidy Kelly RN (offgoing nurse). Report included the following information SBAR, Kardex, Intake/Output, MAR, Recent Results and Med Rec Status.
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
## 2022-06-04 NOTE — PROGRESS NOTES
Posterior skin assessment done with SAFIA Tabares, as follows:    Coccyx - stage 4 ulcer, mepilex in place, no drainage present on dressing

## 2022-06-04 NOTE — PROGRESS NOTES
Bedside and Verbal shift change report given to SAFIA RIVERS (oncoming nurse) by Ramakrishna Blackburn RN (offgoing nurse). Report included the following information SBAR, Kardex, Intake/Output, MAR and Med Rec Status.

## 2022-06-04 NOTE — PROGRESS NOTES
Problem: Risk for Spread of Infection  Goal: Prevent transmission of infectious organism to others  Description: Prevent the transmission of infectious organisms to other patients, staff members, and visitors. Outcome: Progressing Towards Goal     Problem: Patient Education:  Go to Education Activity  Goal: Patient/Family Education  Outcome: Progressing Towards Goal     Problem: Pressure Injury - Risk of  Goal: *Prevention of pressure injury  Description: Document Jordin Scale and appropriate interventions in the flowsheet. Outcome: Progressing Towards Goal  Note: Pressure Injury Interventions:  Sensory Interventions: Assess changes in LOC,Assess need for specialty bed,Avoid rigorous massage over bony prominences,Check visual cues for pain,Float heels,Maintain/enhance activity level,Minimize linen layers,Monitor skin under medical devices,Pad between skin to skin,Turn and reposition approx.  every two hours (pillows and wedges if needed)    Moisture Interventions: Absorbent underpads,Apply protective barrier, creams and emollients,Assess need for specialty bed,Check for incontinence Q2 hours and as needed,Contain wound drainage,Minimize layers,Moisture barrier    Activity Interventions: Assess need for specialty bed,Pressure redistribution bed/mattress(bed type),PT/OT evaluation    Mobility Interventions: Assess need for specialty bed,Float heels,HOB 30 degrees or less,Pressure redistribution bed/mattress (bed type),PT/OT evaluation,Trapeze to reposition    Nutrition Interventions: Document food/fluid/supplement intake,Discuss nutritional consult with provider,Offer support with meals,snacks and hydration    Friction and Shear Interventions: Apply protective barrier, creams and emollients,Feet elevated on foot rest,HOB 30 degrees or less,Lift team/patient mobility team,Minimize layers,Transferring/repositioning devices                Problem: Falls - Risk of  Goal: *Absence of Falls  Description: John Paul Law Fall Risk and appropriate interventions in the flowsheet.   Outcome: Progressing Towards Goal  Note: Fall Risk Interventions:            Medication Interventions: Bed/chair exit alarm,Evaluate medications/consider consulting pharmacy    Elimination Interventions: Bed/chair exit alarm,Call light in reach,Patient to call for help with toileting needs,Stay With Me (per policy),Toilet paper/wipes in reach,Toileting schedule/hourly rounds

## 2022-06-05 LAB
ALBUMIN SERPL-MCNC: 2.1 G/DL (ref 3.4–5)
ALBUMIN/GLOB SERPL: 0.4 {RATIO} (ref 0.8–1.7)
ALP SERPL-CCNC: 71 U/L (ref 45–117)
ALT SERPL-CCNC: 49 U/L (ref 16–61)
ANION GAP SERPL CALC-SCNC: 5 MMOL/L (ref 3–18)
AST SERPL-CCNC: 27 U/L (ref 10–38)
BACTERIA SPEC CULT: ABNORMAL
BACTERIA SPEC CULT: NORMAL
BASOPHILS # BLD: 0 K/UL (ref 0–0.1)
BASOPHILS NFR BLD: 0 % (ref 0–2)
BILIRUB DIRECT SERPL-MCNC: 1.7 MG/DL (ref 0–0.2)
BILIRUB SERPL-MCNC: 2.9 MG/DL (ref 0.2–1)
BUN SERPL-MCNC: 21 MG/DL (ref 7–18)
BUN/CREAT SERPL: 25 (ref 12–20)
CALCIUM SERPL-MCNC: 8.1 MG/DL (ref 8.5–10.1)
CC UR VC: ABNORMAL
CHLORIDE SERPL-SCNC: 114 MMOL/L (ref 100–111)
CO2 SERPL-SCNC: 27 MMOL/L (ref 21–32)
CREAT SERPL-MCNC: 0.85 MG/DL (ref 0.6–1.3)
DIFFERENTIAL METHOD BLD: ABNORMAL
EOSINOPHIL # BLD: 0.1 K/UL (ref 0–0.4)
EOSINOPHIL NFR BLD: 1 % (ref 0–5)
ERYTHROCYTE [DISTWIDTH] IN BLOOD BY AUTOMATED COUNT: 15.1 % (ref 11.6–14.5)
GLOBULIN SER CALC-MCNC: 5.3 G/DL (ref 2–4)
GLUCOSE SERPL-MCNC: 121 MG/DL (ref 74–99)
HCT VFR BLD AUTO: 31.1 % (ref 36–48)
HGB BLD-MCNC: 10 G/DL (ref 13–16)
IMM GRANULOCYTES # BLD AUTO: 0 K/UL
IMM GRANULOCYTES NFR BLD AUTO: 0 %
LYMPHOCYTES # BLD: 3.3 K/UL (ref 0.9–3.6)
LYMPHOCYTES NFR BLD: 23 % (ref 21–52)
MAGNESIUM SERPL-MCNC: 1.7 MG/DL (ref 1.6–2.6)
MCH RBC QN AUTO: 29.2 PG (ref 24–34)
MCHC RBC AUTO-ENTMCNC: 32.2 G/DL (ref 31–37)
MCV RBC AUTO: 90.7 FL (ref 78–100)
MONOCYTES # BLD: 1.2 K/UL (ref 0.05–1.2)
MONOCYTES NFR BLD: 8 % (ref 3–10)
NEUTS SEG # BLD: 9.8 K/UL (ref 1.8–8)
NEUTS SEG NFR BLD: 68 % (ref 40–73)
NRBC # BLD: 0 K/UL (ref 0–0.01)
NRBC BLD-RTO: 0 PER 100 WBC
PHOSPHATE SERPL-MCNC: 2.4 MG/DL (ref 2.5–4.9)
PLATELET # BLD AUTO: 195 K/UL (ref 135–420)
PLATELET COMMENTS,PCOM: ABNORMAL
PMV BLD AUTO: 11.8 FL (ref 9.2–11.8)
POTASSIUM SERPL-SCNC: 3.4 MMOL/L (ref 3.5–5.5)
PROT SERPL-MCNC: 7.4 G/DL (ref 6.4–8.2)
RBC # BLD AUTO: 3.43 M/UL (ref 4.35–5.65)
RBC MORPH BLD: ABNORMAL
SERVICE CMNT-IMP: ABNORMAL
SERVICE CMNT-IMP: NORMAL
SODIUM SERPL-SCNC: 146 MMOL/L (ref 136–145)
VANCOMYCIN SERPL-MCNC: 21 UG/ML (ref 5–40)
WBC # BLD AUTO: 14.4 K/UL (ref 4.6–13.2)
WBC MORPH BLD: ABNORMAL

## 2022-06-05 PROCEDURE — 80048 BASIC METABOLIC PNL TOTAL CA: CPT

## 2022-06-05 PROCEDURE — 74011000250 HC RX REV CODE- 250: Performed by: HOSPITALIST

## 2022-06-05 PROCEDURE — 80202 ASSAY OF VANCOMYCIN: CPT

## 2022-06-05 PROCEDURE — 2709999900 HC NON-CHARGEABLE SUPPLY

## 2022-06-05 PROCEDURE — 74011000258 HC RX REV CODE- 258: Performed by: INTERNAL MEDICINE

## 2022-06-05 PROCEDURE — 74011000250 HC RX REV CODE- 250: Performed by: INTERNAL MEDICINE

## 2022-06-05 PROCEDURE — 84100 ASSAY OF PHOSPHORUS: CPT

## 2022-06-05 PROCEDURE — 36415 COLL VENOUS BLD VENIPUNCTURE: CPT

## 2022-06-05 PROCEDURE — 77030008771 HC TU NG SALEM SUMP -A

## 2022-06-05 PROCEDURE — 65270000046 HC RM TELEMETRY

## 2022-06-05 PROCEDURE — 83735 ASSAY OF MAGNESIUM: CPT

## 2022-06-05 PROCEDURE — 74011250636 HC RX REV CODE- 250/636: Performed by: INTERNAL MEDICINE

## 2022-06-05 PROCEDURE — 74011250637 HC RX REV CODE- 250/637: Performed by: PHYSICIAN ASSISTANT

## 2022-06-05 PROCEDURE — 74011250636 HC RX REV CODE- 250/636: Performed by: HOSPITALIST

## 2022-06-05 PROCEDURE — 85025 COMPLETE CBC W/AUTO DIFF WBC: CPT

## 2022-06-05 PROCEDURE — 80076 HEPATIC FUNCTION PANEL: CPT

## 2022-06-05 PROCEDURE — C9113 INJ PANTOPRAZOLE SODIUM, VIA: HCPCS | Performed by: INTERNAL MEDICINE

## 2022-06-05 PROCEDURE — 99232 SBSQ HOSP IP/OBS MODERATE 35: CPT | Performed by: HOSPITALIST

## 2022-06-05 RX ORDER — DEXTROSE, SODIUM CHLORIDE, AND POTASSIUM CHLORIDE 5; .45; .15 G/100ML; G/100ML; G/100ML
50 INJECTION INTRAVENOUS CONTINUOUS
Status: DISCONTINUED | OUTPATIENT
Start: 2022-06-05 | End: 2022-06-05

## 2022-06-05 RX ORDER — METOCLOPRAMIDE HYDROCHLORIDE 5 MG/ML
5 INJECTION INTRAMUSCULAR; INTRAVENOUS EVERY 6 HOURS
Status: DISCONTINUED | OUTPATIENT
Start: 2022-06-05 | End: 2022-06-05

## 2022-06-05 RX ORDER — METOCLOPRAMIDE HYDROCHLORIDE 5 MG/ML
5 INJECTION INTRAMUSCULAR; INTRAVENOUS
Status: COMPLETED | OUTPATIENT
Start: 2022-06-05 | End: 2022-06-05

## 2022-06-05 RX ORDER — DEXTROSE, SODIUM CHLORIDE, AND POTASSIUM CHLORIDE 5; .45; .15 G/100ML; G/100ML; G/100ML
50 INJECTION INTRAVENOUS CONTINUOUS
Status: DISPENSED | OUTPATIENT
Start: 2022-06-05 | End: 2022-06-06

## 2022-06-05 RX ORDER — MAGNESIUM SULFATE 1 G/100ML
1 INJECTION INTRAVENOUS ONCE
Status: COMPLETED | OUTPATIENT
Start: 2022-06-05 | End: 2022-06-05

## 2022-06-05 RX ADMIN — ONDANSETRON 4 MG: 2 INJECTION INTRAMUSCULAR; INTRAVENOUS at 13:13

## 2022-06-05 RX ADMIN — IPRATROPIUM BROMIDE AND ALBUTEROL SULFATE 3 ML: .5; 2.5 SOLUTION RESPIRATORY (INHALATION) at 17:13

## 2022-06-05 RX ADMIN — TROSPIUM CHLORIDE 20 MG: 20 TABLET, FILM COATED ORAL at 08:28

## 2022-06-05 RX ADMIN — POTASSIUM PHOSPHATE, MONOBASIC AND POTASSIUM PHOSPHATE, DIBASIC: 224; 236 INJECTION, SOLUTION, CONCENTRATE INTRAVENOUS at 13:13

## 2022-06-05 RX ADMIN — POTASSIUM CHLORIDE, DEXTROSE MONOHYDRATE AND SODIUM CHLORIDE 50 ML/HR: 150; 5; 450 INJECTION, SOLUTION INTRAVENOUS at 17:14

## 2022-06-05 RX ADMIN — MORPHINE SULFATE 2 MG: 2 INJECTION, SOLUTION INTRAMUSCULAR; INTRAVENOUS at 15:42

## 2022-06-05 RX ADMIN — SODIUM CHLORIDE, PRESERVATIVE FREE 10 ML: 5 INJECTION INTRAVENOUS at 22:13

## 2022-06-05 RX ADMIN — PANTOPRAZOLE SODIUM 40 MG: 40 INJECTION, POWDER, FOR SOLUTION INTRAVENOUS at 01:00

## 2022-06-05 RX ADMIN — ONDANSETRON 4 MG: 2 INJECTION INTRAMUSCULAR; INTRAVENOUS at 23:44

## 2022-06-05 RX ADMIN — MAGNESIUM SULFATE HEPTAHYDRATE 1 G: 1 INJECTION, SOLUTION INTRAVENOUS at 11:08

## 2022-06-05 RX ADMIN — VANCOMYCIN HYDROCHLORIDE 1000 MG: 1 INJECTION, POWDER, LYOPHILIZED, FOR SOLUTION INTRAVENOUS at 11:07

## 2022-06-05 RX ADMIN — PIPERACILLIN AND TAZOBACTAM 3.38 G: 3; .375 INJECTION, POWDER, LYOPHILIZED, FOR SOLUTION INTRAVENOUS at 01:10

## 2022-06-05 RX ADMIN — SODIUM CHLORIDE, PRESERVATIVE FREE 10 ML: 5 INJECTION INTRAVENOUS at 15:31

## 2022-06-05 RX ADMIN — MORPHINE SULFATE 2 MG: 2 INJECTION, SOLUTION INTRAMUSCULAR; INTRAVENOUS at 22:51

## 2022-06-05 RX ADMIN — PIPERACILLIN AND TAZOBACTAM 3.38 G: 3; .375 INJECTION, POWDER, LYOPHILIZED, FOR SOLUTION INTRAVENOUS at 08:28

## 2022-06-05 RX ADMIN — TROSPIUM CHLORIDE 20 MG: 20 TABLET, FILM COATED ORAL at 17:13

## 2022-06-05 RX ADMIN — METOCLOPRAMIDE HYDROCHLORIDE 5 MG: 5 INJECTION INTRAMUSCULAR; INTRAVENOUS at 15:42

## 2022-06-05 RX ADMIN — PIPERACILLIN AND TAZOBACTAM 3.38 G: 3; .375 INJECTION, POWDER, LYOPHILIZED, FOR SOLUTION INTRAVENOUS at 17:13

## 2022-06-05 RX ADMIN — SODIUM CHLORIDE, PRESERVATIVE FREE 10 ML: 5 INJECTION INTRAVENOUS at 07:33

## 2022-06-05 RX ADMIN — VANCOMYCIN HYDROCHLORIDE 1000 MG: 1 INJECTION, POWDER, LYOPHILIZED, FOR SOLUTION INTRAVENOUS at 22:39

## 2022-06-05 NOTE — PROGRESS NOTES
This nurse was complete rounds on the patient and detaching his IV from the complete medications, when this nurse noted the emesis bag in patient's lap with fresh emesis. This nurse confirmed with patient that he had vomited. Paged Galina Wood MD to make her aware. Will continue to monitor.
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
## 2022-06-05 NOTE — PROGRESS NOTES
Patient called nurse back into room after complaining that he was feeling like he was short of breath. At that time, patient was placed on 2L NC and positioned for comfort. 1529: Patient calls nurse into room feeling like he \"can't get wind\" in him. Patient SpO2 100% on those 2L NC and this nurse notes no cyanosis. Excessive hiccups noted. Told patient and mother that I would page his attending Hari Hutton MD to discuss options. 1532: Call back received from Hari Hutton MD. Stated I felt as though the reason the patient felt short of breath given stable O2 level is the hiccups he had been struggling with for the majority of the day. Discussed plan to administer morphine in hopes to help diaphragm relax, and MD Yanet agreed and stated she would put in for a one time dose of Reglan. Will continue to monitor.

## 2022-06-05 NOTE — PROGRESS NOTES
[facility] Pharmacokinetic Monitoring Service - Vancomycin    Consulting Provider: Dr Jose Burgos  Indication: Intra-abdominal Infection  Target Concentration: Goal AUC/CAROLANN 400-600 mg*hr/L  Day of Therapy: 6  Additional Antimicrobials: Piperacillin/Tazobactam    Pertinent Laboratory Values:   Temp: 97.5 °F (36.4 °C)  Weight: 102.5 kg (225 lb 15.5 oz)  Recent Labs     06/05/22  0703 06/04/22  1232   CREA 0.85 0.82   BUN 21* 21*   WBC 14.4* 13.5*       Estimated Creatinine Clearance: 116.6 mL/min (based on SCr of 0.85 mg/dL). Pertinent Cultures:  Culture Date Source Results   6/3/22 Surgical Specimen Heavy GNR, heavy possible Enterococcus species   MRSA Nasal Swab: N/A.  Non-respiratory infection    Assessment:  Date/Time Current Dose Concentration Timing of Concentration (h) AUC   6/5 1250 mg IV Q18hr 21 8 h 43 min post infusion 416   Note: Serum concentrations collected for AUC dosing may appear elevated if collected in close proximity to the dose administered, this is not necessarily an indication of toxicity    Plan:  Current dosing regimen is sub-therapeutic  Increase dose to 1000 mg IV q12hr  Renal labs as indicated   Vancomycin concentration ordered for  Monday, 6/6 @ @ 1800  Pharmacy will continue to monitor patient and adjust therapy as indicated    Thank you for the consult,  Eron Stahl [facility]  6/5/2022

## 2022-06-05 NOTE — PROGRESS NOTES
Patient rested without nausea or vomiting during the night. JALYN draining 50 cc. Chronic lee with 600 dark adryan urine and suprapubic with less than 20 cc. Colostomy emptied 150 semiform greenish foul smelling stools. 0720 Bedside and Verbal shift change report given to Collin Slaughter RN (oncoming nurse) by Janet Claros RN (offgoing nurse). Report given with SBAR, Kardex, Intake/Output, MAR and Recent Results.

## 2022-06-05 NOTE — PROGRESS NOTES
[facility] [place] [address]  [phone]  Colon and Rectal Surgery Progress Note      Patient: Cheryl Liao MRN: [id number]  SSN: [id number]    YOB: 1960  Age: 64 y.o. Sex: male      Admit Date: 5/31/2022    LOS: 5 days     Subjective:     No events. Objective:     Vitals:    06/04/22 2005 06/05/22 0029 06/05/22 0430 06/05/22 0749   BP:  (!) 155/98 (!) 153/99 (!) 153/99   Pulse:  97 100 96   Resp:  18 16 18   Temp:  97.7 °F (36.5 °C) 99.2 °F (37.3 °C) 97.5 °F (36.4 °C)   SpO2: 100% 100% 99% 99%   Weight:       Height:            Intake and Output:  Current Shift: 06/05 0701 - 06/05 1900  In: 0   Out: 100 [Urine:100]  Last three shifts: 06/03 1901 - 06/05 0700  In: 831.2 [P.O.:240;  I.V.:591.2]  Out: 2020 [Urine:1740; Drains:80]    Physical Exam:     Constitutional: well developed, in NAD  Abdomen: soft, distended, appropriately tender    Lab/Data Review:    CMP:   Lab Results   Component Value Date/Time     (H) 06/05/2022 07:03 AM    K 3.4 (L) 06/05/2022 07:03 AM     (H) 06/05/2022 07:03 AM    CO2 27 06/05/2022 07:03 AM    AGAP 5 06/05/2022 07:03 AM     (H) 06/05/2022 07:03 AM    BUN 21 (H) 06/05/2022 07:03 AM    CREA 0.85 06/05/2022 07:03 AM    GFRAA >60 06/05/2022 07:03 AM    GFRNA >60 06/05/2022 07:03 AM    CA 8.1 (L) 06/05/2022 07:03 AM    MG 1.7 06/05/2022 07:03 AM    PHOS 2.4 (L) 06/05/2022 07:03 AM    ALB 2.1 (L) 06/05/2022 07:03 AM    TP 7.4 06/05/2022 07:03 AM    GLOB 5.3 (H) 06/05/2022 07:03 AM    AGRAT 0.4 (L) 06/05/2022 07:03 AM    ALT 49 06/05/2022 07:03 AM     CBC:   Lab Results   Component Value Date/Time    WBC 14.4 (H) 06/05/2022 07:03 AM    HGB 10.0 (L) 06/05/2022 07:03 AM    HCT 31.1 (L) 06/05/2022 07:03 AM     06/05/2022 07:03 AM        Assessment:     S/p ex lap TISH, repair bladder perforation    Plan:     Exam and data consistent with ileus  Recommend NPO, IVF for now  Wait for improved exam prior to advancing diet  Yeshtokin to return tomorrow  Urology f/u    Signed By: Kalee Fernández MD        June 5, 2022

## 2022-06-05 NOTE — PROGRESS NOTES
Problem: Risk for Spread of Infection  Goal: Prevent transmission of infectious organism to others  Description: Prevent the transmission of infectious organisms to other patients, staff members, and visitors. Outcome: Progressing Towards Goal     Problem: Pressure Injury - Risk of  Goal: *Prevention of pressure injury  Description: Document Jordin Scale and appropriate interventions in the flowsheet. Outcome: Progressing Towards Goal  Note: Pressure Injury Interventions:  Sensory Interventions: Assess changes in LOC,Assess need for specialty bed,Avoid rigorous massage over bony prominences,Check visual cues for pain,Float heels,Keep linens dry and wrinkle-free    Moisture Interventions: Absorbent underpads,Apply protective barrier, creams and emollients    Activity Interventions: Assess need for specialty bed,Pressure redistribution bed/mattress(bed type)    Mobility Interventions: HOB 30 degrees or less    Nutrition Interventions: Document food/fluid/supplement intake    Friction and Shear Interventions: HOB 30 degrees or less                Problem: Falls - Risk of  Goal: *Absence of Falls  Description: Document Carisa Fall Risk and appropriate interventions in the flowsheet.   Outcome: Progressing Towards Goal  Note: Fall Risk Interventions:            Medication Interventions: Teach patient to arise slowly    Elimination Interventions: Call light in reach,Patient to call for help with toileting needs

## 2022-06-05 NOTE — PROGRESS NOTES
This nurse attempted to place a [place] sump NGT as ordered. Patient agreed to allow this nurse to try. Instructed patient on taking sips of water while advancing NGT, and patient verbalized understanding. Applied surgi-lube to tip of catheter and explain the insertion should not be a sharp pain, but may burn. Again patient verbalized understanding. This nurse inserted tube to 10cm and encouraged patient to take sips of water as I attempted to advance cathter. Patient asked this nurse to stop, saying that it was burning and he didn't feel like it was going anywhere. This nurse confirmed that there was resistance and removed tube per patient's request. Cleaned excess lubricant from patient's nose, lowered head of bed to patient's comfort level, and placed bed in lowest position. Call bell and belongings within reach of patient. Lul Latham MD made aware of difficulty. Will continue to monitor.

## 2022-06-06 ENCOUNTER — APPOINTMENT (OUTPATIENT)
Dept: GENERAL RADIOLOGY | Age: 62
DRG: 441 | End: 2022-06-06
Attending: HOSPITALIST
Payer: MEDICAID

## 2022-06-06 ENCOUNTER — HOME CARE VISIT (OUTPATIENT)
Dept: HOME HEALTH SERVICES | Facility: HOME HEALTH | Age: 62
End: 2022-06-06
Payer: MEDICAID

## 2022-06-06 LAB
ANION GAP SERPL CALC-SCNC: 2 MMOL/L (ref 3–18)
BACTERIA SPEC CULT: ABNORMAL
BASOPHILS # BLD: 0 K/UL (ref 0–0.1)
BASOPHILS NFR BLD: 0 % (ref 0–2)
BUN SERPL-MCNC: 20 MG/DL (ref 7–18)
BUN/CREAT SERPL: 25 (ref 12–20)
CALCIUM SERPL-MCNC: 8 MG/DL (ref 8.5–10.1)
CHLORIDE SERPL-SCNC: 115 MMOL/L (ref 100–111)
CO2 SERPL-SCNC: 28 MMOL/L (ref 21–32)
CREAT SERPL-MCNC: 0.8 MG/DL (ref 0.6–1.3)
DIFFERENTIAL METHOD BLD: ABNORMAL
EOSINOPHIL # BLD: 0 K/UL (ref 0–0.4)
EOSINOPHIL NFR BLD: 0 % (ref 0–5)
ERYTHROCYTE [DISTWIDTH] IN BLOOD BY AUTOMATED COUNT: 15 % (ref 11.6–14.5)
GLUCOSE SERPL-MCNC: 132 MG/DL (ref 74–99)
GRAM STN SPEC: ABNORMAL
HCT VFR BLD AUTO: 29.8 % (ref 36–48)
HGB BLD-MCNC: 9.5 G/DL (ref 13–16)
IMM GRANULOCYTES # BLD AUTO: 0 K/UL
IMM GRANULOCYTES NFR BLD AUTO: 0 %
LYMPHOCYTES # BLD: 1.4 K/UL (ref 0.9–3.6)
LYMPHOCYTES NFR BLD: 9 % (ref 21–52)
MAGNESIUM SERPL-MCNC: 1.9 MG/DL (ref 1.6–2.6)
MCH RBC QN AUTO: 28.6 PG (ref 24–34)
MCHC RBC AUTO-ENTMCNC: 31.9 G/DL (ref 31–37)
MCV RBC AUTO: 89.8 FL (ref 78–100)
METAMYELOCYTES NFR BLD MANUAL: 1 %
MONOCYTES # BLD: 0.5 K/UL (ref 0.05–1.2)
MONOCYTES NFR BLD: 3 % (ref 3–10)
NEUTS BAND NFR BLD MANUAL: 5 % (ref 0–5)
NEUTS SEG # BLD: 13.1 K/UL (ref 1.8–8)
NEUTS SEG NFR BLD: 82 % (ref 40–73)
NRBC # BLD: 0 K/UL (ref 0–0.01)
NRBC BLD-RTO: 0 PER 100 WBC
PHOSPHATE SERPL-MCNC: 2.6 MG/DL (ref 2.5–4.9)
PLATELET # BLD AUTO: 247 K/UL (ref 135–420)
PLATELET COMMENTS,PCOM: ABNORMAL
PMV BLD AUTO: 10.9 FL (ref 9.2–11.8)
POTASSIUM SERPL-SCNC: 3.7 MMOL/L (ref 3.5–5.5)
RBC # BLD AUTO: 3.32 M/UL (ref 4.35–5.65)
RBC MORPH BLD: ABNORMAL
SERVICE CMNT-IMP: ABNORMAL
SODIUM SERPL-SCNC: 145 MMOL/L (ref 136–145)
WBC # BLD AUTO: 15.1 K/UL (ref 4.6–13.2)
WBC MORPH BLD: ABNORMAL

## 2022-06-06 PROCEDURE — 83735 ASSAY OF MAGNESIUM: CPT

## 2022-06-06 PROCEDURE — C9113 INJ PANTOPRAZOLE SODIUM, VIA: HCPCS | Performed by: INTERNAL MEDICINE

## 2022-06-06 PROCEDURE — 2709999900 HC NON-CHARGEABLE SUPPLY

## 2022-06-06 PROCEDURE — 65270000046 HC RM TELEMETRY

## 2022-06-06 PROCEDURE — 74011250636 HC RX REV CODE- 250/636: Performed by: HOSPITALIST

## 2022-06-06 PROCEDURE — 74011000250 HC RX REV CODE- 250: Performed by: INTERNAL MEDICINE

## 2022-06-06 PROCEDURE — 74011250636 HC RX REV CODE- 250/636: Performed by: INTERNAL MEDICINE

## 2022-06-06 PROCEDURE — 43752 NASAL/OROGASTRIC W/TUBE PLMT: CPT

## 2022-06-06 PROCEDURE — 85025 COMPLETE CBC W/AUTO DIFF WBC: CPT

## 2022-06-06 PROCEDURE — 99232 SBSQ HOSP IP/OBS MODERATE 35: CPT | Performed by: HOSPITALIST

## 2022-06-06 PROCEDURE — 36415 COLL VENOUS BLD VENIPUNCTURE: CPT

## 2022-06-06 PROCEDURE — 74011000258 HC RX REV CODE- 258: Performed by: INTERNAL MEDICINE

## 2022-06-06 PROCEDURE — 80048 BASIC METABOLIC PNL TOTAL CA: CPT

## 2022-06-06 PROCEDURE — 84100 ASSAY OF PHOSPHORUS: CPT

## 2022-06-06 RX ORDER — DEXAMETHASONE SODIUM PHOSPHATE 4 MG/ML
8 INJECTION, SOLUTION INTRA-ARTICULAR; INTRALESIONAL; INTRAMUSCULAR; INTRAVENOUS; SOFT TISSUE ONCE
Status: COMPLETED | OUTPATIENT
Start: 2022-06-06 | End: 2022-06-06

## 2022-06-06 RX ORDER — PROCHLORPERAZINE EDISYLATE 5 MG/ML
5 INJECTION INTRAMUSCULAR; INTRAVENOUS
Status: DISCONTINUED | OUTPATIENT
Start: 2022-06-06 | End: 2022-06-23 | Stop reason: HOSPADM

## 2022-06-06 RX ORDER — ONDANSETRON 2 MG/ML
4 INJECTION INTRAMUSCULAR; INTRAVENOUS EVERY 6 HOURS
Status: COMPLETED | OUTPATIENT
Start: 2022-06-06 | End: 2022-06-07

## 2022-06-06 RX ADMIN — PIPERACILLIN AND TAZOBACTAM 3.38 G: 3; .375 INJECTION, POWDER, LYOPHILIZED, FOR SOLUTION INTRAVENOUS at 17:01

## 2022-06-06 RX ADMIN — VANCOMYCIN HYDROCHLORIDE 1000 MG: 1 INJECTION, POWDER, LYOPHILIZED, FOR SOLUTION INTRAVENOUS at 10:33

## 2022-06-06 RX ADMIN — SODIUM CHLORIDE, PRESERVATIVE FREE 10 ML: 5 INJECTION INTRAVENOUS at 22:38

## 2022-06-06 RX ADMIN — PIPERACILLIN AND TAZOBACTAM 3.38 G: 3; .375 INJECTION, POWDER, LYOPHILIZED, FOR SOLUTION INTRAVENOUS at 01:15

## 2022-06-06 RX ADMIN — MORPHINE SULFATE 2 MG: 2 INJECTION, SOLUTION INTRAMUSCULAR; INTRAVENOUS at 22:29

## 2022-06-06 RX ADMIN — ONDANSETRON 4 MG: 2 INJECTION INTRAMUSCULAR; INTRAVENOUS at 17:32

## 2022-06-06 RX ADMIN — SODIUM CHLORIDE, PRESERVATIVE FREE 10 ML: 5 INJECTION INTRAVENOUS at 17:38

## 2022-06-06 RX ADMIN — DEXAMETHASONE SODIUM PHOSPHATE 8 MG: 4 INJECTION, SOLUTION INTRAMUSCULAR; INTRAVENOUS at 17:00

## 2022-06-06 RX ADMIN — PIPERACILLIN AND TAZOBACTAM 3.38 G: 3; .375 INJECTION, POWDER, LYOPHILIZED, FOR SOLUTION INTRAVENOUS at 10:13

## 2022-06-06 RX ADMIN — ONDANSETRON 4 MG: 2 INJECTION INTRAMUSCULAR; INTRAVENOUS at 22:33

## 2022-06-06 RX ADMIN — PANTOPRAZOLE SODIUM 40 MG: 40 INJECTION, POWDER, FOR SOLUTION INTRAVENOUS at 01:14
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
## 2022-06-06 NOTE — PROGRESS NOTES
Problem: Risk for Spread of Infection  Goal: Prevent transmission of infectious organism to others  Description: Prevent the transmission of infectious organisms to other patients, staff members, and visitors. Outcome: Progressing Towards Goal     Problem: Patient Education:  Go to Education Activity  Goal: Patient/Family Education  Outcome: Progressing Towards Goal     Problem: Pressure Injury - Risk of  Goal: *Prevention of pressure injury  Description: Document Jordin Scale and appropriate interventions in the flowsheet.   Outcome: Progressing Towards Goal  Note: Pressure Injury Interventions:  Sensory Interventions: Assess changes in LOC,Check visual cues for pain    Moisture Interventions: Absorbent underpads,Internal/External urinary devices    Activity Interventions: Assess need for specialty bed    Mobility Interventions: Assess need for specialty bed,Float heels    Nutrition Interventions: Document food/fluid/supplement intake    Friction and Shear Interventions: Apply protective barrier, creams and emollients,Foam dressings/transparent film/skin sealants,HOB 30 degrees or less

## 2022-06-06 NOTE — PROGRESS NOTES
Nutrition Assessment     Type and Reason for Visit: Reassess,Positive nutrition screen,Wound    Nutrition Recommendations/Plan:   1. Monitor ability to tolerate oral diet versus need to initiate parenteral nutrition support. Consider initiating PPN if patient remains NPO without improvement in abdominal pain, nausea/vomiting in the next 48 hours. Recommend NGT placement for worsening symptoms. 2. Continue IVF while NPO. Monitor and replace electrolytes as needed. Nutrition Assessment:  Patient s/p ex lap TISH, repair bladder perforation and started on clear liquids 6/3 and made NPO 6/4 due to postoperative ileus with hiccups, c/o abdominal pain and nausea/vomiting, several episodes of bilious emesis yesterday with abdominal distension and refused NGT insertion/noted difficulty in previous placement attempts by nursing. Hypernatremia resolved and renal function improving. K and Phos WNL today, previously low. Malnutrition Assessment:  Malnutrition Status: At risk for malnutrition (specify) (r/t varied/fair PO intake of CL diet currently)     Estimated Daily Nutrient Needs:  Energy (kcal):  4605-8392  Protein (g):         Fluid (ml/day):  2228-5453    Nutrition Related Findings:  Last BM 6/6 via colostomy, liquid dark green (abdomen firm, tender and distended; hypoactive bowel sounds).  Pertinent Medications: D5 ½ NS + 20 mEq/L KCl at 50 mL/hr (60 gm dextrose, 204 kcal, 24 mEq KCl per day), morphine prn, Zofran prn, pantoprazole, zosyn    Current Nutrition Therapies:  ADULT ORAL NUTRITION SUPPLEMENT Breakfast, Lunch, Dinner; Clear Liquid  DIET NPO    Anthropometric Measures:  Height:  6' 2\" (188 cm)  Current Body Wt:  102.5 kg (225 lb 15.5 oz)  BMI: 29    Nutrition Diagnosis:   · Increased nutrient needs related to altered GI structure,altered GI function,inadequate protein-energy intake as evidenced by wounds,NPO or clear liquid status due to medical condition      Nutrition Interventions:   Food and/or Nutrient Delivery: Continue NPO,IV fluid delivery  Nutrition Education/Counseling: No recommendations at this time,Education not indicated  Coordination of Nutrition Care: Continue to monitor while inpatient  Plan of Care discussed with: patient    Goals:  Previous Goal Met: No progress toward goal(s)  Goals: Meet at least 75% of estimated needs,by next RD assessment       Nutrition Monitoring and Evaluation:   Behavioral-Environmental Outcomes: None identified  Food/Nutrient Intake Outcomes: Diet advancement/tolerance,IVF intake  Physical Signs/Symptoms Outcomes: Biochemical data,GI status,Nausea/vomiting,Nutrition focused physical findings    Discharge Planning:     Too soon to determine    Jessica Moya, [address] [place] [address], Formerly [facility]  Contact: [phone]
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
## 2022-06-06 NOTE — PROGRESS NOTES
Patient noted with hiccups and spoke about being uncomfortable. Emesis bag in use with 100 cc of greenish emesis with foul smell. Family members at bedside. Noted patient's abdomen hard and distended with colostomy partial full with watery dark green drainage at 100 cc. 2110 Patient denies pain in abdomen region, family member concerned about discomfort of hiccup and vomiting earlier prior to shift change. 2251 Morphine administered IV for complaint of abdominal pain. 2320  Stated pain level is 5/10 at present with pain sharp at intervals. Patient vomited 100 cc of greenish emesis. 2330 Patient refused after attempt to insert NG tube. Patient agreed when pain was unbearable and requested NG tube. Changed his mind before staff able to insert tube. Will notify and update Dr. Sahra Sanderson on condition. 06/6/2022    0300 Resting quietly without complaints voiced. No complaint of nausea or vomiting.     0725 Bedside and Verbal shift change report given to Rosalinda Johnson LPN (oncoming nurse) by Chris Jimenez RN (offgoing nurse). Report given with SBAR, Kardex, Intake/Output, MAR and Recent Results.
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
## 2022-06-07 LAB
ANION GAP SERPL CALC-SCNC: 2 MMOL/L (ref 3–18)
BACTERIA SPEC CULT: ABNORMAL
BASOPHILS # BLD: 0 K/UL (ref 0–0.1)
BASOPHILS NFR BLD: 0 % (ref 0–2)
BUN SERPL-MCNC: 19 MG/DL (ref 7–18)
BUN/CREAT SERPL: 27 (ref 12–20)
CALCIUM SERPL-MCNC: 8.1 MG/DL (ref 8.5–10.1)
CHLORIDE SERPL-SCNC: 116 MMOL/L (ref 100–111)
CO2 SERPL-SCNC: 28 MMOL/L (ref 21–32)
CREAT SERPL-MCNC: 0.7 MG/DL (ref 0.6–1.3)
DIFFERENTIAL METHOD BLD: ABNORMAL
EOSINOPHIL # BLD: 0 K/UL (ref 0–0.4)
EOSINOPHIL NFR BLD: 0 % (ref 0–5)
ERYTHROCYTE [DISTWIDTH] IN BLOOD BY AUTOMATED COUNT: 14.6 % (ref 11.6–14.5)
GLUCOSE SERPL-MCNC: 109 MG/DL (ref 74–99)
GRAM STN SPEC: ABNORMAL
GRAM STN SPEC: ABNORMAL
HCT VFR BLD AUTO: 25 % (ref 36–48)
HGB BLD-MCNC: 8 G/DL (ref 13–16)
IMM GRANULOCYTES # BLD AUTO: 0.5 K/UL (ref 0–0.04)
IMM GRANULOCYTES NFR BLD AUTO: 3 % (ref 0–0.5)
LYMPHOCYTES # BLD: 1.6 K/UL (ref 0.9–3.6)
LYMPHOCYTES NFR BLD: 10 % (ref 21–52)
MAGNESIUM SERPL-MCNC: 1.7 MG/DL (ref 1.6–2.6)
MCH RBC QN AUTO: 28.7 PG (ref 24–34)
MCHC RBC AUTO-ENTMCNC: 32 G/DL (ref 31–37)
MCV RBC AUTO: 89.6 FL (ref 78–100)
MONOCYTES # BLD: 0.5 K/UL (ref 0.05–1.2)
MONOCYTES NFR BLD: 3 % (ref 3–10)
NEUTS SEG # BLD: 12.8 K/UL (ref 1.8–8)
NEUTS SEG NFR BLD: 83 % (ref 40–73)
NRBC # BLD: 0 K/UL (ref 0–0.01)
NRBC BLD-RTO: 0 PER 100 WBC
PLATELET # BLD AUTO: 308 K/UL (ref 135–420)
PMV BLD AUTO: 10.6 FL (ref 9.2–11.8)
POTASSIUM SERPL-SCNC: 4 MMOL/L (ref 3.5–5.5)
RBC # BLD AUTO: 2.79 M/UL (ref 4.35–5.65)
SERVICE CMNT-IMP: ABNORMAL
SODIUM SERPL-SCNC: 146 MMOL/L (ref 136–145)
WBC # BLD AUTO: 15.4 K/UL (ref 4.6–13.2)

## 2022-06-07 PROCEDURE — 74011000250 HC RX REV CODE- 250: Performed by: INTERNAL MEDICINE

## 2022-06-07 PROCEDURE — 2709999900 HC NON-CHARGEABLE SUPPLY

## 2022-06-07 PROCEDURE — 74011000250 HC RX REV CODE- 250: Performed by: HOSPITALIST

## 2022-06-07 PROCEDURE — 65270000046 HC RM TELEMETRY

## 2022-06-07 PROCEDURE — 80048 BASIC METABOLIC PNL TOTAL CA: CPT

## 2022-06-07 PROCEDURE — 74011250636 HC RX REV CODE- 250/636: Performed by: HOSPITALIST

## 2022-06-07 PROCEDURE — 99232 SBSQ HOSP IP/OBS MODERATE 35: CPT | Performed by: HOSPITALIST

## 2022-06-07 PROCEDURE — 74011250636 HC RX REV CODE- 250/636: Performed by: INTERNAL MEDICINE

## 2022-06-07 PROCEDURE — 83735 ASSAY OF MAGNESIUM: CPT

## 2022-06-07 PROCEDURE — 74011000258 HC RX REV CODE- 258: Performed by: INTERNAL MEDICINE

## 2022-06-07 PROCEDURE — C9113 INJ PANTOPRAZOLE SODIUM, VIA: HCPCS | Performed by: INTERNAL MEDICINE

## 2022-06-07 PROCEDURE — 74011250636 HC RX REV CODE- 250/636: Performed by: STUDENT IN AN ORGANIZED HEALTH CARE EDUCATION/TRAINING PROGRAM

## 2022-06-07 PROCEDURE — 85025 COMPLETE CBC W/AUTO DIFF WBC: CPT

## 2022-06-07 RX ORDER — SODIUM CHLORIDE, SODIUM LACTATE, POTASSIUM CHLORIDE, CALCIUM CHLORIDE 600; 310; 30; 20 MG/100ML; MG/100ML; MG/100ML; MG/100ML
100 INJECTION, SOLUTION INTRAVENOUS CONTINUOUS
Status: DISCONTINUED | OUTPATIENT
Start: 2022-06-07 | End: 2022-06-07

## 2022-06-07 RX ORDER — SODIUM CHLORIDE 450 MG/100ML
75 INJECTION, SOLUTION INTRAVENOUS CONTINUOUS
Status: DISCONTINUED | OUTPATIENT
Start: 2022-06-07 | End: 2022-06-11

## 2022-06-07 RX ORDER — MAGNESIUM SULFATE 1 G/100ML
1 INJECTION INTRAVENOUS ONCE
Status: COMPLETED | OUTPATIENT
Start: 2022-06-07 | End: 2022-06-07

## 2022-06-07 RX ORDER — HYDRALAZINE HYDROCHLORIDE 20 MG/ML
10 INJECTION INTRAMUSCULAR; INTRAVENOUS
Status: DISCONTINUED | OUTPATIENT
Start: 2022-06-07 | End: 2022-06-23 | Stop reason: HOSPADM

## 2022-06-07 RX ADMIN — ONDANSETRON 4 MG: 2 INJECTION INTRAMUSCULAR; INTRAVENOUS at 11:31

## 2022-06-07 RX ADMIN — PIPERACILLIN AND TAZOBACTAM 3.38 G: 3; .375 INJECTION, POWDER, LYOPHILIZED, FOR SOLUTION INTRAVENOUS at 18:01

## 2022-06-07 RX ADMIN — SODIUM CHLORIDE 100 ML/HR: 450 INJECTION, SOLUTION INTRAVENOUS at 09:44

## 2022-06-07 RX ADMIN — SODIUM CHLORIDE 100 ML/HR: 450 INJECTION, SOLUTION INTRAVENOUS at 21:05

## 2022-06-07 RX ADMIN — PANTOPRAZOLE SODIUM 40 MG: 40 INJECTION, POWDER, FOR SOLUTION INTRAVENOUS at 01:15

## 2022-06-07 RX ADMIN — SODIUM CHLORIDE, PRESERVATIVE FREE 10 ML: 5 INJECTION INTRAVENOUS at 05:18

## 2022-06-07 RX ADMIN — SODIUM CHLORIDE, PRESERVATIVE FREE 10 ML: 5 INJECTION INTRAVENOUS at 22:40

## 2022-06-07 RX ADMIN — ONDANSETRON 4 MG: 2 INJECTION INTRAMUSCULAR; INTRAVENOUS at 05:01

## 2022-06-07 RX ADMIN — SODIUM CHLORIDE, POTASSIUM CHLORIDE, SODIUM LACTATE AND CALCIUM CHLORIDE 100 ML/HR: 600; 310; 30; 20 INJECTION, SOLUTION INTRAVENOUS at 01:32

## 2022-06-07 RX ADMIN — SODIUM CHLORIDE, PRESERVATIVE FREE 10 ML: 5 INJECTION INTRAVENOUS at 14:00

## 2022-06-07 RX ADMIN — ONDANSETRON 4 MG: 2 INJECTION INTRAMUSCULAR; INTRAVENOUS at 18:01

## 2022-06-07 RX ADMIN — PIPERACILLIN AND TAZOBACTAM 3.38 G: 3; .375 INJECTION, POWDER, LYOPHILIZED, FOR SOLUTION INTRAVENOUS at 01:18

## 2022-06-07 RX ADMIN — PIPERACILLIN AND TAZOBACTAM 3.38 G: 3; .375 INJECTION, POWDER, LYOPHILIZED, FOR SOLUTION INTRAVENOUS at 09:39

## 2022-06-07 RX ADMIN — MAGNESIUM SULFATE HEPTAHYDRATE 1 G: 1 INJECTION, SOLUTION INTRAVENOUS at 09:44

## 2022-06-07 NOTE — PROGRESS NOTES
Problem: Risk for Spread of Infection  Goal: Prevent transmission of infectious organism to others  Description: Prevent the transmission of infectious organisms to other patients, staff members, and visitors. Outcome: Progressing Towards Goal     Problem: Patient Education:  Go to Education Activity  Goal: Patient/Family Education  Outcome: Progressing Towards Goal     Problem: Pressure Injury - Risk of  Goal: *Prevention of pressure injury  Description: Document Jordin Scale and appropriate interventions in the flowsheet. Outcome: Progressing Towards Goal  Note: Pressure Injury Interventions:  Sensory Interventions: Assess changes in LOC,Check visual cues for pain    Moisture Interventions: Absorbent underpads,Apply protective barrier, creams and emollients    Activity Interventions: Pressure redistribution bed/mattress(bed type)    Mobility Interventions: Float heels    Nutrition Interventions: Document food/fluid/supplement intake    Friction and Shear Interventions: Apply protective barrier, creams and emollients,Feet elevated on foot rest                Problem: Patient Education: Go to Patient Education Activity  Goal: Patient/Family Education  Outcome: Progressing Towards Goal     Problem: Falls - Risk of  Goal: *Absence of Falls  Description: Document Carisa Fall Risk and appropriate interventions in the flowsheet.   Outcome: Progressing Towards Goal  Note: Fall Risk Interventions:            Medication Interventions: Bed/chair exit alarm    Elimination Interventions: Bed/chair exit alarm,Call light in reach              Problem: Patient Education: Go to Patient Education Activity  Goal: Patient/Family Education  Outcome: Progressing Towards Goal     Problem: Nutrition Deficit  Goal: *Optimize nutritional status  Outcome: Progressing Towards Goal     Problem: Patient Education: Go to Patient Education Activity  Goal: Patient/Family Education  Outcome: Progressing Towards Goal     Problem: Pain  Goal: *Control of Pain  Outcome: Progressing Towards Goal     Problem: Patient Education: Go to Patient Education Activity  Goal: Patient/Family Education  Outcome: Progressing Towards Goal
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
## 2022-06-07 NOTE — PROGRESS NOTES
Dr. Navarro is informed of      06/06/22 4514   Vital Signs   BP (!) 167/87   He said he will write orders.

## 2022-06-07 NOTE — PROGRESS NOTES
Reason for Admission:  Septic shock ([facility] [address] [A41.9, R65.21]                 RUR Score:   16%           Plan for utilizing home health:   TBD                    Likelihood of Readmission:   Moderate                         Do you (patient/family) have any concerns for transition/discharge?  no    Transition of Care Plan:       Initial assessment completed with patient. Cognitive status of patient: oriented to time, place, person and situation. Face sheet information confirmed:  yes. The patient designates Pito Elliott ([phone]) to participate in his discharge plan and to receive any needed information. This patient lives in a single family home with mother. Patient is not able to navigate steps as needed. Prior to hospitalization, patient was considered to be independent with ADLs/IADLS : no . If not independent,  patient needs assist with : dressing, bathing, food preparation, cooking, toileting and grooming  Patient has personal care aide with [facility] 7 days a week 9am -1 pm and 1 pm -6 pm.    Patient has a current ACP document on file: yes      The patient will need BLS stretcher transportation upon discharge. The patient already has [facility] lift, [facility], and hospital bed medical equipment available in the home. Patient is not currently active with home health. Patient has not stayed in a skilled nursing facility or rehab. Was  stay within last 60 days : .      This patient is on dialysis :no     Freedom of choice signed: no.     Currently, the discharge plan is  Home with family assistance vs home health. The patient states that he can obtain his medications from the pharmacy, and take his medications as directed with assistance. Patient's current insurance is [facility] Medicaid/ [facility] medicaid      Care Management Interventions  PCP Verified by CM:  Yes  Transition of Care Consult (CM Consult): Discharge Planning  Discharge Durable Medical Equipment: No  Physical Therapy Consult: No  Occupational Therapy Consult: No  Speech Therapy Consult: No  Support Systems: Parent(s)  Confirm Follow Up Transport: Family  Discharge Location  Patient Expects to be Discharged to[de-identified] Other: (home with family assistance vs home health)      TRINA ScottN, RN  Pager # [phone]  Care Manager
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
## 2022-06-07 NOTE — PROGRESS NOTES
Bedside shift change report given to Klever Stanley RN(oncoming nurse) by Carlin Kelley LPN (offgoing nurse). Report included the following information SBAR, Kardex, Intake/Output, MAR and Recent Results.

## 2022-06-07 NOTE — PROGRESS NOTES
Problem: Risk for Spread of Infection  Goal: Prevent transmission of infectious organism to others  Description: Prevent the transmission of infectious organisms to other patients, staff members, and visitors. Outcome: Progressing Towards Goal     Problem: Patient Education:  Go to Education Activity  Goal: Patient/Family Education  Outcome: Progressing Towards Goal     Problem: Pressure Injury - Risk of  Goal: *Prevention of pressure injury  Description: Document Jordin Scale and appropriate interventions in the flowsheet. Outcome: Progressing Towards Goal  Note: Pressure Injury Interventions:  Sensory Interventions: Assess changes in LOC,Check visual cues for pain,Float heels,Keep linens dry and wrinkle-free,Minimize linen layers,Pad between skin to skin,Monitor skin under medical devices,Pressure redistribution bed/mattress (bed type),Suspension boots,Turn and reposition approx. every two hours (pillows and wedges if needed)    Moisture Interventions: Absorbent underpads,Apply protective barrier, creams and emollients,Check for incontinence Q2 hours and as needed,Internal/External fecal devices,Internal/External urinary devices,Limit adult briefs,Minimize layers,Moisture barrier,Offer toileting Q_hr    Activity Interventions: Pressure redistribution bed/mattress(bed type)    Mobility Interventions: Float heels,HOB 30 degrees or less,Pressure redistribution bed/mattress (bed type),Suspension boots,Turn and reposition approx.  every two hours(pillow and wedges)    Nutrition Interventions: Document food/fluid/supplement intake    Friction and Shear Interventions: Apply protective barrier, creams and emollients,Feet elevated on foot rest,Foam dressings/transparent film/skin sealants,HOB 30 degrees or less,Lift sheet,Minimize layers                Problem: Patient Education: Go to Patient Education Activity  Goal: Patient/Family Education  Outcome: Progressing Towards Goal     Problem: Falls - Risk of  Goal: *Absence of Falls  Description: Document Susan Romario Fall Risk and appropriate interventions in the flowsheet.   Outcome: Progressing Towards Goal  Note: Fall Risk Interventions:            Medication Interventions: Bed/chair exit alarm,Patient to call before getting OOB,Teach patient to arise slowly    Elimination Interventions: Bed/chair exit alarm,Call light in reach,Elevated toilet seat,Patient to call for help with toileting needs,Stay With Me (per policy),Toilet paper/wipes in reach,Toileting schedule/hourly rounds              Problem: Patient Education: Go to Patient Education Activity  Goal: Patient/Family Education  Outcome: Progressing Towards Goal     Problem: Nutrition Deficit  Goal: *Optimize nutritional status  Outcome: Progressing Towards Goal     Problem: Patient Education: Go to Patient Education Activity  Goal: Patient/Family Education  Outcome: Progressing Towards Goal     Problem: Pain  Goal: *Control of Pain  Outcome: Progressing Towards Goal     Problem: Patient Education: Go to Patient Education Activity  Goal: Patient/Family Education  Outcome: Progressing Towards Goal

## 2022-06-08 LAB
ANION GAP SERPL CALC-SCNC: 4 MMOL/L (ref 3–18)
BACTERIA SPEC CULT: NORMAL
BACTERIA SPEC CULT: NORMAL
BASOPHILS # BLD: 0 K/UL (ref 0–0.1)
BASOPHILS NFR BLD: 0 % (ref 0–2)
BUN SERPL-MCNC: 14 MG/DL (ref 7–18)
BUN/CREAT SERPL: 21 (ref 12–20)
CALCIUM SERPL-MCNC: 7.7 MG/DL (ref 8.5–10.1)
CHLORIDE SERPL-SCNC: 109 MMOL/L (ref 100–111)
CO2 SERPL-SCNC: 26 MMOL/L (ref 21–32)
CREAT SERPL-MCNC: 0.66 MG/DL (ref 0.6–1.3)
DIFFERENTIAL METHOD BLD: ABNORMAL
EOSINOPHIL # BLD: 0.1 K/UL (ref 0–0.4)
EOSINOPHIL NFR BLD: 1 % (ref 0–5)
ERYTHROCYTE [DISTWIDTH] IN BLOOD BY AUTOMATED COUNT: 14.5 % (ref 11.6–14.5)
GLUCOSE SERPL-MCNC: 90 MG/DL (ref 74–99)
HCT VFR BLD AUTO: 25.3 % (ref 36–48)
HGB BLD-MCNC: 8.1 G/DL (ref 13–16)
IMM GRANULOCYTES # BLD AUTO: 0.4 K/UL (ref 0–0.04)
IMM GRANULOCYTES NFR BLD AUTO: 2 % (ref 0–0.5)
LYMPHOCYTES # BLD: 2.8 K/UL (ref 0.9–3.6)
LYMPHOCYTES NFR BLD: 16 % (ref 21–52)
MAGNESIUM SERPL-MCNC: 1.7 MG/DL (ref 1.6–2.6)
MCH RBC QN AUTO: 28.6 PG (ref 24–34)
MCHC RBC AUTO-ENTMCNC: 32 G/DL (ref 31–37)
MCV RBC AUTO: 89.4 FL (ref 78–100)
MONOCYTES # BLD: 1 K/UL (ref 0.05–1.2)
MONOCYTES NFR BLD: 6 % (ref 3–10)
NEUTS SEG # BLD: 13.4 K/UL (ref 1.8–8)
NEUTS SEG NFR BLD: 76 % (ref 40–73)
NRBC # BLD: 0.04 K/UL (ref 0–0.01)
NRBC BLD-RTO: 0.2 PER 100 WBC
PHOSPHATE SERPL-MCNC: 2.5 MG/DL (ref 2.5–4.9)
PLATELET # BLD AUTO: 412 K/UL (ref 135–420)
PMV BLD AUTO: 10.5 FL (ref 9.2–11.8)
POTASSIUM SERPL-SCNC: 3.3 MMOL/L (ref 3.5–5.5)
RBC # BLD AUTO: 2.83 M/UL (ref 4.35–5.65)
SERVICE CMNT-IMP: NORMAL
SERVICE CMNT-IMP: NORMAL
SODIUM SERPL-SCNC: 139 MMOL/L (ref 136–145)
WBC # BLD AUTO: 17.7 K/UL (ref 4.6–13.2)

## 2022-06-08 PROCEDURE — 74011250636 HC RX REV CODE- 250/636: Performed by: INTERNAL MEDICINE

## 2022-06-08 PROCEDURE — 83735 ASSAY OF MAGNESIUM: CPT

## 2022-06-08 PROCEDURE — C9113 INJ PANTOPRAZOLE SODIUM, VIA: HCPCS | Performed by: INTERNAL MEDICINE

## 2022-06-08 PROCEDURE — 74011250637 HC RX REV CODE- 250/637: Performed by: PHYSICIAN ASSISTANT

## 2022-06-08 PROCEDURE — 74011000258 HC RX REV CODE- 258: Performed by: INTERNAL MEDICINE

## 2022-06-08 PROCEDURE — 99232 SBSQ HOSP IP/OBS MODERATE 35: CPT | Performed by: INTERNAL MEDICINE

## 2022-06-08 PROCEDURE — 65270000046 HC RM TELEMETRY

## 2022-06-08 PROCEDURE — 80048 BASIC METABOLIC PNL TOTAL CA: CPT

## 2022-06-08 PROCEDURE — 74011000250 HC RX REV CODE- 250: Performed by: HOSPITALIST

## 2022-06-08 PROCEDURE — 2709999900 HC NON-CHARGEABLE SUPPLY

## 2022-06-08 PROCEDURE — 74011000250 HC RX REV CODE- 250: Performed by: INTERNAL MEDICINE

## 2022-06-08 PROCEDURE — 84100 ASSAY OF PHOSPHORUS: CPT

## 2022-06-08 PROCEDURE — 85025 COMPLETE CBC W/AUTO DIFF WBC: CPT

## 2022-06-08 RX ORDER — POTASSIUM CHLORIDE 7.45 MG/ML
10 INJECTION INTRAVENOUS ONCE
Status: COMPLETED | OUTPATIENT
Start: 2022-06-08 | End: 2022-06-08

## 2022-06-08 RX ADMIN — POTASSIUM CHLORIDE 10 MEQ: 7.46 INJECTION, SOLUTION INTRAVENOUS at 16:57

## 2022-06-08 RX ADMIN — TROSPIUM CHLORIDE 20 MG: 20 TABLET, FILM COATED ORAL at 09:53

## 2022-06-08 RX ADMIN — SODIUM CHLORIDE 100 ML/HR: 450 INJECTION, SOLUTION INTRAVENOUS at 07:08

## 2022-06-08 RX ADMIN — SODIUM CHLORIDE 100 ML/HR: 450 INJECTION, SOLUTION INTRAVENOUS at 17:09

## 2022-06-08 RX ADMIN — PIPERACILLIN AND TAZOBACTAM 3.38 G: 3; .375 INJECTION, POWDER, LYOPHILIZED, FOR SOLUTION INTRAVENOUS at 09:53

## 2022-06-08 RX ADMIN — SODIUM CHLORIDE, PRESERVATIVE FREE 10 ML: 5 INJECTION INTRAVENOUS at 16:58

## 2022-06-08 RX ADMIN — PIPERACILLIN AND TAZOBACTAM 3.38 G: 3; .375 INJECTION, POWDER, LYOPHILIZED, FOR SOLUTION INTRAVENOUS at 16:57

## 2022-06-08 RX ADMIN — SODIUM CHLORIDE, PRESERVATIVE FREE 10 ML: 5 INJECTION INTRAVENOUS at 22:39

## 2022-06-08 RX ADMIN — PIPERACILLIN AND TAZOBACTAM 3.38 G: 3; .375 INJECTION, POWDER, LYOPHILIZED, FOR SOLUTION INTRAVENOUS at 01:06

## 2022-06-08 RX ADMIN — PANTOPRAZOLE SODIUM 40 MG: 40 INJECTION, POWDER, FOR SOLUTION INTRAVENOUS at 01:03

## 2022-06-08 RX ADMIN — SODIUM CHLORIDE, PRESERVATIVE FREE 10 ML: 5 INJECTION INTRAVENOUS at 06:54

## 2022-06-08 NOTE — PROGRESS NOTES
Nutrition Assessment     Type and Reason for Visit: Reassess,Wound,Positive nutrition screen    Nutrition Recommendations/Plan:   1. Monitor po intake, diet tolerance and ability to advance. 2. Add oral nutrition supplements: Ensure Clear BID and Gelatein BID. Odalis Huitron Nutrition Assessment:  Patient s/p exploratory laparotomy, washout of intraabdominal abscess, repair of bladder perforation on 6/2/22 and NPO due to postoperative ileus, started on clear liquids today. Decreased appetite, denies nausea/vomiting, tolerating clears. Agreeable to supplements. Malnutrition Assessment:  Malnutrition Status: At risk for malnutrition (specify) (r/t varied/fair PO intake of CL diet currently)     Estimated Daily Nutrient Needs:  Energy (kcal):  8487-3324  Protein (g):         Fluid (ml/day):  9614-9424    Nutrition Related Findings:  Last BM 6/8 via colostomy, liquid dark green (abdominal distension improved).  Pertinent Medications: ½ NS at 100 mL/hr    Current Nutrition Therapies:  ADULT ORAL NUTRITION SUPPLEMENT Breakfast, Lunch, Dinner; Clear Liquid  ADULT DIET Clear Liquid    Anthropometric Measures:  Height:  6' 2\" (188 cm)  Current Body Wt:  102.5 kg (225 lb 15.5 oz)  BMI: 29    Nutrition Diagnosis:   · Increased nutrient needs related to altered GI structure,altered GI function,inadequate protein-energy intake as evidenced by wounds,NPO or clear liquid status due to medical condition      Nutrition Interventions:   Food and/or Nutrient Delivery: Continue current diet,Start oral nutrition supplement,IV fluid delivery  Nutrition Education/Counseling: No recommendations at this time,Education not indicated  Coordination of Nutrition Care: Continue to monitor while inpatient  Plan of Care discussed with: patient    Goals:  Previous Goal Met: Progressing toward goal(s)  Goals: Meet at least 75% of estimated needs,by next RD assessment    Nutrition Monitoring and Evaluation:   Behavioral-Environmental Outcomes: None identified  Food/Nutrient Intake Outcomes: Diet advancement/tolerance,Food and nutrient intake,Supplement intake  Physical Signs/Symptoms Outcomes: Biochemical data,GI status,Nausea/vomiting,Meal time behavior,Nutrition focused physical findings    Discharge Planning:     Too soon to determine    Evans Philip, [address] [place] [address], [facility]  Contact: [phone]
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
## 2022-06-08 NOTE — PROGRESS NOTES
Problem: Risk for Spread of Infection  Goal: Prevent transmission of infectious organism to others  Description: Prevent the transmission of infectious organisms to other patients, staff members, and visitors. Outcome: Progressing Towards Goal     Problem: Patient Education:  Go to Education Activity  Goal: Patient/Family Education  Outcome: Progressing Towards Goal     Problem: Pressure Injury - Risk of  Goal: *Prevention of pressure injury  Description: Document Jordin Scale and appropriate interventions in the flowsheet. Outcome: Progressing Towards Goal  Note: Pressure Injury Interventions:  Sensory Interventions: Assess changes in LOC,Check visual cues for pain,Float heels,Keep linens dry and wrinkle-free,Minimize linen layers,Pressure redistribution bed/mattress (bed type),Turn and reposition approx. every two hours (pillows and wedges if needed),Suspension boots    Moisture Interventions: Absorbent underpads,Apply protective barrier, creams and emollients    Activity Interventions: Pressure redistribution bed/mattress(bed type)    Mobility Interventions: Float heels,HOB 30 degrees or less,Pressure redistribution bed/mattress (bed type),Suspension boots,Turn and reposition approx. every two hours(pillow and wedges)    Nutrition Interventions: Document food/fluid/supplement intake    Friction and Shear Interventions: Apply protective barrier, creams and emollients,Foam dressings/transparent film/skin sealants,HOB 30 degrees or less,Lift sheet,Minimize layers,Feet elevated on foot rest                Problem: Patient Education: Go to Patient Education Activity  Goal: Patient/Family Education  Outcome: Progressing Towards Goal     Problem: Falls - Risk of  Goal: *Absence of Falls  Description: Document Carisa Fall Risk and appropriate interventions in the flowsheet.   Outcome: Progressing Towards Goal  Note: Fall Risk Interventions:            Medication Interventions: Bed/chair exit alarm,Patient to call before getting OOB,Teach patient to arise slowly    Elimination Interventions: Bed/chair exit alarm,Call light in reach,Patient to call for help with toileting needs,Stay With Me (per policy),Toilet paper/wipes in reach,Toileting schedule/hourly rounds              Problem: Patient Education: Go to Patient Education Activity  Goal: Patient/Family Education  Outcome: Progressing Towards Goal     Problem: Nutrition Deficit  Goal: *Optimize nutritional status  Outcome: Progressing Towards Goal     Problem: Patient Education: Go to Patient Education Activity  Goal: Patient/Family Education  Outcome: Progressing Towards Goal     Problem: Pain  Goal: *Control of Pain  Outcome: Progressing Towards Goal     Problem: Patient Education: Go to Patient Education Activity  Goal: Patient/Family Education  Outcome: Progressing Towards Goal

## 2022-06-08 NOTE — PROGRESS NOTES
Problem: Risk for Spread of Infection  Goal: Prevent transmission of infectious organism to others  Description: Prevent the transmission of infectious organisms to other patients, staff members, and visitors. Outcome: Progressing Towards Goal     Problem: Patient Education:  Go to Education Activity  Goal: Patient/Family Education  Outcome: Progressing Towards Goal     Problem: Pressure Injury - Risk of  Goal: *Prevention of pressure injury  Description: Document Jordin Scale and appropriate interventions in the flowsheet. Outcome: Progressing Towards Goal  Note: Pressure Injury Interventions:  Sensory Interventions: Assess changes in LOC,Minimize linen layers,Suspension boots    Moisture Interventions: Absorbent underpads,Apply protective barrier, creams and emollients    Activity Interventions: Pressure redistribution bed/mattress(bed type)    Mobility Interventions: Suspension boots,HOB 30 degrees or less,Pressure redistribution bed/mattress (bed type)    Nutrition Interventions: Document food/fluid/supplement intake    Friction and Shear Interventions: Apply protective barrier, creams and emollients,HOB 30 degrees or less,Lift sheet                Problem: Patient Education: Go to Patient Education Activity  Goal: Patient/Family Education  Outcome: Progressing Towards Goal     Problem: Falls - Risk of  Goal: *Absence of Falls  Description: Document Carisa Fall Risk and appropriate interventions in the flowsheet.   Outcome: Progressing Towards Goal  Note: Fall Risk Interventions:            Medication Interventions: Bed/chair exit alarm    Elimination Interventions: Bed/chair exit alarm,Call light in reach              Problem: Patient Education: Go to Patient Education Activity  Goal: Patient/Family Education  Outcome: Progressing Towards Goal     Problem: Nutrition Deficit  Goal: *Optimize nutritional status  Outcome: Progressing Towards Goal     Problem: Patient Education: Go to Patient Education Activity  Goal: Patient/Family Education  Outcome: Progressing Towards Goal     Problem: Pain  Goal: *Control of Pain  Outcome: Progressing Towards Goal     Problem: Patient Education: Go to Patient Education Activity  Goal: Patient/Family Education  Outcome: Progressing Towards Goal

## 2022-06-08 NOTE — PROGRESS NOTES
Courtesy ID Note    Patient follows with [facility] as OP and called me into his room when he saw me walk by is door. Will assist in smooth transition to OP care and home health wound care. Has pre-existing appointment on 6/14 for re-check of sacral wound and recurrent UTI. Additional pre-exitsing joint appoinment with podiatry on 6/23 for wound care to LE wounds/feet. Dr. Charito Jimenes aware I am availble to assit with transition to [facility] as needed.

## 2022-06-09 ENCOUNTER — APPOINTMENT (OUTPATIENT)
Dept: CT IMAGING | Age: 62
DRG: 441 | End: 2022-06-09
Attending: PHYSICIAN ASSISTANT
Payer: MEDICAID

## 2022-06-09 ENCOUNTER — APPOINTMENT (OUTPATIENT)
Dept: GENERAL RADIOLOGY | Age: 62
DRG: 441 | End: 2022-06-09
Attending: PHYSICIAN ASSISTANT
Payer: MEDICAID

## 2022-06-09 LAB
ALBUMIN SERPL-MCNC: 2.1 G/DL (ref 3.4–5)
ALBUMIN SERPL-MCNC: 2.5 G/DL (ref 3.4–5)
ALBUMIN/GLOB SERPL: 0.5 {RATIO} (ref 0.8–1.7)
ALBUMIN/GLOB SERPL: 0.5 {RATIO} (ref 0.8–1.7)
ALP SERPL-CCNC: 61 U/L (ref 45–117)
ALP SERPL-CCNC: 69 U/L (ref 45–117)
ALT SERPL-CCNC: 57 U/L (ref 16–61)
ALT SERPL-CCNC: 66 U/L (ref 16–61)
ANION GAP SERPL CALC-SCNC: 5 MMOL/L (ref 3–18)
AST SERPL-CCNC: 49 U/L (ref 10–38)
AST SERPL-CCNC: 57 U/L (ref 10–38)
BASOPHILS # BLD: 0 K/UL (ref 0–0.1)
BASOPHILS NFR BLD: 0 % (ref 0–2)
BILIRUB DIRECT SERPL-MCNC: 0.5 MG/DL (ref 0–0.2)
BILIRUB SERPL-MCNC: 0.7 MG/DL (ref 0.2–1)
BILIRUB SERPL-MCNC: 0.8 MG/DL (ref 0.2–1)
BUN SERPL-MCNC: 9 MG/DL (ref 7–18)
BUN/CREAT SERPL: 13 (ref 12–20)
CALCIUM SERPL-MCNC: 7.9 MG/DL (ref 8.5–10.1)
CHLORIDE SERPL-SCNC: 103 MMOL/L (ref 100–111)
CO2 SERPL-SCNC: 26 MMOL/L (ref 21–32)
CREAT SERPL-MCNC: 0.68 MG/DL (ref 0.6–1.3)
DIFFERENTIAL METHOD BLD: ABNORMAL
EOSINOPHIL # BLD: 0.2 K/UL (ref 0–0.4)
EOSINOPHIL NFR BLD: 1 % (ref 0–5)
ERYTHROCYTE [DISTWIDTH] IN BLOOD BY AUTOMATED COUNT: 14.1 % (ref 11.6–14.5)
GLOBULIN SER CALC-MCNC: 4.6 G/DL (ref 2–4)
GLOBULIN SER CALC-MCNC: 4.8 G/DL (ref 2–4)
GLUCOSE BLD STRIP.AUTO-MCNC: 200 MG/DL (ref 70–110)
GLUCOSE SERPL-MCNC: 79 MG/DL (ref 74–99)
HCT VFR BLD AUTO: 26.9 % (ref 36–48)
HGB BLD-MCNC: 8.6 G/DL (ref 13–16)
IMM GRANULOCYTES # BLD AUTO: 0.3 K/UL (ref 0–0.04)
IMM GRANULOCYTES NFR BLD AUTO: 2 % (ref 0–0.5)
LYMPHOCYTES # BLD: 2.7 K/UL (ref 0.9–3.6)
LYMPHOCYTES NFR BLD: 15 % (ref 21–52)
MCH RBC QN AUTO: 28.1 PG (ref 24–34)
MCHC RBC AUTO-ENTMCNC: 32 G/DL (ref 31–37)
MCV RBC AUTO: 87.9 FL (ref 78–100)
MONOCYTES # BLD: 1 K/UL (ref 0.05–1.2)
MONOCYTES NFR BLD: 6 % (ref 3–10)
NEUTS SEG # BLD: 13.4 K/UL (ref 1.8–8)
NEUTS SEG NFR BLD: 76 % (ref 40–73)
NRBC # BLD: 0.02 K/UL (ref 0–0.01)
NRBC BLD-RTO: 0.1 PER 100 WBC
PLATELET # BLD AUTO: 402 K/UL (ref 135–420)
PMV BLD AUTO: 10.9 FL (ref 9.2–11.8)
POTASSIUM SERPL-SCNC: 3.1 MMOL/L (ref 3.5–5.5)
PROT SERPL-MCNC: 6.7 G/DL (ref 6.4–8.2)
PROT SERPL-MCNC: 7.3 G/DL (ref 6.4–8.2)
RBC # BLD AUTO: 3.06 M/UL (ref 4.35–5.65)
SODIUM SERPL-SCNC: 134 MMOL/L (ref 136–145)
WBC # BLD AUTO: 17.5 K/UL (ref 4.6–13.2)

## 2022-06-09 PROCEDURE — 74011250637 HC RX REV CODE- 250/637: Performed by: INTERNAL MEDICINE

## 2022-06-09 PROCEDURE — 77030041076 HC DRSG AG OPTICELL MDII -A

## 2022-06-09 PROCEDURE — 74011000636 HC RX REV CODE- 636: Performed by: INTERNAL MEDICINE

## 2022-06-09 PROCEDURE — 74011000250 HC RX REV CODE- 250: Performed by: INTERNAL MEDICINE

## 2022-06-09 PROCEDURE — 82962 GLUCOSE BLOOD TEST: CPT

## 2022-06-09 PROCEDURE — 36415 COLL VENOUS BLD VENIPUNCTURE: CPT

## 2022-06-09 PROCEDURE — 80076 HEPATIC FUNCTION PANEL: CPT

## 2022-06-09 PROCEDURE — 65270000046 HC RM TELEMETRY

## 2022-06-09 PROCEDURE — 2709999900 HC NON-CHARGEABLE SUPPLY

## 2022-06-09 PROCEDURE — 99232 SBSQ HOSP IP/OBS MODERATE 35: CPT | Performed by: INTERNAL MEDICINE

## 2022-06-09 PROCEDURE — 74011000258 HC RX REV CODE- 258: Performed by: INTERNAL MEDICINE

## 2022-06-09 PROCEDURE — 74011000250 HC RX REV CODE- 250: Performed by: HOSPITALIST

## 2022-06-09 PROCEDURE — 80053 COMPREHEN METABOLIC PANEL: CPT

## 2022-06-09 PROCEDURE — 74011250636 HC RX REV CODE- 250/636: Performed by: INTERNAL MEDICINE

## 2022-06-09 PROCEDURE — 87040 BLOOD CULTURE FOR BACTERIA: CPT

## 2022-06-09 PROCEDURE — 85025 COMPLETE CBC W/AUTO DIFF WBC: CPT

## 2022-06-09 PROCEDURE — 74011250637 HC RX REV CODE- 250/637: Performed by: PHYSICIAN ASSISTANT

## 2022-06-09 PROCEDURE — 74018 RADEX ABDOMEN 1 VIEW: CPT

## 2022-06-09 PROCEDURE — 74177 CT ABD & PELVIS W/CONTRAST: CPT

## 2022-06-09 PROCEDURE — C9113 INJ PANTOPRAZOLE SODIUM, VIA: HCPCS | Performed by: INTERNAL MEDICINE

## 2022-06-09 RX ORDER — POTASSIUM CHLORIDE 20 MEQ/1
40 TABLET, EXTENDED RELEASE ORAL EVERY 4 HOURS
Status: COMPLETED | OUTPATIENT
Start: 2022-06-09 | End: 2022-06-09

## 2022-06-09 RX ORDER — MIRTAZAPINE 15 MG/1
15 TABLET, ORALLY DISINTEGRATING ORAL
Status: DISCONTINUED | OUTPATIENT
Start: 2022-06-09 | End: 2022-06-18

## 2022-06-09 RX ADMIN — POTASSIUM CHLORIDE 40 MEQ: 1500 TABLET, EXTENDED RELEASE ORAL at 16:14

## 2022-06-09 RX ADMIN — SODIUM CHLORIDE 100 ML/HR: 450 INJECTION, SOLUTION INTRAVENOUS at 04:18

## 2022-06-09 RX ADMIN — POTASSIUM CHLORIDE 40 MEQ: 1500 TABLET, EXTENDED RELEASE ORAL at 10:54

## 2022-06-09 RX ADMIN — SODIUM CHLORIDE 75 ML/HR: 450 INJECTION, SOLUTION INTRAVENOUS at 20:43

## 2022-06-09 RX ADMIN — PANTOPRAZOLE SODIUM 40 MG: 40 INJECTION, POWDER, FOR SOLUTION INTRAVENOUS at 01:13

## 2022-06-09 RX ADMIN — TROSPIUM CHLORIDE 20 MG: 20 TABLET, FILM COATED ORAL at 16:14

## 2022-06-09 RX ADMIN — PIPERACILLIN AND TAZOBACTAM 3.38 G: 3; .375 INJECTION, POWDER, LYOPHILIZED, FOR SOLUTION INTRAVENOUS at 16:14

## 2022-06-09 RX ADMIN — TROSPIUM CHLORIDE 20 MG: 20 TABLET, FILM COATED ORAL at 10:54

## 2022-06-09 RX ADMIN — IOPAMIDOL 100 ML: 612 INJECTION, SOLUTION INTRAVENOUS at 15:11

## 2022-06-09 RX ADMIN — SODIUM CHLORIDE, PRESERVATIVE FREE 10 ML: 5 INJECTION INTRAVENOUS at 22:31

## 2022-06-09 RX ADMIN — SODIUM CHLORIDE, PRESERVATIVE FREE 10 ML: 5 INJECTION INTRAVENOUS at 05:58

## 2022-06-09 RX ADMIN — MIRTAZAPINE 15 MG: 15 TABLET, ORALLY DISINTEGRATING ORAL at 22:25

## 2022-06-09 RX ADMIN — PIPERACILLIN AND TAZOBACTAM 3.38 G: 3; .375 INJECTION, POWDER, LYOPHILIZED, FOR SOLUTION INTRAVENOUS at 10:54

## 2022-06-09 RX ADMIN — PIPERACILLIN AND TAZOBACTAM 3.38 G: 3; .375 INJECTION, POWDER, LYOPHILIZED, FOR SOLUTION INTRAVENOUS at 01:17
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
## 2022-06-09 NOTE — PROGRESS NOTES
Spoke to Arvind Chambers  Through her pager [phone] at 3:01 pm calling to ct . She ok'd pt being scanned with just iv and would do an xray if needed (as pt brought down and didn't drink) ml called Dr Candie Batista and ok'd iv only too .
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
## 2022-06-09 NOTE — PROGRESS NOTES
Problem: Risk for Spread of Infection  Goal: Prevent transmission of infectious organism to others  Description: Prevent the transmission of infectious organisms to other patients, staff members, and visitors. Outcome: Progressing Towards Goal     Problem: Patient Education:  Go to Education Activity  Goal: Patient/Family Education  Outcome: Progressing Towards Goal     Problem: Pressure Injury - Risk of  Goal: *Prevention of pressure injury  Description: Document Jordin Scale and appropriate interventions in the flowsheet. Outcome: Progressing Towards Goal  Note: Pressure Injury Interventions:  Sensory Interventions: Assess changes in LOC,Check visual cues for pain,Float heels,Keep linens dry and wrinkle-free,Minimize linen layers,Pressure redistribution bed/mattress (bed type),Monitor skin under medical devices,Suspension boots,Turn and reposition approx. every two hours (pillows and wedges if needed)    Moisture Interventions: Absorbent underpads,Apply protective barrier, creams and emollients,Check for incontinence Q2 hours and as needed,Internal/External urinary devices,Contain wound drainage,Limit adult briefs,Minimize layers,Maintain skin hydration (lotion/cream),Moisture barrier,Offer toileting Q_hr    Activity Interventions: Pressure redistribution bed/mattress(bed type)    Mobility Interventions: Float heels,HOB 30 degrees or less,Pressure redistribution bed/mattress (bed type),Suspension boots,Turn and reposition approx.  every two hours(pillow and wedges)    Nutrition Interventions: Document food/fluid/supplement intake    Friction and Shear Interventions: Apply protective barrier, creams and emollients,Foam dressings/transparent film/skin sealants,HOB 30 degrees or less,Lift sheet,Minimize layers                Problem: Patient Education: Go to Patient Education Activity  Goal: Patient/Family Education  Outcome: Progressing Towards Goal     Problem: Falls - Risk of  Goal: *Absence of Falls  Description: Document Nanette Adames Fall Risk and appropriate interventions in the flowsheet.   Outcome: Progressing Towards Goal  Note: Fall Risk Interventions:            Medication Interventions: Bed/chair exit alarm,Patient to call before getting OOB,Teach patient to arise slowly    Elimination Interventions: Bed/chair exit alarm,Call light in reach,Elevated toilet seat,Patient to call for help with toileting needs,Stay With Me (per policy),Toilet paper/wipes in reach              Problem: Patient Education: Go to Patient Education Activity  Goal: Patient/Family Education  Outcome: Progressing Towards Goal     Problem: Nutrition Deficit  Goal: *Optimize nutritional status  Outcome: Progressing Towards Goal     Problem: Patient Education: Go to Patient Education Activity  Goal: Patient/Family Education  Outcome: Progressing Towards Goal     Problem: Pain  Goal: *Control of Pain  Outcome: Progressing Towards Goal     Problem: Patient Education: Go to Patient Education Activity  Goal: Patient/Family Education  Outcome: Progressing Towards Goal
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
## 2022-06-10 ENCOUNTER — APPOINTMENT (OUTPATIENT)
Dept: CT IMAGING | Age: 62
DRG: 441 | End: 2022-06-10
Attending: PHYSICIAN ASSISTANT
Payer: MEDICAID

## 2022-06-10 ENCOUNTER — HOSPITAL ENCOUNTER (INPATIENT)
Dept: CT IMAGING | Age: 62
Discharge: HOME | DRG: 441 | End: 2022-06-10
Attending: PHYSICIAN ASSISTANT
Payer: MEDICAID

## 2022-06-10 VITALS
DIASTOLIC BLOOD PRESSURE: 77 MMHG | HEART RATE: 86 BPM | RESPIRATION RATE: 21 BRPM | OXYGEN SATURATION: 99 % | SYSTOLIC BLOOD PRESSURE: 145 MMHG

## 2022-06-10 LAB
ANION GAP SERPL CALC-SCNC: 5 MMOL/L (ref 3–18)
BASOPHILS # BLD: 0 K/UL (ref 0–0.1)
BASOPHILS NFR BLD: 0 % (ref 0–2)
BUN SERPL-MCNC: 5 MG/DL (ref 7–18)
BUN/CREAT SERPL: 7 (ref 12–20)
CALCIUM SERPL-MCNC: 8.1 MG/DL (ref 8.5–10.1)
CHLORIDE SERPL-SCNC: 104 MMOL/L (ref 100–111)
CO2 SERPL-SCNC: 26 MMOL/L (ref 21–32)
CREAT SERPL-MCNC: 0.69 MG/DL (ref 0.6–1.3)
DIFFERENTIAL METHOD BLD: ABNORMAL
EOSINOPHIL # BLD: 0.2 K/UL (ref 0–0.4)
EOSINOPHIL NFR BLD: 1 % (ref 0–5)
ERYTHROCYTE [DISTWIDTH] IN BLOOD BY AUTOMATED COUNT: 14.1 % (ref 11.6–14.5)
GLUCOSE SERPL-MCNC: 67 MG/DL (ref 74–99)
HCT VFR BLD AUTO: 28 % (ref 36–48)
HGB BLD-MCNC: 9.2 G/DL (ref 13–16)
IMM GRANULOCYTES # BLD AUTO: 0.3 K/UL (ref 0–0.04)
IMM GRANULOCYTES NFR BLD AUTO: 1 % (ref 0–0.5)
LYMPHOCYTES # BLD: 2.6 K/UL (ref 0.9–3.6)
LYMPHOCYTES NFR BLD: 14 % (ref 21–52)
MCH RBC QN AUTO: 28.9 PG (ref 24–34)
MCHC RBC AUTO-ENTMCNC: 32.9 G/DL (ref 31–37)
MCV RBC AUTO: 88.1 FL (ref 78–100)
MONOCYTES # BLD: 1.2 K/UL (ref 0.05–1.2)
MONOCYTES NFR BLD: 6 % (ref 3–10)
NEUTS SEG # BLD: 14.6 K/UL (ref 1.8–8)
NEUTS SEG NFR BLD: 77 % (ref 40–73)
NRBC # BLD: 0 K/UL (ref 0–0.01)
NRBC BLD-RTO: 0 PER 100 WBC
PLATELET # BLD AUTO: 428 K/UL (ref 135–420)
PMV BLD AUTO: 11 FL (ref 9.2–11.8)
POTASSIUM SERPL-SCNC: 3.9 MMOL/L (ref 3.5–5.5)
RBC # BLD AUTO: 3.18 M/UL (ref 4.35–5.65)
SODIUM SERPL-SCNC: 135 MMOL/L (ref 136–145)
WBC # BLD AUTO: 18.9 K/UL (ref 4.6–13.2)

## 2022-06-10 PROCEDURE — 85025 COMPLETE CBC W/AUTO DIFF WBC: CPT

## 2022-06-10 PROCEDURE — 87075 CULTR BACTERIA EXCEPT BLOOD: CPT

## 2022-06-10 PROCEDURE — 74011250636 HC RX REV CODE- 250/636: Performed by: INTERNAL MEDICINE

## 2022-06-10 PROCEDURE — 87205 SMEAR GRAM STAIN: CPT

## 2022-06-10 PROCEDURE — 74011000250 HC RX REV CODE- 250: Performed by: INTERNAL MEDICINE

## 2022-06-10 PROCEDURE — 80048 BASIC METABOLIC PNL TOTAL CA: CPT

## 2022-06-10 PROCEDURE — 74011000636 HC RX REV CODE- 636: Performed by: INTERNAL MEDICINE

## 2022-06-10 PROCEDURE — 74011250637 HC RX REV CODE- 250/637: Performed by: INTERNAL MEDICINE

## 2022-06-10 PROCEDURE — 75989 ABSCESS DRAINAGE UNDER X-RAY: CPT

## 2022-06-10 PROCEDURE — 0W9F3ZZ DRAINAGE OF ABDOMINAL WALL, PERCUTANEOUS APPROACH: ICD-10-PCS | Performed by: RADIOLOGY

## 2022-06-10 PROCEDURE — 99232 SBSQ HOSP IP/OBS MODERATE 35: CPT | Performed by: INTERNAL MEDICINE

## 2022-06-10 PROCEDURE — 72192 CT PELVIS W/O DYE: CPT

## 2022-06-10 PROCEDURE — 65270000046 HC RM TELEMETRY

## 2022-06-10 PROCEDURE — C9113 INJ PANTOPRAZOLE SODIUM, VIA: HCPCS | Performed by: INTERNAL MEDICINE

## 2022-06-10 PROCEDURE — 74011000258 HC RX REV CODE- 258: Performed by: INTERNAL MEDICINE

## 2022-06-10 PROCEDURE — 87186 SC STD MICRODIL/AGAR DIL: CPT

## 2022-06-10 PROCEDURE — 74011250637 HC RX REV CODE- 250/637: Performed by: PHYSICIAN ASSISTANT

## 2022-06-10 PROCEDURE — 36415 COLL VENOUS BLD VENIPUNCTURE: CPT

## 2022-06-10 PROCEDURE — 87077 CULTURE AEROBIC IDENTIFY: CPT

## 2022-06-10 RX ORDER — NALOXONE HYDROCHLORIDE 0.4 MG/ML
0.2 INJECTION, SOLUTION INTRAMUSCULAR; INTRAVENOUS; SUBCUTANEOUS
Status: DISPENSED | OUTPATIENT
Start: 2022-06-10 | End: 2022-06-10

## 2022-06-10 RX ORDER — FENTANYL CITRATE 50 UG/ML
12.5-1 INJECTION, SOLUTION INTRAMUSCULAR; INTRAVENOUS
Status: ACTIVE | OUTPATIENT
Start: 2022-06-10 | End: 2022-06-10

## 2022-06-10 RX ORDER — FLUMAZENIL 0.1 MG/ML
0.2 INJECTION INTRAVENOUS
Status: DISPENSED | OUTPATIENT
Start: 2022-06-10 | End: 2022-06-10

## 2022-06-10 RX ORDER — MIDAZOLAM HYDROCHLORIDE 1 MG/ML
.5-2 INJECTION, SOLUTION INTRAMUSCULAR; INTRAVENOUS
Status: ACTIVE | OUTPATIENT
Start: 2022-06-10 | End: 2022-06-10

## 2022-06-10 RX ADMIN — SODIUM CHLORIDE 40 MG: 9 INJECTION INTRAMUSCULAR; INTRAVENOUS; SUBCUTANEOUS at 09:39

## 2022-06-10 RX ADMIN — TROSPIUM CHLORIDE 20 MG: 20 TABLET, FILM COATED ORAL at 09:39

## 2022-06-10 RX ADMIN — SODIUM CHLORIDE, PRESERVATIVE FREE 10 ML: 5 INJECTION INTRAVENOUS at 17:29

## 2022-06-10 RX ADMIN — PIPERACILLIN AND TAZOBACTAM 3.38 G: 3; .375 INJECTION, POWDER, LYOPHILIZED, FOR SOLUTION INTRAVENOUS at 09:39

## 2022-06-10 RX ADMIN — SODIUM CHLORIDE, PRESERVATIVE FREE 10 ML: 5 INJECTION INTRAVENOUS at 22:46

## 2022-06-10 RX ADMIN — MIRTAZAPINE 15 MG: 15 TABLET, ORALLY DISINTEGRATING ORAL at 22:42

## 2022-06-10 RX ADMIN — TROSPIUM CHLORIDE 20 MG: 20 TABLET, FILM COATED ORAL at 18:02

## 2022-06-10 RX ADMIN — PIPERACILLIN AND TAZOBACTAM 3.38 G: 3; .375 INJECTION, POWDER, LYOPHILIZED, FOR SOLUTION INTRAVENOUS at 17:36

## 2022-06-10 RX ADMIN — PIPERACILLIN AND TAZOBACTAM 3.38 G: 3; .375 INJECTION, POWDER, LYOPHILIZED, FOR SOLUTION INTRAVENOUS at 02:10

## 2022-06-10 RX ADMIN — IOPAMIDOL 40 ML: 755 INJECTION, SOLUTION INTRAVENOUS at 15:57

## 2022-06-10 RX ADMIN — MULTIPLE VITAMINS W/ MINERALS TAB 1 TABLET: TAB at 09:39

## 2022-06-10 NOTE — PROGRESS NOTES
Discharge planning    Discharge plan is for home with family and personal care aide when medically cleared. Will need medicaid stretcher transport at discharge.      JEANNA Soriano, RN  Pager # [phone]  Care Manager
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
## 2022-06-10 NOTE — PROGRESS NOTES
General surgery update:     Came by to evaluate patient but he is off the floor. Spoke with interventional radiology earlier this morning and they are able to drain several abscess pockets. No indication for any surgical intervention at this time. Treat possible bowel obstruction versus ileus clinically if patient has no pain or vomiting and colostomy is functioning can have diet as tolerated. We will reevaluate patient in adonato bag[place]
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
## 2022-06-10 NOTE — PROGRESS NOTES
Bedside and Verbal shift change report given to Chapo Starkey RN (oncoming nurse) by Nan Kanner, RN (offgoing nurse). Report given with SBAR, Kardex, Intake/Output, MAR and Recent Results.

## 2022-06-10 NOTE — PROGRESS NOTES
Bedside shift change report given to Asuncion Rosales RN (oncoming nurse) by Bang Carlson (offgoing nurse). Report included the following information SBAR, Kardex, MAR and Quality Measures.

## 2022-06-10 NOTE — PROGRESS NOTES
Problem: Risk for Spread of Infection  Goal: Prevent transmission of infectious organism to others  Description: Prevent the transmission of infectious organisms to other patients, staff members, and visitors. Outcome: Not Progressing Towards Goal     Problem: Patient Education:  Go to Education Activity  Goal: Patient/Family Education  Outcome: Not Progressing Towards Goal     Problem: Pressure Injury - Risk of  Goal: *Prevention of pressure injury  Description: Document Jordin Scale and appropriate interventions in the flowsheet. Outcome: Not Progressing Towards Goal  Note: Pressure Injury Interventions:  Sensory Interventions: Assess changes in LOC,Keep linens dry and wrinkle-free    Moisture Interventions: Absorbent underpads,Internal/External urinary devices,Internal/External fecal devices,Minimize layers,Moisture barrier    Activity Interventions: Pressure redistribution bed/mattress(bed type)    Mobility Interventions: Pressure redistribution bed/mattress (bed type),Turn and reposition approx. every two hours(pillow and wedges)    Nutrition Interventions: Document food/fluid/supplement intake    Friction and Shear Interventions: Foam dressings/transparent film/skin sealants,HOB 30 degrees or less,Lift team/patient mobility team,Transferring/repositioning devices                Problem: Patient Education: Go to Patient Education Activity  Goal: Patient/Family Education  Outcome: Not Progressing Towards Goal     Problem: Falls - Risk of  Goal: *Absence of Falls  Description: Document Carisa Fall Risk and appropriate interventions in the flowsheet.   Outcome: Not Progressing Towards Goal  Note: Fall Risk Interventions:            Medication Interventions: Bed/chair exit alarm    Elimination Interventions: Bed/chair exit alarm,Call light in reach              Problem: Patient Education: Go to Patient Education Activity  Goal: Patient/Family Education  Outcome: Not Progressing Towards Goal     Problem: Nutrition Deficit  Goal: *Optimize nutritional status  Outcome: Not Progressing Towards Goal     Problem: Patient Education: Go to Patient Education Activity  Goal: Patient/Family Education  Outcome: Not Progressing Towards Goal     Problem: Pain  Goal: *Control of Pain  Outcome: Not Progressing Towards Goal     Problem: Patient Education: Go to Patient Education Activity  Goal: Patient/Family Education  Outcome: Not Progressing Towards Goal
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
## 2022-06-10 NOTE — PROGRESS NOTES
Problem: Risk for Spread of Infection  Goal: Prevent transmission of infectious organism to others  Description: Prevent the transmission of infectious organisms to other patients, staff members, and visitors. Outcome: Progressing Towards Goal     Problem: Pressure Injury - Risk of  Goal: *Prevention of pressure injury  Description: Document Jordin Scale and appropriate interventions in the flowsheet. Outcome: Progressing Towards Goal  Note: Pressure Injury Interventions:  Sensory Interventions: Assess changes in LOC    Moisture Interventions: Absorbent underpads,Apply protective barrier, creams and emollients    Activity Interventions: Pressure redistribution bed/mattress(bed type)    Mobility Interventions: HOB 30 degrees or less    Nutrition Interventions: Document food/fluid/supplement intake    Friction and Shear Interventions: Apply protective barrier, creams and emollients                Problem: Falls - Risk of  Goal: *Absence of Falls  Description: Document Carisa Fall Risk and appropriate interventions in the flowsheet.   Outcome: Progressing Towards Goal  Note: Fall Risk Interventions:            Medication Interventions: Teach patient to arise slowly,Patient to call before getting OOB    Elimination Interventions: Call light in reach              Problem: Pain  Goal: *Control of Pain  Outcome: Progressing Towards Goal

## 2022-06-10 NOTE — PROCEDURES
RADIOLOGY POST PROCEDURE NOTE     Georgie 10, 2022       4:10 PM     Preoperative Diagnosis:   Abdominal fluid collection. Postoperative Diagnosis:  Same. :  Dr. Nils Marie    Assistant:  None. Type of Anesthesia: 1% plain lidocaine and IV moderate sedation with Versed and Fentanyl. Procedure/Description:  Image guided RLQ fluid collection drainage. Findings:   No bleeding. Estimated blood Loss:  Minimal    Specimen Removed:   yes    Blood transfusions:  None. Implants:  12F APD Resolve Drain to BS.     Complications: None    Condition: Stable    Discharge Plan:  continue present therapy    Tamra Tobias MD

## 2022-06-10 NOTE — PROGRESS NOTES
TRANSFER - OUT REPORT:    Verbal report given to Monika BAIG(name) on Anna Cintron  being transferred to (unit) for routine post - op       Report consisted of patients Situation, Background, Assessment and   Recommendations(SBAR). Information from the following report(s) SBAR, Kardex, Procedure Summary and MAR was reviewed with the receiving nurse. Lines:   Peripheral IV 05/31/22 Right Antecubital (Active)   Site Assessment Clean, dry, & intact 06/10/22 0845   Phlebitis Assessment 0 06/10/22 0845   Infiltration Assessment 0 06/10/22 0845   Dressing Status Clean, dry, & intact 06/10/22 0845   Dressing Type Transparent;Tape 06/10/22 0845   Hub Color/Line Status Pink 06/10/22 0845   Action Taken Open ports on tubing capped 06/10/22 0845   Alcohol Cap Used Yes 06/10/22 0845        Opportunity for questions and clarification was provided.       Patient transported with:   [employer]

## 2022-06-10 NOTE — PROGRESS NOTES
Comprehensive Nutrition Assessment    Type and Reason for Visit: Reassess,Positive nutrition screen,Wound    Nutrition Recommendations/Plan:   1. Recommend changing oral nutrition supplements to Ensure Enlive and Magic Cup BID once diet resumed. Malnutrition Assessment:  Malnutrition Status: At risk for malnutrition (specify) (r/t varied/fair PO intake of CL diet currently) (06/03/22 9109)      Nutrition Assessment:    Diet advanced to full liquids. Appetite is poor. NPO for IR abdominal fluid collection today. Will advance supplements once diet resumed. Nutrition Related Findings:    + colostomy output. NS at 75 mL/hr Wound Type: Multiple,Pressure injury,Stage IV,Surgical incision    Current Nutrition Intake & Therapies:  Average Meal Intake: 26-50%  Average Supplement Intake: Unable to assess  ADULT ORAL NUTRITION SUPPLEMENT Breakfast, Dinner; Clear Liquid  ADULT ORAL NUTRITION SUPPLEMENT Lunch, Dinner; Other Supplement; Gelatein  DIET NPO    Anthropometric Measures:  Height: 6' 2\" (188 cm)  Ideal Body Weight (IBW): 190 lbs (86 kg)  Admission Body Weight: 205 lb 11 oz  Current Body Wt:  102.5 kg (225 lb 15.5 oz), 108.3 % IBW. Bed scale  Current BMI (kg/m2): 29  Usual Body Weight: 90.7 kg (200 lb)  % Weight Change (Calculated): 2.8  Weight Adjustment: Paraplegia  Total Amputation Percentage: 7.5           Adjusted BMI (Calculated): 31.2  BMI Category: Obese class 1 (BMI 30.0-34. 9)    Estimated Daily Nutrient Needs:  Energy Requirements Based On: Kcal/kg (22-25)  Weight Used for Energy Requirements: Current  Energy (kcal/day): 8670-9169  Weight Used for Protein Requirements: Current (1.0-1.2)  Protein (g/day):   Method Used for Fluid Requirements: 1 ml/kcal  Fluid (ml/day): 6154-3599    Nutrition Diagnosis:   · Increased nutrient needs related to altered GI structure,altered GI function,inadequate protein-energy intake as evidenced by wounds,NPO or clear liquid status due to medical condition      Nutrition Interventions:   Food and/or Nutrient Delivery: Continue current diet,Modify oral nutrition supplement  Nutrition Education/Counseling: No recommendations at this time,Education not indicated  Coordination of Nutrition Care: Continue to monitor while inpatient  Plan of Care discussed with: patiet    Goals:  Previous Goal Met: Progressing toward goal(s)  Goals: Meet at least 75% of estimated needs,by next RD assessment       Nutrition Monitoring and Evaluation:   Behavioral-Environmental Outcomes: None identified  Food/Nutrient Intake Outcomes: Diet advancement/tolerance,Food and nutrient intake,Supplement intake,IVF intake  Physical Signs/Symptoms Outcomes: Biochemical data,Chewing or swallowing,GI status,Nausea/vomiting,Meal time behavior,Nutrition focused physical findings    Discharge Planning:    Continue oral nutrition supplement,Continue current diet    Yuri Barreto RD  Contact: [phone]

## 2022-06-10 NOTE — PROGRESS NOTES
Problem: Risk for Spread of Infection  Goal: Prevent transmission of infectious organism to others  Description: Prevent the transmission of infectious organisms to other patients, staff members, and visitors. Outcome: Progressing Towards Goal     Problem: Patient Education:  Go to Education Activity  Goal: Patient/Family Education  Outcome: Progressing Towards Goal     Problem: Pressure Injury - Risk of  Goal: *Prevention of pressure injury  Description: Document Jordin Scale and appropriate interventions in the flowsheet. Outcome: Progressing Towards Goal  Note: Pressure Injury Interventions:  Sensory Interventions: Assess changes in LOC,Pad between skin to skin,Monitor skin under medical devices    Moisture Interventions: Absorbent underpads,Apply protective barrier, creams and emollients,Internal/External urinary devices,Internal/External fecal devices    Activity Interventions: Pressure redistribution bed/mattress(bed type)    Mobility Interventions: HOB 30 degrees or less,Float heels,Turn and reposition approx.  every two hours(pillow and wedges)    Nutrition Interventions: Offer support with meals,snacks and hydration    Friction and Shear Interventions: Apply protective barrier, creams and emollients

## 2022-06-11 PROCEDURE — 74011250636 HC RX REV CODE- 250/636: Performed by: INTERNAL MEDICINE

## 2022-06-11 PROCEDURE — 99232 SBSQ HOSP IP/OBS MODERATE 35: CPT | Performed by: INTERNAL MEDICINE

## 2022-06-11 PROCEDURE — 65270000046 HC RM TELEMETRY

## 2022-06-11 PROCEDURE — 2709999900 HC NON-CHARGEABLE SUPPLY

## 2022-06-11 PROCEDURE — 74011250637 HC RX REV CODE- 250/637: Performed by: INTERNAL MEDICINE

## 2022-06-11 PROCEDURE — 74011000250 HC RX REV CODE- 250: Performed by: INTERNAL MEDICINE

## 2022-06-11 PROCEDURE — C9113 INJ PANTOPRAZOLE SODIUM, VIA: HCPCS | Performed by: INTERNAL MEDICINE

## 2022-06-11 PROCEDURE — 74011000258 HC RX REV CODE- 258: Performed by: INTERNAL MEDICINE

## 2022-06-11 PROCEDURE — 74011250637 HC RX REV CODE- 250/637: Performed by: PHYSICIAN ASSISTANT

## 2022-06-11 RX ADMIN — SODIUM CHLORIDE, PRESERVATIVE FREE 10 ML: 5 INJECTION INTRAVENOUS at 22:27

## 2022-06-11 RX ADMIN — PIPERACILLIN AND TAZOBACTAM 3.38 G: 3; .375 INJECTION, POWDER, LYOPHILIZED, FOR SOLUTION INTRAVENOUS at 01:14

## 2022-06-11 RX ADMIN — TROSPIUM CHLORIDE 20 MG: 20 TABLET, FILM COATED ORAL at 09:40

## 2022-06-11 RX ADMIN — PIPERACILLIN AND TAZOBACTAM 3.38 G: 3; .375 INJECTION, POWDER, LYOPHILIZED, FOR SOLUTION INTRAVENOUS at 18:09

## 2022-06-11 RX ADMIN — SODIUM CHLORIDE 75 ML/HR: 450 INJECTION, SOLUTION INTRAVENOUS at 06:45

## 2022-06-11 RX ADMIN — SODIUM CHLORIDE, PRESERVATIVE FREE 10 ML: 5 INJECTION INTRAVENOUS at 06:35

## 2022-06-11 RX ADMIN — TROSPIUM CHLORIDE 20 MG: 20 TABLET, FILM COATED ORAL at 18:08

## 2022-06-11 RX ADMIN — SODIUM CHLORIDE, PRESERVATIVE FREE 10 ML: 5 INJECTION INTRAVENOUS at 18:10

## 2022-06-11 RX ADMIN — PIPERACILLIN AND TAZOBACTAM 3.38 G: 3; .375 INJECTION, POWDER, LYOPHILIZED, FOR SOLUTION INTRAVENOUS at 09:41

## 2022-06-11 RX ADMIN — PROCHLORPERAZINE EDISYLATE 5 MG: 5 INJECTION INTRAMUSCULAR; INTRAVENOUS at 23:48

## 2022-06-11 RX ADMIN — MIRTAZAPINE 15 MG: 15 TABLET, ORALLY DISINTEGRATING ORAL at 22:27

## 2022-06-11 RX ADMIN — MULTIPLE VITAMINS W/ MINERALS TAB 1 TABLET: TAB at 09:40

## 2022-06-11 RX ADMIN — SODIUM CHLORIDE 40 MG: 9 INJECTION INTRAMUSCULAR; INTRAVENOUS; SUBCUTANEOUS at 09:41
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
## 2022-06-11 NOTE — PROGRESS NOTES
Problem: Risk for Spread of Infection  Goal: Prevent transmission of infectious organism to others  Description: Prevent the transmission of infectious organisms to other patients, staff members, and visitors. Outcome: Progressing Towards Goal     Problem: Patient Education:  Go to Education Activity  Goal: Patient/Family Education  Outcome: Progressing Towards Goal     Problem: Pressure Injury - Risk of  Goal: *Prevention of pressure injury  Description: Document Jordin Scale and appropriate interventions in the flowsheet. Outcome: Progressing Towards Goal  Note: Pressure Injury Interventions:  Sensory Interventions: Assess changes in LOC,Assess need for specialty bed,Check visual cues for pain,Float heels,Minimize linen layers,Turn and reposition approx.  every two hours (pillows and wedges if needed)    Moisture Interventions: Absorbent underpads,Apply protective barrier, creams and emollients    Activity Interventions: Pressure redistribution bed/mattress(bed type)    Mobility Interventions: Float heels,HOB 30 degrees or less,Pressure redistribution bed/mattress (bed type)    Nutrition Interventions: Document food/fluid/supplement intake    Friction and Shear Interventions: Apply protective barrier, creams and emollients,Feet elevated on foot rest,HOB 30 degrees or less,Minimize layers                Problem: Patient Education: Go to Patient Education Activity  Goal: Patient/Family Education  Outcome: Progressing Towards Goal
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
## 2022-06-11 NOTE — PROGRESS NOTES
Problem: Risk for Spread of Infection  Goal: Prevent transmission of infectious organism to others  Description: Prevent the transmission of infectious organisms to other patients, staff members, and visitors. Outcome: Progressing Towards Goal     Problem: Patient Education:  Go to Education Activity  Goal: Patient/Family Education  Outcome: Progressing Towards Goal     Problem: Pressure Injury - Risk of  Goal: *Prevention of pressure injury  Description: Document Jordin Scale and appropriate interventions in the flowsheet. Outcome: Progressing Towards Goal  Note: Pressure Injury Interventions:  Sensory Interventions: Assess changes in LOC    Moisture Interventions: Absorbent underpads,Apply protective barrier, creams and emollients    Activity Interventions: Pressure redistribution bed/mattress(bed type)    Mobility Interventions: HOB 30 degrees or less    Nutrition Interventions: Document food/fluid/supplement intake    Friction and Shear Interventions: Apply protective barrier, creams and emollients                Problem: Patient Education: Go to Patient Education Activity  Goal: Patient/Family Education  Outcome: Progressing Towards Goal     Problem: Falls - Risk of  Goal: *Absence of Falls  Description: Document Carisa Fall Risk and appropriate interventions in the flowsheet.   Outcome: Progressing Towards Goal  Note: Fall Risk Interventions:            Medication Interventions: Teach patient to arise slowly,Patient to call before getting OOB    Elimination Interventions: Call light in reach              Problem: Patient Education: Go to Patient Education Activity  Goal: Patient/Family Education  Outcome: Progressing Towards Goal     Problem: Nutrition Deficit  Goal: *Optimize nutritional status  Outcome: Progressing Towards Goal     Problem: Patient Education: Go to Patient Education Activity  Goal: Patient/Family Education  Outcome: Progressing Towards Goal     Problem: Pain  Goal: *Control of Pain  Outcome: Progressing Towards Goal     Problem: Patient Education: Go to Patient Education Activity  Goal: Patient/Family Education  Outcome: Progressing Towards Goal

## 2022-06-11 NOTE — PROGRESS NOTES
To Whom It May Concern;     Please excuse Mrs Mckayla Leija Saturday,  6/11/2022. She was visiting family at the bedside of [facility].     Thank you,    Taylor Heredia LPN
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
## 2022-06-12 ENCOUNTER — APPOINTMENT (OUTPATIENT)
Dept: GENERAL RADIOLOGY | Age: 62
DRG: 441 | End: 2022-06-12
Attending: INTERNAL MEDICINE
Payer: MEDICAID

## 2022-06-12 LAB
ANION GAP SERPL CALC-SCNC: 9 MMOL/L (ref 3–18)
BACTERIA SPEC CULT: NORMAL
BASOPHILS # BLD: 0.1 K/UL (ref 0–0.1)
BASOPHILS NFR BLD: 0 % (ref 0–2)
BUN SERPL-MCNC: 3 MG/DL (ref 7–18)
BUN/CREAT SERPL: 4 (ref 12–20)
CALCIUM SERPL-MCNC: 8.1 MG/DL (ref 8.5–10.1)
CHLORIDE SERPL-SCNC: 102 MMOL/L (ref 100–111)
CO2 SERPL-SCNC: 26 MMOL/L (ref 21–32)
CREAT SERPL-MCNC: 0.81 MG/DL (ref 0.6–1.3)
DIFFERENTIAL METHOD BLD: ABNORMAL
EOSINOPHIL # BLD: 0.1 K/UL (ref 0–0.4)
EOSINOPHIL NFR BLD: 1 % (ref 0–5)
ERYTHROCYTE [DISTWIDTH] IN BLOOD BY AUTOMATED COUNT: 14.6 % (ref 11.6–14.5)
GLUCOSE SERPL-MCNC: 98 MG/DL (ref 74–99)
HCT VFR BLD AUTO: 28.1 % (ref 36–48)
HGB BLD-MCNC: 9.1 G/DL (ref 13–16)
IMM GRANULOCYTES # BLD AUTO: 0.1 K/UL (ref 0–0.04)
IMM GRANULOCYTES NFR BLD AUTO: 1 % (ref 0–0.5)
LYMPHOCYTES # BLD: 1.6 K/UL (ref 0.9–3.6)
LYMPHOCYTES NFR BLD: 9 % (ref 21–52)
MAGNESIUM SERPL-MCNC: 1.5 MG/DL (ref 1.6–2.6)
MCH RBC QN AUTO: 29 PG (ref 24–34)
MCHC RBC AUTO-ENTMCNC: 32.4 G/DL (ref 31–37)
MCV RBC AUTO: 89.5 FL (ref 78–100)
MONOCYTES # BLD: 1.2 K/UL (ref 0.05–1.2)
MONOCYTES NFR BLD: 7 % (ref 3–10)
NEUTS SEG # BLD: 13.7 K/UL (ref 1.8–8)
NEUTS SEG NFR BLD: 82 % (ref 40–73)
NRBC # BLD: 0 K/UL (ref 0–0.01)
NRBC BLD-RTO: 0 PER 100 WBC
PHOSPHATE SERPL-MCNC: 2.6 MG/DL (ref 2.5–4.9)
PLATELET # BLD AUTO: 432 K/UL (ref 135–420)
PMV BLD AUTO: 10.9 FL (ref 9.2–11.8)
POTASSIUM SERPL-SCNC: 3.7 MMOL/L (ref 3.5–5.5)
RBC # BLD AUTO: 3.14 M/UL (ref 4.35–5.65)
SERVICE CMNT-IMP: NORMAL
SODIUM SERPL-SCNC: 137 MMOL/L (ref 136–145)
WBC # BLD AUTO: 16.8 K/UL (ref 4.6–13.2)

## 2022-06-12 PROCEDURE — 74011000250 HC RX REV CODE- 250: Performed by: INTERNAL MEDICINE

## 2022-06-12 PROCEDURE — 74011250636 HC RX REV CODE- 250/636: Performed by: INTERNAL MEDICINE

## 2022-06-12 PROCEDURE — 74011000258 HC RX REV CODE- 258: Performed by: INTERNAL MEDICINE

## 2022-06-12 PROCEDURE — 74018 RADEX ABDOMEN 1 VIEW: CPT

## 2022-06-12 PROCEDURE — 84100 ASSAY OF PHOSPHORUS: CPT

## 2022-06-12 PROCEDURE — 65270000046 HC RM TELEMETRY

## 2022-06-12 PROCEDURE — 83735 ASSAY OF MAGNESIUM: CPT

## 2022-06-12 PROCEDURE — C9113 INJ PANTOPRAZOLE SODIUM, VIA: HCPCS | Performed by: INTERNAL MEDICINE

## 2022-06-12 PROCEDURE — 85025 COMPLETE CBC W/AUTO DIFF WBC: CPT

## 2022-06-12 PROCEDURE — 99232 SBSQ HOSP IP/OBS MODERATE 35: CPT | Performed by: INTERNAL MEDICINE

## 2022-06-12 PROCEDURE — 80048 BASIC METABOLIC PNL TOTAL CA: CPT

## 2022-06-12 PROCEDURE — 2709999900 HC NON-CHARGEABLE SUPPLY

## 2022-06-12 RX ORDER — TROSPIUM CHLORIDE 20 MG/1
20 TABLET, FILM COATED ORAL
Status: DISCONTINUED | OUTPATIENT
Start: 2022-06-12 | End: 2022-06-23 | Stop reason: HOSPADM

## 2022-06-12 RX ORDER — MAGNESIUM SULFATE HEPTAHYDRATE 40 MG/ML
2 INJECTION, SOLUTION INTRAVENOUS ONCE
Status: COMPLETED | OUTPATIENT
Start: 2022-06-12 | End: 2022-06-12

## 2022-06-12 RX ADMIN — PIPERACILLIN AND TAZOBACTAM 3.38 G: 3; .375 INJECTION, POWDER, LYOPHILIZED, FOR SOLUTION INTRAVENOUS at 08:59

## 2022-06-12 RX ADMIN — PIPERACILLIN AND TAZOBACTAM 3.38 G: 3; .375 INJECTION, POWDER, LYOPHILIZED, FOR SOLUTION INTRAVENOUS at 00:37

## 2022-06-12 RX ADMIN — SODIUM CHLORIDE, PRESERVATIVE FREE 10 ML: 5 INJECTION INTRAVENOUS at 05:20

## 2022-06-12 RX ADMIN — SODIUM CHLORIDE, PRESERVATIVE FREE 10 ML: 5 INJECTION INTRAVENOUS at 13:25

## 2022-06-12 RX ADMIN — THIAMINE HYDROCHLORIDE: 100 INJECTION, SOLUTION INTRAMUSCULAR; INTRAVENOUS at 20:57

## 2022-06-12 RX ADMIN — SODIUM CHLORIDE, PRESERVATIVE FREE 10 ML: 5 INJECTION INTRAVENOUS at 22:11

## 2022-06-12 RX ADMIN — MAGNESIUM SULFATE 2 G: 2 INJECTION INTRAVENOUS at 13:25

## 2022-06-12 RX ADMIN — PIPERACILLIN AND TAZOBACTAM 3.38 G: 3; .375 INJECTION, POWDER, LYOPHILIZED, FOR SOLUTION INTRAVENOUS at 16:45

## 2022-06-12 RX ADMIN — PROCHLORPERAZINE EDISYLATE 5 MG: 5 INJECTION INTRAMUSCULAR; INTRAVENOUS at 03:56

## 2022-06-12 RX ADMIN — SODIUM CHLORIDE 40 MG: 9 INJECTION INTRAMUSCULAR; INTRAVENOUS; SUBCUTANEOUS at 08:59
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
## 2022-06-12 NOTE — PROGRESS NOTES
Patient refuses to have NGT inserted. 10:13 pm Patient continues to refuse NGT placement. 12:30 AM While changing the mepilex on patient's left heel, a scab peeled off and revealed a  1.5. cm x 1.5 cm pinkish scar on patient's left heel. Covered back with new mepilex. Patient continues to refuse NGT placement. 6:49 AM Patient continues to refuse NGT insertion. No vomiting noted during the night. No c/o nausea.
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
## 2022-06-12 NOTE — CONSULTS
Nutrition Assessment     Type and Reason for Visit: Reassess,Positive nutrition screen,Wound,Consult    Nutrition Recommendations/Plan:   Provide parenteral nutrition using standard premixed product until patient is able to tolerate adequate intake of oral diet or enteral feeding. MD to review PN order and note daily; RD will not notify MD of content and changes per MD request.    Start PN at 20:00 tonight: Peripheral PN at 60 mL/hr, provides 970 kcal, 34 gm amino acids, 97 gm dextrose, 51 gm lipids, 32 mEq Na, 24 mEq K, 8 mEq Mg, 4 mEq Ca, 11 mmol Phos , 100 mg thiamine   Monitor and replace electrolytes as needed - MD to address  Discontinue oral nutrition supplements now that pt is NPO  Check labs:  CMP, triglyceride, and prealbumin tomorrow;  BMP daily starting 6/14; Mg and Phos daily starting tomorrow     Nutrition Assessment:  Pt was on full liquids on 6/9, then made NPO for IR abdominal fluid collection on 6/10. Was resumed onto clear liquid diet that same evening. Was having poor po intake and appetite. Pt noted to have vomited 3 times overnight of 6/11 to 6/12. Abdomen is distended; colostomy has no output but filled with gas per RN note this morning. KUB was ordered; impression included worsening bowel gas pattern as above which is concerning for small bowel obstruction. Pt made NPO per Surgery and NGT placement ordered. Plan for gastrograffin small bowel series tomorrow. Pt LOS day 12 with being NPO/ on clear liquid diet x 11 days. Pt discussed with Dr Jeri Zamarripa; recommended starting peripheral PN support; MD agreed with recommendation, verbal order obtained to place nutrition consult for PN. Mg and Phos add on labs ordered. Noted K 3.7 mmol/L,  Phos 2.6 mg/dL - both low normal and Mg 1.5 mg/dL, low; discussed with MD about replacing prior to PN starting tonight; MD stated will address as medically appropriate.  Discussed with pt about plan to start PN support; pt agreed with plan, denied having any questions; verified food allergies; pt reported having NKFA       Malnutrition Assessment:  Malnutrition Status: At risk for malnutrition (specify) (r/t varied/fair PO intake of CL diet currently)     Estimated Daily Nutrient Needs:  Energy (kcal):  9608-9000  Protein (g):         Fluid (ml/day):  6763-4881    Nutrition Related Findings:  + colostomy output, loose on 6/11. POD 11 s/p exploratory laparotomy, washout of intraabdominal abscess, repair of bladder perforation           Current Nutrition Therapies:  ADULT ORAL NUTRITION SUPPLEMENT Breakfast, Dinner; Clear Liquid  ADULT ORAL NUTRITION SUPPLEMENT Lunch, Dinner; Other Supplement; Gelatein  ADULT ORAL NUTRITION SUPPLEMENT Lunch, Dinner;  Other Supplement; Gelatin  DIET NPO  TPN ADULT PERIKABIVEN W/ ADDITIVES    Anthropometric Measures:  Height:  6' 2\" (188 cm)  Current Body Wt:  98.4 kg (216 lb 14.9 oz)  BMI: 27.8    Nutrition Diagnosis:   · Increased nutrient needs related to altered GI structure,altered GI function,inadequate protein-energy intake as evidenced by wounds,NPO or clear liquid status due to medical condition    · Inadequate oral intake related to altered GI function as evidenced by NPO or clear liquid status due to medical condition      Nutrition Interventions:   Food and/or Nutrient Delivery: Continue NPO,Mineral supplement,Vitamin supplement,Discontinue oral nutrition supplement,Start parenteral nutrition  Nutrition Education/Counseling: Education not indicated  Coordination of Nutrition Care: Continue to monitor while inpatient  Plan of Care discussed with: MD and pt (Per Dr Juanita Robbins MD to review PN order and note daily; RD will not notify MD of content & changes)    Goals:  Previous Goal Met: Progress towards goal(s) declining  Goals: Meet at least 75% of estimated needs,Initiate nutrition support,by next RD assessment       Nutrition Monitoring and Evaluation:   Behavioral-Environmental Outcomes: None identified  Food/Nutrient Intake Outcomes: Diet advancement/tolerance,Parenteral nutrition intake/tolerance,Vitamin/mineral intake  Physical Signs/Symptoms Outcomes: Biochemical data,Constipation,GI status    Discharge Planning:     Too soon to determine    Violette Florez, [address] [place] [address]  Contact: [phone]

## 2022-06-12 NOTE — PROGRESS NOTES
Dr. Karen Fitzgerald is informed of patient having had vomited 3 times already. Abdomen is distended with hypoactive bowel sounds and colostomy has no output but filled with gas only. He said he will order X-ray KUB. 7:37 AM, Beddside shift report given to Glenny Dockery RN. No further vomiting is noted. 8:10 AM, Received a call from Dr. Karen Fitzgerald about X-ray KUB result: IMPRESSION     1.  Worsening bowel gas pattern as above which is concerning for small bowel  Obstruction. He said to hold off  NGT insertion.

## 2022-06-12 NOTE — ROUTINE PROCESS
Wound Prevention Checklist    Patient: Janie Manzano (89 y.o. male)  Date: 6/12/2022  Diagnosis: Septic shock ([facility] [A41.9, R65.21] Septic shock ([facility]    Precautions:         [x]  Heel prevention boots placed on patient    []  Patient turned q2h during shift    [x]  Lift team ordered    [x]  Patient on [facility] bed/Specialty bed    [x]  Each Wound is documented during shift (Stage, Color, drainage, odor, measurements, and dressings)    [x]  Dual skin checks done at bedside during shift report with EZRA Villegas RN

## 2022-06-12 NOTE — PROGRESS NOTES
INTERIM UPDATE - 0422 EST on 6/12/2022    Nursing Staff reports that Patient has a post-surgical abdomen and has had three episodes of emeses despite antiemetics. Nursing Staff reports that Patient's abdomen is distended. Plan:  Obtain a KUB to determine if Patient has ileus/obstruction. If ileus or obstruction appears to be present, plan to order placement of an NG Tube to be set to low, intermittent suction.

## 2022-06-12 NOTE — PROGRESS NOTES
Problem: Risk for Spread of Infection  Goal: Prevent transmission of infectious organism to others  Description: Prevent the transmission of infectious organisms to other patients, staff members, and visitors. Outcome: Progressing Towards Goal     Problem: Patient Education:  Go to Education Activity  Goal: Patient/Family Education  Outcome: Progressing Towards Goal     Problem: Pressure Injury - Risk of  Goal: *Prevention of pressure injury  Description: Document Jordin Scale and appropriate interventions in the flowsheet. Outcome: Progressing Towards Goal  Note: Pressure Injury Interventions:  Sensory Interventions: Assess changes in LOC,Check visual cues for pain,Float heels,Keep linens dry and wrinkle-free,Minimize linen layers,Pressure redistribution bed/mattress (bed type),Suspension boots,Turn and reposition approx. every two hours (pillows and wedges if needed)    Moisture Interventions: Absorbent underpads,Apply protective barrier, creams and emollients,Check for incontinence Q2 hours and as needed,Internal/External urinary devices,Internal/External fecal devices,Minimize layers,Moisture barrier,Offer toileting Q_hr    Activity Interventions: Pressure redistribution bed/mattress(bed type)    Mobility Interventions: Float heels,HOB 30 degrees or less,Pressure redistribution bed/mattress (bed type),Suspension boots,Turn and reposition approx.  every two hours(pillow and wedges)    Nutrition Interventions: Document food/fluid/supplement intake    Friction and Shear Interventions: Apply protective barrier, creams and emollients,Foam dressings/transparent film/skin sealants,HOB 30 degrees or less,Lift sheet,Minimize layers                Problem: Patient Education: Go to Patient Education Activity  Goal: Patient/Family Education  Outcome: Progressing Towards Goal     Problem: Falls - Risk of  Goal: *Absence of Falls  Description: Document Carisa Fall Risk and appropriate interventions in the flowsheet.   Outcome: Progressing Towards Goal  Note: Fall Risk Interventions:            Medication Interventions: Bed/chair exit alarm,Patient to call before getting OOB,Teach patient to arise slowly    Elimination Interventions: Bed/chair exit alarm,Call light in reach,Stay With Me (per policy),Patient to call for help with toileting needs              Problem: Patient Education: Go to Patient Education Activity  Goal: Patient/Family Education  Outcome: Progressing Towards Goal     Problem: Nutrition Deficit  Goal: *Optimize nutritional status  Outcome: Progressing Towards Goal     Problem: Patient Education: Go to Patient Education Activity  Goal: Patient/Family Education  Outcome: Progressing Towards Goal     Problem: Pain  Goal: *Control of Pain  Outcome: Progressing Towards Goal     Problem: Patient Education: Go to Patient Education Activity  Goal: Patient/Family Education  Outcome: Progressing Towards Goal

## 2022-06-12 NOTE — ROUTINE PROCESS
0900: Attempted NGT insertion. Pt refused and stated he is pain. Re-educated pt on NGT benefits. Pt still refused. MD Sujatha Alcantara made aware. 1330: Attempted NGT insertion again. Pt refused and stated he is pain. Re-educated pt on NGT benefits. Pt still refused. MD Sujatha Alcantara made aware.
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
## 2022-06-12 NOTE — PROGRESS NOTES
Problem: Risk for Spread of Infection  Goal: Prevent transmission of infectious organism to others  Description: Prevent the transmission of infectious organisms to other patients, staff members, and visitors. Outcome: Progressing Towards Goal     Problem: Pressure Injury - Risk of  Goal: *Prevention of pressure injury  Description: Document Jordin Scale and appropriate interventions in the flowsheet. Outcome: Progressing Towards Goal  Note: Pressure Injury Interventions:  Sensory Interventions: Assess changes in LOC,Avoid rigorous massage over bony prominences,Pressure redistribution bed/mattress (bed type),Turn and reposition approx. every two hours (pillows and wedges if needed)    Moisture Interventions: Absorbent underpads,Apply protective barrier, creams and emollients,Check for incontinence Q2 hours and as needed    Activity Interventions: Increase time out of bed,Pressure redistribution bed/mattress(bed type),PT/OT evaluation    Mobility Interventions: Pressure redistribution bed/mattress (bed type),Turn and reposition approx. every two hours(pillow and wedges)    Nutrition Interventions: Document food/fluid/supplement intake,Offer support with meals,snacks and hydration    Friction and Shear Interventions: Apply protective barrier, creams and emollients,Foam dressings/transparent film/skin sealants,Transferring/repositioning devices                Problem: Falls - Risk of  Goal: *Absence of Falls  Description: Document Carisa Fall Risk and appropriate interventions in the flowsheet.   Outcome: Progressing Towards Goal  Note: Fall Risk Interventions:            Medication Interventions: Bed/chair exit alarm,Patient to call before getting OOB,Evaluate medications/consider consulting pharmacy,Teach patient to arise slowly    Elimination Interventions: Call light in reach,Bed/chair exit alarm              Problem: Nutrition Deficit  Goal: *Optimize nutritional status  Outcome: Progressing Towards Goal Problem: Pain  Goal: *Control of Pain  Outcome: Progressing Towards Goal

## 2022-06-13 ENCOUNTER — APPOINTMENT (OUTPATIENT)
Dept: GENERAL RADIOLOGY | Age: 62
DRG: 441 | End: 2022-06-13
Attending: SURGERY
Payer: MEDICAID

## 2022-06-13 LAB
ALBUMIN SERPL-MCNC: 2.2 G/DL (ref 3.4–5)
ALBUMIN/GLOB SERPL: 0.5 {RATIO} (ref 0.8–1.7)
ALP SERPL-CCNC: 60 U/L (ref 45–117)
ALT SERPL-CCNC: 55 U/L (ref 16–61)
ANION GAP SERPL CALC-SCNC: 4 MMOL/L (ref 3–18)
AST SERPL-CCNC: 31 U/L (ref 10–38)
BACTERIA SPEC CULT: ABNORMAL
BACTERIA SPEC CULT: ABNORMAL
BILIRUB SERPL-MCNC: 0.5 MG/DL (ref 0.2–1)
BUN SERPL-MCNC: 4 MG/DL (ref 7–18)
BUN/CREAT SERPL: 5 (ref 12–20)
CALCIUM SERPL-MCNC: 7.9 MG/DL (ref 8.5–10.1)
CHLORIDE SERPL-SCNC: 104 MMOL/L (ref 100–111)
CO2 SERPL-SCNC: 30 MMOL/L (ref 21–32)
CREAT SERPL-MCNC: 0.79 MG/DL (ref 0.6–1.3)
GLOBULIN SER CALC-MCNC: 4.8 G/DL (ref 2–4)
GLUCOSE SERPL-MCNC: 144 MG/DL (ref 74–99)
GRAM STN SPEC: ABNORMAL
GRAM STN SPEC: ABNORMAL
MAGNESIUM SERPL-MCNC: 1.8 MG/DL (ref 1.6–2.6)
PHOSPHATE SERPL-MCNC: 2.1 MG/DL (ref 2.5–4.9)
POTASSIUM SERPL-SCNC: 3.5 MMOL/L (ref 3.5–5.5)
PREALB SERPL-MCNC: 10.5 MG/DL (ref 20–40)
PROT SERPL-MCNC: 7 G/DL (ref 6.4–8.2)
SERVICE CMNT-IMP: ABNORMAL
SODIUM SERPL-SCNC: 138 MMOL/L (ref 136–145)
TRIGL SERPL-MCNC: 102 MG/DL (ref ?–150)

## 2022-06-13 PROCEDURE — 74011000258 HC RX REV CODE- 258: Performed by: INTERNAL MEDICINE

## 2022-06-13 PROCEDURE — 84100 ASSAY OF PHOSPHORUS: CPT

## 2022-06-13 PROCEDURE — 99232 SBSQ HOSP IP/OBS MODERATE 35: CPT | Performed by: INTERNAL MEDICINE

## 2022-06-13 PROCEDURE — 74011000250 HC RX REV CODE- 250: Performed by: INTERNAL MEDICINE

## 2022-06-13 PROCEDURE — 74250 X-RAY XM SM INT 1CNTRST STD: CPT

## 2022-06-13 PROCEDURE — 74011000636 HC RX REV CODE- 636: Performed by: INTERNAL MEDICINE

## 2022-06-13 PROCEDURE — 80053 COMPREHEN METABOLIC PANEL: CPT

## 2022-06-13 PROCEDURE — 2709999900 HC NON-CHARGEABLE SUPPLY

## 2022-06-13 PROCEDURE — 84134 ASSAY OF PREALBUMIN: CPT

## 2022-06-13 PROCEDURE — C9113 INJ PANTOPRAZOLE SODIUM, VIA: HCPCS | Performed by: INTERNAL MEDICINE

## 2022-06-13 PROCEDURE — 74011250636 HC RX REV CODE- 250/636: Performed by: INTERNAL MEDICINE

## 2022-06-13 PROCEDURE — 83735 ASSAY OF MAGNESIUM: CPT

## 2022-06-13 PROCEDURE — 77030037878 HC DRSG MEPILEX >48IN BORD MOLN -B

## 2022-06-13 PROCEDURE — 77030040393 HC DRSG OPTIFOAM GENT MDII -B

## 2022-06-13 PROCEDURE — 84478 ASSAY OF TRIGLYCERIDES: CPT

## 2022-06-13 PROCEDURE — 65270000046 HC RM TELEMETRY

## 2022-06-13 PROCEDURE — 77030041076 HC DRSG AG OPTICELL MDII -A

## 2022-06-13 PROCEDURE — 74011250637 HC RX REV CODE- 250/637: Performed by: INTERNAL MEDICINE

## 2022-06-13 RX ADMIN — MIRTAZAPINE 15 MG: 15 TABLET, ORALLY DISINTEGRATING ORAL at 22:10

## 2022-06-13 RX ADMIN — SODIUM CHLORIDE 40 MG: 9 INJECTION INTRAMUSCULAR; INTRAVENOUS; SUBCUTANEOUS at 08:34

## 2022-06-13 RX ADMIN — SODIUM CHLORIDE, PRESERVATIVE FREE 10 ML: 5 INJECTION INTRAVENOUS at 05:36

## 2022-06-13 RX ADMIN — PIPERACILLIN AND TAZOBACTAM 3.38 G: 3; .375 INJECTION, POWDER, LYOPHILIZED, FOR SOLUTION INTRAVENOUS at 08:34

## 2022-06-13 RX ADMIN — PIPERACILLIN AND TAZOBACTAM 3.38 G: 3; .375 INJECTION, POWDER, LYOPHILIZED, FOR SOLUTION INTRAVENOUS at 17:00

## 2022-06-13 RX ADMIN — SODIUM CHLORIDE, PRESERVATIVE FREE 10 ML: 5 INJECTION INTRAVENOUS at 22:10

## 2022-06-13 RX ADMIN — THIAMINE HYDROCHLORIDE: 100 INJECTION, SOLUTION INTRAMUSCULAR; INTRAVENOUS at 20:00

## 2022-06-13 RX ADMIN — DIATRIZOATE MEGLUMINE AND DIATRIZOATE SODIUM 240 ML: 660; 100 LIQUID ORAL; RECTAL at 13:00

## 2022-06-13 RX ADMIN — PIPERACILLIN AND TAZOBACTAM 3.38 G: 3; .375 INJECTION, POWDER, LYOPHILIZED, FOR SOLUTION INTRAVENOUS at 01:16

## 2022-06-13 RX ADMIN — POTASSIUM PHOSPHATE, MONOBASIC AND POTASSIUM PHOSPHATE, DIBASIC: 224; 236 INJECTION, SOLUTION, CONCENTRATE INTRAVENOUS at 17:00

## 2022-06-13 RX ADMIN — SODIUM CHLORIDE, PRESERVATIVE FREE 10 ML: 5 INJECTION INTRAVENOUS at 14:55

## 2022-06-13 NOTE — PROGRESS NOTES
Spoke with Radiology concerning patient refusal to have NGT placed. Contrast needed for bowel series is unpleasant to swallow and x-ray questioning if MD would want patient to try to swallow it instead of having an NGT placed. 1212: Verified with Diana Yoo, DO that patient is able to have the contrast PO instead of through an NGT. Updated X-ray. Will continue to monitor.
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
## 2022-06-13 NOTE — PROGRESS NOTES
Comprehensive Nutrition Assessment    Type and Reason for Visit: Reassess,Positive nutrition screen,Consult,Wound,NPO/clear liquid    Nutrition Recommendations/Plan:   Provide parenteral nutrition using standard premixed product until patient is able to tolerate adequate intake of oral diet or enteral feeding. Dr Juanita Robbins to review PN order and note daily; RD will not notify MD of content and changes per MD request.    Modify PN to start at 20:00 tonight: Peripheral PN at 60 mL/hr, provides 970 kcal, 34 gm amino acids, 97 gm dextrose, 51 gm lipids, 32 mEq Na, 24 mEq K, 8 mEq Mg, 4 mEq Ca, 11 mmol Phos , 10 mL MVI , 1 mL trace elements , 100 mg thiamine. Plan to add an additional 20 mmol of K-Phos outside of TPN; discussed addition with Dr Juanita Robbins. Continue to hold oral MVI d/t NPO status    Continue to monitor tolerance of TPN, readiness to advance diet, weight, labs and plan of care. Malnutrition Assessment:  Malnutrition Status: At risk for malnutrition (specify) (r/t varied/fair PO intake of CL diet currently) (06/03/22 0901)      Nutrition Assessment:    Pt continues NPO status and tolerating TPN currently. Pt POD 12 s/p exploratory laparotomy, washout of intraabdominal abscess, repair of bladder perforation per MD note. Per chart review, pt refusing NGT placement and denies n/v. Wound noted. Colostomy in place. Current labs shows low calcium (7.9), phosphorus (2.1) and albumin (2.2)-with improvements of Mg (1.8) after replacement. CBW: 98.0 kg. Discussed with Dr Juanita Robbins, continuing TPN till surgery clears pt for oral diet and including MVI and trace elements in TPN on MWF d/t NPO status. Nutrition Related Findings:    Last BM - 6/13-colostomy. Output: 1700 ml-catheter (urine). Pertinent medications: Remeron, pantoprazole, sodium chloride NS 10mL.  Wound Type: Multiple,Pressure injury,Deep tissue injury,Stage III,Stage IV,Skin tears    Current Nutrition Intake & Therapies:  Average Meal Intake: NPO  Average Supplement Intake: NPO  DIET NPO  TPN ADULT PERIKABIVEN W/ ADDITIVES   Current Parenteral Nutrition Order: Peripheral PN at 60 mL/hr, provides 970 kcal, 34 gm amino acids, 97 gm dextrose, 51 gm lipids, 32 mEq Na, 24 mEq K, 8 mEq Mg, 4 mEq Ca, 11 mmol Phos , 100 mg thiamine    Anthropometric Measures:  Height: 6' 2\" (188 cm)  Ideal Body Weight (IBW): 190 lbs (86 kg)  Admission Body Weight: 205 lb 11 oz  Current Body Wt:  98 kg (216 lb 0.8 oz), 113.7 % IBW. Bed scale  Current BMI (kg/m2): 27.7  Usual Body Weight: 90.7 kg (200 lb)  % Weight Change (Calculated): 2.8  Weight Adjustment: Paraplegia  Total Amputation Percentage: 7.5  Adjusted Ideal Body Weight (lbs) (Calculated): 175.8  Adjusted Ideal Body Weight (kg) (Calculated): 79.91 kg  Adjusted % IBW (Calculated): 122.9  Adjusted BMI (Calculated): 29.8  BMI Category: Overweight (BMI 25.0-29. 9)    Estimated Daily Nutrient Needs:  Energy Requirements Based On: Kcal/kg (22-25)  Weight Used for Energy Requirements: Current  Energy (kcal/day): 3105-5068  Weight Used for Protein Requirements: Current (1.0-1.2)  Protein (g/day):   Method Used for Fluid Requirements: 1 ml/kcal  Fluid (ml/day): 0641-2435    Nutrition Diagnosis:   · Increased nutrient needs related to altered GI structure,altered GI function,inadequate protein-energy intake as evidenced by wounds,NPO or clear liquid status due to medical condition    · Inadequate oral intake related to altered GI function as evidenced by NPO or clear liquid status due to medical condition      Nutrition Interventions:   Food and/or Nutrient Delivery: Continue NPO,Mineral supplement,Vitamin supplement,Continue parenteral nutrition  Nutrition Education/Counseling: Education not indicated,No recommendations at this time  Coordination of Nutrition Care: Continue to monitor while inpatient,Interdisciplinary rounds  Plan of Care discussed with: MD and pt (Per Dr Esther Dumas MD to review PN order and note daily; RD will not notify MD of content & changes)    Goals:  Previous Goal Met: Progressing toward goal(s)  Goals: Meet at least 75% of estimated needs,by next RD assessment       Nutrition Monitoring and Evaluation:   Behavioral-Environmental Outcomes: None identified  Food/Nutrient Intake Outcomes: Parenteral nutrition intake/tolerance,Diet advancement/tolerance,Vitamin/mineral intake,IVF intake  Physical Signs/Symptoms Outcomes: Biochemical data,Nutrition focused physical findings,Weight,Skin,GI status    Discharge Planning:     Too soon to determine    Jose Alejandro Ohms, [address] Peoples RDN, LD  Contact: [phone]

## 2022-06-13 NOTE — PROGRESS NOTES
Wound Prevention Checklist    Patient: Rasta Sevilla (36 y.o. male)  Date: 6/13/2022  Diagnosis: Septic shock ([facility] [A41.9, R65.21] Septic shock ([facility]    Precautions:         [x]  Heel prevention boots placed on patient    [x]  Patient turned q2h during shift    []  Lift team ordered    [x]  Patient on [place] bed/Specialty bed    [x]  Each Wound is documented during shift (Stage, Color, drainage, odor, measurements, and dressings)    [x]  Dual skin checks done at bedside during shift report with Valencia Bill

## 2022-06-13 NOTE — PROGRESS NOTES
Problem: Risk for Spread of Infection  Goal: Prevent transmission of infectious organism to others  Description: Prevent the transmission of infectious organisms to other patients, staff members, and visitors. Outcome: Progressing Towards Goal     Problem: Patient Education:  Go to Education Activity  Goal: Patient/Family Education  Outcome: Progressing Towards Goal     Problem: Pressure Injury - Risk of  Goal: *Prevention of pressure injury  Description: Document Jordin Scale and appropriate interventions in the flowsheet. Outcome: Progressing Towards Goal  Note: Pressure Injury Interventions:  Sensory Interventions: Assess changes in LOC,Check visual cues for pain,Float heels,Keep linens dry and wrinkle-free,Minimize linen layers,Pressure redistribution bed/mattress (bed type),Suspension boots,Monitor skin under medical devices,Pad between skin to skin,Turn and reposition approx. every two hours (pillows and wedges if needed)    Moisture Interventions: Absorbent underpads,Apply protective barrier, creams and emollients,Check for incontinence Q2 hours and as needed,Internal/External fecal devices,Internal/External urinary devices,Minimize layers,Moisture barrier    Activity Interventions: Pressure redistribution bed/mattress(bed type)    Mobility Interventions: Float heels,HOB 30 degrees or less,Pressure redistribution bed/mattress (bed type),Suspension boots,Turn and reposition approx.  every two hours(pillow and wedges)    Nutrition Interventions: Document food/fluid/supplement intake    Friction and Shear Interventions: Foam dressings/transparent film/skin sealants,Apply protective barrier, creams and emollients,HOB 30 degrees or less,Lift sheet,Minimize layers                Problem: Patient Education: Go to Patient Education Activity  Goal: Patient/Family Education  Outcome: Progressing Towards Goal     Problem: Falls - Risk of  Goal: *Absence of Falls  Description: Document Carisa Fall Risk and appropriate interventions in the flowsheet.   Outcome: Progressing Towards Goal  Note: Fall Risk Interventions:            Medication Interventions: Bed/chair exit alarm,Patient to call before getting OOB,Teach patient to arise slowly    Elimination Interventions: Bed/chair exit alarm,Call light in reach,Elevated toilet seat,Patient to call for help with toileting needs,Stay With Me (per policy)              Problem: Patient Education: Go to Patient Education Activity  Goal: Patient/Family Education  Outcome: Progressing Towards Goal     Problem: Nutrition Deficit  Goal: *Optimize nutritional status  Outcome: Progressing Towards Goal     Problem: Patient Education: Go to Patient Education Activity  Goal: Patient/Family Education  Outcome: Progressing Towards Goal     Problem: Pain  Goal: *Control of Pain  Outcome: Progressing Towards Goal     Problem: Patient Education: Go to Patient Education Activity  Goal: Patient/Family Education  Outcome: Progressing Towards Goal
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
## 2022-06-14 ENCOUNTER — APPOINTMENT (OUTPATIENT)
Dept: GENERAL RADIOLOGY | Age: 62
DRG: 441 | End: 2022-06-14
Attending: INTERNAL MEDICINE
Payer: MEDICAID

## 2022-06-14 LAB
ANION GAP SERPL CALC-SCNC: 3 MMOL/L (ref 3–18)
BASOPHILS # BLD: 0.1 K/UL (ref 0–0.1)
BASOPHILS NFR BLD: 1 % (ref 0–2)
BUN SERPL-MCNC: 4 MG/DL (ref 7–18)
BUN/CREAT SERPL: 5 (ref 12–20)
CALCIUM SERPL-MCNC: 8.5 MG/DL (ref 8.5–10.1)
CHLORIDE SERPL-SCNC: 104 MMOL/L (ref 100–111)
CO2 SERPL-SCNC: 31 MMOL/L (ref 21–32)
CREAT SERPL-MCNC: 0.8 MG/DL (ref 0.6–1.3)
DIFFERENTIAL METHOD BLD: ABNORMAL
EOSINOPHIL # BLD: 0.2 K/UL (ref 0–0.4)
EOSINOPHIL NFR BLD: 1 % (ref 0–5)
ERYTHROCYTE [DISTWIDTH] IN BLOOD BY AUTOMATED COUNT: 14.9 % (ref 11.6–14.5)
GLUCOSE SERPL-MCNC: 111 MG/DL (ref 74–99)
HCT VFR BLD AUTO: 26.5 % (ref 36–48)
HGB BLD-MCNC: 8.6 G/DL (ref 13–16)
IMM GRANULOCYTES # BLD AUTO: 0.1 K/UL (ref 0–0.04)
IMM GRANULOCYTES NFR BLD AUTO: 1 % (ref 0–0.5)
LYMPHOCYTES # BLD: 2.7 K/UL (ref 0.9–3.6)
LYMPHOCYTES NFR BLD: 18 % (ref 21–52)
MAGNESIUM SERPL-MCNC: 1.8 MG/DL (ref 1.6–2.6)
MCH RBC QN AUTO: 29.1 PG (ref 24–34)
MCHC RBC AUTO-ENTMCNC: 32.5 G/DL (ref 31–37)
MCV RBC AUTO: 89.5 FL (ref 78–100)
MONOCYTES # BLD: 1.6 K/UL (ref 0.05–1.2)
MONOCYTES NFR BLD: 10 % (ref 3–10)
NEUTS SEG # BLD: 10.6 K/UL (ref 1.8–8)
NEUTS SEG NFR BLD: 70 % (ref 40–73)
NRBC # BLD: 0 K/UL (ref 0–0.01)
NRBC BLD-RTO: 0 PER 100 WBC
PHOSPHATE SERPL-MCNC: 2 MG/DL (ref 2.5–4.9)
PLATELET # BLD AUTO: 472 K/UL (ref 135–420)
PMV BLD AUTO: 10.6 FL (ref 9.2–11.8)
POTASSIUM SERPL-SCNC: 3.2 MMOL/L (ref 3.5–5.5)
RBC # BLD AUTO: 2.96 M/UL (ref 4.35–5.65)
SODIUM SERPL-SCNC: 138 MMOL/L (ref 136–145)
WBC # BLD AUTO: 15.2 K/UL (ref 4.6–13.2)

## 2022-06-14 PROCEDURE — 74011000250 HC RX REV CODE- 250: Performed by: INTERNAL MEDICINE

## 2022-06-14 PROCEDURE — 77030041076 HC DRSG AG OPTICELL MDII -A

## 2022-06-14 PROCEDURE — 80048 BASIC METABOLIC PNL TOTAL CA: CPT

## 2022-06-14 PROCEDURE — 77030040393 HC DRSG OPTIFOAM GENT MDII -B

## 2022-06-14 PROCEDURE — 74011250636 HC RX REV CODE- 250/636: Performed by: INTERNAL MEDICINE

## 2022-06-14 PROCEDURE — 99232 SBSQ HOSP IP/OBS MODERATE 35: CPT | Performed by: INTERNAL MEDICINE

## 2022-06-14 PROCEDURE — 85025 COMPLETE CBC W/AUTO DIFF WBC: CPT

## 2022-06-14 PROCEDURE — 84100 ASSAY OF PHOSPHORUS: CPT

## 2022-06-14 PROCEDURE — 74011250637 HC RX REV CODE- 250/637: Performed by: INTERNAL MEDICINE

## 2022-06-14 PROCEDURE — 83735 ASSAY OF MAGNESIUM: CPT

## 2022-06-14 PROCEDURE — 74011000258 HC RX REV CODE- 258: Performed by: INTERNAL MEDICINE

## 2022-06-14 PROCEDURE — 65270000046 HC RM TELEMETRY

## 2022-06-14 PROCEDURE — 77030037878 HC DRSG MEPILEX >48IN BORD MOLN -B

## 2022-06-14 PROCEDURE — C9113 INJ PANTOPRAZOLE SODIUM, VIA: HCPCS | Performed by: INTERNAL MEDICINE

## 2022-06-14 PROCEDURE — 77030040392 HC DRSG OPTIFOAM MDII -A

## 2022-06-14 PROCEDURE — 2709999900 HC NON-CHARGEABLE SUPPLY

## 2022-06-14 PROCEDURE — 36415 COLL VENOUS BLD VENIPUNCTURE: CPT

## 2022-06-14 PROCEDURE — 74018 RADEX ABDOMEN 1 VIEW: CPT

## 2022-06-14 RX ORDER — LANOLIN ALCOHOL/MO/W.PET/CERES
100 CREAM (GRAM) TOPICAL DAILY
Status: DISCONTINUED | OUTPATIENT
Start: 2022-06-15 | End: 2022-06-23 | Stop reason: HOSPADM

## 2022-06-14 RX ORDER — MORPHINE SULFATE 2 MG/ML
1 INJECTION, SOLUTION INTRAMUSCULAR; INTRAVENOUS
Status: DISCONTINUED | OUTPATIENT
Start: 2022-06-14 | End: 2022-06-23 | Stop reason: HOSPADM

## 2022-06-14 RX ORDER — POTASSIUM CHLORIDE 7.45 MG/ML
10 INJECTION INTRAVENOUS ONCE
Status: COMPLETED | OUTPATIENT
Start: 2022-06-14 | End: 2022-06-14

## 2022-06-14 RX ADMIN — MIRTAZAPINE 15 MG: 15 TABLET, ORALLY DISINTEGRATING ORAL at 21:26

## 2022-06-14 RX ADMIN — SODIUM CHLORIDE, PRESERVATIVE FREE 10 ML: 5 INJECTION INTRAVENOUS at 06:00

## 2022-06-14 RX ADMIN — PIPERACILLIN AND TAZOBACTAM 3.38 G: 3; .375 INJECTION, POWDER, LYOPHILIZED, FOR SOLUTION INTRAVENOUS at 02:13

## 2022-06-14 RX ADMIN — DEXTROSE, SOYBEAN OIL, ELECTROLYTES, LYSINE, PHENYLALANINE, LEUCINE, VALINE, THREONINE, METHIONINE, ISOLEUCINE, TRYPTOPHAN, ALANINE, ARGININE, GLYCINE, PROLINE, HISTIDINE, GLUTAMIC ACID, SERINE, ASPARTIC ACID AND TYROSINE
6.8; 3.5; 170; 174; 105; 68; 20; 187; 164; 164; 152; 116; 116; 116; 40; 333; 235; 164; 141; 141; 116; 94; 71; 4.8 INJECTION, EMULSION INTRAVENOUS at 20:26

## 2022-06-14 RX ADMIN — POTASSIUM CHLORIDE 10 MEQ: 7.46 INJECTION, SOLUTION INTRAVENOUS at 09:21

## 2022-06-14 RX ADMIN — PIPERACILLIN AND TAZOBACTAM 3.38 G: 3; .375 INJECTION, POWDER, LYOPHILIZED, FOR SOLUTION INTRAVENOUS at 20:25

## 2022-06-14 RX ADMIN — SODIUM CHLORIDE, PRESERVATIVE FREE 10 ML: 5 INJECTION INTRAVENOUS at 15:11

## 2022-06-14 RX ADMIN — POTASSIUM PHOSPHATE, MONOBASIC AND POTASSIUM PHOSPHATE, DIBASIC: 224; 236 INJECTION, SOLUTION, CONCENTRATE INTRAVENOUS at 11:02

## 2022-06-14 RX ADMIN — SODIUM CHLORIDE 40 MG: 9 INJECTION INTRAMUSCULAR; INTRAVENOUS; SUBCUTANEOUS at 08:43

## 2022-06-14 RX ADMIN — SODIUM CHLORIDE, PRESERVATIVE FREE 10 ML: 5 INJECTION INTRAVENOUS at 22:00

## 2022-06-14 RX ADMIN — PIPERACILLIN AND TAZOBACTAM 3.38 G: 3; .375 INJECTION, POWDER, LYOPHILIZED, FOR SOLUTION INTRAVENOUS at 08:43

## 2022-06-14 RX ADMIN — PIPERACILLIN AND TAZOBACTAM 3.38 G: 3; .375 INJECTION, POWDER, LYOPHILIZED, FOR SOLUTION INTRAVENOUS at 15:11

## 2022-06-14 NOTE — PROGRESS NOTES
Pharmacy Note     3.375 grams gm IV Q8hr each dose over 4 hours ordered for treatment of Intra-abdominal Infection. Per [address], will administer Q6hr over 30 minutes each dose as the patient only has one peripheral IV line and has a 24 hour TPN infusion. By changing the infusion to 30 minutes the patient will only have their TPN stopped for 2 hours/24 hours versus 12 hours. Estimated Creatinine Clearance: Estimated Creatinine Clearance: 111.3 mL/min (based on SCr of 0.8 mg/dL). BMI:  Body mass index is 27.75 kg/m². Rationale for Adjustment:  [facility] B-Lactam extended infusion policy    Pharmacy will continue to monitor and adjust dose as necessary. Please call with any questions.     Thank you,  Colten Murray, [facility]

## 2022-06-14 NOTE — PROGRESS NOTES
Bedside and Verbal shift change report given to [address] [place] [address] (oncoming nurse) by Amanda Fuentes RN (offgoing nurse). Report included the following information SBAR, Kardex, Intake/Output, MAR and Recent Results.
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
## 2022-06-14 NOTE — PROGRESS NOTES
Comprehensive Nutrition Assessment    Type and Reason for Visit: Reassess,Positive nutrition screen,Wound,Consult,NPO/clear liquid    Nutrition Recommendations/Plan:   Provide parenteral nutrition using standard premixed product until patient is able to tolerate adequate intake of oral diet or enteral feeding. Dr Lissa Yo to review PN order and note daily; RD will not notify MD of content and changes per MD request.    Modify PN to start at 20:00 tonight: Peripheral PN at 60 mL/hr, provides 970 kcal, 34 gm amino acids, 97 gm dextrose, 51 gm lipids, 32 mEq Na, 24 mEq K, 8 mEq Mg, 4 mEq Ca, 11 mmol Phos   Provide oral multivitamin/multi-trace element and exclude from PN. Discontinue thiamine in PPN and transition to oral Thiamine daily, discussed with MD  Plan to add Ensure Clear TID to promote PO intake   Continue to monitor tolerance of PPN, readiness to advance diet, labs, weight and plan of care. Malnutrition Assessment:  Malnutrition Status: At risk for malnutrition (specify) (r/t varied/fair PO intake of CL diet currently) (06/03/22 1711)      Nutrition Assessment:    Visited pt in room, was sleeping in bed, and unable to awaken with calling name and knock at the door. CBW: 98 kg. Followed up with Nurse-reported pt consumed only (600mL) apple and grape juice for breakfast and refused broth. Per chart review, pt has transition to clear liquids with no additional supplements added at this time. MVI orally currently. Pt lost peripheral IV access on 6/14 and nurses were unable to place an additional peripheral line-PPN will be held for 4-6 hours for administration of Zosyn and KCl and PPN will resume afterwards per RN note. Discussed this update with Pharmacy and Dr Lissa Yo who were both fine with pt receiving a partial PN bag since PO diet has started. Followed up with Dr Lissa Yo as well-MD reported PN for an additional day and IR will work on getting another IV access.  Current lab shows low Potassium (3.2) and Phos (2.0)-replaced with 10mEq KCL and 20mmolKPhos; MD did not want to add additional KPhos outside of bag d/t IV access. Will continue current standard PPN bags of 1440 mL volume; discussed with Dr Mona Cralton. Nutrition Related Findings:    Last BM 6/14-colostomy. Output: 400mL (urine), 200mL (colostomy). Pertinent Medications:  Remeron, MVI, Miralax, Potassium Phosphate 20mmol. Wound Type: Multiple,Deep tissue injury,Pressure injury,Stage III,Stage IV,Skin tears    Current Nutrition Intake & Therapies:  Average Meal Intake: 26-50%  Average Supplement Intake: None ordered  TPN ADULT PERIKABIVEN W/ ADDITIVES  ADULT DIET Full Liquid   Current Parenteral Nutrition Order: Peripheral PN at 60 mL/hr, provides 970 kcal, 34 gm amino acids, 97 gm dextrose, 51 gm lipids, 32 mEq Na, 24 mEq K, 8 mEq Mg, 4 mEq Ca, 11 mmol Phos , 10 mL MVI , 1 mL trace elements , 100 mg thiamine An additional 20 mmol KPhos outside of PPN added as well. Anthropometric Measures:  Height: 6' 2\" (188 cm)  Ideal Body Weight (IBW): 190 lbs (86 kg)  Admission Body Weight: 205 lb 11 oz  Current Body Wt:  98 kg (216 lb 0.8 oz), 113.7 % IBW. Bed scale  Current BMI (kg/m2): 27.7  Usual Body Weight: 90.7 kg (200 lb)  % Weight Change (Calculated): 2.8  Weight Adjustment: Paraplegia  Total Amputation Percentage: 7.5  Adjusted Ideal Body Weight (lbs) (Calculated): 175.8  Adjusted Ideal Body Weight (kg) (Calculated): 79.91 kg  Adjusted % IBW (Calculated): 122.9  Adjusted BMI (Calculated): 29.8  BMI Category: Overweight (BMI 25.0-29. 9)    Estimated Daily Nutrient Needs:  Energy Requirements Based On: Kcal/kg (22-25)  Weight Used for Energy Requirements: Current  Energy (kcal/day): 4815-4843  Weight Used for Protein Requirements: Current (1.0-1.2)  Protein (g/day):   Method Used for Fluid Requirements: 1 ml/kcal  Fluid (ml/day): 7983-5230    Nutrition Diagnosis:   · Increased nutrient needs related to altered GI structure,altered GI function,inadequate protein-energy intake as evidenced by wounds,NPO or clear liquid status due to medical condition    · Inadequate oral intake related to altered GI function as evidenced by NPO or clear liquid status due to medical condition      Nutrition Interventions:   Food and/or Nutrient Delivery: Continue current diet,Mineral supplement,Vitamin supplement,Continue parenteral nutrition,Modify parenteral nutrition,Start oral nutrition supplement  Nutrition Education/Counseling: Education not indicated,No recommendations at this time  Coordination of Nutrition Care: Continue to monitor while inpatient,Interdisciplinary rounds  Plan of Care discussed with: RN and Dr Sujatha Alcantara    Goals:  Previous Goal Met: Progressing toward goal(s)  Goals: Meet at least 75% of estimated needs,by next RD assessment       Nutrition Monitoring and Evaluation:   Behavioral-Environmental Outcomes: None identified  Food/Nutrient Intake Outcomes: Diet advancement/tolerance,Food and nutrient intake,Parenteral nutrition intake/tolerance,Supplement intake,Vitamin/mineral intake  Physical Signs/Symptoms Outcomes: Biochemical data,Chewing or swallowing,GI status,Weight,Skin,Nutrition focused physical findings,Meal time behavior    Discharge Planning:     Too soon to determine    Vanessa Vergara, DORYS, LD   Contact: [phone]
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
## 2022-06-14 NOTE — PROGRESS NOTES
INTERIM UPDATE - 5670 EST on 6/14/2022    Nursing Staff reports that peripheral IV access has been lost in this Patient that is NPO and receiving TPN via his Mid-line. Nursing Staff reports that 4 Nurses have attempted to place additional peripheral lines and failed. Nursing Staff report that Patient is due for IV Potassium chloride and IV Zosyn. Plan:  So long as TPN and IV Zosyn can be administered serially via the same line (i.e. not currently, but the line is not dedicated for TPN), Nursing Staff was instructed to stop the TPN to administer the IV Zosyn (presumably for 4 hours) and then resume the TPN thereafter.

## 2022-06-14 NOTE — PROGRESS NOTES
Problem: Risk for Spread of Infection  Goal: Prevent transmission of infectious organism to others  Description: Prevent the transmission of infectious organisms to other patients, staff members, and visitors. Outcome: Progressing Towards Goal     Problem: Pressure Injury - Risk of  Goal: *Prevention of pressure injury  Description: Document Jordin Scale and appropriate interventions in the flowsheet. Outcome: Progressing Towards Goal  Note: Pressure Injury Interventions:  Sensory Interventions: Assess changes in LOC,Assess need for specialty bed,Avoid rigorous massage over bony prominences,Float heels,Keep linens dry and wrinkle-free,Minimize linen layers,Pressure redistribution bed/mattress (bed type)    Moisture Interventions: Absorbent underpads,Internal/External urinary devices,Internal/External fecal devices,Apply protective barrier, creams and emollients,Minimize layers    Activity Interventions: Pressure redistribution bed/mattress(bed type)    Mobility Interventions: HOB 30 degrees or less,Pressure redistribution bed/mattress (bed type)    Nutrition Interventions: Document food/fluid/supplement intake    Friction and Shear Interventions: Apply protective barrier, creams and emollients,Foam dressings/transparent film/skin sealants,Minimize layers     Problem: Falls - Risk of  Goal: *Absence of Falls  Description: Document Carisa Fall Risk and appropriate interventions in the flowsheet.   Outcome: Progressing Towards Goal  Note: Fall Risk Interventions:     Medication Interventions: Bed/chair exit alarm,Patient to call before getting OOB    Elimination Interventions: Call light in reach (lee and colostomy)

## 2022-06-14 NOTE — ROUTINE PROCESS
Bedside shift change report given to [facility] Arianna Snider RN (oncoming nurse) by Tamera Salazar RN (offgoing nurse). Report included the following information SBAR, Kardex, Intake/Output and Recent Results.
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
## 2022-06-14 NOTE — PROGRESS NOTES
Wound Prevention Checklist    Patient: Randi Heart (71 y.o. male)  Date: 6/14/2022  Diagnosis: Septic shock ([facility] [A41.9, R65.21] Septic shock ([facility]    Precautions:         [x]  Heel prevention boots placed on patient    [x]  Patient turned q2h during shift    [x]  Lift team ordered    []  Patient on [place] bed/Specialty bed    [x]  Each Wound is documented during shift (Stage, Color, drainage, odor, measurements, and dressings)    [x]  Dual skin checks done at bedside during shift report with Antonia BAIG    [address]

## 2022-06-14 NOTE — PROGRESS NOTES
This nurse was cleaning up large stool with RANJIT Sanchez, that had ruptured the wafer on his abdomen for the ileostomy. Large, brown, mucoid stool with foul smell. New wafer and bag placed on patient after performing partial bed bath and draining colostomy. Patient's PIV became dislodged, IV abx and potassium phosphate held. This nurse attempted x2 to place new IV.    2000: Oncoming nurse aware that patient needs new PIV and to finish medication.

## 2022-06-14 NOTE — ROUTINE PROCESS
Wound Prevention Checklist    Patient: Heide Light (89 y.o. male)  Date: 6/14/2022  Diagnosis: Septic shock ([facility] [A41.9, R65.21] Septic shock ([facility] [address]    Precautions:         [x]  Heel prevention boots placed on patient    [x]  Patient turned q2h during shift    [x]  Lift team ordered    []  Patient on [place] bed/Specialty bed    [x]  Each Wound is documented during shift (Stage, Color, drainage, odor, measurements, and dressings)    [x]  Dual skin checks done at bedside during shift report with Santy Murray RN

## 2022-06-15 LAB
ANION GAP SERPL CALC-SCNC: 2 MMOL/L (ref 3–18)
BACTERIA SPEC CULT: NORMAL
BASOPHILS # BLD: 0.1 K/UL (ref 0–0.1)
BASOPHILS NFR BLD: 1 % (ref 0–2)
BUN SERPL-MCNC: 5 MG/DL (ref 7–18)
BUN/CREAT SERPL: 6 (ref 12–20)
CALCIUM SERPL-MCNC: 8.2 MG/DL (ref 8.5–10.1)
CHLORIDE SERPL-SCNC: 105 MMOL/L (ref 100–111)
CO2 SERPL-SCNC: 31 MMOL/L (ref 21–32)
CREAT SERPL-MCNC: 0.87 MG/DL (ref 0.6–1.3)
DIFFERENTIAL METHOD BLD: ABNORMAL
EOSINOPHIL # BLD: 0.2 K/UL (ref 0–0.4)
EOSINOPHIL NFR BLD: 2 % (ref 0–5)
ERYTHROCYTE [DISTWIDTH] IN BLOOD BY AUTOMATED COUNT: 15.1 % (ref 11.6–14.5)
GLUCOSE SERPL-MCNC: 104 MG/DL (ref 74–99)
HCT VFR BLD AUTO: 25.4 % (ref 36–48)
HGB BLD-MCNC: 8.1 G/DL (ref 13–16)
IMM GRANULOCYTES # BLD AUTO: 0.1 K/UL (ref 0–0.04)
IMM GRANULOCYTES NFR BLD AUTO: 0 % (ref 0–0.5)
LYMPHOCYTES # BLD: 2.6 K/UL (ref 0.9–3.6)
LYMPHOCYTES NFR BLD: 22 % (ref 21–52)
MAGNESIUM SERPL-MCNC: 1.7 MG/DL (ref 1.6–2.6)
MCH RBC QN AUTO: 29.1 PG (ref 24–34)
MCHC RBC AUTO-ENTMCNC: 31.9 G/DL (ref 31–37)
MCV RBC AUTO: 91.4 FL (ref 78–100)
MONOCYTES # BLD: 1.2 K/UL (ref 0.05–1.2)
MONOCYTES NFR BLD: 10 % (ref 3–10)
NEUTS SEG # BLD: 7.9 K/UL (ref 1.8–8)
NEUTS SEG NFR BLD: 66 % (ref 40–73)
NRBC # BLD: 0 K/UL (ref 0–0.01)
NRBC BLD-RTO: 0 PER 100 WBC
PHOSPHATE SERPL-MCNC: 2.5 MG/DL (ref 2.5–4.9)
PLATELET # BLD AUTO: 434 K/UL (ref 135–420)
PMV BLD AUTO: 11.4 FL (ref 9.2–11.8)
POTASSIUM SERPL-SCNC: 4.1 MMOL/L (ref 3.5–5.5)
RBC # BLD AUTO: 2.78 M/UL (ref 4.35–5.65)
SERVICE CMNT-IMP: NORMAL
SODIUM SERPL-SCNC: 138 MMOL/L (ref 136–145)
WBC # BLD AUTO: 12 K/UL (ref 4.6–13.2)

## 2022-06-15 PROCEDURE — 74011250636 HC RX REV CODE- 250/636: Performed by: INTERNAL MEDICINE

## 2022-06-15 PROCEDURE — 74011000258 HC RX REV CODE- 258: Performed by: INTERNAL MEDICINE

## 2022-06-15 PROCEDURE — 83735 ASSAY OF MAGNESIUM: CPT

## 2022-06-15 PROCEDURE — 99233 SBSQ HOSP IP/OBS HIGH 50: CPT | Performed by: HOSPITALIST

## 2022-06-15 PROCEDURE — 36415 COLL VENOUS BLD VENIPUNCTURE: CPT

## 2022-06-15 PROCEDURE — 85025 COMPLETE CBC W/AUTO DIFF WBC: CPT

## 2022-06-15 PROCEDURE — 74011000250 HC RX REV CODE- 250: Performed by: INTERNAL MEDICINE

## 2022-06-15 PROCEDURE — C9113 INJ PANTOPRAZOLE SODIUM, VIA: HCPCS | Performed by: INTERNAL MEDICINE

## 2022-06-15 PROCEDURE — 74011250637 HC RX REV CODE- 250/637: Performed by: INTERNAL MEDICINE

## 2022-06-15 PROCEDURE — 65270000046 HC RM TELEMETRY

## 2022-06-15 PROCEDURE — 2709999900 HC NON-CHARGEABLE SUPPLY

## 2022-06-15 PROCEDURE — 74011000258 HC RX REV CODE- 258: Performed by: HOSPITALIST

## 2022-06-15 PROCEDURE — 84100 ASSAY OF PHOSPHORUS: CPT

## 2022-06-15 PROCEDURE — 80048 BASIC METABOLIC PNL TOTAL CA: CPT

## 2022-06-15 RX ADMIN — PIPERACILLIN AND TAZOBACTAM 3.38 G: 3; .375 INJECTION, POWDER, LYOPHILIZED, FOR SOLUTION INTRAVENOUS at 09:28

## 2022-06-15 RX ADMIN — SODIUM CHLORIDE, PRESERVATIVE FREE 10 ML: 5 INJECTION INTRAVENOUS at 06:53

## 2022-06-15 RX ADMIN — PIPERACILLIN AND TAZOBACTAM 3.38 G: 3; .375 INJECTION, POWDER, LYOPHILIZED, FOR SOLUTION INTRAVENOUS at 20:36

## 2022-06-15 RX ADMIN — DEXTROSE, SOYBEAN OIL, ELECTROLYTES, LYSINE, PHENYLALANINE, LEUCINE, VALINE, THREONINE, METHIONINE, ISOLEUCINE, TRYPTOPHAN, ALANINE, ARGININE, GLYCINE, PROLINE, HISTIDINE, GLUTAMIC ACID, SERINE, ASPARTIC ACID AND TYROSINE
6.8; 3.5; 170; 174; 105; 68; 20; 187; 164; 164; 152; 116; 116; 116; 40; 333; 235; 164; 141; 141; 116; 94; 71; 4.8 INJECTION, EMULSION INTRAVENOUS at 20:28

## 2022-06-15 RX ADMIN — SODIUM CHLORIDE 40 MG: 9 INJECTION INTRAMUSCULAR; INTRAVENOUS; SUBCUTANEOUS at 09:28

## 2022-06-15 RX ADMIN — PIPERACILLIN AND TAZOBACTAM 3.38 G: 3; .375 INJECTION, POWDER, LYOPHILIZED, FOR SOLUTION INTRAVENOUS at 04:00

## 2022-06-15 RX ADMIN — Medication 100 MG: at 09:51

## 2022-06-15 RX ADMIN — MIRTAZAPINE 15 MG: 15 TABLET, ORALLY DISINTEGRATING ORAL at 22:39

## 2022-06-15 RX ADMIN — SODIUM CHLORIDE, PRESERVATIVE FREE 10 ML: 5 INJECTION INTRAVENOUS at 15:32

## 2022-06-15 RX ADMIN — SODIUM CHLORIDE, PRESERVATIVE FREE 10 ML: 5 INJECTION INTRAVENOUS at 22:39

## 2022-06-15 RX ADMIN — PIPERACILLIN AND TAZOBACTAM 3.38 G: 3; .375 INJECTION, POWDER, LYOPHILIZED, FOR SOLUTION INTRAVENOUS at 15:22
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
## 2022-06-15 NOTE — PROGRESS NOTES
Problem: Risk for Spread of Infection  Goal: Prevent transmission of infectious organism to others  Description: Prevent the transmission of infectious organisms to other patients, staff members, and visitors. Outcome: Progressing Towards Goal     Problem: Patient Education:  Go to Education Activity  Goal: Patient/Family Education  Outcome: Progressing Towards Goal     Problem: Pressure Injury - Risk of  Goal: *Prevention of pressure injury  Description: Document Jordin Scale and appropriate interventions in the flowsheet. Outcome: Progressing Towards Goal  Note: Pressure Injury Interventions:  Sensory Interventions: Assess changes in LOC,Keep linens dry and wrinkle-free,Minimize linen layers    Moisture Interventions: Absorbent underpads,Check for incontinence Q2 hours and as needed    Activity Interventions: Pressure redistribution bed/mattress(bed type)    Mobility Interventions: HOB 30 degrees or less,Pressure redistribution bed/mattress (bed type)    Nutrition Interventions: Document food/fluid/supplement intake    Friction and Shear Interventions: Apply protective barrier, creams and emollients                Problem: Patient Education: Go to Patient Education Activity  Goal: Patient/Family Education  Outcome: Progressing Towards Goal     Problem: Falls - Risk of  Goal: *Absence of Falls  Description: Document Carisa Fall Risk and appropriate interventions in the flowsheet.   Outcome: Progressing Towards Goal  Note: Fall Risk Interventions:            Medication Interventions: Bed/chair exit alarm,Patient to call before getting OOB    Elimination Interventions: Bed/chair exit alarm,Call light in reach,Patient to call for help with toileting needs              Problem: Patient Education: Go to Patient Education Activity  Goal: Patient/Family Education  Outcome: Progressing Towards Goal     Problem: Nutrition Deficit  Goal: *Optimize nutritional status  Outcome: Progressing Towards Goal     Problem: Pain  Goal: *Control of Pain  Outcome: Progressing Towards Goal     Problem: Patient Education: Go to Patient Education Activity  Goal: Patient/Family Education  Outcome: Progressing Towards Goal

## 2022-06-15 NOTE — PROGRESS NOTES
Comprehensive Nutrition Assessment    Type and Reason for Visit: Reassess,Positive nutrition screen,Wound,Consult    Nutrition Recommendations/Plan:   Provide parenteral nutrition using standard premixed product until patient is able to tolerate adequate intake of oral diet or enteral feeding. Dr Galina Wood MD to review PN order and note daily; RD will not notify MD of content and changes per MD request.    Continue current PN order. See \"current nutrition therapies\" section of note for current content details. Provide oral multivitamin/multi-trace element and exclude from PN. Plan to add Magic Cup BID to promote PO intake   Monitor tolerance of PPN, readiness to discontinue PPN, tolerance of PO intake/supplements, labs, weight and plan of care. Malnutrition Assessment:  Malnutrition Status: At risk for malnutrition (specify) (r/t varied/fair PO intake of CL diet currently) (06/03/22 7192)      Nutrition Assessment:    Pt continue to tolerate PPN at current rate. Pt remains with only on peripheral IV access and pharmacy updated antibiotic infusion to 30 minutes the patient will only have their PPN stopped for 2 hours/24 hours versus 12 hours per chart review. Pt transition to Easy To Chew Diet and will monitor PO intake as well as tolerance. Discussed discontinuing TPN with Dr Galina Wood and she recommended to continue PPN for one more day. Will continue standard PPN bag of 1440mL. Current labs show improvement to Potassium and Phosphorus (WNL). Nutrition Related Findings:    Last BM 6/15-COLOSTOMY. Output: 400 ml (urine) and 400mL(stool). Pertinent medications: Remeron, MVI, pantoprazole, thiamine.  Wound Type: Multiple,Deep tissue injury,Pressure injury,Stage III,Stage IV,Skin tears    Current Nutrition Intake & Therapies:  Average Meal Intake: 26-50%  Average Supplement Intake: None ordered  TPN ADULT PERIKABIVEN W/ ADDITIVES  ADULT DIET Easy to Chew   Current Parenteral Nutrition Order: Peripheral PN at 61 mL/hr, provides 970 kcal, 34 gm amino acids, 97 gm dextrose, 51 gm lipids, 32 mEq Na, 24 mEq K, 8 mEq Mg, 4 mEq Ca, 11 mmol Phos    Anthropometric Measures:  Height: 6' 2\" (188 cm)  Ideal Body Weight (IBW): 190 lbs (86 kg)  Admission Body Weight: 205 lb 11 oz  Current Body Wt:  98 kg (216 lb 0.8 oz), 113.7 % IBW. Bed scale  Current BMI (kg/m2): 27.7  Usual Body Weight: 90.7 kg (200 lb)  % Weight Change (Calculated): 2.8  Weight Adjustment: Paraplegia  Total Amputation Percentage: 7.5  Adjusted Ideal Body Weight (lbs) (Calculated): 175.8  Adjusted Ideal Body Weight (kg) (Calculated): 79.91 kg  Adjusted % IBW (Calculated): 122.9  Adjusted BMI (Calculated): 29.8  BMI Category: Overweight (BMI 25.0-29. 9)    Estimated Daily Nutrient Needs:  Energy Requirements Based On: Kcal/kg (22-25)  Weight Used for Energy Requirements: Current  Energy (kcal/day): 4876-0025  Weight Used for Protein Requirements: Current (1.0-1.2)  Protein (g/day):   Method Used for Fluid Requirements: 1 ml/kcal  Fluid (ml/day): 4645-8857    Nutrition Diagnosis:   · Increased nutrient needs related to altered GI structure,altered GI function,inadequate protein-energy intake as evidenced by wounds,NPO or clear liquid status due to medical condition      Nutrition Interventions:   Food and/or Nutrient Delivery: Continue current diet,Mineral supplement,Vitamin supplement,Start oral nutrition supplement,Continue parenteral nutrition,Modify parenteral nutrition  Nutrition Education/Counseling: Education not indicated,No recommendations at this time  Coordination of Nutrition Care: Continue to monitor while inpatient,Interdisciplinary rounds  Plan of Care discussed with: Dr Tyler Mitchell    Goals:  Previous Goal Met: Progressing toward goal(s)  Goals: Meet at least 75% of estimated needs,by next RD assessment       Nutrition Monitoring and Evaluation:   Behavioral-Environmental Outcomes: None identified  Food/Nutrient Intake Outcomes: Food and nutrient intake,Diet advancement/tolerance,Parenteral nutrition intake/tolerance,Vitamin/mineral intake  Physical Signs/Symptoms Outcomes: Biochemical data,Meal time behavior,Chewing or swallowing,Nutrition focused physical findings,Weight,GI status    Discharge Planning:    Continue current diet,Parenteral nutrition,Continue oral nutrition supplement    Einar Gaucher, MA, RDN, LD   Contact: [phone]
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
## 2022-06-15 NOTE — PROGRESS NOTES
Discharge planning    Met with patient at bedside and spoke with Sulma Powell, mother, via phone to discuss discharge plan. Patient is active with [facility] home health. [place] of choice obtained for [facility]. Home health orders noted. Patient will need medicaid stretcher transportation at discharge.      JEANNA Monet, RN  Pager # [phone]  Care Manager

## 2022-06-15 NOTE — PROGRESS NOTES
Bedside and Verbal shift change report given to [facility] (oncoming nurse) by Andrea Chu RN (offgoing nurse). Report included the following information SBAR, Kardex, Intake/Output, MAR and Recent Results.
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
## 2022-06-16 LAB
ANION GAP SERPL CALC-SCNC: 3 MMOL/L (ref 3–18)
BUN SERPL-MCNC: 3 MG/DL (ref 7–18)
BUN/CREAT SERPL: 4 (ref 12–20)
CALCIUM SERPL-MCNC: 8.4 MG/DL (ref 8.5–10.1)
CHLORIDE SERPL-SCNC: 104 MMOL/L (ref 100–111)
CO2 SERPL-SCNC: 29 MMOL/L (ref 21–32)
CREAT SERPL-MCNC: 0.78 MG/DL (ref 0.6–1.3)
GLUCOSE SERPL-MCNC: 103 MG/DL (ref 74–99)
MAGNESIUM SERPL-MCNC: 1.7 MG/DL (ref 1.6–2.6)
PHOSPHATE SERPL-MCNC: 2.7 MG/DL (ref 2.5–4.9)
POTASSIUM SERPL-SCNC: 4.4 MMOL/L (ref 3.5–5.5)
SODIUM SERPL-SCNC: 136 MMOL/L (ref 136–145)

## 2022-06-16 PROCEDURE — 80048 BASIC METABOLIC PNL TOTAL CA: CPT

## 2022-06-16 PROCEDURE — 99232 SBSQ HOSP IP/OBS MODERATE 35: CPT | Performed by: HOSPITALIST

## 2022-06-16 PROCEDURE — 74011000258 HC RX REV CODE- 258: Performed by: INTERNAL MEDICINE

## 2022-06-16 PROCEDURE — C9113 INJ PANTOPRAZOLE SODIUM, VIA: HCPCS | Performed by: INTERNAL MEDICINE

## 2022-06-16 PROCEDURE — 2709999900 HC NON-CHARGEABLE SUPPLY

## 2022-06-16 PROCEDURE — 65270000046 HC RM TELEMETRY

## 2022-06-16 PROCEDURE — 74011250636 HC RX REV CODE- 250/636: Performed by: INTERNAL MEDICINE

## 2022-06-16 PROCEDURE — 74011000250 HC RX REV CODE- 250: Performed by: INTERNAL MEDICINE

## 2022-06-16 PROCEDURE — 36415 COLL VENOUS BLD VENIPUNCTURE: CPT

## 2022-06-16 PROCEDURE — 74011250637 HC RX REV CODE- 250/637: Performed by: INTERNAL MEDICINE

## 2022-06-16 PROCEDURE — 84100 ASSAY OF PHOSPHORUS: CPT

## 2022-06-16 PROCEDURE — 83735 ASSAY OF MAGNESIUM: CPT

## 2022-06-16 RX ADMIN — SODIUM CHLORIDE 40 MG: 9 INJECTION INTRAMUSCULAR; INTRAVENOUS; SUBCUTANEOUS at 11:35

## 2022-06-16 RX ADMIN — SODIUM CHLORIDE, PRESERVATIVE FREE 10 ML: 5 INJECTION INTRAVENOUS at 21:15

## 2022-06-16 RX ADMIN — SODIUM CHLORIDE, PRESERVATIVE FREE 10 ML: 5 INJECTION INTRAVENOUS at 18:10

## 2022-06-16 RX ADMIN — SODIUM CHLORIDE, PRESERVATIVE FREE 10 ML: 5 INJECTION INTRAVENOUS at 07:05

## 2022-06-16 RX ADMIN — PIPERACILLIN AND TAZOBACTAM 3.38 G: 3; .375 INJECTION, POWDER, LYOPHILIZED, FOR SOLUTION INTRAVENOUS at 21:10

## 2022-06-16 RX ADMIN — PIPERACILLIN AND TAZOBACTAM 3.38 G: 3; .375 INJECTION, POWDER, LYOPHILIZED, FOR SOLUTION INTRAVENOUS at 03:16

## 2022-06-16 RX ADMIN — PIPERACILLIN AND TAZOBACTAM 3.38 G: 3; .375 INJECTION, POWDER, LYOPHILIZED, FOR SOLUTION INTRAVENOUS at 11:35

## 2022-06-16 RX ADMIN — PIPERACILLIN AND TAZOBACTAM 3.38 G: 3; .375 INJECTION, POWDER, LYOPHILIZED, FOR SOLUTION INTRAVENOUS at 18:09

## 2022-06-16 RX ADMIN — Medication 100 MG: at 11:35

## 2022-06-16 RX ADMIN — MIRTAZAPINE 15 MG: 15 TABLET, ORALLY DISINTEGRATING ORAL at 21:15

## 2022-06-16 NOTE — PROGRESS NOTES
Wound Prevention Checklist    Patient: Mer Wen (56 y.o. male)  Date: 6/15/2022  Diagnosis: Septic shock ([facility] [address] [A41.9, R65.21] Septic shock ([facility] [address]    Precautions:         [x]  Heel prevention boots placed on patient    [x]  Patient turned q2h during shift    [x]  Lift team ordered    []  Patient on [facility] bed/Specialty bed    [x]  Each Wound is documented during shift (Stage, Color, drainage, odor, measurements, and dressings)    [x]  Dual skin checks done at bedside during shift report with Antonia BAIG    [facility]
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
## 2022-06-16 NOTE — PROGRESS NOTES
Problem: Risk for Spread of Infection  Goal: Prevent transmission of infectious organism to others  Description: Prevent the transmission of infectious organisms to other patients, staff members, and visitors. Outcome: Progressing Towards Goal     Problem: Patient Education:  Go to Education Activity  Goal: Patient/Family Education  Outcome: Progressing Towards Goal     Problem: Pressure Injury - Risk of  Goal: *Prevention of pressure injury  Description: Document Jordin Scale and appropriate interventions in the flowsheet. Outcome: Progressing Towards Goal  Note: Pressure Injury Interventions:  Sensory Interventions: Assess changes in LOC,Keep linens dry and wrinkle-free,Maintain/enhance activity level,Minimize linen layers    Moisture Interventions: Absorbent underpads,Internal/External urinary devices,Internal/External fecal devices    Activity Interventions: Pressure redistribution bed/mattress(bed type)    Mobility Interventions: HOB 30 degrees or less,Pressure redistribution bed/mattress (bed type)    Nutrition Interventions: Document food/fluid/supplement intake    Friction and Shear Interventions: Apply protective barrier, creams and emollients,HOB 30 degrees or less,Minimize layers,Lift team/patient mobility team                Problem: Patient Education: Go to Patient Education Activity  Goal: Patient/Family Education  Outcome: Progressing Towards Goal     Problem: Falls - Risk of  Goal: *Absence of Falls  Description: Document Carisa Fall Risk and appropriate interventions in the flowsheet.   Outcome: Progressing Towards Goal  Note: Fall Risk Interventions:            Medication Interventions: Bed/chair exit alarm    Elimination Interventions: Call light in reach,Bed/chair exit alarm,Toileting schedule/hourly rounds              Problem: Patient Education: Go to Patient Education Activity  Goal: Patient/Family Education  Outcome: Progressing Towards Goal     Problem: Nutrition Deficit  Goal: *Optimize nutritional status  Outcome: Progressing Towards Goal     Problem: Patient Education: Go to Patient Education Activity  Goal: Patient/Family Education  Outcome: Progressing Towards Goal     Problem: Pain  Goal: *Control of Pain  Outcome: Progressing Towards Goal     Problem: Patient Education: Go to Patient Education Activity  Goal: Patient/Family Education  Outcome: Progressing Towards Goal
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
## 2022-06-16 NOTE — PROGRESS NOTES
Comprehensive Nutrition Assessment    Type and Reason for Visit: Reassess,Positive nutrition screen,Wound,Consult    Nutrition Recommendations/Plan:   Plan to discontinue PN once current bag ends tonight at 8 PM  Add nutrition supplement: Ensure Enlive BID, continue magic cup supplement  Continue all other nutrition interventions. Encourage/ monitor po intake of meals and supplements. Continue daily MVI     Malnutrition Assessment:  Malnutrition Status: At risk for malnutrition (specify) (r/t varied/fair PO intake of CL diet currently) (06/03/22 6314)      Nutrition Assessment:    Pt on po diet; fair meal intake yesterday for breakfast, then did not eat his lunch, dinner yesterday or breakfast today per RN. Per RN, received report that pt tolerated breakfast yesterday from nursing report. Pt discussed with Dr Austen Sofia; discussed plan to discontinue PN when current bag ends tonight and monitor po intake of meals and supplements. Per report, pt having poor appetite; will monitor if appetite improving once PN is discontinued due to PN support may suppress appetite. Per MD, if pt continues to have poor meal intake, may reconsider resuming PN; nursing and IR had previous attempted NGT placement but were unsuccessful and pt has since refused NGT placement.          Addendum 5:14 PM:  Pt and nutrition plan discussed with RN; pt did try eating some of his lunch today per RN      Nutrition Related Findings:     6/15 Wound Type: Multiple,Deep tissue injury,Pressure injury,Stage III,Stage IV,Skin tears    Current Nutrition Intake & Therapies:  Average Meal Intake: 0%  Average Supplement Intake: Unable to assess  ADULT DIET Easy to Chew  TPN ADULT PERIKABIVEN W/ ADDITIVES  ADULT ORAL NUTRITION SUPPLEMENT Lunch, Dinner; Frozen Supplement     Current Parenteral Nutrition Order: Peripheral PN at 60 mL/hr, provides 970 kcal, 34 gm amino acids, 97 gm dextrose, 51 gm lipids, 32 mEq Na, 24 mEq K, 8 mEq Mg, 4 mEq Ca, 11 mmol Banner    Anthropometric Measures:  Height: 6' 2\" (188 cm)  Ideal Body Weight (IBW): 190 lbs (86 kg)  Admission Body Weight: 205 lb 11 oz  Current Body Wt:  98 kg (216 lb 0.8 oz), 113.7 % IBW. Bed scale  Current BMI (kg/m2): 27.7  Usual Body Weight: 90.7 kg (200 lb)  % Weight Change (Calculated): 2.8  Weight Adjustment: Paraplegia  Total Amputation Percentage: 7.5  Adjusted Ideal Body Weight (lbs) (Calculated): 175.8  Adjusted Ideal Body Weight (kg) (Calculated): 79.91 kg  Adjusted % IBW (Calculated): 122.9  Adjusted BMI (Calculated): 29.8  BMI Category: Overweight (BMI 25.0-29. 9)    Estimated Daily Nutrient Needs:  Energy Requirements Based On: Kcal/kg (22-25)  Weight Used for Energy Requirements: Current  Energy (kcal/day): 6460-1407  Weight Used for Protein Requirements: Current (1.0-1.2)  Protein (g/day):   Method Used for Fluid Requirements: 1 ml/kcal  Fluid (ml/day): 6141-5793    Nutrition Diagnosis:   · Increased nutrient needs related to altered GI structure,altered GI function,inadequate protein-energy intake as evidenced by wounds,NPO or clear liquid status due to medical condition      Nutrition Interventions:   Food and/or Nutrient Delivery: Continue current diet,Vitamin supplement,Modify oral nutrition supplement,Discontinue parenteral nutrition  Nutrition Education/Counseling: Education not indicated,No recommendations at this time  Coordination of Nutrition Care: Continue to monitor while inpatient  Plan of Care discussed with: MD    Goals:  Previous Goal Met: Progress towards goal(s) declining  Goals: Meet at least 75% of estimated needs,PO intake 75% or greater,by next RD assessment       Nutrition Monitoring and Evaluation:   Behavioral-Environmental Outcomes: None identified  Food/Nutrient Intake Outcomes: Diet advancement/tolerance,Food and nutrient intake,Supplement intake,Parenteral nutrition intake/tolerance,Vitamin/mineral intake  Physical Signs/Symptoms Outcomes: Biochemical data,Meal time behavior,Chewing or swallowing,Nutrition focused physical findings,Weight,GI status    Discharge Planning:    Continue current diet,Parenteral nutrition,Continue oral nutrition supplement    Amanda Sue RD  Contact: [phone]

## 2022-06-16 NOTE — PROGRESS NOTES
Problem: Risk for Spread of Infection  Goal: Prevent transmission of infectious organism to others  Description: Prevent the transmission of infectious organisms to other patients, staff members, and visitors. Outcome: Progressing Towards Goal     Problem: Pressure Injury - Risk of  Goal: *Prevention of pressure injury  Description: Document Jordin Scale and appropriate interventions in the flowsheet. Outcome: Progressing Towards Goal  Note: Pressure Injury Interventions:  Sensory Interventions: Assess changes in LOC,Keep linens dry and wrinkle-free,Maintain/enhance activity level,Minimize linen layers    Moisture Interventions: Absorbent underpads,Internal/External urinary devices,Internal/External fecal devices    Activity Interventions: Pressure redistribution bed/mattress(bed type)    Mobility Interventions: HOB 30 degrees or less,Pressure redistribution bed/mattress (bed type)    Nutrition Interventions: Document food/fluid/supplement intake    Friction and Shear Interventions: Apply protective barrier, creams and emollients,HOB 30 degrees or less,Minimize layers,Lift team/patient mobility team       Problem: Falls - Risk of  Goal: *Absence of Falls  Description: Document Carisa Fall Risk and appropriate interventions in the flowsheet.   Outcome: Progressing Towards Goal  Note: Fall Risk Interventions:      Medication Interventions: Bed/chair exit alarm    Elimination Interventions: Call light in reach,Bed/chair exit alarm,Toileting schedule/hourly rounds

## 2022-06-17 ENCOUNTER — APPOINTMENT (OUTPATIENT)
Dept: CT IMAGING | Age: 62
DRG: 441 | End: 2022-06-17
Attending: PHYSICIAN ASSISTANT
Payer: MEDICAID

## 2022-06-17 LAB
ANION GAP SERPL CALC-SCNC: 5 MMOL/L (ref 3–18)
BASOPHILS # BLD: 0.1 K/UL (ref 0–0.1)
BASOPHILS NFR BLD: 1 % (ref 0–2)
BUN SERPL-MCNC: 6 MG/DL (ref 7–18)
BUN/CREAT SERPL: 7 (ref 12–20)
CALCIUM SERPL-MCNC: 8.7 MG/DL (ref 8.5–10.1)
CHLORIDE SERPL-SCNC: 103 MMOL/L (ref 100–111)
CO2 SERPL-SCNC: 27 MMOL/L (ref 21–32)
CREAT FLD-MCNC: 0.93 MG/DL
CREAT SERPL-MCNC: 0.89 MG/DL (ref 0.6–1.3)
DIFFERENTIAL METHOD BLD: ABNORMAL
EOSINOPHIL # BLD: 0.2 K/UL (ref 0–0.4)
EOSINOPHIL NFR BLD: 3 % (ref 0–5)
ERYTHROCYTE [DISTWIDTH] IN BLOOD BY AUTOMATED COUNT: 14.8 % (ref 11.6–14.5)
GLUCOSE SERPL-MCNC: 82 MG/DL (ref 74–99)
HCT VFR BLD AUTO: 29.2 % (ref 36–48)
HGB BLD-MCNC: 9.1 G/DL (ref 13–16)
IMM GRANULOCYTES # BLD AUTO: 0.1 K/UL (ref 0–0.04)
IMM GRANULOCYTES NFR BLD AUTO: 1 % (ref 0–0.5)
LYMPHOCYTES # BLD: 2.3 K/UL (ref 0.9–3.6)
LYMPHOCYTES NFR BLD: 25 % (ref 21–52)
MAGNESIUM SERPL-MCNC: 1.8 MG/DL (ref 1.6–2.6)
MCH RBC QN AUTO: 28.4 PG (ref 24–34)
MCHC RBC AUTO-ENTMCNC: 31.2 G/DL (ref 31–37)
MCV RBC AUTO: 91.3 FL (ref 78–100)
MONOCYTES # BLD: 1 K/UL (ref 0.05–1.2)
MONOCYTES NFR BLD: 11 % (ref 3–10)
NEUTS SEG # BLD: 5.4 K/UL (ref 1.8–8)
NEUTS SEG NFR BLD: 59 % (ref 40–73)
NRBC # BLD: 0 K/UL (ref 0–0.01)
NRBC BLD-RTO: 0 PER 100 WBC
PHOSPHATE SERPL-MCNC: 3.2 MG/DL (ref 2.5–4.9)
PLATELET # BLD AUTO: 530 K/UL (ref 135–420)
PMV BLD AUTO: 11.1 FL (ref 9.2–11.8)
POTASSIUM SERPL-SCNC: 4.6 MMOL/L (ref 3.5–5.5)
RBC # BLD AUTO: 3.2 M/UL (ref 4.35–5.65)
SODIUM SERPL-SCNC: 135 MMOL/L (ref 136–145)
SPECIMEN SOURCE FLD: NORMAL
WBC # BLD AUTO: 9.1 K/UL (ref 4.6–13.2)

## 2022-06-17 PROCEDURE — 74011250637 HC RX REV CODE- 250/637: Performed by: INTERNAL MEDICINE

## 2022-06-17 PROCEDURE — 65270000046 HC RM TELEMETRY

## 2022-06-17 PROCEDURE — 36415 COLL VENOUS BLD VENIPUNCTURE: CPT

## 2022-06-17 PROCEDURE — 74011000258 HC RX REV CODE- 258: Performed by: INTERNAL MEDICINE

## 2022-06-17 PROCEDURE — 80048 BASIC METABOLIC PNL TOTAL CA: CPT

## 2022-06-17 PROCEDURE — 84100 ASSAY OF PHOSPHORUS: CPT

## 2022-06-17 PROCEDURE — 83735 ASSAY OF MAGNESIUM: CPT

## 2022-06-17 PROCEDURE — 82570 ASSAY OF URINE CREATININE: CPT

## 2022-06-17 PROCEDURE — 74011000636 HC RX REV CODE- 636: Performed by: HOSPITALIST

## 2022-06-17 PROCEDURE — 99232 SBSQ HOSP IP/OBS MODERATE 35: CPT | Performed by: HOSPITALIST

## 2022-06-17 PROCEDURE — C9113 INJ PANTOPRAZOLE SODIUM, VIA: HCPCS | Performed by: INTERNAL MEDICINE

## 2022-06-17 PROCEDURE — 74011000250 HC RX REV CODE- 250: Performed by: INTERNAL MEDICINE

## 2022-06-17 PROCEDURE — 85025 COMPLETE CBC W/AUTO DIFF WBC: CPT

## 2022-06-17 PROCEDURE — 74177 CT ABD & PELVIS W/CONTRAST: CPT

## 2022-06-17 PROCEDURE — 74011250636 HC RX REV CODE- 250/636: Performed by: INTERNAL MEDICINE

## 2022-06-17 RX ADMIN — SODIUM CHLORIDE, PRESERVATIVE FREE 10 ML: 5 INJECTION INTRAVENOUS at 22:05

## 2022-06-17 RX ADMIN — SODIUM CHLORIDE, PRESERVATIVE FREE 10 ML: 5 INJECTION INTRAVENOUS at 06:33

## 2022-06-17 RX ADMIN — Medication 100 MG: at 10:14

## 2022-06-17 RX ADMIN — PIPERACILLIN AND TAZOBACTAM 3.38 G: 3; .375 INJECTION, POWDER, LYOPHILIZED, FOR SOLUTION INTRAVENOUS at 03:09

## 2022-06-17 RX ADMIN — PIPERACILLIN AND TAZOBACTAM 3.38 G: 3; .375 INJECTION, POWDER, LYOPHILIZED, FOR SOLUTION INTRAVENOUS at 18:46

## 2022-06-17 RX ADMIN — IOPAMIDOL 100 ML: 612 INJECTION, SOLUTION INTRAVENOUS at 11:55

## 2022-06-17 RX ADMIN — SODIUM CHLORIDE, PRESERVATIVE FREE 10 ML: 5 INJECTION INTRAVENOUS at 18:47

## 2022-06-17 RX ADMIN — PIPERACILLIN AND TAZOBACTAM 3.38 G: 3; .375 INJECTION, POWDER, LYOPHILIZED, FOR SOLUTION INTRAVENOUS at 10:14

## 2022-06-17 RX ADMIN — SODIUM CHLORIDE 40 MG: 9 INJECTION INTRAMUSCULAR; INTRAVENOUS; SUBCUTANEOUS at 10:14

## 2022-06-17 NOTE — ROUTINE PROCESS
Wound Prevention Checklist    Patient: Merced Vyas (89 y.o. male)  Date: 6/16/2022  Diagnosis: Septic shock ([facility] [address] [A41.9, R65.21] Septic shock ([facility] [address]    Precautions:         [x]  Heel prevention boots placed on patient    [x]  Patient turned q2h during shift    [x]  Lift team ordered    [x]  Patient on [facility] bed/Specialty bed    [x]  Each Wound is documented during shift (Stage, Color, drainage, odor, measurements, and dressings)    [x]  Dual skin checks done at bedside during shift report with Cally Fitzgerald RN
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
## 2022-06-17 NOTE — PROGRESS NOTES
Problem: Risk for Spread of Infection  Goal: Prevent transmission of infectious organism to others  Description: Prevent the transmission of infectious organisms to other patients, staff members, and visitors. Outcome: Not Progressing Towards Goal     Problem: Patient Education:  Go to Education Activity  Goal: Patient/Family Education  Outcome: Not Progressing Towards Goal     Problem: Pressure Injury - Risk of  Goal: *Prevention of pressure injury  Description: Document Jordin Scale and appropriate interventions in the flowsheet. Outcome: Not Progressing Towards Goal  Note: Pressure Injury Interventions:  Sensory Interventions: Assess changes in LOC,Avoid rigorous massage over bony prominences,Minimize linen layers    Moisture Interventions: Absorbent underpads,Internal/External urinary devices,Check for incontinence Q2 hours and as needed,Internal/External fecal devices,Minimize layers    Activity Interventions: Increase time out of bed,Pressure redistribution bed/mattress(bed type)    Mobility Interventions: Float heels,Pressure redistribution bed/mattress (bed type),Turn and reposition approx. every two hours(pillow and wedges)    Nutrition Interventions: Document food/fluid/supplement intake    Friction and Shear Interventions: Foam dressings/transparent film/skin sealants,Lift team/patient mobility team,Transferring/repositioning devices                Problem: Patient Education: Go to Patient Education Activity  Goal: Patient/Family Education  Outcome: Not Progressing Towards Goal     Problem: Falls - Risk of  Goal: *Absence of Falls  Description: Document Carisa Fall Risk and appropriate interventions in the flowsheet.   Outcome: Not Progressing Towards Goal  Note: Fall Risk Interventions:            Medication Interventions: Bed/chair exit alarm    Elimination Interventions: Bed/chair exit alarm,Call light in reach,Toileting schedule/hourly rounds              Problem: Patient Education: Go to Patient Education Activity  Goal: Patient/Family Education  Outcome: Not Progressing Towards Goal     Problem: Nutrition Deficit  Goal: *Optimize nutritional status  Outcome: Not Progressing Towards Goal     Problem: Patient Education: Go to Patient Education Activity  Goal: Patient/Family Education  Outcome: Not Progressing Towards Goal     Problem: Pain  Goal: *Control of Pain  Outcome: Not Progressing Towards Goal     Problem: Patient Education: Go to Patient Education Activity  Goal: Patient/Family Education  Outcome: Not Progressing Towards Goal

## 2022-06-17 NOTE — PROGRESS NOTES
Pharmacy Note     PPN has been discontinued therefore:  Zosyn 3.375 gm Q6hr ordered for treatment of Intra-abdominal infection,  per Tiffany Simmons 10 will be changed to 3.375 gm Q8hr with each dose to be infused over 240 minutes     Estimated Creatinine Clearance: Estimated Creatinine Clearance: 100.1 mL/min (based on SCr of 0.89 mg/dL). Dialysis Status, ЕКАТЕРИНА, CKD:  Renal function looks fantastic   BMI:  Body mass index is 27.37 kg/m². Rationale for Adjustment:  [facility] B-Lactam extended infusion policy    Pharmacy will continue to monitor and adjust dose as necessary. Please call with any questions.     Thank you,  Serena Mccauley, [facility]
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
## 2022-06-17 NOTE — PROGRESS NOTES
Problem: Risk for Spread of Infection  Goal: Prevent transmission of infectious organism to others  Description: Prevent the transmission of infectious organisms to other patients, staff members, and visitors. Outcome: Progressing Towards Goal     Problem: Patient Education:  Go to Education Activity  Goal: Patient/Family Education  Outcome: Progressing Towards Goal     Problem: Pressure Injury - Risk of  Goal: *Prevention of pressure injury  Description: Document Jordin Scale and appropriate interventions in the flowsheet. Outcome: Progressing Towards Goal  Note: Pressure Injury Interventions:  Sensory Interventions: Assess changes in LOC,Check visual cues for pain,Float heels,Keep linens dry and wrinkle-free,Minimize linen layers,Monitor skin under medical devices,Pad between skin to skin,Pressure redistribution bed/mattress (bed type),Suspension boots,Turn and reposition approx. every two hours (pillows and wedges if needed)    Moisture Interventions: Absorbent underpads,Apply protective barrier, creams and emollients,Check for incontinence Q2 hours and as needed,Contain wound drainage,Internal/External fecal devices,Internal/External urinary devices,Limit adult briefs,Minimize layers,Moisture barrier,Offer toileting Q_hr    Activity Interventions: Pressure redistribution bed/mattress(bed type)    Mobility Interventions: Float heels,HOB 30 degrees or less,Pressure redistribution bed/mattress (bed type),Suspension boots,Turn and reposition approx.  every two hours(pillow and wedges)    Nutrition Interventions: Document food/fluid/supplement intake    Friction and Shear Interventions: Apply protective barrier, creams and emollients,Foam dressings/transparent film/skin sealants,HOB 30 degrees or less,Lift sheet,Minimize layers                Problem: Patient Education: Go to Patient Education Activity  Goal: Patient/Family Education  Outcome: Progressing Towards Goal     Problem: Falls - Risk of  Goal: *Absence of Falls  Description: Document Sheila Tamayo Fall Risk and appropriate interventions in the flowsheet.   Outcome: Progressing Towards Goal  Note: Fall Risk Interventions:            Medication Interventions: Bed/chair exit alarm,Patient to call before getting OOB,Teach patient to arise slowly    Elimination Interventions: Bed/chair exit alarm,Call light in reach,Stay With Me (per policy),Toilet paper/wipes in reach,Toileting schedule/hourly rounds              Problem: Patient Education: Go to Patient Education Activity  Goal: Patient/Family Education  Outcome: Progressing Towards Goal     Problem: Nutrition Deficit  Goal: *Optimize nutritional status  Outcome: Progressing Towards Goal     Problem: Patient Education: Go to Patient Education Activity  Goal: Patient/Family Education  Outcome: Progressing Towards Goal     Problem: Pain  Goal: *Control of Pain  Outcome: Progressing Towards Goal     Problem: Patient Education: Go to Patient Education Activity  Goal: Patient/Family Education  Outcome: Progressing Towards Goal

## 2022-06-17 NOTE — ROUTINE PROCESS
Wound Prevention Checklist    Patient: Sherrie Guzmán (8 y.o. male)  Date: 6/17/2022  Diagnosis: Septic shock ([facility] [A41.9, R65.21] Septic shock ([facility] [address]    Precautions:         [x]  Heel prevention boots placed on patient    [x]  Patient turned q2h during shift    [x]  Lift team ordered    [x]  Patient on [place] bed/Specialty bed    [x]  Each Wound is documented during shift (Stage, Color, drainage, odor, measurements, and dressings)    [x]  Dual skin checks done at bedside during shift report with SAFIA Kelley RN

## 2022-06-17 NOTE — PROGRESS NOTES
Bedside shift change report given to Manuel Puri RN (oncoming nurse) by Michelle Parra (offgoing nurse). Report included the following information SBAR, Kardex, MAR and Quality Measures. Patient needs refill zolapin melvin rolle please call cell # once send it she made annual appt in september

## 2022-06-18 LAB
ANION GAP SERPL CALC-SCNC: 5 MMOL/L (ref 3–18)
BASOPHILS # BLD: 0.1 K/UL (ref 0–0.1)
BASOPHILS NFR BLD: 1 % (ref 0–2)
BUN SERPL-MCNC: 10 MG/DL (ref 7–18)
BUN/CREAT SERPL: 12 (ref 12–20)
CALCIUM SERPL-MCNC: 8.3 MG/DL (ref 8.5–10.1)
CHLORIDE SERPL-SCNC: 103 MMOL/L (ref 100–111)
CO2 SERPL-SCNC: 25 MMOL/L (ref 21–32)
CREAT SERPL-MCNC: 0.83 MG/DL (ref 0.6–1.3)
DIFFERENTIAL METHOD BLD: ABNORMAL
EOSINOPHIL # BLD: 0.3 K/UL (ref 0–0.4)
EOSINOPHIL NFR BLD: 3 % (ref 0–5)
ERYTHROCYTE [DISTWIDTH] IN BLOOD BY AUTOMATED COUNT: 14.6 % (ref 11.6–14.5)
GLUCOSE SERPL-MCNC: 81 MG/DL (ref 74–99)
HCT VFR BLD AUTO: 27.4 % (ref 36–48)
HGB BLD-MCNC: 8.6 G/DL (ref 13–16)
IMM GRANULOCYTES # BLD AUTO: 0.1 K/UL (ref 0–0.04)
IMM GRANULOCYTES NFR BLD AUTO: 1 % (ref 0–0.5)
LACTATE SERPL-SCNC: 0.8 MMOL/L (ref 0.4–2)
LYMPHOCYTES # BLD: 2.7 K/UL (ref 0.9–3.6)
LYMPHOCYTES NFR BLD: 28 % (ref 21–52)
MAGNESIUM SERPL-MCNC: 1.8 MG/DL (ref 1.6–2.6)
MCH RBC QN AUTO: 28.7 PG (ref 24–34)
MCHC RBC AUTO-ENTMCNC: 31.4 G/DL (ref 31–37)
MCV RBC AUTO: 91.3 FL (ref 78–100)
MONOCYTES # BLD: 1 K/UL (ref 0.05–1.2)
MONOCYTES NFR BLD: 10 % (ref 3–10)
NEUTS SEG # BLD: 5.4 K/UL (ref 1.8–8)
NEUTS SEG NFR BLD: 57 % (ref 40–73)
NRBC # BLD: 0 K/UL (ref 0–0.01)
NRBC BLD-RTO: 0 PER 100 WBC
PHOSPHATE SERPL-MCNC: 2.8 MG/DL (ref 2.5–4.9)
PLATELET # BLD AUTO: 516 K/UL (ref 135–420)
PMV BLD AUTO: 10.9 FL (ref 9.2–11.8)
POTASSIUM SERPL-SCNC: 4.7 MMOL/L (ref 3.5–5.5)
RBC # BLD AUTO: 3 M/UL (ref 4.35–5.65)
SODIUM SERPL-SCNC: 133 MMOL/L (ref 136–145)
WBC # BLD AUTO: 9.5 K/UL (ref 4.6–13.2)

## 2022-06-18 PROCEDURE — 80048 BASIC METABOLIC PNL TOTAL CA: CPT

## 2022-06-18 PROCEDURE — 74011250636 HC RX REV CODE- 250/636: Performed by: INTERNAL MEDICINE

## 2022-06-18 PROCEDURE — 84100 ASSAY OF PHOSPHORUS: CPT

## 2022-06-18 PROCEDURE — 83735 ASSAY OF MAGNESIUM: CPT

## 2022-06-18 PROCEDURE — 74011000250 HC RX REV CODE- 250: Performed by: INTERNAL MEDICINE

## 2022-06-18 PROCEDURE — 99232 SBSQ HOSP IP/OBS MODERATE 35: CPT | Performed by: HOSPITALIST

## 2022-06-18 PROCEDURE — 85025 COMPLETE CBC W/AUTO DIFF WBC: CPT

## 2022-06-18 PROCEDURE — 83605 ASSAY OF LACTIC ACID: CPT

## 2022-06-18 PROCEDURE — 65270000046 HC RM TELEMETRY

## 2022-06-18 PROCEDURE — 36415 COLL VENOUS BLD VENIPUNCTURE: CPT

## 2022-06-18 PROCEDURE — 74011000258 HC RX REV CODE- 258: Performed by: INTERNAL MEDICINE

## 2022-06-18 PROCEDURE — C9113 INJ PANTOPRAZOLE SODIUM, VIA: HCPCS | Performed by: INTERNAL MEDICINE

## 2022-06-18 RX ORDER — ESCITALOPRAM OXALATE 5 MG/5ML
20 SOLUTION ORAL DAILY
Status: DISCONTINUED | OUTPATIENT
Start: 2022-06-19 | End: 2022-06-23 | Stop reason: HOSPADM

## 2022-06-18 RX ADMIN — SODIUM CHLORIDE 40 MG: 9 INJECTION INTRAMUSCULAR; INTRAVENOUS; SUBCUTANEOUS at 09:44

## 2022-06-18 RX ADMIN — SODIUM CHLORIDE, PRESERVATIVE FREE 10 ML: 5 INJECTION INTRAVENOUS at 06:02

## 2022-06-18 RX ADMIN — SODIUM CHLORIDE, PRESERVATIVE FREE 10 ML: 5 INJECTION INTRAVENOUS at 22:14

## 2022-06-18 RX ADMIN — PIPERACILLIN AND TAZOBACTAM 3.38 G: 3; .375 INJECTION, POWDER, LYOPHILIZED, FOR SOLUTION INTRAVENOUS at 09:42

## 2022-06-18 RX ADMIN — PIPERACILLIN AND TAZOBACTAM 3.38 G: 3; .375 INJECTION, POWDER, LYOPHILIZED, FOR SOLUTION INTRAVENOUS at 16:22

## 2022-06-18 RX ADMIN — SODIUM CHLORIDE, PRESERVATIVE FREE 10 ML: 5 INJECTION INTRAVENOUS at 16:24

## 2022-06-18 RX ADMIN — PIPERACILLIN AND TAZOBACTAM 3.38 G: 3; .375 INJECTION, POWDER, LYOPHILIZED, FOR SOLUTION INTRAVENOUS at 00:00

## 2022-06-18 NOTE — PROGRESS NOTES
Wound Prevention Checklist    Patient: Delbert Vyas (51 y.o. male)  Date: 6/17/2022  Diagnosis: Septic shock ([facility] [address] [A41.9, R65.21] Septic shock ([facility] [address]    Precautions:         [x]  Heel prevention boots placed on patient    [x]  Patient turned q2h during shift    [x]  Lift team ordered    [x]  Patient on [place] bed/Specialty bed    [x]  Each Wound is documented during shift (Stage, Color, drainage, odor, measurements, and dressings)    [x]  Dual skin checks done at bedside during shift report with off going nurse Mariah Marsh RN

## 2022-06-18 NOTE — PROGRESS NOTES
Problem: Risk for Spread of Infection  Goal: Prevent transmission of infectious organism to others  Description: Prevent the transmission of infectious organisms to other patients, staff members, and visitors. Outcome: Progressing Towards Goal     Problem: Patient Education:  Go to Education Activity  Goal: Patient/Family Education  Outcome: Progressing Towards Goal     Problem: Pressure Injury - Risk of  Goal: *Prevention of pressure injury  Description: Document Jordin Scale and appropriate interventions in the flowsheet. Outcome: Progressing Towards Goal  Note: Pressure Injury Interventions:  Sensory Interventions: Assess changes in LOC    Moisture Interventions: Absorbent underpads    Activity Interventions: Pressure redistribution bed/mattress(bed type)    Mobility Interventions: Float heels    Nutrition Interventions: Document food/fluid/supplement intake    Friction and Shear Interventions: Apply protective barrier, creams and emollients                Problem: Patient Education: Go to Patient Education Activity  Goal: Patient/Family Education  Outcome: Progressing Towards Goal     Problem: Falls - Risk of  Goal: *Absence of Falls  Description: Document Carisa Fall Risk and appropriate interventions in the flowsheet.   Outcome: Progressing Towards Goal  Note: Fall Risk Interventions:            Medication Interventions: Bed/chair exit alarm    Elimination Interventions: Call light in reach,Bed/chair exit alarm              Problem: Patient Education: Go to Patient Education Activity  Goal: Patient/Family Education  Outcome: Progressing Towards Goal     Problem: Nutrition Deficit  Goal: *Optimize nutritional status  Outcome: Progressing Towards Goal     Problem: Patient Education: Go to Patient Education Activity  Goal: Patient/Family Education  Outcome: Progressing Towards Goal     Problem: Pain  Goal: *Control of Pain  Outcome: Progressing Towards Goal     Problem: Patient Education: Go to Patient Education Activity  Goal: Patient/Family Education  Outcome: Progressing Towards Goal

## 2022-06-18 NOTE — PROGRESS NOTES
Wound Prevention Checklist    Patient: Merced Vyas (32 y.o. male)  Date: 6/18/2022  Diagnosis: Septic shock ([facility] [address] [A41.9, R65.21] Septic shock ([facility] [address]    Precautions:         [x]  Heel prevention boots placed on patient    [x]  Patient turned q2h during shift    [x]  Lift team ordered    [x]  Patient on [facility] bed/Specialty bed    [x]  Each Wound is documented during shift (Stage, Color, drainage, odor, measurements, and dressings)    [x]  Dual skin checks done at bedside during shift report with off going nurse Quin Emery RN

## 2022-06-18 NOTE — PROGRESS NOTES
Comprehensive Nutrition Assessment    Type and Reason for Visit: Reassess,Positive nutrition screen,Wound,Consult    Nutrition Recommendations/Plan:   1. Modify oral nutrition supplements to Ensure Clear TID and Gelatein BID  2. Monitor diet tolerance and ability to advance diet. 3. Monitor po intake and acceptance of nutrition interventions. Malnutrition Assessment:  Malnutrition Status: At risk for malnutrition (specify) (r/t varied/fair PO intake of CL diet currently) (06/03/22 7762)      Nutrition Assessment:    Pt made NPO with CT scan indicating SBO with multiple enteroenteric fistulas. Noted pt still cleared for po intake by surgery and clear liquid diet resumed. PO intake yesterday of solid foods was poor, consuming <25% of meals and supplements. Pt refused NGT when previously attempted. Nutrition Related Findings:    BM 6/17. Pertinent Meds: MVI (held), protonix, thiamine. Wound Type: Multiple,Deep tissue injury,Pressure injury,Stage III,Stage IV,Skin tears    Current Nutrition Intake & Therapies:  Average Meal Intake: 1-25%  Average Supplement Intake: 1-25%  ADULT ORAL NUTRITION SUPPLEMENT Lunch, Dinner; Frozen Supplement  ADULT ORAL NUTRITION SUPPLEMENT Breakfast, Lunch; Standard High Calorie/High Protein  ADULT DIET Clear Liquid    Anthropometric Measures:  Height: 6' 2\" (188 cm)  Ideal Body Weight (IBW): 190 lbs (86 kg)  Admission Body Weight: 205 lb 11 oz  Current Body Wt:  98 kg (216 lb 0.8 oz), 113.7 % IBW. Bed scale  Current BMI (kg/m2): 27.7  Usual Body Weight: 90.7 kg (200 lb)  % Weight Change (Calculated): 2.8  Weight Adjustment: Paraplegia  Total Amputation Percentage: 7.5  Adjusted Ideal Body Weight (lbs) (Calculated): 175.8  Adjusted Ideal Body Weight (kg) (Calculated): 79.91 kg  Adjusted % IBW (Calculated): 122.9  Adjusted BMI (Calculated): 29.8  BMI Category: Overweight (BMI 25.0-29. 9)    Estimated Daily Nutrient Needs:  Energy Requirements Based On: Kcal/kg (22-25)  Weight Used for Energy Requirements: Current  Energy (kcal/day): 3591-1158  Weight Used for Protein Requirements: Current (1.0-1.2)  Protein (g/day):   Method Used for Fluid Requirements: 1 ml/kcal  Fluid (ml/day): 2046-2325    Nutrition Diagnosis:   · Increased nutrient needs related to altered GI structure,altered GI function,inadequate protein-energy intake as evidenced by wounds,NPO or clear liquid status due to medical condition    · Inadequate oral intake related to altered GI function as evidenced by NPO or clear liquid status due to medical condition      Nutrition Interventions:   Food and/or Nutrient Delivery: Continue current diet,Modify oral nutrition supplement,Vitamin supplement  Nutrition Education/Counseling: Education not indicated,No recommendations at this time  Coordination of Nutrition Care: Continue to monitor while inpatient  Plan of Care discussed with: Dr. Willy German    Goals:  Previous Goal Met: No progress toward goal(s)  Goals: Meet at least 75% of estimated needs,PO intake 75% or greater,by next RD assessment       Nutrition Monitoring and Evaluation:   Behavioral-Environmental Outcomes: None identified  Food/Nutrient Intake Outcomes: Diet advancement/tolerance,Food and nutrient intake,Supplement intake,Vitamin/mineral intake  Physical Signs/Symptoms Outcomes: Biochemical data,Chewing or swallowing,GI status,Nausea/vomiting,Meal time behavior,Nutrition focused physical findings    Discharge Planning:    Continue oral nutrition supplement,Continue current diet    Sherryl Goldberg, RD  Contact: [phone]

## 2022-06-18 NOTE — PROGRESS NOTES
Wound Prevention Checklist    Patient: Dewayne North (70 y.o. male)  Date: 6/17/2022  Diagnosis: Septic shock ([facility] [address] [A41.9, R65.21] Septic shock ([facility] [address]    Precautions:         [x]  Heel prevention boots placed on patient    [x]  Patient turned q2h during shift    [x]  Lift team ordered    [x]  Patient on [facility] bed/Specialty bed    [x]  Each Wound is documented during shift (Stage, Color, drainage, odor, measurements, and dressings)    [x]  Dual skin checks done at bedside during shift report with the on coming nurse Pat Sharma RN
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
## 2022-06-18 NOTE — PROGRESS NOTES
I was asked by Dr. Marilyn Fields to round on the patient today and make sure he is fine giving his CT scan results yesterday. The patient stated he is feeling well and he denies any abdominal pain and he denies any nausea or vomiting. His colostomy bag was being changed when I examined him and his abdomen is soft and nontender and nondistended. However I did review his CT scan and it is very concerning because it showed picture of small bowel obstruction and what seems to be multiple enteroenteric fistulas. Because the patient is currently asymptomatic and because of his multiple significant medical conditions there is no need for any intervention currently. I think we should continue with the current treatment and continue with the diet and observe the patient.

## 2022-06-19 LAB
ANION GAP SERPL CALC-SCNC: 4 MMOL/L (ref 3–18)
BUN SERPL-MCNC: 9 MG/DL (ref 7–18)
BUN/CREAT SERPL: 13 (ref 12–20)
CALCIUM SERPL-MCNC: 8.6 MG/DL (ref 8.5–10.1)
CHLORIDE SERPL-SCNC: 101 MMOL/L (ref 100–111)
CO2 SERPL-SCNC: 28 MMOL/L (ref 21–32)
CREAT SERPL-MCNC: 0.72 MG/DL (ref 0.6–1.3)
GLUCOSE SERPL-MCNC: 67 MG/DL (ref 74–99)
MAGNESIUM SERPL-MCNC: 1.8 MG/DL (ref 1.6–2.6)
PHOSPHATE SERPL-MCNC: 2.9 MG/DL (ref 2.5–4.9)
POTASSIUM SERPL-SCNC: 4.4 MMOL/L (ref 3.5–5.5)
SODIUM SERPL-SCNC: 133 MMOL/L (ref 136–145)

## 2022-06-19 PROCEDURE — 74011250636 HC RX REV CODE- 250/636: Performed by: INTERNAL MEDICINE

## 2022-06-19 PROCEDURE — 84100 ASSAY OF PHOSPHORUS: CPT

## 2022-06-19 PROCEDURE — 36415 COLL VENOUS BLD VENIPUNCTURE: CPT

## 2022-06-19 PROCEDURE — 65270000046 HC RM TELEMETRY

## 2022-06-19 PROCEDURE — 83735 ASSAY OF MAGNESIUM: CPT

## 2022-06-19 PROCEDURE — 99232 SBSQ HOSP IP/OBS MODERATE 35: CPT | Performed by: HOSPITALIST

## 2022-06-19 PROCEDURE — 74011250637 HC RX REV CODE- 250/637: Performed by: HOSPITALIST

## 2022-06-19 PROCEDURE — C9113 INJ PANTOPRAZOLE SODIUM, VIA: HCPCS | Performed by: INTERNAL MEDICINE

## 2022-06-19 PROCEDURE — 74011000258 HC RX REV CODE- 258: Performed by: INTERNAL MEDICINE

## 2022-06-19 PROCEDURE — 80048 BASIC METABOLIC PNL TOTAL CA: CPT

## 2022-06-19 PROCEDURE — 74011000250 HC RX REV CODE- 250: Performed by: INTERNAL MEDICINE

## 2022-06-19 PROCEDURE — 74011250637 HC RX REV CODE- 250/637: Performed by: INTERNAL MEDICINE

## 2022-06-19 PROCEDURE — 2709999900 HC NON-CHARGEABLE SUPPLY

## 2022-06-19 RX ADMIN — SODIUM CHLORIDE, PRESERVATIVE FREE 10 ML: 5 INJECTION INTRAVENOUS at 22:29

## 2022-06-19 RX ADMIN — ESCITALOPRAM OXALATE 20 MG: 5 SOLUTION ORAL at 11:31

## 2022-06-19 RX ADMIN — SODIUM CHLORIDE 40 MG: 9 INJECTION INTRAMUSCULAR; INTRAVENOUS; SUBCUTANEOUS at 09:01

## 2022-06-19 RX ADMIN — PIPERACILLIN AND TAZOBACTAM 3.38 G: 3; .375 INJECTION, POWDER, LYOPHILIZED, FOR SOLUTION INTRAVENOUS at 00:14

## 2022-06-19 RX ADMIN — PIPERACILLIN AND TAZOBACTAM 3.38 G: 3; .375 INJECTION, POWDER, LYOPHILIZED, FOR SOLUTION INTRAVENOUS at 18:45

## 2022-06-19 RX ADMIN — PIPERACILLIN AND TAZOBACTAM 3.38 G: 3; .375 INJECTION, POWDER, LYOPHILIZED, FOR SOLUTION INTRAVENOUS at 08:50

## 2022-06-19 RX ADMIN — PIPERACILLIN AND TAZOBACTAM 3.38 G: 3; .375 INJECTION, POWDER, LYOPHILIZED, FOR SOLUTION INTRAVENOUS at 23:56

## 2022-06-19 RX ADMIN — SODIUM CHLORIDE, PRESERVATIVE FREE 10 ML: 5 INJECTION INTRAVENOUS at 14:00

## 2022-06-19 RX ADMIN — Medication 100 MG: at 09:01

## 2022-06-19 NOTE — ROUTINE PROCESS
Bedside and Verbal shift change report given to [facility] [employer] (oncoming nurse) by Christine Gutierrez RN (offgoing nurse). Report included the following information SBAR, Kardex, Intake/Output, MAR and Recent Results.

## 2022-06-19 NOTE — PROGRESS NOTES
Bedside shift change report given to Sujit Loco RN  (oncoming nurse) by Stiven Tavarez RN (offgoing nurse). Report included the following information SBAR, Kardex, MAR and Quality Measures.

## 2022-06-19 NOTE — PROGRESS NOTES
Problem: Risk for Spread of Infection  Goal: Prevent transmission of infectious organism to others  Description: Prevent the transmission of infectious organisms to other patients, staff members, and visitors. Outcome: Progressing Towards Goal     Problem: Pressure Injury - Risk of  Goal: *Prevention of pressure injury  Description: Document Jordin Scale and appropriate interventions in the flowsheet. Outcome: Progressing Towards Goal  Note: Pressure Injury Interventions:  Sensory Interventions: Assess changes in LOC    Moisture Interventions: Absorbent underpads    Activity Interventions: Pressure redistribution bed/mattress(bed type)    Mobility Interventions: HOB 30 degrees or less    Nutrition Interventions: Document food/fluid/supplement intake    Friction and Shear Interventions: Apply protective barrier, creams and emollients                Problem: Falls - Risk of  Goal: *Absence of Falls  Description: Document Carisa Fall Risk and appropriate interventions in the flowsheet.   Outcome: Progressing Towards Goal  Note: Fall Risk Interventions:            Medication Interventions: Bed/chair exit alarm    Elimination Interventions: Bed/chair exit alarm,Call light in reach              Problem: Pain  Goal: *Control of Pain  Outcome: Progressing Towards Goal

## 2022-06-19 NOTE — PROGRESS NOTES
Bedside shift change report given to SAFIA Smith (oncoming nurse) by Octavia Ma (offgoing nurse). Report included the following information SBAR, Kardex, MAR and Quality Measures.
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
## 2022-06-20 PROCEDURE — 74011250636 HC RX REV CODE- 250/636: Performed by: INTERNAL MEDICINE

## 2022-06-20 PROCEDURE — C9113 INJ PANTOPRAZOLE SODIUM, VIA: HCPCS | Performed by: INTERNAL MEDICINE

## 2022-06-20 PROCEDURE — 74011000250 HC RX REV CODE- 250: Performed by: INTERNAL MEDICINE

## 2022-06-20 PROCEDURE — 74011000258 HC RX REV CODE- 258: Performed by: INTERNAL MEDICINE

## 2022-06-20 PROCEDURE — 65270000046 HC RM TELEMETRY

## 2022-06-20 PROCEDURE — 77030037878 HC DRSG MEPILEX >48IN BORD MOLN -B

## 2022-06-20 PROCEDURE — 99232 SBSQ HOSP IP/OBS MODERATE 35: CPT | Performed by: HOSPITALIST

## 2022-06-20 PROCEDURE — 2709999900 HC NON-CHARGEABLE SUPPLY

## 2022-06-20 PROCEDURE — 74011250637 HC RX REV CODE- 250/637: Performed by: HOSPITALIST

## 2022-06-20 PROCEDURE — 77030041076 HC DRSG AG OPTICELL MDII -A

## 2022-06-20 PROCEDURE — 74011250637 HC RX REV CODE- 250/637: Performed by: INTERNAL MEDICINE

## 2022-06-20 PROCEDURE — 77030040392 HC DRSG OPTIFOAM MDII -A

## 2022-06-20 RX ORDER — AMOXICILLIN AND CLAVULANATE POTASSIUM 875; 125 MG/1; MG/1
1 TABLET, FILM COATED ORAL EVERY 12 HOURS
Status: DISCONTINUED | OUTPATIENT
Start: 2022-06-20 | End: 2022-06-23 | Stop reason: HOSPADM

## 2022-06-20 RX ORDER — SULFAMETHOXAZOLE AND TRIMETHOPRIM 800; 160 MG/1; MG/1
2 TABLET ORAL EVERY 12 HOURS
Status: DISCONTINUED | OUTPATIENT
Start: 2022-06-20 | End: 2022-06-23 | Stop reason: HOSPADM

## 2022-06-20 RX ADMIN — SODIUM CHLORIDE, PRESERVATIVE FREE 10 ML: 5 INJECTION INTRAVENOUS at 05:16

## 2022-06-20 RX ADMIN — AMOXICILLIN AND CLAVULANATE POTASSIUM 1 TABLET: 875; 125 TABLET, FILM COATED ORAL at 12:45

## 2022-06-20 RX ADMIN — PIPERACILLIN AND TAZOBACTAM 3.38 G: 3; .375 INJECTION, POWDER, LYOPHILIZED, FOR SOLUTION INTRAVENOUS at 08:48

## 2022-06-20 RX ADMIN — Medication 100 MG: at 08:49

## 2022-06-20 RX ADMIN — AMOXICILLIN AND CLAVULANATE POTASSIUM 1 TABLET: 875; 125 TABLET, FILM COATED ORAL at 21:25

## 2022-06-20 RX ADMIN — SODIUM CHLORIDE, PRESERVATIVE FREE 10 ML: 5 INJECTION INTRAVENOUS at 14:22

## 2022-06-20 RX ADMIN — SODIUM CHLORIDE 40 MG: 9 INJECTION INTRAMUSCULAR; INTRAVENOUS; SUBCUTANEOUS at 08:49

## 2022-06-20 RX ADMIN — ESCITALOPRAM OXALATE 20 MG: 5 SOLUTION ORAL at 09:00

## 2022-06-20 RX ADMIN — SULFAMETHOXAZOLE AND TRIMETHOPRIM 2 TABLET: 800; 160 TABLET ORAL at 21:25

## 2022-06-20 RX ADMIN — SULFAMETHOXAZOLE AND TRIMETHOPRIM 2 TABLET: 800; 160 TABLET ORAL at 12:45

## 2022-06-20 RX ADMIN — SODIUM CHLORIDE, PRESERVATIVE FREE 10 ML: 5 INJECTION INTRAVENOUS at 22:11

## 2022-06-20 NOTE — PROGRESS NOTES
Wound Prevention Checklist    Patient: Lige Cushing (21 y.o. male)  Date: 6/19/2022  Diagnosis: Septic shock ([facility] [address] [A41.9, R65.21] Septic shock ([facility] [address]    Precautions:         [x]  Heel prevention boots placed on patient    [x]  Patient turned q2h during shift    [x]  Lift team ordered    [x]  Patient on [facility] bed/Specialty bed    [x]  Each Wound is documented during shift (Stage, Color, drainage, odor, measurements, and dressings)    [x]  Dual skin checks done at bedside during shift report with off going nurse Jo Ann Ku RN

## 2022-06-20 NOTE — PROGRESS NOTES
Wound Prevention Checklist    Patient: Roz Ellison (28 y.o. male)  Date: 6/20/2022  Diagnosis: Septic shock ([facility] [address] [A41.9, R65.21] Septic shock ([facility] [address]    Precautions:         [x]  Heel prevention boots placed on patient    [x]  Patient turned q2h during shift    [x]  Lift team ordered    [x]  Patient on [place] bed/Specialty bed    [x]  Each Wound is documented during shift (Stage, Color, drainage, odor, measurements, and dressings)    [x]  Dual skin checks done at bedside during shift report with Cheyanne Graham RN

## 2022-06-20 NOTE — PROGRESS NOTES
Problem: Risk for Spread of Infection  Goal: Prevent transmission of infectious organism to others  Description: Prevent the transmission of infectious organisms to other patients, staff members, and visitors. Outcome: Progressing Towards Goal     Problem: Patient Education:  Go to Education Activity  Goal: Patient/Family Education  Outcome: Progressing Towards Goal     Problem: Pressure Injury - Risk of  Goal: *Prevention of pressure injury  Description: Document Jordin Scale and appropriate interventions in the flowsheet. Outcome: Progressing Towards Goal  Note: Pressure Injury Interventions:  Sensory Interventions: Assess changes in LOC    Moisture Interventions: Absorbent underpads    Activity Interventions: Increase time out of bed,Pressure redistribution bed/mattress(bed type)    Mobility Interventions: Assess need for specialty bed,HOB 30 degrees or less,Pressure redistribution bed/mattress (bed type)    Nutrition Interventions: Document food/fluid/supplement intake    Friction and Shear Interventions: Apply protective barrier, creams and emollients,HOB 30 degrees or less                Problem: Patient Education: Go to Patient Education Activity  Goal: Patient/Family Education  Outcome: Progressing Towards Goal     Problem: Falls - Risk of  Goal: *Absence of Falls  Description: Document Carisa Fall Risk and appropriate interventions in the flowsheet.   Outcome: Progressing Towards Goal  Note: Fall Risk Interventions:            Medication Interventions: Evaluate medications/consider consulting pharmacy,Patient to call before getting OOB,Teach patient to arise slowly    Elimination Interventions: Bed/chair exit alarm,Call light in reach,Patient to call for help with toileting needs              Problem: Patient Education: Go to Patient Education Activity  Goal: Patient/Family Education  Outcome: Progressing Towards Goal     Problem: Nutrition Deficit  Goal: *Optimize nutritional status  Outcome: Progressing Towards Goal     Problem: Patient Education: Go to Patient Education Activity  Goal: Patient/Family Education  Outcome: Progressing Towards Goal     Problem: Pain  Goal: *Control of Pain  Outcome: Progressing Towards Goal     Problem: Patient Education: Go to Patient Education Activity  Goal: Patient/Family Education  Outcome: Progressing Towards Goal
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
## 2022-06-20 NOTE — PROGRESS NOTES
Comprehensive Nutrition Assessment    Type and Reason for Visit: Reassess,Positive nutrition screen,Consult,Wound    Nutrition Recommendations/Plan:   1. Continue current diet and monitor tolerance and intake. 2. Continue oral nutrition supplements to optimze intake opportunity: Ensure Enlive BID and Magic Cup BID  3. Resume oral MVI, currently held. 4. Monitor acceptance of nutrition interventions. Malnutrition Assessment:  Malnutrition Status: At risk for malnutrition (specify) (r/t varied/fair PO intake of CL diet currently) (06/03/22 2163)      Nutrition Assessment:    Pt more responsive today than on usual visits. Reports poor appetite, but per documentation pt is consuming more of meals/supplements. Diet advanced to solids 6/19. Lunch tray at bedside during visit, pt consumed 100% of meat, but no other items. Magic cup on tray untouched. No n/v, tolerating solids at this time. Encouraged meal and supplement intake as tolerated, pt receptive. Nutrition Related Findings:    +ostomy output. Pertinent Meds: thiamine, MVI (held) Wound Type: Multiple,Deep tissue injury,Pressure injury,Stage III,Stage IV,Skin tears    Current Nutrition Intake & Therapies:  Average Meal Intake: 26-50%  Average Supplement Intake: 26-50%  ADULT ORAL NUTRITION SUPPLEMENT Lunch, Dinner; Frozen Supplement  ADULT ORAL NUTRITION SUPPLEMENT Breakfast, Lunch; Standard High Calorie/High Protein  ADULT DIET Regular; Post Gastrectomy  DIET NPO    Anthropometric Measures:  Height: 6' 2\" (188 cm)  Ideal Body Weight (IBW): 190 lbs (86 kg)  Admission Body Weight: 205 lb 11 oz  Current Body Wt:  98 kg (216 lb 0.8 oz), 113.7 % IBW.  Bed scale  Current BMI (kg/m2): 27.7  Usual Body Weight: 90.7 kg (200 lb)  % Weight Change (Calculated): 2.8  Weight Adjustment: Paraplegia  Total Amputation Percentage: 7.5  Adjusted Ideal Body Weight (lbs) (Calculated): 175.8  Adjusted Ideal Body Weight (kg) (Calculated): 79.91 kg  Adjusted % IBW (Calculated): 122.9  Adjusted BMI (Calculated): 29.8  BMI Category: Overweight (BMI 25.0-29. 9)    Estimated Daily Nutrient Needs:  Energy Requirements Based On: Kcal/kg (22-25)  Weight Used for Energy Requirements: Current  Energy (kcal/day): 2796-1284  Weight Used for Protein Requirements: Current (1.0-1.2)  Protein (g/day):   Method Used for Fluid Requirements: 1 ml/kcal  Fluid (ml/day): 1107-9081    Nutrition Diagnosis:   · Inadequate oral intake related to altered GI function,early satiety as evidenced by intake 26-50%,GI abnormality      Nutrition Interventions:   Food and/or Nutrient Delivery: Continue current diet,Vitamin supplement,Continue oral nutrition supplement  Nutrition Education/Counseling: Education not indicated,No recommendations at this time  Coordination of Nutrition Care: Continue to monitor while inpatient  Plan of Care discussed with: Patient, Dr. Arlet Adams    Goals:  Previous Goal Met: Progressing toward goal(s)  Goals: Meet at least 75% of estimated needs,PO intake 75% or greater,by next RD assessment       Nutrition Monitoring and Evaluation:   Behavioral-Environmental Outcomes: None identified  Food/Nutrient Intake Outcomes: Diet advancement/tolerance,Food and nutrient intake,Supplement intake,Vitamin/mineral intake  Physical Signs/Symptoms Outcomes: Biochemical data,GI status,Nausea/vomiting,Meal time behavior,Nutrition focused physical findings    Discharge Planning:    Continue oral nutrition supplement,Continue current diet    Luz Lopez RD  Contact: [phone]
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
## 2022-06-21 PROCEDURE — 65270000046 HC RM TELEMETRY

## 2022-06-21 PROCEDURE — 77030013076 HC PCH OST BAG COLO -A

## 2022-06-21 PROCEDURE — 74011250637 HC RX REV CODE- 250/637: Performed by: INTERNAL MEDICINE

## 2022-06-21 PROCEDURE — 74011000250 HC RX REV CODE- 250: Performed by: INTERNAL MEDICINE

## 2022-06-21 PROCEDURE — 2709999900 HC NON-CHARGEABLE SUPPLY

## 2022-06-21 PROCEDURE — 77030040162

## 2022-06-21 PROCEDURE — 99232 SBSQ HOSP IP/OBS MODERATE 35: CPT | Performed by: HOSPITALIST

## 2022-06-21 PROCEDURE — C9113 INJ PANTOPRAZOLE SODIUM, VIA: HCPCS | Performed by: INTERNAL MEDICINE

## 2022-06-21 PROCEDURE — 74011250637 HC RX REV CODE- 250/637: Performed by: HOSPITALIST

## 2022-06-21 PROCEDURE — 74011250636 HC RX REV CODE- 250/636: Performed by: INTERNAL MEDICINE

## 2022-06-21 RX ADMIN — SULFAMETHOXAZOLE AND TRIMETHOPRIM 2 TABLET: 800; 160 TABLET ORAL at 09:02

## 2022-06-21 RX ADMIN — AMOXICILLIN AND CLAVULANATE POTASSIUM 1 TABLET: 875; 125 TABLET, FILM COATED ORAL at 09:02

## 2022-06-21 RX ADMIN — SULFAMETHOXAZOLE AND TRIMETHOPRIM 2 TABLET: 800; 160 TABLET ORAL at 20:55

## 2022-06-21 RX ADMIN — SODIUM CHLORIDE, PRESERVATIVE FREE 10 ML: 5 INJECTION INTRAVENOUS at 21:02

## 2022-06-21 RX ADMIN — SODIUM CHLORIDE 40 MG: 9 INJECTION INTRAMUSCULAR; INTRAVENOUS; SUBCUTANEOUS at 09:02

## 2022-06-21 RX ADMIN — SODIUM CHLORIDE, PRESERVATIVE FREE 10 ML: 5 INJECTION INTRAVENOUS at 05:39

## 2022-06-21 RX ADMIN — MULTIPLE VITAMINS W/ MINERALS TAB 1 TABLET: TAB at 09:02

## 2022-06-21 RX ADMIN — Medication 100 MG: at 09:02

## 2022-06-21 RX ADMIN — AMOXICILLIN AND CLAVULANATE POTASSIUM 1 TABLET: 875; 125 TABLET, FILM COATED ORAL at 20:55

## 2022-06-21 RX ADMIN — ESCITALOPRAM OXALATE 20 MG: 5 SOLUTION ORAL at 09:02

## 2022-06-21 NOTE — ROUTINE PROCESS
Bedside shift change report given to Kacie Amin RN (oncoming nurse) by Aly Womack RN (offgoing nurse). Report included the following information SBAR, Kardex, Intake/Output and MAR.
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
## 2022-06-21 NOTE — ROUTINE PROCESS
Wound Prevention Checklist    Patient: Luis Armando Quan (68 y.o. male)  Date: 6/21/2022  Diagnosis: Septic shock ([facility] [address] [A41.9, R65.21] Septic shock ([facility] [address]    Precautions:         [x]  Heel prevention boots placed on patient    [x]  Patient turned q2h during shift    [x]  Lift team ordered    [x]  Patient on [facility] bed/Specialty bed    [x]  Each Wound is documented during shift (Stage, Color, drainage, odor, measurements, and dressings)    [x]  Dual skin checks done at bedside during shift report with SAFIA Edward RN

## 2022-06-21 NOTE — PROGRESS NOTES
Problem: Risk for Spread of Infection  Goal: Prevent transmission of infectious organism to others  Description: Prevent the transmission of infectious organisms to other patients, staff members, and visitors. Outcome: Progressing Towards Goal     Problem: Patient Education:  Go to Education Activity  Goal: Patient/Family Education  Outcome: Progressing Towards Goal     Problem: Pressure Injury - Risk of  Goal: *Prevention of pressure injury  Description: Document Jordin Scale and appropriate interventions in the flowsheet. Outcome: Progressing Towards Goal  Note: Pressure Injury Interventions:  Sensory Interventions: Assess changes in LOC,Float heels,Keep linens dry and wrinkle-free,Maintain/enhance activity level,Minimize linen layers,Pad between skin to skin,Pressure redistribution bed/mattress (bed type)    Moisture Interventions: Absorbent underpads,Apply protective barrier, creams and emollients,Internal/External urinary devices    Activity Interventions: Pressure redistribution bed/mattress(bed type)    Mobility Interventions: Float heels,HOB 30 degrees or less,Pressure redistribution bed/mattress (bed type)    Nutrition Interventions: Document food/fluid/supplement intake    Friction and Shear Interventions: Apply protective barrier, creams and emollients,Foam dressings/transparent film/skin sealants,HOB 30 degrees or less,Lift sheet,Minimize layers                Problem: Patient Education: Go to Patient Education Activity  Goal: Patient/Family Education  Outcome: Progressing Towards Goal     Problem: Falls - Risk of  Goal: *Absence of Falls  Description: Document Carisa Fall Risk and appropriate interventions in the flowsheet.   Outcome: Progressing Towards Goal  Note: Fall Risk Interventions:            Medication Interventions: Bed/chair exit alarm    Elimination Interventions: Bed/chair exit alarm,Call light in reach,Toileting schedule/hourly rounds              Problem: Patient Education: Go to Patient Education Activity  Goal: Patient/Family Education  Outcome: Progressing Towards Goal     Problem: Nutrition Deficit  Goal: *Optimize nutritional status  Outcome: Progressing Towards Goal     Problem: Pain  Goal: *Control of Pain  Outcome: Progressing Towards Goal     Problem: Patient Education: Go to Patient Education Activity  Goal: Patient/Family Education  Outcome: Progressing Towards Goal
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
## 2022-06-21 NOTE — PROGRESS NOTES
Wound Prevention Checklist    Patient: Tan Uribe (58 y.o. male)  Date: 6/20/2022  Diagnosis: Septic shock ([facility] [A41.9, R65.21] Septic shock ([facility]    Precautions:         [x]  Heel prevention boots placed on patient    [x]  Patient turned q2h during shift    [x]  Lift team ordered    []  Patient on [facility] bed/Specialty bed    [x]  Each Wound is documented during shift (Stage, Color, drainage, odor, measurements, and dressings)    [x]  Dual skin checks done at bedside during shift report with Simona Rock RN
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
## 2022-06-21 NOTE — ROUTINE PROCESS
Wound Prevention Checklist    Patient: Mer Wen (57 y.o. male)  Date: 6/21/2022  Diagnosis: Septic shock ([facility] [A41.9, R65.21] Septic shock ([address]    Precautions:         [x]  Heel prevention boots placed on patient    [x]  Patient turned q2h during shift    [x]  Lift team ordered    [x]  Patient on [facility] bed/Specialty bed    [x]  Each Wound is documented during shift (Stage, Color, drainage, odor, measurements, and dressings)    [x]  Dual skin checks done at bedside during shift report with SAFIA Hammond RN
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
## 2022-06-22 ENCOUNTER — HOSPITAL ENCOUNTER (INPATIENT)
Dept: INTERVENTIONAL RADIOLOGY/VASCULAR | Age: 62
Discharge: HOME | DRG: 441 | End: 2022-06-22
Attending: PHYSICIAN ASSISTANT
Payer: MEDICAID

## 2022-06-22 PROCEDURE — 74011000250 HC RX REV CODE- 250: Performed by: INTERNAL MEDICINE

## 2022-06-22 PROCEDURE — 74011250637 HC RX REV CODE- 250/637: Performed by: INTERNAL MEDICINE

## 2022-06-22 PROCEDURE — C9113 INJ PANTOPRAZOLE SODIUM, VIA: HCPCS | Performed by: INTERNAL MEDICINE

## 2022-06-22 PROCEDURE — 99232 SBSQ HOSP IP/OBS MODERATE 35: CPT | Performed by: HOSPITALIST

## 2022-06-22 PROCEDURE — 20501 NJX SINUS TRACT DIAGNOSTIC: CPT

## 2022-06-22 PROCEDURE — 65270000046 HC RM TELEMETRY

## 2022-06-22 PROCEDURE — BW111ZZ FLUOROSCOPY OF ABDOMEN AND PELVIS USING LOW OSMOLAR CONTRAST: ICD-10-PCS | Performed by: PHYSICIAN ASSISTANT

## 2022-06-22 PROCEDURE — 74011250636 HC RX REV CODE- 250/636: Performed by: INTERNAL MEDICINE

## 2022-06-22 PROCEDURE — 74011000636 HC RX REV CODE- 636: Performed by: PHYSICIAN ASSISTANT

## 2022-06-22 RX ADMIN — Medication 100 MG: at 08:30

## 2022-06-22 RX ADMIN — SODIUM CHLORIDE, PRESERVATIVE FREE 10 ML: 5 INJECTION INTRAVENOUS at 06:13

## 2022-06-22 RX ADMIN — AMOXICILLIN AND CLAVULANATE POTASSIUM 1 TABLET: 875; 125 TABLET, FILM COATED ORAL at 21:39

## 2022-06-22 RX ADMIN — SODIUM CHLORIDE 40 MG: 9 INJECTION INTRAMUSCULAR; INTRAVENOUS; SUBCUTANEOUS at 08:30

## 2022-06-22 RX ADMIN — SULFAMETHOXAZOLE AND TRIMETHOPRIM 2 TABLET: 800; 160 TABLET ORAL at 08:30

## 2022-06-22 RX ADMIN — SODIUM CHLORIDE, PRESERVATIVE FREE 10 ML: 5 INJECTION INTRAVENOUS at 22:40

## 2022-06-22 RX ADMIN — MULTIPLE VITAMINS W/ MINERALS TAB 1 TABLET: TAB at 08:30

## 2022-06-22 RX ADMIN — IOHEXOL 50 ML: 300 INJECTION, SOLUTION INTRAVENOUS at 17:00

## 2022-06-22 RX ADMIN — AMOXICILLIN AND CLAVULANATE POTASSIUM 1 TABLET: 875; 125 TABLET, FILM COATED ORAL at 08:30

## 2022-06-22 RX ADMIN — SULFAMETHOXAZOLE AND TRIMETHOPRIM 2 TABLET: 800; 160 TABLET ORAL at 21:40
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
## 2022-06-22 NOTE — PROCEDURES
RADIOLOGY POST PROCEDURE NOTE     June 22, 2022       4:42 PM     Preoperative Diagnosis:   Hx perforated bladder with abdominal fluid collections    Postoperative Diagnosis:  Same. :  MELVINA Cano    Assistant:  Dr. Luis M Taylor present for the entire procedure    Type of Anesthesia: not needed    Procedure/Description: Sinogram through existing intraabdominal drain    Findings:   Sinogram demonstrates a high grade fistula between the RLQ fluid collection, the bladder, and the right paracolic gutter. The drain is noted to be in good position. New JALYN bulb attached. Drain remains in place. Estimated blood Loss:  Minimal    Specimen Removed:   no    Blood transfusions:  None.     Implants:  12 Fr all purpose drain to the RLQ    Complications: None    Condition: Stable    Blood thinning medications: OK     Discharge Plan:  continue present therapy    [address] [place] [address], [place]

## 2022-06-22 NOTE — PROGRESS NOTES
Bedside shift change report given to Jamey España RN (oncoming nurse) by Alison Pack RN (offgoing nurse). Report included the following information SBAR, Kardex, MAR and Quality Measures. Wound Prevention Checklist    Patient: Cyndi Bower (26 y.o. male)  Date: 6/21/2022  Diagnosis: Septic shock ([address] [A41.9, R65.21] Septic shock ([address]    Precautions:         [x]  Heel prevention boots placed on patient    [x]  Patient turned q2h during shift    [x]  Lift team ordered    [x]  Patient on [facility] bed/Specialty bed    [x]  Each Wound is documented during shift (Stage, Color, drainage, odor, measurements, and dressings)    [x]  Dual skin checks done at bedside during shift report with Jamey España RN.     Lion Ridley RN

## 2022-06-22 NOTE — PROGRESS NOTES
Problem: Risk for Spread of Infection  Goal: Prevent transmission of infectious organism to others  Description: Prevent the transmission of infectious organisms to other patients, staff members, and visitors. Outcome: Progressing Towards Goal     Problem: Patient Education:  Go to Education Activity  Goal: Patient/Family Education  Outcome: Progressing Towards Goal     Problem: Pressure Injury - Risk of  Goal: *Prevention of pressure injury  Description: Document Jordin Scale and appropriate interventions in the flowsheet. Outcome: Progressing Towards Goal  Note: Pressure Injury Interventions:  Sensory Interventions: Assess changes in LOC,Check visual cues for pain,Float heels,Keep linens dry and wrinkle-free,Minimize linen layers,Pressure redistribution bed/mattress (bed type)    Moisture Interventions: Absorbent underpads,Check for incontinence Q2 hours and as needed,Internal/External fecal devices,Internal/External urinary devices,Minimize layers    Activity Interventions: Pressure redistribution bed/mattress(bed type)    Mobility Interventions: Float heels,Pressure redistribution bed/mattress (bed type),Turn and reposition approx. every two hours(pillow and wedges)    Nutrition Interventions: Document food/fluid/supplement intake    Friction and Shear Interventions: Lift sheet,Minimize layers,Apply protective barrier, creams and emollients                Problem: Patient Education: Go to Patient Education Activity  Goal: Patient/Family Education  Outcome: Progressing Towards Goal     Problem: Falls - Risk of  Goal: *Absence of Falls  Description: Document Carisa Fall Risk and appropriate interventions in the flowsheet.   Outcome: Progressing Towards Goal  Note: Fall Risk Interventions:            Medication Interventions: Bed/chair exit alarm    Elimination Interventions: Call light in reach,Patient to call for help with toileting needs    History of Falls Interventions: Bed/chair exit alarm,Door open when patient unattended,Room close to nurse's station         Problem: Patient Education: Go to Patient Education Activity  Goal: Patient/Family Education  Outcome: Progressing Towards Goal     Problem: Nutrition Deficit  Goal: *Optimize nutritional status  Outcome: Progressing Towards Goal     Problem: Pain  Goal: *Control of Pain  Outcome: Progressing Towards Goal     Problem: Patient Education: Go to Patient Education Activity  Goal: Patient/Family Education  Outcome: Progressing Towards Goal
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
## 2022-06-22 NOTE — PROGRESS NOTES
Problem: Risk for Spread of Infection  Goal: Prevent transmission of infectious organism to others  Description: Prevent the transmission of infectious organisms to other patients, staff members, and visitors. Outcome: Progressing Towards Goal     Problem: Patient Education:  Go to Education Activity  Goal: Patient/Family Education  Outcome: Progressing Towards Goal     Problem: Pressure Injury - Risk of  Goal: *Prevention of pressure injury  Description: Document Jordin Scale and appropriate interventions in the flowsheet. Outcome: Progressing Towards Goal  Note: Pressure Injury Interventions:  Sensory Interventions: Assess changes in LOC,Float heels,Keep linens dry and wrinkle-free,Maintain/enhance activity level,Minimize linen layers,Pad between skin to skin,Pressure redistribution bed/mattress (bed type)    Moisture Interventions: Absorbent underpads,Apply protective barrier, creams and emollients,Internal/External urinary devices    Activity Interventions: Pressure redistribution bed/mattress(bed type)    Mobility Interventions: Float heels,HOB 30 degrees or less,Pressure redistribution bed/mattress (bed type)    Nutrition Interventions: Document food/fluid/supplement intake    Friction and Shear Interventions: Apply protective barrier, creams and emollients,Foam dressings/transparent film/skin sealants,HOB 30 degrees or less,Lift sheet,Minimize layers                Problem: Patient Education: Go to Patient Education Activity  Goal: Patient/Family Education  Outcome: Progressing Towards Goal     Problem: Falls - Risk of  Goal: *Absence of Falls  Description: Document Carisa Fall Risk and appropriate interventions in the flowsheet.   Outcome: Progressing Towards Goal  Note: Fall Risk Interventions:            Medication Interventions: Bed/chair exit alarm    Elimination Interventions: Bed/chair exit alarm,Call light in reach,Toileting schedule/hourly rounds              Problem: Patient Education: Go to Patient Education Activity  Goal: Patient/Family Education  Outcome: Progressing Towards Goal     Problem: Nutrition Deficit  Goal: *Optimize nutritional status  Outcome: Progressing Towards Goal     Problem: Patient Education: Go to Patient Education Activity  Goal: Patient/Family Education  Outcome: Progressing Towards Goal     Problem: Pain  Goal: *Control of Pain  Outcome: Progressing Towards Goal     Problem: Patient Education: Go to Patient Education Activity  Goal: Patient/Family Education  Outcome: Progressing Towards Goal
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
## 2022-06-23 VITALS
HEIGHT: 74 IN | HEART RATE: 78 BPM | TEMPERATURE: 98.4 F | OXYGEN SATURATION: 99 % | WEIGHT: 174.8 LBS | SYSTOLIC BLOOD PRESSURE: 124 MMHG | RESPIRATION RATE: 17 BRPM | BODY MASS INDEX: 22.43 KG/M2 | DIASTOLIC BLOOD PRESSURE: 77 MMHG

## 2022-06-23 PROCEDURE — 99239 HOSP IP/OBS DSCHRG MGMT >30: CPT | Performed by: INTERNAL MEDICINE

## 2022-06-23 PROCEDURE — 74011250637 HC RX REV CODE- 250/637: Performed by: HOSPITALIST

## 2022-06-23 PROCEDURE — 77030037878 HC DRSG MEPILEX >48IN BORD MOLN -B

## 2022-06-23 PROCEDURE — 74011000250 HC RX REV CODE- 250: Performed by: INTERNAL MEDICINE

## 2022-06-23 PROCEDURE — 74011250636 HC RX REV CODE- 250/636: Performed by: INTERNAL MEDICINE

## 2022-06-23 PROCEDURE — 74011250637 HC RX REV CODE- 250/637: Performed by: INTERNAL MEDICINE

## 2022-06-23 PROCEDURE — 77030041076 HC DRSG AG OPTICELL MDII -A

## 2022-06-23 PROCEDURE — C9113 INJ PANTOPRAZOLE SODIUM, VIA: HCPCS | Performed by: INTERNAL MEDICINE

## 2022-06-23 PROCEDURE — 77030040392 HC DRSG OPTIFOAM MDII -A

## 2022-06-23 PROCEDURE — 77030040393 HC DRSG OPTIFOAM GENT MDII -B

## 2022-06-23 PROCEDURE — 2709999900 HC NON-CHARGEABLE SUPPLY

## 2022-06-23 RX ORDER — TROSPIUM CHLORIDE 20 MG/1
20 TABLET, FILM COATED ORAL
Qty: 30 TABLET | Refills: 0 | Status: SHIPPED | OUTPATIENT
Start: 2022-06-23 | End: 2022-07-23

## 2022-06-23 RX ORDER — SULFAMETHOXAZOLE AND TRIMETHOPRIM 800; 160 MG/1; MG/1
2 TABLET ORAL EVERY 12 HOURS
Qty: 56 TABLET | Refills: 0 | Status: SHIPPED | OUTPATIENT
Start: 2022-06-23 | End: 2022-07-07

## 2022-06-23 RX ORDER — AMOXICILLIN AND CLAVULANATE POTASSIUM 875; 125 MG/1; MG/1
1 TABLET, FILM COATED ORAL EVERY 12 HOURS
Qty: 28 TABLET | Refills: 0 | Status: SHIPPED | OUTPATIENT
Start: 2022-06-23 | End: 2022-07-07

## 2022-06-23 RX ORDER — LANOLIN ALCOHOL/MO/W.PET/CERES
100 CREAM (GRAM) TOPICAL DAILY
Qty: 30 TABLET | Refills: 0 | Status: SHIPPED | OUTPATIENT
Start: 2022-06-24 | End: 2022-07-24

## 2022-06-23 RX ADMIN — ESCITALOPRAM OXALATE 20 MG: 5 SOLUTION ORAL at 09:47

## 2022-06-23 RX ADMIN — Medication 100 MG: at 09:37

## 2022-06-23 RX ADMIN — SULFAMETHOXAZOLE AND TRIMETHOPRIM 2 TABLET: 800; 160 TABLET ORAL at 09:37

## 2022-06-23 RX ADMIN — AMOXICILLIN AND CLAVULANATE POTASSIUM 1 TABLET: 875; 125 TABLET, FILM COATED ORAL at 09:36

## 2022-06-23 RX ADMIN — SODIUM CHLORIDE, PRESERVATIVE FREE 10 ML: 5 INJECTION INTRAVENOUS at 09:48

## 2022-06-23 RX ADMIN — MULTIPLE VITAMINS W/ MINERALS TAB 1 TABLET: TAB at 09:37

## 2022-06-23 RX ADMIN — SODIUM CHLORIDE 40 MG: 9 INJECTION INTRAMUSCULAR; INTRAVENOUS; SUBCUTANEOUS at 09:36
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
## 2022-06-23 NOTE — PROGRESS NOTES
Bedside shift change report given to Martir Benavidez RN (oncoming nurse) by Dulce Maria Duenas RN (offgoing nurse). Report included the following information SBAR, Kardex, Intake/Output, MAR, Procedure Verification and Quality Measures. Wound Prevention Checklist    Patient: Hu Vogel (2 y.o. male)  Date: 6/22/2022  Diagnosis: Septic shock ([facility] [A41.9, R65.21] Septic shock ([facility]    Precautions:         [x]  Heel prevention boots placed on patient    []  Patient turned q2h during shift    [x]  Lift team ordered    [x]  Patient on [facility] bed/Specialty bed    [x]  Each Wound is documented during shift (Stage, Color, drainage, odor, measurements, and dressings)    [x]  Dual skin checks done at bedside during shift report with Martir Benavidez RN.     Jessi Hall RN

## 2022-06-23 NOTE — HOME CARE
Discharge order noted for today. Active with [address]. Referral updated with new orders and information. Spoke with via phone;  Verified will return to currently listed address.   Has family members available.        ----    Neto Whitaker [facility] Liaison

## 2022-06-23 NOTE — PROGRESS NOTES
Comprehensive Nutrition Assessment    Type and Reason for Visit: Reassess,Positive nutrition screen,Consult,Wound    Nutrition Recommendations/Plan:   1. Continue current diet and supplements. 2. Monitor PO intake/tolerance and weight. Malnutrition Assessment:  Malnutrition Status: At risk for malnutrition (specify) (PO intake 26-50% and increased nutrient needs r/t wounds) (06/23/22 7426)      Nutrition Assessment:    Diet advanced to regular 6/22. Supplements continued. No PO documentation for intake since diet advanced, supplement intake 51-75%. Spoke to pt- claimed to be eating around 50% of meals and would finish at least two of the supplements per day. Encouraged pt to save supplements for snack later or to consume if he wasnt hungry for meal. Pt vocalized understanding. Per Chart review, pt has lost 14.6% body weight since admission, question accuracy of bed weights. Will continue to monitor/encourage PO intake. Nutrition Related Findings:    Last BM 6/22 (colostomy). Labs reviewed. Pertinent meds- MVI, thiamine. Wound Type: Multiple,Pressure injury,Stage III,Stage IV,Deep tissue injury    Current Nutrition Intake & Therapies:  Average Meal Intake: 26-50%  Average Supplement Intake: 51-75%  ADULT ORAL NUTRITION SUPPLEMENT Lunch, Dinner; Frozen Supplement  ADULT ORAL NUTRITION SUPPLEMENT Breakfast, Lunch; Standard High Calorie/High Protein  ADULT DIET Regular    Anthropometric Measures:  Height: 6' 2\" (188 cm)  Ideal Body Weight (IBW): 190 lbs (86 kg)  Admission Body Weight: 205 lb 11 oz  Current Body Wt:  79.3 kg (174 lb 12.8 oz), 92 % IBW.  Bed scale  Current BMI (kg/m2): 22.4  Usual Body Weight: 90.7 kg (200 lb)  % Weight Change (Calculated): -12.6  Weight Adjustment: Paraplegia  Total Amputation Percentage: 7.5  Adjusted Ideal Body Weight (lbs) (Calculated): 175.8  Adjusted Ideal Body Weight (kg) (Calculated): 79.91 kg  Adjusted % IBW (Calculated): 99.4  Adjusted BMI (Calculated): 24.1  BMI Category: Normal weight (BMI 18.5-24. 9)    Estimated Daily Nutrient Needs:  Energy Requirements Based On: Kcal/kg (25-27)  Weight Used for Energy Requirements: Current  Energy (kcal/day): 1983 - 2141  Weight Used for Protein Requirements: Current (1-1.2)  Protein (g/day): 79 - 95  Method Used for Fluid Requirements: 1 ml/kcal  Fluid (ml/day): 1983 - 2141    Nutrition Diagnosis:   · Inadequate oral intake related to altered GI function,early satiety as evidenced by intake 26-50%,GI abnormality,weight loss (-12.5% in past 2 months)      Nutrition Interventions:   Food and/or Nutrient Delivery: Continue current diet,Continue oral nutrition supplement,Vitamin supplement  Nutrition Education/Counseling: No recommendations at this time,Education not indicated  Coordination of Nutrition Care: Continue to monitor while inpatient  Plan of Care discussed with: Patient    Goals:  Previous Goal Met: Progressing toward goal(s)  Goals: Meet at least 75% of estimated needs,PO intake 75% or greater,by next RD assessment       Nutrition Monitoring and Evaluation:   Behavioral-Environmental Outcomes: None identified  Food/Nutrient Intake Outcomes: Diet advancement/tolerance,Food and nutrient intake,Supplement intake,Vitamin/mineral intake  Physical Signs/Symptoms Outcomes: Biochemical data,GI status,Meal time behavior,Nutrition focused physical findings    Discharge Planning:    Continue oral nutrition supplement,Continue current diet    Peter Snowball  Contact: [phone]

## 2022-06-23 NOTE — PROGRESS NOTES
Wound Prevention Checklist    Patient: Rosette Harris (35 y.o. male)  Date: 6/23/2022  Diagnosis: Septic shock ([facility] [A41.9, R65.21] Septic shock ([facility]    Precautions:         [x]  Heel prevention boots placed on patient    [x]  Patient turned q2h during shift    []  Lift team ordered    [x]  Patient on [place] bed/Specialty bed    [x]  Each Wound is documented during shift (Stage, Color, drainage, odor, measurements, and dressings)    [x]  Dual skin checks done at bedside during shift report with Digna Huston RN     Bedside and Verbal shift change report given to Rowena Pemberton RN (oncoming nurse) by Karen Garibay RN (offgoing nurse). Report given with SBAR, Kardex, Intake/Output, MAR and Recent Results.

## 2022-06-23 NOTE — DISCHARGE INSTRUCTIONS
DISCHARGE SUMMARY from Nurse    PATIENT INSTRUCTIONS:    After general anesthesia or intravenous sedation, for 24 hours or while taking prescription Narcotics:  · Limit your activities  · Do not drive and operate hazardous machinery  · Do not make important personal or business decisions  · Do  not drink alcoholic beverages  · If you have not urinated within 8 hours after discharge, please contact your surgeon on call. Report the following to your surgeon:  · Excessive pain, swelling, redness or odor of or around the surgical area  · Temperature over 100.5  · Nausea and vomiting lasting longer than 4 hours or if unable to take medications  · Any signs of decreased circulation or nerve impairment to extremity: change in color, persistent  numbness, tingling, coldness or increase pain  · Any questions    What to do at Home:  Recommended activity: Activity as tolerated, with assisstance    If you experience any of the following symptoms chest pain, shortness of breath, nausea, vomiting, fever greater than 100.5,pain unrelieved by medication , please follow up with [address] [place] [address] Schuyler Lowe MD.    *  Please give a list of your current medications to your Primary Care Provider. *  Please update this list whenever your medications are discontinued, doses are      changed, or new medications (including over-the-counter products) are added. *  Please carry medication information at all times in case of emergency situations. These are general instructions for a healthy lifestyle:    No smoking/ No tobacco products/ Avoid exposure to second hand smoke  Surgeon General's Warning:  Quitting smoking now greatly reduces serious risk to your health.     Obesity, smoking, and sedentary lifestyle greatly increases your risk for illness    A healthy diet, regular physical exercise & weight monitoring are important for maintaining a healthy lifestyle    You may be retaining fluid if you have a history of heart failure or if you experience any of the following symptoms:  Weight gain of 3 pounds or more overnight or 5 pounds in a week, increased swelling in our hands or feet or shortness of breath while lying flat in bed. Please call your doctor as soon as you notice any of these symptoms; do not wait until your next office visit. Patient {ARMBANDS:28130}  MyChart Activation    Thank you for requesting access to [employer]. Please follow the instructions below to securely access and download your online medical record. [employer] allows you to send messages to your doctor, view your test results, renew your prescriptions, schedule appointments, and more. How Do I Sign Up? 1. In your internet browser, go to [email or web address]  2. Click on the First Time User? Click Here link in the Sign In box. You will be redirect to the New Member Sign Up page. 3. Enter your [employer] Access Code exactly as it appears below. You will not need to use this code after youve completed the sign-up process. If you do not sign up before the expiration date, you must request a new code. [employer] Access Code: [email or web address]  Expires: 2022  7:27 AM (This is the date your [employer] access code will )    4. Enter the last four digits of your Social Security Number (xxxx) and Date of Birth (mm/dd/yyyy) as indicated and click Submit. You will be taken to the next sign-up page. 5. Create a [employer] ID. This will be your [employer] login ID and cannot be changed, so think of one that is secure and easy to remember. 6. Create a [employer] password. You can change your password at any time. 7. Enter your Password Reset Question and Answer. This can be used at a later time if you forget your password. 8. Enter your e-mail address. You will receive e-mail notification when new information is available in [address] 9. Click Sign Up. You can now view and download portions of your medical record.   10. Click the Download Summary menu link to download a portable copy of your medical information. Additional Information    If you have questions, please visit the Frequently Asked Questions section of the [employer] website at [email or web address] Remember, [employer] is NOT to be used for urgent needs. For medical emergencies, dial 911. The discharge information has been reviewed with the {PATIENT PARENT GUARDIAN:09586}. The {PATIENT PARENT GUARDIAN:07356} verbalized understanding. Discharge medications reviewed with the {Dishcarge meds reviewed TMPQ:52809} and appropriate educational materials and side effects teaching were provided.   ___________________________________________________________________________________________________________________________________

## 2022-06-23 NOTE — PROGRESS NOTES
Discharge planning    Discharge order noted for today. Pt is active with [address] [place] [address] agency. Met with patient and mother at bedside and are agreeable to the transition plan today. Transport has been arranged through [employer] [facility]. Patient's home health  orders have been forwarded to [address] [place] [address] health  agency via [facility]. Updated bedside RN, Do  to the transition plan. Discharge information has been documented on the AVS.     Transportation to home  Address is [facility],[address], [address] [place] [address] phone number is [phone] or [phone]  Patient will require BLS transport. Pt requires Stretcher If stretcher, reason: Bed bound abdominal pain, sepsis, paraplegia, ЕКАТЕРИНА, sacral ulcers, bilateral feet ulcers,   Patient is currently requiring oxygen No   Height:6'2\"   Weight: 174 lbs  Pt is on isolation: Yes Isolation is for: Contact isolation  Is the pt ready now? yes  Requested time: Next Available Will call nurses station with ETA  PCS Faxed: N/A  Insurance verified on face sheet: yes  Auth needed for transport: yes  CM completed PCS/ Envelope and placed on chart. Spoke with Al Chang with [facility] [employer] [facility] ([phone] with confirmation # [id number])      JEANNA Rawls, RN  Pager # [phone]  Care Manager

## 2022-06-24 NOTE — PROGRESS NOTES
Problem: Risk for Spread of Infection  Goal: Prevent transmission of infectious organism to others  Description: Prevent the transmission of infectious organisms to other patients, staff members, and visitors. Outcome: Progressing Towards Goal     Problem: Patient Education:  Go to Education Activity  Goal: Patient/Family Education  Outcome: Progressing Towards Goal     Problem: Pressure Injury - Risk of  Goal: *Prevention of pressure injury  Description: Document Jordin Scale and appropriate interventions in the flowsheet. Outcome: Progressing Towards Goal  Note: Pressure Injury Interventions:  Sensory Interventions: Assess changes in LOC,Keep linens dry and wrinkle-free,Check visual cues for pain    Moisture Interventions: Absorbent underpads,Internal/External fecal devices,Internal/External urinary devices,Check for incontinence Q2 hours and as needed,Minimize layers    Activity Interventions: Pressure redistribution bed/mattress(bed type)    Mobility Interventions: Pressure redistribution bed/mattress (bed type),Turn and reposition approx. every two hours(pillow and wedges)    Nutrition Interventions: Document food/fluid/supplement intake    Friction and Shear Interventions: Foam dressings/transparent film/skin sealants,Lift team/patient mobility team,Transferring/repositioning devices                Problem: Patient Education: Go to Patient Education Activity  Goal: Patient/Family Education  Outcome: Progressing Towards Goal     Problem: Falls - Risk of  Goal: *Absence of Falls  Description: Document Carisa Fall Risk and appropriate interventions in the flowsheet.   Outcome: Progressing Towards Goal  Note: Fall Risk Interventions:            Medication Interventions: Bed/chair exit alarm    Elimination Interventions: Bed/chair exit alarm,Call light in reach,Toileting schedule/hourly rounds    History of Falls Interventions: Bed/chair exit alarm,Door open when patient unattended         Problem: Patient Education: Go to Patient Education Activity  Goal: Patient/Family Education  Outcome: Progressing Towards Goal     Problem: Nutrition Deficit  Goal: *Optimize nutritional status  Outcome: Progressing Towards Goal     Problem: Patient Education: Go to Patient Education Activity  Goal: Patient/Family Education  Outcome: Progressing Towards Goal     Problem: Pain  Goal: *Control of Pain  Outcome: Progressing Towards Goal     Problem: Patient Education: Go to Patient Education Activity  Goal: Patient/Family Education  Outcome: Progressing Towards Goal

## 2022-06-24 NOTE — PROGRESS NOTES
Problem: Risk for Spread of Infection  Goal: Prevent transmission of infectious organism to others  Description: Prevent the transmission of infectious organisms to other patients, staff members, and visitors. 6/23/2022 2022 by Dorothea Severin, RN  Outcome: Resolved/Met  6/23/2022 2022 by Dorothea Severin, RN  Outcome: Progressing Towards Goal     Problem: Patient Education:  Go to Education Activity  Goal: Patient/Family Education  6/23/2022 2022 by Dorothea Severin, RN  Outcome: Resolved/Met  6/23/2022 2022 by Dorothea Severin, RN  Outcome: Progressing Towards Goal     Problem: Pressure Injury - Risk of  Goal: *Prevention of pressure injury  Description: Document Jordin Scale and appropriate interventions in the flowsheet. 6/23/2022 2022 by Dorothea Severin, RN  Outcome: Resolved/Met  6/23/2022 2022 by Dorothea Severin, RN  Outcome: Progressing Towards Goal  Note: Pressure Injury Interventions:  Sensory Interventions: Assess changes in LOC,Keep linens dry and wrinkle-free,Check visual cues for pain    Moisture Interventions: Absorbent underpads,Internal/External fecal devices,Internal/External urinary devices,Check for incontinence Q2 hours and as needed,Minimize layers    Activity Interventions: Pressure redistribution bed/mattress(bed type)    Mobility Interventions: Pressure redistribution bed/mattress (bed type),Turn and reposition approx.  every two hours(pillow and wedges)    Nutrition Interventions: Document food/fluid/supplement intake    Friction and Shear Interventions: Foam dressings/transparent film/skin sealants,Lift team/patient mobility team,Transferring/repositioning devices                Problem: Patient Education: Go to Patient Education Activity  Goal: Patient/Family Education  6/23/2022 2022 by Dorothea Severin, RN  Outcome: Resolved/Met  6/23/2022 2022 by Dorothea Severin, RN  Outcome: Progressing Towards Goal     Problem: Falls - Risk of  Goal: *Absence of Falls  Description: Document Sabi Barrios Fall Risk and appropriate interventions in the flowsheet.   6/23/2022 2022 by Lori Beverly RN  Outcome: Resolved/Met  6/23/2022 2022 by Lori Beverly RN  Outcome: Progressing Towards Goal  Note: Fall Risk Interventions:            Medication Interventions: Bed/chair exit alarm    Elimination Interventions: Bed/chair exit alarm,Call light in reach,Toileting schedule/hourly rounds    History of Falls Interventions: Bed/chair exit alarm,Door open when patient unattended         Problem: Patient Education: Go to Patient Education Activity  Goal: Patient/Family Education  6/23/2022 2022 by Lori Beverly RN  Outcome: Resolved/Met  6/23/2022 2022 by Lori Beverly RN  Outcome: Progressing Towards Goal     Problem: Nutrition Deficit  Goal: *Optimize nutritional status  6/23/2022 2022 by Lori Bveerly RN  Outcome: Resolved/Met  6/23/2022 2022 by Lori Beverly RN  Outcome: Progressing Towards Goal     Problem: Patient Education: Go to Patient Education Activity  Goal: Patient/Family Education  6/23/2022 2022 by Lori Beverly RN  Outcome: Resolved/Met  6/23/2022 2022 by Lori Beverly RN  Outcome: Progressing Towards Goal     Problem: Pain  Goal: *Control of Pain  6/23/2022 2022 by Lori Beverly RN  Outcome: Resolved/Met  6/23/2022 2022 by Lori Beverly RN  Outcome: Progressing Towards Goal     Problem: Patient Education: Go to Patient Education Activity  Goal: Patient/Family Education  6/23/2022 2022 by Lori Beverly RN  Outcome: Resolved/Met  6/23/2022 2022 by Lori Beverly RN  Outcome: Progressing Towards Goal

## 2022-06-25 ENCOUNTER — HOME CARE VISIT (OUTPATIENT)
Dept: SCHEDULING | Facility: HOME HEALTH | Age: 62
End: 2022-06-25
Payer: MEDICAID

## 2022-06-25 PROCEDURE — G0299 HHS/HOSPICE OF RN EA 15 MIN: HCPCS

## 2022-06-26 VITALS
DIASTOLIC BLOOD PRESSURE: 78 MMHG | HEART RATE: 68 BPM | OXYGEN SATURATION: 98 % | SYSTOLIC BLOOD PRESSURE: 138 MMHG | TEMPERATURE: 97.6 F | RESPIRATION RATE: 18 BRPM

## 2022-06-27 ENCOUNTER — HOME CARE VISIT (OUTPATIENT)
Dept: SCHEDULING | Facility: HOME HEALTH | Age: 62
End: 2022-06-27
Payer: MEDICAID

## 2022-06-27 ENCOUNTER — HOME CARE VISIT (OUTPATIENT)
Dept: HOME HEALTH SERVICES | Facility: HOME HEALTH | Age: 62
End: 2022-06-27
Payer: MEDICAID

## 2022-06-27 VITALS
HEART RATE: 74 BPM | SYSTOLIC BLOOD PRESSURE: 106 MMHG | OXYGEN SATURATION: 97 % | TEMPERATURE: 98.1 F | DIASTOLIC BLOOD PRESSURE: 68 MMHG | RESPIRATION RATE: 16 BRPM

## 2022-06-27 PROCEDURE — 400016 HH ROC

## 2022-06-27 PROCEDURE — A6257 TRANSPARENT FILM <= 16 SQ IN: HCPCS

## 2022-06-27 PROCEDURE — G0151 HHCP-SERV OF PT,EA 15 MIN: HCPCS

## 2022-06-27 PROCEDURE — G0299 HHS/HOSPICE OF RN EA 15 MIN: HCPCS

## 2022-06-27 PROCEDURE — A6212 FOAM DRG <=16 SQ IN W/BORDER: HCPCS

## 2022-06-27 NOTE — Clinical Note
PT eval only. The patient has all the equipment he needs and is not interested in/ declines PT for transfer re-training and strengthening for return to PLOF. OT eval pending. He may have a different reaction to OT.   Thank you

## 2022-06-27 NOTE — Clinical Note
thank you   ----- Message -----  From: Carlos Hanson, PT  Sent: 6/28/2022   9:21 AM EDT  To: Dagoberto Baez OTR/L      PT eval only. The patient has all the equipment he needs and is not interested in/ declines PT for transfer re-training and strengthening for return to PLOF. OT eval pending. He may have a different reaction to OT.   Thank you

## 2022-06-27 NOTE — HOME HEALTH
Summary of clinical health condition:  57 y/o male readmitted 5/31/20 with principal problem septic shock. Dx; Recurrent perforated bladder. Medications reconciled, all medications are at home except Vit D, thiamine, B-1, Amoxicillin, Bactrim, Sanctura needs to be picked up from pharmacy. The following education was provided regarding medications, medication interactions, and look a like medications: N/A all meds reviewed. Discussed importance of compliance, timely taking all prescribed meds, proper dosage and freq. Teaching provided S/E with Bactrim: diarrhea,  loss of appetite, dizziness, ringing in your ears and tiredness and Amoxicillin S/E: bloating, stomach pain, black tarry stools, blood in urine and bloody nose. CG to monitor with S/S of bleeding. Medications are somewhat effective at this time. Caregiver: Primary caregiver/mother PCA is available to assist with daily meals, ADL's, assist with daily medications,  run errands, groceries and accompany to MD appt prn. Skilled care provided: Teaching disease and medication management. Performed wound care per order (see wound  addendum), assessed suprapubic cath draining with good amt of clear urine, RUQ JALYN 0.5 ml serous drainage, and ostomy bag intact dressing not due to be change. CG/mother instructed to continue to empty JALYN drain daily & record daily. Empty suprapubic when half full of urine drainage and monitor amt and color of drainage. Pt/CG has no difficulty with ostomy care and changing (1 pc ConvaTec) bag 2x/week. Tolerated will during the procedure with no C/O. Completed/explained VENKAT POC, SNV freq and D/C plan to pt/CG with good understanding. Patient education provided this visit to include: Discussed intervention to prevent infection; hand washing or hand  and avoiding sick person. Keeping wound dressing dry clean, reinforce prn. Continue to apply BLE shoe splint to prevent pressure.  Repositioning q 2hrs, elevating BLE and propping pillow to bony prominences. Skin care; apply daily mositurizing to BLE & BUE. Encouraged to increase protein intake to enhance healing, consume yoghourt daily, avoid skipping meals, and good hydration. Reviewed S/S of infection; fever 100.4, increase pain, redness, swelling, purulent wound drainage with foul smell, coughing with yellow thick sputum, cloudy urine with strong odor, not feeling well 2-3 days, SOB, and to call [facility] or MD for assistance if experiencing any of these S/S. To call 911 with chest pains, facial drooping, difficulty talking, non arousable/unconscious and uncontrollable bleeding. Patient/caregiver degree of understanding: Pt has good understanding of the teaching provided during visit. Home health supplies by type and quantity ordered/delivered this visit include: Pt has available supplies at home. Pt./CG instructed on plan of care, SN freq visit; 1d1, 3w8, 2 prn and D/C plan. Home exercise program/Homework provided: Ambulation, HEP deep breathing exercises 10x when having SOB, pain and anxiety. Discharge planning discussed with patient and caregiver. Discharge planning as follows: Pt/CG will be able to manage disease and medication independently, wounds is healed/improved, pt/CG able to continue to perform wound care, JAYLN drain d/c, pt manage ostomy and suprapubic with no difficulty and health condition stable. Pt/Caregiver did verbalized understanding of discharge planning. Patient/caregiver encouraged/instructed to keep appointment as lack of follow through with physician appointment could result in discontinuation of home care services for non-  compliance. Physician Notification and Justification to Continue Services:     Justification for continued intermittent care: patient with RUQ JALYN drain, new suprapubic cath, Ostomy, BLE & sacral wounds. concerns as follows: Monitoring/assessment including teaching CG's with wounds care, cath & ostomy care.  Continue to monitor for S/S of infection and complication with drainage including and dressings & bag/wafer changes. Dr. Dr. Darío Luong, [facility], notified of the following: the need for continued Skilled Nursing home health services for above reasons, plan of care including visit frequency 1d1, 3w8,2 prn. Skilled Nursing for: Additional assessment for sacral and BLE woun care, Ostomy, JALYN drain and suprapubic cath care, management and education on intervention to prevent infection and following prescribed diet; heart healthy diet. (No wound picture taken, CP camera not working well). COVID - 23 Screening completed before visit:     Denies and no family member  has any of these S/S:  Fever, dry cough, sore throat diarrhea, body aches, chills, not feeling well and lost of taste.

## 2022-06-28 VITALS
TEMPERATURE: 98.7 F | SYSTOLIC BLOOD PRESSURE: 102 MMHG | DIASTOLIC BLOOD PRESSURE: 70 MMHG | OXYGEN SATURATION: 98 % | RESPIRATION RATE: 18 BRPM | HEART RATE: 77 BPM
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
## 2022-06-28 NOTE — HOME HEALTH
Skilled reason for visit: wound assessment/ dressing change, med management/education. Caregiver involvement: available for ADLs, meal prep, med management and transportation. Medications reviewed and all medications are available in the home this visit. The following education was provided regarding medications:  Educated patient on antidepressants side effects which include, dizziness, nausea, fatigue, nervousness and mood swings. MD notified of any discrepancies/look a-like medications/medication interactions: n/a  Medications are effective at this time. Home health supplies by type and quantity ordered/delivered this visit include: n/a    Patient education provided this visit: discussed fall precautions in detail- having lighted hallways, removing throw rugs, monitoring medication that may alter mental status. perform skin inspections looking for any skin breakdown or redness. monitor for edema. frequent position changes. Watch for signs and symptoms of infection which include; fever, drainage, foul smell, lethargy. Sharps education provided: n/a    Patient level of understanding of education provided: Patient verbalized understanding of all education provided. Skilled Care Performed this visit: wound assessment/ dressing change, med management/education. Patient response to procedure performed:  Patient tolerated dressing change well, no complaints of pain. Patient's Progress towards personal goals: good    Home exercise program: sn instructed patient to perform deep breathing exercises, 5-6 breaths 5 x daily to promote air exchange and prevent pneumonia     Continued need for the following skills: Nursing    Plan for next visit: wound assessment/ dressing change, med management/education. Patient and/or caregiver notified and agrees to changes in the Plan of Care N/A      The following discharge planning was discussed with the pt/caregiver: DC when SN no longer needed.

## 2022-06-29 ENCOUNTER — HOME CARE VISIT (OUTPATIENT)
Dept: SCHEDULING | Facility: HOME HEALTH | Age: 62
End: 2022-06-29
Payer: MEDICAID

## 2022-06-29 PROCEDURE — G0300 HHS/HOSPICE OF LPN EA 15 MIN: HCPCS

## 2022-06-30 VITALS
SYSTOLIC BLOOD PRESSURE: 119 MMHG | DIASTOLIC BLOOD PRESSURE: 76 MMHG | TEMPERATURE: 98.1 F | OXYGEN SATURATION: 99 % | HEART RATE: 71 BPM | RESPIRATION RATE: 17 BRPM

## 2022-07-01 ENCOUNTER — HOME CARE VISIT (OUTPATIENT)
Dept: SCHEDULING | Facility: HOME HEALTH | Age: 62
End: 2022-07-01
Payer: MEDICAID

## 2022-07-01 PROCEDURE — G0300 HHS/HOSPICE OF LPN EA 15 MIN: HCPCS

## 2022-07-01 NOTE — HOME HEALTH
Skilled reason for visit: Wound Care, Ostomy, Chavez, JALYN Drain Medication Management    Caregiver involvement: Mother manage medication, provide meals, schedule appointment, set up transpiration,PCA assist with ADL;s     Medications reviewed and all medications are available in the home this visit. The following education was provided regarding medications:  Amoxicillin This drug may cause nausea, vomiting, or diarrhea. Patients should take dose at the start of a meal.     MD notified of any discrepancies/look a-like medications/medication interactions: na  Medications are effective at this time. Home health supplies by type and quantity ordered/delivered this visit include: n/a    Patient education provided this visit: instruct patient/caregiver on importance of adequate nutrition and hydration for wound healing. Healthy foods give your body the nutrients it needs to heal wounds. Protein foods like meat, fish, nuts, and soy products are important to wound healing. In addition to protein, calories, vitamin C, and zinc help wounds heal. Liquids prevent dehydration that can decrease the blood supply to wounds. Always follow physician recommended diet in regards to patient condition. Instruct on other factors that affect wound healing such as: maintaining general hygiene, not smoking or using tobacco products, offloading pressure and pressure relieving devices.     Sharps education provided: n/a    Patient level of understanding of education provided: Patient/CG verbalized complete understanding of education provided     Skilled Care Performed this visit: wound care, ostomy,Chavez  and drain site assessment education     Patient response to procedure performed:  Patient tolerated procedure well denied pain    Agency Progress toward goals: Continue promoting wound healing    Patient's Progress towards personal goals: Complete wound healing and prevent infections     Home exercise program: Report any s/s of infection     Continued need for the following skills: Nursing    Plan for next visit: Wound, ostomy and drain care    Patient and/or caregiver notified and agrees to changes in the Plan of Care YES      The following discharge planning was discussed with the pt/caregiver: D/C when wounds healed,drain removed and ostomy managed by CG

## 2022-07-02 VITALS
RESPIRATION RATE: 18 BRPM | SYSTOLIC BLOOD PRESSURE: 115 MMHG | HEART RATE: 70 BPM | DIASTOLIC BLOOD PRESSURE: 74 MMHG | TEMPERATURE: 98.7 F | OXYGEN SATURATION: 100 %

## 2022-07-04 ENCOUNTER — HOME CARE VISIT (OUTPATIENT)
Dept: SCHEDULING | Facility: HOME HEALTH | Age: 62
End: 2022-07-04
Payer: MEDICAID

## 2022-07-04 PROCEDURE — G0300 HHS/HOSPICE OF LPN EA 15 MIN: HCPCS

## 2022-07-05 VITALS
TEMPERATURE: 98.8 F | OXYGEN SATURATION: 100 % | HEART RATE: 65 BPM | DIASTOLIC BLOOD PRESSURE: 72 MMHG | SYSTOLIC BLOOD PRESSURE: 120 MMHG | RESPIRATION RATE: 16 BRPM

## 2022-07-05 NOTE — HOME HEALTH
Skilled reason for visit: Wound Care, Ostomy, Chavez,  Medication Management         Caregiver involvement: Mother manage medication, provide meals, schedule appointment, set up transpiration,PCA assist with ADL;s          Medications reviewed and all medications are available in the home this visit. The following education was provided regarding medications:  Lexapro Drug may cause nausea, somnolence, insomnia, fatigue       MD notified of any discrepancies/look a-like medications/medication interactions: na    Medications are effective at this time. Home health supplies by type and quantity ordered/delivered this visit include: n/a         Patient education provided this visit: Instruct patient/caregiver on how to empty drainage bag every 4-8 hours or when it becomes filled, keep drainage bag below bladder and off the floor at all times, when to increase hydration, keep catheter secured to your leg to prevent it from moving, don't lay on our catheter or block flow of urine in the tube, need to irrigate as ordered by MD, and wash daily to keep catheter area clean.   Sharps education provided: n/a         Patient level of understanding of education provided: Patient/CG verbalized complete understanding of education provided          Skilled Care Performed this visit: wound care, ostomy,Chavez  and drain site assessment education          Patient response to procedure performed:  Patient tolerated procedure well denied pain         Agency Progress toward goals: Continue promoting wound healing         Patient's Progress towards personal goals: Complete wound healing and prevent infections          Home exercise program: Report any s/s of infection          Continued need for the following skills: Nursing         Plan for next visit: Wound, ostomy and drain care         Patient and/or caregiver notified and agrees to changes in the Plan of Care YES           The following discharge planning was discussed with the pt/caregiver: D/C when wounds healed,drain removed and ostomy managed by CG

## 2022-07-06 ENCOUNTER — HOME CARE VISIT (OUTPATIENT)
Dept: HOME HEALTH SERVICES | Facility: HOME HEALTH | Age: 62
End: 2022-07-06
Payer: MEDICAID

## 2022-07-06 ENCOUNTER — HOME CARE VISIT (OUTPATIENT)
Dept: SCHEDULING | Facility: HOME HEALTH | Age: 62
End: 2022-07-06
Payer: MEDICAID

## 2022-07-06 ENCOUNTER — APPOINTMENT (OUTPATIENT)
Dept: CT IMAGING | Age: 62
End: 2022-07-06
Attending: STUDENT IN AN ORGANIZED HEALTH CARE EDUCATION/TRAINING PROGRAM
Payer: MEDICAID

## 2022-07-06 ENCOUNTER — HOSPITAL ENCOUNTER (EMERGENCY)
Age: 62
Discharge: HOME | End: 2022-07-07
Attending: STUDENT IN AN ORGANIZED HEALTH CARE EDUCATION/TRAINING PROGRAM
Payer: MEDICAID

## 2022-07-06 VITALS
BODY MASS INDEX: 25.67 KG/M2 | TEMPERATURE: 98 F | SYSTOLIC BLOOD PRESSURE: 101 MMHG | HEART RATE: 86 BPM | DIASTOLIC BLOOD PRESSURE: 65 MMHG | HEIGHT: 74 IN | WEIGHT: 200 LBS | OXYGEN SATURATION: 100 % | RESPIRATION RATE: 18 BRPM

## 2022-07-06 DIAGNOSIS — R11.2 NAUSEA AND VOMITING, UNSPECIFIED VOMITING TYPE: ICD-10-CM

## 2022-07-06 DIAGNOSIS — R10.84 ABDOMINAL PAIN, GENERALIZED: Primary | ICD-10-CM

## 2022-07-06 LAB
ALBUMIN SERPL-MCNC: 3 G/DL (ref 3.4–5)
ALBUMIN/GLOB SERPL: 0.5 {RATIO} (ref 0.8–1.7)
ALP SERPL-CCNC: 58 U/L (ref 45–117)
ALT SERPL-CCNC: 33 U/L (ref 16–61)
ANION GAP SERPL CALC-SCNC: 6 MMOL/L (ref 3–18)
APPEARANCE UR: ABNORMAL
AST SERPL-CCNC: 24 U/L (ref 10–38)
BACTERIA URNS QL MICRO: ABNORMAL /HPF
BASOPHILS # BLD: 0 K/UL (ref 0–0.1)
BASOPHILS NFR BLD: 0 % (ref 0–2)
BILIRUB SERPL-MCNC: 0.4 MG/DL (ref 0.2–1)
BILIRUB UR QL: ABNORMAL
BUN SERPL-MCNC: 21 MG/DL (ref 7–18)
BUN/CREAT SERPL: 16 (ref 12–20)
CALCIUM SERPL-MCNC: 8.8 MG/DL (ref 8.5–10.1)
CAOX CRY URNS QL MICRO: ABNORMAL
CHLORIDE SERPL-SCNC: 102 MMOL/L (ref 100–111)
CO2 SERPL-SCNC: 25 MMOL/L (ref 21–32)
COLOR UR: ABNORMAL
CREAT SERPL-MCNC: 1.29 MG/DL (ref 0.6–1.3)
DIFFERENTIAL METHOD BLD: ABNORMAL
EOSINOPHIL # BLD: 0.2 K/UL (ref 0–0.4)
EOSINOPHIL NFR BLD: 2 % (ref 0–5)
EPITH CASTS URNS QL MICRO: NEGATIVE /LPF (ref 0–5)
ERYTHROCYTE [DISTWIDTH] IN BLOOD BY AUTOMATED COUNT: 14.8 % (ref 11.6–14.5)
GLOBULIN SER CALC-MCNC: 5.7 G/DL (ref 2–4)
GLUCOSE SERPL-MCNC: 115 MG/DL (ref 74–99)
GLUCOSE UR STRIP.AUTO-MCNC: NEGATIVE MG/DL
HCT VFR BLD AUTO: 28 % (ref 36–48)
HGB BLD-MCNC: 8.8 G/DL (ref 13–16)
HGB UR QL STRIP: ABNORMAL
IMM GRANULOCYTES # BLD AUTO: 0 K/UL (ref 0–0.04)
IMM GRANULOCYTES NFR BLD AUTO: 0 % (ref 0–0.5)
KETONES UR QL STRIP.AUTO: NEGATIVE MG/DL
LACTATE BLD-SCNC: 1.78 MMOL/L (ref 0.4–2)
LEUKOCYTE ESTERASE UR QL STRIP.AUTO: ABNORMAL
LYMPHOCYTES # BLD: 1.2 K/UL (ref 0.9–3.6)
LYMPHOCYTES NFR BLD: 11 % (ref 21–52)
MCH RBC QN AUTO: 28.9 PG (ref 24–34)
MCHC RBC AUTO-ENTMCNC: 31.4 G/DL (ref 31–37)
MCV RBC AUTO: 91.8 FL (ref 78–100)
MONOCYTES # BLD: 0.5 K/UL (ref 0.05–1.2)
MONOCYTES NFR BLD: 4 % (ref 3–10)
NEUTS SEG # BLD: 9.4 K/UL (ref 1.8–8)
NEUTS SEG NFR BLD: 83 % (ref 40–73)
NITRITE UR QL STRIP.AUTO: NEGATIVE
NRBC # BLD: 0 K/UL (ref 0–0.01)
NRBC BLD-RTO: 0 PER 100 WBC
PH UR STRIP: 5.5 [PH] (ref 5–8)
PLATELET # BLD AUTO: 184 K/UL (ref 135–420)
PMV BLD AUTO: 10.2 FL (ref 9.2–11.8)
POTASSIUM SERPL-SCNC: 4.5 MMOL/L (ref 3.5–5.5)
PROT SERPL-MCNC: 8.7 G/DL (ref 6.4–8.2)
PROT UR STRIP-MCNC: 300 MG/DL
RBC # BLD AUTO: 3.05 M/UL (ref 4.35–5.65)
RBC #/AREA URNS HPF: ABNORMAL /HPF (ref 0–5)
SODIUM SERPL-SCNC: 133 MMOL/L (ref 136–145)
SP GR UR REFRACTOMETRY: 1.02 (ref 1–1.03)
UROBILINOGEN UR QL STRIP.AUTO: 1 EU/DL (ref 0.2–1)
WBC # BLD AUTO: 11.3 K/UL (ref 4.6–13.2)
WBC URNS QL MICRO: ABNORMAL /HPF (ref 0–4)
YEAST URNS QL MICRO: ABNORMAL

## 2022-07-06 PROCEDURE — 99284 EMERGENCY DEPT VISIT MOD MDM: CPT

## 2022-07-06 PROCEDURE — 74176 CT ABD & PELVIS W/O CONTRAST: CPT

## 2022-07-06 PROCEDURE — 87040 BLOOD CULTURE FOR BACTERIA: CPT

## 2022-07-06 PROCEDURE — G0152 HHCP-SERV OF OT,EA 15 MIN: HCPCS

## 2022-07-06 PROCEDURE — 83605 ASSAY OF LACTIC ACID: CPT

## 2022-07-06 PROCEDURE — 81001 URINALYSIS AUTO W/SCOPE: CPT

## 2022-07-06 PROCEDURE — 80053 COMPREHEN METABOLIC PANEL: CPT

## 2022-07-06 PROCEDURE — 96374 THER/PROPH/DIAG INJ IV PUSH: CPT

## 2022-07-06 PROCEDURE — 85025 COMPLETE CBC W/AUTO DIFF WBC: CPT

## 2022-07-06 PROCEDURE — 74011250636 HC RX REV CODE- 250/636: Performed by: STUDENT IN AN ORGANIZED HEALTH CARE EDUCATION/TRAINING PROGRAM

## 2022-07-06 RX ORDER — ONDANSETRON 2 MG/ML
4 INJECTION INTRAMUSCULAR; INTRAVENOUS ONCE
Status: COMPLETED | OUTPATIENT
Start: 2022-07-06 | End: 2022-07-06

## 2022-07-06 RX ADMIN — ONDANSETRON 4 MG: 2 INJECTION INTRAMUSCULAR; INTRAVENOUS at 13:43
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
## 2022-07-06 NOTE — DISCHARGE INSTRUCTIONS
Continue with your antibiotics as prescribed upon discharge. Please follow-up with your urologist and surgeon. Please return to the ER with any new or worsening symptoms or any other concerns. Please return to the Emergency Department if you develop a fever, chills, cannot eat or drink due to nausea or vomiting, or if any of your symptoms worsen. Also, It is extremely important that you follow-up with a primary care physician and if you do not have one currently use the contact information provided to obtain an appointment. If none was provided please call the number on the back of your insurance card to locate a Primary care doctor. Many offices have \"cancellation lists\" that you can ask to be placed on; should a patient with an earlier appointment cancel you will be notified to take their place. Please return to the Emergency Room immediately if your symptoms worsen. Please carefully read all discharge instructions    [email or web address]    What are GoodRx coupons? GoodRx coupons will help you pay less than the cash price for your prescription. [employer] free to use and are accepted at virtually every [place] pharmacy.   Your pharmacist will know how to enter the codes on the coupon to pull up the lowest discount available

## 2022-07-06 NOTE — PROGRESS NOTES
Patient had ID and podiatry appointments for today at [address] by mother and caregiver that he was taken to the ED instead  Available if needed.

## 2022-07-06 NOTE — ED TRIAGE NOTES
Pt arrived via EMS from home for reported abdominal pain. Pt reports nausea and vomiting since last night.   Pt denies abdominal pain

## 2022-07-06 NOTE — ED NOTES
Patient reevaluated appears comfortable in bed. Discussed CT scan results with patient that shows interval improvement of inflammation in right lower quadrant status post drain is a PERC tube. Patient with UA that shows likely chronically infected urine, last culture seen is susceptible to Bactrim which patient is on for another 7 days with Augmentin. Discussed this with patient that he should continue his p.o. antibiotics and talk to his urologist about possibly changing his suprapubic catheter. Patient understands and is in agreement with plan.

## 2022-07-06 NOTE — Clinical Note
OT evaluation completed 7.6. 22. pt and caregiver declining OT due to being at functional baseline. pt performing ADL tasks at prior level of min A to max A, transfers to perform functional tasks at prior level of mod A per pt and caregiver report. pt declined performing transfers this day. pt caregiver reports providing same level of A for pt as prior to hospitalization. pt demonstrated I UB HEP. pt presents with good BUE AROM and functional strength of 5/5 MMT. D/C OT due to no skilled need secondary to pt at prior functional level. pt and caregiver agree D/C OT.  D/C pt to care of Dr Devlin Pro

## 2022-07-06 NOTE — ED NOTES
Verbal shift change report given to Dee (oncoming nurse) by Robbin Yen (offgoing nurse). Report included the following information SBAR, ED Summary and MAR.

## 2022-07-06 NOTE — HOME HEALTH
Skilled reason for visit: Wound Care, Ostomy, Chavez,  Medication Management       Caregiver involvement: Mother manage medication, provide meals, schedule appointment, set up transpiration,PCA assist with ADL;s        Medications reviewed and all medications are available in the home this visit. The following education was provided regarding medications: Mother manage medication no question or concern all medication longtern with exception of ABT           MD notified of any discrepancies/look a-like medications/medication interactions: na         Medications are effective at this time. Home health supplies by type and quantity ordered/delivered this visit include: n/a        Patient education provided this visit: Instruct patient/caregiver strategies to prevent infection frequent/proper hand-washing techniques, Standard precautions, avoid crowds and persons with known infections, staying current with immunizations, s/s of infection, use of incentive spirometer, use of antibiotics and encourage adequate diet and fluid intake.   Sharps education provided: n/a        Patient level of understanding of education provided: Patient/CG verbalized complete understanding of education provided       Skilled Care Performed this visit: wound care, ostomy,Chavez,education         Patient response to procedure performed:  Patient tolerated procedure well denied pain    Agency Progress toward goals: Continue promoting wound healing      Patient's Progress towards personal goals: Complete wound healing and prevent infections       Home exercise program: Report any s/s of infection       Continued need for the following skills: Nursing        Plan for next visit: Wound, ostomy care    Patient and/or caregiver notified and agrees to changes in the Plan of Care YES        The following discharge planning was discussed with the pt/caregiver: D/C when wounds healed,drain removed and ostomy managed by CG

## 2022-07-07 VITALS
HEART RATE: 78 BPM | SYSTOLIC BLOOD PRESSURE: 101 MMHG | OXYGEN SATURATION: 98 % | TEMPERATURE: 99 F | DIASTOLIC BLOOD PRESSURE: 71 MMHG | RESPIRATION RATE: 16 BRPM
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
## 2022-07-08 ENCOUNTER — HOME CARE VISIT (OUTPATIENT)
Dept: SCHEDULING | Facility: HOME HEALTH | Age: 62
End: 2022-07-08
Payer: MEDICAID

## 2022-07-08 PROCEDURE — G0300 HHS/HOSPICE OF LPN EA 15 MIN: HCPCS

## 2022-07-09 ENCOUNTER — HOSPITAL ENCOUNTER (EMERGENCY)
Age: 62
Discharge: HOME | End: 2022-07-10
Attending: STUDENT IN AN ORGANIZED HEALTH CARE EDUCATION/TRAINING PROGRAM
Payer: MEDICAID

## 2022-07-09 ENCOUNTER — APPOINTMENT (OUTPATIENT)
Dept: GENERAL RADIOLOGY | Age: 62
End: 2022-07-09
Attending: STUDENT IN AN ORGANIZED HEALTH CARE EDUCATION/TRAINING PROGRAM
Payer: MEDICAID

## 2022-07-09 DIAGNOSIS — T83.010A SUPRAPUBIC CATHETER DYSFUNCTION, INITIAL ENCOUNTER (HCC): Primary | ICD-10-CM

## 2022-07-09 PROCEDURE — 51705 CHANGE OF BLADDER TUBE: CPT

## 2022-07-09 PROCEDURE — 74018 RADEX ABDOMEN 1 VIEW: CPT

## 2022-07-09 PROCEDURE — 99284 EMERGENCY DEPT VISIT MOD MDM: CPT

## 2022-07-10 VITALS
TEMPERATURE: 98.1 F | SYSTOLIC BLOOD PRESSURE: 128 MMHG | BODY MASS INDEX: 25.67 KG/M2 | DIASTOLIC BLOOD PRESSURE: 68 MMHG | HEIGHT: 74 IN | RESPIRATION RATE: 16 BRPM | WEIGHT: 200 LBS | OXYGEN SATURATION: 99 % | HEART RATE: 62 BPM

## 2022-07-10 PROCEDURE — 87086 URINE CULTURE/COLONY COUNT: CPT

## 2022-07-10 NOTE — DISCHARGE INSTRUCTIONS
Please call your urologist to confirm your upcoming appointment. If you develop any sudden change in your symptoms including sudden/severe pain, repeated dysfunction of your catheter, fever/chills, or any other sudden/severe change in your condition please return immediately to emergency department for further evaluation and treatment.

## 2022-07-10 NOTE — ED NOTES
Assumed care of patient. Laying supine, eyes closed. Suprapubic urine catheter being replaced. Blankets on side of patient. Sterile cover over patient's legs. Cardiac, SpO2 and blood pressure not monitored. Skin is warm and dry to touch. No respiratory distress observed. Will continue to monitor.

## 2022-07-10 NOTE — ED NOTES
Old stat lock removed from right thigh, cleaned and dried. Skin clean, dry and intact. New stat lock placed on right thigh and suprpubic cath secured.

## 2022-07-10 NOTE — ED NOTES
Patient discharge to home. All personal belongings given to patient. Escorted via stretcher to ambulance. Transfer of patient care report given to Michelle Bess, [id number] [address].

## 2022-07-10 NOTE — ED NOTES
KUB without any dilation of the abdomen and Chavez catheter appeared in place. Bedside ultrasound confirmed that Chavez bulb is in place but bladder appears full so possibly catheter is blocked leading to leakage around the catheter. Discussed case with on-call urologist Dr. Travis Lawler who recommends trying to flush the catheter to dislodge any blockage. If unsuccessful he states that he cannot see the patient in the emergency department tomorrow likely in the late morning or early afternoon after he finishes his morning cases.
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
## 2022-07-10 NOTE — PROCEDURES
PROCEDURE:    Pre procedure diagnosis: Obstructed SP tube  Post: Same    Procedure: Suprapubic tube change    Details:  Attempts to irrigate the current SP tube were unsuccessful. Bladder scan showed 700 cc in the bladder. I attempted to pass a guidewire through the current SP tube but met resistance. Therefore the current tube was removed. Under near sterile conditions a new 20 [place] SP tube was replaced without difficulty with approximately 800 cc of urine obtained. 10 cc was placed in the balloon.     Patient tolerated well
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
## 2022-07-11 ENCOUNTER — HOME CARE VISIT (OUTPATIENT)
Dept: SCHEDULING | Facility: HOME HEALTH | Age: 62
End: 2022-07-11
Payer: MEDICAID

## 2022-07-11 VITALS — SYSTOLIC BLOOD PRESSURE: 104 MMHG | HEART RATE: 85 BPM | TEMPERATURE: 97.8 F | DIASTOLIC BLOOD PRESSURE: 64 MMHG

## 2022-07-11 LAB
BACTERIA SPEC CULT: NORMAL
SERVICE CMNT-IMP: NORMAL

## 2022-07-11 PROCEDURE — A6212 FOAM DRG <=16 SQ IN W/BORDER: HCPCS

## 2022-07-11 PROCEDURE — A6213 FOAM DRG >16<=48 SQ IN W/BDR: HCPCS

## 2022-07-11 PROCEDURE — G0300 HHS/HOSPICE OF LPN EA 15 MIN: HCPCS

## 2022-07-12 VITALS
DIASTOLIC BLOOD PRESSURE: 67 MMHG | HEART RATE: 78 BPM | SYSTOLIC BLOOD PRESSURE: 117 MMHG | TEMPERATURE: 97.8 F | OXYGEN SATURATION: 100 % | RESPIRATION RATE: 18 BRPM

## 2022-07-12 NOTE — HOME HEALTH
Skilled reason for visit: Wound Care, Ostomy, Suppubic cath ,  Medication Management              Caregiver involvement: Mother manage medication, provide meals, schedule appointment, set up transpiration,PCA assist with ADL;s               Medications reviewed and all medications are available in the home this visit. The following education was provided regarding medications: Mother manage medication no question or concern all medication davy CERON notified of any discrepancies/look a-like medications/medication interactions: na      Medications are effective at this time. Home health supplies by type and quantity ordered/delivered this visit include: n/a           Patient education provided this visit Instruct patient/caregiver on how to empty drainage bag every 4-8 hours or when it becomes filled, keep drainage bag below bladder and off the floor at all times, when to increase hydration, keep catheter secured to your leg to prevent it from moving, don't lay on our catheter or block flow of urine in the tube, and wash daily to keep catheter area clean.   Sharps education provided: n/a                   Patient level of understanding of education provided: Patient/CG verbalized complete understanding of education provided               Skilled Care Performed this visit: wound care, ostomy,cath care ,education                    Patient response to procedure performed:  Patient tolerated procedure well denied pain         Agency Progress toward goals: Continue promoting wound healing              Patient's Progress towards personal goals: Complete wound healing and prevent infections               Home exercise program: Report any s/s of infection Monitor cath site for leaking and report to urologist              Continued need for the following skills: Nursing       Plan for next visit: Wound, ostomy care         Patient and/or caregiver notified and agrees to changes in the Plan of Care YES          The following discharge planning was discussed with the pt/caregiver: D/C when wounds healed,drain removed and ostomy managed by CG

## 2022-07-12 NOTE — HOME HEALTH
Skilled reason for visit: Wound Care, Ostomy, Cath ,  Medication Management       Caregiver involvement: Mother manage medication, provide meals, schedule appointment, set up transpiration,PCA assist with ADL;s        Medications reviewed and all medications are available in the home this visit. The following education was provided regarding medications: Mother manage medication no question or concern all medication davy CERON notified of any discrepancies/look a-like medications/medication interactions: na       Medications are effective at this time. Home health supplies by type and quantity ordered/delivered this visit include: n/a         Patient education provided this visit: Instruct patient/caregiver in strategies to prevent urinary tract infection including clean around catheter site daily, clean hands before and after touching catheter, positioning the drainage bag to keep below the level of the patient's bladder, and increase fluid intake. Instruct patient/caregiver on how to recognize signs and symptoms of infection: including elevated temperature, changes in mental status or confusion, pain, foul smelling urine, urine that has changed in color or clarity; and when to notify Home Care agency and/or physician. Monitor suprapubic cath site  for  S/S of infection; redness, swelling, and open skin with drainage & [facility] or [facility] if s/s present.   Sharps education provided: n/a        Patient level of understanding of education provided: Patient/CG verbalized complete understanding of education provided        Skilled Care Performed this visit: wound care, ostomy,Chavez,education        Patient response to procedure performed:  Patient tolerated procedure well denied pain         Agency Progress toward goals: Continue promoting wound healing    Patient's Progress towards personal goals: Complete wound healing and prevent infections        Home exercise program: Report any s/s of infection or leaking a cath site         Continued need for the following skills: Nursing         Plan for next visit: Wound, ostomy care         Patient and/or caregiver notified and agrees to changes in the Plan of Care YES          The following discharge planning was discussed with the pt/caregiver: D/C when wounds healed,drain removed and ostomy managed by CG

## 2022-07-13 ENCOUNTER — HOME CARE VISIT (OUTPATIENT)
Dept: SCHEDULING | Facility: HOME HEALTH | Age: 62
End: 2022-07-13
Payer: MEDICAID

## 2022-07-13 PROCEDURE — G0300 HHS/HOSPICE OF LPN EA 15 MIN: HCPCS

## 2022-07-15 ENCOUNTER — HOME CARE VISIT (OUTPATIENT)
Dept: SCHEDULING | Facility: HOME HEALTH | Age: 62
End: 2022-07-15
Payer: MEDICAID

## 2022-07-15 PROCEDURE — G0300 HHS/HOSPICE OF LPN EA 15 MIN: HCPCS

## 2022-07-18 ENCOUNTER — HOSPITAL ENCOUNTER (OUTPATIENT)
Dept: LAB | Age: 62
Discharge: HOME | End: 2022-07-18

## 2022-07-18 ENCOUNTER — HOME CARE VISIT (OUTPATIENT)
Dept: SCHEDULING | Facility: HOME HEALTH | Age: 62
End: 2022-07-18
Payer: MEDICAID

## 2022-07-18 VITALS
TEMPERATURE: 97.8 F | SYSTOLIC BLOOD PRESSURE: 112 MMHG | OXYGEN SATURATION: 100 % | RESPIRATION RATE: 17 BRPM | OXYGEN SATURATION: 100 % | SYSTOLIC BLOOD PRESSURE: 131 MMHG | HEART RATE: 87 BPM | DIASTOLIC BLOOD PRESSURE: 78 MMHG | DIASTOLIC BLOOD PRESSURE: 80 MMHG | HEART RATE: 76 BPM | TEMPERATURE: 98.5 F | RESPIRATION RATE: 17 BRPM

## 2022-07-18 LAB — XX-LABCORP SPECIMEN COL,LCBCF: NORMAL

## 2022-07-18 PROCEDURE — G0300 HHS/HOSPICE OF LPN EA 15 MIN: HCPCS

## 2022-07-18 PROCEDURE — 99001 SPECIMEN HANDLING PT-LAB: CPT

## 2022-07-18 NOTE — HOME HEALTH
Skilled reason for visit: Wound Care, Ostomy, Cath ,  Medication Management        Caregiver involvement: Mother manage medication, provide meals, schedule appointment, set up transpiration,PCA assist with ADL;s       Medications reviewed and all medications are available in the home this visit. The following education was provided regarding medications: Mother manage medication no question or concern all medication davy CERON notified of any discrepancies/look a-like medications/medication interactions: na        Medications are effective at this time. Home health supplies by type and quantity ordered/delivered this visit include: n/a       Patient education provided this visit: Supra pubic continue to leak Patient will be seen at urology on Monday. Instructed CG to keep Patient dry as possible to avoid skin irritation and damage to sacral wound.              Sharps education provided: n/a         Patient level of understanding of education provided: Patient/CG verbalized complete understanding of education provided       Skilled Care Performed this visit: wound care, ostomy,Chavez,education       Patient response to procedure performed:  Patient tolerated procedure well denied pain       Agency Progress toward goals: Continue promoting wound healing         Patient's Progress towards personal goals: Complete wound healing and prevent infections               Home exercise program: Report any s/s of infection or leaking a cath site                    Continued need for the following skills: Nursing       Plan for next visit: Wound, ostomy care       Patient and/or caregiver notified and agrees to changes in the Plan of Care YES           The following discharge planning was discussed with the pt/caregiver: D/C when wounds healed,drain removed and ostomy managed by CG

## 2022-07-18 NOTE — HOME HEALTH
Skilled reason for visit: Wound Care, Ostomy, Supra pubic cath ,  Medication Management     Caregiver involvement: Mother manage medication, provide meals, schedule appointment, set up transportaton PCA assist with ADL;s        Medications reviewed and all medications are available in the home this visit. The following education was provided regarding medications: Mother manage medication no question or concern all medication davy CERON notified of any discrepancies/look a-like medications/medication interactions: na        Medications are effective at this time. Home health supplies by type and quantity ordered/delivered this visit include: n/a        Patient education provided this visit instruct patient/caregiver on importance of adequate nutrition and hydration for wound healing. Healthy foods give your body the nutrients it needs to heal wounds. Protein foods like meat, fish, nuts, and soy products are important to wound healing. In addition to protein, calories, vitamin C, and zinc help wounds heal. Liquids prevent dehydration that can decrease the blood supply to wounds. Always follow physician recommended diet in regards to patient condition. Instruct on other factors that affect wound healing such as: maintaining general hygiene, not smoking or using tobacco products, offloading pressure and pressure relieving devices.   Sharps education provided: n/a      Patient level of understanding of education provided: Patient/CG verbalized complete understanding of education provided        Skilled Care Performed this visit: wound care, ostomy,cath care ,education       Patient response to procedure performed:  Patient tolerated procedure well denied pain      Agency Progress toward goals: Continue promoting wound healing      Patient's Progress towards personal goals: Complete wound healing and prevent infections       Home exercise program: Report any s/s of infection Monitor cath site for leaking and report to urologist      Continued need for the following skills: Nursing      Plan for next visit: Wound, ostomy care    Patient and/or caregiver notified and agrees to changes in the Plan of Care YES        The following discharge planning was discussed with the pt/caregiver: D/C when wounds healed,drain removed and ostomy managed by CG

## 2022-07-19 VITALS
HEART RATE: 78 BPM | SYSTOLIC BLOOD PRESSURE: 117 MMHG | DIASTOLIC BLOOD PRESSURE: 20 MMHG | OXYGEN SATURATION: 100 % | RESPIRATION RATE: 17 BRPM | TEMPERATURE: 98.8 F

## 2022-07-20 ENCOUNTER — HOME CARE VISIT (OUTPATIENT)
Dept: SCHEDULING | Facility: HOME HEALTH | Age: 62
End: 2022-07-20
Payer: MEDICAID

## 2022-07-20 PROCEDURE — G0300 HHS/HOSPICE OF LPN EA 15 MIN: HCPCS

## 2022-07-20 NOTE — HOME HEALTH
Skilled reason for visit: Wound Care, Ostomy, Cath ,  Medication Management       Caregiver involvement: Mother manage medication, provide meals, schedule appointment, set up transpiration,PCA assist with ADL;s       Medications reviewed and all medications are available in the home this visit. The following education was provided regarding medications: Mother manage medication no question or concern all medication davy CERON notified of any discrepancies/look a-like medications/medication interactions: na       Medications are effective at this time. Home health supplies by type and quantity ordered/delivered this visit include: n/a       Patient education provided this visit: patient made aware to turn every 2 hours and to keep pressure off of bony prominences, to monitor for any pressure ulcer development/worsening  . [place] education provided: n/a      Patient level of understanding of education provided: Patient/CG verbalized complete understanding of education provided          Skilled Care Performed this visit: wound care, ostomy,Chavez,education venipuncture x1 to right AF for multiple labs. specimen obtained, needle removed, pressure held and band aid applied. patient tolerated procedure without incident  labs to Kristin Guy.           Patient response to procedure performed:  Patient tolerated procedure well denied pain      Agency Progress toward goals: Continue promoting wound healing         Patient's Progress towards personal goals: Complete wound healing and prevent infections       Home exercise program: Report any s/s of infection or leaking a cath site         Continued need for the following skills: Nursing         Plan for next visit: Wound, ostomy care       Patient and/or caregiver notified and agrees to changes in the Plan of Care YES          The following discharge planning was discussed with the pt/caregiver: D/C when wounds healed,drain removed and ostomy managed by CG

## 2022-07-21 VITALS
OXYGEN SATURATION: 100 % | HEART RATE: 76 BPM | TEMPERATURE: 98.7 F | SYSTOLIC BLOOD PRESSURE: 118 MMHG | DIASTOLIC BLOOD PRESSURE: 72 MMHG | RESPIRATION RATE: 18 BRPM

## 2022-07-22 ENCOUNTER — HOME CARE VISIT (OUTPATIENT)
Dept: SCHEDULING | Facility: HOME HEALTH | Age: 62
End: 2022-07-22
Payer: MEDICAID

## 2022-07-22 PROCEDURE — G0300 HHS/HOSPICE OF LPN EA 15 MIN: HCPCS

## 2022-07-22 NOTE — HOME HEALTH
Skilled reason for visit: Wound Care, Ostomy, Cath ,  Medication Management              Caregiver involvement: Mother manage medication, provide meals, schedule appointment, set up transpiration,PCA assist with ADL;s               Medications reviewed and all medications are available in the home this visit. The following education was provided regarding medications: Mother manage medication no question or concern all medication davy CERON notified of any discrepancies/look a-like medications/medication interactions: na              Medications are effective at this time. Home health supplies by type and quantity ordered/delivered this visit include: n/a              Patient education provided this visit; Instructed CG/Patient drinking enough water each day is crucial for many reasons, to regulate body temp., keep joints lubricated, prevent infections, deliver nutrients to cells and keep organs functioning properly. Being well-hydrated also improves sleep quality, cognition and mood.         Shar[place] education provided: n/a              Patient level of understanding of education provided: Patient/CG verbalized complete understanding of education provided         Skilled Care Performed this visit: wound care, ostomy,education        Patient response to procedure performed:  Patient tolerated procedure well denied pain    Agency Progress toward goals: Continue promoting wound healing         Patient's Progress towards personal goals: Complete wound healing and prevent infections        Home exercise program: Report any s/s of infection or leaking a cath site       Continued need for the following skills: Nursing         Plan for next visit: Wound, ostomy care      Patient and/or caregiver notified and agrees to changes in the Plan of Care YES         The following discharge planning was discussed with the pt/caregiver: D/C when wounds healed,drain removed and ostomy managed by CG

## 2022-07-25 ENCOUNTER — HOME CARE VISIT (OUTPATIENT)
Dept: SCHEDULING | Facility: HOME HEALTH | Age: 62
End: 2022-07-25
Payer: MEDICAID

## 2022-07-25 PROCEDURE — G0300 HHS/HOSPICE OF LPN EA 15 MIN: HCPCS

## 2022-07-26 VITALS
DIASTOLIC BLOOD PRESSURE: 74 MMHG | HEART RATE: 67 BPM | SYSTOLIC BLOOD PRESSURE: 117 MMHG | TEMPERATURE: 98.8 F | OXYGEN SATURATION: 100 % | RESPIRATION RATE: 18 BRPM

## 2022-07-26 NOTE — HOME HEALTH
Skilled reason for visit: Wound Care, Ostomy, Cath ,  Medication Management      Caregiver involvement: Mother manage medication, provide meals, schedule appointment, set up transpiration,PCA assist with ADL;s       Medications reviewed and all medications are available in the home this visit. The following education was provided regarding medications: Mother manage medication no question or concern all medication davy CERON notified of any discrepancies/look a-like medications/medication interactions: na      Medications are effective at this time. Home health supplies by type and quantity ordered/delivered this visit include: n/a         Patient education provided this visit;  reviewed low sodium diet- patient aware to limit sodium, no added sodium to diet. reviewed foods to avoid, how to order foods when eating out, how to read nutrition labels and measure sodium intake.  discussed importance of monitoring blood pressure daily and recording for review, discussed hypertension, causes/long term effects of uncontrolled hypertension  Sharps education provided: n/a       Patient level of understanding of education provided: Patient/CG verbalized complete understanding of education provided          Skilled Care Performed this visit: wound care, ostomy,education         Patient response to procedure performed:  Patient tolerated procedure well denied pain         Agency Progress toward goals: Continue promoting wound healing         Patient's Progress towards personal goals: Complete wound healing and prevent infections        Home exercise program: Report any s/s of infection or leaking a cath site        Continued need for the following skills: Nursing         Plan for next visit: Wound, ostomy care        Patient and/or caregiver notified and agrees to changes in the Plan of Care YES           The following discharge planning was discussed with the pt/caregiver: D/C when wounds healed,drain removed and ostomy managed by CG

## 2022-07-27 ENCOUNTER — HOME CARE VISIT (OUTPATIENT)
Dept: SCHEDULING | Facility: HOME HEALTH | Age: 62
End: 2022-07-27
Payer: MEDICAID

## 2022-07-27 PROCEDURE — G0300 HHS/HOSPICE OF LPN EA 15 MIN: HCPCS

## 2022-07-27 PROCEDURE — 400016 HH ROC

## 2022-07-28 ENCOUNTER — TELEPHONE (OUTPATIENT)
Dept: INFECTIOUS DISEASES | Age: 62
End: 2022-07-28

## 2022-07-28 VITALS
OXYGEN SATURATION: 100 % | SYSTOLIC BLOOD PRESSURE: 126 MMHG | HEART RATE: 69 BPM | RESPIRATION RATE: 18 BRPM | TEMPERATURE: 98.7 F | DIASTOLIC BLOOD PRESSURE: 75 MMHG

## 2022-07-28 NOTE — TELEPHONE ENCOUNTER
Received call from patient's TRAY regarding concern for increased drainage from his sacral wound. Would also like to further discuss abdominal wound and recent treatment.      Appointment arranged for Thursday 8/4 at 11:30 pm.

## 2022-07-28 NOTE — HOME HEALTH
Skilled reason for visit: Wound Care, Ostomy, Suppubic cath ,  Medication Management      Caregiver involvement: Mother manage medication, provide meals, schedule appointment, set up transpiration,PCA assist with ADL;s       Medications reviewed and all medications are available in the home this visit. The following education was provided regarding medications: Mother manage medication no question or concern all medication davy CERON notified of any discrepancies/look a-like medications/medication interactions: n/a      Medications are effective at this time. Home health supplies by type and quantity ordered/delivered this visit include: n/a       Patient education provided this visit  instruct patient/caregiver that hypertension is high blood pressure. Blood pressure is the force of blood moving against the walls of the arteries. A health care provider will report the blood pressure reading in 2 numbers. The top number is the pressure inside the arteries when the heart is hafsa, and the bottom number is the pressure inside the arteries when the heart is relaxed. Hypertension causes the blood pressure to get so high that the heart has to work harder than normal. This can damage the heart. The cause of hypertension may not be known. This is called essential or primary hypertension. Hypertension caused by another medical condition, such as kidney disease, is called secondary hypertension. There may be no signs and symptoms of hypertension or may include headache, blurred vision, chest pain, nose bleeds, dizziness, weakness, or trouble breathing. Hypertension is diagnosed after taking blood pressure readings at several doctor visits. If left untreated, hypertension increases the risk of heart attack, stroke, and kidney disease.     [place] education provided: n/a           Patient level of understanding of education provided: Patient/CG verbalized complete understanding of education provided Skilled Care Performed this visit: wound care, ostomy,cath care ,education       Patient response to procedure performed:  Patient tolerated procedure well denied pain       Agency Progress toward goals: Continue promoting wound healing      Patient's Progress towards personal goals: Complete wound healing and prevent infections       Home exercise program: Frequent turn and reposition         Continued need for the following skills: Nursing    Plan for next visit: Wound, ostomy care      Patient and/or caregiver notified and agrees to changes in the Plan of Care YES          The following discharge planning was discussed with the pt/caregiver: D/C when wounds healed,drain removed and ostomy managed by NIEVES

## 2022-07-29 ENCOUNTER — HOME CARE VISIT (OUTPATIENT)
Dept: SCHEDULING | Facility: HOME HEALTH | Age: 62
End: 2022-07-29
Payer: MEDICAID

## 2022-07-29 VITALS
DIASTOLIC BLOOD PRESSURE: 74 MMHG | TEMPERATURE: 97.4 F | SYSTOLIC BLOOD PRESSURE: 113 MMHG | RESPIRATION RATE: 16 BRPM | OXYGEN SATURATION: 100 % | HEART RATE: 98 BPM

## 2022-07-29 PROCEDURE — G0300 HHS/HOSPICE OF LPN EA 15 MIN: HCPCS

## 2022-07-29 NOTE — HOME HEALTH
Skilled reason for visit: Wound Care, Ostomy, Cath ,  Medication Management       Caregiver involvement: Mother manage medication, provide meals, schedule appointment, set up transpiration,PCA assist with ADL;s         Medications reviewed and all medications are available in the home this visit. The following education was provided regarding medications: Mother manage medication no question or concern all medication davy CERON notified of any discrepancies/look a-like medications/medication interactions: na      Medications are effective at this time.          Home health supplies by type and quantity ordered/delivered this visit include: n/a         Patient education provided this visit; patient made aware to turn every 2 hours and to keep pressure off of bony prominences, to monitor for any pressure ulcer development/worsening          Sharps education provided: n/a       Patient level of understanding of education provided: Patient/CG verbalized complete understanding of education provided        Skilled Care Performed this visit: wound care, ostomy,education               Patient response to procedure performed:  Patient tolerated procedure well denied pain         Agency Progress toward goals: Continue promoting wound healing        Patient's Progress towards personal goals: Complete wound healing and prevent infections        Home exercise program: Frequent reposition releve prressure sacrum     Continued need for the following skills: Nursing       Plan for next visit: Wound, ostomy care        Patient and/or caregiver notified and agrees to changes in the Plan of Care YES        The following discharge planning was discussed with the pt/caregiver: D/C when wounds healed,drain removed and ostomy managed by CG

## 2022-07-31 VITALS
DIASTOLIC BLOOD PRESSURE: 74 MMHG | SYSTOLIC BLOOD PRESSURE: 124 MMHG | TEMPERATURE: 98.7 F | HEART RATE: 70 BPM | OXYGEN SATURATION: 100 % | RESPIRATION RATE: 18 BRPM

## 2022-08-01 ENCOUNTER — HOME CARE VISIT (OUTPATIENT)
Dept: SCHEDULING | Facility: HOME HEALTH | Age: 62
End: 2022-08-01
Payer: MEDICAID

## 2022-08-01 PROCEDURE — G0300 HHS/HOSPICE OF LPN EA 15 MIN: HCPCS

## 2022-08-01 NOTE — HOME HEALTH
Skilled reason for visit: Wound Care, Ostomy, Cath ,  Medication Management                             Caregiver involvement: Mother manage medication, provide meals, schedule appointment, set up transpiration,PCA assist with ADL;s                              Medications reviewed and all medications are available in the home this visit. The following education was provided regarding medications: Mother manage medication no question or concern all medication davy CERON notified of any discrepancies/look a-like medications/medication interactions: na                             Medications are effective at this time. Home health supplies by type and quantity ordered/delivered this visit include: n/a                             Patient education provided this visit;  reviewed s/s of infection  including redness, odor to drainage, warmth at site, increased pain, temp > 101.         Sharps education provided: n/a       Patient level of understanding of education provided: Patient/CG verbalized complete understanding of education provided          Skilled Care Performed this visit: wound care, ostomy,education        Patient response to procedure performed:  Patient tolerated procedure well denied pain         Agency Progress toward goals: Continue promoting wound healing         Patient's Progress towards personal goals: Complete wound healing and prevent infections          Home exercise program: Frequent re positioning             Continued need for the following skills: Nursing        Plan for next visit: Wound, ostomy care      Patient and/or caregiver notified and agrees to changes in the Plan of Care YES          The following discharge planning was discussed with the pt/caregiver: D/C when wounds healed,drain removed and ostomy managed by CG

## 2022-08-03 ENCOUNTER — HOME CARE VISIT (OUTPATIENT)
Dept: SCHEDULING | Facility: HOME HEALTH | Age: 62
End: 2022-08-03
Payer: MEDICAID

## 2022-08-03 VITALS
TEMPERATURE: 98.7 F | SYSTOLIC BLOOD PRESSURE: 141 MMHG | RESPIRATION RATE: 18 BRPM | OXYGEN SATURATION: 100 % | DIASTOLIC BLOOD PRESSURE: 85 MMHG | HEART RATE: 60 BPM

## 2022-08-03 PROCEDURE — G0300 HHS/HOSPICE OF LPN EA 15 MIN: HCPCS

## 2022-08-03 NOTE — HOME HEALTH
Skilled reason for visit: Wound Care, Ostomy, Cath ,  Medication Management        Caregiver involvement: Mother manage medication, provide meals, schedule appointment, set up transpiration,PCA assist with ADL;s        Medications reviewed and all medications are available in the home this visit. The following education was provided regarding medications: Mother manage medication no question or concern all medication davy CERON notified of any discrepancies/look a-like medications/medication interactions: na         Medications are effective at this time. Home health supplies by type and quantity ordered/delivered this visit include: n/a        Patient education provided this visit; reviewed with  patient/caregiver on importance of adequate nutrition and hydration for wound healing. Healthy foods give your body the nutrients it needs to heal wounds. Protein foods like meat, fish, nuts, and soy products are important to wound healing. In addition to protein, calories, vitamin C, and zinc help wounds heal. Liquids prevent dehydration that can decrease the blood supply to wounds. Always follow physician recommended diet in regards to patient condition. Instruct on other factors that affect wound healing such as: maintaining general hygiene, not smoking or using tobacco products, offloading pressure and pressure relieving devices.    Sharps education provided: n/a       Patient level of understanding of education provided: Patient/CG verbalized complete understanding of education provided       Skilled Care Performed this visit: wound care, ostomy,education        Patient response to procedure performed:  Patient tolerated procedure well denied pain      Agency Progress toward goals: Continue promoting wound healing       Patient's Progress towards personal goals: Complete wound healing and prevent infections        Home exercise program: Frequent re positioning        Continued need for the following skills: Nursing        Plan for next visit: Wound, ostomy care       Patient and/or caregiver notified and agrees to changes in the Plan of Care YES          The following discharge planning was discussed with the pt/caregiver: D/C when wounds healed,drain removed and ostomy managed by CG

## 2022-08-04 ENCOUNTER — DOCUMENTATION ONLY (OUTPATIENT)
Dept: INFECTIOUS DISEASES | Age: 62
End: 2022-08-04
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
## 2022-08-05 ENCOUNTER — HOME CARE VISIT (OUTPATIENT)
Dept: SCHEDULING | Facility: HOME HEALTH | Age: 62
End: 2022-08-05
Payer: MEDICAID

## 2022-08-05 VITALS
HEART RATE: 78 BPM | SYSTOLIC BLOOD PRESSURE: 133 MMHG | OXYGEN SATURATION: 100 % | RESPIRATION RATE: 18 BRPM | TEMPERATURE: 98.8 F | DIASTOLIC BLOOD PRESSURE: 77 MMHG

## 2022-08-05 PROCEDURE — G0300 HHS/HOSPICE OF LPN EA 15 MIN: HCPCS

## 2022-08-05 NOTE — HOME HEALTH
Skilled reason for visit: Wound Care, Ostomy, Cath ,  Medication Management      Caregiver involvement: Mother manage medication, provide meals, schedule appointment, set up transpiration,PCA assist with ADL;s       Medications reviewed and all medications are available in the home this visit. The following education was provided regarding medications: Mother manage medication no question or concern all medication davy CERON notified of any discrepancies/look a-like medications/medication interactions: na           Medications are effective at this time. Home health supplies by type and quantity ordered/delivered this visit include: n/a                   Patient education provided this visitInstruct patient/caregiver to take medications as directed, even if feeling well. Medications for coronary artery disease are used to control blood pressure, cholesterol, chest pain, and prevent blood clots from forming. Follow a heart healthy diet. Stop smoking; nicotine and other chemicals in cigarettes can contribute to worsening disease. Be active and work with provider to create an exercise plan.      Sharps education provided: n/a              Patient level of understanding of education provided: Patient/CG verbalized complete understanding of education provided               Skilled Care Performed this visit: wound care, ostomy,education               Patient response to procedure performed:  Patient tolerated procedure well denied pain              Agency Progress toward goals: Continue promoting wound healing              Patient's Progress towards personal goals: Complete wound healing and prevent infections       Home exercise program: Frequent re positioning       Continued need for the following skills: Nursing      Plan for next visit: Wound, ostomy care         Patient and/or caregiver notified and agrees to changes in the Plan of Care YES             The following discharge planning was discussed with the pt/caregiver: D/C when wounds healed,and ostomy managed by CG

## 2022-08-08 ENCOUNTER — HOME CARE VISIT (OUTPATIENT)
Dept: SCHEDULING | Facility: HOME HEALTH | Age: 62
End: 2022-08-08
Payer: MEDICAID

## 2022-08-08 VITALS
TEMPERATURE: 97.8 F | HEART RATE: 78 BPM | DIASTOLIC BLOOD PRESSURE: 69 MMHG | SYSTOLIC BLOOD PRESSURE: 112 MMHG | OXYGEN SATURATION: 100 % | RESPIRATION RATE: 18 BRPM

## 2022-08-08 PROCEDURE — G0300 HHS/HOSPICE OF LPN EA 15 MIN: HCPCS

## 2022-08-09 VITALS
TEMPERATURE: 98.8 F | DIASTOLIC BLOOD PRESSURE: 79 MMHG | HEART RATE: 68 BPM | RESPIRATION RATE: 18 BRPM | SYSTOLIC BLOOD PRESSURE: 138 MMHG | OXYGEN SATURATION: 100 %

## 2022-08-09 NOTE — HOME HEALTH
Skilled reason for visit: Wound Care, Ostomy, Cath ,  Medication Management        Caregiver involvement: Mother manage medication, provide meals, schedule appointment, set up transpiration,PCA assist with ADL;s         Medications reviewed and all medications are available in the home this visit. The following education was provided regarding medications: Mother manage medication no question or concern all medication davy CERON notified of any discrepancies/look a-like medications/medication interactions: na           Medications are effective at this time. Home health supplies by type and quantity ordered/delivered this visit include: n/a       Patient education provided this visit instruct patient/caregiver on importance of adequate nutrition and hydration for wound healing. Healthy foods give your body the nutrients it needs to heal wounds. Protein foods like meat, fish, nuts, and soy products are important to wound healing. In addition to protein, calories, vitamin C, and zinc help wounds heal. Liquids prevent dehydration that can decrease the blood supply to wounds. Always follow physician recommended diet in regards to patient condition. Instruct on other factors that affect wound healing such as: maintaining general hygiene, not smoking or using tobacco products, offloading pressure and pressure relieving devices.   Sharps education provided: n/a            Patient level of understanding of education provided: Patient/CG verbalized complete understanding of education provided          Skilled Care Performed this visit: wound care, ostomy,education        Patient response to procedure performed:  Patient tolerated procedure well denied pain       Agency Progress toward goals: Continue promoting wound healing      Patient's Progress towards personal goals: Complete wound healing and prevent infections       Home exercise program: Frequent re positioning        Continued need for the following skills: Nursing              Plan for next visit: Wound, ostomy care       Patient and/or caregiver notified and agrees to changes in the Plan of Care YES              The following discharge planning was discussed with the pt/caregiver: D/C when wounds healed,and ostomy managed by CG

## 2022-08-10 ENCOUNTER — HOME CARE VISIT (OUTPATIENT)
Dept: SCHEDULING | Facility: HOME HEALTH | Age: 62
End: 2022-08-10
Payer: MEDICAID

## 2022-08-10 VITALS
HEART RATE: 66 BPM | DIASTOLIC BLOOD PRESSURE: 83 MMHG | TEMPERATURE: 98.9 F | SYSTOLIC BLOOD PRESSURE: 127 MMHG | RESPIRATION RATE: 18 BRPM | OXYGEN SATURATION: 100 %

## 2022-08-10 PROCEDURE — G0300 HHS/HOSPICE OF LPN EA 15 MIN: HCPCS

## 2022-08-11 NOTE — HOME HEALTH
Skilled reason for visit: Wound Care, Ostomy, Cath ,  Medication Management      Caregiver involvement: Mother manage medication, provide meals, schedule appointment, set up transpiration,PCA assist with ADL;s        Medications reviewed and all medications are available in the home this visit. The following education was provided regarding medications: Mother manage medication no question or concern all medication davy CERON notified of any discrepancies/look a-like medications/medication interactions: na    Medications are effective at this time.         Home health supplies by type and quantity ordered/delivered this visit include: n/a      Patient education provided this visit patient made aware to turn every 2 hours and to keep pressure off of bony prominences, to monitor for any pressure ulcer development/worsening  Sharps education provided: n/a         Patient level of understanding of education provided: Patient/CG verbalized complete understanding of education provided       Skilled Care Performed this visit: wound care, ostomy,education                              Patient response to procedure performed:  Patient tolerated procedure well denied pain                             Agency Progress toward goals: Continue promoting wound healing                             Patient's Progress towards personal goals: Complete wound healing and prevent infections                              Home exercise program: Frequent re positioning                    Continued need for the following skills: Nursing      Plan for next visit: Wound, ostomy care      Patient and/or caregiver notified and agrees to changes in the Plan of Care YES        The following discharge planning was discussed with the pt/caregiver: D/C when wounds healed,and ostomy managed by CG

## 2022-08-12 ENCOUNTER — HOME CARE VISIT (OUTPATIENT)
Dept: HOME HEALTH SERVICES | Facility: HOME HEALTH | Age: 62
End: 2022-08-12
Payer: MEDICAID

## 2022-08-14 NOTE — HOME HEALTH
Sandrita Tolbert LPN contacted patient to schedule visit and mother responded to call stating that patient passed away in his sleep and was found this morning.

## 2023-01-02 NOTE — PROGRESS NOTES
Shift Summary Note:  Assumed care of patient in bed awake and alert intermittently waking up and asking for something to drink. Received 1 unit of PRBC's without events. Also received one dose of prn hydralazine for elevated blood pressure. No change in status, ostomy draining liquid stool and back dressing changed during the shift. No change in status, call bell within reach.   Patient Vitals for the past 8 hrs:   Temp Pulse Resp BP SpO2   05/22/21 0341 97.6 °F (36.4 °C) 95 16 (!) 150/85 100 %   05/22/21 0250 98.9 °F (37.2 °C) 97 16 (!) 142/77 100 %   05/22/21 0150 98.1 °F (36.7 °C) 83 18 (!) 171/100 99 %   05/22/21 0120 99.1 °F (37.3 °C) 82 16 (!) 171/92 100 %   05/22/21 0105 97.9 °F (36.6 °C) 85 16 (!) 166/94 99 %   05/22/21 0052 98 °F (36.7 °C) 87 16 (!) 169/97 100 %   05/22/21 0048 97.9 °F (36.6 °C) 89 18 (!) 159/93 100 % 8

## 2023-03-23 NOTE — PROGRESS NOTES
General Surgery Note   Called this morning because the patient continued to ooze through the dressings overnight. He has received about 3 U PRBC over the past few days. I saw the patient at the bedside. With the assistance of the nurse, we took down all the dressings to the patient's sacral wound. The entire cavity was inspected. There is no area of bleeding or oozing identified. His dressing does have some serosang tint on it. But most likely from the just the fluid coming off the wound. There was nothing to bovie or suture. I left some instruments and vicryl stures, and portable bovie pen in a bucket in the patient's room. There are also additional hemostat agents in there as well. If there is any further issues, please call back. Dr. Dalton Rodriguez will be on call this weekend. Cephalexin Counseling: I counseled the patient regarding use of cephalexin as an antibiotic for prophylactic and/or therapeutic purposes. Cephalexin (commonly prescribed under brand name Keflex) is a cephalosporin antibiotic which is active against numerous classes of bacteria, including most skin bacteria. Side effects may include nausea, diarrhea, gastrointestinal upset, rash, hives, yeast infections, and in rare cases, hepatitis, kidney disease, seizures, fever, confusion, neurologic symptoms, and others. Patients with severe allergies to penicillin medications are cautioned that there is about a 10% incidence of cross-reactivity with cephalosporins. When possible, patients with penicillin allergies should use alternatives to cephalosporins for antibiotic therapy.

## 2023-09-22 NOTE — ROUTINE PROCESS
----- Message from Shana Lindsey MD sent at 9/13/2023  8:19 AM EDT -----  WBC and neutrophil count is still low. Please make sure patient already been seen by hematologist Dr. Wanda Mcgowan. Diabetes stable. Total cholesterol and LDL is worse. We can increase Lipitor 20 mg at bedtime. Repeat AST ALT in a month. Wound Prevention Checklist    Patient: Gabino Amato (55 y.o. male)  Date: 6/10/2022  Diagnosis: Septic shock ([facility] [address] [A41.9, R65.21] Septic shock ([facility] [address]    Precautions:         [x]  Heel prevention boots placed on patient    []  Patient turned q2h during shift    [x]  Lift team ordered    [x]  Patient on [facility] bed/Specialty bed    [x]  Each Wound is documented during shift (Stage, Color, drainage, odor, measurements, and dressings)    [x]  Dual skin checks done at bedside during shift report with SAFIA Frank RN

## 2024-05-14 NOTE — PROGRESS NOTES
Discharge orders noted. Chart reviewed . TABITHA Sanchez spoke with pt's mom yesterday and was decided that pt goes to [facility]TC un a nursing facility. Called pt's mother Mikhail Jacobs and she stated the plan is for pt to go to nursing facility that pt's wound cannot be cared for at home. She stated [employer] told her pt will be in the hospital over the weekend. Pt has no acceptance in [facility] and [facility] yet. Updated pt's nurse Warner Maier and she stated she will inform Dr. Corin Dockery. 1730: Spoke with Dr. Corin Dockery concerning CM's discussion with pt's mom. He stated he will contact pt and his mom and if pt is not accepted to long term care in a nursing facility by Monday, he will have to go home with home health. Case Management will continue to monitor for d/c needs.     TRINA WellsN RN  Care Management  Pager: [phone] [FreeTextEntry1] : annual physical [de-identified] : Pt here for annual physical. Runs for exercise. No cardiac complaints. Pt had flu vaccine and COVID booster this past season. UTD with PAP, mammo and cologuard. No complaints at present.
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
## 2025-03-05 NOTE — HOME HEALTH
Skilled reason for visit: Wound Care, Ostomy, Cath ,  Medication Management                   Caregiver involvement: Mother manage medication, provide meals, schedule appointment, set up transpiration,PCA assist with ADL;s                    Medications reviewed and all medications are available in the home this visit. The following education was provided regarding medications: Mother manage medication no question or concern all medication davy CERON notified of any discrepancies/look a-like medications/medication interactions: na      Medications are effective at this time. Home health supplies by type and quantity ordered/delivered this visit include: n/a           Patient education provided this visit patient/CG re educated on  to turn every 2 hours and to keep pressure off of bony prominences, to monitor for any pressure ulcer development/worsening. Sacral  wound increase in size r/t increase heat and sweating. Instructed to turn off sacrum to allow air to circulate frequently.   Patient seen by wound clinic no new orders noted   Sharps education provided: n/a           Patient level of understanding of education provided: Patient/CG verbalized complete understanding of education provided        Skilled Care Performed this visit: wound care, ostomy,education               Patient response to procedure performed:  Patient tolerated procedure well denied pain              Agency Progress toward goals: Continue promoting wound healing              Patient's Progress towards personal goals: Complete wound healing and prevent infections               Home exercise program: Frequent re positioning          Continued need for the following skills: Nursing         Plan for next visit: Wound, ostomy care      Patient and/or caregiver notified and agrees to changes in the Plan of Care YES           The following discharge planning was discussed with the pt/caregiver: D/C when wounds healed,and ostomy managed by CG None

## (undated) DEVICE — SUTURE PDS II SZ 1 L36IN ABSRB VLT L36MM CT-1 1/2 CIR Z347H

## (undated) DEVICE — SUTURE VCRL SZ 4-0 L27IN ABSRB UD L26MM SH 1/2 CIR J415H

## (undated) DEVICE — SOLUTION IV 1000ML 0.9% SOD CHL

## (undated) DEVICE — GARMENT,MEDLINE,DVT,INT,CALF,MED, GEN2: Brand: MEDLINE

## (undated) DEVICE — SYR 20ML LL STRL LF --

## (undated) DEVICE — DRESSING FOAM DISK DIA1IN H 7MM HYDRPHLC CHG IMPREG IN SL

## (undated) DEVICE — SPONGE LAP 18X18IN STRL -- 5/PK

## (undated) DEVICE — REM POLYHESIVE ADULT PATIENT RETURN ELECTRODE: Brand: VALLEYLAB

## (undated) DEVICE — NEEDLE HYPO 25GA L1.5IN BVL ORIENTED ECLIPSE

## (undated) DEVICE — AIRLIFE™ ADULT CUSHION NASAL CANNULA 14 FOOT (4.3) CRUSH-RESISTANT OXYGEN TUBING, AND U/CONNECT-IT ADAPTER: Brand: AIRLIFE™

## (undated) DEVICE — MAJOR SET UP PK

## (undated) DEVICE — KIT CLN UP BON SECOURS MARYV

## (undated) DEVICE — ENDOSCOPY PUMP TUBING/ CAP SET: Brand: ERBE

## (undated) DEVICE — MEDI-VAC NON-CONDUCTIVE SUCTION TUBING: Brand: CARDINAL HEALTH

## (undated) DEVICE — GOWN ISOL IMPERV UNIV, DISP, OPEN BACK, BLUE --

## (undated) DEVICE — PACK PROCEDURE SURG VASC CATH 161 MMC LF

## (undated) DEVICE — SNARE POLYP M W27MMXL240CM OVL STIFF DISP CAPTIVATOR

## (undated) DEVICE — CATHETER SUCT TR FL TIP 14FR W/ O CTRL

## (undated) DEVICE — DRAPE TOWEL: Brand: CONVERTORS

## (undated) DEVICE — CATHETER URETH 18FR BLLN 5CC SIL HYDRGEL 2 W F LUBRICIOUS

## (undated) DEVICE — SUTURE PERMAHAND SZ 2-0 L18IN NONABSORBABLE BLK L26MM SH C012D

## (undated) DEVICE — STAPLER INT L75MM CUT LN L73MM STPL LN L77MM BLU B FRM 8

## (undated) DEVICE — MEDI-VAC SUCTION HIGH CAPACITY: Brand: CARDINAL HEALTH

## (undated) DEVICE — BLADELESS OBTURATOR

## (undated) DEVICE — TRAY PREP DRY W/ PREM GLV 2 APPL 6 SPNG 2 UNDPD 1 OVERWRAP

## (undated) DEVICE — SYRINGE MED 25GA 3ML L5/8IN SUBQ PLAS W/ DETACH NDL SFTY

## (undated) DEVICE — 4-PORT MANIFOLD: Brand: NEPTUNE 2

## (undated) DEVICE — GAUZE, BORDER, 3"X6", 1.5"X4"PAD, STERIL: Brand: MEDLINE INDUSTRIES, INC.

## (undated) DEVICE — INTENDED FOR TISSUE SEPARATION, AND OTHER PROCEDURES THAT REQUIRE A SHARP SURGICAL BLADE TO PUNCTURE OR CUT.: Brand: BARD-PARKER SAFETY BLADES SIZE 10, STERILE

## (undated) DEVICE — Device

## (undated) DEVICE — COLUMN DRAPE

## (undated) DEVICE — SUTURE VCRL SZ 2-0 L27IN ABSRB UD L26MM SH 1/2 CIR J417H

## (undated) DEVICE — FORCEPS BX L240CM JAW DIA2.8MM L CAP W/ NDL MIC MESH TOOTH

## (undated) DEVICE — FLEX ADVANTAGE 3000CC: Brand: FLEX ADVANTAGE

## (undated) DEVICE — CATHETER,FOLEY,SILI-ELAST,LTX,20FR,10ML: Brand: MEDLINE

## (undated) DEVICE — SKIN PREP TRAY 4 COMPARTM TRAY: Brand: MEDLINE INDUSTRIES, INC.

## (undated) DEVICE — HEX-LOCKING BLADE ELECTRODE: Brand: EDGE

## (undated) DEVICE — GARMENT,MEDLINE,DVT,INTTHIGH,L, GEN2: Brand: MEDLINE

## (undated) DEVICE — APPLICATOR SCRB 26ML TEAL STRL -- CHLORAPREP 26ML

## (undated) DEVICE — AIRLIFE™ NASAL OXYGEN CANNULA CURVED, NONFLARED TIP WITH 14 FOOT (4.3 M) CRUSH-RESISTANT TUBING, OVER-THE-EAR STYLE: Brand: AIRLIFE™

## (undated) DEVICE — BLANKET WRM AD W50XL85.8IN PACU FULL BODY FORC AIR

## (undated) DEVICE — SYR 10ML LUER LOK 1/5ML GRAD --

## (undated) DEVICE — SEALER ENDOSCP NANO COAT OPN DIV CRV L JAW LIGASURE IMPACT

## (undated) DEVICE — BLADE ELECTRODE: Brand: EDGE

## (undated) DEVICE — FLUFF AND POLYMER UNDERPAD,EXTRA HEAVY: Brand: WINGS

## (undated) DEVICE — CANNULA ORIG TL CLR W FOAM CUSHIONS AND 14FT SUPL TB 3 CHN

## (undated) DEVICE — STERILE POLYISOPRENE POWDER-FREE SURGICAL GLOVES: Brand: PROTEXIS

## (undated) DEVICE — SUTURE ABSORBABLE BRAIDED 2-0 CT-1 27 IN UD VICRYL J259H

## (undated) DEVICE — SUT SLK 3-0 30IN SH BLK --

## (undated) DEVICE — STAPLER INT L100MM CUT LN L98MM STPL LN L102MM BLU B FRM 8

## (undated) DEVICE — STAPLER RELOAD SUREFORM 60 BLU -- DA VINCI XI

## (undated) DEVICE — SHEET, T, LAPAROTOMY, STERILE: Brand: MEDLINE

## (undated) DEVICE — SUT VCRL + 2-0 36IN CT1 UD --

## (undated) DEVICE — SOLUTION IRRIG 1000ML H2O STRL BLT

## (undated) DEVICE — COLOSTOMY/ILEOSTOMY KIT,FLEXWEAR: Brand: NEW IMAGE

## (undated) DEVICE — KIT MIC INTR PERC 4F 60CM SMTH -- VSI

## (undated) DEVICE — DRAIN SURG 15FR SIL RND CHN W/ TRCR FULL FLUT DBL WRP TRAD

## (undated) DEVICE — GAUZE,SPONGE,4"X4",16PLY,STRL,LF,10/TRAY: Brand: MEDLINE

## (undated) DEVICE — SUTURE CHROMIC GUT SZ 2-0 L27IN ABSRB BRN L26MM SH 1/2 CIR G123H

## (undated) DEVICE — PACK PROCEDURE SURG MAJ W/ BASIN LF

## (undated) DEVICE — 3M™ TEGADERM™ TRANSPARENT FILM DRESSING FRAME STYLE, 1626W, 4 IN X 4-3/4 IN (10 CM X 12 CM), 50/CT 4CT/CASE: Brand: 3M™ TEGADERM™

## (undated) DEVICE — SET FLD ADMIN 3 W STPCOCK FIX FEM L BOR 1IN

## (undated) DEVICE — GARMENT,MEDLINE,DVT,INT,THIGH,M, GEN2: Brand: MEDLINE

## (undated) DEVICE — RELOAD STPL L75MM OPN H3.8MM CLS 1.5MM WIRE DIA0.2MM REG

## (undated) DEVICE — 450 ML BOTTLE OF 0.05% CHLORHEXIDINE GLUCONATE IN 99.95% STERILE WATER FOR IRRIGATION, USP AND APPLICATOR.: Brand: IRRISEPT ANTIMICROBIAL WOUND LAVAGE

## (undated) DEVICE — SYR ART 700 CLEAR MARK 7 -- ARTERION

## (undated) DEVICE — ELECTRO LUBE IS A SINGLE PATIENT USE DEVICE THAT IS INTENDED TO BE USED ON ELECTROSURGICAL ELECTRODES TO REDUCE STICKING.: Brand: KEY SURGICAL ELECTRO LUBE

## (undated) DEVICE — COVER US PRB W15XL120CM W/ GEL RUBBERBAND TAPE STRP FLD GEN

## (undated) DEVICE — ARM DRAPE

## (undated) DEVICE — BANDAGE,GAUZE,BULKEE II,4.5"X4.1YD,STRL: Brand: MEDLINE

## (undated) DEVICE — SEAL UNIV 5-8MM DISP BX/10 -- DA VINCI XI - SNGL USE

## (undated) DEVICE — COVER,LIGHT HANDLE,FLX,1/PK: Brand: MEDLINE INDUSTRIES, INC.

## (undated) DEVICE — PAD,ABDOMINAL,8"X10",ST,LF: Brand: MEDLINE

## (undated) DEVICE — INTENDED FOR TISSUE SEPARATION, AND OTHER PROCEDURES THAT REQUIRE A SHARP SURGICAL BLADE TO PUNCTURE OR CUT.: Brand: BARD-PARKER ® CARBON RIB-BACK BLADES

## (undated) DEVICE — GOWN,SIRUS,FABRNF,XL,20/CS: Brand: MEDLINE

## (undated) DEVICE — ANGIOGRAPHY KIT CUST VASC

## (undated) DEVICE — SYRINGE,TOOMEY,IRRIGATION,70CC,STERILE: Brand: MEDLINE

## (undated) DEVICE — SUTURE VCRL SZ 3-0 L18IN ABSRB UD L26MM SH 1/2 CIR J864D

## (undated) DEVICE — BANDAGE COMPR XL KNEE ELBW TAN TUBIGRIP ARTHRO-PAD LTX

## (undated) DEVICE — VESSEL SEALER XI EXTENDED DISP -- VESSEL

## (undated) DEVICE — SYR 50ML SLIP TIP NSAF LF STRL --

## (undated) DEVICE — THREE-QUARTER SHEET: Brand: CONVERTORS

## (undated) DEVICE — REDUCER CANN ENDOWRIST 12-8MM -- DA VINCI XI - SNGL USE

## (undated) DEVICE — STAPLER 60: Brand: SUREFORM

## (undated) DEVICE — GAUZE BORDERED 4X4 --

## (undated) DEVICE — DRAINBAG,ANTI-REFLUX TOWER,L/F,2000ML,LL: Brand: MEDLINE